# Patient Record
Sex: FEMALE | Race: WHITE
[De-identification: names, ages, dates, MRNs, and addresses within clinical notes are randomized per-mention and may not be internally consistent; named-entity substitution may affect disease eponyms.]

---

## 2012-07-06 VITALS — SYSTOLIC BLOOD PRESSURE: 138 MMHG | DIASTOLIC BLOOD PRESSURE: 74 MMHG

## 2018-09-24 ENCOUNTER — HOSPITAL ENCOUNTER (OUTPATIENT)
Dept: HOSPITAL 97 - 2ND | Age: 78
Setting detail: OBSERVATION
LOS: 3 days | Discharge: HOME | End: 2018-09-27
Attending: INTERNAL MEDICINE | Admitting: INTERNAL MEDICINE
Payer: COMMERCIAL

## 2018-09-24 VITALS — BODY MASS INDEX: 34.9 KG/M2

## 2018-09-24 DIAGNOSIS — B96.20: ICD-10-CM

## 2018-09-24 DIAGNOSIS — Z23: ICD-10-CM

## 2018-09-24 DIAGNOSIS — Z88.0: ICD-10-CM

## 2018-09-24 DIAGNOSIS — R46.0: ICD-10-CM

## 2018-09-24 DIAGNOSIS — Z88.2: ICD-10-CM

## 2018-09-24 DIAGNOSIS — E11.9: ICD-10-CM

## 2018-09-24 DIAGNOSIS — M19.90: ICD-10-CM

## 2018-09-24 DIAGNOSIS — I10: ICD-10-CM

## 2018-09-24 DIAGNOSIS — N39.0: Primary | ICD-10-CM

## 2018-09-24 LAB
ALBUMIN SERPL BCP-MCNC: 3 G/DL (ref 3.4–5)
ALP SERPL-CCNC: 65 U/L (ref 45–117)
ALT SERPL W P-5'-P-CCNC: 15 U/L (ref 12–78)
AST SERPL W P-5'-P-CCNC: 20 U/L (ref 15–37)
BUN BLD-MCNC: 19 MG/DL (ref 7–18)
GLUCOSE SERPLBLD-MCNC: 89 MG/DL (ref 74–106)
HCT VFR BLD CALC: 41.1 % (ref 36–45)
INR BLD: 0.99
LYMPHOCYTES # SPEC AUTO: 2.6 K/UL (ref 0.7–4.9)
MAGNESIUM SERPL-MCNC: 2 MG/DL (ref 1.8–2.4)
MCH RBC QN AUTO: 33.6 PG (ref 27–35)
MCV RBC: 100.6 FL (ref 80–100)
PMV BLD: 8.8 FL (ref 7.6–11.3)
POTASSIUM SERPL-SCNC: 3.7 MMOL/L (ref 3.5–5.1)
RBC # BLD: 4.09 M/UL (ref 3.86–4.86)
TSH SERPL DL<=0.05 MIU/L-ACNC: 2.84 UIU/ML (ref 0.36–3.74)
UA COMPLETE W REFLEX CULTURE PNL UR: (no result)
UA DIPSTICK W REFLEX MICRO PNL UR: (no result)

## 2018-09-24 PROCEDURE — 36415 COLL VENOUS BLD VENIPUNCTURE: CPT

## 2018-09-24 PROCEDURE — 74177 CT ABD & PELVIS W/CONTRAST: CPT

## 2018-09-24 PROCEDURE — 93005 ELECTROCARDIOGRAM TRACING: CPT

## 2018-09-24 PROCEDURE — 80076 HEPATIC FUNCTION PANEL: CPT

## 2018-09-24 PROCEDURE — 87186 SC STD MICRODIL/AGAR DIL: CPT

## 2018-09-24 PROCEDURE — 87086 URINE CULTURE/COLONY COUNT: CPT

## 2018-09-24 PROCEDURE — 81015 MICROSCOPIC EXAM OF URINE: CPT

## 2018-09-24 PROCEDURE — 82607 VITAMIN B-12: CPT

## 2018-09-24 PROCEDURE — 82652 VIT D 1 25-DIHYDROXY: CPT

## 2018-09-24 PROCEDURE — 87088 URINE BACTERIA CULTURE: CPT

## 2018-09-24 PROCEDURE — 71046 X-RAY EXAM CHEST 2 VIEWS: CPT

## 2018-09-24 PROCEDURE — 85730 THROMBOPLASTIN TIME PARTIAL: CPT

## 2018-09-24 PROCEDURE — 87077 CULTURE AEROBIC IDENTIFY: CPT

## 2018-09-24 PROCEDURE — 85025 COMPLETE CBC W/AUTO DIFF WBC: CPT

## 2018-09-24 PROCEDURE — 84100 ASSAY OF PHOSPHORUS: CPT

## 2018-09-24 PROCEDURE — 83735 ASSAY OF MAGNESIUM: CPT

## 2018-09-24 PROCEDURE — 94640 AIRWAY INHALATION TREATMENT: CPT

## 2018-09-24 PROCEDURE — 87040 BLOOD CULTURE FOR BACTERIA: CPT

## 2018-09-24 PROCEDURE — 85610 PROTHROMBIN TIME: CPT

## 2018-09-24 PROCEDURE — 71045 X-RAY EXAM CHEST 1 VIEW: CPT

## 2018-09-24 PROCEDURE — 84443 ASSAY THYROID STIM HORMONE: CPT

## 2018-09-24 PROCEDURE — 81003 URINALYSIS AUTO W/O SCOPE: CPT

## 2018-09-24 PROCEDURE — 80048 BASIC METABOLIC PNL TOTAL CA: CPT

## 2018-09-24 RX ADMIN — TOPIRAMATE SCH MG: 100 TABLET, FILM COATED ORAL at 22:17

## 2018-09-24 RX ADMIN — SODIUM CHLORIDE SCH MLS: 0.45 INJECTION, SOLUTION INTRAVENOUS at 21:58

## 2018-09-24 RX ADMIN — QUETIAPINE FUMARATE SCH MG: 100 TABLET, FILM COATED ORAL at 22:17

## 2018-09-24 NOTE — RAD REPORT
EXAM DESCRIPTION:  Vanna Garcia (2 Views)9/24/2018 9:42 pm

 

CLINICAL HISTORY:  Cough

 

COMPARISON:  April 2018

 

FINDINGS:   The lungs appear clear of acute infiltrate. The heart is borderline enlarged

 

IMPRESSION:   No acute abnormalities displayed

## 2018-09-24 NOTE — RAD REPORT
EXAM DESCRIPTION:  CT - Abdomen   Pelvis W Contrast - 9/24/2018 9:29 pm

 

CLINICAL HISTORY:  Abdominal pain/urinary tract infection/dysuria

 

COMPARISON:  none.

 

TECHNIQUE:  Computed axial tomography of the abdomen pelvis was obtained. 100 cc Isovue-300 was admin
istered intravenously. Oral contrast was given

 

All CT scans are performed using dose optimization technique as appropriate and may include automated
 exposure control or mA/KV adjustment according to patient size.

 

FINDINGS:  An 11 millimeter left lower lobe nodule is unchanged T

 

He liver, pancreas, and adrenals appear unremarkable. Small renal cysts are present.

 

A 25 millimeter splenic cyst contains a small peripheral calcification

 

There is no evidence of diverticulitis.

 

The bladder wall is thickened with stranding in the adjacent fat

 

Postsurgical changes involve the lumbar spine.

 

A tiny umbilical hernia is seen

 

IMPRESSION:  Thickened bladder wall with stranding in the adjacent fat indicating a cystitis

 

11 millimeter left lower lobe nodule unchanged from April 2018. However it has enlarged from May 2011
 exam in which it measured 6 millimeters. As was previously mentioned a nuclear medicine PET-CT scan 
is recommended

## 2018-09-25 LAB
BUN BLD-MCNC: 16 MG/DL (ref 7–18)
GLUCOSE SERPLBLD-MCNC: 96 MG/DL (ref 74–106)
HCT VFR BLD CALC: 37.2 % (ref 36–45)
LYMPHOCYTES # SPEC AUTO: 2.3 K/UL (ref 0.7–4.9)
MAGNESIUM SERPL-MCNC: 1.9 MG/DL (ref 1.8–2.4)
MCH RBC QN AUTO: 33.8 PG (ref 27–35)
MCV RBC: 100.2 FL (ref 80–100)
PMV BLD: 8.8 FL (ref 7.6–11.3)
POTASSIUM SERPL-SCNC: 3.3 MMOL/L (ref 3.5–5.1)
RBC # BLD: 3.71 M/UL (ref 3.86–4.86)
UA COMPLETE W REFLEX CULTURE PNL UR: (no result)
UA DIPSTICK W REFLEX MICRO PNL UR: (no result)

## 2018-09-25 RX ADMIN — TOPIRAMATE SCH MG: 100 TABLET, FILM COATED ORAL at 10:18

## 2018-09-25 RX ADMIN — MEMANTINE HYDROCHLORIDE SCH MG: 10 TABLET ORAL at 21:18

## 2018-09-25 RX ADMIN — TRAMADOL HYDROCHLORIDE SCH MG: 50 TABLET, COATED ORAL at 12:21

## 2018-09-25 RX ADMIN — MEMANTINE HYDROCHLORIDE SCH MG: 10 TABLET ORAL at 10:19

## 2018-09-25 RX ADMIN — DULOXETINE HYDROCHLORIDE SCH MG: 30 CAPSULE, DELAYED RELEASE ORAL at 10:16

## 2018-09-25 RX ADMIN — METOPROLOL SUCCINATE SCH MG: 50 TABLET, EXTENDED RELEASE ORAL at 10:22

## 2018-09-25 RX ADMIN — ALBUTEROL SULFATE SCH MG: 2.5 SOLUTION RESPIRATORY (INHALATION) at 20:17

## 2018-09-25 RX ADMIN — TRAMADOL HYDROCHLORIDE SCH MG: 50 TABLET, COATED ORAL at 21:17

## 2018-09-25 RX ADMIN — GABAPENTIN SCH MG: 300 CAPSULE ORAL at 10:17

## 2018-09-25 RX ADMIN — ALBUTEROL SULFATE SCH MG: 2.5 SOLUTION RESPIRATORY (INHALATION) at 14:58

## 2018-09-25 RX ADMIN — CEFTRIAXONE SCH MLS: 1 INJECTION, SOLUTION INTRAVENOUS at 21:18

## 2018-09-25 RX ADMIN — QUETIAPINE FUMARATE SCH MG: 100 TABLET, FILM COATED ORAL at 21:18

## 2018-09-25 RX ADMIN — TOPIRAMATE SCH MG: 100 TABLET, FILM COATED ORAL at 21:49

## 2018-09-25 RX ADMIN — DULOXETINE HYDROCHLORIDE SCH MG: 30 CAPSULE, DELAYED RELEASE ORAL at 21:17

## 2018-09-25 RX ADMIN — ENOXAPARIN SODIUM SCH MG: 40 INJECTION SUBCUTANEOUS at 10:22

## 2018-09-25 RX ADMIN — GABAPENTIN SCH MG: 300 CAPSULE ORAL at 21:17

## 2018-09-25 RX ADMIN — POTASSIUM CHLORIDE SCH MEQ: 10 TABLET, FILM COATED, EXTENDED RELEASE ORAL at 10:16

## 2018-09-25 RX ADMIN — POTASSIUM CHLORIDE SCH MEQ: 10 TABLET, FILM COATED, EXTENDED RELEASE ORAL at 21:16

## 2018-09-25 RX ADMIN — INDAPAMIDE SCH MG: 2.5 TABLET, FILM COATED ORAL at 12:22

## 2018-09-25 RX ADMIN — TOPIRAMATE SCH: 100 TABLET, FILM COATED ORAL at 21:00

## 2018-09-25 RX ADMIN — ALBUTEROL SULFATE SCH MG: 2.5 SOLUTION RESPIRATORY (INHALATION) at 02:00

## 2018-09-25 RX ADMIN — METOPROLOL SUCCINATE SCH MG: 50 TABLET, EXTENDED RELEASE ORAL at 18:26

## 2018-09-25 RX ADMIN — ALBUTEROL SULFATE SCH MG: 2.5 SOLUTION RESPIRATORY (INHALATION) at 07:45

## 2018-09-25 RX ADMIN — GABAPENTIN SCH MG: 300 CAPSULE ORAL at 14:37

## 2018-09-25 RX ADMIN — ATORVASTATIN CALCIUM SCH MG: 20 TABLET, FILM COATED ORAL at 21:16

## 2018-09-25 RX ADMIN — CEFTRIAXONE SCH MLS: 1 INJECTION, SOLUTION INTRAVENOUS at 10:17

## 2018-09-25 NOTE — P.CNS
Date of Consult: 18





Gyn Consult requested.


Pt. is a 77 year old , s/p hysterectomy for abnormal pap smears in . 

She is hospitilized with an uncomplicated UTI, with no hx of fever or flank 

pain.  By history this is her 5th UTI since May.  She has no hx of bladder 

support surgery. There are no documents to review on chart other than CT, which 

is suggestive only of inflammation around bladder.  I don't suspect gyn origen 

for her infections and would suggest outpt. evaluation by a urologist.  Perhaps 

a 6 month course of suppressive therapy with trimethoprim would be appropriate 

to consider.

## 2018-09-25 NOTE — PN
Subjective:  Ms. Peres has had about 5 to 6 UTI's with treatment given for Cipro and Ceftin off and
 on.  Cultures have been sensitive to this antibiotics.  She fails to improve, continues to have lowe
r abdominal pain and severely putrid smell of the urine, so I decided to admit her for IV antibiotics
 and have consulted from GYN and Urology.  Dr. Concepcion saw the patient this morning, he did not fin
d anything obvious on his side.  I ordered a CT scan of abdomen pelvis with contrast, which showed no
 major findings.  She does have a nodule, which is in the lung, which is about 11 mm slightly bigger 
than before about 5 years ago.  On investigations, as above plus potassium 2.2, BUN, creatinine constance
l.  UA is positive for more than 50 WBCs and leukocyte esterase is pending.



Assessment And Plan:  

1.Recurrent urinary tract infection.  Consult Dr. Rooney.  Possible cystoscopy.  Prognosis remains gu
arded.  She had extremely poor hygiene at home and that is one reason why she continues to have infec
tions.  We do not find any gynecological reasons on the CT scan of abdomen and pelvis did not show ob
struction of urinary flow.  Now we need a cystoscopy to figure out anything inside the bladder, which
 could be causing these bladder infections like a bladder cancer.

2.Diabetes, high blood pressure, degenerative joint disease.  Continues to be stable at this point.





MISTY/CATRACHITA

DD:  09/25/2018 12:52:30Voice ID:  [id number]

DT:  09/25/2018 17:47:30Report ID:  [id number]

## 2018-09-25 NOTE — CON
History Of Present Illness:  This is a pleasant 77-year-old lady, a patient of Dr. Barger, who has bee
n having recurrent urinary tract infections.  She said she has had 5 infections since May of this yea
r, which is almost one every month or so.  Last time on 08/16/2018, she had E coli that was pansensit
salome and in January 2017, she had a Klebsiella pneumoniae infection that was pretty much pansensitive 
with intermediate sensitivity to cephalosporin, resistant to nitrofurantoin and the computer shows an
other E coli infection in December 2016 that was pansensitive.  So, according to the computer, she ha
s an occasional UTI, but she said she has had 5 since May.  She has no other urinary symptoms except 
burning normally.  She does not have any frequency, hesitancy, nocturia, decrease in force of stream 
or postvoid dribbling.  She only drinks 1 to 2 small bottles of water a day, so she is definitely deh
ydrated.  She needs to increase her fluids.  Continue taking probiotics, take cranberry juice and D-M
annose 1500 mg a day for prevention.  She said her mom had these problems too when she got older, but
 last year and so she did pretty well.



Past Medical History:  History of benign hypertension, obstructive sleep apnea, Meniere disease, esop
hageal stenosis.



Past Surgical History:  Tonsillectomy, breast biopsy, hysterectomy, appendectomy, left mastectomy, to
mely right knee replacement.



Allergies:  CLINDAMYCIN CAUSES RASH, PENICILLIN CAUSES RASH, SULFA CAUSES RASH, PROPOXYPHENE FROM FRAN
VOCET, AND SOME UNKNOWN ALLERGIES, STRAWBERRY SOME RASH.



Active Medications:  Albuterol, Lipitor, ceftriaxone now, Benadryl, Cymbalta, Lovenox, Neurontin, Chaz
ol, Imodium, Namenda, Zofran GlycoLax, potassium, Seroquel, Topamax, Ultram.



Social History:  No tobacco.  No smoking.



Family History:  Noncontributory.



Review of Systems:

10-point review of systems is essentially negative.



Physical Examination:

GENERAL:  She is lying in bed, resting comfortable.  Normal-appearing patient. 

VITAL SIGNS: Temperature 97.2, pulse 57, respirations 16, blood pressure 99/54, sats 93%. 

HEENT:  Atraumatic, normocephalic. 

CHEST:  Clear. 

HEART:  S1, S2.  

ABDOMEN:  Soft, nontender.  

EXTREMITIES:  Normal range of motion.



Laboratory Data:  White count 7.0, H and H is 12.5 and 37, platelet 199.  Coagulation studies normal 
with a PTT 22.5.  Chemistry shows slightly low potassium 3.3 with sodium 139, chloride 103, carbon di
oxide 24, BUN 16, creatinine 1.0, GFR 54, glucose 96, calcium 8.2.  Urine culture is pending.  The UA
 shows 10 to 20 rbc's, greater than 50 wbc's and bacteria 20 to 50.



Assessment:  Recurrent urinary tract infection.



Plan:  Await for culture and sensitivity based on antibiotics and that she needs to hydrate a lot mor
e.  She should be drinking about half her body weight in ounces per day, taking probiotics every day,
 cranberry pills and D-Mannose 1500 mg per day.  Her CT scan was reviewed and there are no significan
t finding for any cause for UTI.  No stones.  No hydronephrosis and some inflammation around the blad
stefano consistent with a UTI.  She may also need possible prophylactic once a day antibiotics in the nex
t 6 months.





LIBERTAD/CATRACHITA

DD:  09/25/2018 09:18:14Voice ID:  [id number]

DT:  09/25/2018 14:01:19Report ID:  [id number]

## 2018-09-25 NOTE — EKG
Test Date:    2018-09-24               Test Time:    17:53:04

Technician:   RICKY                                     

                                                     

MEASUREMENT RESULTS:                                       

Intervals:                                           

Rate:         56                                     

VA:           168                                    

QRSD:         86                                     

QT:           446                                    

QTc:          430                                    

Axis:                                                

P:            0                                      

VA:           168                                    

QRS:          43                                     

T:            41                                     

                                                     

INTERPRETIVE STATEMENTS:                                       

                                                     

Sinus bradycardia

Otherwise normal ECG

Compared to ECG 04/19/2012 19:53:12

Sinus rhythm no longer present

ST (T wave) deviation no longer present



Electronically Signed On 09-25-18 09:24:10 CDT by Darrian Packer

## 2018-09-25 NOTE — RAD REPORT
EXAM DESCRIPTION:  RAD - Chest Single View - 9/25/2018 6:00 pm

 

CLINICAL HISTORY:  PICC line placement

 

COMPARISON:  September 24

 

FINDINGS:  Portable chest was obtained following placement of a right upper extremity PICC line. The 
catheter tip is in the mid SVC.

## 2018-09-26 LAB
BUN BLD-MCNC: 15 MG/DL (ref 7–18)
GLUCOSE SERPLBLD-MCNC: 103 MG/DL (ref 74–106)
HCT VFR BLD CALC: 37 % (ref 36–45)
LYMPHOCYTES # SPEC AUTO: 2.5 K/UL (ref 0.7–4.9)
MAGNESIUM SERPL-MCNC: 2 MG/DL (ref 1.8–2.4)
MCH RBC QN AUTO: 33.8 PG (ref 27–35)
MCV RBC: 99.3 FL (ref 80–100)
PMV BLD: 9.1 FL (ref 7.6–11.3)
POTASSIUM SERPL-SCNC: 3.1 MMOL/L (ref 3.5–5.1)
RBC # BLD: 3.73 M/UL (ref 3.86–4.86)

## 2018-09-26 RX ADMIN — TRAMADOL HYDROCHLORIDE SCH: 50 TABLET, COATED ORAL at 21:00

## 2018-09-26 RX ADMIN — INDAPAMIDE SCH MG: 2.5 TABLET, FILM COATED ORAL at 09:23

## 2018-09-26 RX ADMIN — GABAPENTIN SCH MG: 300 CAPSULE ORAL at 14:25

## 2018-09-26 RX ADMIN — POTASSIUM CHLORIDE SCH MEQ: 10 TABLET, FILM COATED, EXTENDED RELEASE ORAL at 07:42

## 2018-09-26 RX ADMIN — ALBUTEROL SULFATE SCH MG: 2.5 SOLUTION RESPIRATORY (INHALATION) at 08:25

## 2018-09-26 RX ADMIN — POTASSIUM CHLORIDE SCH MEQ: 10 TABLET, FILM COATED, EXTENDED RELEASE ORAL at 21:17

## 2018-09-26 RX ADMIN — GABAPENTIN SCH MG: 300 CAPSULE ORAL at 21:17

## 2018-09-26 RX ADMIN — ALBUTEROL SULFATE SCH MG: 2.5 SOLUTION RESPIRATORY (INHALATION) at 13:55

## 2018-09-26 RX ADMIN — ATORVASTATIN CALCIUM SCH MG: 20 TABLET, FILM COATED ORAL at 21:18

## 2018-09-26 RX ADMIN — METOPROLOL SUCCINATE SCH MG: 50 TABLET, EXTENDED RELEASE ORAL at 05:43

## 2018-09-26 RX ADMIN — SODIUM CHLORIDE SCH MLS: 0.45 INJECTION, SOLUTION INTRAVENOUS at 05:43

## 2018-09-26 RX ADMIN — ALBUTEROL SULFATE SCH MG: 2.5 SOLUTION RESPIRATORY (INHALATION) at 19:56

## 2018-09-26 RX ADMIN — DULOXETINE HYDROCHLORIDE SCH MG: 30 CAPSULE, DELAYED RELEASE ORAL at 09:22

## 2018-09-26 RX ADMIN — ENOXAPARIN SODIUM SCH MG: 40 INJECTION SUBCUTANEOUS at 09:22

## 2018-09-26 RX ADMIN — MEMANTINE HYDROCHLORIDE SCH MG: 10 TABLET ORAL at 09:26

## 2018-09-26 RX ADMIN — METOPROLOL SUCCINATE SCH MG: 50 TABLET, EXTENDED RELEASE ORAL at 17:48

## 2018-09-26 RX ADMIN — TRAMADOL HYDROCHLORIDE SCH: 50 TABLET, COATED ORAL at 09:00

## 2018-09-26 RX ADMIN — DULOXETINE HYDROCHLORIDE SCH MG: 30 CAPSULE, DELAYED RELEASE ORAL at 21:17

## 2018-09-26 RX ADMIN — TOPIRAMATE SCH MG: 100 TABLET, FILM COATED ORAL at 21:19

## 2018-09-26 RX ADMIN — MEMANTINE HYDROCHLORIDE SCH MG: 10 TABLET ORAL at 21:18

## 2018-09-26 RX ADMIN — CEFTRIAXONE SCH MLS: 1 INJECTION, SOLUTION INTRAVENOUS at 09:26

## 2018-09-26 RX ADMIN — CEFTRIAXONE SCH MLS: 1 INJECTION, SOLUTION INTRAVENOUS at 21:16

## 2018-09-26 RX ADMIN — QUETIAPINE FUMARATE SCH MG: 100 TABLET, FILM COATED ORAL at 21:18

## 2018-09-26 RX ADMIN — ALBUTEROL SULFATE SCH MG: 2.5 SOLUTION RESPIRATORY (INHALATION) at 01:29

## 2018-09-26 RX ADMIN — GABAPENTIN SCH MG: 300 CAPSULE ORAL at 09:26

## 2018-09-26 RX ADMIN — TOPIRAMATE SCH MG: 100 TABLET, FILM COATED ORAL at 09:26

## 2018-09-26 NOTE — PN
Subjective:  The patient is stable.  Multiple family members present in the room of 5.



Objective:  Her urine culture came back E. coli, pansensitive. 

Vital Sign:  She is afebrile, stable. 



Her white count is still normal at 6.6, H and H 12.6 and 37, platelet 218.  .



Assessment And Plan:  Plan is to send the patient home.  She is to bathe every day.  Apparently, she 
was not doing that at home.  She is to drink lots of water.  We are going to give her Macrobid 1 p.o.
 b.i.d. with food #14, then she is to go on Macrodantin 50 mg 1 p.o. daily with food for 90 days with
 1 refill, and she can follow up with me in 1 week.  She is okay for discharge today.





VALARIE

DD:  09/26/2018 13:34:37Voice ID:  [id number]

DT:  09/26/2018 18:14:57Report ID:  [id number]

## 2018-09-27 VITALS — OXYGEN SATURATION: 90 %

## 2018-09-27 VITALS — DIASTOLIC BLOOD PRESSURE: 58 MMHG | SYSTOLIC BLOOD PRESSURE: 128 MMHG

## 2018-09-27 VITALS — TEMPERATURE: 98.8 F

## 2018-09-27 LAB
BUN BLD-MCNC: 13 MG/DL (ref 7–18)
GLUCOSE SERPLBLD-MCNC: 98 MG/DL (ref 74–106)
HCT VFR BLD CALC: 37 % (ref 36–45)
LYMPHOCYTES # SPEC AUTO: 2.6 K/UL (ref 0.7–4.9)
MAGNESIUM SERPL-MCNC: 1.8 MG/DL (ref 1.8–2.4)
MCH RBC QN AUTO: 33.9 PG (ref 27–35)
MCV RBC: 100.6 FL (ref 80–100)
PMV BLD: 8.5 FL (ref 7.6–11.3)
POTASSIUM SERPL-SCNC: 3.2 MMOL/L (ref 3.5–5.1)
RBC # BLD: 3.68 M/UL (ref 3.86–4.86)

## 2018-09-27 RX ADMIN — GABAPENTIN SCH MG: 300 CAPSULE ORAL at 08:36

## 2018-09-27 RX ADMIN — POTASSIUM CHLORIDE SCH: 10 TABLET, FILM COATED, EXTENDED RELEASE ORAL at 08:22

## 2018-09-27 RX ADMIN — DULOXETINE HYDROCHLORIDE SCH MG: 30 CAPSULE, DELAYED RELEASE ORAL at 08:37

## 2018-09-27 RX ADMIN — INDAPAMIDE SCH: 2.5 TABLET, FILM COATED ORAL at 09:00

## 2018-09-27 RX ADMIN — METOPROLOL SUCCINATE SCH MG: 50 TABLET, EXTENDED RELEASE ORAL at 08:37

## 2018-09-27 RX ADMIN — ENOXAPARIN SODIUM SCH MG: 40 INJECTION SUBCUTANEOUS at 08:40

## 2018-09-27 RX ADMIN — MEMANTINE HYDROCHLORIDE SCH MG: 10 TABLET ORAL at 08:35

## 2018-09-27 RX ADMIN — TRAMADOL HYDROCHLORIDE SCH MG: 50 TABLET, COATED ORAL at 11:16

## 2018-09-27 RX ADMIN — ALBUTEROL SULFATE SCH: 2.5 SOLUTION RESPIRATORY (INHALATION) at 14:00

## 2018-09-27 RX ADMIN — TOPIRAMATE SCH MG: 100 TABLET, FILM COATED ORAL at 08:40

## 2018-09-27 RX ADMIN — CEFTRIAXONE SCH MLS: 1 INJECTION, SOLUTION INTRAVENOUS at 08:35

## 2018-09-27 RX ADMIN — ALBUTEROL SULFATE SCH: 2.5 SOLUTION RESPIRATORY (INHALATION) at 08:00

## 2018-09-27 RX ADMIN — ALBUTEROL SULFATE SCH MG: 2.5 SOLUTION RESPIRATORY (INHALATION) at 03:53

## 2018-09-27 NOTE — PN
__________



Subjective:  The patient has improved about 50%.  Denies any chest pain, nausea
, vomiting, diarrhea, or coughing.



Physical Examination:

Vital Signs:  Blood pressure 118/59.  Pulse is 55. 

CHEST:  Clear. 

Heart:  Regular. 

Abdomen:  No guarding, rebound, or rigidity. 



On full investigation, CT abdomen, chest, and pelvis did not show any 
abnormalities.  Dr. Rooney performed postvoid residual.  She has normal 
residual.  Her urine culture is growing gram-negative bacilli, E coli, and 
seems to be sensitive to all the antibiotics.



Assessment And Planning:  Recurrent UTI.  In the past, the patient has had 
multiple antibiotics given for about the last 4 months for the same bacteria.  
Sensitivity has been just as good as before, even though she recurs an 
infection and the patient's family, which is a caretaker, was quite upset about 
it, but actually her hygiene is extremely poor.  She does not get bath possibly 
on a daily basis, not maybe once a week.  Her clothes have severe smell all the 
time.  Her house is full of junk according to home health nurses and the care 
given by the people around is probably not good enough to clean her area 
appropriately, so she does not get infections, which is what I stressed today 
with the family, which is daughter that she has to be clean every single day, 
shower every day, and clean from front to back, use Bidet to spray water and 
clean the area after every time she urinates or has bowel movement , and keep 
the area very dry in the merlin-area to prevent infection.





MISTY/CATRACHITA

DD:  09/26/2018 17:48:58   Voice ID:  [id number]

DT:  09/26/2018 22:40:33   Report ID:  [id number]

LARRY

## 2018-09-27 NOTE — PN
Subjective:  The patient is doing well, ready for discharge.



Objective:  Vital Signs:  Afebrile.  Vital signs are stable.



Laboratory Data:  Labs are stable.  White count 6.6, H and H 12.5 and 37.0, platelet count 204.



Assessment:  Escherichia coli.  Urinary tract infection.



Plan:  To shower daily, take her medications, hydrate daily well.  Follow up in a month.  Has been on
 prophylaxis for 6 months.





VALARIE

DD:  09/27/2018 12:23:48Voice ID:  [id number]

DT:  09/27/2018 17:24:36Report ID:  [id number]

## 2018-09-28 LAB
1,25(OH)2D SERPL-MCNC: 27 PG/ML (ref 18–72)
1,25(OH)2D2 SERPL-MCNC: <8 PG/ML

## 2018-09-28 NOTE — DS
Date of Discharge:  09/27/2018



Final Diagnosis:  Recurrent severe urinary tract infection.



Secondary Diagnoses:  Diabetes, poor hygiene, high blood pressure, osteoarthritis.



Hospital Course:  The patient is a 77-year-old with past medical history of above medical problems, h
as had 5 infections in the last 5-6 months of UTI, but on further details we find out that she is wip
ing her urine area back to front and the same for the stool area, and the hygiene is poor.  I have ad
vised family to get Bidet to clean to have hooked up on the commode and clean her merlin area properly 
on daily basis, get shower every day, and get a very good hygiene which will improve and prevent the 
urinary tract infections.  She does not have any physical reasons why she will have UTI besides that.
  CT scan of abdomen and pelvis with contrast, negative.  No postvoid residual in the urinary bladder
.  Patient is discharged in stable condition on Ceftin 250 b.i.d.  She grew E coli which is sensitive
 to Ceftin 250 b.i.d. for about 10 days, and further details given about the UTI.





MISTY/CATRACHITA

DD:  09/27/2018 12:46:28Voice ID:  [id number]

DT:  09/28/2018 06:27:51Report ID:  [id number]

## 2019-10-19 ENCOUNTER — HOSPITAL ENCOUNTER (EMERGENCY)
Dept: HOSPITAL 97 - ER | Age: 79
Discharge: HOME | End: 2019-10-19
Payer: COMMERCIAL

## 2019-10-19 VITALS — SYSTOLIC BLOOD PRESSURE: 126 MMHG | DIASTOLIC BLOOD PRESSURE: 76 MMHG

## 2019-10-19 VITALS — TEMPERATURE: 97.8 F

## 2019-10-19 VITALS — OXYGEN SATURATION: 100 %

## 2019-10-19 DIAGNOSIS — F32.9: ICD-10-CM

## 2019-10-19 DIAGNOSIS — Z88.0: ICD-10-CM

## 2019-10-19 DIAGNOSIS — Z88.2: ICD-10-CM

## 2019-10-19 DIAGNOSIS — Z85.9: ICD-10-CM

## 2019-10-19 DIAGNOSIS — N39.0: Primary | ICD-10-CM

## 2019-10-19 DIAGNOSIS — Z88.5: ICD-10-CM

## 2019-10-19 DIAGNOSIS — Z88.3: ICD-10-CM

## 2019-10-19 LAB — UA COMPLETE W REFLEX CULTURE PNL UR: (no result)

## 2019-10-19 PROCEDURE — 99283 EMERGENCY DEPT VISIT LOW MDM: CPT

## 2019-10-19 PROCEDURE — 96372 THER/PROPH/DIAG INJ SC/IM: CPT

## 2019-10-19 PROCEDURE — 87086 URINE CULTURE/COLONY COUNT: CPT

## 2019-10-19 PROCEDURE — 81003 URINALYSIS AUTO W/O SCOPE: CPT

## 2019-10-19 PROCEDURE — 81015 MICROSCOPIC EXAM OF URINE: CPT

## 2019-10-19 PROCEDURE — 87088 URINE BACTERIA CULTURE: CPT

## 2019-10-19 NOTE — ER
Nurse's Notes                                                                                     

 [facility]                                                                 

Name: Leona Peres                                                                              

Age: 79 yrs                                                                                       

Sex: Female                                                                                       

: 1940                                                                                   

MRN: [id number]                                                                                   

Arrival Date: 10/19/2019                                                                          

Time: 08:47                                                                                       

Account#: [id number]                                                                            

Bed 8                                                                                             

Private MD: Kendall Barger V                                                                      

Diagnosis: Urinary tract infection, site not specified                                            

                                                                                                  

Presentation:                                                                                     

10/19                                                                                             

09:01 Presenting complaint: Patient states: Pain with urination and urinary frequency that    ss  

      began at 0500 this AM. Patient was treated for UTI 1 month ago, and is on maintenance       

      dose of Macrodantin for recurrent UTI. Transition of care: patient was not received         

      from another setting of care. Onset of symptoms was 2019. Risk Assessment:      

      Do you want to hurt yourself or someone else? Patient reports no desire to harm self or     

      others. Initial Sepsis Screen: Does the patient have a suspected source of infection?       

      Yes: Dysuria/Frequency/Urgency/UTI. Care prior to arrival: None.                            

09:01 Method Of Arrival: Ambulatory                                                           ss  

09:01 Acuity: ANUSHKA 4                                                                           ss  

09:02 Initial Sepsis Screen: Does the patient meet any 2 criteria? No. Patient's initial      hb  

      sepsis screen is negative.                                                                  

                                                                                                  

Historical:                                                                                       

- Allergies:                                                                                      

09:02 clindamycin HCl (bulk);                                                                 hb  

09:02 Darvocet-N 100;                                                                         hb  

09:02 PENICILLINS;                                                                            hb  

09:02 Sulfa (Sulfonamide Antibiotics);                                                        hb  

- Home Meds:                                                                                      

09:02 duloxetine 60 mg Oral cpDR 2 caps once daily [Active]; Macrodantin 50 mg Oral cap 1 cap hb  

      once daily [Active]; metoprolol succinate 50 mg Oral Tb24 1 tab once daily [Active];        

      potassium chloride 10 mEq Oral cpER 1 cap 2 times per day [Active]; Seroquel 100 mg         

      Oral tab daily [Active]; simvastatin 40 mg Oral tab 1 tab once daily [Active];              

      spironolactone 20mg Oral once daily [Active]; tramadol 50 mg Oral tab 2 times daily         

      [Active];                                                                                   

- PMHx:                                                                                           

09:02 Cancer; Depression; Fibromyalgia; GERD; insomnia; lymphedema; skipped heart beats;      hb  

- PSHx:                                                                                           

09:02 cervical fusion; knee replacements; Appendectomy; lumbar fusion; Hysterectomy;          hb  

      Mastectomy, Left;                                                                           

                                                                                                  

- Immunization history:: Adult Immunizations up to date, Adult Immunizations up to date.          

- Social history:: Smoking status: Patient/guardian denies using tobacco, Smoking                 

  status: Patient/guardian denies using tobacco.                                                  

- Ebola Screening: : No symptoms or risks identified at this time Patient denies                  

  exposure to infectious person Patient denies travel to an Ebola-affected area in the            

  21 days before illness onset.                                                                   

                                                                                                  

                                                                                                  

Screenin:01 Abuse screen: Denies threats or abuse. Denies injuries from another. Nutritional        hb  

      screening: No deficits noted. Tuberculosis screening: No symptoms or risk factors           

      identified. Fall Risk None identified.                                                      

                                                                                                  

Assessment:                                                                                       

09:00 General: Appears in no apparent distress. Behavior is calm, cooperative. Pain: Pain     hb  

      currently is 7 out of 10 on a pain scale. Neuro: Level of Consciousness is awake,           

      alert, obeys commands, Oriented to person, place, time, situation. Cardiovascular:          

      Capillary refill < 3 seconds Patient's skin is warm and dry. Respiratory: Airway is         

      patent Respiratory effort is even, unlabored, Respiratory pattern is regular,               

      symmetrical. GI: Abdomen is non-distended, Reports lower abdominal pain. : Reports        

      burning with urination, pain in suprapubic area with urination. EENT: No signs and/or       

      symptoms were reported regarding the EENT system. Derm: Skin is pink, warm \T\ dry.         

      Musculoskeletal: No signs and/or symptoms reported regarding the musculoskeletal system.    

09:52 Reassessment: Patient appears in no apparent distress at this time. No changes from     hb  

      previously documented assessment. Patient and/or family updated on plan of care and         

      expected duration. Pain level reassessed. Patient is alert, oriented x 3, equal             

      unlabored respirations, skin warm/dry/pink.                                                 

10:31 Reassessment: Patient appears in no apparent distress at this time. No changes from     hb  

      previously documented assessment. Patient and/or family updated on plan of care and         

      expected duration. Pain level reassessed. Patient is alert, oriented x 3, equal             

      unlabored respirations, skin warm/dry/pink.                                                 

11:30 Reassessment: Patient appears in no apparent distress at this time. Patient and/or      hb  

      family updated on plan of care and expected duration. Pain level reassessed. Patient is     

      alert, oriented x 3, equal unlabored respirations, skin warm/dry/pink.                      

                                                                                                  

Vital Signs:                                                                                      

09:01  / 77; Pulse 71; Resp 18; Temp 97.8(TE); Pulse Ox 95% ; Weight 96.62 kg; Height 5   

      ft. 3 in. (160.02 cm); Pain 8/10;                                                           

10:15  / 74; Pulse 70; Resp 14; Pulse Ox 100% on R/A;                                   hb  

11:30  / 76; Pulse 68; Resp 16; Pulse Ox 100% on R/A;                                   hb  

09:01 Body Mass Index 37.73 (96.62 kg, 160.02 cm)                                               

                                                                                                  

ED Course:                                                                                        

08:47 Patient arrived in ED.                                                                  ag5 

08:47 Kendall Barger MD is Private Physician.                                                 ag5 

08:54 Arsen Brice MD is Attending Physician.                                                

09:01 Marcelle Glalagher RN is Primary Nurse.                                                   hb  

09:01 Arm band placed on.                                                                     hb  

09:02 Triage completed.                                                                       ss  

09:11 Patient has correct armband on for positive identification. Bed in low position. Call   hb  

      light in reach. Side rails up X 1.                                                          

11:27 Kendall Barger MD is Referral Physician.                                                gs  

11:59 No provider procedures requiring assistance completed. Patient did not have IV access   hb  

      during this emergency room visit.                                                           

                                                                                                  

Administered Medications:                                                                         

11:41 Drug: Rocephin (cefTRIAXone) 1 grams Route: IM; Site: right ventrogluteal;              hb  

11:59 Follow up: Response: No adverse reaction                                                hb  

                                                                                                  

                                                                                                  

Outcome:                                                                                          

11:27 Discharge ordered by MD.                                                                gs  

11:59 Discharged to home ambulatory.                                                          hb  

11:59 Condition: stable                                                                           

11:59 Discharge instructions given to patient, Instructed on discharge instructions, follow       

      up and referral plans. medication usage, Demonstrated understanding of instructions,        

      follow-up care, medications, Prescriptions given X 1.                                       

12:00 Patient left the ED.                                                                    hb  

                                                                                                  

Signatures:                                                                                       

Kimberly River RN                      RN                                                      

Marcelle Gallagher RN RN                                                      

Arsen Brice MD MD                                                      

Ashtyn Mas                                ag5                                                  

                                                                                                  

Corrections: (The following items were deleted from the chart)                                    

09:03 09:01 Initial Sepsis Screen: Does the patient meet any 2 criteria? hb                   hb  

                                                                                                  

**************************************************************************************************

## 2019-10-19 NOTE — EDPHYS
Physician Documentation                                                                           

 [facility]                                                                 

Name: Leona Peres                                                                              

Age: 79 yrs                                                                                       

Sex: Female                                                                                       

: 1940                                                                                   

MRN: [id number]                                                                                   

Arrival Date: 10/19/2019                                                                          

Time: 08:47                                                                                       

Account#: [id number]                                                                            

Bed 8                                                                                             

Private MD: Kendall Barger V                                                                      

ED Physician Arsen Brice                                                                       

HPI:                                                                                              

10/19                                                                                             

11:25 This 79 yrs old  Female presents to ER via Ambulatory with complaints of Pain  gs  

      With Urination.                                                                             

11:25 Onset: The symptoms/episode began/occurred this morning. Associated signs and symptoms: gs  

      Pertinent negatives: fever, vomiting. Severity of symptoms: At their worst the symptoms     

      were moderate, in the emergency department the symptoms are unchanged. The patient has      

      experienced similar episodes in the past, a few times. The patient has not recently         

      seen a physician.                                                                           

                                                                                                  

Historical:                                                                                       

- Allergies:                                                                                      

09:02 clindamycin HCl (bulk);                                                                 hb  

09:02 Darvocet-N 100;                                                                         hb  

09:02 PENICILLINS;                                                                            hb  

09:02 Sulfa (Sulfonamide Antibiotics);                                                        hb  

- Home Meds:                                                                                      

09:02 duloxetine 60 mg Oral cpDR 2 caps once daily [Active]; Macrodantin 50 mg Oral cap 1 cap hb  

      once daily [Active]; metoprolol succinate 50 mg Oral Tb24 1 tab once daily [Active];        

      potassium chloride 10 mEq Oral cpER 1 cap 2 times per day [Active]; Seroquel 100 mg         

      Oral tab daily [Active]; simvastatin 40 mg Oral tab 1 tab once daily [Active];              

      spironolactone 20mg Oral once daily [Active]; tramadol 50 mg Oral tab 2 times daily         

      [Active];                                                                                   

- PMHx:                                                                                           

09:02 Cancer; Depression; Fibromyalgia; GERD; insomnia; lymphedema; skipped heart beats;      hb  

- PSHx:                                                                                           

09:02 cervical fusion; knee replacements; Appendectomy; lumbar fusion; Hysterectomy;          hb  

      Mastectomy, Left;                                                                           

                                                                                                  

- Immunization history:: Adult Immunizations up to date, Adult Immunizations up to date.          

- Social history:: Smoking status: Patient/guardian denies using tobacco, Smoking                 

  status: Patient/guardian denies using tobacco.                                                  

- Ebola Screening: : No symptoms or risks identified at this time Patient denies                  

  exposure to infectious person Patient denies travel to an Ebola-affected area in the            

  21 days before illness onset.                                                                   

                                                                                                  

                                                                                                  

ROS:                                                                                              

11:25 Abdomen/GI: Positive for diarrhea, chronic.                                             gs  

11:25 All other systems are negative.                                                             

                                                                                                  

Exam:                                                                                             

11:25 Head/Face:  Normocephalic, atraumatic. Eyes:  Pupils equal round and reactive to light, gs  

      extra-ocular motions intact.  Lids and lashes normal.  Conjunctiva and sclera are           

      non-icteric and not injected.  Cornea within normal limits.  Periorbital areas with no      

      swelling, redness, or edema. ENT:  Nares patent. No nasal discharge, no septal              

      abnormalities noted.  Tympanic membranes are normal and external auditory canals are        

      clear.  Oropharynx with no redness, swelling, or masses, exudates, or evidence of           

      obstruction, uvula midline.  Mucous membranes moist. Neck:  Trachea midline, no             

      thyromegaly or masses palpated, and no cervical lymphadenopathy.  Supple, full range of     

      motion without nuchal rigidity, or vertebral point tenderness.  No Meningismus.             

      Chest/axilla:  Normal chest wall appearance and motion.  Nontender with no deformity.       

      No lesions are appreciated. Cardiovascular:  Regular rate and rhythm with a normal S1       

      and S2.  No gallops, murmurs, or rubs.  Normal PMI, no JVD.  No pulse deficits.             

      Respiratory:  Lungs have equal breath sounds bilaterally, clear to auscultation and         

      percussion.  No rales, rhonchi or wheezes noted.  No increased work of breathing, no        

      retractions or nasal flaring. Abdomen/GI:  Soft, non-tender, with normal bowel sounds.      

      No distension or tympany.  No guarding or rebound.  No evidence of tenderness               

      throughout. Back:  No spinal tenderness.  No costovertebral tenderness.  Full range of      

      motion. Skin:  Warm, dry with normal turgor.  Normal color with no rashes, no lesions,      

      and no evidence of cellulitis. MS/ Extremity:  Pulses equal, no cyanosis.                   

      Neurovascular intact.  Full, normal range of motion. Neuro:  Awake and alert, GCS 15,       

      oriented to person, place, time, and situation.  Cranial nerves II-XII grossly intact.      

      Motor strength 5/5 in all extremities.  Sensory grossly intact.  Cerebellar exam            

      normal.  Normal gait.                                                                       

11:25 Constitutional: The patient appears alert, awake.                                           

                                                                                                  

Vital Signs:                                                                                      

09:01  / 77; Pulse 71; Resp 18; Temp 97.8(TE); Pulse Ox 95% ; Weight 96.62 kg; Height 5 ss  

      ft. 3 in. (160.02 cm); Pain 8/10;                                                           

10:15  / 74; Pulse 70; Resp 14; Pulse Ox 100% on R/A;                                   hb  

11:30  / 76; Pulse 68; Resp 16; Pulse Ox 100% on R/A;                                   hb  

09:01 Body Mass Index 37.73 (96.62 kg, 160.02 cm)                                               

                                                                                                  

MDM:                                                                                              

09:59 Patient medically screened.                                                             gs  

11:25 Differential diagnosis: urinary tract infection. Data reviewed: vital signs, nurses     gs  

      notes, lab test result(s). Counseling: I had a detailed discussion with the patient         

      and/or guardian regarding: lab results, the need for outpatient follow up. Response to      

      treatment: the patient's symptoms have markedly improved after treatment.                   

                                                                                                  

10/19                                                                                             

09:03 Order name: Urine Microscopic Only; Complete Time: 11:01                                gs  

10/19                                                                                             

09:55 Order name: Urine Dipstick--Ancillary (enter results); Complete Time: 11:01             eb  

10/19                                                                                             

09:03 Order name: Urine Dipstick-Ancillary (obtain specimen); Complete Time: 09:21              

10/19                                                                                             

10:33 Order name: Urine Culture                                                               EDMS

                                                                                                  

Administered Medications:                                                                         

11:41 Drug: Rocephin (cefTRIAXone) 1 grams Route: IM; Site: right ventrogluteal;              hb  

11:59 Follow up: Response: No adverse reaction                                                hb  

                                                                                                  

                                                                                                  

Disposition:                                                                                      

10/19/19 11:27 Discharged to Home. Impression: Urinary tract infection, site not specified.       

- Condition is Stable.                                                                            

- Discharge Instructions: Dysuria, Urinary Tract Infection, Adult.                                

- Prescriptions for Keflex 500 mg Oral Capsule - take 1 capsule by ORAL route every 12            

  hours for 10 days; 20 capsule.                                                                  

- Medication Reconciliation Form, Thank You Letter, Antibiotic Education, Prescription            

  Opioid Use form.                                                                                

- Follow up: Kendall Barger MD; When: 2 - 3 days; Reason: Re-evaluation by your                   

  physician.                                                                                      

                                                                                                  

                                                                                                  

                                                                                                  

Signatures:                                                                                       

[facility]                                                 

Kimberly River RN                      RN                                                      

Marcelle Gallagher RN                     RN                                                      

Arsen Brice MD MD                                                      

                                                                                                  

Corrections: (The following items were deleted from the chart)                                    

12:00 11:27 10/19/2019 11:27 Discharged to Home. Impression: Urinary tract infection, site    hb  

      not specified. Condition is Stable. Forms are Medication Reconciliation Form, Thank You     

      Letter, Antibiotic Education, Prescription Opioid Use. Follow up: Kendall Barger; When: 2     

      - 3 days; Reason: Re-evaluation by your physician. gs                                       

                                                                                                  

**************************************************************************************************

## 2020-01-24 ENCOUNTER — HOSPITAL ENCOUNTER (EMERGENCY)
Dept: HOSPITAL 97 - ER | Age: 80
Discharge: HOME | End: 2020-01-24
Payer: COMMERCIAL

## 2020-01-24 VITALS — OXYGEN SATURATION: 97 % | SYSTOLIC BLOOD PRESSURE: 117 MMHG | TEMPERATURE: 97.2 F | DIASTOLIC BLOOD PRESSURE: 85 MMHG
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
## 2020-01-24 DIAGNOSIS — G47.00: ICD-10-CM

## 2020-01-24 DIAGNOSIS — Z88.3: ICD-10-CM

## 2020-01-24 DIAGNOSIS — K21.9: ICD-10-CM
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
## 2020-01-24 DIAGNOSIS — S02.2XXA: Primary | ICD-10-CM

## 2020-01-24 DIAGNOSIS — Z88.0: ICD-10-CM
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
## 2020-01-24 DIAGNOSIS — M79.7: ICD-10-CM

## 2020-01-24 DIAGNOSIS — Z88.2: ICD-10-CM

## 2020-01-24 PROCEDURE — 76377 3D RENDER W/INTRP POSTPROCES: CPT

## 2020-01-24 PROCEDURE — 99284 EMERGENCY DEPT VISIT MOD MDM: CPT

## 2020-01-24 PROCEDURE — 70450 CT HEAD/BRAIN W/O DYE: CPT

## 2020-01-24 PROCEDURE — 70486 CT MAXILLOFACIAL W/O DYE: CPT

## 2020-01-24 NOTE — RAD REPORT
EXAM DESCRIPTION:  CT - Head Brain Wo Cont - 1/24/2020 1:39 pm

 

CLINICAL HISTORY:  HEADACHE

Trauma, head injury

 

COMPARISON:  Facial Bones W/ Mpr dated 1/24/2020; HEAD BRAIN W O CONTRAST dated 8/7/2013

 

TECHNIQUE:  All CT scans are performed using dose optimization technique as appropriate and may inclu
de automated exposure control or mA/KV adjustment according to patient size.

 

FINDINGS:  No intracranial hemorrhage, hydrocephalus or extra-axial fluid collection.No areas of brai
n edema or evidence of midline shift.

 

The paranasal sinuses and mastoids are clear. The calvarium is intact.

 

IMPRESSION:  No acute intracranial abnormality.

## 2020-01-24 NOTE — RAD REPORT
EXAM DESCRIPTION:  CT - CTFB

 

CLINICAL HISTORY:  FACIAL PAIN

Trauma, facial pain and injury

 

COMPARISON:  No comparisons

 

TECHNIQUE:  Axial 2 mm thick images of the face were obtained with sagittal and coronal reconstructio
n images.

 

All CT scans are performed using dose optimization technique as appropriate and may include automated
 exposure control or mA/KV adjustment according to patient size.

 

FINDINGS:  Mild nasal bone fracture is present.The mandible is intact.

 

The globes and orbital contents are grossly unremarkable.The paranasal sinuses and mastoids are clear
.

 

 

IMPRESSION:  Nasal bone fracture.

## 2020-01-24 NOTE — EDPHYS
Physician Documentation                                                                           

 [facility]                                                                 

Name: Leona Peres                                                                              

Age: 79 yrs                                                                                       

Sex: Female                                                                                       

: 1940                                                                                   

MRN: [id number]                                                                                   

Arrival Date: 2020                                                                          

Time: 12:56                                                                                       

Account#: [id number]                                                                            

Bed 20                                                                                            

Private MD: Kendall Barger V                                                                      

ED Physician Ruslan Bob                                                                       

HPI:                                                                                              

                                                                                             

13:26 This 79 yrs old  Female presents to ER via Wheelchair with complaints of Fall  kb  

      Injury.                                                                                     

13:26 Details of fall: The patient fell from an upright position, while walking. Onset: The   kb  

      symptoms/episode began/occurred just prior to arrival. Associated injuries: The patient     

      sustained injury to the head, abrasion, pain. Severity of symptoms: At their worst the      

      symptoms were moderate, in the emergency department the symptoms are unchanged. The         

      patient has not experienced similar symptoms in the past. The patient has not recently      

      seen a physician. "I tripped over a curb and face planted." Reports facial pain,            

      headache and bilateral knee pain. Full ROM of all extremities. Ambulates with steady        

      gait..                                                                                      

                                                                                                  

Historical:                                                                                       

- Allergies:                                                                                      

13:04 clindamycin HCl (bulk);                                                                 aa5 

13:04 Darvocet-N 100;                                                                         aa5 

13:04 PENICILLINS;                                                                            aa5 

13:04 Sulfa (Sulfonamide Antibiotics);                                                        aa5 

- Home Meds:                                                                                      

13:04 duloxetine 60 mg Oral cpDR 2 caps once daily [Active]; Macrodantin 50 mg Oral cap 1 cap aa5 

      once daily [Active]; metoprolol succinate 50 mg Oral Tb24 1 tab once daily [Active];        

      potassium chloride 10 mEq Oral cpER 1 cap 2 times per day [Active]; Seroquel 100 mg         

      Oral tab daily [Active]; simvastatin 40 mg Oral tab 1 tab once daily [Active];              

      spironolactone 20mg Oral once daily [Active]; tramadol 50 mg Oral tab 2 times daily         

      [Active];                                                                                   

- PMHx:                                                                                           

13:04 Cancer; Depression; Fibromyalgia; GERD; insomnia; lymphedema; skipped heart beats;      aa5 

- PSHx:                                                                                           

13:04 cervical fusion; knee replacements; Appendectomy; lumbar fusion; Hysterectomy;          aa5 

      Mastectomy, Left;                                                                           

                                                                                                  

- Immunization history:: Adult Immunizations unknown.                                             

- Social history:: Smoking status: Patient denies any tobacco usage or history of.                

- Ebola Screening: : No symptoms or risks identified at this time.                                

                                                                                                  

                                                                                                  

ROS:                                                                                              

13:26 Constitutional: Negative for fever, chills, and weight loss, Eyes: Negative for injury, kb  

      pain, redness, and discharge, ENT: Negative for injury, pain, and discharge, Neck:          

      Negative for injury, pain, and swelling, Cardiovascular: Negative for chest pain,           

      palpitations, and edema, Respiratory: Negative for shortness of breath, cough,              

      wheezing, and pleuritic chest pain, Abdomen/GI: Negative for abdominal pain, nausea,        

      vomiting, diarrhea, and constipation, Back: Negative for injury and pain, : Negative      

      for injury, bleeding, discharge, and swelling, MS/Extremity: Negative for injury and        

      deformity.                                                                                  

13:26 Skin: Positive for abrasion(s), ecchymosis, of the face.                                    

13:26 Neuro: Positive for headache.                                                               

                                                                                                  

Exam:                                                                                             

13:25 Constitutional:  This is a well developed, well nourished patient who is awake, alert,  kb  

      and in no acute distress. Eyes:  Pupils equal round and reactive to light, extra-ocular     

      motions intact.  Lids and lashes normal.  Conjunctiva and sclera are non-icteric and        

      not injected.  Cornea within normal limits.  Periorbital areas with no swelling,            

      redness, or edema. ENT:  Nares patent. No nasal discharge, no septal abnormalities          

      noted.  Tympanic membranes are normal and external auditory canals are clear.               

      Oropharynx with no redness, swelling, or masses, exudates, or evidence of obstruction,      

      uvula midline.  Mucous membranes moist. Neck:  Trachea midline, no thyromegaly or           

      masses palpated, and no cervical lymphadenopathy.  Supple, full range of motion without     

      nuchal rigidity, or vertebral point tenderness.  No Meningismus. Chest/axilla:  Normal      

      chest wall appearance and motion.  Nontender with no deformity.  No lesions are             

      appreciated. Cardiovascular:  Regular rate and rhythm with a normal S1 and S2.  No          

      gallops, murmurs, or rubs.  Normal PMI, no JVD.  No pulse deficits. Respiratory:  Lungs     

      have equal breath sounds bilaterally, clear to auscultation and percussion.  No rales,      

      rhonchi or wheezes noted.  No increased work of breathing, no retractions or nasal          

      flaring. Abdomen/GI:  Soft, non-tender, with normal bowel sounds.  No distension or         

      tympany.  No guarding or rebound.  No evidence of tenderness throughout. Skin:  Warm,       

      dry with normal turgor.  Normal color with no rashes, no lesions, and no evidence of        

      cellulitis. MS/ Extremity:  Pulses equal, no cyanosis.  Neurovascular intact.  Full,        

      normal range of motion. Neuro:  Awake and alert, GCS 15, oriented to person, place,         

      time, and situation.  Cranial nerves II-XII grossly intact.  Motor strength 5/5 in all      

      extremities.  Sensory grossly intact.  Cerebellar exam normal.  Normal gait.                

13:25 Head/face: Noted is no obvious of injury or deformity except abrasion(s), that are          

      mild, of the  nose, ecchymosis, that is mild.                                               

                                                                                                  

Vital Signs:                                                                                      

13:05  / 85; Pulse 58; Resp 16 S; Temp 97.2(TE); Pulse Ox 97% on R/A; Weight 95.25 kg   aa5 

      (R); Height 5 ft. 3 in. (160.02 cm) (R);                                                    

13:05 Body Mass Index 37.20 (95.25 kg, 160.02 cm)                                             aa5 

                                                                                                  

MDM:                                                                                              

13:09 Patient medically screened.                                                             kb  

13:25 Data reviewed: vital signs, nurses notes. Data interpreted: Pulse oximetry: on room air kb  

      is 97 %. Interpretation: normal.                                                            

13:59 Counseling: I had a detailed discussion with the patient and/or guardian regarding: the kb  

      historical points, exam findings, and any diagnostic results supporting the                 

      discharge/admit diagnosis, radiology results, the need for outpatient follow up, a          

      family practitioner, to return to the emergency department if symptoms worsen or            

      persist or if there are any questions or concerns that arise at home.                       

                                                                                                  

                                                                                             

13:17 Order name: CT Head Brain wo Cont; Complete Time: 13:53                                 kb  

                                                                                             

13:17 Order name: CT Facial Bones W/O Con; Complete Time: 13:57                               kb  

                                                                                                  

Administered Medications:                                                                         

13:27 Drug: traMADol 50 mg Route: PO;                                                         tw2 

14:07 Follow up: Response: No adverse reaction; Pain is decreased                             tw2 

14:08 Drug: Cipro 500 mg Route: PO;                                                           tw2 

14:22 Follow up: Response: No adverse reaction                                                tw2 

                                                                                                  

                                                                                                  

Disposition:                                                                                      

14:50 Co-signature as Attending Physician, Ruslan Bob MD I agree with the assessment and   kdr 

      plan of care.                                                                               

                                                                                                  

Disposition:                                                                                      

20 14:06 Discharged to Home. Impression: Fracture of nasal bones.                           

- Condition is Stable.                                                                            

- Discharge Instructions: Nasal Fracture, Easy-to-Read.                                           

- Prescriptions for Cipro 500 mg Oral Tablet - take 1 tablet by ORAL route every 12               

  hours for 7 days; 14 tablet. Tramadol 50 mg Oral Tablet - take 1 tablet by ORAL route           

  every 8 hours as needed; 12 tablet.                                                             

- Medication Reconciliation Form, Thank You Letter, Antibiotic Education, Prescription            

  Opioid Use form.                                                                                

- Follow up: Emergency Department; When: As needed; Reason: Worsening of condition.               

  Follow up: Kendall Barger; When: 2 - 3 days; Reason: Recheck today's complaints,                  

  Continuance of care, Re-evaluation by your physician.                                           

                                                                                                  

                                                                                                  

                                                                                                  

Signatures:                                                                                       

Dispatcher [employer]                           EDMS                                                 

Megan Lan, FNP-C                 FNP-Ckb                                                   

Ruslan Bob MD MD kdr Calderon, Audri, RN                     RN   aa5                                                  

Jennifer Hurst RN                          RN   tw2                                                  

                                                                                                  

Corrections: (The following items were deleted from the chart)                                    

14:37 14:06 2020 14:06 Discharged to Home. Impression: Fracture of nasal bones.         tw2 

      Condition is Stable. Discharge Instructions: Nasal Fracture, Easy-to-Read.                  

      Prescriptions for Cipro 500 mg Oral Tablet - take 1 tablet by ORAL route every 12 hours     

      for 7 days; 14 tablet, Tramadol 50 mg Oral Tablet - take 1 tablet by ORAL route every 8     

      hours as needed; 12 tablet. and Forms are Medication Reconciliation Form, Thank You         

      Letter, Antibiotic Education, Prescription Opioid Use. Follow up: Emergency Department;     

      When: As needed; Reason: Worsening of condition. Follow up: Kendall Barger; When: 2 - 3       

      days; Reason: Recheck today's complaints, Continuance of care, Re-evaluation by your        

      physician. kb                                                                               

                                                                                                  

**************************************************************************************************

## 2020-01-24 NOTE — ER
Nurse's Notes                                                                                     

 [facility]                                                                 

Name: Leona Peres                                                                              

Age: 79 yrs                                                                                       

Sex: Female                                                                                       

: 1940                                                                                   

MRN: [id number]                                                                                   

Arrival Date: 2020                                                                          

Time: 12:56                                                                                       

Account#: [id number]                                                                            

Bed 20                                                                                            

Private MD: Kendall Barger V                                                                      

Diagnosis: Fracture of nasal bones                                                                

                                                                                                  

Presentation:                                                                                     

                                                                                             

13:02 Presenting complaint: Patient states: "I tripped on a curve and fell onto face". Denies aa5 

      LOC. Pt c/o yao knees and face pain. Transition of care: patient was not received from      

      another setting of care. Onset of symptoms was 2020. Risk Assessment: Do        

      you want to hurt yourself or someone else? Patient reports no desire to harm self or        

      others. Initial Sepsis Screen: Does the patient meet any 2 criteria? No. Patient's          

      initial sepsis screen is negative. Does the patient have a suspected source of              

      infection? No. Patient's initial sepsis screen is negative. Care prior to arrival: None.    

13:02 Acuity: ANUSHKA 4                                                                           aa5 

13:02 Method Of Arrival: Wheelchair                                                           aa5 

                                                                                                  

Historical:                                                                                       

- Allergies:                                                                                      

13:04 clindamycin HCl (bulk);                                                                 aa5 

13:04 Darvocet-N 100;                                                                         aa5 

13:04 PENICILLINS;                                                                            aa5 

13:04 Sulfa (Sulfonamide Antibiotics);                                                        aa5 

- Home Meds:                                                                                      

13:04 duloxetine 60 mg Oral cpDR 2 caps once daily [Active]; Macrodantin 50 mg Oral cap 1 cap aa5 

      once daily [Active]; metoprolol succinate 50 mg Oral Tb24 1 tab once daily [Active];        

      potassium chloride 10 mEq Oral cpER 1 cap 2 times per day [Active]; Seroquel 100 mg         

      Oral tab daily [Active]; simvastatin 40 mg Oral tab 1 tab once daily [Active];              

      spironolactone 20mg Oral once daily [Active]; tramadol 50 mg Oral tab 2 times daily         

      [Active];                                                                                   

- PMHx:                                                                                           

13:04 Cancer; Depression; Fibromyalgia; GERD; insomnia; lymphedema; skipped heart beats;      aa5 

- PSHx:                                                                                           

13:04 cervical fusion; knee replacements; Appendectomy; lumbar fusion; Hysterectomy;          aa5 

      Mastectomy, Left;                                                                           

                                                                                                  

- Immunization history:: Adult Immunizations unknown.                                             

- Social history:: Smoking status: Patient denies any tobacco usage or history of.                

- Ebola Screening: : No symptoms or risks identified at this time.                                

                                                                                                  

                                                                                                  

Screenin:36 Abuse screen: Denies threats or abuse. Nutritional screening: No deficits noted.        tw2 

      Tuberculosis screening: No symptoms or risk factors identified. Fall Risk Secondary         

      diagnosis (15 points) impaired mobility.                                                    

                                                                                                  

Assessment:                                                                                       

13:10 General: Appears in no apparent distress. slender, well groomed, Behavior is calm,      tw2 

      cooperative, appropriate for age. Pain: Complains of pain in face and nose. Neuro:          

      Level of Consciousness is awake, alert, obeys commands, Oriented to person, place,          

      time, situation. Cardiovascular: Patient's skin is warm and dry. Respiratory: Airway is     

      patent Respiratory effort is even, unlabored, Respiratory pattern is regular,               

      symmetrical. GI: No signs and/or symptoms were reported involving the gastrointestinal      

      system. : No signs and/or symptoms were reported regarding the genitourinary system.      

      EENT: Reports pain in nose. Derm: No signs and/or symptoms reported regarding the           

      dermatologic system. Musculoskeletal: Range of motion: intact in all extremities.           

14:33 Reassessment: Patient appears in no apparent distress at this time. No changes from     tw2 

      previously documented assessment. Patient and/or family updated on plan of care and         

      expected duration. Pain level reassessed. Patient is alert, oriented x 3, equal             

      unlabored respirations, skin warm/dry/pink.                                                 

                                                                                                  

Vital Signs:                                                                                      

13:05  / 85; Pulse 58; Resp 16 S; Temp 97.2(TE); Pulse Ox 97% on R/A; Weight 95.25 kg   aa5 

      (R); Height 5 ft. 3 in. (160.02 cm) (R);                                                    

13:05 Body Mass Index 37.20 (95.25 kg, 160.02 cm)                                             aa5 

                                                                                                  

ED Course:                                                                                        

12:56 Patient arrived in ED.                                                                  as  

12:57 Kendall Barger MD is Private Physician.                                                 as  

13:03 Triage completed.                                                                       aa5 

13:03 Arm band placed on.                                                                     aa5 

13:06 Bed in low position. Call light in reach. Side rails up X2. Cardiac monitor on. Pulse   tw2 

      ox on. NIBP on. Warm blanket given.                                                         

13:08 [facility]                                                        kb  

13:08 Ruslan Bob MD is Attending Physician.                                              kb  

13:15 Jennifer Hurst, RN is Primary Nurse.                                                        tw2 

13:40 CT Head Brain wo Cont In Process Unspecified.                                           EDMS

13:41 CT Facial Bones W/O Con In Process Unspecified.                                         EDMS

14:06 Kendall Barger MD is Referral Physician.                                                kb  

14:36 No provider procedures requiring assistance completed. Patient did not have IV access   tw2 

      during this emergency room visit.                                                           

                                                                                                  

Administered Medications:                                                                         

13:27 Drug: traMADol 50 mg Route: PO;                                                         tw2 

14:07 Follow up: Response: No adverse reaction; Pain is decreased                             tw2 

14:08 Drug: Cipro 500 mg Route: PO;                                                           tw2 

14:22 Follow up: Response: No adverse reaction                                                tw2 

                                                                                                  

                                                                                                  

Outcome:                                                                                          

14:06 Discharge ordered by MD.                                                                kb  

14:36 Discharged to home via wheelchair, with family.                                         tw2 

14:36 Condition: stable                                                                           

14:36 Discharge instructions given to patient, family, Instructed on discharge instructions,      

      follow up and referral plans. no drinking with medication, no driving heavy equipment,      

      medication usage, Demonstrated understanding of instructions, follow-up care,               

      medications, Prescriptions given X 2.                                                       

14:37 Patient left the ED.                                                                    tw2 

                                                                                                  

Signatures:                                                                                       

Dispatcher [employer]                           EDMS                                                 

Megan Lan, NELSONP-C                 FNP-Blank Brown Audri, RN                     RN   aa5                                                  

Jennifer Hurst, RN                          RN   tw2                                                  

                                                                                                  

**************************************************************************************************

## 2020-02-06 ENCOUNTER — HOSPITAL ENCOUNTER (OUTPATIENT)
Dept: HOSPITAL 97 - OR | Age: 80
Discharge: HOME | End: 2020-02-06
Attending: OTOLARYNGOLOGY
Payer: COMMERCIAL

## 2020-02-06 VITALS — OXYGEN SATURATION: 94 % | DIASTOLIC BLOOD PRESSURE: 53 MMHG | SYSTOLIC BLOOD PRESSURE: 110 MMHG

## 2020-02-06 VITALS — TEMPERATURE: 97.7 F

## 2020-02-06 DIAGNOSIS — Z80.9: ICD-10-CM

## 2020-02-06 DIAGNOSIS — Z88.3: ICD-10-CM

## 2020-02-06 DIAGNOSIS — S02.2XXA: Primary | ICD-10-CM

## 2020-02-06 DIAGNOSIS — J34.89: ICD-10-CM

## 2020-02-06 DIAGNOSIS — Z88.0: ICD-10-CM

## 2020-02-06 DIAGNOSIS — Z88.2: ICD-10-CM

## 2020-02-06 DIAGNOSIS — Z87.891: ICD-10-CM

## 2020-02-06 DIAGNOSIS — K21.9: ICD-10-CM

## 2020-02-06 DIAGNOSIS — Z83.3: ICD-10-CM

## 2020-02-06 DIAGNOSIS — Z82.49: ICD-10-CM

## 2020-02-06 PROCEDURE — 84132 ASSAY OF SERUM POTASSIUM: CPT

## 2020-02-06 PROCEDURE — 36415 COLL VENOUS BLD VENIPUNCTURE: CPT

## 2020-02-06 PROCEDURE — 0NSBXZZ REPOSITION NASAL BONE, EXTERNAL APPROACH: ICD-10-PCS

## 2020-02-06 PROCEDURE — 21320 CLSD TX NSL FX W/MNPJ&STABLJ: CPT

## 2020-02-06 RX ADMIN — MORPHINE SULFATE ONE MG: 4 INJECTION, SOLUTION INTRAMUSCULAR; INTRAVENOUS at 08:45

## 2020-02-06 RX ADMIN — MORPHINE SULFATE ONE MG: 4 INJECTION, SOLUTION INTRAMUSCULAR; INTRAVENOUS at 08:35

## 2020-03-11 ENCOUNTER — HOSPITAL ENCOUNTER (EMERGENCY)
Dept: HOSPITAL 97 - ER | Age: 80
Discharge: HOME | End: 2020-03-11
Payer: COMMERCIAL

## 2020-03-11 VITALS — OXYGEN SATURATION: 98 % | SYSTOLIC BLOOD PRESSURE: 111 MMHG | DIASTOLIC BLOOD PRESSURE: 56 MMHG | TEMPERATURE: 97.7 F

## 2020-03-11 DIAGNOSIS — Z88.5: ICD-10-CM

## 2020-03-11 DIAGNOSIS — F32.9: ICD-10-CM

## 2020-03-11 DIAGNOSIS — Z88.0: ICD-10-CM

## 2020-03-11 DIAGNOSIS — Z88.2: ICD-10-CM

## 2020-03-11 DIAGNOSIS — Z88.3: ICD-10-CM

## 2020-03-11 DIAGNOSIS — Z90.12: ICD-10-CM

## 2020-03-11 DIAGNOSIS — M25.511: Primary | ICD-10-CM

## 2020-03-11 PROCEDURE — 71250 CT THORAX DX C-: CPT

## 2020-03-11 PROCEDURE — 70450 CT HEAD/BRAIN W/O DYE: CPT

## 2020-03-11 PROCEDURE — 72125 CT NECK SPINE W/O DYE: CPT

## 2020-03-11 PROCEDURE — 96372 THER/PROPH/DIAG INJ SC/IM: CPT

## 2020-03-11 PROCEDURE — 99283 EMERGENCY DEPT VISIT LOW MDM: CPT

## 2020-03-11 NOTE — ER
Nurse's Notes                                                                                     

 [facility]                                                                 

Name: Leona Peres                                                                              

Age: 79 yrs                                                                                       

Sex: Female                                                                                       

: 1940                                                                                   

MRN: [id number]                                                                                   

Arrival Date: 2020                                                                          

Time: 14:24                                                                                       

Account#: [id number]                                                                            

Bed 18                                                                                            

Private MD:                                                                                       

Diagnosis: Low back pain;Pain in right shoulder                                                   

                                                                                                  

Presentation:                                                                                     

                                                                                             

14:37 Chief complaint: Patient states: pain in right shoulder and neck. Pt fell last Friday   dm5 

      morning. Pt was seen by Dr. Barger on Friday and got xrays. Pain has gotten on left          

      side. Coronavirus screen: The patient has NOT traveled to a country currently being         

      monitored by the [facility] within the last 14 days. The patient has NOT had contact with any      

      known and/or suspected case of coronavirus. Proceed with normal triage procedures.          

      Ebola Screen: Patient negative for fever greater than or equal to 101.5 degrees             

      Fahrenheit, and additional compatible Ebola Virus Disease symptoms Patient denies           

      exposure to infectious person. Patient denies travel to an Ebola-affected area in the       

      21 days before illness onset. No symptoms or risks identified at this time. Initial         

      Sepsis Screen: Does the patient meet any 2 criteria? No. Patient's initial sepsis           

      screen is negative. Does the patient have a suspected source of infection? No.              

      Patient's initial sepsis screen is negative. Risk Assessment: Do you want to hurt           

      yourself or someone else? Patient reports no desire to harm self or others.                 

14:37 Method Of Arrival: Wheelchair                                                           dm5 

14:37 Acuity: ANUSHKA 3                                                                           dm5 

                                                                                                  

Triage Assessment:                                                                                

16:57 General: Appears in no apparent distress. Behavior is calm, cooperative, appropriate    vc  

      for age. Pain: Complains of pain in lumbar spine and right lateral posterior chest and      

      right clavicle. Musculoskeletal: Circulation, motion, and sensation intact. Range of        

      motion: intact in all extremities.                                                          

                                                                                                  

Historical:                                                                                       

- Allergies:                                                                                      

17:04 clindamycin HCl (bulk);                                                                 vc  

17:04 Darvocet-N 100;                                                                         vc  

17:04 PENICILLINS;                                                                            vc  

17:04 Sulfa (Sulfonamide Antibiotics);                                                        vc  

- Home Meds:                                                                                      

17:04 duloxetine 60 mg Oral cpDR 2 caps once daily [Active]; Macrodantin 50 mg Oral cap 1 cap vc  

      once daily [Active]; metoprolol succinate 50 mg Oral Tb24 1 tab once daily [Active];        

- PMHx:                                                                                           

17:04 Cancer; Depression; Fibromyalgia; insomnia; GERD; lymphedema; skipped heart beats;      vc  

- PSHx:                                                                                           

17:04 Appendectomy; knee replacements; Hysterectomy; Mastectomy, Left; lumbar fusion;         vc  

      cervical fusion;                                                                            

                                                                                                  

- Immunization history:: Adult Immunizations up to date.                                          

- Social history:: Smoking status: Patient denies any tobacco usage or history of.                

                                                                                                  

                                                                                                  

Screenin:57 Abuse screen: Denies threats or abuse. Nutritional screening: No deficits noted.        vc  

      Tuberculosis screening: No symptoms or risk factors identified. Fall Risk None              

      identified.                                                                                 

                                                                                                  

Vital Signs:                                                                                      

14:40  / 72; Pulse 66; Resp 20; Temp 98.7; Pulse Ox 94% on R/A; Weight 93.89 kg (R);    dm5 

      Height 5 ft. 3 in. (160.02 cm); Pain 7/10;                                                  

17:12  / 56; Pulse 58; Resp 20; Temp 97.7; Pulse Ox 98% ;                               lt1 

14:40 Body Mass Index 36.67 (93.89 kg, 160.02 cm)                                             dm5 

                                                                                                  

ED Course:                                                                                        

14:24 Patient arrived in ED.                                                                  ag5 

14:39 Triage completed.                                                                       dm5 

15:00 Kelsey Gama, RN is Primary Nurse.                                                  vc  

15:06 Ruslan Bob MD is Attending Physician.                                              kdr 

16:04 CT Traumagram (Head C Spine CAP wo con) In Process Unspecified.                         EDMS

16:59 Arm band placed on.                                                                     vc  

17:00 No provider procedures requiring assistance completed. IV discontinued, intact,         vc  

      bleeding controlled, No redness/swelling at site. Pressure dressing applied.                

                                                                                                  

Administered Medications:                                                                         

15:58 Drug: Norco 10 mg-325 mg 1 tabs Route: PO;                                              vc  

16:30 Follow up: Response: No adverse reaction; Pain is unchanged, physician notified         vc  

17:27 Drug: morphine 4 mg Route: IM; Site: right deltoid;                                     vc  

17:28 Follow up: Response: No adverse reaction; Medication administered at discharge.         vc  

                                                                                                  

                                                                                                  

Outcome:                                                                                          

16:42 Discharge ordered by MD.                                                                kdr 

17:26 Discharged to home via wheelchair, with family.                                         vc  

17:26 Condition: good                                                                             

17:26 Discharge instructions given to patient, family, Instructed on discharge instructions,      

      follow up and referral plans. medication usage, Demonstrated understanding of               

      instructions, follow-up care, medications, Prescriptions given X 2.                         

17:30 Patient left the ED.                                                                    vc  

                                                                                                  

Signatures:                                                                                       

Dispatcher [employer]                           Andie Nation RN                    RN   [id number]                                                  

Ruslan Bob MD MD kdr Gaskin, Ajare                                5                                                  

Monique Oshea                                   1                                                  

Kelsey Gama RN                    RN                                                      

                                                                                                  

**************************************************************************************************

## 2020-03-11 NOTE — EDPHYS
Physician Documentation                                                                           

 [facility]                                                                 

Name: Leona Peres                                                                              

Age: 79 yrs                                                                                       

Sex: Female                                                                                       

: 1940                                                                                   

MRN: [id number]                                                                                   

Arrival Date: 2020                                                                          

Time: 14:24                                                                                       

Account#: [id number]                                                                            

Bed 18                                                                                            

Private MD:                                                                                       

ED Physician Ruslan Bob                                                                       

HPI:                                                                                              

                                                                                             

15:29 This 79 yrs old  Female presents to ER via Wheelchair with complaints of Back  kdr 

      Pain, Neck Pain, <24hrs Old.                                                                

15:29 This 79 yrs old  Female presents to ER via Wheelchair with complaints of Back  kdr 

      Pain, Neck Pain, <24hrs Old.                                                                

15:31 The patient fell backwards into her shower on Friday and is now c/o head pain, right    kdr 

      shoulder low back and left hip pain.. Onset: The symptoms/episode began/occurred            

      suddenly, Last Friday. Severity of symptoms: At their worst the symptoms were moderate      

      in the emergency department the symptoms are unchanged. The patient has not experienced     

      similar symptoms in the past. The patient has not recently seen a physician. .              

                                                                                                  

Historical:                                                                                       

- Allergies:                                                                                      

17:04 clindamycin HCl (bulk);                                                                 vc  

17:04 Darvocet-N 100;                                                                         vc  

17:04 PENICILLINS;                                                                            vc  

17:04 Sulfa (Sulfonamide Antibiotics);                                                        vc  

- Home Meds:                                                                                      

17:04 duloxetine 60 mg Oral cpDR 2 caps once daily [Active]; Macrodantin 50 mg Oral cap 1 cap vc  

      once daily [Active]; metoprolol succinate 50 mg Oral Tb24 1 tab once daily [Active];        

- PMHx:                                                                                           

17:04 Cancer; Depression; Fibromyalgia; insomnia; GERD; lymphedema; skipped heart beats;      vc  

- PSHx:                                                                                           

17:04 Appendectomy; knee replacements; Hysterectomy; Mastectomy, Left; lumbar fusion;         vc  

      cervical fusion;                                                                            

                                                                                                  

- Immunization history:: Adult Immunizations up to date.                                          

- Social history:: Smoking status: Patient denies any tobacco usage or history of.                

                                                                                                  

                                                                                                  

ROS:                                                                                              

15:31 Constitutional: Negative for fever, chills, and weight loss, Eyes: Negative for injury, kdr 

      pain, redness, and discharge, ENT: Negative for injury, pain, and discharge, Neck:          

      Negative for injury, pain, and swelling, Cardiovascular: Negative for chest pain,           

      palpitations, and edema, Respiratory: Negative for shortness of breath, cough,              

      wheezing, and pleuritic chest pain, Abdomen/GI: Negative for abdominal pain, nausea,        

      vomiting, diarrhea, and constipation, : Negative for injury, bleeding, discharge, and     

      swelling, MS/Extremity: Negative for injury and deformity, Skin: Negative for injury,       

      rash, and discoloration, Psych: Negative for depression, anxiety, suicide ideation,         

      homicidal ideation, and hallucinations, Allergy/Immunology: Negative for hives, rash,       

      and allergies, Endocrine: Negative for neck swelling, polydipsia, polyuria, polyphagia,     

      and marked weight changes, Hematologic/Lymphatic: Negative for swollen nodes, abnormal      

      bleeding, and unusual bruising.                                                             

15:31 Back: Positive for pain at rest, pain with movement, Negative for injury or acute           

      deformity.                                                                                  

15:31 MS/extremity: Positive for decreased range of motion, of the right clavicle and right       

      lateral posterior chest.                                                                    

                                                                                                  

Exam:                                                                                             

15:31 Constitutional:  This is a well developed, well nourished patient who is awake, alert,  kdr 

      and in no acute distress. Head/Face:  Normocephalic, atraumatic. Eyes:  Pupils equal        

      round and reactive to light, extra-ocular motions intact.  Lids and lashes normal.          

      Conjunctiva and sclera are non-icteric and not injected.  Cornea within normal limits.      

      Periorbital areas with no swelling, redness, or edema. Neck:  Trachea midline, no           

      thyromegaly or masses palpated, and no cervical lymphadenopathy.  Supple, full range of     

      motion without nuchal rigidity, or vertebral point tenderness.  No Meningismus.             

      Chest/axilla:  Normal chest wall appearance and motion.  Nontender with no deformity.       

      No lesions are appreciated. Cardiovascular:  Regular rate and rhythm with a normal S1       

      and S2.  No gallops, murmurs, or rubs.  Normal PMI, no JVD.  No pulse deficits.             

      Respiratory:  Lungs have equal breath sounds bilaterally, clear to auscultation and         

      percussion.  No rales, rhonchi or wheezes noted.  No increased work of breathing, no        

      retractions or nasal flaring. Abdomen/GI:  Soft, non-tender, with normal bowel sounds.      

      No distension or tympany.  No guarding or rebound.  No evidence of tenderness               

      throughout. Skin:  Warm, dry with normal turgor.  Normal color with no rashes, no           

      lesions, and no evidence of cellulitis. MS/ Extremity:  Pulses equal, no cyanosis.          

      Neurovascular intact.  Full, normal range of motion. Psych:  Awake, alert, with             

      orientation to person, place and time.  Behavior, mood, and affect are within normal        

      limits.                                                                                     

15:31 Back: pain, that is mild, ROM is decreased, with rotation to the right, chronic. normal     

      spinal alignment noted, CVA tenderness, that is mild, is noted on the left, vertebral       

      tenderness, is appreciated at L5 and lumbar spine.                                          

                                                                                                  

Vital Signs:                                                                                      

14:40  / 72; Pulse 66; Resp 20; Temp 98.7; Pulse Ox 94% on R/A; Weight 93.89 kg (R);    dm5 

      Height 5 ft. 3 in. (160.02 cm); Pain 7/10;                                                  

17:12  / 56; Pulse 58; Resp 20; Temp 97.7; Pulse Ox 98% ;                               lt1 

14:40 Body Mass Index 36.67 (93.89 kg, 160.02 cm)                                             dm5 

                                                                                                  

MDM:                                                                                              

15:31 Data reviewed: vital signs, nurses notes. Counseling: I had a detailed discussion with  kdr 

      the patient and/or guardian regarding: the historical points, exam findings, and any        

      diagnostic results supporting the discharge/admit diagnosis, lab results, radiology         

      results.                                                                                    

16:42 Patient medically screened.                                                             kdr 

16:46 ED course: The patient was stable in the ED and o/w stable.  score 150.              kdr 

                                                                                                  

                                                                                             

15:29 Order name: CT Traumagram (Head C Spine CAP wo con); Complete Time: 16:40               kdr 

                                                                                                  

Administered Medications:                                                                         

15:58 Drug: Norco 10 mg-325 mg 1 tabs Route: PO;                                              vc  

16:30 Follow up: Response: No adverse reaction; Pain is unchanged, physician notified         vc  

17:27 Drug: morphine 4 mg Route: IM; Site: right deltoid;                                     vc  

17:28 Follow up: Response: No adverse reaction; Medication administered at discharge.         vc  

                                                                                                  

                                                                                                  

Disposition:                                                                                      

20 16:42 Discharged to Home. Impression: Low back pain, Pain in right shoulder.             

- Condition is Stable.                                                                            

- Discharge Instructions: Musculoskeletal Pain, Shoulder Pain, Easy-to-Read, Back Pain,           

  Adult, Easy-to-Read.                                                                            

- Prescriptions for Robaxin 500 mg Oral Tablet - take 1 tablet by ORAL route every 6              

  hours As needed; 20 tablet. Tylenol- Codeine #3 300-30 mg Oral Tablet - take 2                  

  tablets by ORAL route every 4-6 hours As needed; 12 tablet.                                     

- Medication Reconciliation Form, Thank You Letter, Prescription Opioid Use form.                 

- Follow up: Private Physician; When: 2 - 3 days; Reason: If symptoms return, Further             

  diagnostic work-up, Recheck today's complaints, Continuance of care, Re-evaluation by           

  your physician.                                                                                 

- Problem is new.                                                                                 

- Symptoms have improved.                                                                         

                                                                                                  

                                                                                                  

                                                                                                  

Signatures:                                                                                       

Dispatcher [employer]                           EDMS                                                 

Ruslan Bob MD MD   [facility]                                                  

Kelsey Gama RN                    RN   vc                                                   

                                                                                                  

Corrections: (The following items were deleted from the chart)                                    

17:30 16:42 2020 16:42 Discharged to Home. Impression: Low back pain; Pain in right     vc  

      shoulder. Condition is Stable. Forms are Medication Reconciliation Form, Thank You          

      Letter, Antibiotic Education, Prescription Opioid Use. Follow up: Private Physician;        

      When: 2 - 3 days; Reason: If symptoms return, Further diagnostic work-up, Recheck           

      today's complaints, Continuance of care, Re-evaluation by your physician. Problem is        

      new. Symptoms have improved. kdr                                                            

                                                                                                  

**************************************************************************************************

## 2020-03-11 NOTE — RAD REPORT
EXAM DESCRIPTION:

CT - Head C Spine Cap Wo Con - 3/11/2020 3:53 pm

 

TECHNIQUE:  Computed axial tomography of the head and cervical spine was obtained. Coronal and sagitt
al reconstruction was performed

 

Computed axial tomography of the chest, abdomen and pelvis was obtained. Contrast was not requested.

 

All CT scans are performed using dose optimization technique as appropriate and may include automated
 exposure control or mA/KV adjustment according to patient size.

 

CLINICAL HISTORY:

Head and neck injury with chest and abdominal pain status post fall

 

COMPARISON:  CT abdomen chest 2018 and 2019

 

FINDINGS:

An intracranial bleed is not seen.

 

The ventricles are normal in caliber. An extra-axial fluid collection is not noted. .

 

Fluid within the sinuses/mastoids is not seen.

 

A cervical fracture is not seen. Anterior fusion involves C4 through C7. Mild posterior subluxation C
4 on C5 is without significant change

 

The evaluation of mediastinum, t[place], vessels, solid organs and bowel are limited secondary to the lac
k of contrast administration.

 

A mediastinal hematoma is not noted. A pleural effusion is not seen. A lung contusion is not present.
 A 12 millimeter left lower lobe nodule unchanged from 2018. A right humeral prosthesis is in place. 
Artifact from the prosthesis obscures adjacent detail somewhat.

 

The liver,spleen, pancreas, adrenals,kidneys and bladder do not demonstrate a traumatic injury. 25 mi
llimeter splenic cyst.

 

IMPRESSION:  .

1. No acute intracranial abnormality is seen.

 

2. A cervical fracture is not visualized. If the patient continues have symptoms to suggest intracran
ial/spinal cord pathology MRI be recommended

 

3. No traumatic abnormality involving the chest/abdomen/pelvis.

 

4. 12 millimeter left lower lobe nodule unchanged from 2018. Follow up CT chest in 1 year is recommen
ded to reassess stability

## 2020-10-16 ENCOUNTER — HOSPITAL ENCOUNTER (OUTPATIENT)
Dept: HOSPITAL 97 - ER | Age: 80
Setting detail: OBSERVATION
LOS: 2 days | Discharge: HOME | End: 2020-10-18
Attending: INTERNAL MEDICINE | Admitting: INTERNAL MEDICINE
Payer: COMMERCIAL

## 2020-10-16 VITALS — BODY MASS INDEX: 37.7 KG/M2

## 2020-10-16 DIAGNOSIS — G30.9: ICD-10-CM

## 2020-10-16 DIAGNOSIS — E86.0: ICD-10-CM

## 2020-10-16 DIAGNOSIS — M79.7: ICD-10-CM

## 2020-10-16 DIAGNOSIS — M79.2: ICD-10-CM

## 2020-10-16 DIAGNOSIS — G47.00: ICD-10-CM

## 2020-10-16 DIAGNOSIS — K21.9: ICD-10-CM

## 2020-10-16 DIAGNOSIS — F02.80: ICD-10-CM

## 2020-10-16 DIAGNOSIS — F32.9: ICD-10-CM

## 2020-10-16 DIAGNOSIS — R53.1: ICD-10-CM

## 2020-10-16 DIAGNOSIS — I89.0: ICD-10-CM

## 2020-10-16 DIAGNOSIS — Z20.828: ICD-10-CM

## 2020-10-16 DIAGNOSIS — E66.01: ICD-10-CM

## 2020-10-16 DIAGNOSIS — Z85.3: ICD-10-CM

## 2020-10-16 DIAGNOSIS — J98.8: Primary | ICD-10-CM

## 2020-10-16 DIAGNOSIS — Z87.891: ICD-10-CM

## 2020-10-16 DIAGNOSIS — Z86.711: ICD-10-CM

## 2020-10-16 DIAGNOSIS — Z96.653: ICD-10-CM

## 2020-10-16 LAB
BUN BLD-MCNC: 16 MG/DL (ref 7–18)
GLUCOSE SERPLBLD-MCNC: 106 MG/DL (ref 74–106)
HCT VFR BLD CALC: 48.9 % (ref 36–45)
INR BLD: 1.02
LYMPHOCYTES # SPEC AUTO: 1.3 K/UL (ref 0.7–4.9)
MAGNESIUM SERPL-MCNC: 2.1 MG/DL (ref 1.8–2.4)
MORPHOLOGY BLD-IMP: (no result)
NT-PROBNP SERPL-MCNC: 784 PG/ML (ref ?–450)
PMV BLD: 9.4 FL (ref 7.6–11.3)
POTASSIUM SERPL-SCNC: 3.8 MMOL/L (ref 3.5–5.1)
RBC # BLD: 4.91 M/UL (ref 3.86–4.86)
TROPONIN (EMERG DEPT USE ONLY): < 0.02 NG/ML (ref 0–0.04)

## 2020-10-16 PROCEDURE — 96372 THER/PROPH/DIAG INJ SC/IM: CPT

## 2020-10-16 PROCEDURE — 93970 EXTREMITY STUDY: CPT

## 2020-10-16 PROCEDURE — 83735 ASSAY OF MAGNESIUM: CPT

## 2020-10-16 PROCEDURE — 93005 ELECTROCARDIOGRAM TRACING: CPT

## 2020-10-16 PROCEDURE — 83880 ASSAY OF NATRIURETIC PEPTIDE: CPT

## 2020-10-16 PROCEDURE — 71045 X-RAY EXAM CHEST 1 VIEW: CPT

## 2020-10-16 PROCEDURE — 36415 COLL VENOUS BLD VENIPUNCTURE: CPT

## 2020-10-16 PROCEDURE — 85025 COMPLETE CBC W/AUTO DIFF WBC: CPT

## 2020-10-16 PROCEDURE — 85610 PROTHROMBIN TIME: CPT

## 2020-10-16 PROCEDURE — 80048 BASIC METABOLIC PNL TOTAL CA: CPT

## 2020-10-16 PROCEDURE — 84484 ASSAY OF TROPONIN QUANT: CPT

## 2020-10-16 PROCEDURE — 87804 INFLUENZA ASSAY W/OPTIC: CPT

## 2020-10-16 PROCEDURE — 87040 BLOOD CULTURE FOR BACTERIA: CPT

## 2020-10-16 PROCEDURE — 72125 CT NECK SPINE W/O DYE: CPT

## 2020-10-16 PROCEDURE — 97161 PT EVAL LOW COMPLEX 20 MIN: CPT

## 2020-10-16 PROCEDURE — 71275 CT ANGIOGRAPHY CHEST: CPT

## 2020-10-16 PROCEDURE — 83605 ASSAY OF LACTIC ACID: CPT

## 2020-10-16 PROCEDURE — 85049 AUTOMATED PLATELET COUNT: CPT

## 2020-10-16 PROCEDURE — 85379 FIBRIN DEGRADATION QUANT: CPT

## 2020-10-16 PROCEDURE — 96374 THER/PROPH/DIAG INJ IV PUSH: CPT

## 2020-10-16 PROCEDURE — 99285 EMERGENCY DEPT VISIT HI MDM: CPT

## 2020-10-16 PROCEDURE — 96375 TX/PRO/DX INJ NEW DRUG ADDON: CPT

## 2020-10-16 PROCEDURE — 90471 IMMUNIZATION ADMIN: CPT

## 2020-10-16 PROCEDURE — 70450 CT HEAD/BRAIN W/O DYE: CPT

## 2020-10-16 RX ADMIN — ACETAMINOPHEN PRN MG: 500 TABLET, FILM COATED ORAL at 23:19

## 2020-10-16 RX ADMIN — Medication SCH ML: at 23:07

## 2020-10-16 NOTE — RAD REPORT
EXAM DESCRIPTION:  US - Extrem Venous W Compress Addi - 10/16/2020 9:04 pm

 

CLINICAL HISTORY:  SOB;Pain

Bilateral leg edema and swelling.

 

COMPARISON:  Extremity Venous Uni Ltd dated 3/3/2018

 

TECHNIQUE:  Real-time sonographic interrogation of the left and right lower extremity deep venous sys
tems was performed.

 

FINDINGS:  Normal compressibility, flow augmentation, phasic flow and spontaneous flow is identified 
in both the left and right lower extremity deep venous systems.

 

IMPRESSION:  No sonographic evidence of left or right lower extremity deep venous thrombosis.

## 2020-10-16 NOTE — EDPHYS
Physician Documentation                                                                           

 [facility]                                                                 

Name: Leona Peres                                                                              

Age: 80 yrs                                                                                       

Sex: Female                                                                                       

: 1940                                                                                   

MRN: [id number]                                                                                   

Arrival Date: 10/16/2020                                                                          

Time: 14:18                                                                                       

Account#: [id number]                                                                            

Bed 5                                                                                             

Private MD: Kendall Rivero V                                                                      

ED Physician Og Cotter                                                                      

HPI:                                                                                              

10/16                                                                                             

16:21 This 80 yrs old  Female presents to ER via Wheelchair with complaints of Fall  kdr 

      Injury, General Weakness.                                                                   

16:21 Details of fall: The patient fell from seated position, Was trying to get up from       kdr 

      toilet and fell hitting her head on the fixture. She denies LOC and now c/o pain to the     

      right parietal area. Minimal contusion and no hematoma noted. Onset: The                    

      symptoms/episode began/occurred acutely, suddenly, just prior to arrival. Associated        

      injuries: The patient sustained injury to the head. Severity of symptoms: At their          

      worst the symptoms were mild, in the emergency department the symptoms are unchanged.       

      The patient has not experienced similar symptoms in the past. The patient has not           

      recently seen a physician.                                                                  

                                                                                                  

Historical:                                                                                       

- Allergies:                                                                                      

14:43 Darvocet-N 100;                                                                         ss  

14:43 PENICILLINS;                                                                            ss  

14:43 Sulfa (Sulfonamide Antibiotics);                                                        ss  

22:21 clindamycin HCl (bulk);                                                                 ll2 

- PMHx:                                                                                           

14:43 Cancer; GERD; insomnia; Fibromyalgia; Depression; lymphedema; skipped heart beats;      ss  

- PSHx:                                                                                           

14:43 Appendectomy; knee replacements; lumbar fusion; Hysterectomy; Mastectomy, Left;         ss  

      cervical fusion;                                                                            

                                                                                                  

- Immunization history:: Adult Immunizations up to date.                                          

- Social history:: Smoking status: Patient denies any tobacco usage or history of.                

                                                                                                  

                                                                                                  

ROS:                                                                                              

16:21 Constitutional: Negative for fever, chills, and weight loss, Eyes: Negative for injury, kdr 

      pain, redness, and discharge, Neck: Negative for injury, pain, and swelling,                

      Cardiovascular: Negative for chest pain, palpitations, and edema, Respiratory: Negative     

      for shortness of breath, cough, wheezing, and pleuritic chest pain, Abdomen/GI:             

      Negative for abdominal pain, nausea, vomiting, diarrhea, and constipation, Back:            

      Negative for injury and pain, : Negative for injury, bleeding, discharge, and             

      swelling, MS/Extremity: Negative for injury and deformity, Skin: Negative for injury,       

      rash, and discoloration, Neuro: Negative for headache, weakness, numbness, tingling,        

      and seizure activity. Psych: Negative for depression, anxiety, suicide ideation,            

      homicidal ideation, and hallucinations, Allergy/Immunology: Negative for hives, rash,       

      and allergies, Endocrine: Negative for neck swelling, polydipsia, polyuria, polyphagia,     

      and marked weight changes, Hematologic/Lymphatic: Negative for swollen nodes, abnormal      

      bleeding, and unusual bruising.                                                             

                                                                                                  

Exam:                                                                                             

16:21 Constitutional:  This is a well developed, well nourished patient who is awake, alert,  kdr 

      and in no acute distress. Head/Face:  Normocephalic, atraumatic. Eyes:  Pupils equal        

      round and reactive to light, extra-ocular motions intact.  Lids and lashes normal.          

      Conjunctiva and sclera are non-icteric and not injected.  Cornea within normal limits.      

      Periorbital areas with no swelling, redness, or edema. Neck:  Trachea midline, no           

      thyromegaly or masses palpated, and no cervical lymphadenopathy.  Supple, full range of     

      motion without nuchal rigidity, or vertebral point tenderness.  No Meningismus.             

      Chest/axilla:  Normal chest wall appearance and motion.  Nontender with no deformity.       

      No lesions are appreciated. Cardiovascular:  Regular rate and rhythm with a normal S1       

      and S2.  No gallops, murmurs, or rubs.  Normal PMI, no JVD.  No pulse deficits.             

      Respiratory:  Lungs have equal breath sounds bilaterally, clear to auscultation and         

      percussion.  No rales, rhonchi or wheezes noted.  No increased work of breathing, no        

      retractions or nasal flaring. Abdomen/GI:  Soft, non-tender, with normal bowel sounds.      

      No distension or tympany.  No guarding or rebound.  No evidence of tenderness               

      throughout. Back:  No spinal tenderness.  No costovertebral tenderness.  Full range of      

      motion.                                                                                     

16:21 Abdomen/GI: Inspection: obese                                                               

19:25 ECG was reviewed by the Attending Physician.                                            kdr 

                                                                                                  

Vital Signs:                                                                                      

14:41  / 76; Pulse 83; Resp 16; Temp 97.9(TE); Pulse Ox 95% on R/A; Weight 94.35 kg;    ss  

      Height 5 ft. 3 in. (160.02 cm);                                                             

14:45  / 82; Pulse 79; Resp 17;                                                         bp  

15:22  / 63; Pulse 74; Resp 17; Pulse Ox 96% on R/A;                                    tw2 

15:59  / 79; Pulse 79; Resp 17; Pulse Ox 95% ;                                          bp  

16:10  / 71; Pulse 80; Resp 16; Pulse Ox 88% on R/A;                                    tw2 

17:10  / 64; Pulse 77; Resp 18; Pulse Ox 97% on 2 lpm NC;                               tw2 

17:58  / 64; Pulse 76; Resp 14; Pulse Ox 98% ;                                          bp  

19:34  / 78; Pulse 81; Resp 18; Pulse Ox 98% ;                                          ll2 

21:00  / 78; Pulse 84; Resp 18; Pulse Ox 98% on 2 lpm NC;                               ll2 

22:22  / 79; Pulse 83; Resp 14; Pulse Ox 98% on 2 lpm NC;                               ll2 

14:41 Body Mass Index 36.85 (94.35 kg, 160.02 cm)                                             ss  

16:10 pt placed on 2L NC at this time, 95% will continue to monitor, provider notified.       tw2 

                                                                                                  

[place] Coma Score:                                                                               

15:00 Eye Response: spontaneous(4). Verbal Response: oriented(5). Motor Response: obeys       bp  

      commands(6). Total: 15.                                                                     

                                                                                                  

Trauma Score (Adult):                                                                             

15:00 Eye Response: spontaneous(1); Verbal Response: oriented(1); Motor Response: obeys       bp  

      commands(2); Systolic BP: > 89 mm Hg(4); Respiratory Rate: 10 to 29 per min(4); [place]     

      Score: 15; Trauma Score: 12                                                                 

                                                                                                  

MDM:                                                                                              

16:20 Patient medically screened.                                                             kdr 

16:25 Data reviewed: vital signs, nurses notes, old medical records, radiologic studies.      kdr 

      Counseling: I had a detailed discussion with the patient and/or guardian regarding: the     

      historical points, exam findings, and any diagnostic results supporting the                 

      discharge/admit diagnosis, lab results, radiology results.                                  

16:25 ED course: The patient was noted, while awake and talking, to have a sat of 88% on RA.  kdr 

      The patient has been having a non-productive cough for.                                     

19:34 Patient medically screened.                                                             ragini 

19:39 Differential diagnosis: closed head injury, contusion. Data interpreted: Cardiac        ragini 

      monitor: rate is 81 beats/min, rhythm is regular, Pulse oximetry: on room air is 98 %.      

      Test interpretation: by ED physician or midlevel provider: ECG, plain radiologic            

      studies. ED course: dr rivero dw the case , admit , full.                                    

                                                                                                  

10/16                                                                                             

16:29 Order name: Basic Metabolic Panel; Complete Time: 17:53                                 kdr 

10/16                                                                                             

16:29 Order name: CBC with Diff; Complete Time: 17:53                                         kdr 

10/16                                                                                             

16:29 Order name: Magnesium; Complete Time: 17:53                                             kdr 

10/16                                                                                             

16:29 Order name: NT PRO-BNP; Complete Time: 17:53                                            kdr 

10/16                                                                                             

16:29 Order name: PT-INR; Complete Time: 17:53                                                kdr 

10/16                                                                                             

16:29 Order name: Troponin (emerg Dept Use Only); Complete Time: 17:53                        kdr 

10/16                                                                                             

17:05 Order name: Manual Differential; Complete Time: 17:53                                   EDMS

10/16                                                                                             

18:09 Order name: DD; Complete Time: 19:35                                                    kdr 

10/16                                                                                             

18:19 Order name: Basic Metabolic Panel                                                       EDMS

10/16                                                                                             

18:19 Order name: Basic Metabolic Panel                                                       EDMS

10/16                                                                                             

18:19 Order name: CBC with Automated Diff                                                     EDMS

10/16                                                                                             

18:19 Order name: CBC with Automated Diff                                                     EDMS

10/16                                                                                             

18:19 Order name: Troponin I                                                                  EDMS

10/16                                                                                             

15:09 Order name: CT Head C Spine; Complete Time: 16:19                                       em1 

10/16                                                                                             

16:29 Order name: CXR XRAY; Complete Time: 17:53                                              kdr 

10/16                                                                                             

18:19 Order name: Troponin I                                                                  EDMS

10/16                                                                                             

18:19 Order name: Troponin I                                                                  EDMS

10/16                                                                                             

18:20 Order name: CT Chest For PE Angio; Complete Time: 19:50                                 kdr 

10/16                                                                                             

18:49 Order name: US Extremity Venous W Compression Addi; Complete Time: 21:37                 kdr 

10/16                                                                                             

19:39 Order name: Blood Culture Adult (2)                                                     ragini 

10/16                                                                                             

19:39 Order name: Lactate                                                                     ragini 

10/16                                                                                             

20:20 Order name: SARS-COV-2 RT PCR; Complete Time: 21:37                                     EDMS

10/16                                                                                             

16:29 Order name: EKG; Complete Time: 16:30                                                   kdr 

10/16                                                                                             

16:29 Order name: Cardiac monitoring; Complete Time: 16:56                                    kdr 

10/16                                                                                             

16:29 Order name: EKG - Nurse/Tech; Complete Time: 16:56                                      kdr 

10/16                                                                                             

16:29 Order name: IV Saline Lock; Complete Time: 16:57                                        kdr 

10/16                                                                                             

16:29 Order name: Labs collected and sent; Complete Time: 16:56                               kdr 

10/16                                                                                             

16:29 Order name: O2 Per Protocol; Complete Time: 16:33                                       kdr 

10/16                                                                                             

16:29 Order name: O2 Sat Monitoring; Complete Time: 16:33                                     kdr 

10/16                                                                                             

18:19 Order name: Regular                                                                     EDMS

10/16                                                                                             

18:19 Order name: EKG Electrocardiogram                                                       EDMS

10/16                                                                                             

18:19 Order name: EKG Electrocardiogram                                                       EDMS

10/16                                                                                             

18:19 Order name: EKG Electrocardiogram                                                       EDMS

10/16                                                                                             

18:19 Order name: EKG Electrocardiogram                                                       EDMS

                                                                                                  

EC:25 Rate is 72 beats/min. Rhythm is regular, Sinus arrythmia with No ectopy. QRS Axis is    kdr 

      Normal. CT interval is normal. QRS interval is normal. QT interval is normal. Clinical      

      impression: NSR w/ Non-specific ST/T Changes.                                               

                                                                                                  

Administered Medications:                                                                         

19:43 Not Given (Duplicate Order): Albuterol HFA Inhaler 4 puffs Inhalation once              ragini 

20:03 Drug: Decadron - Dexamethasone 6 mg Route: IVP; Site: right hand;                       ll2 

21:23 Follow up: Response: No adverse reaction                                                ll2 

20:04 Drug: Mucomyst - Acetylcysteine 600 mg Route: PO;                                       ll2 

21:22 Follow up: Response: No adverse reaction                                                ll2 

20:04 Drug: Pepcid 20 mg Route: IVP; Site: right hand;                                        ll2 

20:53 Follow up: Response: No adverse reaction                                                ll2 

21:22 Follow up: Response: No adverse reaction                                                ll2 

21:22 Drug: Zithromax 500 mg Route: IVPB; Infused Over: 1 hrs; Site: right hand;              ll2 

21:22 Drug: Rocephin 1 grams Route: IV; Rate: per protocol; Site: right hand;                 ll2 

21:23 Follow up: Response: No adverse reaction; IV Status: Completed infusion                 ll2 

21:22 Drug: Lovenox 40 mg Route: Sub-Q; Site: left lower abdomen;                             ll2 

21:23 Follow up: Response: No adverse reaction                                                ll2 

21:30 Drug: Xopenex 2.5 mg Route: Inhalation;                                                 ll2 

21:30 Drug: AtroVENT Aerosol 0.5 mg Route: Inhalation;                                        ll2 

                                                                                                  

                                                                                                  

Disposition:                                                                                      

10/16/20 18:05 Hospitalization ordered by Kendall Rivero for Observation. Preliminary diagnosis    

  are Superficial injury of head, Concussion, Weakness, Dyspnea, Hypoxemia,                       

  Solitary pulmonary nodule.                                                                      

- Bed requested for Telemetry/MedSurg (observation).                                              

- Status is Observation.                                                                      ll2 

- Condition is Fair.                                                                              

- Problem is new.                                                                                 

- Symptoms have improved.                                                                         

                                                                                                  

                                                                                                  

                                                                                                  

Signatures:                                                                                       

Dispatcher [employer]                           EDMS                                                 

Og Cotter MD MD cha Rittger, Kevin, MD MD kdr Smirch, Shelby, RN RN                                                      

Michelle Braswell RN                       RN   Aviva Stanton RN                    RN   ll2                                                  

                                                                                                  

Corrections: (The following items were deleted from the chart)                                    

15:13 14:55 Head Brain Wo Cont+CT.RAD.BRZ ordered. EDMS                                       EDMS

15:13 15:08 Head C Spine MPR Wo Con+CT.RAD.BRZ ordered. [facility]                                  EDMS

16:25 16:20 10/16/2020 16:20 Discharged to Home. Impression: Other slipping, tripping and     kdr 

      stumbling and falls; Superficial injury of head. Condition is Stable. Forms are             

      Medication Reconciliation Form, Thank You Letter, Antibiotic Education, Prescription        

      Opioid Use. Follow up: Kendall Rivero; When: 2 - 3 days; Reason: If symptoms return,          

      Further diagnostic work-up, Recheck today's complaints, Continuance of care,                

      Re-evaluation by your physician. Problem is new. Symptoms have improved. kdr                

19:45 18:05 Hospitalization Ordered by Kendall Rivero MD for Observation. Preliminary diagnosis ragini 

      is Superficial injury of head; Concussion. Bed requested for Telemetry/MedSurg              

      (observation). Status is Observation. Condition is Fair. Problem is new. Symptoms have      

      improved. kdr                                                                               

20:10 19:45 10/16/2020 18:05 Hospitalization Ordered by Kendall Rivero MD for Observation.      ragini 

      Preliminary diagnosis is Superficial injury of head; Concussion; Weakness; Dyspnea;         

      Hypoxemia. Bed requested for Telemetry/MedSurg (observation). Status is Observation.        

      Condition is Fair. Problem is new. Symptoms have improved. [facility]                              

20:20 16:25 CORONAVIRUS+MR.LAB.BRZ ordered. [facility]                                              EDMS

21:39 20:10 10/16/2020 18:05 Hospitalization Ordered by Kendall Rivero MD for Observation.      cg  

      Preliminary diagnosis is Superficial injury of head; Concussion; Weakness; Dyspnea;         

      Hypoxemia; Solitary pulmonary nodule. Bed requested for Telemetry/MedSurg                   

      (observation). Status is Observation. Condition is Fair. Problem is new. Symptoms have      

      improved. ragini                                                                               

22:21 14:43 Allergies: clindamycin HCl (bulk); ss                                             ll2 

22:27 21:39 10/16/2020 18:05 Hospitalization Ordered by Kendall Rivero MD for Observation.      ll2 

      Preliminary diagnosis is Superficial injury of head; Concussion; Weakness; Dyspnea;         

      Hypoxemia; Solitary pulmonary nodule. Bed requested for Telemetry/MedSurg                   

      (observation). Status is Observation. Condition is Fair. Problem is new. Symptoms have      

      improved. cg                                                                                

                                                                                                  

**************************************************************************************************

## 2020-10-16 NOTE — RAD REPORT
EXAM DESCRIPTION:  CT - Chest For Pe Angio - 10/16/2020 6:58 pm

 

CLINICAL HISTORY:  Chest pain.

Dyspnea;SOB

 

COMPARISON:  Chest Abd Pelvis Wo Con dated 4/11/2018

 

TECHNIQUE:  CT angiogram of the pulmonary arteries was performed with MIP.

 

All CT scans are performed using dose optimization technique as appropriate and may include automated
 exposure control or mA/KV adjustment according to patient size.

 

FINDINGS:  No evidence of pulmonary thromboembolism.

 

No acute aortic finding demonstrated.

 

Rounded pulmonary nodule is again seen in the medial left lung base. There has been slight interval g
rowth in the size of the nodule since 04/11/2018. Currently, the nodule measures approximately 12 x 1
2 mm, previously 10 x 10 mm.

 

No pulmonary infiltrate is seen.

 

No significant pericardial or pleural fluid. Small hiatal hernia is seen with mildly patulous esophag
us.

 

No concerning bony finding.

 

IMPRESSION:  No evidence of pulmonary thromboembolism.

 

No acute lumbar abnormality suspected. Fractional growth in the medial left lower lobe pulmonary nodu
le since comparative study. Consider follow-up CT chest in 6 -12 months for surveillance purposes.

## 2020-10-16 NOTE — RAD REPORT
EXAM DESCRIPTION:  CT - CTHCSPWOC - 10/16/2020 3:53 pm

 

CLINICAL HISTORY:  Trauma, head and neck injury.

Fall

 

COMPARISON:  CT HEAD CSPINE MPR WO CONTRAST dated 7/6/2012; SOFT TISSUE NECK W O CONTRAST dated 4/17/
2011

 

TECHNIQUE:  Axial 5 mm thick images of the head were obtained.

 

Axial 2 mm thick images of the cervical spine were obtained with sagittal and coronal reconstruction 
images generated and reviewed.

 

All CT scans are performed using dose optimization technique as appropriate and may include automated
 exposure control or mA/KV adjustment according to patient size.

 

FINDINGS:  CT HEAD WITHOUT CONTRAST:

 

No acute hemorrhage, hydrocephalus or extra-axial collection is identified.No areas of brain edema or
 midline shift.

 

The paranasal sinuses and mastoids are clear.The calvarium is intact.

 

CT CERVICAL SPINE WITHOUT CONTRAST:

 

No fracture or subluxation.Anterior cervical hardware spanning C4 -7 is present with solid bony fusio
n.No prevertebral soft tissues swelling is identified.

 

IMPRESSION:  No acute intracranial or cervical spine findings.

## 2020-10-16 NOTE — RAD REPORT
EXAM DESCRIPTION:  RAD - Chest Single View - 10/16/2020 4:58 pm

 

CLINICAL HISTORY:  COUGH

Chest pain.

 

COMPARISON:  Chest Single View dated 8/28/2019; Chest Single View dated 9/25/2018; Chest Pa And Lat (
2 Views) dated 9/24/2018; Chest Pa And Lat (2 Views) dated 11/9/2016

 

FINDINGS:  Portable technique limits examination quality.

 

The lungs are grossly clear. The heart is upper limit normal in size. No displaced fractures.Right hu
meral prosthesis. Small cervical hardware plate.

 

IMPRESSION:  No acute intrathoracic process suspected.

## 2020-10-16 NOTE — XMS REPORT
Continuity of Care Document

                           Created on:2020



Patient:NEGRO GONZALES

Sex:Female

:1940

External Reference #:[id number]





Demographics







                          Address                   [address] [place] [address]

 

                          Home Phone                [phone]

 

                          Mobile Phone              [phone]

 

                          Email Address             [email or web address]

 

                          Preferred Language        English

 

                          Marital Status            Unknown

 

                          Episcopal Affiliation     Unknown

 

                          Race                      Unknown

 

                          Additional Race(s)        Unavailable



                                                    White

 

                          Ethnic Group              Unknown









Author







                          Organization              [facility]

t

 

                          Address                   [address]



                                                    [place] [address]

 

                          Phone                     [phone]









Support







                Name            Relationship    Address         Phone

 

                Homero          Child           Unavailable     [phone]

 

                Homero          Other           Unavailable     [phone]









Care Team Providers







                    Name                Role                Phone

 

                    Denita RIZVI            Primary Care Physician [phone]

 

                    Abbe RIZVI           Attending Clinician [phone]









Payers







           Payer Name Policy Type Policy     Effective Date Expiration Date Sour

ce



                                 Number                           

 

           [employer] MEDICARE[employer]ETNA            [id number]   2014            [place]

on



           MEDICARE HMO/PPO                       00:00:00              [facility]

t



           [id number]                                             



           -PresentHMO                                             







Problems

This patient has no known problems.



Allergies, Adverse Reactions, Alerts







       Allergy Allergy Status Severity Reaction(s) Onset  Inactive Treating Comm

ents 

Source



       Name   Type                        Date   Date   Clinician        

 

       Clindamy Propensi Active        Unknown 2020-0                      [place]

on



       jayna    ty to                Reaction 6-30                        [facility]



       Phosphat adverse                      00:00:                      st



       e      reaction                      00                          



              s to                                                    



              drug                                                    

 

       Propoxyp Propensi Active        Unknown 2020-0                      [place]

on



       hene   ty to                Reaction 6-30                        [facility]



       N-Acetam adverse                      00:00:                      st



       inophen reaction                      00                          



              s to                                                    



              drug                                                    

 

       Penicill Propensi Active        Unknown 2020-0                      [place]

on



       ins    ty to                Reaction 6-30                        [facility]



              adverse                      00:00:                      st



              reaction                      00                          



              s to                                                    



              drug                                                    

 

       Sulfa  Propensi Active        Unknown 2020-0                      [place]



       (Sulfona ty to                Reaction 6-30                        [facility]

i



       mide   adverse                      00:00:                      st



       Antibiot reaction                      00                          



       ics)   s to                                                    



              drug                                                    

 

       Acetamin Propensi Active        Unknown 2020-0                      [facility]

on



       ophen  ty to                Reaction 6-30                        [facility]



              adverse                      00:00:                      st



              reaction                      00                          



              s to                                                    



              drug                                                    







Family History







           Family Member Diagnosis  Comments   Start Date Stop Date  Source

 

           Natural father Diabetes                                    [facility]

[facility]

 

           Natural father Heart disease                                  [facility]

 

           Natural father Hypertension                                  [place] 

[facility]

 

           Natural mother Asthma                                      [facility]

[facility]odist







Social History







           Social Habit Start Date Stop Date  Quantity   Comments   Source

 

           History of tobacco                       Current smoker            [place]

[place] [facility]



           use                                                    

 

           [facility]



           Alcohol Std Drinks                                             

 

           [facility]

[facility]



           Alcohol Binge                                             

 

           Sex Assigned At                                             [facility]

ethodist



           Birth                                                  

 

           Cigarettes smoked 2020                       [place]

 [facility]



           current (pack per 00:00:00   00:00:00                         



           day) - Reported                                             

 

           Cigarette  2020                       [facility]

ist



           pack-years 00:00:00   00:00:00                         

 

           Tobacco use and 2020 Never used            [facility]

ethodist



           exposure   00:00:00   00:00:00                         

 

           Alcohol intake 2020 Lifetime              [facility]

thodist



                      00:00:00   00:00:00   non-drinker            



                                            (finding)             

 

           History SDOH 2020 1                     [place] [facility]

odist



           Alcohol Frequency 00:00:00   00:00:00                         









                Smoking Status  Start Date      Stop Date       Source

 

                Former smoker   2020 00:00:00 2020 00:00:00 [place] 

[facility]







Medications

This patient has no known medications.



Vital Signs







             Vital Name   Observation Time Observation Value Comments     Source

 

             Body height  2020 11:34:00 160 cm                    [place] 

[facility]

 

             Body weight  2020 11:34:00 95.255 kg                 [place] 

[facility]

 

             BMI          2020 11:34:00 37.20 kg/m2               [place] 

[facility]







Procedures







                Procedure       Date / Time Performed Performing Clinician [facility]

e

 

                CT ABD/PELVIC EXTERNAL 2020 10:37:00 Ellis Mcadams [employer] [facility]



                STUDY                                           







Plan of Care







             Planned Activity Planned Date Details      Comments     Source

 

             Future Scheduled 2020   INFLUENZA VACCINE              [place]

n [employer]



             Test         00:00:00     [code = INFLUENZA              



                                       VACCINE]                  

 

             Future Scheduled 2006-10-07   65+ PNEUMOCOCCAL              [place]

 [facility] [facility]



             Test         00:00:00     VACCINE (1 of 1 -              



                                       PPSV23) [code = 65+              



                                       PNEUMOCOCCAL VACCINE              



                                       (1 of 1 - PPSV23)]              

 

             Future Scheduled 1991-10-07   SHINGLES VACCINES (#1)              [facility]

[place] [facility]



             Test         00:00:00     [code = SHINGLES              



                                       VACCINES (#1)]              







Encounters







        Start   End     Encounter Admission Attending Care    Care    Encounter 

Source



        Date/Time Date/Time Type    Type    Clinicians Facility Department ID   

   

 

        2020 Outpatient         [facility]     [id number]

[address] [place]



        00:00:00 00:00:00                 ELLIS                    [id number]     [facility]

i



                                                                        st

 

        2020 Outpatient         [facility]     [id number]

[address] [place]



        00:00:00 00:00:00                 ELLIS                    [address]     [facility]

i



                                                                        st







Results







           Test Description Test Time  Test Comments Results    Result     [facility]

e



                                                       Comments   

 

           CT Abd/Pelvic 2020            This exam was not            [facility]

[place]



           External Study 12:24:47              acquired at a            [facility]



                                            [facility]             



                                            facility and has            



                                            not been              



                                            interpreted by a            



                                            [employer] [facility]             



                                            Provider.  The            



                                            exam was imported            



                                            into our imaging            



                                            system.

## 2020-10-16 NOTE — ER
Nurse's Notes                                                                                     

 [facility]                                                                 

Name: Leona Peres                                                                              

Age: 80 yrs                                                                                       

Sex: Female                                                                                       

: 1940                                                                                   

MRN: [id number]                                                                                   

Arrival Date: 10/16/2020                                                                          

Time: 14:18                                                                                       

Account#: [id number]                                                                            

Bed 5                                                                                             

Private MD: Kendall Barger V                                                                      

Diagnosis: Superficial injury of head;Concussion;Weakness;Dyspnea;Hypoxemia;Solitary pulmonary    

  nodule                                                                                          

                                                                                                  

Presentation:                                                                                     

10/16                                                                                             

14:41 Chief complaint: Patient states: "She fell and hit her head on the floor a couple hours ss  

      ago. She has been a little disoriented since then. She has been falling more                

      frequently. She is on antibiotics and cough medication and is scheduled to have a COVID     

      swab on Monday.". Coronavirus screen: Client denies travel out of the [place] in the last      

      14 days. Ebola Screen: Patient denies exposure to infectious person. Patient denies         

      travel to an Ebola-affected area in the 21 days before illness onset. Initial Sepsis        

      Screen: Does the patient meet any 2 criteria? No. Patient's initial sepsis screen is        

      negative. Does the patient have a suspected source of infection? No. Patient's initial      

      sepsis screen is negative. Risk Assessment: Do you want to hurt yourself or someone         

      else? Patient reports no desire to harm self or others. Onset of symptoms was 2020.                                                                                   

14:41 Method Of Arrival: Wheelchair                                                           ss  

14:41 Acuity: ANUSHKA 2                                                                           ss  

                                                                                                  

Triage Assessment:                                                                                

14:45 General: Appears in no apparent distress. comfortable, obese, Behavior is cooperative,  bp  

      appropriate for age, anxious. General: SEE TRAUMA TAB. Pain: Complains of pain in head.     

                                                                                                  

Historical:                                                                                       

- Allergies:                                                                                      

14:43 Darvocet-N 100;                                                                         ss  

14:43 PENICILLINS;                                                                            ss  

14:43 Sulfa (Sulfonamide Antibiotics);                                                        ss  

22:21 clindamycin HCl (bulk);                                                                 ll2 

- PMHx:                                                                                           

14:43 Cancer; GERD; insomnia; Fibromyalgia; Depression; lymphedema; skipped heart beats;      ss  

- PSHx:                                                                                           

14:43 Appendectomy; knee replacements; lumbar fusion; Hysterectomy; Mastectomy, Left;         ss  

      cervical fusion;                                                                            

                                                                                                  

- Immunization history:: Adult Immunizations up to date.                                          

- Social history:: Smoking status: Patient denies any tobacco usage or history of.                

                                                                                                  

                                                                                                  

Screening:                                                                                        

15:22 Abuse screen: Denies threats or abuse. Nutritional screening: No deficits noted.        tw2 

      Tuberculosis screening: No symptoms or risk factors identified. Fall Risk Secondary         

      diagnosis (15 points) impaired mobility.                                                    

                                                                                                  

Primary Survey:                                                                                   

14:45 NO uncontrolled hemorrhage observed. A: The patient is alert. Airway: patent.           bp  

      Breathing/Chest: Respiratory pattern: regular, Respiratory effort: spontaneous,             

      unlabored. Circulation: Cardiac rhythm: sinus rhythm. Disability Alert.                     

      Exposure/Environment: There is no evidence of uncontrolled external bleeding. No            

      obvious injuries are noted at this time.                                                    

15:57 Reassessment Airway Airway Patent Breathing/Chest Respiratory pattern Regular           bp  

      Respiratory effort Spontaneous Unlabored Circulation Heart rhythm Sinus rhythm              

      Disability Alert.                                                                           

                                                                                                  

Assessment:                                                                                       

14:45 General: SEE TRAUMA TAB.                                                                bp  

15:56 Reassessment: PT RETURNED FROM CT.                                                      bp  

16:22 Reassessment: Patient appears in no apparent distress at this time. No changes from     tw2 

      previously documented assessment. Patient and/or family updated on plan of care and         

      expected duration. Pain level reassessed.                                                   

17:59 Reassessment: Patient appears in no apparent distress at this time. Patient and/or      bp  

      family updated on plan of care and expected duration. Pain level reassessed. ALL            

      CURRENT ORDERS COMPLETED, DISPO PENDING.                                                    

18:32 Reassessment: Patient appears in no apparent distress at this time. ADMIT INITIATED. PT bp  

      TO CT WITH RAD TECH.                                                                        

20:05 Reassessment: Patient and/or family updated on plan of care and expected duration. Pain ll2 

      level reassessed. Patient is alert, oriented x 3, equal unlabored respirations, skin        

      warm/dry/pink. lab at bedside drawing cultures, neither IV would draw blood.                

21:50 Reassessment: Patient and/or family updated on plan of care and expected duration. Pain ll2 

      level reassessed. Patient is alert, oriented x 3, equal unlabored respirations, skin        

      warm/dry/pink. attempted to call report, was told the nurse needed to call me back.         

22:00 Reassessment: report given to ZHAO Smallwood.                                            ll2 

                                                                                                  

Vital Signs:                                                                                      

14:41  / 76; Pulse 83; Resp 16; Temp 97.9(TE); Pulse Ox 95% on R/A; Weight 94.35 kg;    ss  

      Height 5 ft. 3 in. (160.02 cm);                                                             

14:45  / 82; Pulse 79; Resp 17;                                                         bp  

15:22  / 63; Pulse 74; Resp 17; Pulse Ox 96% on R/A;                                    tw2 

15:59  / 79; Pulse 79; Resp 17; Pulse Ox 95% ;                                          bp  

16:10  / 71; Pulse 80; Resp 16; Pulse Ox 88% on R/A;                                    tw2 

17:10  / 64; Pulse 77; Resp 18; Pulse Ox 97% on 2 lpm NC;                               tw2 

17:58  / 64; Pulse 76; Resp 14; Pulse Ox 98% ;                                          bp  

19:34  / 78; Pulse 81; Resp 18; Pulse Ox 98% ;                                          ll2 

21:00  / 78; Pulse 84; Resp 18; Pulse Ox 98% on 2 lpm NC;                               ll2 

22:22  / 79; Pulse 83; Resp 14; Pulse Ox 98% on 2 lpm NC;                               ll2 

14:41 Body Mass Index 36.85 (94.35 kg, 160.02 cm)                                             ss  

16:10 pt placed on 2L NC at this time, 95% will continue to monitor, provider notified.       tw2 

                                                                                                  

[place] Coma Score:                                                                               

15:00 Eye Response: spontaneous(4). Verbal Response: oriented(5). Motor Response: obeys       bp  

      commands(6). Total: 15.                                                                     

                                                                                                  

Trauma Score (Adult):                                                                             

15:00 Eye Response: spontaneous(1); Verbal Response: oriented(1); Motor Response: obeys       bp  

      commands(2); Systolic BP: > 89 mm Hg(4); Respiratory Rate: 10 to 29 per min(4); [place]     

      Score: 15; Trauma Score: 12                                                                 

                                                                                                  

ED Course:                                                                                        

14:18 Patient arrived in ED.                                                                  ag5 

14:19 Kendall Barger MD is Private Physician.                                                 ag5 

14:43 Triage completed.                                                                       ss  

14:43 Arm band placed on right wrist.                                                         ss  

14:45 Patient has correct armband on for positive identification. Bed in low position. Call   bp  

      light in reach. Side rails up X2.                                                           

14:55 Matthew Palmer, RN is Primary Nurse.                                                    bp  

14:59 Ruslan Bob MD is Attending Physician.                                              kdr 

15:23 Patient maintains SpO2 saturation greater than 95% on room air. Thermoregulation: warm  tw2 

      blanket given to patient.                                                                   

15:53 CT Head C Spine In Process Unspecified.                                                 EDMS

16:19 Kendall Barger MD is Referral Physician.                                                kdr 

16:56 Inserted saline lock: 22 gauge in right hand, using aseptic technique. Blood collected. tw2 

      Missed attempt(s): 22 gauge in right hand. Bleeding controlled, band aid applied,           

      catheter tip intact.                                                                        

16:59 CXR XRAY In Process Unspecified.                                                        EDMS

18:04 Kendall Barger MD is Hospitalizing Provider.                                            kdr 

18:47 Radiology exam delayed due to IV insertion attempt and/or patient not having            vm2 

      appropriate IV at this time.                                                                

18:59 CT Chest For PE Angio In Process Unspecified.                                           EDMS

19:00 Report given to ZHAO Vences and ZHAO Carbajal.                                               tw2 

19:34 Attending Physician role handed off by Ruslan Bob MD                               ragini 

19:34 Og Cotter MD is Attending Physician.                                             ragini 

19:40 US Extremity Venous W Compression Addi Sent.                                             mg2 

21:04 US Extremity Venous W Compression Addi In Process Unspecified.                           EDMS

22:20 No provider procedures requiring assistance completed. Patient admitted, IV remains in  ll2 

      place.                                                                                      

                                                                                                  

Administered Medications:                                                                         

19:43 Not Given (Duplicate Order): Albuterol HFA Inhaler 4 puffs Inhalation once              ragini 

20:03 Drug: Decadron - Dexamethasone 6 mg Route: IVP; Site: right hand;                       ll2 

21:23 Follow up: Response: No adverse reaction                                                ll2 

20:04 Drug: Mucomyst - Acetylcysteine 600 mg Route: PO;                                       ll2 

21:22 Follow up: Response: No adverse reaction                                                ll2 

20:04 Drug: Pepcid 20 mg Route: IVP; Site: right hand;                                        ll2 

20:53 Follow up: Response: No adverse reaction                                                ll2 

21:22 Follow up: Response: No adverse reaction                                                ll2 

21:22 Drug: Zithromax 500 mg Route: IVPB; Infused Over: 1 hrs; Site: right hand;              ll2 

21:22 Drug: Rocephin 1 grams Route: IV; Rate: per protocol; Site: right hand;                 ll2 

21:23 Follow up: Response: No adverse reaction; IV Status: Completed infusion                 ll2 

21:22 Drug: Lovenox 40 mg Route: Sub-Q; Site: left lower abdomen;                             ll2 

21:23 Follow up: Response: No adverse reaction                                                ll2 

21:30 Drug: Xopenex 2.5 mg Route: Inhalation;                                                 ll2 

21:30 Drug: AtroVENT Aerosol 0.5 mg Route: Inhalation;                                        ll2 

                                                                                                  

                                                                                                  

Intake:                                                                                           

15:00 PO: 0ml; Total: 0ml.                                                                    bp  

                                                                                                  

Output:                                                                                           

15:00 Urine: 0ml; Total: 0ml.                                                                 bp  

                                                                                                  

Outcome:                                                                                          

16:20 Discharge ordered by MD.                                                                kdr 

18:05 Decision to Hospitalize by Provider.                                                    kdr 

22:27 Patient left the ED.                                                                    ll2 

                                                                                                  

Signatures:                                                                                       

Dispatcher [employer]                           EDMS                                                 

Og Cotter MD MD cha Rittger, Kevin, MD MD   [facility]                                                  

Kimberly River RN                      RN                                                      

Jennifer Hurst RN                          RN   2                                                  

Serenity Richards                            [facility]                                                  

Matthew Palmer RN                      RN                                                      

Anjum Calhoun RN                    RN   [facility]                                                  

Ashtyn Mas                                [facility]                                                  

Aviva New RN                    RN   ll2                                                  

                                                                                                  

Corrections: (The following items were deleted from the chart)                                    

20:20 16:34 CORONAVIRUS+MR.LAB.BRZ drawn and sent. tw2                                        EDMS

22:21 14:43 Allergies: clindamycin HCl (bulk); [facility] 

                                                                                                  

************************************************************************************************** Problem: Falls - Risk of:  Goal: Will remain free from falls  Description: Will remain free from falls  Outcome: Met This Shift  Goal: Absence of physical injury  Description: Absence of physical injury  Outcome: Met This Shift     Problem: Pain:  Goal: Pain level will decrease  Description: Pain level will decrease  Outcome: Ongoing  Goal: Control of acute pain  Description: Control of acute pain  Outcome: Ongoing  Goal: Control of chronic pain  Description: Control of chronic pain  Outcome: Ongoing

## 2020-10-16 NOTE — XMS REPORT
Clinical Summary

                           Created on:2020



Patient:Leona Peres

Sex:Female

:1940

External Reference #:[id number]





Demographics







                          [place] [address]

 

                          Home Phone                [phone]

 

                          Mobile Phone              [phone]

 

                          Email Address             [email or web address]

 

                          Preferred Language        English

 

                          Marital Status            

 

                          Judaism Affiliation     Unknown

 

                          Race                      White

 

                          Ethnic Group              Not  or 









Author







                          Organization              [facility]

 

                          Address                   [address] [place] [address]









Support







                Name            Relationship    Address         Phone

 

                Lydia Valentin    Child           Unavailable     [phone]

 

                Maida Valentin Unavailable     Unavailable     [phone]









Care Team Providers







                    Name                Role                Phone

 

                    Kendall Barger MD    Primary Care Provider [phone]









Allergies







             Active Allergy Reactions    Severity     Noted Date   Comments

 

             Clindamycin Phosphate Unknown Reaction              2020   

 

             Propoxyphene N-Acetaminophen Unknown Reaction              20

20   

 

             Penicillins  Unknown Reaction              2020   

 

             Sulfa (Sulfonamide Antibiotics) Unknown Reaction                 

 

             Acetaminophen Unknown Reaction              2020   







Medications

Not on file



Active Problems

Not on file



Encounters







             Date         Type         Specialty    Care Team    Description

 

             2020   Hospital Encounter Radiology    Ellis Mcadams MD 

 

             2020   Office Visit Neurosurgery Ellis Mcadams MD Compression

 fracture of



                                                                 T11 vertebra, i

nitial



                                                                 encounter (HCC)

 (Primary



                                                                 Dx)

 

             2020   Travel                                 

 

             2020   Travel                                 



after 10/16/2019



Medical History







                    Medical History     Date                Comments

 

                    Hypertension                            

 

                    Cancer (HCC)                            

 

                    Headache                                







Family History







                Medical History Relation        Name            Comments

 

                Diabetes        Father                          

 

                Heart disease   Father                          

 

                Hypertension    Father                          

 

                Asthma          Mother                          









                Relation        Name            Status          Comments

 

                Father                                          

 

                Mother                                          







Social History







             Tobacco Use  Types        Packs/Day    Years Used   Date

 

             Former Smoker              0.5          15           Quit: 









                Smokeless Tobacco: Never Used                                 









                Alcohol Use     Drinks/Week     oz/Week         Comments

 

                Never                                           









                    Alcohol Habits      Answer              Date Recorded

 

                    How often do you have a drink containing alcohol? Never     

          2020

 

                    How many drinks containing alcohol do you have on a typical 

Not asked           



                    day when you are drinking?                     

 

                    How often do you have six or more drinks on one occasion? No

t asked           









                          Sex Assigned at Birth     Date Recorded

 

                          Not on file               







Last Filed Vital Signs







                Vital Sign      Reading         Time Taken      Comments

 

                Blood Pressure  -               -               

 

                Pulse           -               -               

 

                Temperature     -               -               

 

                Respiratory Rate -               -               

 

                Oxygen Saturation -               -               

 

                Inhaled Oxygen Concentration -               -               

 

                Weight          95.3 kg (210 lb) 2020 11:34 AM CDT 

 

                Height          160 cm (5' 3")  2020 11:34 AM CDT 

 

                Body Mass Index 37.2            2020 11:34 AM CDT 







Plan of Treatment







                Health Maintenance Due Date        Last Done       Comments

 

                SHINGLES VACCINES (#1) 10/07/1991                      

 

                65+ PNEUMOCOCCAL VACCINE (1 of 1 - PPSV23) 10/07/2006           

           

 

                INFLUENZA VACCINE 2020                      







Procedures







             Procedure Name Priority     Date/Time    Associated Diagnosis Comme

nts

 

             CT ABD/PELVIC Routine      2020 10:37 AM              Results

 for this



             EXTERNAL STUDY              CDT                       procedure are

 in



                                                                 the results



                                                                 section.



after 10/16/2019



Results

CT Abd/Pelvic External Study (2020 10:37 AM CDT)





                                        Specimen

 

                                        









                          Narrative                 Performed At

 

                                        This exam was not acquired at a [facility]

t facility and has not been 



                                        [facility]



                                        interpreted by a [facility] Provider. T

he exam was imported into our 



                                        



                          imaging system.           









                Performing Organization Address         City/State/ZIP Code Phon

e Number

 

                [facility]      [address] [place] [address] 



after 10/16/2019



Insurance







          Payer     Benefit Plan / Group Subscriber ID Effective Dates Phone    

 Address   Type

 

          AETNA MEDICARE AETNA MEDICARE [id number]  2014-Present              

       HMO



                    HMO/PPO [facility]                                         









           Guarantor Name Account Type Relation to Date of Birth Phone      Bill

ing



                                 Patient                          Address

 

           Leona Peres Personal/Family Self       1940 [phone]

 [facility]







                                                       (Home)     [place] [address]







Advance Directives

For more information, please contact: [phone]





                Type            Date Recorded   Patient Representative Explanati

on

 

                Advance Directives, Living Will and                             

    



                Medical Power of

## 2020-10-17 LAB
BLD SMEAR INTERP: (no result)
BUN BLD-MCNC: 16 MG/DL (ref 7–18)
GLUCOSE SERPLBLD-MCNC: 188 MG/DL (ref 74–106)
HCT VFR BLD CALC: 43.5 % (ref 36–45)
LYMPHOCYTES # SPEC AUTO: 1 K/UL (ref 0.7–4.9)
MORPHOLOGY BLD-IMP: (no result)
PMV BLD: 10.2 FL (ref 7.6–11.3)
PMV BLD: 9.7 FL (ref 7.6–11.3)
POTASSIUM SERPL-SCNC: 3.8 MMOL/L (ref 3.5–5.1)
RBC # BLD: 4.42 M/UL (ref 3.86–4.86)

## 2020-10-17 RX ADMIN — ACETAMINOPHEN PRN MG: 500 TABLET, FILM COATED ORAL at 13:51

## 2020-10-17 RX ADMIN — Medication SCH ML: at 09:03

## 2020-10-17 RX ADMIN — Medication SCH ML: at 20:54

## 2020-10-17 RX ADMIN — CEFTRIAXONE SCH MLS: 1 INJECTION, SOLUTION INTRAVENOUS at 20:54

## 2020-10-17 NOTE — P.HP
Certification for Inpatient


Patient admitted to: Observation


With expected LOS: <2 Midnights


Practitioner: I am a practitioner with admitting privileges, knowledge of 

patient current condition, hospital course, and medical plan of care.


Services: Services provided to patient in accordance with Admission requirements

found in Title 42 Section 412.3 of the Code of Federal Regulations





Patient History


Date of Service: 10/17/20


Reason for admission: CONGESTION AND WEAKNESS


History of Present Illness: 





SHE COMES TO ER FOR CONGESTION, WEAKNESS AND AS SHE IS OLD, CHRONICALLY ILL WITH

WBC OF 14K, ER DOCTORS DECIDED TO ADMIT HER.  CT CHEST IS NEG FOR PE OR 

PNEUMONIA.  SHE IS WEAK THIS AM WHEN I SEE HER AND SEEMS TO HAVE SINUS 

CONGESTION. 


Allergies





clindamycin HCl [From Cleocin] Allergy (Severe, Verified 10/16/20 22:44)


   Hives/Rash


clindamycin palmitate HCl [From Cleocin] Allergy (Severe, Verified 10/16/20 

22:44)


   Hives/Rash


clindamycin phosphate [From Cleocin] Allergy (Severe, Verified 10/16/20 22:44)


   Hives/Rash


Penicillins Allergy (Severe, Verified 10/16/20 22:44)


   Hives/Rash


Sulfa (Sulfonamide Antibiotics) [Sulfa(Sulfonamide Antibiotics)] Allergy 

(Severe, Verified 10/16/20 22:44)


   Hives/Rash


acetaminophen [From Darvocet-N 100] Allergy (Verified 02/05/20 12:34)


   Unknown


propoxyphene [From Darvocet-N 100] Allergy (Verified 02/05/20 12:34)


   Unknown


strawberries Allergy (Severe, Uncoded 02/05/20 12:34)


   Hives/Rash





Home Medications: 








Indapamide [Lozol*] 2.5 mg PO DAILY 04/17/12 


Simvastatin 40 mg PO BEDTIME 04/17/12 


Duloxetine [Cymbalta *] 60 mg PO BID 09/24/18 


Memantine HCl 10 mg PO BID 09/24/18 


Potassium Oral Tab [Klor-Con 10 mEq Tab*] 20 meq PO BID 09/24/18 


Quetiapine [Seroquel*] 300 mg PO BEDTIME 09/24/18 


Tramadol HCl [Ultram] 50 mg PO BEDTIME 09/24/18 


Metoprolol Succinate [Toprol Xl*] 25 mg PO BEDTIME 08/29/19 


Nitrofurantoin Monohyd/M-Cryst [Macrobid 100 mg Capsule] 50 mg PO DAILY 08/29/19




Topiramate [Topamax*] 200 mg PO BEDTIME 08/29/19 


Donepezil HCl 5 mg PO DAILY 02/05/20 


Gabapentin 300 mg PO DAILY 02/05/20 


Cyclosporine [Restasis] 1 gtt EACH EYE BID 10/17/20 


Gabapentin 1 tab PO BEDTIME 10/17/20 


Pantoprazole Sodium [Protonix] 1 tab PO DAILY 10/17/20 


Thyroid,Pork [Np Thyroid] 1 tab PO DAILY 10/17/20 


Topiramate [Topamax] 1 tab PO DAILY 10/17/20 


Zolpidem Tartrate [Ambien] 1 tab PO BEDTIME 10/17/20 








- Past Medical/Surgical History


Has patient received pneumonia vaccine in the past: Yes


Diabetic: No


-: Lymphedema


-: heart arrhythmia w/ PVCs


-: L breast cancer


-: PE


-: Meniere's Disease


-: Former smoker ([id number])


-: Reccurent UTIs


-: Fibromyalgia


-: GERD


-: insomnia


-: Depression


-: s/p Left Mastectomy


-: SANTOSH TKA


-: Cervical fusion


-: Lumbar fusion


-: Appey


-: Hysterectomy





- Family History


  ** Father


-: Diabetes


Notes: heart attack.  alcoholic





- Social History


Smoking Status: Former smoker


Alcohol use: No


CD- Drugs: No


Caffeine use: No


Place of Residence: Home





Review of Systems


10-point ROS is otherwise unremarkable


General: Weakness, Malaise





Physical Examination





- Vital Signs


Temperature: 96.9 F


Blood Pressure: 117/67


Pulse: 60


Respirations: 18


Pulse Ox (%): 96





- Physical Exam


General: Mild distress, Obese


HEENT: Atraumatic, PERRLA, Mucous membr. moist/pink, EOMI, Sclerae nonicteric


Neck: Supple, 2+ carotid pulse no bruit, No LAD, Without JVD or thyroid 

abnormality


Respiratory: Clear to auscultation bilaterally, Normal air movement


Cardiovascular: Regular rate/rhythm, Normal S1 S2


Gastrointestinal: Normal bowel sounds, No tenderness


Musculoskeletal: No tenderness


Integumentary: No rashes


Neurological: Normal gait, Normal speech, Normal strength at 5/5 x4 extr, Normal

 tone, Normal affect


Lymphatics: No axilla or inguinal lymphadenopathy





- Studies


Laboratory Data (last 24 hrs)





10/16/20 21:00: Troponin I Cancelled


10/16/20 16:54: PT 12.0, INR 1.02


10/16/20 16:54: WBC 14.1 H, Hgb 16.4 H, Hct 48.9 H, Plt Count 240


10/16/20 16:54: Sodium 138, Potassium 3.8, BUN 16, Creatinine 1.35 H, Glucose 

106, Magnesium 2.1








Assessment and Plan





- Problems (Diagnosis)


(1) Respiratory infection


Current Visit: Yes   Status: Acute   


Plan: 


ACUTE SINUSITIS. 


SHE IS IMMUNOSUPPRESSED ELDERLY, DIABETIC WHO TENDS TO HAVE RECURRENT UTI, AND 

RECURRENT COMPLAINTS OF MANY ISSUES GOING TO MANY DOCTORS.  


IV ROCEPHINE


BC 2 PENDING. 








(2) General weakness


Current Visit: Yes   Status: Acute   





(3) Morbid obesity


Current Visit: Yes   Status: Chronic   





(4) Alzheimer disease


Current Visit: Yes   Status: Chronic   


Qualifiers: 


   Alzheimer's disease onset: late-onset 





(5) Neuralgic pain


Current Visit: Yes   Status: Chronic   


Plan: 


SHE IS ON BOTH TOPAMAX AND LYRICA PER HER NEUROLOGIST. 


I ENQUIRED AND HE WANTS TO CONTINUE BOTH.








(6) Dehydration


Current Visit: Yes   Status: Acute   


Plan: 


IMPROVED ON LAB.








- Advance Directives


Does patient have a Living Will: No


Does patient have a Durable POA for Healthcare: Yes

## 2020-10-18 VITALS — DIASTOLIC BLOOD PRESSURE: 54 MMHG | SYSTOLIC BLOOD PRESSURE: 99 MMHG | TEMPERATURE: 97.3 F

## 2020-10-18 VITALS — OXYGEN SATURATION: 96 %

## 2020-10-18 RX ADMIN — Medication SCH ML: at 08:51

## 2020-10-18 RX ADMIN — CEFTRIAXONE SCH MLS: 1 INJECTION, SOLUTION INTRAVENOUS at 08:51

## 2020-10-18 NOTE — P.DS
Admission Date: 10/16/20


Discharge Date: 10/18/20


Disposition: ROUTINE DISCHARGE


Discharge Condition: FAIR


Reason for Admission: CONGESTION AND WEAKNESS





- Problems


(1) Respiratory infection


Current Visit: Yes   Status: Acute   





(2) General weakness


Current Visit: Yes   Status: Acute   





(3) Morbid obesity


Current Visit: Yes   Status: Chronic   





(4) Alzheimer disease


Current Visit: Yes   Status: Chronic   


Qualifiers: 


   Alzheimer's disease onset: late-onset 





(5) Neuralgic pain


Current Visit: Yes   Status: Chronic   





(6) Dehydration


Current Visit: Yes   Status: Acute   


Brief History of Present Illness: 





SHE COMES TO ER FOR CONGESTION, WEAKNESS AND AS SHE IS OLD, CHRONICALLY ILL WITH

WBC OF 14K, ER DOCTORS DECIDED TO ADMIT HER.  CT CHEST IS NEG FOR PE OR 

PNEUMONIA.  SHE IS WEAK THIS AM WHEN I SEE HER AND SEEMS TO HAVE SINUS 

CONGESTION. 


Hospital Course: 





NEGRO CAME WITH CONGESTION AND WEAKNESS.  ER DOCTORS DECIDED TO ADMIT HER.  SHE

HAS NO PNEUMONIA, PE AND HAS DEHYDRATION. SHE HAS IMPROVED ON ABX.  I WILL 

DISCHARGE HER ON LEVAQUIN.  SHE IS CONGESTED MAINLY IN SINUSES AND HAS PND. SHE 

HAS MANY MEDICAL ISSUES AND IS QUITE DECONDITIONED FROM OBESITY AND CHRONIC 

ISSUES. 


Vital Signs/Physical Exam: 














Temp Pulse Resp BP Pulse Ox


 


 97.5 F   54   18   117/58 L  96 


 


 10/18/20 08:00  10/18/20 08:00  10/18/20 08:00  10/18/20 08:00  10/18/20 08:00








Laboratory Data at Discharge: 














WBC  12.9 K/uL (4.3-10.9)  H  10/17/20  06:51    


 


Hgb  14.4 g/dL (12.0-15.0)   10/17/20  06:51    


 


Hct  43.5 % (36.0-45.0)   10/17/20  06:51    


 


Plt Count  193 K/uL (152-406)   10/17/20  15:07    


 


PT  12.0 SECONDS (9.5-12.5)   10/16/20  16:54    


 


INR  1.02   10/16/20  16:54    


 


Sodium  138 mmol/L (136-145)   10/17/20  06:51    


 


Potassium  3.8 mmol/L (3.5-5.1)   10/17/20  06:51    


 


BUN  16 mg/dL (7-18)   10/17/20  06:51    


 


Creatinine  1.18 mg/dL (0.55-1.3)   10/17/20  06:51    


 


Glucose  188 mg/dL ()  H  10/17/20  06:51    


 


Magnesium  2.1 mg/dL (1.8-2.4)   10/16/20  16:54    


 


Troponin I  < 0.02 ng/mL (0.0-0.045)   10/17/20  06:58    








Home Medications: 








Indapamide [Lozol*] 2.5 mg PO DAILY 04/17/12 


Simvastatin 40 mg PO BEDTIME 04/17/12 


Duloxetine [Cymbalta *] 60 mg PO BID 09/24/18 


Memantine HCl 10 mg PO BID 09/24/18 


Potassium Oral Tab [Klor-Con 10 mEq Tab*] 20 meq PO BID 09/24/18 


Quetiapine [Seroquel*] 300 mg PO BEDTIME 09/24/18 


Tramadol HCl [Ultram] 50 mg PO BEDTIME 09/24/18 


Metoprolol Succinate [Toprol Xl*] 25 mg PO BEDTIME 08/29/19 


Topiramate [Topamax*] 200 mg PO BEDTIME 08/29/19 


Donepezil HCl 5 mg PO DAILY 02/05/20 


Gabapentin 300 mg PO DAILY 02/05/20 


Cyclosporine [Restasis] 1 gtt EACH EYE BID 10/17/20 


Gabapentin 1 tab PO BEDTIME 10/17/20 


Pantoprazole Sodium [Protonix] 1 tab PO DAILY 10/17/20 


Thyroid,Pork [Np Thyroid] 1 tab PO DAILY 10/17/20 


Topiramate [Topamax] 1 tab PO DAILY 10/17/20 


Zolpidem Tartrate [Ambien] 1 tab PO BEDTIME 10/17/20 


levoFLOXacin [Levaquin*] 500 mg PO DAILY #7 tab 10/18/20 





New Medications: 


levoFLOXacin [Levaquin*] 500 mg PO DAILY #7 tab


Patient Discharge Instructions: MAKE SURE TO MAKE APT WITH DR. BRIDGES FOR SMALL

 NODULE IN YOUR LUNG.  YOU CAN TAKE COPY OF THE REPORT TO HIM.  I GAVE YOU COPY 

TODAY.


Followup: 


José Luis Segal MD [ACTIVE - CAN ADMIT] -  (Call to make an appointment. )

## 2020-10-25 ENCOUNTER — HOSPITAL ENCOUNTER (EMERGENCY)
Dept: HOSPITAL 97 - ER | Age: 80
Discharge: HOME | End: 2020-10-25
Payer: COMMERCIAL

## 2020-10-25 VITALS — OXYGEN SATURATION: 99 % | SYSTOLIC BLOOD PRESSURE: 117 MMHG | DIASTOLIC BLOOD PRESSURE: 71 MMHG

## 2020-10-25 VITALS — TEMPERATURE: 99.5 F

## 2020-10-25 DIAGNOSIS — Z88.0: ICD-10-CM

## 2020-10-25 DIAGNOSIS — Z88.2: ICD-10-CM

## 2020-10-25 DIAGNOSIS — Z88.3: ICD-10-CM

## 2020-10-25 DIAGNOSIS — Z88.5: ICD-10-CM

## 2020-10-25 DIAGNOSIS — R05: Primary | ICD-10-CM

## 2020-10-25 LAB
BUN BLD-MCNC: 10 MG/DL (ref 7–18)
GLUCOSE SERPLBLD-MCNC: 111 MG/DL (ref 74–106)
HCT VFR BLD CALC: 42.8 % (ref 36–45)
LYMPHOCYTES # SPEC AUTO: 2.3 K/UL (ref 0.7–4.9)
PMV BLD: 9 FL (ref 7.6–11.3)
POTASSIUM SERPL-SCNC: 3 MMOL/L (ref 3.5–5.1)
RBC # BLD: 4.36 M/UL (ref 3.86–4.86)

## 2020-10-25 PROCEDURE — 71045 X-RAY EXAM CHEST 1 VIEW: CPT

## 2020-10-25 PROCEDURE — 99284 EMERGENCY DEPT VISIT MOD MDM: CPT

## 2020-10-25 PROCEDURE — 84145 PROCALCITONIN (PCT): CPT

## 2020-10-25 PROCEDURE — 85025 COMPLETE CBC W/AUTO DIFF WBC: CPT

## 2020-10-25 PROCEDURE — 36415 COLL VENOUS BLD VENIPUNCTURE: CPT

## 2020-10-25 PROCEDURE — 87804 INFLUENZA ASSAY W/OPTIC: CPT

## 2020-10-25 PROCEDURE — 80048 BASIC METABOLIC PNL TOTAL CA: CPT

## 2020-10-25 NOTE — ER
Nurse's Notes                                                                                     

 [facility]                                                                 

Name: Leona Peres                                                                              

Age: 80 yrs                                                                                       

Sex: Female                                                                                       

: 1940                                                                                   

MRN: [id number]                                                                                   

Arrival Date: 10/25/2020                                                                          

Time: 11:59                                                                                       

Account#: [id number]                                                                            

Bed 2                                                                                             

Private MD: Kendall Barger V                                                                      

Diagnosis: Cough                                                                                  

                                                                                                  

Presentation:                                                                                     

10/25                                                                                             

12:10 Coronavirus screen: Client denies travel out of the [place] in the last 14 days. The       sv  

      client reports previous COVID testing was negative. Date of collection: 2020. Ebola Screen: No symptoms or risks identified at this time. Risk Assessment: Do       

      you want to hurt yourself or someone else? Patient reports no desire to harm self or        

      others.                                                                                     

12:10 Method Of Arrival: Ambulatory                                                             

12:15 Chief complaint: Patient states: Dry cough for 1 week. On Levaquin for 1 week. Was      ll1 

      admitted last weekend for sinus infection and hypoxia. Covid negative last week.            

      Coronavirus screen: cough unrelated to allergies, difficulty breathing, fatigue, fever,     

      Client presents with at least one sign or symptom that may indicate coronavirus-19.         

      Standard/surgical mask placed on the client. Initial Sepsis Screen: Does the patient        

      meet any 2 criteria? No. Patient's initial sepsis screen is negative. Does the patient      

      have a suspected source of infection? Yes: Productive cough/pneumonia. Onset of             

      symptoms was 2020.                                                              

12:15 Acuity: ANUSHKA 3                                                                           ll1 

                                                                                                  

Triage Assessment:                                                                                

12:16 General: Appears in no apparent distress. uncomfortable, well developed, Behavior is    sv  

      calm, cooperative, appropriate for age. Pain: Denies pain. Neuro: Level of                  

      Consciousness is awake, alert, obeys commands, Oriented to person, place, time,             

      situation, Gait is steady, with her walker. Respiratory: Reports cough that is              

      non-productive, persistent. Respiratory: Denies shortness of breath. Derm: Skin is          

      normal.                                                                                     

                                                                                                  

Historical:                                                                                       

- Allergies:                                                                                      

12:10 clindamycin HCl (bulk);                                                                 sv  

12:10 Darvocet-N 100;                                                                         sv  

12:10 PENICILLINS;                                                                            sv  

12:10 Sulfa (Sulfonamide Antibiotics);                                                        sv  

- PMHx:                                                                                           

12:10 insomnia; Depression; GERD; Cancer; Fibromyalgia; lymphedema; skipped heart beats;      sv  

- PSHx:                                                                                           

12:10 Appendectomy; Hysterectomy; Mastectomy, Left; knee replacements; lumbar fusion;         sv  

      cervical fusion;                                                                            

                                                                                                  

- Immunization history:: Flu vaccine is up to date.                                               

- Social history:: Smoking status: Patient/guardian denies using tobacco, but has a               

  distant history of tobacco abuse, Smoking status: Patient/guardian denies using                 

  tobacco, the patient reports quitting approximately 45 years ago.                               

- Family history:: not pertinent.                                                                 

- Hospitalizations: : The patient was recently seen at [facility].                                                                                         

                                                                                                  

                                                                                                  

Screenin:08 Abuse screen: Denies threats or abuse. Denies injuries from another. Nutritional        sv  

      screening: No deficits noted. Tuberculosis screening: No symptoms or risk factors           

      identified.                                                                                 

12:10 Fall Risk None identified.                                                              sv  

                                                                                                  

Assessment:                                                                                       

13:24 Reassessment: Patient appears in no apparent distress at this time. No changes from     sv  

      previously documented assessment. Patient and/or family updated on plan of care and         

      expected duration. Pain level reassessed. Patient is alert, oriented x 3, equal             

      unlabored respirations, skin warm/dry/pink.                                                 

14:00 Reassessment: Patient appears in no apparent distress at this time. Patient and/or      sv  

      family updated on plan of care and expected duration. Pain level reassessed. Patient is     

      alert, oriented x 3, equal unlabored respirations, skin warm/dry/pink. Patient states       

      feeling better. Patient states symptoms have improved.                                      

                                                                                                  

Vital Signs:                                                                                      

12:15  / 83; Pulse 80; Resp 20; Temp 99.5(O); Pulse Ox 94% ; Pain 7/10;                 ll1 

13:19  / 71; Pulse 61; Resp 16; Pulse Ox 99% ;                                          sv  

                                                                                                  

ED Course:                                                                                        

11:59 Patient arrived in ED.                                                                  mr  

11:59 Kendall Barger MD is Private Physician.                                                 mr  

12:06 Jey Cassidy MD is Attending Physician.                                                rn  

12:06 Brittani Connolly RN is Primary Nurse.                                                  sv  

12:06 Arm band placed on.                                                                     sv  

12:06 Patient has correct armband on for positive identification. Bed in low position. Call   sv  

      light in reach. Door closed. Head of bed elevated.                                          

12:15 ED physician to see patient.                                                            sv  

12:17 Triage completed.                                                                       ll1 

12:35 Flu and/or RSV swab sent to lab. Inserted saline lock: 24 gauge in right wrist, using   sv  

      aseptic technique. ,using aseptic technique. diffusics Blood collected. Flushed right       

      with 2 ml normal saline.                                                                    

12:43 X-ray(s) taken.                                                                         sv  

12:48 XRAY Chest (1 view) In Process Unspecified.                                             EDMS

13:27 Awaiting disposition.                                                                   sv  

13:47 José Luis Segal MD is Referral Physician.                                             rn  

14:11 No provider procedures requiring assistance completed. IV discontinued, intact,         sv  

      bleeding controlled, No redness/swelling at site. Pressure dressing applied.                

                                                                                                  

Administered Medications:                                                                         

12:30 Drug: Tussionex Pennkinetic ER 5 ml Route: PO;                                          sv  

13:24 Follow up: Response: No adverse reaction                                                sv  

13:24 Drug: Tessalon Perle 100 mg Route: PO;                                                  sv  

14:11 Follow up: Response: No adverse reaction                                                sv  

                                                                                                  

                                                                                                  

Outcome:                                                                                          

13:48 Discharge ordered by MD.                                                                rn  

14:11 Patient left the ED.                                                                    sv  

14:11 Discharged to home via wheelchair, with family.                                         sv  

14:11 Condition: stable                                                                           

14:11 Condition: improved                                                                         

14:11 Discharge instructions given to patient, family, Instructed on discharge instructions,      

      follow up and referral plans. medication usage, Demonstrated understanding of               

      instructions, follow-up care, medications, Prescriptions given X 1.                         

                                                                                                  

Signatures:                                                                                       

Dispatcher [employer]                           EDMS                                                 

Brittani Connolly RN                    Ana Rendon Roman, MD MD rn Lewis, Lynsay, RN RN   ll1                                                  

                                                                                                  

**************************************************************************************************

## 2020-10-25 NOTE — RAD REPORT
EXAM DESCRIPTION:  RAD - Chest Single View - 10/25/2020 12:48 pm

 

CLINICAL HISTORY:  COUGH

 

COMPARISON:  Portable chest October 16

 

TECHNIQUE:  AP portable chest image was obtained 10/25/2020 12:48 pm .

 

FINDINGS:  Lung volumes are low. No new mass or consolidation. Interstitial pattern is fractionally i
ncreased from the comparison study. Heart and vasculature are normal. No measurable pleural effusion 
and no pneumothorax. No acute bony abnormality seen. No acute aortic findings suspected.

 

IMPRESSION:  No new mass or consolidation.

 

Slight increase in the interstitial pattern could be mild infiltrate or edema.

## 2020-10-25 NOTE — EDPHYS
Physician Documentation                                                                           

 [facility]                                                                 

Name: Leona Peres                                                                              

Age: 80 yrs                                                                                       

Sex: Female                                                                                       

: 1940                                                                                   

MRN: [id number]                                                                                   

Arrival Date: 10/25/2020                                                                          

Time: 11:59                                                                                       

Account#: [id number]                                                                            

Bed 2                                                                                             

Private MD: Kendall Barger V                                                                      

ED Physician Jey Cassidy                                                                         

HPI:                                                                                              

10/25                                                                                             

12:26 This 80 yrs old  Female presents to ER via Ambulatory with complaints of Cough.rn  

12:26 The patient or guardian reports cough. Onset: The symptoms/episode began/occurred 1     rn  

      week(s) ago. Severity of symptoms: At their worst the symptoms were moderate, in the        

      emergency department the symptoms are unchanged. Modifying factors: The symptoms are        

      alleviated by nothing, the symptoms are aggravated by nothing. The patient has              

      experienced similar episodes in the past. The patient has been recently been admitted       

      at [facility]. Reports cough, worsening over last week, was          

      admitted after fall, sent home on levaquin, still taking, no fever, non-productive          

      cough..                                                                                     

                                                                                                  

Historical:                                                                                       

- Allergies:                                                                                      

12:10 clindamycin HCl (bulk);                                                                 sv  

12:10 Darvocet-N 100;                                                                         sv  

12:10 PENICILLINS;                                                                            sv  

12:10 Sulfa (Sulfonamide Antibiotics);                                                        sv  

- PMHx:                                                                                           

12:10 insomnia; Depression; GERD; Cancer; Fibromyalgia; lymphedema; skipped heart beats;      sv  

- PSHx:                                                                                           

12:10 Appendectomy; Hysterectomy; Mastectomy, Left; knee replacements; lumbar fusion;         sv  

      cervical fusion;                                                                            

                                                                                                  

- Immunization history:: Flu vaccine is up to date.                                               

- Social history:: Smoking status: Patient/guardian denies using tobacco, but has a               

  distant history of tobacco abuse, Smoking status: Patient/guardian denies using                 

  tobacco, the patient reports quitting approximately 45 years ago.                               

- Family history:: not pertinent.                                                                 

- Hospitalizations: : The patient was recently seen at [facility].                                                                                         

                                                                                                  

                                                                                                  

ROS:                                                                                              

12:26 Constitutional: Negative for fever, chills, and weight loss, Eyes: Negative for injury, rn  

      pain, redness, and discharge, Cardiovascular: Negative for chest pain, palpitations,        

      and edema, Respiratory: Negative for wheezing, and pleuritic chest pain, Abdomen/GI:        

      Negative for abdominal pain, nausea, vomiting, diarrhea, and constipation,                  

      MS/Extremity: Negative for injury and deformity, Skin: Negative for injury, rash, and       

      discoloration, Neuro: Negative for headache, numbness, tingling, and seizure.               

                                                                                                  

Exam:                                                                                             

12:26 Constitutional:  Overweight female, ambulatory to room without assistance Head/Face:    rn  

      Normocephalic, atraumatic. Cardiovascular:  Regular rate and rhythm.  No pulse              

      deficits. Respiratory:  Mild tachypnea, no retractions, diminished at bases, + somewhat     

      frequent non-productive cough Abdomen/GI:  soft, non-tender Skin:  Warm, dry MS/            

      Extremity:  Pulses equal, no cyanosis.  Neurovascular intact.  Full, normal range of        

      motion.  Equal circumference. Neuro:  Awake and alert, GCS 15, oriented to person,          

      place, time, and situation.                                                                 

                                                                                                  

Vital Signs:                                                                                      

12:15  / 83; Pulse 80; Resp 20; Temp 99.5(O); Pulse Ox 94% ; Pain 7/10;                 ll1 

13:19  / 71; Pulse 61; Resp 16; Pulse Ox 99% ;                                          sv  

                                                                                                  

MDM:                                                                                              

12:06 Patient medically screened.                                                             rn  

13:45 Differential Diagnosis: Bronchitis Influenza Upper Respiratory Infection Allergic       rn  

      Rhinitis Viral Syndrome Pneumonia. Data reviewed: vital signs, nurses notes, lab test       

      result(s), radiologic studies, plain films, and as a result, I will discharge patient.      

      Counseling: I had a detailed discussion with the patient and/or guardian regarding: the     

      historical points, exam findings, and any diagnostic results supporting the                 

      discharge/admit diagnosis, lab results, radiology results, the need for outpatient          

      follow up, to return to the emergency department if symptoms worsen or persist or if        

      there are any questions or concerns that arise at home. Response to treatment: the          

      patient's symptoms have mildly improved after treatment, and as a result, I will            

      discharge patient. Special discussion: I discussed with the patient/guardian in detail      

      that at this point there is no indication for admission to the hospital. It is              

      understood, however, that if the symptoms persist or worsen the patient needs to return     

      immediately for re-evaluation. Based on the history and exam findings, there is no          

      indication for further emergent testing or inpatient evaluation. I discussed with the       

      patient/guardian the need to see the pulmonologist for further evaluation of the            

      symptoms. ED course: Oxygen ok, cough improved, no definitive pneumonia on CXR, stable      

      vitals, has f/u appt with Dr. Segal tomorrow. Will dc home with continuation of          

      levaquin, add cough medication, and recommend humidifier and return precautions. .          

                                                                                                  

10/25                                                                                             

12:20 Order name: CBC with Diff; Complete Time: 13:10                                         rn  

10/25                                                                                             

12:20 Order name: Basic Metabolic Panel; Complete Time: 13:10                                 rn  

10/25                                                                                             

12:20 Order name: XRAY Chest (1 view); Complete Time: 13:10                                   rn  

10/25                                                                                             

12:20 Order name: Procalcitonin; Complete Time: 13:33                                         rn  

10/25                                                                                             

12:20 Order name: Flu; Complete Time: 13:24                                                   rn  

10/25                                                                                             

12:20 Order name: IV Start; Complete Time: 12:44                                              rn  

                                                                                                  

Administered Medications:                                                                         

12:30 Drug: Tussionex Pennkinetic ER 5 ml Route: PO;                                          sv  

13:24 Follow up: Response: No adverse reaction                                                sv  

13:24 Drug: Tessalon Perle 100 mg Route: PO;                                                  sv  

14:11 Follow up: Response: No adverse reaction                                                sv  

                                                                                                  

                                                                                                  

Disposition:                                                                                      

10/25/20 13:48 Discharged to Home. Impression: Cough.                                             

- Condition is Stable.                                                                            

- Discharge Instructions: Cough, Adult.                                                           

- Prescriptions for Tessalon Perles 100 mg Oral Capsule - take 1 capsule by ORAL route            

  every 8 hours As needed; 15 capsule.                                                            

- Medication Reconciliation Form, Thank You Letter, Antibiotic Education, Prescription            

  Opioid Use form.                                                                                

- Follow up: José Luis Segal MD; When: Tomorrow; Reason: Recheck today's complaints,            

  Re-evaluation by your physician.                                                                

- Problem is an ongoing problem.                                                                  

- Symptoms have improved.                                                                         

                                                                                                  

                                                                                                  

                                                                                                  

Signatures:                                                                                       

Dispatcher [employer]                           Brittani Rosen RN                    RN   Jey Henry MD MD rn Lewis, Lynsay, RN                       RN   ll1                                                  

                                                                                                  

Corrections: (The following items were deleted from the chart)                                    

14:11 13:48 10/25/2020 13:48 Discharged to Home. Impression: Cough. Condition is Stable.      sv  

      Forms are Medication Reconciliation Form, Thank You Letter, Antibiotic Education,           

      Prescription Opioid Use. Follow up: José Luis Segal; When: Tomorrow; Reason: Recheck        

      today's complaints, Re-evaluation by your physician. Problem is an ongoing problem.         

      Symptoms have improved. rn                                                                  

                                                                                                  

**************************************************************************************************

## 2020-10-25 NOTE — XMS REPORT
Continuity of Care Document

                           Created on:2020



Patient:NEGRO GONZALES

Sex:Female

:1940

External Reference #:[id number]





Demographics







                          Address                   [address] [place] [address]

 

                          Home Phone                [phone]

 

                          Mobile Phone              [phone]

 

                          Email Address             [email or web address]

 

                          Preferred Language        English

 

                          Marital Status            Unknown

 

                          Presybeterian Affiliation     Unknown

 

                          Race                      Unknown

 

                          Additional Race(s)        Unavailable



                                                    White

 

                          Ethnic Group              Unknown









Author







                          Organization              [facility]

t

 

                          Address                   [address]



                                                    [place] [address]

 

                          Phone                     [phone]









Support







                Name            Relationship    Address         Phone

 

                Homero          Child           Unavailable     [phone]

 

                Homero          Other           Unavailable     [phone]









Care Team Providers







                    Name                Role                Phone

 

                    Denita RIZVI            Primary Care Physician [phone]

 

                    Abbe RIZVI           Attending Clinician [phone]









Payers







           Payer Name Policy Type Policy     Effective Date Expiration Date Sour

ce



                                 Number                           

 

           AETNA MEDICAREAETNA            [id number]   2014            [facility]

on



           MEDICARE HMO/PPO                       00:00:00              [facility]

t



           [id number]                                             



           -PresentHMO                                             







Problems

This patient has no known problems.



Allergies, Adverse Reactions, Alerts







       Allergy Allergy Status Severity Reaction(s) Onset  Inactive Treating Comm

ents 

Source



       Name   Type                        Date   Date   Clinician        

 

       Clindamy Propensi Active        Unknown 2020-0                      [facility]

on



       jayna    ty to                Reaction 6-30                        [facility]



       Phosphat adverse                      00:00:                      st



       e      reaction                      00                          



              s to                                                    



              drug                                                    

 

       Propoxyp Propensi Active        Unknown 2020-0                      [facility]

on



       hene   ty to                Reaction 6-30                        [facility]



       N-Acetam adverse                      00:00:                      st



       inophen reaction                      00                          



              s to                                                    



              drug                                                    

 

       Penicill Propensi Active        Unknown 2020-0                      [facility]

on



       ins    ty to                Reaction 6-30                        [facility]



              adverse                      00:00:                      st



              reaction                      00                          



              s to                                                    



              drug                                                    

 

       Sulfa  Propensi Active        Unknown 2020-0                      [facility]



       (Sulfona ty to                Reaction 6-30                        [facility]

i



       mide   adverse                      00:00:                      st



       Antibiot reaction                      00                          



       ics)   s to                                                    



              drug                                                    

 

       Acetamin Propensi Active        Unknown 2020-0                      [facility]

on



       ophen  ty to                Reaction 6-30                        [facility]



              adverse                      00:00:                      st



              reaction                      00                          



              s to                                                    



              drug                                                    







Family History







           Family Member Diagnosis  Comments   Start Date Stop Date  Source

 

           Natural father Diabetes                                    [facility]

[facility]odist

 

           Natural father Heart disease                                  [facility]

 

           Natural father Hypertension                                  [facility] 

[facility]

 

           Natural mother Asthma                                      [facility]

thodist







Social History







           Social Habit Start Date Stop Date  Quantity   Comments   Source

 

           History of tobacco                       Current smoker            [facility]

[place] [facility]



           use                                                    

 

           [facility]

[facility]



           Alcohol Std Drinks                                             

 

           [facility]

od[facility]



           Alcohol Binge                                             

 

           Sex Assigned At                                             [facility]

[facility]



           Birth                                                  

 

           Cigarettes smoked 2020                       [facility]

 [facility]



           current (pack per 00:00:00   00:00:00                         



           day) - Reported                                             

 

           Cigarette  2020                       [facility]

ist



           pack-years 00:00:00   00:00:00                         

 

           Tobacco use and 2020 Never used            [facility]

ethodist



           exposure   00:00:00   00:00:00                         

 

           Alcohol intake 2020 Lifetime              [facility]

thodist



                      00:00:00   00:00:00   non-drinker            



                                            (finding)             

 

           History SDOH 2020 1                     [facility]

odist



           Alcohol Frequency 00:00:00   00:00:00                         









                Smoking Status  Start Date      Stop Date       Source

 

                Former smoker   2020 00:00:00 2020 00:00:00 [facility] 

[facility]







Medications

This patient has no known medications.



Vital Signs







             Vital Name   Observation Time Observation Value Comments     Source

 

             Body height  2020 11:34:00 160 cm                    [facility] 

[facility]

 

             Body weight  2020 11:34:00 95.255 kg                 [facility] 

[facility]

 

             BMI          2020 11:34:00 37.20 kg/m2               [facility] 

[facility]







Procedures







                Procedure       Date / Time Performed Performing Clinician [facility]

e

 

                CT ABD/PELVIC EXTERNAL 2020 10:37:00 Ellis Mcadams [facility]



                STUDY                                           







Plan of Care







             Planned Activity Planned Date Details      Comments     Source

 

             Future Scheduled 2020   INFLUENZA VACCINE              [facility]

n [facility]



             Test         00:00:00     [code = INFLUENZA              



                                       VACCINE]                  

 

             Future Scheduled 2006-10-07   65+ PNEUMOCOCCAL              [facility]

 [facility]



             Test         00:00:00     VACCINE (1 of 1 -              



                                       PPSV23) [code = 65+              



                                       PNEUMOCOCCAL VACCINE              



                                       (1 of 1 - PPSV23)]              

 

             Future Scheduled 1991-10-07   SHINGLES VACCINES (#1)              [facility]

[facility]



             Test         00:00:00     [code = SHINGLES              



                                       VACCINES (#1)]              







Encounters







        Start   End     Encounter Admission Attending Care    Care    Encounter 

Source



        Date/Time Date/Time Type    Type    Clinicians Facility Department ID   

   

 

        2020 Outpatient         [facility]     [id number]

[address] [facility]



        00:00:00 00:00:00                 ELLIS                    [address]     [facility]

i



                                                                        st

 

        2020 Outpatient         [facility]     [id number]

[address] [facility]



        00:00:00 00:00:00                 ELLIS                    [address]     [facility]

i



                                                                        st







Results







           Test Description Test Time  Test Comments Results    Result     [facility]

e



                                                       Comments   

 

           CT Abd/Pelvic 2020            This exam was not            [facility]

[place]



           External Study 12:24:47              acquired at a            [facility]



                                            [facility]             



                                            facility and has            



                                            not been              



                                            interpreted by a            



                                            [facility]             



                                            Provider.  The            



                                            exam was imported            



                                            into our imaging            



                                            system.

## 2020-10-25 NOTE — XMS REPORT
Clinical Summary

                           Created on:2020



Patient:Leona Peres

Sex:Female

:1940

External Reference #:[id number]





Demographics







                          [place] [address]

 

                          Home Phone                [phone]

 

                          Mobile Phone              [phone]

 

                          Email Address             [email or web address]

 

                          Preferred Language        English

 

                          Marital Status            

 

                          Mormonism Affiliation     Unknown

 

                          Race                      White

 

                          Ethnic Group              Not  or 









Author







                          Organization              [facility]

 

                          Address                   [address] [place] [address]









Support







                Name            Relationship    Address         Phone

 

                Lydia Valentin    Child           Unavailable     [phone]

 

                Maida Valentin Unavailable     Unavailable     [phone]









Care Team Providers







                    Name                Role                Phone

 

                    Kendall Barger MD    Primary Care Provider [phone]









Allergies







             Active Allergy Reactions    Severity     Noted Date   Comments

 

             Clindamycin Phosphate Unknown Reaction              2020   

 

             Propoxyphene N-Acetaminophen Unknown Reaction              20

20   

 

             Penicillins  Unknown Reaction              2020   

 

             Sulfa (Sulfonamide Antibiotics) Unknown Reaction                 

 

             Acetaminophen Unknown Reaction              2020   







Medications

Not on file



Active Problems

Not on file



Encounters







             Date         Type         Specialty    Care Team    Description

 

             2020   Hospital Encounter Radiology    Ellis Mcadams MD 

 

             2020   Office Visit Neurosurgery Ellis Mcadams MD Compression

 fracture of



                                                                 T11 vertebra, i

nitial



                                                                 encounter (HCC)

 (Primary



                                                                 Dx)

 

             2020   Travel                                 

 

             2020   Travel                                 



after 10/25/2019



Medical History







                    Medical History     Date                Comments

 

                    Hypertension                            

 

                    Cancer (HCC)                            

 

                    Headache                                







Family History







                Medical History Relation        Name            Comments

 

                Diabetes        Father                          

 

                Heart disease   Father                          

 

                Hypertension    Father                          

 

                Asthma          Mother                          









                Relation        Name            Status          Comments

 

                Father                                          

 

                Mother                                          







Social History







             Tobacco Use  Types        Packs/Day    Years Used   Date

 

             Former Smoker              0.5          15           Quit: 









                Smokeless Tobacco: Never Used                                 









                Alcohol Use     Drinks/Week     oz/Week         Comments

 

                Never                                           









                    Alcohol Habits      Answer              Date Recorded

 

                    How often do you have a drink containing alcohol? Never     

          2020

 

                    How many drinks containing alcohol do you have on a typical 

Not asked           



                    day when you are drinking?                     

 

                    How often do you have six or more drinks on one occasion? No

t asked           









                          Sex Assigned at Birth     Date Recorded

 

                          Not on file               







Last Filed Vital Signs







                Vital Sign      Reading         Time Taken      Comments

 

                Blood Pressure  -               -               

 

                Pulse           -               -               

 

                Temperature     -               -               

 

                Respiratory Rate -               -               

 

                Oxygen Saturation -               -               

 

                Inhaled Oxygen Concentration -               -               

 

                Weight          95.3 kg (210 lb) 2020 11:34 AM CDT 

 

                Height          160 cm (5' 3")  2020 11:34 AM CDT 

 

                Body Mass Index 37.2            2020 11:34 AM CDT 







Plan of Treatment







                Health Maintenance Due Date        Last Done       Comments

 

                SHINGLES VACCINES (#1) 10/07/1991                      

 

                65+ PNEUMOCOCCAL VACCINE (1 of 1 - PPSV23) 10/07/2006           

           

 

                INFLUENZA VACCINE 2020                      







Procedures







             Procedure Name Priority     Date/Time    Associated Diagnosis Comme

nts

 

             CT ABD/PELVIC Routine      2020 10:37 AM              Results

 for this



             EXTERNAL STUDY              CDT                       procedure are

 in



                                                                 the results



                                                                 section.



after 10/25/2019



Results

CT Abd/Pelvic External Study (2020 10:37 AM CDT)





                                        Specimen

 

                                        









                          Narrative                 Performed At

 

                                        This exam was not acquired at a [facility]

t facility and has not been 



                                        [facility]



                                        interpreted by a [facility] Provider. T

he exam was imported into our 



                                        



                          imaging system.           









                Performing Organization Address         City/State/ZIP Code Phon

e Number

 

                [facility]      [address] [place] [address] 



after 10/25/2019



Insurance







          Payer     Benefit Plan / Group Subscriber ID Effective Dates Phone    

 Address   Type

 

          AETNA MEDICARE AETNA MEDICARE [id number]  2014-Present              

       HMO



                    HMO/PPO [facility]                                         









           Guarantor Name Account Type Relation to Date of Birth Phone      Bill

ing



                                 Patient                          Address

 

           Leona Peres Personal/Family Self       1940 [phone] [address]

 [facility]







                                                       (Home)     [place] [address]







Advance Directives

For more information, please contact: [phone]





                Type            Date Recorded   Patient Representative Explanati

on

 

                Advance Directives, Living Will and                             

    



                Medical Power of

## 2020-11-05 ENCOUNTER — HOSPITAL ENCOUNTER (EMERGENCY)
Dept: HOSPITAL 97 - ER | Age: 80
Discharge: HOME | End: 2020-11-05
Payer: COMMERCIAL

## 2020-11-05 VITALS — OXYGEN SATURATION: 95 % | SYSTOLIC BLOOD PRESSURE: 131 MMHG | DIASTOLIC BLOOD PRESSURE: 72 MMHG

## 2020-11-05 VITALS — TEMPERATURE: 97.9 F

## 2020-11-05 DIAGNOSIS — Z88.2: ICD-10-CM
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
## 2020-11-05 DIAGNOSIS — K21.9: ICD-10-CM

## 2020-11-05 DIAGNOSIS — Z90.12: ICD-10-CM

## 2020-11-05 DIAGNOSIS — Z88.4: ICD-10-CM
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
## 2020-11-05 DIAGNOSIS — Z88.5: ICD-10-CM

## 2020-11-05 DIAGNOSIS — F32.9: ICD-10-CM

## 2020-11-05 DIAGNOSIS — S01.01XA: Primary | ICD-10-CM

## 2020-11-05 DIAGNOSIS — Z88.0: ICD-10-CM
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
## 2020-11-05 PROCEDURE — 70450 CT HEAD/BRAIN W/O DYE: CPT

## 2020-11-05 PROCEDURE — 96372 THER/PROPH/DIAG INJ SC/IM: CPT

## 2020-11-05 PROCEDURE — 99284 EMERGENCY DEPT VISIT MOD MDM: CPT

## 2020-11-05 PROCEDURE — 72125 CT NECK SPINE W/O DYE: CPT

## 2020-11-05 NOTE — XMS REPORT
Clinical Summary

                           Created on:2020



Patient:Leona Peres

Sex:Female

:1940

External Reference #:[id number]





Demographics







                          Address                   [place] [address]

 

                          Home Phone                [phone]

 

                          Mobile Phone              [phone]

 

                          Email Address             [email or web address]

 

                          Preferred Language        English

 

                          Marital Status            

 

                          Sabianist Affiliation     Unknown

 

                          Race                      White

 

                          Ethnic Group              Not  or 









Author







                          Organization              [facility]

 

                          Address                   [address] [place] [address]









Support







                Name            Relationship    Address         Phone

 

                Lydia Valentin    Child           Unavailable     [phone]

 

                Maida Valentin Unavailable     Unavailable     [phone]









Care Team Providers







                    Name                Role                Phone

 

                    Kendall Barger MD    Primary Care Provider [phone]









Allergies







             Active Allergy Reactions    Severity     Noted Date   Comments

 

             Clindamycin Phosphate Unknown Reaction              2020   

 

             Propoxyphene N-Acetaminophen Unknown Reaction              20

20   

 

             Penicillins  Unknown Reaction              2020   

 

             Sulfa (Sulfonamide Antibiotics) Unknown Reaction                 

 

             Acetaminophen Unknown Reaction              2020   







Medications

Not on file



Active Problems

Not on file



Encounters







             Date         Type         Specialty    Care Team    Description

 

             2020   Hospital Encounter Radiology    Ellis Mcadams MD 

 

             2020   Office Visit Neurosurgery Ellis Mcadams MD Compression

 fracture of



                                                                 T11 vertebra, i

nitial



                                                                 encounter (HCC)

 (Primary



                                                                 Dx)

 

             2020   Travel                                 

 

             2020   Travel                                 



after 2019



Medical History







                    Medical History     Date                Comments

 

                    Hypertension                            

 

                    Cancer (HCC)                            

 

                    Headache                                







Family History







                Medical History Relation        Name            Comments

 

                Diabetes        Father                          

 

                Heart disease   Father                          

 

                Hypertension    Father                          

 

                Asthma          Mother                          









                Relation        Name            Status          Comments

 

                Father                                          

 

                Mother                                          







Social History







             Tobacco Use  Types        Packs/Day    Years Used   Date

 

             Former Smoker              0.5          15           Quit: 









                Smokeless Tobacco: Never Used                                 









                Alcohol Use     Drinks/Week     oz/Week         Comments

 

                Never                                           









                    Alcohol Habits      Answer              Date Recorded

 

                    How often do you have a drink containing alcohol? Never     

          2020

 

                    How many drinks containing alcohol do you have on a typical 

Not asked           



                    day when you are drinking?                     

 

                    How often do you have six or more drinks on one occasion? No

t asked           









                          Sex Assigned at Birth     Date Recorded

 

                          Not on file               







Last Filed Vital Signs







                Vital Sign      Reading         Time Taken      Comments

 

                Blood Pressure  -               -               

 

                Pulse           -               -               

 

                Temperature     -               -               

 

                Respiratory Rate -               -               

 

                Oxygen Saturation -               -               

 

                Inhaled Oxygen Concentration -               -               

 

                Weight          95.3 kg (210 lb) 2020 11:34 AM CDT 

 

                Height          160 cm (5' 3")  2020 11:34 AM CDT 

 

                Body Mass Index 37.2            2020 11:34 AM CDT 







Plan of Treatment







                Health Maintenance Due Date        Last Done       Comments

 

                SHINGLES VACCINES (#1) 10/07/1991                      

 

                65+ PNEUMOCOCCAL VACCINE (1 of 1 - PPSV23) 10/07/2006           

           

 

                INFLUENZA VACCINE 2020                      







Procedures







             Procedure Name Priority     Date/Time    Associated Diagnosis Comme

nts

 

             CT ABD/PELVIC Routine      2020 10:37 AM              Results

 for this



             EXTERNAL STUDY              CDT                       procedure are

 in



                                                                 the results



                                                                 section.



after 2019



Results

CT Abd/Pelvic External Study (2020 10:37 AM CDT)





                                        Specimen

 

                                        









                          Narrative                 Performed At

 

                                        This exam was not acquired at a [facility]

t facility and has not been 



                                        [facility]



                                        interpreted by a [facility] Provider. T

he exam was imported into our 



                                        



                          imaging system.           









                Performing Organization Address         City/State/ZIP Code Phon

e Number

 

                [facility]      [address] [place] [address] 



after 2019



Insurance







          Payer     Benefit Plan / Group Subscriber ID Effective Dates Phone    

 Address   Type

 

          AETNA MEDICARE AETNA MEDICARE [id number]  2014-Present              

       HMO



                    HMO/PPO [facility]                                         









           Guarantor Name Account Type Relation to Date of Birth Phone      Bill

ing



                                 Patient                          Address

 

           Leona Peres Personal/Family Self       1940 [phone]

 [facility]







                                                       (Home)     [place] [address]







Advance Directives

For more information, please contact: [phone]





                Type            Date Recorded   Patient Representative Explanati

on

 

                Advance Directives, Living Will and                             

    



                Medical Power of

## 2020-11-05 NOTE — XMS REPORT
Continuity of Care Document

                           Created on:2020



Patient:NEGRO GONZALES

Sex:Female

:1940

External Reference #:[id number]





Demographics







                          Address                   [address] [place] [address]

 

                          Home Phone                [phone]

 

                          Mobile Phone              [phone]

 

                          Email Address             [email or web address]

 

                          Preferred Language        English

 

                          Marital Status            Unknown

 

                          Holiness Affiliation     Unknown

 

                          Race                      Unknown

 

                          Additional Race(s)        Unavailable



                                                    White

 

                          Ethnic Group              Unknown









Author







                          Organization              [facility]

t

 

                          Address                   [address]



                                                    [place] [address]

 

                          Phone                     [phone]









Support







                Name            Relationship    Address         Phone

 

                Homero          Child           Unavailable     [phone]

 

                Homero          Other           Unavailable     [phone]









Care Team Providers







                    Name                Role                Phone

 

                    Denita RIZVI            Primary Care Physician [phone]

 

                    Abbe RIZVI           Attending Clinician [phone]









Payers







           Payer Name Policy Type Policy     Effective Date Expiration Date Sour

ce



                                 Number                           

 

           AETNA MEDICAREAETNA            [id number]   2014            [facility]

on



           MEDICARE HMO/PPO                       00:00:00              [facility]

t



           [id number]                                             



           -PresentHMO                                             







Problems

This patient has no known problems.



Allergies, Adverse Reactions, Alerts







       Allergy Allergy Status Severity Reaction(s) Onset  Inactive Treating Comm

ents 

Source



       Name   Type                        Date   Date   Clinician        

 

       Clindamy Propensi Active        Unknown 2020-0                      [facility]

on



       jayna    ty to                Reaction 6-30                        [facility]



       Phosphat adverse                      00:00:                      st



       e      reaction                      00                          



              s to                                                    



              drug                                                    

 

       Propoxyp Propensi Active        Unknown 2020-0                      [facility]

on



       hene   ty to                Reaction 6-30                        [facility]



       N-Acetam adverse                      00:00:                      st



       inophen reaction                      00                          



              s to                                                    



              drug                                                    

 

       Penicill Propensi Active        Unknown 2020-0                      [facility]

on



       ins    ty to                Reaction 6-30                        [facility]



              adverse                      00:00:                      st



              reaction                      00                          



              s to                                                    



              drug                                                    

 

       Sulfa  Propensi Active        Unknown 2020-0                      [facility]



       (Sulfona ty to                Reaction 6-30                        [facility]

i



       mide   adverse                      00:00:                      st



       Antibiot reaction                      00                          



       ics)   s to                                                    



              drug                                                    

 

       Acetamin Propensi Active        Unknown 2020-0                      [facility]

on



       ophen  ty to                Reaction 6-30                        [facility]



              adverse                      00:00:                      st



              reaction                      00                          



              s to                                                    



              drug                                                    







Family History







           Family Member Diagnosis  Comments   Start Date Stop Date  Source

 

           Natural father Diabetes                                    [facility]

[facility]

 

           Natural father Heart disease                                  [facility]

 

           Natural father Hypertension                                  [facility] 

[facility]

 

           Natural mother Asthma                                      [facility]

[facility]







Social History







           Social Habit Start Date Stop Date  Quantity   Comments   Source

 

           History of tobacco                       Current smoker            [facility]

[place] [facility]



           use                                                    

 

           [facility]



           Alcohol Std Drinks                                             

 

           [facility]

[facility]



           Alcohol Binge                                             

 

           Sex Assigned At                                             [facility]

[facility]



           Birth                                                  

 

           Cigarettes smoked 2020                       [facility]

 [facility]



           current (pack per 00:00:00   00:00:00                         



           day) - Reported                                             

 

           Cigarette  2020                       [facility]

ist



           pack-years 00:00:00   00:00:00                         

 

           Tobacco use and 2020 Never used            [facility]

[facility]



           exposure   00:00:00   00:00:00                         

 

           Alcohol intake 2020 Lifetime              [facility]

[facility]



                      00:00:00   00:00:00   non-drinker            



                                            (finding)             

 

           History SDOH 2020 1                     [facility]

[facility]



           Alcohol Frequency 00:00:00   00:00:00                         









                Smoking Status  Start Date      Stop Date       Source

 

                Former smoker   2020 00:00:00 2020 00:00:00 [facility] 

[facility]







Medications

This patient has no known medications.



Vital Signs







             Vital Name   Observation Time Observation Value Comments     Source

 

             Body height  2020 11:34:00 160 cm                    [facility] 

[facility]

 

             Body weight  2020 11:34:00 95.255 kg                 [facility] 

[facility]

 

             BMI          2020 11:34:00 37.20 kg/m2               [facility] 

[facility]







Procedures







                Procedure       Date / Time Performed Performing Clinician [facility]

e

 

                CT ABD/PELVIC EXTERNAL 2020 10:37:00 Ellis Mcadams [facility]



                STUDY                                           







Plan of Care







             Planned Activity Planned Date Details      Comments     Source

 

             Future Scheduled 2020   INFLUENZA VACCINE              [place]

n [facility]



             Test         00:00:00     [code = INFLUENZA              



                                       VACCINE]                  

 

             Future Scheduled 2006-10-07   65+ PNEUMOCOCCAL              [facility]

 [facility]



             Test         00:00:00     VACCINE (1 of 1 -              



                                       PPSV23) [code = 65+              



                                       PNEUMOCOCCAL VACCINE              



                                       (1 of 1 - PPSV23)]              

 

             Future Scheduled 1991-10-07   SHINGLES VACCINES (#1)              [facility]

[facility]



             Test         00:00:00     [code = SHINGLES              



                                       VACCINES (#1)]              







Encounters







        Start   End     Encounter Admission Attending Care    Care    Encounter 

Source



        Date/Time Date/Time Type    Type    Clinicians Facility Department ID   

   

 

        2020 Outpatient         [facility]     [id number]

[address] [place]



        00:00:00 00:00:00                 [facility]                    [address]     [facility]

i



                                                                        st

 

        2020 Outpatient         [facility]     [id number]

[address] [place]



        00:00:00 00:00:00                 [facility]LL[facility]                    [address]     [facility]

i



                                                                        st







Results







           Test Description Test Time  Test Comments Results    Result     [facility]

e



                                                       Comments   

 

           CT Abd/Pelvic 2020            This exam was not            [facility]

[place]



           External Study 12:24:47              acquired at a            [facility]



                                            [facility]             



                                            facility and has            



                                            not been              



                                            interpreted by a            



                                            [facility]             



                                            Provider.  The            



                                            exam was imported            



                                            into our imaging            



                                            system.

## 2020-11-05 NOTE — ER
Nurse's Notes                                                                                     

 [facility]                                                                 

Name: Leona Peres                                                                              

Age: 80 yrs                                                                                       

Sex: Female                                                                                       

: 1940                                                                                   

MRN: [id number]                                                                                   

Arrival Date: 2020                                                                          

Time: 17:37                                                                                       

Account#: [id number]                                                                            

Bed 19                                                                                            

Private MD:                                                                                       

Diagnosis: Fall on same level, unspecified;Unspecified injury of head;Laceration without foreign  

  body of scalp                                                                                   

                                                                                                  

Presentation:                                                                                     

                                                                                             

17:30 Chief complaint: EMS states: Pt. was walking to the bed with her walker when she over   rb3 

      stepped and lost her footing causing her to fall. LOC is unknown. Has laceration to the     

      left side of the head, bleeding is controlled. Denies taking blood thinners. , T      

      98.8 Allergy to Penicillin, Sulfa, and Cleocin. Coronavirus screen: At this time, the       

      client does not indicate any symptoms associated with coronavirus-19. Ebola Screen:         

      Patient denies travel to an Ebola-affected area in the 21 days before illness onset.        

      Complicating Factors: There are no complicating factors for this patient. Initial           

      Sepsis Screen: Does the patient meet any 2 criteria? No. Patient's initial sepsis           

      screen is negative. Does the patient have a suspected source of infection? Yes: Skin        

      breakdown/wound. Risk Assessment: Do you want to hurt yourself or someone else? Patient     

      reports no desire to harm self or others. Onset of symptoms was 2020 at        

      16:30.                                                                                      

17:30 Method Of Arrival: EMS: [facility]                                                rb3 

17:30 Acuity: ANUSHKA 3                                                                           rb3 

                                                                                                  

Triage Assessment:                                                                                

17:30 General: Appears in no apparent distress. comfortable, Behavior is calm, cooperative.   rb3 

      Pain: Complains of pain in left side of her head Pain currently is 8 out of 10 on a         

      pain scale. Pain began 1 hour ago. Neuro: Level of Consciousness is awake, obeys            

      commands, confused, Oriented to person, place, situation. Cardiovascular: Patient's         

      skin is warm and dry. Respiratory: Airway is patent Respiratory effort is even,             

      unlabored, Respiratory pattern is regular, symmetrical. GI: No signs and/or symptoms        

      were reported involving the gastrointestinal system. : No signs and/or symptoms were      

      reported regarding the genitourinary system. Injury Description: Laceration sustained       

      to left side of head is not bleeding, was sustained 1-2 hours ago. is bleeding no           

      active bleeding noted.                                                                      

                                                                                                  

Historical:                                                                                       

- Allergies:                                                                                      

17:30 clindamycin HCl (bulk);                                                                 rb3 

17:30 Darvocet-N 100;                                                                         rb3 

17:30 PENICILLINS;                                                                            rb3 

17:30 Sulfa (Sulfonamide Antibiotics);                                                        rb3 

- Home Meds:                                                                                      

17:30 duloxetine 60 mg Oral cpDR 2 caps once daily [Active]; Macrodantin 50 mg Oral cap 1 cap rb3 

      once daily [Active]; metoprolol succinate 50 mg Oral Tb24 1 tab once daily [Active];        

      potassium chloride 10 mEq Oral cpER 1 cap 2 times per day [Active]; Seroquel 100 mg         

      Oral tab daily [Active]; simvastatin 40 mg Oral tab 1 tab once daily [Active];              

      spironolactone 20mg Oral once daily [Active]; tramadol 50 mg Oral tab 2 times daily         

      [Active];                                                                                   

- PMHx:                                                                                           

17:30 Cancer; Depression; Fibromyalgia; insomnia; GERD; skipped heart beats; lymphedema;      rb3 

- PSHx:                                                                                           

17:30 Appendectomy; Hysterectomy; Mastectomy, Left; knee replacements; lumbar fusion;         rb3 

      cervical fusion;                                                                            

                                                                                                  

- Immunization history:: Adult Immunizations up to date, Last tetanus immunization: up            

  to date.                                                                                        

- Social history:: Smoking status: Patient/guardian denies using.                                 

                                                                                                  

                                                                                                  

Screenin:30 Abuse screen: Denies threats or abuse. Nutritional screening: No deficits noted.        rb3 

      Tuberculosis screening: No symptoms or risk factors identified. Fall Risk Fall in past      

      12 months (25 points). Secondary diagnosis (15 points) impaired mobility, No IV (0          

      pts). Ambulatory Aid- Crutches/Cane/Walker (15 pts). Gait- Impaired (20 pts.). Mental       

      Status- Overestimates/Forgets Limitations (15 pts.). Total Martinez Fall Scale indicates       

      High Risk Score (45 or more points). Fall prevention measures have been instituted.         

      Side Rails Up X 2 Placed Close to Nursing Station 1:1 Attendant Assigned Frequent           

      Obs/Assessments Occuring Family Present and informed to notify staff if the need to         

      leave the bedside As available patient and family educated on Fall Prevention Program       

      and Strategies.                                                                             

                                                                                                  

Assessment:                                                                                       

17:30 Musculoskeletal: Range of motion: intact in all extremities.                            rb3 

18:30 Reassessment: Patient appears in no apparent distress at this time. No changes from     rb3 

      previously documented assessment.                                                           

                                                                                                  

Vital Signs:                                                                                      

17:30  / 76; Pulse 83; Resp 17; Temp 97.9; Pulse Ox 96% on R/A; Weight 93.44 kg; Height rb3 

      5 ft. 3 in. (160.02 cm); Pain 8/10;                                                         

17:30 Body Mass Index 36.49 (93.44 kg, 160.02 cm)                                             rb3 

                                                                                                  

ED Course:                                                                                        

17:30 Arm band placed on right wrist.                                                         rb3 

17:30 Patient has correct armband on for positive identification. Bed in low position. Call   rb3 

      light in reach. Side rails up X 1. Cardiac monitor on. Pulse ox on. NIBP on. Warm           

      blanket given.                                                                              

17:37 Patient arrived in ED.                                                                  rb3 

17:42 Triage completed.                                                                       rb3 

17:46 Dahiana Pugh FNP-C is PHCP.                                                          pramod 

18:05 CT Head C Spine In Process Unspecified.                                                 EDMS

                                                                                                  

Administered Medications:                                                                         

18:39 Drug: fentaNYL (PF) 25 mcg Route: IM; Site: right deltoid;                              rb3 

                                                                                                  

                                                                                                  

Outcome:                                                                                          

18:51 Discharge ordered by MD. [email or web address] 

                                                                                                  

Signatures:                                                                                       

Dispatcher [employer]                           EDMS                                                 

Dahiana Pugh FNP-C FNP-Jena Dinh, RN                     RN   rb3                                                  

                                                                                                  

**************************************************************************************************

## 2020-11-05 NOTE — RAD REPORT
EXAM DESCRIPTION:  CT - [id number] - 11/5/2020 6:05 pm

 

CLINICAL HISTORY:  Trauma, head and neck injury.

SMASH INJURY

 

COMPARISON:  Head C Spine Mpr Wo Con dated 10/16/2020; CT HEAD CSPINE MPR WO CONTRAST dated 7/6/2012;
 SOFT TISSUE NECK W O CONTRAST dated 4/17/2011

 

TECHNIQUE:  Axial 5 mm thick images of the head were obtained.

 

Axial 2 mm thick images of the cervical spine were obtained with sagittal and coronal reconstruction 
images generated and reviewed.

 

All CT scans are performed using dose optimization technique as appropriate and may include automated
 exposure control or mA/KV adjustment according to patient size.

 

FINDINGS:  CT HEAD WITHOUT CONTRAST:

 

No acute hemorrhage, hydrocephalus or extra-axial collection is identified.No areas of brain edema or
 midline shift.

 

The paranasal sinuses and mastoids are clear.The calvarium is intact.

 

CT CERVICAL SPINE WITHOUT CONTRAST:

 

No fracture or subluxation.Hardware is present spanning C4-7.No prevertebral soft tissues swelling is
 identified.

 

IMPRESSION:  No acute intracranial or cervical spine findings.

## 2020-11-28 ENCOUNTER — HOSPITAL ENCOUNTER (EMERGENCY)
Dept: HOSPITAL 97 - ER | Age: 80
LOS: 1 days | Discharge: HOME | End: 2020-11-29
Payer: COMMERCIAL

## 2020-11-28 DIAGNOSIS — Z88.2: ICD-10-CM

## 2020-11-28 DIAGNOSIS — Z88.3: ICD-10-CM

## 2020-11-28 DIAGNOSIS — N39.0: Primary | ICD-10-CM

## 2020-11-28 DIAGNOSIS — Z88.5: ICD-10-CM

## 2020-11-28 DIAGNOSIS — Z88.0: ICD-10-CM

## 2020-11-28 LAB
ALBUMIN SERPL BCP-MCNC: 3.4 G/DL (ref 3.4–5)
ALP SERPL-CCNC: 55 U/L (ref 45–117)
ALT SERPL W P-5'-P-CCNC: 27 U/L (ref 12–78)
AST SERPL W P-5'-P-CCNC: 24 U/L (ref 15–37)
BUN BLD-MCNC: 18 MG/DL (ref 7–18)
GLUCOSE SERPLBLD-MCNC: 134 MG/DL (ref 74–106)
HCT VFR BLD CALC: 44.6 % (ref 36–45)
LIPASE SERPL-CCNC: 73 U/L (ref 73–393)
LYMPHOCYTES # SPEC AUTO: 1.2 K/UL (ref 0.7–4.9)
PMV BLD: 9 FL (ref 7.6–11.3)
POTASSIUM SERPL-SCNC: 3.2 MMOL/L (ref 3.5–5.1)
RBC # BLD: 4.44 M/UL (ref 3.86–4.86)

## 2020-11-28 PROCEDURE — 99285 EMERGENCY DEPT VISIT HI MDM: CPT

## 2020-11-28 PROCEDURE — 83690 ASSAY OF LIPASE: CPT

## 2020-11-28 PROCEDURE — 80048 BASIC METABOLIC PNL TOTAL CA: CPT

## 2020-11-28 PROCEDURE — 83735 ASSAY OF MAGNESIUM: CPT

## 2020-11-28 PROCEDURE — 93005 ELECTROCARDIOGRAM TRACING: CPT

## 2020-11-28 PROCEDURE — 36415 COLL VENOUS BLD VENIPUNCTURE: CPT

## 2020-11-28 PROCEDURE — 74177 CT ABD & PELVIS W/CONTRAST: CPT

## 2020-11-28 PROCEDURE — 96360 HYDRATION IV INFUSION INIT: CPT

## 2020-11-28 PROCEDURE — 81003 URINALYSIS AUTO W/O SCOPE: CPT

## 2020-11-28 PROCEDURE — 80076 HEPATIC FUNCTION PANEL: CPT

## 2020-11-28 PROCEDURE — 85025 COMPLETE CBC W/AUTO DIFF WBC: CPT

## 2020-11-28 NOTE — XMS REPORT
Clinical Summary

                          Created on:2020



Patient:Leona Peres

Sex:Female

:1940

External Reference #:[id number]





Demographics







                          [place] [address]

 

                          Home Phone                [phone]

 

                          Mobile Phone              [phone]

 

                          Email Address             [email or web address]

 

                          Preferred Language        English

 

                          Marital Status            

 

                          Holiness Affiliation     Unknown

 

                          Race                      White

 

                          Ethnic Group              Not  or 









Author







                          Organization              [facility]

 

                          Address                   [address] [place] [address]









Support







                Name            Relationship    Address         Phone

 

                Lydia Valentin    Child           Unavailable     [phone]

 

                Maida Valentin Unavailable     Unavailable     [phone]









Care Team Providers







                    Name                Role                Phone

 

                    Kendall Barger MD    Primary Care Provider [phone]









Allergies







             Active Allergy Reactions    Severity     Noted Date   Comments

 

             Clindamycin Phosphate Unknown Reaction              2020   

 

             Propoxyphene N-Acetaminophen Unknown Reaction              20

20   

 

             Penicillins  Unknown Reaction              2020   

 

             Sulfa (Sulfonamide Antibiotics) Unknown Reaction                 

 

             Acetaminophen Unknown Reaction              2020   







Medications

Not on file



Active Problems

Not on file



Encounters







             Date         Type         Specialty    Care Team    Description

 

             2020   Hospital Encounter Radiology    Ellis Mcadams MD 

 

             2020   Office Visit Neurosurgery Ellis Mcadams MD Compression

 fracture of



                                                                 T11 vertebra, i

nitial



                                                                 encounter (HCC)

 (Primary



                                                                 Dx)

 

             2020   Travel                                 

 

             2020   Travel                                 



after 2019



Medical History







                    Medical History     Date                Comments

 

                    Hypertension                            

 

                    Cancer (HCC)                            

 

                    Headache                                







Family History







                Medical History Relation        Name            Comments

 

                Diabetes        Father                          

 

                Heart disease   Father                          

 

                Hypertension    Father                          

 

                Asthma          Mother                          









                Relation        Name            Status          Comments

 

                Father                                          

 

                Mother                                          







Social History







             Tobacco Use  Types        Packs/Day    Years Used   Date

 

             Former Smoker              0.5          15           Quit: 









                Smokeless Tobacco: Never Used                                 









                Alcohol Use     Drinks/Week     oz/Week         Comments

 

                Never                                           









                    Alcohol Habits      Answer              Date Recorded

 

                    How often do you have a drink containing alcohol? Never     

          2020

 

                    How many drinks containing alcohol do you have on a typical 

Not asked           



                    day when you are drinking?                     

 

                    How often do you have six or more drinks on one occasion? No

t asked           









                          Sex Assigned at Birth     Date Recorded

 

                          Not on file               







Last Filed Vital Signs







                Vital Sign      Reading         Time Taken      Comments

 

                Blood Pressure  -               -               

 

                Pulse           -               -               

 

                Temperature     -               -               

 

                Respiratory Rate -               -               

 

                Oxygen Saturation -               -               

 

                Inhaled Oxygen Concentration -               -               

 

                Weight          95.3 kg (210 lb) 2020 11:34 AM CDT 

 

                Height          160 cm (5' 3")  2020 11:34 AM CDT 

 

                Body Mass Index 37.2            2020 11:34 AM CDT 







Plan of Treatment







                Health Maintenance Due Date        Last Done       Comments

 

                SHINGLES VACCINES (#1) 10/07/1991                      

 

                65+ PNEUMOCOCCAL VACCINE (1 of 1 - PPSV23) 10/07/2006           

           

 

                INFLUENZA VACCINE 2020                      







Procedures







             Procedure Name Priority     Date/Time    Associated Diagnosis Comme

nts

 

             CT ABD/PELVIC Routine      2020 10:37 AM              Results

 for this



             EXTERNAL STUDY              CDT                       procedure are

 in



                                                                 the results



                                                                 section.



after 2019



Results

CT Abd/Pelvic External Study (2020 10:37 AM CDT)





                                        Specimen

 

                                        









                          Narrative                 Performed At

 

                                        This exam was not acquired at a [facility]

t facility and has not been 



                                        [facility]



                                        interpreted by a [facility] Provider. T

he exam was imported into our 



                                        



                          imaging system.           









                Performing Organization Address         City/State/ZIP Code Phon

e Number

 

                [facility]      [address] [place] [address] 



after 2019



Insurance







          Payer     Benefit Plan / Group Subscriber ID Effective Dates Phone    

 Address   Type

 

          AETNA MEDICARE AETNA MEDICARE [id number]  2014-Present              

       HMO



                    HMO/PPO [facility]                                         









           Guarantor Name Account Type Relation to Date of Birth Phone      Bill

ing



                                 Patient                          Address

 

           Leona Peres Personal/Family Self       1940 [phone]

 [facility]







                                                       (Home)     [place] [address]







Advance Directives

For more information, please contact: [phone]





                Type            Date Recorded   Patient Representative Explanati

on

 

                Advance Directives, Living Will and                             

    



                Medical Power of

## 2020-11-28 NOTE — XMS REPORT
Continuity of Care Document

                          Created on:2020



Patient:NEGRO GONZALES

Sex:Female

:1940

External Reference #:[id number]





Demographics







                          Address                   [address] [place] [address]

 

                          Home Phone                [phone] [place]

 

                          Mobile Phone              [phone]

 

                          Email Address             [email or web address]

 

                          Preferred Language        English

 

                          Marital Status            Unknown

 

                          Adventism Affiliation     Unknown

 

                          Race                      Unknown

 

                          Additional Race(s)        Unavailable



                                                    White

 

                          Ethnic Group              Unknown









Author







                          Organization              [facility]

t

 

                          Address                   [address]



                                                    [place] [address]

 

                          Phone                     [phone]









Support







                Name            Relationship    Address         Phone

 

                Homero          Child           Unavailable     [phone]

 

                Homero          Other           Unavailable     [phone]









Care Team Providers







                    Name                Role                Phone

 

                    Denita RIZVI            Primary Care Physician [phone]

 

                    Abbe RIZVI           Attending Clinician [phone]









Payers







           Payer Name Policy Type Policy     Effective Date Expiration Date Sour

ce



                                 Number                           

 

           [employer]ET[employer] MEDICAREAETNA            [id number]   2014            [place]

on



           MEDICARE HMO/PPO                       00:00:00              [facility]

t



           [id number]                                             



           -PresentHMO                                             







Problems

This patient has no known problems.



Allergies, Adverse Reactions, Alerts







       Allergy Allergy Status Severity Reaction(s) Onset  Inactive Treating Comm

ents 

Source



       Name   Type                        Date   Date   Clinician        

 

       Clindamy Propensi Active        Unknown 2020-0                      [facility]

on



       jayna    ty to                Reaction 6-30                        [facility]



       Phosphat adverse                      00:00:                      st



       e      reaction                      00                          



              s to                                                    



              drug                                                    

 

       Propoxyp Propensi Active        Unknown 2020-0                      [facility]

on



       hene   ty to                Reaction 6-30                        [facility]



       N-Acetam adverse                      00:00:                      st



       inophen reaction                      00                          



              s to                                                    



              drug                                                    

 

       Penicill Propensi Active        Unknown 2020-0                      [facility]

on



       ins    ty to                Reaction 6-30                        [facility]



              adverse                      00:00:                      st



              reaction                      00                          



              s to                                                    



              drug                                                    

 

       Sulfa  Propensi Active        Unknown 2020-0                      [facility]



       (Sulfona ty to                Reaction 6-30                        [facility]

i



       mide   adverse                      00:00:                      st



       Antibiot reaction                      00                          



       ics)   s to                                                    



              drug                                                    

 

       Acetamin Propensi Active        Unknown 2020-0                      [facility]

on



       ophen  ty to                Reaction 6-30                        [facility]



              adverse                      00:00:                      st



              reaction                      00                          



              s to                                                    



              drug                                                    







Family History







           Family Member Diagnosis  Comments   Start Date Stop Date  Source

 

           Natural father Diabetes                                    [facility]

[facility]

 

           Natural father Heart disease                                  [facility]

 [facility]

 

           Natural father Hypertension                                  [facility] 

[facility]

 

           Natural mother Asthma                                      [facility]

[facility]odist







Social History







           Social Habit Start Date Stop Date  Quantity   Comments   Source

 

           History of tobacco                       Current smoker            [facility]

[place] [facility]



           use                                                    

 

           [facility]



           Alcohol Std Drinks                                             

 

           [facility]

[facility]



           Alcohol Binge                                             

 

           Sex Assigned At                                             [facility]

[facility]



           Birth                                                  

 

           Cigarettes smoked 2020                       [facility]

 [facility]



           current (pack per 00:00:00   00:00:00                         



           day) - Reported                                             

 

           Cigarette  2020                       [facility]

ist



           pack-years 00:00:00   00:00:00                         

 

           Tobacco use and 2020 Never used            [facility]

ethodist



           exposure   00:00:00   00:00:00                         

 

           Alcohol intake 2020 Lifetime              [facility]

thodist



                      00:00:00   00:00:00   non-drinker            



                                            (finding)             

 

           History SDOH 2020 1                     [facility]

odist



           Alcohol Frequency 00:00:00   00:00:00                         









                Smoking Status  Start Date      Stop Date       Source

 

                Former smoker   2020 00:00:00 2020 00:00:00 [facility] 

[facility]







Medications

This patient has no known medications.



Vital Signs







             Vital Name   Observation Time Observation Value Comments     Source

 

             Body height  2020 11:34:00 160 cm                    [facility] 

[facility]

 

             Body weight  2020 11:34:00 95.255 kg                 [facility] 

[facility]

 

             BMI          2020 11:34:00 37.20 kg/m2               [facility] 

[facility]







Procedures







                Procedure       Date / Time Performed Performing Clinician [facility]

e

 

                CT ABD/PELVIC EXTERNAL 2020 10:37:00 [facility]



                STUDY                                           







Plan of Care







             Planned Activity Planned Date Details      Comments     Source

 

             Future Scheduled 2020   INFLUENZA VACCINE              [facility]

[facility]



             Test         00:00:00     [code = INFLUENZA              



                                       VACCINE]                  

 

             Future Scheduled 2006-10-07   65+ PNEUMOCOCCAL              [facility]

 [facility]



             Test         00:00:00     VACCINE (1 of 1 -              



                                       PPSV23) [code = 65+              



                                       PNEUMOCOCCAL VACCINE              



                                       (1 of 1 - PPSV23)]              

 

             Future Scheduled 1991-10-07   SHINGLES VACCINES (#1)              [facility]

[facility]



             Test         00:00:00     [code = SHINGLES              



                                       VACCINES (#1)]              







Encounters







        Start   End     Encounter Admission Attending Care    Care    Encounter 

Source



        Date/Time Date/Time Type    Type    Clinicians Facility Department ID   

   

 

        2020 Outpatient         ABBE [facility]     [id number]

[address] [place]



        00:00:00 00:00:00                 ELLIS                    [id number]     [facility]

i



                                                                        st

 

        2020 Outpatient         ABBE [facility]     [id number]

[address] [place]



        00:00:00 00:00:00                 ELLIS                    [address]     [facility]

i



                                                                        st







Results







           Test Description Test Time  Test Comments Results    Result     [facility]

e



                                                       Comments   

 

           CT Abd/Pelvic 2020            This exam was not            [facility]

[place]



           External Study 12:24:47              acquired at a            [facility]



                                            [facility]             



                                            facility and has            



                                            not been              



                                            interpreted by a            



                                            [facility]             



                                            Provider.  The            



                                            exam was imported            



                                            into our imaging            



                                            system.

## 2020-11-29 VITALS — DIASTOLIC BLOOD PRESSURE: 83 MMHG | SYSTOLIC BLOOD PRESSURE: 127 MMHG | OXYGEN SATURATION: 99 %

## 2020-11-29 VITALS — TEMPERATURE: 98.4 F

## 2020-11-29 NOTE — EDPHYS
Physician Documentation                                                                           

 [facility]                                                                 

Name: Leona Peres                                                                              

Age: 80 yrs                                                                                       

Sex: Female                                                                                       

: 1940                                                                                   

MRN: [id number]                                                                                   

Arrival Date: 2020                                                                          

Time: 19:52                                                                                       

Account#: [id number]                                                                            

Bed 4                                                                                             

Private MD:                                                                                       

ED Physician Gerardo Richardson                                                                      

HPI:                                                                                              

                                                                                             

20:42 This 80 yrs old  Female presents to ER via Ambulatory with complaints of       mh7 

      Diarrhea.                                                                                   

20:42 The patient presents to the emergency department with diarrhea, that is intermittent, 8 mh7 

      times since the onset of symptoms. Onset: The symptoms/episode began/occurred today, at     

      15:00. Possible causes: unknown. The symptoms are aggravated by nothing. The symptoms       

      are alleviated by nothing. Associated signs and symptoms: Pertinent negatives:              

      abdominal pain, anorexia, belching, constipation, dysuria, fever, flatulence, GI            

      bleeding, hematuria, nausea, vaginal discharge, vomiting. Severity of symptoms: At          

      their worst the symptoms were moderate today, in the emergency department the symptoms      

      have improved moderately.                                                                   

                                                                                                  

Historical:                                                                                       

- Allergies:                                                                                      

20:09 clindamycin HCl (bulk);                                                                 sg  

20:09 Darvocet-N 100;                                                                         sg  

20:09 PENICILLINS;                                                                            sg  

20:09 Sulfa (Sulfonamide Antibiotics);                                                        sg  

- PMHx:                                                                                           

20:09 Cancer; Depression; Fibromyalgia; GERD; insomnia; lymphedema; skipped heart beats;      sg  

- PSHx:                                                                                           

20:09 Appendectomy; Hysterectomy; Mastectomy, Left; knee replacements; lumbar fusion;         sg  

      cervical fusion;                                                                            

                                                                                                  

- Immunization history:: Adult Immunizations up to date.                                          

- Social history:: Smoking status: Patient denies any tobacco usage or history of.                

                                                                                                  

                                                                                                  

ROS:                                                                                              

20:42 Constitutional: Negative for fever, chills, and weight loss, Eyes: Negative for injury, mh7 

      pain, redness, and discharge, ENT: Negative for injury, pain, and discharge, Neck:          

      Negative for injury, pain, and swelling, Cardiovascular: Negative for chest pain,           

      palpitations, and edema, Respiratory: Negative for shortness of breath, cough,              

      wheezing, and pleuritic chest pain, Back: Negative for injury and pain, : Negative        

      for injury, bleeding, discharge, and swelling, MS/Extremity: Negative for injury and        

      deformity, Skin: Negative for injury, rash, and discoloration, Neuro: Negative for          

      headache, weakness, numbness, tingling, and seizure, Psych: Negative for depression,        

      anxiety, suicide ideation, homicidal ideation, and hallucinations, Allergy/Immunology:      

      Negative for hives, rash, and allergies, Endocrine: Negative for neck swelling,             

      polydipsia, polyuria, polyphagia, and marked weight changes, Hematologic/Lymphatic:         

      Negative for swollen nodes, abnormal bleeding, and unusual bruising.                        

                                                                                                  

Exam:                                                                                             

20:42 Constitutional:  This is a well developed, well nourished patient who is awake, alert,  mh7 

      and in no acute distress. Head/Face:  Normocephalic, atraumatic. Eyes:  Pupils equal        

      round and reactive to light, extra-ocular motions intact.  Lids and lashes normal.          

      Conjunctiva and sclera are non-icteric and not injected.  Cornea within normal limits.      

      Periorbital areas with no swelling, redness, or edema. Neck:  Trachea midline, no           

      thyromegaly or masses palpated, and no cervical lymphadenopathy.  Supple, full range of     

      motion without nuchal rigidity, or vertebral point tenderness.  No Meningismus.             

      Chest/axilla:  Normal chest wall appearance and motion.  Nontender with no deformity.       

      No lesions are appreciated. Cardiovascular:  Regular rate and rhythm with a normal S1       

      and S2.  No gallops, murmurs, or rubs.  Normal PMI, no JVD.  No pulse deficits.             

      Respiratory:  Lungs have equal breath sounds bilaterally, clear to auscultation and         

      percussion.  No rales, rhonchi or wheezes noted.  No increased work of breathing, no        

      retractions or nasal flaring. Abdomen/GI:  Soft, non-tender, with normal bowel sounds.      

      No distension or tympany.  No guarding or rebound.  No evidence of tenderness               

      throughout. Back:  No spinal tenderness.  No costovertebral tenderness.  Full range of      

      motion. Skin:  Warm, dry with normal turgor.  Normal color with no rashes, no lesions,      

      and no evidence of cellulitis. MS/ Extremity:  Pulses equal, no cyanosis.                   

      Neurovascular intact.  Full, normal range of motion. Neuro:  Awake and alert, GCS 15,       

      oriented to person, place, time, and situation.  Cranial nerves II-XII grossly intact.      

      Motor strength 5/5 in all extremities.  Sensory grossly intact.  Cerebellar exam            

      normal.  Normal gait. Psych:  Awake, alert, with orientation to person, place and time.     

       Behavior, mood, and affect are within normal limits.                                       

                                                                                                  

Vital Signs:                                                                                      

20:00  / 63; Pulse 61; Resp 16; Temp 98.4; Pulse Ox 99% ;                               rr5 

21:00  / 70; Pulse 59; Resp 17; Pulse Ox 98% ;                                          rr5 

22:00  / 62; Pulse 60; Resp 16; Pulse Ox 98% ;                                          rr5 

22:40  / 83; Pulse 62; Resp 16; Pulse Ox 99% ;                                          rr5 

                                                                                             

00:00  / 89; Pulse 64; Resp 17; Pulse Ox 98% ;                                          rr5 

01:00  / 62; Pulse 69; Resp 17; Pulse Ox 99% ;                                          rr5 

                                                                                                  

MDM:                                                                                              

00:54 Differential diagnosis: pancreatitis, diverticulitis, viral gastroenteritis,            mh7 

      gastroenteritis, Diarrhea, UTI. Data reviewed: vital signs, nurses notes, old medical       

      records, lab test result(s), CBC, electrolytes, urinalysis, EKG, radiologic studies, CT     

      scan. Data interpreted: Pulse oximetry: on room air is 99 %. Interpretation: normal.        

      Counseling: I had a detailed discussion with the patient and/or guardian regarding: the     

      historical points, exam findings, and any diagnostic results supporting the                 

      discharge/admit diagnosis, lab results, radiology results, the need for outpatient          

      follow up, to return to the emergency department if symptoms worsen or persist or if        

      there are any questions or concerns that arise at home. Response to treatment: the          

      patient's symptoms have resolved after treatment, the patient's blood pressure is in an     

      acceptable range, mental status has returned to baseline, the patient no longer shows       

      bradycardia, the patient is not short of breath, the patient is not tachycardic, the        

      patient's pain is gone, the patient's temperature has normalized.                           

00:56 Patient medically screened.                                                             [facility] 

                                                                                                  

                                                                                             

20:39 Order name: Basic Metabolic Panel; Complete Time: 21:36                                 [facility] 

                                                                                             

20:39 Order name: CBC with Diff; Complete Time: 21:36                                         [facility] 

                                                                                             

20:39 Order name: Hepatic Function; Complete Time: 21:36                                      [facility] 

                                                                                             

20:39 Order name: Lipase; Complete Time: 21:36                                                [facility] 

                                                                                             

21:36 Order name: Magnesium; Complete Time: 22:06                                             [facility] 

                                                                                             

22:52 Order name: Urine Dipstick--Ancillary (enter results); Complete Time: 23:42             tt3 

                                                                                             

20:39 Order name: IV Saline Lock; Complete Time: 20:57                                        mh7 

                                                                                             

20:39 Order name: Labs collected and sent; Complete Time: 20:58                               mh7 

                                                                                             

20:39 Order name: Urine Dipstick-Ancillary (obtain specimen); Complete Time: 22:53            mh7 

                                                                                             

22:47 Order name: Abdomen                                                                     [facility]

                                                                                             

21:39 Order name: EKG - Nurse/Tech; Complete Time: 22:22                                      mh7 

                                                                                             

00:32 Order name: PO challenge; Complete Time: 00:36                                          mh7 

                                                                                                  

Administered Medications:                                                                         

                                                                                             

21:01 Drug: NS 0.9% 1000 ml Route: IV; Rate: 1000 ml; Site: right hand;                       rr5 

22:00 Follow up: Response: No adverse reaction; IV Status: Completed infusion; IV Intake:     rr5 

      1000ml                                                                                      

22:22 Drug: Potassium Effervescent Tablet 25 mEq Route: PO;                                   rr5 

23:20 Follow up: Response: No adverse reaction                                                rr5 

                                                                                             

00:36 Drug: Macrobid 100 mg Route: PO;                                                        rr5 

01:15 Follow up: Response: No adverse reaction                                                rr5 

                                                                                                  

                                                                                                  

Disposition:                                                                                      

20 00:56 Discharged to Home. Impression: Diarrhea, unspecified, Urinary tract infection,    

  site not specified.                                                                             

- Condition is Stable.                                                                            

- Discharge Instructions: Diarrhea, Adult, Urinary Tract Infection, Adult.                        

- Prescriptions for Flagyl 500 mg Oral Tablet - take 1 tablet by ORAL route every 12              

  hours for 7 days; 14 tablet. Macrobid 100 mg Oral Capsule - take 1 capsule by ORAL              

  route every 12 hours for 7 days; 14 capsule.                                                    

- Medication Reconciliation Form, Thank You Letter, Antibiotic Education, Prescription            

  Opioid Use form.                                                                                

- Follow up: Private Physician; When: 1 - 2 days; Reason: Worsening of condition,                 

  Recheck today's complaints, Continuance of care, Re-evaluation by your physician.               

- Problem is new.                                                                                 

- Symptoms have improved.                                                                         

                                                                                                  

                                                                                                  

                                                                                                  

Signatures:                                                                                       

Dispatcher [employer]                           EDJimmy Ashford, RN                         RN   Dahiana Clifton, FNP-C                   FNP-Csnw                                                  

Sammy Mullen RN                      RN   rr5                                                  

Gerardo Richardson MD MD   7                                                  

                                                                                                  

Corrections: (The following items were deleted from the chart)                                    

                                                                                             

22:47 21:40 Abdomen W/ Con+CT.RAD.BRZ ordered. [facility]

                                                                                             

01:15 00:56 2020 00:56 Discharged to Home. Impression: Diarrhea, unspecified; Urinary   rr5 

      tract infection, site not specified. Condition is Stable. Forms are Medication              

      Reconciliation Form, Thank You Letter, Antibiotic Education, Prescription Opioid Use.       

      Follow up: Private Physician; When: 1 - 2 days; Reason: Worsening of condition, Recheck     

      today's complaints, Continuance of care, Re-evaluation by your physician. Problem is        

      new. Symptoms have improved. mh7                                                            

                                                                                                  

**************************************************************************************************

## 2020-11-29 NOTE — ER
Nurse's Notes                                                                                     

 [facility]                                                                 

Name: Leona Peres                                                                              

Age: 80 yrs                                                                                       

Sex: Female                                                                                       

: 1940                                                                                   

MRN: [id number]                                                                                   

Arrival Date: 2020                                                                          

Time: 19:52                                                                                       

Account#: [id number]                                                                            

Bed 4                                                                                             

Private MD:                                                                                       

Diagnosis: Diarrhea, unspecified;Urinary tract infection, site not specified                      

                                                                                                  

Presentation:                                                                                     

                                                                                             

19:57 Acuity: ANUSHKA 3                                                                           sg  

19:57 Chief complaint: Patient states: pt reports having diarrhea that began about 1500       sg  

      today, reports having taken Immodium PTA, states no BM in the last hour, pt complaining     

      of feeling weak and having not been able to stand and use her walker. Coronavirus           

      screen: Client denies travel out of the [place] in the last 14 days. At this time, the         

      client does not indicate any symptoms associated with coronavirus-19. Ebola Screen:         

      Patient negative for fever greater than or equal to 101.5 degrees Fahrenheit, and           

      additional compatible Ebola Virus Disease symptoms Patient denies exposure to               

      infectious person. Patient denies travel to an Ebola-affected area in the 21 days           

      before illness onset. No symptoms or risks identified at this time. Initial Sepsis          

      Screen: Does the patient meet any 2 criteria? No. Patient's initial sepsis screen is        

      negative. Does the patient have a suspected source of infection? No. Patient's initial      

      sepsis screen is negative. Risk Assessment: Do you want to hurt yourself or someone         

      else? Patient reports no desire to harm self or others. Onset of symptoms was 2020. Care prior to arrival: None. Activity prior to arrival: None. Mechanism of        

      Injury: No Mechanism of Injury. Transition of care: patient was not received from           

      another setting of care.                                                                    

19:57 Method Of Arrival: Ambulatory                                                           sg  

                                                                                                  

Historical:                                                                                       

- Allergies:                                                                                      

20:09 clindamycin HCl (bulk);                                                                 sg  

20:09 Darvocet-N 100;                                                                         sg  

20:09 PENICILLINS;                                                                            sg  

20:09 Sulfa (Sulfonamide Antibiotics);                                                        sg  

- PMHx:                                                                                           

20:09 Cancer; Depression; Fibromyalgia; GERD; insomnia; lymphedema; skipped heart beats;      sg  

- PSHx:                                                                                           

20:09 Appendectomy; Hysterectomy; Mastectomy, Left; knee replacements; lumbar fusion;         sg  

      cervical fusion;                                                                            

                                                                                                  

- Immunization history:: Adult Immunizations up to date.                                          

- Social history:: Smoking status: Patient denies any tobacco usage or history of.                

                                                                                                  

                                                                                                  

Screenin:02 Abuse screen: Denies threats or abuse. Denies injuries from another. Nutritional        rr5 

      screening: No deficits noted. Tuberculosis screening: No symptoms or risk factors           

      identified. Fall Risk IV access (20 points). Gait- Weak (10 pts.). Mental Status-           

      Oriented to own ability (0 pts). Total Martinez Fall Scale indicates Low Risk Score (25-44     

      pts). Fall prevention measures have been instituted. Side Rails Up X 2 Placed close to      

      Nursing Station Frequent Obs/Assesments occuring Family Present and informed to notify      

      staff if they need to leave bedside As available Patient and Family Educated on Fall        

      Prevention Program and strategies.                                                          

                                                                                                  

Assessment:                                                                                       

20:00 General: Appears in no apparent distress. comfortable, Behavior is calm, cooperative,   rr5 

      appropriate for age, Reports feeling ill for fatigue for. Pain: Denies pain. Neuro:         

      Level of Consciousness is awake, alert, obeys commands, Oriented to person, place,          

      time. Cardiovascular: Capillary refill < 3 seconds Patient's skin is warm and dry.          

      Respiratory: Airway is patent Respiratory effort is even, unlabored, Respiratory            

      pattern is regular, symmetrical. GI: Abdomen is round non-distended, Reports diarrhea.      

      : No signs and/or symptoms were reported regarding the genitourinary system. EENT: No     

      signs and/or symptoms were reported regarding the EENT system. Derm: Skin is intact, is     

      healthy with good turgor, Skin temperature is warm. Musculoskeletal: Capillary refill <     

      3 seconds.                                                                                  

21:00 Reassessment: Patient appears in no apparent distress at this time. Patient is alert,   rr5 

      oriented x 3, equal unlabored respirations, skin warm/dry/pink. awaiting for results.       

22:00 Reassessment: Patient appears in no apparent distress at this time. Patient is alert,   rr5 

      oriented x 3, equal unlabored respirations, skin warm/dry/pink. no complaints made.         

23:00 Reassessment: Patient appears in no apparent distress at this time. Patient is alert,   rr5 

      oriented x 3, equal unlabored respirations, skin warm/dry/pink. on CT scan.                 

                                                                                             

00:00 Reassessment: Patient appears in no apparent distress at this time. Patient is alert,   rr5 

      oriented x 3, equal unlabored respirations, skin warm/dry/pink. awaiting for CT result.     

01:15 Reassessment: Patient appears in no apparent distress at this time. Patient is alert,   rr5 

      oriented x 3, equal unlabored respirations, skin warm/dry/pink. PO challenge done no        

      nausea or vomiting noted, discharge instruction given and explained without complaints      

      made Patient states feeling better. Patient states symptoms have improved.                  

                                                                                                  

Vital Signs:                                                                                      

                                                                                             

20:00  / 63; Pulse 61; Resp 16; Temp 98.4; Pulse Ox 99% ;                               rr5 

21:00  / 70; Pulse 59; Resp 17; Pulse Ox 98% ;                                          rr5 

22:00  / 62; Pulse 60; Resp 16; Pulse Ox 98% ;                                          rr5 

22:40  / 83; Pulse 62; Resp 16; Pulse Ox 99% ;                                          rr5 

                                                                                             

00:00  / 89; Pulse 64; Resp 17; Pulse Ox 98% ;                                          rr5 

01:00  / 62; Pulse 69; Resp 17; Pulse Ox 99% ;                                          rr5 

                                                                                                  

ED Course:                                                                                        

                                                                                             

19:52 Patient arrived in ED.                                                                  bp1 

19:57 Triage completed.                                                                       sg  

20:04 Sammy Mullen, RN is Primary Nurse.                                                    rr5 

20:08 Arm band placed on.                                                                     sg  

20:11 Geradro Richardson MD is Attending Physician.                                             mh7 

20:13 Sammy Mullen, RN is Primary Nurse.                                                    rr5 

20:20 Patient has correct armband on for positive identification. Placed in gown. Bed in low  rr5 

      position. Call light in reach. Side rails up X2. Cardiac monitor on. Pulse ox on. NIBP      

      on.                                                                                         

21:00 Inserted saline lock: 20 gauge in right hand, using aseptic technique. Blood collected. rr5 

22:20 Inserted saline lock: 22 gauge in right ,using aseptic technique. shoulder.             rr5 

22:22 EKG done, by ED staff, reviewed by Gerardo Richardson MD.                                   rr5 

23:08 Abdomen In Process Unspecified.                                                         EDMS

                                                                                             

01:10 No provider procedures requiring assistance completed. IV discontinued, intact,         rr5 

      bleeding controlled, No redness/swelling at site. Pressure dressing applied.                

                                                                                                  

Administered Medications:                                                                         

                                                                                             

21:01 Drug: NS 0.9% 1000 ml Route: IV; Rate: 1000 ml; Site: right hand;                       rr5 

22:00 Follow up: Response: No adverse reaction; IV Status: Completed infusion; IV Intake:     rr5 

      1000ml                                                                                      

22:22 Drug: Potassium Effervescent Tablet 25 mEq Route: PO;                                   rr5 

23:20 Follow up: Response: No adverse reaction                                                rr5 

                                                                                             

00:36 Drug: Macrobid 100 mg Route: PO;                                                        rr5 

01:15 Follow up: Response: No adverse reaction                                                rr5 

                                                                                                  

                                                                                                  

Intake:                                                                                           

                                                                                             

22:00 IV: 1000ml; Total: 1000ml.                                                              rr5 

                                                                                                  

Outcome:                                                                                          

                                                                                             

00:56 Discharge ordered by MD.                                                                mh7 

01:10 Discharged to home via wheelchair, with family.                                         rr5 

01:10 Condition: stable                                                                           

01:10 Discharge instructions given to patient, family, Instructed on discharge instructions,      

      follow up and referral plans. medication usage, Demonstrated understanding of               

      instructions, follow-up care, medications, Prescriptions given X 2.                         

01:15 Patient left the ED.                                                                    rr5 

                                                                                                  

Signatures:                                                                                       

Dispatcher [employer]                           EDMS                                                 

Jimmy Sims RN RN                                                      

Sammy Mullen RN RN   rr5                                                  

Tesha Turcios Maurice, MD MD   7                                                  

                                                                                                  

**************************************************************************************************

## 2020-11-30 NOTE — RAD REPORT
EXAM DESCRIPTION:  CT - Abdomen   Pelvis W Contrast - 11/29/2020 4:02 am

 

CLINICAL HISTORY:  Diarrhea

 

COMPARISON:  8/28/2019

 

TECHNIQUE:  CT of the abdomen and pelvis performed following IV administration of   iodinated contras
t.

 

FINDINGS:  Lung Bases: Stable solid 120 cm pulmonary nodule in the left lower lobe.

Bones: Generalized endplate spondylosis and disc height narrowing at this time. Mild superior endplat
e deformity of T11 is stable.

Abdomen:

Liver: The liver has normal size and density. No intrahepatic biliary dilatation.

Gallbladder: No calcified gallstones.

Spleen, Pancreas, and Adrenal Glands:   Stable cystic lesion in the spleen. Replacement of pancreas. 
Adrenal glands are unremarkable.

Kidneys:   No hydronephrosis or obstructing calculus.   Small right hepatic cyst. Punctate nonobstruc
ting right nephrolithiasis.

Vasculature: Aortoiliac atherosclerosis. IVC is unremarkable.   The portal vein is patent. The proxim
al visceral and renal arteries are patent.

Stomach:   Small hiatal hernia.

Other:   No free intraperitoneal air.   No free fluid or lymphadenopathy.

Pelvis:

Bladder:   Urinary bladder is unremarkable.

Bowel:   No dilated loops of large or small bowel.

Appendix:   Not identified.

Pelvis: Prior hysterectomy.

 

IMPRESSION:  1. No acute inflammatory or obstructive process identified.

2. Punctate nonobstructing right nephrolithiasis.

This exam was performed according to our departmental dose-optimization program, which includes autom
ated exposure control, adjustment of the mA and/or kV according to patient size and/or use of iterati
ve reconstruction technique.

 

Electronically signed by:   Jhonny Schmidt   11/28/2020 11:28 PM CST Workstation: [id number]

 

 

 

Due to temporary technical issues with the PACS/Fluency reporting system, reports are being signed by
 the in house radiologist without review as a courtesy to ensure prompt reporting. The interpreting r
adiologist is fully responsible for the content of the report.

## 2021-01-26 ENCOUNTER — HOSPITAL ENCOUNTER (EMERGENCY)
Dept: HOSPITAL 97 - ER | Age: 81
Discharge: HOME | End: 2021-01-26
Payer: COMMERCIAL

## 2021-01-26 VITALS — SYSTOLIC BLOOD PRESSURE: 150 MMHG | DIASTOLIC BLOOD PRESSURE: 75 MMHG

## 2021-01-26 VITALS — OXYGEN SATURATION: 98 % | TEMPERATURE: 97.4 F
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
## 2021-01-26 DIAGNOSIS — S80.01XA: Primary | ICD-10-CM

## 2021-01-26 DIAGNOSIS — Z88.3: ICD-10-CM
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
## 2021-01-26 DIAGNOSIS — Z88.5: ICD-10-CM

## 2021-01-26 DIAGNOSIS — Z88.0: ICD-10-CM

## 2021-01-26 DIAGNOSIS — Z88.2: ICD-10-CM

## 2021-01-26 DIAGNOSIS — Z85.9: ICD-10-CM

## 2021-01-26 PROCEDURE — 72125 CT NECK SPINE W/O DYE: CPT

## 2021-01-26 PROCEDURE — 70450 CT HEAD/BRAIN W/O DYE: CPT

## 2021-01-26 PROCEDURE — 99284 EMERGENCY DEPT VISIT MOD MDM: CPT

## 2021-01-26 NOTE — XMS REPORT
Clinical Summary

                           Created on:2021



Patient:Leona Peres

Sex:Female

:1940

External Reference #:[id number]





Demographics







                          [place] [address]

 

                          Home Phone                [phone]

 

                          Mobile Phone              [phone]

 

                          Email Address             [email or web address]

 

                          Preferred Language        English

 

                          Marital Status            

 

                          Shinto Affiliation     Unknown

 

                          Race                      White

 

                          Ethnic Group              Not  or 









Author







                          Organization              [facility]

 

                          Address                   [address] [place] [address]









Support







                Name            Relationship    Address         Phone

 

                Lydia Valentin    Child           Unavailable     [phone]

 

                Maida Valentin Unavailable     Unavailable     [phone]









Care Team Providers







                    Name                Role                Phone

 

                    Kendall Barger MD    Primary Care Provider [phone]









Allergies







             Active Allergy Reactions    Severity     Noted Date   Comments

 

             Clindamycin Phosphate Unknown Reaction              2020   

 

             Propoxyphene N-Acetaminophen Unknown Reaction              20

20   

 

             Penicillins  Unknown Reaction              2020   

 

             Sulfa (Sulfonamide Antibiotics) Unknown Reaction                 

 

             Acetaminophen Unknown Reaction              2020   







Medications

Not on file



Active Problems

Not on file



Encounters







             Date         Type         Specialty    Care Team    Description

 

             2020   Hospital Encounter Radiology    Ellis Mcadams MD 

 

             2020   Office Visit Neurosurgery Ellis Mcadams MD Compression

 fracture of



                                                                 T11 vertebra, i

nitial



                                                                 encounter (HCC)

 (Primary



                                                                 Dx)

 

             2020   Travel                                 

 

             2020   Travel                                 



after 2020



Medical History







                    Medical History     Date                Comments

 

                    Hypertension                            

 

                    Cancer (HCC)                            

 

                    Headache                                







Family History







                Medical History Relation        Name            Comments

 

                Diabetes        Father                          

 

                Heart disease   Father                          

 

                Hypertension    Father                          

 

                Asthma          Mother                          









                Relation        Name            Status          Comments

 

                Father                                          

 

                Mother                                          







Social History







             Tobacco Use  Types        Packs/Day    Years Used   Date

 

             Former Smoker              0.5          15           Quit: 









                Smokeless Tobacco: Never Used                                 









                Alcohol Use     Drinks/Week     oz/Week         Comments

 

                Never                                           









                    Alcohol Habits      Answer              Date Recorded

 

                    How often do you have a drink containing alcohol? Never     

          2020

 

                    How many drinks containing alcohol do you have on a typical 

Not asked           



                    day when you are drinking?                     

 

                    How often do you have six or more drinks on one occasion? No

t asked           









                          Sex Assigned at Birth     Date Recorded

 

                          Not on file               







Last Filed Vital Signs







                Vital Sign      Reading         Time Taken      Comments

 

                Blood Pressure  -               -               

 

                Pulse           -               -               

 

                Temperature     -               -               

 

                Respiratory Rate -               -               

 

                Oxygen Saturation -               -               

 

                Inhaled Oxygen Concentration -               -               

 

                Weight          95.3 kg (210 lb) 2020 11:34 AM CDT 

 

                Height          160 cm (5' 3")  2020 11:34 AM CDT 

 

                Body Mass Index 37.2            2020 11:34 AM CDT 







Plan of Treatment







                Health Maintenance Due Date        Last Done       Comments

 

                COVID-19 VACCINE (1 of 2) 10/07/1957                      

 

                SHINGLES VACCINES (#1) 10/07/1991                      

 

                65+ PNEUMOCOCCAL VACCINE (1 of 1 - PPSV23) 10/07/2006           

           

 

                INFLUENZA VACCINE 2020                      







Procedures







             Procedure Name Priority     Date/Time    Associated Diagnosis Comme

nts

 

             CT ABD/PELVIC Routine      2020 10:37 AM              Results

 for this



             EXTERNAL STUDY              CDT                       procedure are

 in



                                                                 the results



                                                                 section.



after 2020



Results

CT Abd/Pelvic External Study (2020 10:37 AM CDT)





                                        Specimen

 

                                        









                          Narrative                 Performed At

 

                                        This exam was not acquired at a [facility]

t facility and has not been 



                                         [facility]



                                        interpreted by a [facility] Provider. T

he exam was imported into our 



                                        



                          imaging system.           









                Performing Organization Address         City/State/ZIP Code Phon

e Number

 

                [facility]      [address] [place] [address] 



after 2020



Insurance







          Payer     Benefit Plan / Group Subscriber ID Effective Dates Phone    

 Address   Type

 

          AETNA MEDICARE AETNA MEDICARE [id number]  2014-Present              

       HMO



                    HMO/PPO [facility]                                         









           Guarantor Name Account Type Relation to Date of Birth Phone      Bill

ing



                                 Patient                          Address

 

           Leona Peres Personal/Family Self       1940 [phone]

 [facility]







                                                       (Home)     [place] [address]







Advance Directives

For more information, please contact: [phone]





                Type            Date Recorded   Patient Representative Explanati

on

 

                Advance Directives, Living Will and                             

    



                Medical Power of

## 2021-01-26 NOTE — ER
Nurse's Notes                                                                                     

 [facility]                                                                 

Name: Leona Peres                                                                              

Age: 80 yrs                                                                                       

Sex: Female                                                                                       

: 1940                                                                                   

MRN: [id number]                                                                                   

Arrival Date: 2021                                                                          

Time: 15:22                                                                                       

Account#: [id number]                                                                            

Bed 2                                                                                             

Private MD:                                                                                       

Diagnosis: Contusion of right knee-from fall;Fall on same level from slipping, tripping and       

  stumbling                                                                                       

                                                                                                  

Presentation:                                                                                     

                                                                                             

15:58 Chief complaint: Patient states: "I was letting my dog out of the kennel and I lost my  ss  

      balance and fell." Pt c/o R knee pain and hematoma to R temple area. Denies LOC.            

      Coronavirus screen: Client denies travel out of the [place] in the last 14 days. Ebola         

      Screen: Patient denies exposure to infectious person. Patient denies travel to an           

      Ebola-affected area in the 21 days before illness onset. Initial Sepsis Screen: Does        

      the patient meet any 2 criteria? No. Patient's initial sepsis screen is negative. Does      

      the patient have a suspected source of infection? No. Patient's initial sepsis screen       

      is negative. Risk Assessment: Do you want to hurt yourself or someone else? Patient         

      reports no desire to harm self or others. Note Injury occurred 1 hour ago. Onset of         

      symptoms was 2021.                                                              

15:58 Method Of Arrival: Wheelchair                                                           ss  

15:58 Acuity: ANUSHKA 3                                                                           ss  

                                                                                                  

Historical:                                                                                       

- Allergies:                                                                                      

16:03 clindamycin HCl (bulk);                                                                 ss  

16:03 Darvocet-N 100;                                                                         ss  

16:03 PENICILLINS;                                                                            ss  

16:03 Sulfa (Sulfonamide Antibiotics);                                                        ss  

- PMHx:                                                                                           

16:03 Cancer; Fibromyalgia; Depression; GERD; insomnia; lymphedema; skipped heart beats;      ss  

- PSHx:                                                                                           

16:03 Appendectomy; Hysterectomy; Mastectomy, Left; knee replacements; lumbar fusion;         ss  

      cervical fusion;                                                                            

                                                                                                  

- Immunization history:: Adult Immunizations up to date.                                          

- Social history:: Smoking status: Patient denies any tobacco usage or history of.                

                                                                                                  

                                                                                                  

Screenin:54 Abuse screen: Denies threats or abuse. Nutritional screening: No deficits noted.        tw2 

      Tuberculosis screening: No symptoms or risk factors identified. Fall Risk Secondary         

      diagnosis (15 points) impaired mobility.                                                    

                                                                                                  

Assessment:                                                                                       

17:22 General: Appears in no apparent distress. uncomfortable, obese, well groomed, Behavior  tw2 

      is calm, cooperative, appropriate for age. Pain: Complains of pain in right leg. Neuro:     

      Level of Consciousness is awake, alert, obeys commands, Oriented to person, place,          

      situation. Cardiovascular: Patient's skin is warm and dry. Respiratory: Airway is           

      patent Respiratory effort is even, unlabored, Respiratory pattern is regular,               

      symmetrical. GI: No signs and/or symptoms were reported involving the gastrointestinal      

      system. Musculoskeletal: Circulation, motion, and sensation intact. Range of motion:        

      intact in all extremities.                                                                  

17:58 Reassessment: Patient appears in no apparent distress at this time. No changes from     tw2 

      previously documented assessment. Patient and/or family updated on plan of care and         

      expected duration. Pain level reassessed. Patient is alert, oriented x 3, equal             

      unlabored respirations, skin warm/dry/pink.                                                 

18:09 Reassessment: Patient appears in no apparent distress at this time. No changes from     tw2 

      previously documented assessment. Patient and/or family updated on plan of care and         

      expected duration. Pain level reassessed. Patient is alert, oriented x 3, equal             

      unlabored respirations, skin warm/dry/pink.                                                 

                                                                                                  

Vital Signs:                                                                                      

15:58  / 74; Pulse 80; Resp 16; Temp 97.4(TE); Pulse Ox 98% on R/A; Weight 92.08 kg;    ss  

      Height 5 ft. 3 in. (160.02 cm); Pain 8/10;                                                  

17:54  / 75; Pulse 65; Resp 17; Pulse Ox 98% on R/A;                                    tw2 

15:58 Body Mass Index 35.96 (92.08 kg, 160.02 cm)                                               

                                                                                                  

ED Course:                                                                                        

15:22 Patient arrived in ED.                                                                  ag5 

16:02 Triage completed.                                                                       ss  

16:03 Arm band placed on right wrist.                                                         ss  

16:33 XRAY Knee RIGHT 3 view In Process Unspecified.                                          EDMS

16:39 CT Head C Spine In Process Unspecified.                                                 EDMS

17:22 Og Cottre MD is Attending Physician.                                             [facility] 

17:22 Bed in low position. Call light in reach. Side rails up X2. Pulse ox on. NIBP on.       tw2 

17:23 Og Abreu PA is PHCP.                                                                cp  

17:23 Brittani Connolly, RN is Primary Nurse.                                                  sv  

17:58 No provider procedures requiring assistance completed. Patient did not have IV access   tw2 

      during this emergency room visit.                                                           

                                                                                                  

Administered Medications:                                                                         

17:39 Drug: Ibuprofen 800 mg Route: PO;                                                       tw2 

18:09 Follow up: Response: No adverse reaction                                                tw2 

17:39 Drug: traMADol 50 mg {Note: rass 0.} Route: PO;                                         tw2 

18:09 Follow up: Response: No adverse reaction                                                tw2 

                                                                                                  

                                                                                                  

Outcome:                                                                                          

17:39 Discharge ordered by MD.                                                                sebastien  

18:08 Discharged to home via wheelchair.                                                      tw2 

18:08 Condition: stable                                                                           

18:08 Discharge instructions given to patient, Instructed on discharge instructions, follow       

      up and referral plans. no drinking with medication, no driving heavy equipment,             

      medication usage, safety practices, CMS checks after ace wrapping Demonstrated              

      understanding of instructions, follow-up care, medications, CMS checks after ace            

      wrapping Prescriptions given X 2.                                                           

18:09 Patient left the ED.                                                                    tw2 

                                                                                                  

Signatures:                                                                                       

Dispatcher [employer]                           Brittani Rosen, Og Armstrong RN, MD MD cha Smirch, Shelby, RN RN ss Page, Corey, PA                         PA   Jennifer Moreno RN                          RN   tw2                                                  

Ashtyn Mas                                ag5                                                  

                                                                                                  

**************************************************************************************************

## 2021-01-26 NOTE — XMS REPORT
Continuity of Care Document

                           Created on:2021



Patient:NEGRO GONZALES

Sex:Female

:1940

External Reference #:[id number]





Demographics







                          Address                   [address] [place] [address]

 

                          Home Phone                [phone] [employer]

 

                          Mobile Phone              [phone]

 

                          Email Address             [email or web address]

 

                          Preferred Language        English

 

                          Marital Status            Unknown

 

                          Jewish Affiliation     Unknown

 

                          Race                      Unknown

 

                          Additional Race(s)        Unavailable



                                                    White

 

                          Ethnic Group              Unknown









Author







                          Organization              [facility]

t

 

                          Address                   [address]



                                                    [place] [address]

 

                          Phone                     [phone]









Support







                Name            Relationship    Address         Phone

 

                Homero          Child           Unavailable     [phone]

 

                Homero          Other           Unavailable     [phone]









Care Team Providers







                    Name                Role                Phone

 

                    Denita RIZVI            Primary Care Physician [phone]

 

                    Abbe RIZVI           Attending Clinician [phone]









Payers







           Payer Name Policy Type Policy     Effective Date Expiration Date Sour

ce



                                 Number                           

 

           AETNA MEDICAREAETNA            [id number]   2014            [place]

on



           MEDICARE HMO/PPO                       00:00:00              [facility]

t



           [id number]                                             



           -PresentHMO                                             







Problems

This patient has no known problems.



Allergies, Adverse Reactions, Alerts







       Allergy Allergy Status Severity Reaction(s) Onset  Inactive Treating Comm

ents 

Source



       Name   Type                        Date   Date   Clinician        

 

       Clindamy Propensi Active        Unknown 2020-0                      [facility]

on



       jayna    ty to                Reaction 6-30                        [facility]



       Phosphat adverse                      00:00:                      st



       e      reaction                      00                          



              s to                                                    



              drug                                                    

 

       Propoxyp Propensi Active        Unknown 2020-0                      [facility]

on



       hene   ty to                Reaction 6-30                        [facility]



       N-Acetam adverse                      00:00:                      st



       inophen reaction                      00                          



              s to                                                    



              drug                                                    

 

       Penicill Propensi Active        Unknown 2020-0                      [facility]

on



       ins    ty to                Reaction 6-30                        [facility]



              adverse                      00:00:                      st



              reaction                      00                          



              s to                                                    



              drug                                                    

 

       Sulfa  Propensi Active        Unknown 2020-0                      [facility]



       (Sulfona ty to                Reaction 6-30                        [facility]

i



       mide   adverse                      00:00:                      st



       Antibiot reaction                      00                          



       ics)   s to                                                    



              drug                                                    

 

       Acetamin Propensi Active        Unknown 2020-0                      [facility]

on



       ophen  ty to                Reaction 6-30                        [facility]



              adverse                      00:00:                      st



              reaction                      00                          



              s to                                                    



              drug                                                    







Family History







           Family Member Diagnosis  Comments   Start Date Stop Date  Source

 

           Natural father Diabetes                                    [facility]

[facility]

 

           Natural father Heart disease                                  [place]

 [facility]

 

           Natural father Hypertension                                  [place] 

[facility]

 

           Natural mother Asthma                                      [facility]

[facility]od[facility]







Social History







           Social Habit Start Date Stop Date  Quantity   Comments   Source

 

           History of tobacco                       Current smoker            [facility]

[place] [facility]



           use                                                    

 

           [facility]

[facility]



           Alcohol Std Drinks                                             

 

           [facility]

[facility]



           Alcohol Binge                                             

 

           Sex Assigned At                                             [facility]

ethodist



           Birth                                                  

 

           Cigarettes smoked 2020                       [place]

 [facility]



           current (pack per 00:00:00   00:00:00                         



           day) - Reported                                             

 

           Cigarette  2020                       [place] [facility]

ist



           pack-years 00:00:00   00:00:00                         

 

           Tobacco use and 2020 Never used            [facility]

ethodist



           exposure   00:00:00   00:00:00                         

 

           Alcohol intake 2020 Lifetime              [facility]

thodist



                      00:00:00   00:00:00   non-drinker            



                                            (finding)             

 

           History SDOH 2020 1                     [place] [facility]

odist



           Alcohol Frequency 00:00:00   00:00:00                         









                Smoking Status  Start Date      Stop Date       Source

 

                Former smoker   2020 00:00:00 2020 00:00:00 [place] 

[facility]







Medications

This patient has no known medications.



Vital Signs







             Vital Name   Observation Time Observation Value Comments     Source

 

             Body height  2020 11:34:00 160 cm                    [place] 

[facility]

 

             Body weight  2020 11:34:00 95.255 kg                 [place] 

[facility]

 

             BMI          2020 11:34:00 37.20 kg/m2               [place] 

[facility]







Procedures







                Procedure       Date / Time Performed Performing Clinician [facility]

e

 

                CT ABD/PELVIC EXTERNAL 2020 10:37:00 Ellis Mcadams [facility]



                STUDY                                           







Plan of Care







             Planned Activity Planned Date Details      Comments     Source

 

             Future Scheduled 2020   INFLUENZA VACCINE              [facility]

[facility] [facility]



             Test         00:00:00     [code = INFLUENZA              



                                       VACCINE]                  

 

             Future Scheduled 2006-10-07   65+ PNEUMOCOCCAL              [place]

 [facility]



             Test         00:00:00     VACCINE (1 of 1 -              



                                       PPSV23) [code = 65+              



                                       PNEUMOCOCCAL VACCINE              



                                       (1 of 1 - PPSV23)]              

 

             Future Scheduled 1991-10-07   SHINGLES VACCINES (#1)              [facility]

[facility]



             Test         00:00:00     [code = SHINGLES              



                                       VACCINES (#1)]              

 

             Future Scheduled 1957-10-07   COVID-19 VACCINE (1 of              [place]

[place]



             Test         00:00:00     2) [code = COVID-19              



                                       VACCINE (1 of 2)]              







Encounters







        Start   End     Encounter Admission Attending Care    Care    Encounter 

Source



        Date/Time Date/Time Type    Type    Clinicians Facility Department ID   

   

 

        2020 Outpatient         [facility]     [id number]

[address] [place]



        00:00:00 00:00:00                 ELLIS                    [id number]     [facility]

i



                                                                        st

 

        2020 Outpatient         [facility]     [id number]

[address] [place]



        00:00:00 00:00:00                 ELLIS                    [id number]     [facility]

i



                                                                        st







Results







           Test Description Test Time  Test Comments Results    Result     [facility]

e



                                                       Comments   

 

           CT Abd/Pelvic 2020            This exam was not            [facility]

[place]



           External Study 12:24:47              acquired at a            [facility]             



                                            facility and has            



                                            not been              



                                            interpreted by a            



                                            [facility]             



                                            Provider.  The            



                                            exam was imported            



                                            into our imaging            



                                            system.

## 2021-01-26 NOTE — EDPHYS
Physician Documentation                                                                           

 [facility]                                                                 

Name: Leona Peres                                                                              

Age: 80 yrs                                                                                       

Sex: Female                                                                                       

: 1940                                                                                   

MRN: [id number]                                                                                   

Arrival Date: 2021                                                                          

Time: 15:22                                                                                       

Account#: [id number]                                                                            

Bed 2                                                                                             

Private MD:                                                                                       

ED Physician Og Cotter                                                                      

HPI:                                                                                              

                                                                                             

17:30 This 80 yrs old  Female presents to ER via Wheelchair with complaints of Fall  cp  

      Injury.                                                                                     

17:30 Details of fall: The patient fell from an upright position, while walking.              cp  

17:30 Onset: The symptoms/episode began/occurred today. Associated injuries: The patient      cp  

      sustained right knee, ecchymosis, painful injury. Severity of symptoms: in the              

      emergency department the symptoms are unchanged, despite home interventions. Patient        

      reports she did hit her head but no LOC.                                                    

                                                                                                  

Historical:                                                                                       

- Allergies:                                                                                      

16:03 clindamycin HCl (bulk);                                                                 ss  

16:03 Darvocet-N 100;                                                                         ss  

16:03 PENICILLINS;                                                                            ss  

16:03 Sulfa (Sulfonamide Antibiotics);                                                        ss  

- PMHx:                                                                                           

16:03 Cancer; Fibromyalgia; Depression; GERD; insomnia; lymphedema; skipped heart beats;      ss  

- PSHx:                                                                                           

16:03 Appendectomy; Hysterectomy; Mastectomy, Left; knee replacements; lumbar fusion;         ss  

      cervical fusion;                                                                            

                                                                                                  

- Immunization history:: Adult Immunizations up to date.                                          

- Social history:: Smoking status: Patient denies any tobacco usage or history of.                

                                                                                                  

                                                                                                  

ROS:                                                                                              

17:32 Constitutional: Negative for body aches, chills, fever, poor PO intake.                 cp  

17:32 Neck: Negative for pain with movement, pain at rest.                                        

17:32 Cardiovascular: Negative for chest pain.                                                    

17:32 Respiratory: Negative for cough, shortness of breath, wheezing.                             

17:32 Back: Negative for pain at rest, pain with movement.                                        

17:32 MS/extremity: Positive for contusion, ecchymosis, pain, swelling, tenderness, of the        

      right knee, Negative for decreased range of motion, deformity.                              

17:32 Neuro: Negative for altered mental status, headache, loss of consciousness, syncope,        

      weakness.                                                                                   

17:32 All other systems are negative.                                                             

                                                                                                  

Exam:                                                                                             

17:35 Head/Face:  Normocephalic, atraumatic.                                                  cp  

17:35 Constitutional: The patient appears in no acute distress, alert, awake, non-toxic, well     

      developed, well nourished, obese.                                                           

17:35 Eyes: Periorbital structures: appear normal, Conjunctiva: normal, no exudate, no            

      injection, Sclera: no appreciated abnormality, Lids and lashes: appear normal,              

      bilaterally.                                                                                

17:35 ENT: External ear(s): are unremarkable, Nose: is normal, Posterior pharynx: Airway: no      

      evidence of obstruction, patent.                                                            

17:35 Neck: C-spine: vertebral tenderness, is not appreciated, crepitus, is not appreciated,      

      ROM/movement: is normal, is supple, without pain, no range of motions limitations.          

17:35 Chest/axilla: Inspection: normal.                                                           

17:35 Cardiovascular: Rate: normal, Rhythm: regular.                                              

17:35 Respiratory: the patient does not display signs of respiratory distress,  Respirations:     

      normal, no use of accessory muscles, no retractions, labored breathing, is not present.     

17:35 Abdomen/GI: Inspection: abdomen appears normal, Palpation: abdomen is soft and              

      non-tender, in all quadrants.                                                               

17:35 Back: pain, is absent, ROM is normal.                                                       

17:35 Musculoskeletal/extremity: Extremities: grossly normal except: noted in the anterior        

      aspect right knee: ecchymosis, pain, swelling, tenderness, There is no evidence of          

      decreased ROM, deformity, Perfusion: the extremity is normally perfused throughout,         

      Sensation intact.                                                                           

17:35 Neuro: Orientation: to person, place \T\ time. Mentation: is normal, Motor: moves all       

      fours, strength is normal.                                                                  

                                                                                                  

Vital Signs:                                                                                      

15:58  / 74; Pulse 80; Resp 16; Temp 97.4(TE); Pulse Ox 98% on R/A; Weight 92.08 kg;    ss  

      Height 5 ft. 3 in. (160.02 cm); Pain 8/10;                                                  

17:54  / 75; Pulse 65; Resp 17; Pulse Ox 98% on R/A;                                    tw2 

15:58 Body Mass Index 35.96 (92.08 kg, 160.02 cm)                                             ss  

                                                                                                  

MDM:                                                                                              

17:22 Patient medically screened.                                                             ragini 

17:30 Differential diagnosis: closed head injury, contusion, fracture, multiple trauma.       cp  

17:39 Data reviewed: vital signs, nurses notes, radiologic studies, plain films.              cp  

17:39 Test interpretation: by ED physician or midlevel provider: plain radiologic studies.    cp  

      Counseling: I had a detailed discussion with the patient and/or guardian regarding: the     

      historical points, exam findings, and any diagnostic results supporting the                 

      discharge/admit diagnosis, radiology results, to return to the emergency department if      

      symptoms worsen or persist or if there are any questions or concerns that arise at          

      home. Response to treatment: the patient's symptoms have mildly improved after              

      treatment, and as a result, I will discharge patient. ED course: VSS. Radiology studies     

      negative for fracture. Will discharge to home for continued monitoring.                     

                                                                                                  

                                                                                             

16:04 Order name: CT Head C Spine                                                               

                                                                                             

16:04 Order name: XRAY Knee RIGHT 3 view                                                        

                                                                                             

17:31 Order name: Ace wrap-joint; Complete Time: 17:33                                        cp  

                                                                                                  

Administered Medications:                                                                         

17:39 Drug: Ibuprofen 800 mg Route: PO;                                                       tw2 

18:09 Follow up: Response: No adverse reaction                                                tw 

17:39 Drug: traMADol 50 mg {Note: rass 0.} Route: PO;                                         tw2 

18:09 Follow up: Response: No adverse reaction                                                tw2 

                                                                                                  

                                                                                                  

Disposition:                                                                                      

18:00 Chart complete.                                                                         cp  

                                                                                             

11:09 Co-signature as Attending Physician, Og Cotter MD I agree with the assessment and  ragini 

      plan of care.                                                                               

                                                                                                  

Disposition:                                                                                      

21 17:39 Discharged to Home. Impression: Contusion of right knee - from fall, Fall on       

  same level from slipping, tripping and stumbling.                                               

- Condition is Stable.                                                                            

- Discharge Instructions: Contusion, Knee Pain.                                                   

- Prescriptions for Mobic 7.5 mg Oral Tablet - take 1 tablet by ORAL route once daily             

  take with food; 20 tablet. Tramadol 50 mg Oral Tablet - take 1 tablet by ORAL route             

  every 8 hours as needed; 12 tablet.                                                             

- Medication Reconciliation Form, Thank You Letter, Antibiotic Education, Prescription            

  Opioid Use form.                                                                                

- Follow up: Private Physician; When: 2 - 3 days; Reason: Recheck today's complaints.             

- Problem is new.                                                                                 

- Symptoms have improved.                                                                         

                                                                                                  

                                                                                                  

                                                                                                  

Signatures:                                                                                       

Dispatcher [employer]                           Og Castellon MD MD cha Smirch, Shelby RN                      RN   Og Lester PA PA cp Wise, Tara RN                          RN   tw2                                                  

                                                                                                  

Corrections: (The following items were deleted from the chart)                                    

                                                                                             

18:09 17:39 2021 17:39 Discharged to Home. Impression: Contusion of right knee - from   tw2 

      fall; Fall on same level from slipping, tripping and stumbling. Condition is Stable.        

      Forms are Medication Reconciliation Form, Thank You Letter, Antibiotic Education,           

      Prescription Opioid Use. Follow up: Private Physician; When: 2 - 3 days; Reason:            

      Recheck today's complaints. Problem is new. Symptoms have improved. cp                      

                                                                                                  

**************************************************************************************************

## 2021-01-26 NOTE — RAD REPORT
EXAM DESCRIPTION:  CT - Head C Spine Mpr Wo Con - 1/26/2021 4:39 pm

 

CLINICAL HISTORY:  Head and neck injury status post fall. Head and neck pain

 

COMPARISON:  October 2020

 

TECHNIQUE:  Computed axial tomography of the head and cervical spine was obtained.

 

Sagittal and coronal reconstruction was performed.

 

All CT scans are performed using dose optimization technique as appropriate and may include automated
 exposure control or mA/KV adjustment according to patient size.

 

FINDINGS:  A right frontal scalp hematoma

 

An intracranial bleed is not seen. The ventricles are normal in caliber. An extra-axial fluid collect
ion is not noted.Fluid within the visualized sinuses and mastoids is not seen

 

A cervical fracture is not visualized. No dislocation is noted. Postsurgical changes involve the cerv
ical spine. Mild posterior subluxation C4 on C5 without significant change .Spondylosis

 

IMPRESSION:  No acute intracranial abnormality is seen.

 

A cervical fracture is not visualized.  If the patient continues to have symptoms to suggest intracra
nial /spinal cord pathology then MRI would be recommended

## 2021-01-26 NOTE — RAD REPORT
EXAM DESCRIPTION:  RAD - Knee Right 3 View - 1/26/2021 4:33 pm

 

CLINICAL HISTORY:  Right knee pain status post injury

 

FINDINGS:  No fracture or dislocation is seen.

 

Right knee prosthesis is in good position

## 2021-03-23 ENCOUNTER — HOSPITAL ENCOUNTER (OUTPATIENT)
Dept: HOSPITAL 97 - ER | Age: 81
Setting detail: OBSERVATION
LOS: 1 days | Discharge: HOME | End: 2021-03-24
Attending: HOSPITALIST | Admitting: HOSPITALIST
Payer: COMMERCIAL

## 2021-03-23 VITALS — BODY MASS INDEX: 36.6 KG/M2

## 2021-03-23 DIAGNOSIS — G47.00: ICD-10-CM

## 2021-03-23 DIAGNOSIS — R53.1: ICD-10-CM

## 2021-03-23 DIAGNOSIS — M79.7: ICD-10-CM

## 2021-03-23 DIAGNOSIS — Z88.0: ICD-10-CM

## 2021-03-23 DIAGNOSIS — Z85.3: ICD-10-CM

## 2021-03-23 DIAGNOSIS — F32.9: ICD-10-CM

## 2021-03-23 DIAGNOSIS — I49.3: ICD-10-CM

## 2021-03-23 DIAGNOSIS — R41.82: Primary | ICD-10-CM

## 2021-03-23 DIAGNOSIS — Z83.3: ICD-10-CM

## 2021-03-23 DIAGNOSIS — Z90.710: ICD-10-CM

## 2021-03-23 DIAGNOSIS — Z87.891: ICD-10-CM

## 2021-03-23 DIAGNOSIS — Z88.6: ICD-10-CM

## 2021-03-23 DIAGNOSIS — Z82.49: ICD-10-CM

## 2021-03-23 DIAGNOSIS — Z90.12: ICD-10-CM

## 2021-03-23 DIAGNOSIS — Z88.3: ICD-10-CM

## 2021-03-23 DIAGNOSIS — Z88.2: ICD-10-CM

## 2021-03-23 DIAGNOSIS — I89.0: ICD-10-CM

## 2021-03-23 DIAGNOSIS — E78.5: ICD-10-CM

## 2021-03-23 DIAGNOSIS — Z87.440: ICD-10-CM

## 2021-03-23 DIAGNOSIS — K21.9: ICD-10-CM

## 2021-03-23 DIAGNOSIS — Z96.653: ICD-10-CM

## 2021-03-23 DIAGNOSIS — Z20.822: ICD-10-CM

## 2021-03-23 DIAGNOSIS — H81.09: ICD-10-CM

## 2021-03-23 PROCEDURE — 87040 BLOOD CULTURE FOR BACTERIA: CPT

## 2021-03-23 PROCEDURE — 96361 HYDRATE IV INFUSION ADD-ON: CPT

## 2021-03-23 PROCEDURE — 83605 ASSAY OF LACTIC ACID: CPT

## 2021-03-23 PROCEDURE — 93005 ELECTROCARDIOGRAM TRACING: CPT

## 2021-03-23 PROCEDURE — 85610 PROTHROMBIN TIME: CPT

## 2021-03-23 PROCEDURE — 83690 ASSAY OF LIPASE: CPT

## 2021-03-23 PROCEDURE — 83880 ASSAY OF NATRIURETIC PEPTIDE: CPT

## 2021-03-23 PROCEDURE — 71045 X-RAY EXAM CHEST 1 VIEW: CPT

## 2021-03-23 PROCEDURE — 36415 COLL VENOUS BLD VENIPUNCTURE: CPT

## 2021-03-23 PROCEDURE — 96375 TX/PRO/DX INJ NEW DRUG ADDON: CPT

## 2021-03-23 PROCEDURE — 80076 HEPATIC FUNCTION PANEL: CPT

## 2021-03-23 PROCEDURE — 81003 URINALYSIS AUTO W/O SCOPE: CPT

## 2021-03-23 PROCEDURE — 70450 CT HEAD/BRAIN W/O DYE: CPT

## 2021-03-23 PROCEDURE — 85025 COMPLETE CBC W/AUTO DIFF WBC: CPT

## 2021-03-23 PROCEDURE — 80048 BASIC METABOLIC PNL TOTAL CA: CPT

## 2021-03-23 PROCEDURE — 84484 ASSAY OF TROPONIN QUANT: CPT

## 2021-03-23 PROCEDURE — 99285 EMERGENCY DEPT VISIT HI MDM: CPT

## 2021-03-23 PROCEDURE — 96374 THER/PROPH/DIAG INJ IV PUSH: CPT

## 2021-03-23 PROCEDURE — 83735 ASSAY OF MAGNESIUM: CPT

## 2021-03-23 NOTE — XMS REPORT
Continuity of Care Document

                            Created on:2021



Patient:NEGRO GONZALES

Sex:Female

:1940

External Reference #:[id number]





Demographics







                          Address                   [address] [place] [address]

 

                          Home Phone                [phone] [employer]

 

                          Email Address             [email or web address]

 

                          Preferred Language        Unknown

 

                          Marital Status            Unknown

 

                          Gnosticism Affiliation     Unknown

 

                          Race                      Unknown

 

                          Additional Race(s)        Unavailable

 

                          Ethnic Group              Unknown









Author







                          Organization              [facility]

t

 

                          Address                   [address] [place] [address] [address]. [address] [place] [id number]

 

                          Phone                     [phone]









Care Team Providers







                    Name                Role                Phone

 

                    BAEZ              Attending Clinician Unavailable









Problems

This patient has no known problems.



Allergies, Adverse Reactions, Alerts

This patient has no known allergies or adverse reactions.



Medications

This patient has no known medications.



Procedures

This patient has no known procedures.



Encounters







        Start   End     Encounter Admission Attending Care    Care    Encounter 

Source



        Date/Time Date/Time Type    Type    Clinicians Facility Department ID   

   

 

        2021 Outpatient         [facility]     [id number]

[address]



        00:00:00 00:00:00                 JOSE                    [id number]     [facility]

i



                                                                        st

 

        2020 Outpatient         [facility]     [id number]

[address]



        00:00:00 00:00:00                 JOSE                    [id number]     [facility]

i



                                                                        st

 

        2020 Outpatient         [facility]     [id number]

[address]



        00:00:00 00:00:00                 JOSE                    [id number]     [facility]

i



                                                                        st







Results

This patient has no known results.

## 2021-03-24 VITALS — TEMPERATURE: 97.1 F | SYSTOLIC BLOOD PRESSURE: 98 MMHG | DIASTOLIC BLOOD PRESSURE: 54 MMHG

## 2021-03-24 VITALS — OXYGEN SATURATION: 97 %

## 2021-03-24 LAB
ALBUMIN SERPL BCP-MCNC: 3 G/DL (ref 3.4–5)
ALP SERPL-CCNC: 77 U/L (ref 45–117)
ALT SERPL W P-5'-P-CCNC: 21 U/L (ref 12–78)
AST SERPL W P-5'-P-CCNC: 23 U/L (ref 15–37)
BUN BLD-MCNC: 18 MG/DL (ref 7–18)
GLUCOSE SERPLBLD-MCNC: 106 MG/DL (ref 74–106)
HCT VFR BLD CALC: 45.2 % (ref 36–45)
INR BLD: 0.9
LIPASE SERPL-CCNC: 115 U/L (ref 73–393)
LYMPHOCYTES # SPEC AUTO: 2.8 K/UL (ref 0.7–4.9)
MAGNESIUM SERPL-MCNC: 2.1 MG/DL (ref 1.8–2.4)
NT-PROBNP SERPL-MCNC: 144 PG/ML (ref ?–450)
PMV BLD: 8.4 FL (ref 7.6–11.3)
POTASSIUM SERPL-SCNC: 3.5 MMOL/L (ref 3.5–5.1)
RBC # BLD: 4.64 M/UL (ref 3.86–4.86)
TROPONIN (EMERG DEPT USE ONLY): < 0.02 NG/ML (ref 0–0.04)
UA DIPSTICK W REFLEX MICRO PNL UR: (no result)

## 2021-03-24 NOTE — EKG
Test Date:    2021-03-23               Test Time:    23:24:52

Technician:   PATRICIA                                    

                                                     

MEASUREMENT RESULTS:                                       

Intervals:                                           

Rate:         58                                     

OH:           166                                    

QRSD:         84                                     

QT:           440                                    

QTc:          431                                    

Axis:                                                

P:            77                                     

OH:           166                                    

QRS:          78                                     

T:            73                                     

                                                     

INTERPRETIVE STATEMENTS:                                       

                                                     

Sinus bradycardia

ST & T wave abnormality, consider anterior ischemia

Abnormal ECG

Compared to ECG 11/28/2020 22:09:48

Possible ischemia now present

ST (T wave) deviation still present



Electronically Signed On 03-24-21 07:02:17 CDT by Darrian Packer

## 2021-03-24 NOTE — RAD REPORT
EXAM DESCRIPTION:  CT - Ct Stroke Brain Wo Cont - 3/24/2021 7:12 am

 

******** ADDENDUM #1 ********

THIS REPORT CONTAINS FINDINGS THAT MAY BE CRITICAL TO PATIENT CARE:  The findings were verbally discu
ssed via telephone conference with Og Cotter MD on 3/23/2021 at 11:46 PM CDT.

Electronically signed by:   Milton Stanley MD   3/23/2021 11:46 PM CDT Workstation: [id number]

*** End of Addendum ***

 

EXAM DESCRIPTION:  Ct Stroke Brain Wo Cont

 

CLINICAL HISTORY:  80 years   Female   Mental status change; Declining state

 

TECHNIQUE:  Axial noncontrast CT head with coronal and sagittal reformats. All CT scans at this facil
ity use dose modulation, iterative reconstruction, and/or weight based dosing when appropriate to red
uce radiation dose to as low as reasonably achievable.

 

COMPARISON:  1/26/2021.

 

FINDINGS:  Brain: Parenchymal volume loss. Chronic small vessel disease. No obvious large acute lupillo
torial infarction. No intracranial hemorrhage, midline shift, mass or mass effect.

Ventricles: No hydrocephalus.

Orbits: Unremarkable.

Sinuses: Visualized portions are clear.

Mastoid: Clear.

Osseous: Unremarkable.

Soft tissues: Unremarkable.

 

IMPRESSION:  No acute CT findings.

 

Electronically signed by:   Milton Stanley MD   3/23/2021 11:43 PM CDT Workstation: [id number]

 

 

Due to temporary technical issues with the PACS/Fluency reporting system, reports are being signed by
 the in house radiologist without review as a courtesy to ensure prompt reporting. The interpreting r
adiologist is fully responsible for the content of the report.

## 2021-03-24 NOTE — P.HP
Certification for Inpatient


Patient admitted to: Observation


With expected LOS: <2 Midnights


Patient will require the following post-hospital care: None


Practitioner: I am a practitioner with admitting privileges, knowledge of 

patient current condition, hospital course, and medical plan of care.


Services: Services provided to patient in accordance with Admission requirements

found in Title 42 Section 412.3 of the Code of Federal Regulations





<Jaun Peres - Last Filed: 03/24/21 04:04>





Patient History


Date of Service: 03/24/21


Primary Care Provider: Dr. Mcwilliams


Reason for admission: AMS, weakness


History of Present Illness: 


Ms. Peres is a 81 yo female with HLD, Meniere's, lymphedema, GERD, fibromy

algia, depression, h/o breast cancer here today for AMS and weakness. Tonight at

her assisted living facility after receiving her regular medications, she 

started to feel 'alf awake, alf dreaming.' She said she felt foggy, 

couldn't move her hands, and her speech was slurred so she called for help. She 

reports weakness, but has no other complaints besides a few days of dry cough. 

She received narcan x3 at her facility with reported improvement. She now feels 

back to baseline, AOx4. She denies fever, night sweats, chills, chest pain, 

wheezing. Initial stroke scale of 1, now score of 0. CT head with no acute 

findings. 





- Past Medical/Surgical History


Diabetic: No


-: Lymphedema


-: heart arrhythmia w/ PVCs


-: L breast cancer


-: PE


-: Meniere's Disease


-: Former smoker ([id number])


-: Reccurent UTIs


-: Fibromyalgia


-: GERD


-: insomnia


-: Depression


-: s/p Left Mastectomy


-: SANTOSH TKA


-: Cervical fusion


-: Lumbar fusion


-: Appey


-: Hysterectomy





- Family History


  ** Father


-: Diabetes


Notes: heart attack.  alcoholic





- Social History


Smoking Status: Former smoker


Alcohol use: No


CD- Drugs: No


Caffeine use: No


Place of Residence: Nursing Home





<Jaun Peres - Last Filed: 03/24/21 04:04>


Date of Service: 03/24/21





<Guillaume Garibay - Last Filed: 03/30/21 04:41>


Allergies





clindamycin HCl [From Cleocin] Allergy (Severe, Verified 03/24/21 05:19)


   Hives/Rash


clindamycin palmitate HCl [From Cleocin] Allergy (Severe, Verified 03/24/21 

05:19)


   Hives/Rash


clindamycin phosphate [From Cleocin] Allergy (Severe, Verified 03/24/21 05:19)


   Hives/Rash


Penicillins Allergy (Severe, Verified 03/24/21 05:19)


   Hives/Rash


Sulfa (Sulfonamide Antibiotics) [Sulfa(Sulfonamide Antibiotics)] Allergy 

(Severe, Verified 03/24/21 05:19)


   Hives/Rash


acetaminophen [From Darvocet-N 100] Allergy (Verified 03/24/21 05:19)


   Unknown


propoxyphene [From Darvocet-N 100] Allergy (Verified 03/24/21 05:19)


   Unknown


strawberries Allergy (Severe, Uncoded 03/24/21 05:19)


   Hives/Rash





Home Medications: 








Indapamide [Lozol*] 2.5 mg PO DAILY 04/17/12 


Simvastatin 40 mg PO BEDTIME 04/17/12 


Duloxetine [Cymbalta *] 60 mg PO BID 09/24/18 


Memantine HCl 10 mg PO BID 09/24/18 


Potassium Oral Tab [Klor-Con 10 mEq Tab*] 20 meq PO BID 09/24/18 


Quetiapine [Seroquel*] 150 mg PO BEDTIME 09/24/18 


Metoprolol Succinate [Toprol Xl*] 25 mg PO BEDTIME 08/29/19 


Donepezil HCl 10 mg PO BEDTIME 02/05/20 


Gabapentin 300 mg PO TID 02/05/20 


Pantoprazole Sodium [Protonix] 1 tab PO DAILY 10/17/20 


Thyroid,Pork [Np Thyroid] 1 tab PO DAILY 10/17/20 


Topiramate [Topamax] 200 mg PO BID 10/17/20 


Zolpidem Tartrate [Ambien] 1 tab PO BEDTIME 10/17/20 


ALPRAZolam [Xanax*] 1 tab PO BEDTIME 03/24/21 


Cranberry Fruit Extract [Ellura] 1 cap PO DAILY 03/24/21 


Hydrocodone 7.5/APAP 325 [Norco 7.5/325 mg*] 1 tab PO TID PRN 03/24/21 


Magnesium Oxide 1 tab PO DAILY 03/24/21 


Tizanidine HCl [Zanaflex] 1 tab PO BEDTIME 03/24/21 


buPROPion HCl [Bupropion Xl] 1 tab PO DAILY 03/24/21 








Review of Systems


General: Weakness, As per HPI


Eyes: Unremarkable


ENT: Unremarkable


Respiratory: Cough, As per HPI


Cardiovascular: Unremarkable


Gastrointestinal: Unremarkable


Genitourinary: Unremarkable


Musculoskeletal: Unremarkable


Integumentary: Unremarkable


Neurological: Weakness, Change in Speech, Confusion, As per HPI


Lymphatics: Unremarkable





<Jaun Peres - Last Filed: 03/24/21 04:04>





Physical Examination





- Vital Signs


Temperature: 97.5 F


Blood Pressure: 108/66


Pulse: 60


Respirations: 18


Pulse Ox (%): 95





- Physical Exam


General: Alert, In no apparent distress, Oriented x3, Cooperative


HEENT: Atraumatic, Normocephalic, PERRLA, Mucous membr. moist/pink, EOMI, 

Sclerae nonicteric


Neck: Supple, 2+ carotid pulse no bruit, JVD not distended, No Thyromegaly, No 

LAD


Respiratory: Clear to auscultation bilaterally, Normal air movement


Cardiovascular: No edema, Normal pulses, Regular rate/rhythm, Normal S1 S2, No 

gallops, No rubs, No murmurs


Capillary refill: <2 Seconds


Gastrointestinal: Normal bowel sounds, Soft and benign, Non-distended, No 

ascites, No tenderness, No masses, No rebound, No guarding


Musculoskeletal: No clubbing, No swelling, No contractures, No erythema, No 

tenderness, No warmth


Integumentary: No rashes, No breakdown, No significant lesion, No 

tenderness/swelling, No erythema, No warmth, No cyanosis


Neurological: Normal speech, Normal strength at 5/5 x4 extr, Normal tone, 

Sensation intact, Cranial nerves 3-12 intact, Normal affect


Lymphatics: No axilla or inguinal lymphadenopathy





- Studies


Laboratory Data (last 24 hrs)





03/24/21 01:59: WBC 8.20, Hgb 14.7, Hct 45.2 H, Plt Count 219


03/24/21 01:59: Sodium 141, Potassium 3.5, BUN 18, Creatinine 0.97, Glucose 106,

Magnesium 2.1, Total Bilirubin 0.2, AST 23, ALT 21, Alkaline Phosphatase 77, 

Lipase 115


03/23/21 23:50: PT 10.3, INR 0.90








<Jaun Peres - Last Filed: 03/24/21 04:04>





- Studies


Microbiology Data (last 24 hrs): 








03/24/21 01:48   Blood  - Blood   Aerobic Blood Culture - Final


                            No growth in 5 days.


03/24/21 01:48   Blood  - Blood   Anaerobic Blood Culture - Final


03/24/21 01:41   Blood  - Blood   Aerobic Blood Culture - Final


                            No growth in 5 days.


03/24/21 01:41   Blood  - Blood   Anaerobic Blood Culture - Final








<Guillaume Garibay - Last Filed: 03/30/21 04:41>





Assessment and Plan





- Problems (Diagnosis)


(1) Altered mental status


Status: Acute   


Plan: 


Patient has returned to baseline. CT head with no acute findings. given ASA and 

folic acid. Improvement after narcan x 3, likely due to polypharmacy.  patient 

should see PCP to further evaluate outpatient medications. 


Qualifiers: 


   Altered mental status type: unspecified   Qualified Code(s): R41.82 - Altered

mental status, unspecified   





(2) GERD (gastroesophageal reflux disease)


Status: Acute   


Plan: 


stable, continue with home medications


Qualifiers: 


   Esophagitis presence: esophagitis presence not specified   Qualified Code(s):

K21.9 - Gastro-esophageal reflux disease without esophagitis   





(3) Depression


Status: Acute   


Plan: 


stable, continue with home medications


Qualifiers: 


   Depression Type: unspecified   Qualified Code(s): F32.9 - Major depressive 

disorder, single episode, unspecified   





(4) Fibromyalgia


Status: Chronic   


Plan: 


stable, continue with home medications








(5) Hyperlipidemia


Status: Chronic   


Plan: 


stable, continue with home medications


Qualifiers: 


   Hyperlipidemia type: unspecified   Qualified Code(s): E78.5 - Hyperlipidemia,

unspecified   





(6) General weakness


Status: Chronic   


Plan: 


improved from initial admission. speech has improved. patient at baseline. 

continue to monitor. 





Discharge Plan: Nursing Home


Plan to discharge in: 24 Hours





- Advance Directives


Does patient have a Living Will: No


Does patient have a Durable POA for Healthcare: Yes





- Code Status/Comfort Care


Code Status Assessed: Yes (full code)


Critical Care: No


Time Spent Managing Pts Care (In Minutes): 70





<Jaun Peres - Last Filed: 03/24/21 04:04>


Date of Service: 03/24/21





Patient will be admitted and cultures are pending. Patient is clinically doing 

much better. Patient is with daughter and wanting to go home. 





<Guillaume Garibay - Last Filed: 03/30/21 04:41>

## 2021-03-24 NOTE — EDPHYS
Physician Documentation                                                                           

 [facility]                                                                 

Name: Leona Peres                                                                              

Age: 80 yrs                                                                                       

Sex: Female                                                                                       

: 1940                                                                                   

MRN: [id number]                                                                                   

Arrival Date: 2021                                                                          

Time: 23:08                                                                                       

Account#: [id number]                                                                            

Bed 24                                                                                            

Private MD:                                                                                       

ED Physician Og Cotter                                                                      

HPI:                                                                                              

                                                                                             

23:22 This 80 yrs old  Female presents to ER via Un[facility]igned with complaints of       ragini 

      Altered Mental Status.                                                                      

23:22 The patient presents with confusion, decreased mental status. Onset: The                ragini 

      symptoms/episode began/occurred just prior to arrival, 1.25 hour(s) ago. Possible           

      causes: MEDS, POLYPHARMACY. Associated signs and symptoms: The patient has no apparent      

      associated signs or symptoms. Current symptoms: In the emergency department the             

      patient's symptoms have improved, mildly. Patient's baseline: Neuro: alert and fully        

      oriented. The patient has experienced similar episodes in the past, a few times.            

                                                                                                  

Historical:                                                                                       

- Allergies:                                                                                      

23:09 clindamycin HCl (bulk);                                                                 jb4 

23:09 PENICILLINS;                                                                            jb4 

23:09 Darvocet-N 100;                                                                         jb4 

23:09 Sulfa (Sulfonamide Antibiotics);                                                        jb4 

23:09 Strawberries;                                                                           jb4 

- PMHx:                                                                                           

23:09 Cancer; Depression; Fibromyalgia; GERD; insomnia; lymphedema; skipped heart beats;      jb4 

- PSHx:                                                                                           

23:09 Appendectomy; Mastectomy, Left; Hysterectomy; lumbar fusion; knee replacements;         jb4 

      cervical fusion;                                                                            

                                                                                                  

- Immunization history:: Adult Immunizations up to date.                                          

- Social history:: Smoking status: Patient denies any tobacco usage or history of.                

- Family history:: not pertinent.                                                                 

                                                                                                  

                                                                                                  

ROS:                                                                                              

23:24 Constitutional: Negative for fever, chills, and weight loss, Eyes: Negative for injury, ragini 

      pain, redness, and discharge, ENT: Negative for injury, pain, and discharge, Neck:          

      Negative for injury, pain, and swelling, Cardiovascular: Negative for chest pain,           

      palpitations, and edema, Abdomen/GI: Negative for abdominal pain, nausea, vomiting,         

      diarrhea, and constipation, Back: Negative for injury and pain, : Negative for            

      injury, bleeding, discharge, and swelling, MS/Extremity: Negative for injury and            

      deformity, Psych: Negative for depression, anxiety, suicide ideation, homicidal             

      ideation, and hallucinations, Allergy/Immunology: Negative for hives, rash, and             

      allergies, Endocrine: Negative for neck swelling, polydipsia, polyuria, polyphagia, and     

      marked weight changes, Hematologic/Lymphatic: Negative for swollen nodes, abnormal          

      bleeding, and unusual bruising.                                                             

23:24 Respiratory: Positive for cough, shortness of breath, at rest.                              

23:24 Neuro: Positive for altered mental status, weakness.                                        

23:24 Psych: Negative for anxiety, insomnia.                                                      

                                                                                                  

Exam:                                                                                             

23:24 Constitutional:  This is a well developed, well nourished patient who is awake, alert,  ragini 

      and in no acute distress. Head/Face:  Normocephalic, atraumatic. Eyes:  Pupils equal        

      round and reactive to light, extra-ocular motions intact.  Lids and lashes normal.          

      Conjunctiva and sclera are non-icteric and not injected.  Cornea within normal limits.      

      Periorbital areas with no swelling, redness, or edema. ENT:  Nares patent. No nasal         

      discharge, no septal abnormalities noted.  Tympanic membranes are normal and external       

      auditory canals are clear.  Oropharynx with no redness, swelling, or masses, exudates,      

      or evidence of obstruction, uvula midline.  Mucous membranes moist. Neck:  Trachea          

      midline, no thyromegaly or masses palpated, and no cervical lymphadenopathy.  Supple,       

      full range of motion without nuchal rigidity, or vertebral point tenderness.  No            

      Meningismus. Chest/axilla:  Normal chest wall appearance and motion.  Nontender with no     

      deformity.  No lesions are appreciated. Cardiovascular:  Regular rate and rhythm with a     

      normal S1 and S2.  No gallops, murmurs, or rubs.  Normal PMI, no JVD.  No pulse             

      deficits. Respiratory:  Lungs have equal breath sounds bilaterally, clear to                

      auscultation and percussion.  No rales, rhonchi or wheezes noted.  No increased work of     

      breathing, no retractions or nasal flaring. Abdomen/GI:  Soft, non-tender, with normal      

      bowel sounds.  No distension or tympany.  No guarding or rebound.  No evidence of           

      tenderness throughout. Back:  No spinal tenderness.  No costovertebral tenderness.          

      Full range of motion. Female :  Normal external genitalia. Skin:  Warm, dry with          

      normal turgor.  Normal color with no rashes, no lesions, and no evidence of cellulitis.     

      MS/ Extremity:  Pulses equal, no cyanosis.  Neurovascular intact.  Full, normal range       

      of motion. Psych:  Awake, alert, with orientation to person, place and time.  Behavior,     

      mood, and affect are within normal limits.                                                  

23:24 Neuro: Orientation: appropriate for stated age, no acute changes, Mentation:                

      appropriate for stated age, no acute changes, per EMS, per family, Memory: appropriate      

      for stated age, Cranial nerves: is grossly normal based on the patient's age, no acute      

      changes, Cerebellar function: Motor: Gait: not tested. Deep tendon reflexes are 2+          

      (normal) in the  bilateral brachioradialis, bicep, tricep and patellar and Achilles         

      tendons, seizure activity, is not displayed by the patient.                                 

23:30 ECG was reviewed by the Attending Physician.                                            [facility] 

                                                                                                  

Vital Signs:                                                                                      

23:09  / 66; Pulse 60; Resp 18; Temp 97.5(TE); Pulse Ox 95% on R/A; Pain 0/10;          4 

                                                                                             

00:00  / 55; Pulse 58; Resp 16; Pulse Ox 95% on R/A;                                    4 

01:00 BP 93 / 50; Pulse 57; Resp 18; Pulse Ox 94% on R/A;                                     4 

02:00  / 84; Pulse 56; Resp 19; Pulse Ox 98% on R/A;                                    4 

03:00  / 80; Pulse 55; Resp 17; Pulse Ox 94% on R/A;                                    4 

04:00  / 59; Pulse 96; Resp 16; Pulse Ox 58% on R/A;                                    jb4 

                                                                                                  

NIH Stroke Scale Scores:                                                                          

                                                                                             

23:15 NIHSS Score: 1                                                                          [facility] 

23:24 NIHSS Score: 0                                                                          ragini 

                                                                                                  

MDM:                                                                                              

23:16 Patient medically screened.                                                             ragini 

23:29 Differential Diagnosis altered mental status. Differential Diagnosis: CVA, electrolyte  ragini 

      abnormality, hypoglycemia, intracranial bleed, pneumonia, sepsis, TIA, UTI. Data            

      reviewed: vital signs, nurses notes, lab test result(s), EKG, radiologic studies, CT        

      scan, plain films. Data interpreted: Cardiac monitor: rate is 85 beats/min, rhythm is       

      regular, Pulse oximetry: on room air is 100 %. Test interpretation: by ED physician or      

      midlevel provider: ECG, plain radiologic studies. Counseling: I had a detailed              

      discussion with the patient and/or guardian regarding: the historical points, exam          

      findings, and any diagnostic results supporting the discharge/admit diagnosis, lab          

      results, radiology results, the need for further work-up and treatment in the hospital.     

                                                                                             

01:21 ED course: PT IS NIH 0, NON FOCAL , IMPROVED , AT BASELINE, NOT A TPA CANDIDATE.        [facility] 

                                                                                                  

                                                                                             

23:20 Order name: Basic Metabolic Panel                                                       [facility] 

                                                                                             

23:20 Order name: CBC with Diff                                                               [facility] 

                                                                                             

23:20 Order name: LFT's                                                                       [facility] 

                                                                                             

23:20 Order name: Magnesium                                                                   [facility] 

                                                                                             

23:20 Order name: NT PRO-BNP                                                                  [facility] 

                                                                                             

23:20 Order name: PT-INR; Complete Time: 01:21                                                [facility] 

                                                                                             

23:20 Order name: Troponin (emerg Dept Use Only); Complete Time: 03:01                        [facility] 

                                                                                             

23:20 Order name: Blood Culture Adult (2)                                                     [facility] 

                                                                                             

23:20 Order name: Lactate; Complete Time: 03:01                                               [facility] 

                                                                                             

23:20 Order name: Lipase; Complete Time: 03:01                                                [facility] 

                                                                                             

23:21 Order name: Basic Metabolic Panel; Complete Time: 03:01                                 [facility]

                                                                                             

23:21 Order name: CBC with Automated Diff; Complete Time: 03:01                               [facility]

                                                                                             

23:21 Order name: Liver (Hepatic) Function; Complete Time: 03:01                              [facility]

                                                                                             

23:21 Order name: Magnesium; Complete Time: 03:01                                             [facility]

                                                                                             

23:20 Order name: XRAY Chest (1 view)                                                         [facility] 

                                                                                             

23:20 Order name: EKG; Complete Time: 23:22                                                   [facility] 

                                                                                             

23:20 Order name: Cardiac monitoring; Complete Time: 23:59                                    [facility] 

                                                                                             

23:20 Order name: EKG - Nurse/Tech; Complete Time: 23:59                                      [facility] 

                                                                                             

23:20 Order name: IV Saline Lock; Complete Time: 23:59                                        [facility] 

                                                                                             

23:20 Order name: CT Stroke Brain w/o Contrast                                                [facility] 

                                                                                             

23:21 Order name: NT PRO-BNP; Complete Time: 03:01                                            [facility]

                                                                                             

23:24 Order name: COVID-19 : Document "Date of Symptom Onset" if Symptomatic.                 [facility] 

                                                                                             

00:52 Order name: Urine Dipstick--Ancillary (enter results); Complete Time: 03:01             [facility] 

                                                                                             

03:42 Order name: SARS-COV-2 RT PCR                                                           [facility]

                                                                                             

03:59 Order name: CONS Physician Consult                                                      [facility]

                                                                                             

23:20 Order name: Labs collected and sent; Complete Time: 23:59                               [facility] 

                                                                                             

23:20 Order name: O2 Per Protocol; Complete Time: 23:59                                       [facility] 

                                                                                             

23:20 Order name: O2 Sat Monitoring; Complete Time: 23:59                                     [facility] 

                                                                                             

23:20 Order name: Arvind; Complete Time: 02:50                                                 [facility] 

                                                                                                  

EC/23                                                                                             

23:30 Rate is 58 beats/min. Rhythm is regular. QRS Axis is Normal. UT interval is normal. QRS ragini 

      interval is normal. QT interval is normal. No Q waves. T waves are Inverted in leads        

      V1, V2, V3, V4. No ST changes noted. Clinical impression: NSR w/ Non-specific ST/T          

      Changes. Interpreted by me. Reviewed by me.                                                 

                                                                                                  

Administered Medications:                                                                         

23:50 Drug: NS 0.9% 1000 ml Route: IV; Rate: 1 bolus; Site: right hand;                       [facility] 

                                                                                             

02:00 Follow up: Response: No adverse reaction; IV Status: Completed infusion; IV Intake:     jb4 

      1000ml                                                                                      

                                                                                             

23:50 Drug: foLIC Acid 1 mg Route: IVPB; Site: right hand;                                    [facility] 

23:51 Follow up: Response: No adverse reaction; IV Status: Completed infusion                 [facility] 

                                                                                             

01:35 Drug: Rocephin - (cefTRIAXone) 1 grams {Note: Administered IV push per pharmacy         [facility] 

      protocol.} Route: IVPB; Infused Over: 30 mins; Site: right hand;                            

01:40 Follow up: Response: No adverse reaction; IV Status: Completed infusion; IV Intake: [id number] 

01:35 Drug: Aspirin Chewable Tablet 324 mg Route: PO;                                         [facility] 

02:00 Follow up: Response: No adverse reaction                                                [facility] 

                                                                                                  

                                                                                                  

Disposition:                                                                                      

21 00:53 Hospitalization ordered by Guillaume Garibay for Observation. Preliminary             

  diagnosis are Altered mental status, unspecified, Weakness.                                     

- Bed requested for Telemetry/MedSurg (observation).                                              

- Status is Observation.                                                                      jb4 

- Condition is Fair.                                                                              

- Problem is new.                                                                                 

- Symptoms have improved.                                                                         

                                                                                                  

                                                                                                  

                                                                                                  

                                                                                                  

NIH Stroke Scale - NIH Stroke Score                                                               

Date: 2021                                                                                  

Time: 23:15                                                                                       

Total Score = 1                                                                                   

  1a. Level of Consciousness (LOC) - 0(Alert)                                                     

  1b. Level of Consciousness (LOC) (Year \T\ Age) - 0(Both)                                       

  1c. LOC Commands (Open \T\ Closes Eyes/) - 0(Both)                                          

   2. Best Gaze (Lateral Gaze Paresis) - 0(Normal)                                                

   3. Visual Field Loss - 0(No visual loss)                                                       

   4. Facial Palsy - 0(Normal)                                                                    

  5a. Left Arm: Motor (10-second hold) - 0(No drift)                                              

  5b. Right Arm: Motor (10-second hold) - 0(No drift)                                             

  6a. Left Leg: Motor (5-second hold - always test supine) - 0(No drift)                          

  6b. Right Leg: Motor (5-second hold - always test supine) - 0(No drift)                         

   7. Limb Ataxia (finger/nose \T\ heel/shin - test with eyes open) - 0(Absent)                   

   8. Sensory Loss (pinprick arms/legs/face) - 0(Normal)                                          

   9. Best Language: Aphasia (description/naming/reading) - 0(No aphasia)                         

  10. Dysarthria (speech clarity - read or repeat words) - 1(Mild to Moderate)                    

  11. Extinction and Inattention (visual/tactile/auditory/spatial/personal) - 0(No                

      abnormality)                                                                                

Initials: jb4                                                                                     

                                                                                                  

                                                                                                  

NIH Stroke Scale - NIH Stroke Score                                                               

Date: 2021                                                                                  

Time: 23:24                                                                                       

Total Score = 0                                                                                   

  1a. Level of Consciousness (LOC) - 0(Alert)                                                     

  1b. Level of Consciousness (LOC) (Year \T\ Age) - 0(Both)                                       

  1c. LOC Commands (Open \T\ Closes Eyes/) - 0(Both)                                          

   2. Best Gaze (Lateral Gaze Paresis) - 0(Normal)                                                

   3. Visual Field Loss - 0(No visual loss)                                                       

   4. Facial Palsy - 0(Normal)                                                                    

  5a. Left Arm: Motor (10-second hold) - 0(No drift)                                              

  5b. Right Arm: Motor (10-second hold) - 0(No drift)                                             

  6a. Left Leg: Motor (5-second hold - always test supine) - 0(No drift)                          

  6b. Right Leg: Motor (5-second hold - always test supine) - 0(No drift)                         

   7. Limb Ataxia (finger/nose \T\ heel/shin - test with eyes open) - 0(Absent)                   

   8. Sensory Loss (pinprick arms/legs/face) - 0(Normal)                                          

   9. Best Language: Aphasia (description/naming/reading) - 0(No aphasia)                         

  10. Dysarthria (speech clarity - read or repeat words) - 0(Normal)                              

  11. Extinction and Inattention (visual/tactile/auditory/spatial/personal) - 0(No                

      abnormality)                                                                                

Initials: [facility]                                                                                     

                                                                                                  

Signatures:                                                                                       

Dispatcher [employer]                           EDMS                                                 

Og Cotter MD MD cha Garcia, Cindy, RN                       RN                                                      

Adam Catalan RN                       RN   jb4                                                  

                                                                                                  

Corrections: (The following items were deleted from the chart)                                    

02:19  23:25 CORONAVIRUS ordered. [facility]        

                                                                                             

04:14 00:53 Hospitalization Ordered by Guillaume Garibay MD for Observation. Preliminary  cg          

      diagnosis is Altered mental status, unspecified; Weakness. Bed requested for                

      Telemetry/MedSurg (observation). Status is Observation. Condition is Fair.                  

      Problem is new. Symptoms have improved. [facility]                                                 

05:08 04:14 2021 00:53 Hospitalization Ordered by Guillaume Garibay MD for          jb4         

      Observation. Preliminary diagnosis is Altered mental status, unspecified;                   

      Weakness. Bed requested for Telemetry/MedSurg (observation). Status is                      

      Observation. Condition is Fair. Problem is new. Symptoms have improved.                   

                                                                                                  

**************************************************************************************************

## 2021-03-24 NOTE — RAD REPORT
EXAM DESCRIPTION:  Vanna Single View3/23/2021 11:46 pm

 

CLINICAL HISTORY:  Chest pain

 

COMPARISON:  2020

 

FINDINGS:  Borderline mild interstitial pulmonary edema.

 

Heart is mildly enlarged

## 2021-03-30 NOTE — P.DS
Discharge Date: 03/24/21


Primary Care Provider: Dr. Mcwilliams


Disposition: ROUTINE DISCHARGE


Discharge Condition: GOOD


Reason for Admission: AMS, weakness


Brief History of Present Illness: 





Ms. Peres is a 79 yo female with HLD, Meniere's, lymphedema, GERD, 

fibromyalgia, depression, h/o breast cancer here today for AMS and weakness. 

Tonight at her assisted living facility after receiving her regular medications,

she started to feel 'alf awake, alf dreaming.' She said she felt foggy, 

couldn't move her hands, and her speech was slurred so she called for help. She 

reports weakness, but has no other complaints besides a few days of dry cough. 

She received narcan x3 at her facility with reported improvement. She now feels 

back to baseline, AOx4. She denies fever, night sweats, chills, chest pain, 

wheezing. Initial stroke scale of 1, now score of 0. CT head with no acute 

findings. 


Hospital Course: 





Patient had done well during hospital stay. At this time patient is clinically 

stable for discharge. Patient will call me in a few days to get results of 

culture results. 


Vital Signs/Physical Exam: 














Temp Pulse Resp BP Pulse Ox


 


 97.1 F   65   18   98/54 L  93 


 


 03/24/21 12:00  03/24/21 12:00  03/24/21 12:00  03/24/21 12:00  03/24/21 12:00








General: Alert, In no apparent distress, Oriented x3


Laboratory Data at Discharge: 














WBC  8.20 K/uL (4.3-10.9)   03/24/21  01:59    


 


Hgb  14.7 g/dL (12.0-15.0)   03/24/21  01:59    


 


Hct  45.2 % (36.0-45.0)  H  03/24/21  01:59    


 


Plt Count  219 K/uL (152-406)   03/24/21  01:59    


 


PT  10.3 SECONDS (9.5-12.5)   03/23/21  23:50    


 


INR  0.90   03/23/21  23:50    


 


Sodium  141 mmol/L (136-145)   03/24/21  01:59    


 


Potassium  3.5 mmol/L (3.5-5.1)   03/24/21  01:59    


 


BUN  18 mg/dL (7-18)   03/24/21  01:59    


 


Creatinine  0.97 mg/dL (0.55-1.3)   03/24/21  01:59    


 


Glucose  106 mg/dL ()   03/24/21  01:59    


 


Magnesium  2.1 mg/dL (1.8-2.4)   03/24/21  01:59    


 


Total Bilirubin  0.2 mg/dL (0.2-1.0)   03/24/21  01:59    


 


AST  23 U/L (15-37)   03/24/21  01:59    


 


ALT  21 U/L (12-78)   03/24/21  01:59    


 


Alkaline Phosphatase  77 U/L ()   03/24/21  01:59    


 


Lipase  115 U/L ()   03/24/21  01:59    








Home Medications: 








Indapamide [Lozol*] 2.5 mg PO DAILY 04/17/12 


Simvastatin 40 mg PO BEDTIME 04/17/12 


Duloxetine [Cymbalta *] 60 mg PO BID 09/24/18 


Memantine HCl 10 mg PO BID 09/24/18 


Potassium Oral Tab [Klor-Con 10 mEq Tab*] 20 meq PO BID 09/24/18 


Quetiapine [Seroquel*] 150 mg PO BEDTIME 09/24/18 


Metoprolol Succinate [Toprol Xl*] 25 mg PO BEDTIME 08/29/19 


Donepezil HCl 10 mg PO BEDTIME 02/05/20 


Gabapentin 300 mg PO TID 02/05/20 


Pantoprazole Sodium [Protonix] 1 tab PO DAILY 10/17/20 


Thyroid,Pork [Np Thyroid] 1 tab PO DAILY 10/17/20 


Topiramate [Topamax] 200 mg PO BID 10/17/20 


Zolpidem Tartrate [Ambien] 1 tab PO BEDTIME 10/17/20 


ALPRAZolam [Xanax*] 1 tab PO BEDTIME 03/24/21 


Cranberry Fruit Extract [Ellura] 1 cap PO DAILY 03/24/21 


Hydrocodone 7.5/APAP 325 [Norco 7.5/325 mg*] 1 tab PO TID PRN 03/24/21 


Magnesium Oxide 1 tab PO DAILY 03/24/21 


Tizanidine HCl [Zanaflex] 1 tab PO BEDTIME 03/24/21 


buPROPion HCl [Bupropion Xl] 1 tab PO DAILY 03/24/21 





Physician Discharge Instructions: 


OK TO DC IV AND DC HOME


FOLLOW-UP WITH PRIMARY CARE PROVIDER IN 1-2 WEEKS


FOLLOW-UP WITH Neurology IN 1-2 WEEKS


RETURN TO THE ER IF symptoms worsen


CALL or TEXT DR. BOUDREAUX [phone] IF ANY QUESTIONS REGARDING HOSPITAL STAY.


PLEASE CALL THE FLOOR [phone] IF ANY MEDICATION OR NURSING QUESTIONS.


Arrange for outpatient sleep study


Diet: AHA


Activity: Fall precautions


Followup: 


Javier Mcwilliams MD [Primary Care Provider] - 


Steven Cannon MD [ACTIVE - CAN ADMIT] - 


Time spent managing pt's care (in minutes): 35

## 2021-05-12 ENCOUNTER — HOSPITAL ENCOUNTER (EMERGENCY)
Dept: HOSPITAL 97 - ER | Age: 81
Discharge: HOME | End: 2021-05-12
Payer: COMMERCIAL

## 2021-05-12 VITALS — TEMPERATURE: 97.8 F

## 2021-05-12 VITALS — OXYGEN SATURATION: 95 % | SYSTOLIC BLOOD PRESSURE: 115 MMHG | DIASTOLIC BLOOD PRESSURE: 55 MMHG

## 2021-05-12 DIAGNOSIS — Z88.0: ICD-10-CM

## 2021-05-12 DIAGNOSIS — S70.01XA: Primary | ICD-10-CM

## 2021-05-12 DIAGNOSIS — Z88.5: ICD-10-CM

## 2021-05-12 DIAGNOSIS — S00.90XA: ICD-10-CM

## 2021-05-12 DIAGNOSIS — Z88.2: ICD-10-CM
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
## 2021-05-12 DIAGNOSIS — F32.9: ICD-10-CM

## 2021-05-12 DIAGNOSIS — Z88.1: ICD-10-CM

## 2021-05-12 DIAGNOSIS — R53.1: ICD-10-CM

## 2021-05-12 DIAGNOSIS — Z90.12: ICD-10-CM

## 2021-05-12 DIAGNOSIS — Z91.018: ICD-10-CM

## 2021-05-12 LAB
ALBUMIN SERPL BCP-MCNC: 2.8 G/DL (ref 3.4–5)
ALP SERPL-CCNC: 89 U/L (ref 45–117)
ALT SERPL W P-5'-P-CCNC: 18 U/L (ref 12–78)
AST SERPL W P-5'-P-CCNC: 18 U/L (ref 15–37)
BUN BLD-MCNC: 21 MG/DL (ref 7–18)
GLUCOSE SERPLBLD-MCNC: 130 MG/DL (ref 74–106)
HCT VFR BLD CALC: 44.8 % (ref 36–45)
INR BLD: 0.99
LIPASE SERPL-CCNC: 91 U/L (ref 73–393)
LYMPHOCYTES # SPEC AUTO: 1.4 K/UL (ref 0.7–4.9)
MAGNESIUM SERPL-MCNC: 2 MG/DL (ref 1.8–2.4)
NT-PROBNP SERPL-MCNC: 247 PG/ML (ref ?–450)
PMV BLD: 8.6 FL (ref 7.6–11.3)
POTASSIUM SERPL-SCNC: 3.5 MMOL/L (ref 3.5–5.1)
RBC # BLD: 4.63 M/UL (ref 3.86–4.86)
TROPONIN (EMERG DEPT USE ONLY): < 0.02 NG/ML (ref 0–0.04)

## 2021-05-12 PROCEDURE — 83735 ASSAY OF MAGNESIUM: CPT

## 2021-05-12 PROCEDURE — 71045 X-RAY EXAM CHEST 1 VIEW: CPT

## 2021-05-12 PROCEDURE — 36415 COLL VENOUS BLD VENIPUNCTURE: CPT

## 2021-05-12 PROCEDURE — 05HQ33Z INSERTION OF INFUSION DEVICE INTO LEFT EXTERNAL JUGULAR VEIN, PERCUTANEOUS APPROACH: ICD-10-PCS

## 2021-05-12 PROCEDURE — 80076 HEPATIC FUNCTION PANEL: CPT

## 2021-05-12 PROCEDURE — 73502 X-RAY EXAM HIP UNI 2-3 VIEWS: CPT

## 2021-05-12 PROCEDURE — 70450 CT HEAD/BRAIN W/O DYE: CPT

## 2021-05-12 PROCEDURE — 85610 PROTHROMBIN TIME: CPT

## 2021-05-12 PROCEDURE — 80048 BASIC METABOLIC PNL TOTAL CA: CPT

## 2021-05-12 PROCEDURE — 72125 CT NECK SPINE W/O DYE: CPT

## 2021-05-12 PROCEDURE — 96360 HYDRATION IV INFUSION INIT: CPT

## 2021-05-12 PROCEDURE — 99284 EMERGENCY DEPT VISIT MOD MDM: CPT

## 2021-05-12 PROCEDURE — 83880 ASSAY OF NATRIURETIC PEPTIDE: CPT

## 2021-05-12 PROCEDURE — 84484 ASSAY OF TROPONIN QUANT: CPT

## 2021-05-12 PROCEDURE — 36569 INSJ PICC 5 YR+ W/O IMAGING: CPT

## 2021-05-12 PROCEDURE — 83690 ASSAY OF LIPASE: CPT

## 2021-05-12 PROCEDURE — 71250 CT THORAX DX C-: CPT

## 2021-05-12 PROCEDURE — 96361 HYDRATE IV INFUSION ADD-ON: CPT

## 2021-05-12 PROCEDURE — 85025 COMPLETE CBC W/AUTO DIFF WBC: CPT

## 2021-05-12 PROCEDURE — 72170 X-RAY EXAM OF PELVIS: CPT

## 2021-05-12 PROCEDURE — 73552 X-RAY EXAM OF FEMUR 2/>: CPT

## 2021-05-12 NOTE — XMS REPORT
Continuity of Care Document

                             Created on:May 12, 2021



Patient:NEGRO GONZALES

Sex:Female

:1940

External Reference #:[id number]





Demographics







                          Address                   [address] [place] [address]

 

                          Home Phone                [phone] [employer]

 

                          Mobile Phone              [phone]

 

                          Email Address             [email or web address]

 

                          Preferred Language        English

 

                          Marital Status            Unknown

 

                          Anabaptist Affiliation     Unknown

 

                          Race                      Unknown

 

                          Additional Race(s)        Unavailable



                                                    White

 

                          Ethnic Group              Unknown









Author







                          Organization              [facility]

t

 

                          Address                   [address]



                                                    [place] [address]

 

                          Phone                     [phone]









Support







                Name            Relationship    Address         Phone

 

                Homero          Child           Unavailable     [phone]

 

                Homero          Other           Unavailable     [phone]









Care Team Providers







                    Name                Role                Phone

 

                    Denita RIZVI            Primary Care Physician [phone]

 

                    Abbe RIZVI           Attending Clinician [phone]









Payers







           Payer Name Policy Type Policy     Effective Date Expiration Date Sour

ce



                                 Number                           

 

           AETNA MEDICAREAETNA            [id number]   2014            [place]

on



           MEDICARE HMO/PPO                       00:00:00              [facility]

t



           [id number]                                             



           -PresentHMO                                             







Problems

This patient has no known problems.



Allergies, Adverse Reactions, Alerts







       Allergy Allergy Status Severity Reaction(s) Onset  Inactive Treating Comm

ents 

Source



       Name   Type                        Date   Date   Clinician        

 

       Propoxyp Propensi Active        Unknown 2020-0                      [facility]

on



       hene   ty to                Reaction 6-30                        [facility]



       N-Acetam adverse                      00:00:                      st



       inophen reaction                      00                          



              s to                                                    



              drug                                                    

 

       Penicill Propensi Active        Unknown 2020-0                      [facility]

on



       ins    ty to                Reaction 6-30                        [facility]



              adverse                      00:00:                      st



              reaction                      00                          



              s to                                                    



              drug                                                    

 

       Sulfa  Propensi Active        Unknown 2020-0                      [facility]



       (Sulfona ty to                Reaction 6-30                        [facility]

i



       mide   adverse                      00:00:                      st



       Antibiot reaction                      00                          



       ics)   s to                                                    



              drug                                                    

 

       Acetamin Propensi Active        Unknown 2020-0                      [facility]

on



       ophen  ty to                Reaction 6-30                        [facility]



              adverse                      00:00:                      st



              reaction                      00                          



              s to                                                    



              drug                                                    

 

       Clindamy Propensi Active        Unknown 2020-0                      [facility]

on



       jayna    ty to                Reaction 6-30                        [facility]



       Phosphat adverse                      00:00:                      st



       e      reaction                      00                          



              s to                                                    



              drug                                                    







Family History







           Family Member Diagnosis  Comments   Start Date Stop Date  Source

 

           Natural father Diabetes                                    [facility]

[facility]

 

           Natural father Heart disease                                  [facility]

 

           Natural father Hypertension                                  [facility] 

[facility]

 

           Natural mother Asthma                                      [facility]

[facility]







Social History







           Social Habit Start Date Stop Date  Quantity   Comments   Source

 

           History of tobacco                       Current smoker            [facility]



           use                                                    

 

           History SDOH                                             [facility]

od[facility]



           Alcohol Std Drinks                                             

 

           History SDOH                                             [facility]

odist



           Alcohol Binge                                             

 

           Cigarettes smoked 2020                       [facility]

 [facility]



           current (pack per 00:00:00   00:00:00                         



           day) - Reported                                             

 

           Cigarette  2020                       [facility]

ist



           pack-years 00:00:00   00:00:00                         

 

           Tobacco use and 2020 Never used            [facility]

[facility]



           exposure   00:00:00   00:00:00                         

 

           Alcohol intake 2020 Lifetime              [facility]

[facility]



                      00:00:00   00:00:00   non-drinker            



                                            (finding)             

 

           History SDOH 2020 1                     [facility]

odist



           Alcohol Frequency 00:00:00   00:00:00                         

 

           Sex Assigned At [birth year]                       [facility]



           Birth      00:00:00   00:00:00                         









                Smoking Status  Start Date      Stop Date       Source

 

                Former smoker   2020 00:00:00 2020 00:00:00 [facility]







Medications

This patient has no known medications.



Vital Signs







             Vital Name   Observation Time Observation Value Comments     Source

 

             Body height  2020 11:34:00 160 cm                    [facility] 

[facility]

 

             Body weight  2020 11:34:00 95.255 kg                 [facility] 

[facility]

 

             BMI          2020 11:34:00 37.20 kg/m2               [facility] 

[facility]







Procedures







                Procedure       Date / Time Performed Performing Clinician Sour

e

 

                FL EXTERNAL STUDY EXAM 2020 11:20:00 Ellis Baezist

 

                CT ABD/PELVIC EXTERNAL 2020 10:37:00 Ellis Baez



                STUDY                                           







Plan of Care







             Planned Activity Planned Date Details      Comments     Source

 

             Future Scheduled 2021   INFLUENZA VACCINE              [place]

[facility]



             Test         00:00:00     [code = INFLUENZA              



                                       VACCINE]                  

 

             Future Scheduled 2006-10-07   65+ PNEUMOCOCCAL              [place]

 [facility]



             Test         00:00:00     VACCINE (1 of 1 -              



                                       PPSV23) [code = 65+              



                                       PNEUMOCOCCAL VACCINE              



                                       (1 of 1 - PPSV23)]              

 

             Future Scheduled 1991-10-07   SHINGLES VACCINES (#1)              [facility]

[facility]



             Test         00:00:00     [code = SHINGLES              



                                       VACCINES (#1)]              

 

             Future Scheduled 1959-10-07   Hepatitis C screening              [facility]

[facility]



             Test         00:00:00     (procedure) [code =              



                                       167963178]                







Encounters







        Start   End     Encounter Admission Attending Care    Care    Encounter 

Source



        Date/Time Date/Time Type    Type    Clinicians Facility Department ID   

   

 

        2021-05-10 2021-05-10 Outpatient         BAEZ, [facility]     [id number]

[address]



        00:00:00 00:00:00                 ELLIS                    [id number]     [facility]

i



                                                                        st

 

        2021 Outpatient         BAEZ, [facility]     [id number]

[address] [place]



        00:00:00 00:00:00                 ELLIS                    [address]     [facility]

i



                                                                        st

 

        2020 Outpatient         BAEZ, [facility]     [id number]

[address] [place]



        00:00:00 00:00:00                 ELLIS                    [address]     [facility]

i



                                                                        st

 

        2020 Outpatient         BAEZ, [facility]     [id number]

[address]



        00:00:00 00:00:00                 ELLIS                    [address]     [facility]

i



                                                                        st







Results







           Test Description Test Time  Test Comments Results    Result     [facility]

e



                                                       Comments   

 

           FL External Study 2021-05-10            This exam was not            

[facility]



           Exam       08:55:15              acquired at a            [facility]



                                            [facility]             



                                            facility and has            



                                            not been              



                                            interpreted by a            



                                            [facility]             



                                            Provider.  The            



                                            exam was imported            



                                            into our imaging            



                                            system.               

 

           CT Abd/Pelvic 2020            This exam was not            [facility]



           External Study 12:24:47              [facility] at a            [facility]



                                            [facility]             



                                            facility and has            



                                            not been              



                                            interpreted by a            



                                            [facility]             



                                            Provider.  The            



                                            exam was imported            



                                            into our imaging            



                                            system.

## 2021-05-12 NOTE — RAD REPORT
EXAM DESCRIPTION:  RAD - Femur Right - 5/12/2021 11:37 am

 

CLINICAL HISTORY:  PAIN

 

COMPARISON:  No comparisons

 

FINDINGS:  Right total knee arthroplasty is present. No hardware loosening seen. No acute fracture ev
ident.

## 2021-05-12 NOTE — ER
Nurse's Notes                                                                                     

 [facility] [place]                                                                 

Name: Leona Peres                                                                              

Age: 80 yrs                                                                                       

Sex: Female                                                                                       

: 1940                                                                                   

MRN: [id number]                                                                                   

Arrival Date: 2021                                                                          

Time: 09:20                                                                                       

Account#: [id number]                                                                            

Bed 16                                                                                            

Private MD:                                                                                       

Diagnosis: Fall due to bumping against object;Superficial injury of head;Contusion of right       

  hip;Weakness                                                                                    

                                                                                                  

Presentation:                                                                                     

                                                                                             

09:21 Chief complaint: EMS states: Slipped in bathroom yesterday, c/o left hip pain, left     hb  

      chest wall pain, and pain in right groin when ambulating. Coronavirus screen: At this       

      time, the client does not indicate any symptoms associated with coronavirus-19. Ebola       

      Screen: No symptoms or risks identified at this time. Initial Sepsis Screen: Does the       

      patient meet any 2 criteria? No. Patient's initial sepsis screen is negative. Does the      

      patient have a suspected source of infection? No. Patient's initial sepsis screen is        

      negative. Risk Assessment: Do you want to hurt yourself or someone else? Patient            

      reports no desire to harm self or others. Onset of symptoms was May 11, 2021.               

09:21 Method Of Arrival: EMS: [facility]                                                  

09:21 Acuity: ANUSHKA 3                                                                           hb  

                                                                                                  

Triage Assessment:                                                                                

09:31 General: Appears in no apparent distress. comfortable, Behavior is calm, cooperative,   tr6 

      appropriate for age. Pain: Complains of pain in c/o pain in left shoulder and lower         

      back s/p mechanical fall per pt. EENT: s/p fall. abrasion to upper nose and knot on         

      forehead. Neuro: No deficits noted. Cardiovascular: No deficits noted. Respiratory: No      

      deficits noted. GI: No deficits noted. : No deficits noted. Derm: No deficits noted.      

      Musculoskeletal: Reports weakness in b/l lower extremeties. pt normally walks with a        

      walker. Injury Description: Abrasion sustained to nose.                                     

                                                                                                  

Historical:                                                                                       

- Allergies:                                                                                      

09:23 clindamycin HCl (bulk);                                                                 hb  

09:23 Darvocet-N 100;                                                                         hb  

09:23 PENICILLINS;                                                                            hb  

09:23 Strawberries;                                                                           hb  

09:23 Sulfa (Sulfonamide Antibiotics);                                                        hb  

- Home Meds:                                                                                      

09:23 duloxetine 60 mg Oral cpDR 2 caps once daily [Active]; Macrodantin 50 mg Oral cap 1 cap hb  

      once daily [Active]; metoprolol succinate 50 mg Oral Tb24 1 tab once daily [Active];        

      potassium chloride 10 mEq Oral cpER 1 cap 2 times per day [Active]; Seroquel 100 mg         

      Oral tab daily [Active]; simvastatin 40 mg Oral tab 1 tab once daily [Active];              

      spironolactone 20mg Oral once daily [Active]; tramadol 50 mg Oral tab 2 times daily         

      [Active];                                                                                   

- PMHx:                                                                                           

09:23 Cancer; Depression; Fibromyalgia; GERD; insomnia; lymphedema; skipped heart beats;      hb  

- PSHx:                                                                                           

09:23 Appendectomy; Mastectomy, Left; Hysterectomy; lumbar fusion; knee replacements;         hb  

      cervical fusion;                                                                            

                                                                                                  

- Immunization history:: Adult Immunizations up to date.                                          

- Social history:: Smoking status: Patient denies any tobacco usage or history of.                

- Family history:: not pertinent.                                                                 

                                                                                                  

                                                                                                  

Screenin:30 Abuse screen: Denies threats or abuse. Denies injuries from another. Abuse screen:      tr6 

      Denies threats or abuse. Denies injuries from another. Nutritional screening: No            

      deficits noted. Tuberculosis screening: No symptoms or risk factors identified. Fall        

      Risk Fall in past 12 months (25 points).                                                    

                                                                                                  

Assessment:                                                                                       

10:02 General: Appears in no apparent distress. comfortable, Behavior is calm, cooperative.   vg1 

      Pain: Complains of pain in right hip, right groin, Left giovanny of ribs Pain currently is 7     

      out of 10 on a pain scale. Pain began 2-3 days ago. Neuro: Level of Consciousness is        

      awake, alert, obeys commands, Oriented to person, place, time, situation.                   

      Cardiovascular: Patient's skin is warm and dry. Respiratory: Airway is patent               

      Respiratory effort is even, unlabored. GI: No signs and/or symptoms were reported           

      involving the gastrointestinal system. : No signs and/or symptoms were reported           

      regarding the genitourinary system. EENT: No signs and/or symptoms were reported            

      regarding the EENT system. Derm: Skin is intact, Skin is pink, warm \T\ dry.                

      Musculoskeletal: Circulation, motion, and sensation intact.                                 

12:15 Reassessment: Patient appears in no apparent distress at this time. No changes from     vg1 

      previously documented assessment. Patient and/or family updated on plan of care and         

      expected duration. Pain level reassessed. Patient is alert, oriented x 3, equal             

      unlabored respirations, skin warm/dry/pink.                                                 

14:49 Reassessment: Patient appears in no apparent distress at this time. Patient and/or      vg1 

      family updated on plan of care and expected duration. Pain level reassessed. Patient is     

      alert, oriented x 3, equal unlabored respirations, skin warm/dry/pink.                      

                                                                                                  

Vital Signs:                                                                                      

09:21  / 65; Pulse 67; Resp 16; Temp 97.8; Pulse Ox 100% ; Pain 5/10;                   hb  

10:03  / 50; Pulse 56; Resp 16; Pulse Ox 97% on R/A;                                    vg1 

12:16  / 57; Pulse 52; Resp 18; Pulse Ox 94% on R/A;                                    vg1 

14:40  / 55; Pulse 56; Resp 18; Pulse Ox 95% on R/A;                                    vg1 

                                                                                                  

ED Course:                                                                                        

09:20 Patient arrived in ED.                                                                  hb  

09:22 Og Cotter MD is Attending Physician.                                             ragini 

09:22 Triage completed.                                                                       hb  

09:23 Arm band placed on.                                                                     hb  

09:30 Sera Santos RN is Primary Nurse.                                                 tr6 

09:38 Patient has correct armband on for positive identification. Fall risk band placed. Bed  tr6 

      in low position. Side rails up X2.                                                          

09:38 No provider procedures requiring assistance completed.                                  tr6 

09:43 EKG done, by ED staff, reviewed by Og Cotter MD.                                   em1 

10:15 Primary Nurse role handed off by Sera Santos, RN                                  vg1 

10:15 Serenity Braswell, RN is Primary Nurse.                                                  vg1 

10:45 Missed attempt(s): 22 gauge in right forearm.                                           vg1 

10:52 Initial lab(s) drawn, by ED staff, sent to lab. Missed attempt(s): 22 gauge in right    vg1 

      forearm.                                                                                    

11:19 Head C Spine Cap Wo Con In Process Unspecified.                                         EDMS

11:33 XRAY Chest (1 view) In Process Unspecified.                                             EDMS

11:33 Pelvis XRAY In Process Unspecified.                                                     EDMS

11:33 Hip Right 2 View XRAY In Process Unspecified.                                           EDMS

11:33 Femur Right XRAY In Process Unspecified.                                                EDMS

11:53 Patient moved back from CT.                                                             vg1 

13:10 Fredis Horton MD is Referral Physician.                                                ragini 

                                                                                                  

Administered Medications:                                                                         

12:15 Drug: NS 0.9% 500 ml Route: IV; Rate: bolus; Site: left jugular;                        vg1 

12:45 Follow up: IV Status: Completed infusion; IV Intake: 500ml                              vg1 

12:59 Drug: NS 0.9% 1000 ml Route: IV; Rate: 125 ml/hr; Site: left jugular;                   vg1 

14:51 Follow up: IV Status: Completed infusion; IV Intake: 700ml                              vg1 

                                                                                                  

                                                                                                  

Intake:                                                                                           

12:45 IV: 500ml; Total: 500ml.                                                                vg1 

14:51 IV: 700ml; Total: 1200ml.                                                               vg1 

                                                                                                  

Outcome:                                                                                          

13:10 Discharge ordered by MD. eid 

14:51 Patient left the ED.                                                                    vg1 

                                                                                                  

Signatures:                                                                                       

Dispatcher [employer]                           EDMS                                                 

gO Cotter MD MD cha Martinez, Eric                               em1                                                  

Marcelle Gallagher RN RN hb Garcia, Victoria, RN                    RN   vg1                                                  

Sera Santos RN                   RN   tr6                                                  

                                                                                                  

**************************************************************************************************

## 2021-05-12 NOTE — RAD REPORT
EXAM DESCRIPTION:  RAD - Chest Single View - 5/12/2021 11:36 am

 

CLINICAL HISTORY:  BLUNT CHEST TRAUMA

Chest pain.

 

COMPARISON:  Chest Single View dated 3/23/2021; Chest Single View dated 10/25/2020; Chest Single View
 dated 10/16/2020; Chest Single View dated 8/28/2019

 

FINDINGS:  Portable technique limits examination quality.

 

The lungs are grossly clear. The heart is normal in size. No displaced fractures.Cervical spine hardw
are plate is noted. Right humeral prosthesis.

 

IMPRESSION:  No acute intrathoracic process suspected.

## 2021-05-12 NOTE — RAD REPORT
EXAM DESCRIPTION:  RAD - Pelvis - 5/12/2021 11:38 am

 

CLINICAL HISTORY:  BLUNT TRAUMA

 

COMPARISON:  No comparisons

 

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 No fracture, dislocation or AVN pattern o
bserved.

## 2021-05-12 NOTE — EDPHYS
Physician Documentation                                                                           

 [facility] [place]                                                                 

Name: Leona Peres                                                                              

Age: 80 yrs                                                                                       

Sex: Female                                                                                       

: 1940                                                                                   

MRN: [id number]                                                                                   

Arrival Date: 2021                                                                          

Time: 09:20                                                                                       

Account#: [id number]                                                                            

Bed 16                                                                                            

Private MD:                                                                                       

CASSANDRA Physician Og Cotter                                                                      

HPI:                                                                                              

                                                                                             

10:18 This 80 yrs old  Female presents to ER via EMS with complaints of Hip Pain.    ragini 

10:18 The patient or guardian reports decreased range of motion, pain. sustained from a fall, ragini 

      the patient slipped, from a standing position. The complaints affect the right hip.         

      Onset: The symptoms/episode began/occurred 2 day(s) ago. Modifying factors: The             

      symptoms are alleviated by nothing, the symptoms are aggravated by any movement.            

      Associated signs and symptoms: Loss of consciousness: the patient experienced no loss       

      of consciousness. Severity of symptoms: At their worst the symptoms were moderate. The      

      patient has not experienced similar symptoms in the past.                                   

                                                                                                  

Historical:                                                                                       

- Allergies:                                                                                      

09:23 clindamycin HCl (bulk);                                                                 hb  

09:23 Darvocet-N 100;                                                                         hb  

09:23 PENICILLINS;                                                                            hb  

09:23 Strawberries;                                                                           hb  

09:23 Sulfa (Sulfonamide Antibiotics);                                                        hb  

- Home Meds:                                                                                      

09:23 duloxetine 60 mg Oral cpDR 2 caps once daily [Active]; Macrodantin 50 mg Oral cap 1 cap hb  

      once daily [Active]; metoprolol succinate 50 mg Oral Tb24 1 tab once daily [Active];        

      potassium chloride 10 mEq Oral cpER 1 cap 2 times per day [Active]; Seroquel 100 mg         

      Oral tab daily [Active]; simvastatin 40 mg Oral tab 1 tab once daily [Active];              

      spironolactone 20mg Oral once daily [Active]; tramadol 50 mg Oral tab 2 times daily         

      [Active];                                                                                   

- PMHx:                                                                                           

09:23 Cancer; Depression; Fibromyalgia; GERD; insomnia; lymphedema; skipped heart beats;      hb  

- PSHx:                                                                                           

09:23 Appendectomy; Mastectomy, Left; Hysterectomy; lumbar fusion; knee replacements;         hb  

      cervical fusion;                                                                            

                                                                                                  

- Immunization history:: Adult Immunizations up to date.                                          

- Social history:: Smoking status: Patient denies any tobacco usage or history of.                

- Family history:: not pertinent.                                                                 

                                                                                                  

                                                                                                  

ROS:                                                                                              

10:18 Constitutional: Negative for fever, chills, and weight loss, Eyes: Negative for injury, ragini 

      pain, redness, and discharge, ENT: Negative for injury, pain, and discharge, Neck:          

      Negative for injury, pain, and swelling, Cardiovascular: Negative for chest pain,           

      palpitations, and edema, Respiratory: Negative for shortness of breath, cough,              

      wheezing, and pleuritic chest pain, Back: Negative for injury and pain, : Negative        

      for injury, bleeding, discharge, and swelling, MS/Extremity: Negative for injury and        

      deformity, Skin: Negative for injury, rash, and discoloration, Neuro: Negative for          

      headache, weakness, numbness, tingling, and seizure.                                        

10:18 Abdomen/GI: Negative for abdominal pain, nausea, vomiting, diarrhea, and constipation.      

10:18 Abdomen/GI:                                                                                 

                                                                                                  

Exam:                                                                                             

10:18 Constitutional:  This is a well developed, well nourished patient who is awake, alert,  ragini 

      and in no acute distress. Head/Face:  Normocephalic, atraumatic. Eyes:  Pupils equal        

      round and reactive to light, extra-ocular motions intact.  Lids and lashes normal.          

      Conjunctiva and sclera are non-icteric and not injected.  Cornea within normal limits.      

      Periorbital areas with no swelling, redness, or edema. ENT:  Nares patent. No nasal         

      discharge, no septal abnormalities noted.  Tympanic membranes are normal and external       

      auditory canals are clear.  Oropharynx with no redness, swelling, or masses, exudates,      

      or evidence of obstruction, uvula midline.  Mucous membranes moist. Neck:  Trachea          

      midline, no thyromegaly or masses palpated, and no cervical lymphadenopathy.  Supple,       

      full range of motion without nuchal rigidity, or vertebral point tenderness.  No            

      Meningismus. Chest/axilla:  Normal chest wall appearance and motion.  Nontender with no     

      deformity.  No lesions are appreciated. Cardiovascular:  Regular rate and rhythm with a     

      normal S1 and S2.  No gallops, murmurs, or rubs.  Normal PMI, no JVD.  No pulse             

      deficits. Respiratory:  Lungs have equal breath sounds bilaterally, clear to                

      auscultation and percussion.  No rales, rhonchi or wheezes noted.  No increased work of     

      breathing, no retractions or nasal flaring. Abdomen/GI:  Soft, non-tender, with normal      

      bowel sounds.  No distension or tympany.  No guarding or rebound.  No evidence of           

      tenderness throughout. Back:  No spinal tenderness.  No costovertebral tenderness.          

      Full range of motion. Female :  Normal external genitalia. Skin:  Warm, dry with          

      normal turgor.  Normal color with no rashes, no lesions, and no evidence of cellulitis.     

      Neuro:  Awake and alert, GCS 15, oriented to person, place, time, and situation.            

      Cranial nerves II-XII grossly intact.  Motor strength 5/5 in all extremities.  Sensory      

      grossly intact.  Cerebellar exam normal.  Normal gait. Psych:  Awake, alert, with           

      orientation to person, place and time.  Behavior, mood, and affect are within normal        

      limits.                                                                                     

10:18 Musculoskeletal/extremity: ROM: limited active range of motion, limited passive range       

      of motion, in the right leg, Circulation is intact in all extremities. Sensation            

      intact. Compartment Syndrome exam of affected extremity: is normal. DVT Exam: no            

      swelling, negative Homans' sign noted on exam, no appreciated bluish discoloration, no      

      erythema, no increased warmth, pain, tenderness.                                            

10:23 ECG was reviewed by the Attending Physician.                                            [facility] 

                                                                                                  

Vital Signs:                                                                                      

09:21  / 65; Pulse 67; Resp 16; Temp 97.8; Pulse Ox 100% ; Pain 5/10;                   hb  

10:03  / 50; Pulse 56; Resp 16; Pulse Ox 97% on R/A;                                    vg1 

12:16  / 57; Pulse 52; Resp 18; Pulse Ox 94% on R/A;                                    vg1 

14:40  / 55; Pulse 56; Resp 18; Pulse Ox 95% on R/A;                                    vg1 

                                                                                                  

Procedures:                                                                                       

12:15 Peripheral line: by aseptic technique a peripheral line was placed in the left external ragini 

      jugular vein.                                                                               

                                                                                                  

MDM:                                                                                              

09:22 Patient medically screened.                                                             [facility] 

10:21 Differential diagnosis: hip fracture, intertrochanteric fracture. Data reviewed: vital  [facility] 

      signs, nurses notes, lab test result(s), EKG, radiologic studies, CT scan, plain films.     

      Data interpreted: Cardiac monitor: not applicable for this patient encounter. Pulse         

      oximetry: on room air is 97 %. Test interpretation: by ED physician or midlevel             

      provider: ECG, plain radiologic studies. Counseling: I had a detailed discussion with       

      the patient and/or guardian regarding: the historical points, exam findings, and any        

      diagnostic results supporting the discharge/admit diagnosis, lab results, radiology         

      results.                                                                                    

                                                                                                  

                                                                                             

10:17 Order name: Basic Metabolic Panel; Complete Time: 13:01                                 [facility] 

                                                                                             

10:17 Order name: CBC with Diff; Complete Time: 12:14                                         [facility] 

                                                                                             

10:17 Order name: LFT's; Complete Time: 13:                                                 [facility] 

                                                                                             

10:17 Order name: Magnesium; Complete Time: 13:                                             [facility] 

                                                                                             

10:17 Order name: NT PRO-BNP; Complete Time: 13:                                            [facility] 

                                                                                             

10:17 Order name: PT-INR; Complete Time: 13:                                                [facility] 

                                                                                             

10:17 Order name: Troponin (emerg Dept Use Only); Complete Time: 13:                        [facility] 

                                                                                             

10:17 Order name: XRAY Chest (1 view); Complete Time: 12:14                                   [facility] 

                                                                                             

10:17 Order name: Lipase; Complete Time: 13:                                                [facility] 

                                                                                             

10:17 Order name: Pelvis XRAY; Complete Time: 13:                                           [facility] 

                                                                                             

10:17 Order name: Hip Right 2 View XRAY; Complete Time: 13:                                 [facility] 

                                                                                             

10:17 Order name: Femur Right XRAY; Complete Time: 13:                                      [facility] 

                                                                                             

11:16 Order name: Head C Spine Cap Wo Con; Complete Time: 12:14                               [facility]

                                                                                             

09:34 Order name: EKG - Nurse/Tech; Complete Time: 09:39                                      tr6 

                                                                                             

10:17 Order name: EKG; Complete Time: 10:18                                                   [facility] 

                                                                                             

10:17 Order name: Cardiac monitoring; Complete Time: 10:33                                    [facility] 

                                                                                             

10:17 Order name: IV Saline Lock; Complete Time: 12:17                                        [facility] 

                                                                                             

10:17 Order name: Labs collected and sent; Complete Time: 10:52                               [facility] 

                                                                                             

10:17 Order name: O2 Per Protocol; Complete Time: 10:34                                       [facility] 

                                                                                             

10:17 Order name: O2 Sat Monitoring; Complete Time: 10:34                                     [facility] 

                                                                                             

11:07 Order name: Labs - recollect needed: recolllect all labs; Complete Time: 12:17          bd  

                                                                                                  

ECG:                                                                                              

10:23 Rate is 56 beats/min. Rhythm is regular. QRS Axis is Normal. VT interval is normal. QRS ragini 

      interval is normal. QT interval is normal. No Q waves. T waves are Normal. No ST            

      changes noted. Clinical impression: Sinus bradycardia and No evidence of ischemia.          

      Interpreted by me. Reviewed by me.                                                          

                                                                                                  

Administered Medications:                                                                         

12:15 Drug: NS 0.9% 500 ml Route: IV; Rate: bolus; Site: left jugular;                        vg1 

12:45 Follow up: IV Status: Completed infusion; IV Intake: 500ml                              vg1 

12:59 Drug: NS 0.9% 1000 ml Route: IV; Rate: 125 ml/hr; Site: left jugular;                   vg1 

14:51 Follow up: IV Status: Completed infusion; IV Intake: 700ml                              vg1 

                                                                                                  

                                                                                                  

Disposition:                                                                                      

21 13:10 Discharged to Home. Impression: Fall due to bumping against object, Superficial    

  injury of head, Contusion of right hip, Weakness.                                               

- Condition is Stable.                                                                            

- Discharge Instructions: Head Injury, Adult, Near-Syncope, Weakness, Weakness,                   

  Easy-to-Read, Head Injury, Adult, Easy-to-Read.                                                 

- Prescriptions for Tylenol- Codeine #3 300-30 mg Oral Tablet - take 1 tablet by ORAL             

  route every 4-6 hours As needed; 20 tablet.                                                     

- Medication Reconciliation Form, Thank You Letter, Antibiotic Education, Prescription            

  Opioid Use form.                                                                                

- Follow up: Private Physician; When: 2 - 3 days; Reason: Recheck today's complaints,             

  Continuance of care, Re-evaluation by your physician. Follow up: Dr. Fredis Horton;              

  When: 2 - 3 days; Reason: Recheck today's complaints, Continuance of care,                      

  Re-evaluation by your physician.                                                                

- Problem is new.                                                                                 

- Symptoms have improved.                                                                         

                                                                                                  

                                                                                                  

                                                                                                  

Signatures:                                                                                       

Dispatcher [employer]                           EDMS                                                 

Chery Coreas Corey, MD MD cha Baxter, Heather, RN                     Serenity Conde RN RN   vg1                                                  

Sera Santos RN                   RN   tr6                                                  

                                                                                                  

Corrections: (The following items were deleted from the chart)                                    

11:16 10:18 Head C Spine CAP W Con+CT.RAD.BRZ ordered. EDMS                                   EDMS

14:51 13:10 2021 13:10 Discharged to Home. Impression: Fall due to bumping against      vg1 

      object; Superficial injury of head; Contusion of right hip; Weakness. Condition is          

      Stable. Discharge Instructions: Head Injury, Adult, Near-Syncope, Weakness, Weakness,       

      Easy-to-Read, Head Injury, Adult, Easy-to-Read. Prescriptions for Tylenol-Codeine #3        

      300-30 mg Oral Tablet - take 1 tablet by ORAL route every 4-6 hours As needed; 20           

      tablet. and Forms are Medication Reconciliation Form, Thank You Letter, Antibiotic          

      Education, Prescription Opioid Use. Follow up: Private Physician; When: 2 - 3 days;         

      Reason: Recheck today's complaints, Continuance of care, Re-evaluation by your              

      physician. Follow up: Dr. Fredis Horton; When: 2 - 3 days; Reason: Recheck today's           

      complaints, Continuance of care, Re-evaluation by your physician. Problem is new.           

      Symptoms have improved. ragini                                                                 

                                                                                                  

**************************************************************************************************

## 2021-05-12 NOTE — RAD REPORT
EXAM DESCRIPTION:  RAD - Hip Right 2 View - 5/12/2021 11:38 am

 

CLINICAL HISTORY:  PAIN

 

COMPARISON:  No comparisons

 

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 No fracture, dislocation 
or AVN pattern observed.

## 2021-07-02 ENCOUNTER — HOSPITAL ENCOUNTER (OUTPATIENT)
Dept: HOSPITAL 97 - ER | Age: 81
Setting detail: OBSERVATION
LOS: 1 days | Discharge: HOME | End: 2021-07-03
Attending: HOSPITALIST | Admitting: HOSPITALIST
Payer: COMMERCIAL

## 2021-07-02 VITALS — BODY MASS INDEX: 37.2 KG/M2

## 2021-07-02 DIAGNOSIS — J44.9: ICD-10-CM

## 2021-07-02 DIAGNOSIS — I95.1: Primary | ICD-10-CM

## 2021-07-02 DIAGNOSIS — Z87.891: ICD-10-CM

## 2021-07-02 DIAGNOSIS — E66.01: ICD-10-CM

## 2021-07-02 DIAGNOSIS — F41.9: ICD-10-CM

## 2021-07-02 DIAGNOSIS — F03.90: ICD-10-CM

## 2021-07-02 DIAGNOSIS — G62.9: ICD-10-CM

## 2021-07-02 DIAGNOSIS — Z85.3: ICD-10-CM

## 2021-07-02 DIAGNOSIS — K21.9: ICD-10-CM

## 2021-07-02 DIAGNOSIS — M79.7: ICD-10-CM

## 2021-07-02 DIAGNOSIS — G89.4: ICD-10-CM

## 2021-07-02 DIAGNOSIS — I49.3: ICD-10-CM

## 2021-07-02 DIAGNOSIS — E03.9: ICD-10-CM

## 2021-07-02 DIAGNOSIS — E87.6: ICD-10-CM

## 2021-07-02 DIAGNOSIS — I10: ICD-10-CM

## 2021-07-02 DIAGNOSIS — G47.00: ICD-10-CM

## 2021-07-02 DIAGNOSIS — F32.9: ICD-10-CM

## 2021-07-02 DIAGNOSIS — Z86.711: ICD-10-CM

## 2021-07-02 DIAGNOSIS — Z96.653: ICD-10-CM

## 2021-07-02 DIAGNOSIS — H81.09: ICD-10-CM

## 2021-07-02 LAB
ALBUMIN SERPL BCP-MCNC: 3 G/DL (ref 3.4–5)
ALP SERPL-CCNC: 93 U/L (ref 45–117)
ALT SERPL W P-5'-P-CCNC: 32 U/L (ref 12–78)
AST SERPL W P-5'-P-CCNC: 20 U/L (ref 15–37)
BUN BLD-MCNC: 22 MG/DL (ref 7–18)
GLUCOSE SERPLBLD-MCNC: 91 MG/DL (ref 74–106)
HCT VFR BLD CALC: 45.7 % (ref 36–45)
INR BLD: 0.97
LYMPHOCYTES # SPEC AUTO: 2.1 K/UL (ref 0.7–4.9)
MAGNESIUM SERPL-MCNC: 2.3 MG/DL (ref 1.8–2.4)
NT-PROBNP SERPL-MCNC: 270 PG/ML (ref ?–450)
PMV BLD: 8 FL (ref 7.6–11.3)
POTASSIUM SERPL-SCNC: 3.2 MMOL/L (ref 3.5–5.1)
RBC # BLD: 4.78 M/UL (ref 3.86–4.86)
TROPONIN (EMERG DEPT USE ONLY): < 0.02 NG/ML (ref 0–0.04)

## 2021-07-02 PROCEDURE — 87088 URINE BACTERIA CULTURE: CPT

## 2021-07-02 PROCEDURE — 36415 COLL VENOUS BLD VENIPUNCTURE: CPT

## 2021-07-02 PROCEDURE — 97161 PT EVAL LOW COMPLEX 20 MIN: CPT

## 2021-07-02 PROCEDURE — 94760 N-INVAS EAR/PLS OXIMETRY 1: CPT

## 2021-07-02 PROCEDURE — 93005 ELECTROCARDIOGRAM TRACING: CPT

## 2021-07-02 PROCEDURE — 83735 ASSAY OF MAGNESIUM: CPT

## 2021-07-02 PROCEDURE — 71045 X-RAY EXAM CHEST 1 VIEW: CPT

## 2021-07-02 PROCEDURE — 83880 ASSAY OF NATRIURETIC PEPTIDE: CPT

## 2021-07-02 PROCEDURE — 99285 EMERGENCY DEPT VISIT HI MDM: CPT

## 2021-07-02 PROCEDURE — 70450 CT HEAD/BRAIN W/O DYE: CPT

## 2021-07-02 PROCEDURE — 85610 PROTHROMBIN TIME: CPT

## 2021-07-02 PROCEDURE — 81015 MICROSCOPIC EXAM OF URINE: CPT

## 2021-07-02 PROCEDURE — 80076 HEPATIC FUNCTION PANEL: CPT

## 2021-07-02 PROCEDURE — 72125 CT NECK SPINE W/O DYE: CPT

## 2021-07-02 PROCEDURE — 84484 ASSAY OF TROPONIN QUANT: CPT

## 2021-07-02 PROCEDURE — 87086 URINE CULTURE/COLONY COUNT: CPT

## 2021-07-02 PROCEDURE — 81003 URINALYSIS AUTO W/O SCOPE: CPT

## 2021-07-02 PROCEDURE — 96360 HYDRATION IV INFUSION INIT: CPT

## 2021-07-02 PROCEDURE — 80048 BASIC METABOLIC PNL TOTAL CA: CPT

## 2021-07-02 PROCEDURE — 85025 COMPLETE CBC W/AUTO DIFF WBC: CPT

## 2021-07-02 RX ADMIN — Medication SCH ML: at 20:43

## 2021-07-02 NOTE — RAD REPORT
EXAM DESCRIPTION:  Vanna Single View7/2/2021 3:33 pm

 

CLINICAL HISTORY:  Chest pain

 

COMPARISON:  May 2021

 

FINDINGS:   The lungs appear clear of acute infiltrate. The heart is mildly enlarged

 

IMPRESSION:   No acute abnormalities displayed

## 2021-07-02 NOTE — ER
Nurse's Notes                                                                                     

 [facility]                                                                 

Name: Leona Peres                                                                              

Age: 80 yrs                                                                                       

Sex: Female                                                                                       

: 1940                                                                                   

MRN: [id number]                                                                                   

Arrival Date: 2021                                                                          

Time: 14:53                                                                                       

Account#: [id number]                                                                            

Bed 15                                                                                            

Private MD:                                                                                       

Diagnosis: Syncope;Orthostatic hypotension;Abnormal electrocardiogram [ECG] [EKG]                 

                                                                                                  

Presentation:                                                                                     

                                                                                             

14:57 Chief complaint: Patient states: I felt dizzy, then I fell and my head bounced once on  ca1 

      the floor. I am not in pain but my head is aching. EMS states: Syncopal episode             

      presumably fell from standing and hit head on floor. Pt on Plavix. . EKG shows       

      ST depression on V3. Coronavirus screen: Client denies travel out of the [place] in the        

      last 14 days. At this time, the client does not indicate any symptoms associated with       

      coronavirus-19. Ebola Screen: Patient negative for fever greater than or equal to 101.5     

      degrees Fahrenheit, and additional compatible Ebola Virus Disease symptoms Patient          

      denies exposure to infectious person. Patient denies travel to an Ebola-affected area       

      in the 21 days before illness onset. No symptoms or risks identified at this time.          

      Initial Sepsis Screen: Does the patient meet any 2 criteria? No. Patient's initial          

      sepsis screen is negative. Does the patient have a suspected source of infection? No.       

      Patient's initial sepsis screen is negative. Risk Assessment: Do you want to hurt           

      yourself or someone else? Patient reports no desire to harm self or others. Onset of        

      symptoms was 2021.                                                                 

14:57 Method Of Arrival: EMS: [facility]                                                ca1 

14:57 Acuity: ANUSHKA 2                                                                           ca1 

                                                                                                  

Historical:                                                                                       

- Allergies:                                                                                      

15:00 clindamycin HCl (bulk);                                                                 ca1 

15:00 Darvocet-N 100;                                                                         ca1 

15:00 PENICILLINS;                                                                            ca1 

15:00 Strawberries;                                                                           ca1 

15:00 Sulfa (Sulfonamide Antibiotics);                                                        ca1 

- Home Meds:                                                                                      

15:00 alprazolam 0.25 mg Oral tab 1 tab twice a day for anxiety [Active]; benzonatate 200 mg  ca1 

      oral cap 1 cap 3 times per day [Active]; bupropion HCl 150 mg Oral Tb24 1 tab once          

      daily [Active]; bupropion HCl 300 mg Oral Tb24 1 tab once daily [Active]; clopidogrel       

      75 mg oral tab 1 tab once daily [Active]; donepezil 10 mg oral tab 1 tab once daily         

      [Active]; duloxetine 60 mg Oral cpDR 2 caps once daily [Active]; doxepin 50 mg Oral cap     

      1 cap once daily [Active]; Ellura 200 mg oral cap daily [Active]; gabapentin 300 mg         

      oral cap 1 cap 3 times per day [Active]; hydrocodone-acetaminophen 7.5-325 mg Oral tab      

      1 tab every 8 hours [Active]; indapamide 2.5 mg oral tab 1 tab once daily [Active];         

      levothyroxine 75 mcg tab 1 tab once daily [Active]; magnesium oxide 500 mg Oral tab 500     

      mg after meals and before bedtime [Active]; memantine 10 mg oral tab 1 tab 2 times per      

      day [Active]; metoprolol succinate 25 mg oral Tb24 1 tab once daily [Active];               

      pantoprazole 40 mg oral grps 1 packet once daily [Active]; potassium chloride 20 mEq        

      oral TbER 1 tab 2 times per day [Active]; simvastatin 40 mg Oral tab 1 tab once daily       

      [Active]; quetiapine 200 mg oral Tb24 1 tab once daily [Active]; tizanidine 4 mg oral       

      tab 1 tab daily [Active]; topiramate 200 mg oral cp24 1 cap twice a day [Active];           

      Trelegy Ellipta 100-62.5-25 mcg inhalation dsdv 1 puff once daily [Active];                 

- PMHx:                                                                                           

15:00 Cancer; Depression; Fibromyalgia; GERD; insomnia; lymphedema; skipped heart beats;      ca1 

                                                                                                  

- Immunization history:: Adult Immunizations up to date, Client reports receiving the             

  2nd dose of the Covid vaccine, Client reports receiving the 1st dose of the Covid               

  vaccine, Pneumococcal vaccine is up to date, Flu vaccine is up to date.                         

- Social history:: Smoking status: Patient/guardian denies using tobacco, the patient             

  reports quitting approximately 53 years ago.                                                    

                                                                                                  

                                                                                                  

Screening:                                                                                        

15:00 Abuse screen: Denies threats or abuse. Denies injuries from another. Nutritional        ca1 

      screening: No deficits noted. Tuberculosis screening: No symptoms or risk factors           

      identified. Fall Risk Fall in past 12 months (25 points). IV access (20 points).            

      Ambulatory Aid- Crutches/Cane/Walker (15 pts). Total Martinez Fall Scale indicates High        

      Risk Score (45 or more points). Fall prevention measures have been instituted. Side         

      Rails Up X 2 Frequent Obs/Assessments Occuring As available patient and family educated     

      on Fall Prevention Program and Strategies.                                                  

                                                                                                  

Assessment:                                                                                       

15:00 General: Appears in no apparent distress. comfortable, Behavior is calm, cooperative,   ca1 

      appropriate for age. Pain: Complains of pain in scalp Pain currently is 2 out of 10 on      

      a pain scale. Quality of pain is described as aching. Neuro: Level of Consciousness is      

      awake, alert, obeys commands, Oriented to person, place, time, situation. Neuro:            

      Reports a syncopal episode. Cardiovascular: Heart tones S1 S2 present Capillary refill      

      < 3 seconds Patient's skin is warm and dry. Rhythm is sinus rhythm. Respiratory: Airway     

      is patent Respiratory effort is even, unlabored, Respiratory pattern is regular,            

      symmetrical, Breath sounds are clear bilaterally. GI: Abdomen is round non-distended,       

      Bowel sounds present X 4 quads. Abd is soft and non tender X 4 quads. : No signs          

      and/or symptoms were reported regarding the genitourinary system. EENT: No signs and/or     

      symptoms were reported regarding the EENT system. Derm: Skin is intact, is healthy with     

      good turgor, Skin is pink, warm \T\ dry. Musculoskeletal: Circulation, motion, and          

      sensation intact. Capillary refill < 3 seconds.                                             

16:00 Reassessment: Patient appears in no apparent distress at this time. Patient and/or      ca1 

      family updated on plan of care and expected duration. Pain level reassessed. Patient is     

      alert, oriented x 3, equal unlabored respirations, skin warm/dry/pink.                      

17:00 Reassessment: Patient appears in no apparent distress at this time. Patient and/or      ca1 

      family updated on plan of care and expected duration. Pain level reassessed. Patient is     

      alert, oriented x 3, equal unlabored respirations, skin warm/dry/pink.                      

17:50 Reassessment: Patient appears in no apparent distress at this time. Patient and/or      ca1 

      family updated on plan of care and expected duration. Pain level reassessed. Patient is     

      alert, oriented x 3, equal unlabored respirations, skin warm/dry/pink.                      

18:42 Reassessment: Patient appears in no apparent distress at this time. Patient and/or      ca1 

      family updated on plan of care and expected duration. Pain level reassessed. Patient is     

      alert, oriented x 3, equal unlabored respirations, skin warm/dry/pink.                      

19:50 Reassessment: Patient appears in no apparent distress at this time. Patient and/or      ca1 

      family updated on plan of care and expected duration. Pain level reassessed. Patient is     

      alert, oriented x 3, equal unlabored respirations, skin warm/dry/pink.                      

20:25 Reassessment: Patient appears in no apparent distress at this time. Patient is alert,   ca1 

      oriented x 3, equal unlabored respirations, skin warm/dry/pink.                             

                                                                                                  

Vital Signs:                                                                                      

14:57  / 94; Pulse 65; Resp 18 S; Temp 98.5(O); Pulse Ox 100% on R/A; Weight 95.25 kg   ca1 

      (R); Height 5 ft. 3 in. (160.02 cm) (R); Pain 0/10;                                         

15:00  / 77; Pulse 60; Resp 18 S; Pulse Ox 97% on R/A;                                  ca1 

17:16  / 81 Supine; Pulse 67; Resp 18 S; Pulse Ox 100% on R/A;                          ca1 

17:18  / 69 Sitting; Pulse 61; Resp 15 S; Pulse Ox 100% on R/A;                         ca1 

17:20 BP 91 / 65 Standing; Pulse 64; Resp 18 S; Pulse Ox 96% on R/A;                          ca1 

18:30 BP 99 / 84; Pulse 59; Resp 18 S; Pulse Ox 96% on R/A;                                   ca1 

19:50  / 58; Pulse 62; Resp 17 S; Pulse Ox 95% on R/A;                                  ca1 

14:57 Body Mass Index 37.20 (95.25 kg, 160.02 cm)                                             ca1 

                                                                                                  

ED Course:                                                                                        

14:53 Patient arrived in ED.                                                                  ca1 

15:00 Triage completed.                                                                       ca1 

15:00 Arm band placed on right wrist.                                                         ca1 

15:00 Patient has correct armband on for positive identification. Bed in low position. Call   ca1 

      light in reach. Side rails up X2. Cardiac monitor on. Pulse ox on. NIBP on. Warm            

      blanket given.                                                                              

15:01 Aroldo Salazar PA is PHCP.                                                               [id number] 

15:01 Ruslan Bob MD is Attending Physician.                                              [email or web address] 

15:05 EKG done, by ED staff, reviewed by Aroldo MARES.                                      [id number] 

15:10 Acob, Olga, RN is Primary Nurse.                                                      ca1 

15:16 CT Head C Spine In Process Unspecified.                                                 EDMS

15:33 XRAY Chest (1 view) In Process Unspecified.                                             EDMS

15:50 Missed attempt(s): 22 gauge in right upper arm. Bleeding controlled, band aid applied,  ca1 

      catheter tip intact.                                                                        

16:08 Accessed peripheral vein via ultrasound, utilizing dynamic ultrasound technique using   jd3 

      18G Nexia IV Catheter ,sterile technique, per hospital protocol. Clean \T\ dry. Dressing    

      intact. Good blood return. Flushes easily.                                                  

17:30 Tyrell Treviño is Hospitalizing Provider.                                               [email or web address] 

20:03 No provider procedures requiring assistance completed.                                  ca1 

20:03 Patient admitted, IV remains in place.                                                  ca1 

                                                                                                  

Administered Medications:                                                                         

16:58 Drug: Potassium Chloride 40 mEq Route: PO;                                              ca1 

18:42 Follow up: Response: No adverse reaction                                                ca1 

17:29 Drug: NS 0.9% 500 ml Route: IV; Rate: bolus; Site: right antecubital;                   ca1 

18:15 Follow up: IV Status: Completed infusion; IV Intake: 500ml                              ca1 

18:16 Drug: NS 0.9% 1000 ml Route: IV; Rate: 75 ml/hr; Site: right antecubital;               ca1 

18:42 Follow up: Response: No adverse reaction; IV Status: Infusion continued upon admission  ca1 

                                                                                                  

                                                                                                  

Intake:                                                                                           

18:15 IV: 500ml; Total: 500ml.                                                                ca1 

                                                                                                  

Outcome:                                                                                          

17:31 Decision to Hospitalize by Provider.                                                    [id number] 

20:03 Admitted to Med/surg accompanied by tech, via stretcher, room 210, with chart, Report   ca1 

      called to  ZHAO Lewis                                                                        

20:03 Condition: stable                                                                           

20:03 Instructed on the need for admit, Demonstrated understanding of instructions.               

20:25 Patient left the ED.                                                                    ca1 

                                                                                                  

Signatures:                                                                                       

Dispatcher [employer]                           EDMS                                                 

Aroldo Salazar PA PA   [id number]                                                  

Kaylee Quijano                              [facility]                                                  

Shane Skelton RN                    RN   Olga Saravia RN                        RN   ca1                                                  

                                                                                                  

**************************************************************************************************

## 2021-07-02 NOTE — XMS REPORT
Continuity of Care Document

                             Created on:2021



Patient:NEGRO GONZALES

Sex:Female

:1940

External Reference #:[id number]





Demographics







                          Address                   [address] [place] [address]

 

                          Home Phone                [phone]

 

                          Mobile Phone              [phone]

 

                          Email Address             [email or web address]

 

                          Preferred Language        English

 

                          Marital Status            Unknown

 

                          Catholic Affiliation     Unknown

 

                          Race                      Unknown

 

                          Additional Race(s)        Unavailable



                                                    White

 

                          Ethnic Group              Unknown









Author







                          Organization              [facility]

t

 

                          Address                   [address]



                                                    [place] [address]

 

                          Phone                     [phone]









Support







                Name            Relationship    Address         Phone

 

                Homero          Child           Unavailable     [phone]

 

                Homero          Other           Unavailable     [phone]









Care Team Providers







                    Name                Role                Phone

 

                    Denita RIZVI            Primary Care Physician [phone]

 

                    Abbe RIZVI           Attending Clinician [phone]









Payers







           Payer Name Policy Type Policy     Effective Date Expiration Date Sour

ce



                                 Number                           

 

           [employer]ET[employer] MEDICAREAETNA            [id number]   2014            [place]

on



           MEDICARE HMO/PPO                       00:00:00              [facility]

t



           [id number]                                             



           -PresentHMO                                             







Problems

This patient has no known problems.



Allergies, Adverse Reactions, Alerts







       Allergy Allergy Status Severity Reaction(s) Onset  Inactive Treating Comm

ents 

Source



       Name   Type                        Date   Date   Clinician        

 

       Propoxyp Propensi Active        Unknown 2020-0                      [facility]

on



       hene   ty to                Reaction 6-30                        [facility]



       N-Acetam adverse                      00:00:                      st



       inophen reaction                      00                          



              s to                                                    



              drug                                                    

 

       Penicill Propensi Active        Unknown 2020-0                      [facility]

on



       ins    ty to                Reaction 6-30                        [facility]



              adverse                      00:00:                      st



              reaction                      00                          



              s to                                                    



              drug                                                    

 

       Sulfa  Propensi Active        Unknown 2020-0                      [facility]



       (Sulfona ty to                Reaction 6-30                        [facility]

i



       mide   adverse                      00:00:                      st



       Antibiot reaction                      00                          



       ics)   s to                                                    



              drug                                                    

 

       Acetamin Propensi Active        Unknown 2020-0                      [facility]

on



       ophen  ty to                Reaction 6-30                        [facility]



              adverse                      00:00:                      st



              reaction                      00                          



              s to                                                    



              drug                                                    

 

       Clindamy Propensi Active        Unknown 2020-0                      [facility]

on



       jayna    ty to                Reaction 6-30                        [facility]



       Phosphat adverse                      00:00:                      st



       e      reaction                      00                          



              s to                                                    



              drug                                                    







Family History







           Family Member Diagnosis  Comments   Start Date Stop Date  Source

 

           Natural father Diabetes                                    [facility]

[facility]

 

           Natural father Heart disease                                  [facility]

 [facility]

 

           Natural father Hypertension                                  [facility] 

[facility]

 

           Natural mother Asthma                                      [facility]

thodist







Social History







           Social Habit Start Date Stop Date  Quantity   Comments   Source

 

           History of tobacco                       Current smoker            [facility]

[facility]



           use                                                    

 

           [facility]

[facility]



           Alcohol Std Drinks                                             

 

           [facility]

[facility]



           Alcohol Binge                                             

 

           Cigarettes smoked 2020                       [facility]

 [facility]



           current (pack per 00:00:00   00:00:00                         



           day) - Reported                                             

 

           Cigarette  2020                       [facility]

ist



           pack-years 00:00:00   00:00:00                         

 

           Tobacco use and 2020 Never used            [facility]

[facility]



           exposure   00:00:00   00:00:00                         

 

           Alcohol intake 2020 Lifetime              [facility]

thod[facility]



                      00:00:00   00:00:00   non-drinker            



                                            (finding)             

 

           History SDOH 2020 1                     [facility]

odist



           Alcohol Frequency 00:00:00   00:00:00                         

 

           Sex Assigned At [birth year]                       [facility]

[facility]



           Birth      00:00:00   00:00:00                         









                Smoking Status  Start Date      Stop Date       Source

 

                Former smoker   2020 00:00:00 2020 00:00:00 [facility] 

[facility]







Medications

This patient has no known medications.



Procedures







                Procedure       Date / Time Performed Performing Clinician [facility]

e

 

                FL EXTERNAL STUDY EXAM 2020 11:20:00 Ellis Baez

on [facility]







Plan of Care







             Planned Activity Planned Date Details      Comments     Source

 

             Future Scheduled 2021   INFLUENZA VACCINE              Pat padilla [facility]



             Test         00:00:00     [code = INFLUENZA              



                                       VACCINE]                  

 

             Future Scheduled 2006-10-07   65+ PNEUMOCOCCAL              [place]

 [facility]



             Test         00:00:00     VACCINE (1 of 1 -              



                                       PPSV23) [code = 65+              



                                       PNEUMOCOCCAL VACCINE              



                                       (1 of 1 - PPSV23)]              

 

             Future Scheduled 1991-10-07   SHINGLES VACCINES (#1)              H

terri [facility]



             Test         00:00:00     [code = SHINGLES              



                                       VACCINES (#1)]              

 

             Future Scheduled 1959-10-07   Hepatitis C screening              [facility]

isaura [facility]



             Test         00:00:00     (procedure) [code =              



                                       099317122]                







Encounters







        Start   End     Encounter Admission Attending Care    Care    Encounter 

Source



        Date/Time Date/Time Type    Type    Clinicians Facility Department ID   

   

 

        2021-05-10 2021-05-10 Outpatient         [facility], [facility]     [id number]

[address]



        00:00:00 00:00:00                 ELLIS                    [address]     [facility]

i



                                                                        st

 

        2021 Outpatient         [facility], [facility]     [id number]

[address]



        00:00:00 00:00:00                 ELLIS                    088     [facility]

i



                                                                        st

 

        2020 Outpatient         [facility], [facility]     [id number]

[address]



        00:00:00 00:00:00                 ELLIS                    [address]     [facility]

i



                                                                        st

 

        2020 Outpatient         [facility], [facility]     [id number]

[address]



        00:00:00 00:00:00                 ELLIS                    [address]     [facility]

i



                                                                        st







Results







           Test Description Test Time  Test Comments Results    Result     [facility]

e



                                                       Comments   

 

           FL External Study 2021-05-10            This exam was not            

[facility]



           Exam       08:55:15              acquired at a            [facility]



                                            [facility]             



                                            facility and has            



                                            not been              



                                            interpreted by a            



                                            [facility]             



                                            Provider.  The            



                                            exam was imported            



                                            into our imaging            



                                            system.

## 2021-07-02 NOTE — EDPHYS
Physician Documentation                                                                           

 [facility]                                                                 

Name: Leona Peres                                                                              

Age: 80 yrs                                                                                       

Sex: Female                                                                                       

: 1940                                                                                   

MRN: [id number]                                                                                   

Arrival Date: 2021                                                                          

Time: 14:53                                                                                       

Account#: [id number]                                                                            

Bed 15                                                                                            

Private MD:                                                                                       

ED Physician Ruslan Bob                                                                       

HPI:                                                                                              

                                                                                             

16:42 This 80 yrs old  Female presents to ER via EMS with complaints of Syncope,     jr8 

      Fall Injury.                                                                                

16:42 The patient has experienced syncope, collapsed, lost consciousness. Onset: The          jr8 

      symptoms/episode began/occurred acutely, today. Duration: This was a single episode.        

      Context: occurred at home, occurred while the patient was walking, Just prior to the        

      episode the patient experienced dizziness. Associated injury: Head/face: pain.              

      Associated signs and symptoms: The patient has no apparent associated signs or              

      symptoms. Current symptoms: Currently, the patient is not experiencing any symptoms,        

      the patient feels back to baseline, no decreased level of consciousness, no confusion,      

      no dysphasia, no headache, no paralysis, no visual changes. The patient has not             

      experienced similar symptoms in the past. The patient has not recently seen a physician.    

                                                                                                  

Historical:                                                                                       

- Allergies:                                                                                      

15:00 clindamycin HCl (bulk);                                                                 ca1 

15:00 Darvocet-N 100;                                                                         ca1 

15:00 PENICILLINS;                                                                            ca1 

15:00 Strawberries;                                                                           ca1 

15:00 Sulfa (Sulfonamide Antibiotics);                                                        ca1 

- Home Meds:                                                                                      

15:00 alprazolam 0.25 mg Oral tab 1 tab twice a day for anxiety [Active]; benzonatate 200 mg  ca1 

      oral cap 1 cap 3 times per day [Active]; bupropion HCl 150 mg Oral Tb24 1 tab once          

      daily [Active]; bupropion HCl 300 mg Oral Tb24 1 tab once daily [Active]; clopidogrel       

      75 mg oral tab 1 tab once daily [Active]; donepezil 10 mg oral tab 1 tab once daily         

      [Active]; duloxetine 60 mg Oral cpDR 2 caps once daily [Active]; doxepin 50 mg Oral cap     

      1 cap once daily [Active]; Ellura 200 mg oral cap daily [Active]; gabapentin 300 mg         

      oral cap 1 cap 3 times per day [Active]; hydrocodone-acetaminophen 7.5-325 mg Oral tab      

      1 tab every 8 hours [Active]; indapamide 2.5 mg oral tab 1 tab once daily [Active];         

      levothyroxine 75 mcg tab 1 tab once daily [Active]; magnesium oxide 500 mg Oral tab 500     

      mg after meals and before bedtime [Active]; memantine 10 mg oral tab 1 tab 2 times per      

      day [Active]; metoprolol succinate 25 mg oral Tb24 1 tab once daily [Active];               

      pantoprazole 40 mg oral grps 1 packet once daily [Active]; potassium chloride 20 mEq        

      oral TbER 1 tab 2 times per day [Active]; simvastatin 40 mg Oral tab 1 tab once daily       

      [Active]; quetiapine 200 mg oral Tb24 1 tab once daily [Active]; tizanidine 4 mg oral       

      tab 1 tab daily [Active]; topiramate 200 mg oral cp24 1 cap twice a day [Active];           

      Trelegy Ellipta 100-62.5-25 mcg inhalation dsdv 1 puff once daily [Active];                 

- PMHx:                                                                                           

15:00 Cancer; Depression; Fibromyalgia; GERD; insomnia; lymphedema; skipped heart beats;      ca1 

                                                                                                  

- Immunization history:: Adult Immunizations up to date, Client reports receiving the             

  2nd dose of the Covid vaccine, Client reports receiving the 1st dose of the Covid               

  vaccine, Pneumococcal vaccine is up to date, Flu vaccine is up to date.                         

- Social history:: Smoking status: Patient/guardian denies using tobacco, the patient             

  reports quitting approximately 53 years ago.                                                    

                                                                                                  

                                                                                                  

ROS:                                                                                              

16:42 Eyes: Negative for injury, pain, redness, and discharge, ENT: Negative for injury,      jr8 

      pain, and discharge, Neck: Negative for injury, pain, and swelling, Cardiovascular:         

      Negative for chest pain, palpitations, and edema, Respiratory: Negative for shortness       

      of breath, cough, wheezing, and pleuritic chest pain, Abdomen/GI: Negative for              

      abdominal pain, nausea, vomiting, diarrhea, and constipation, Back: Negative for injury     

      and pain, MS/Extremity: Negative for injury and deformity, Skin: Negative for injury,       

      rash, and discoloration.                                                                    

16:42 Neuro: Positive for dizziness, syncope.                                                     

                                                                                                  

Exam:                                                                                             

16:42 Constitutional:  This is a well developed, well nourished patient who is awake, alert,  jr8 

      and in no acute distress. Head/Face:  Normocephalic, atraumatic. Eyes:  Pupils equal        

      round and reactive to light, extra-ocular motions intact.  Lids and lashes normal.          

      Conjunctiva and sclera are non-icteric and not injected.  Cornea within normal limits.      

      Periorbital areas with no swelling, redness, or edema. ENT:  Nares patent. No nasal         

      discharge, no septal abnormalities noted.  Tympanic membranes are normal and external       

      auditory canals are clear.  Oropharynx with no redness, swelling, or masses, exudates,      

      or evidence of obstruction, uvula midline.  Mucous membranes moist. Neck:  Trachea          

      midline, no thyromegaly or masses palpated, and no cervical lymphadenopathy.  Supple,       

      full range of motion without nuchal rigidity, or vertebral point tenderness.  No            

      Meningismus. Chest/axilla:  Normal chest wall appearance and motion.  Nontender with no     

      deformity.  No lesions are appreciated. Cardiovascular:  Regular rate and rhythm with a     

      normal S1 and S2.  No gallops, murmurs, or rubs.  Normal PMI, no JVD.  No pulse             

      deficits. Respiratory:  Lungs have equal breath sounds bilaterally, clear to                

      auscultation and percussion.  No rales, rhonchi or wheezes noted.  No increased work of     

      breathing, no retractions or nasal flaring. Abdomen/GI:  Soft, non-tender, with normal      

      bowel sounds.  No distension or tympany.  No guarding or rebound.  No evidence of           

      tenderness throughout. Back:  No spinal tenderness.  No costovertebral tenderness.          

      Full range of motion. Skin:  Warm, dry with normal turgor.  Normal color with no            

      rashes, no lesions, and no evidence of cellulitis. MS/ Extremity:  Pulses equal, no         

      cyanosis.  Neurovascular intact.  Full, normal range of motion. Neuro:  Awake and           

      alert, GCS 15, oriented to person, place, time, and situation.  Cranial nerves II-XII       

      grossly intact.  Motor strength 5/5 in all extremities.  Sensory grossly intact.            

      Cerebellar exam normal.                                                                     

17:28 ECG was reviewed by the Attending Physician.                                            jr8 

                                                                                                  

Vital Signs:                                                                                      

14:57  / 94; Pulse 65; Resp 18 S; Temp 98.5(O); Pulse Ox 100% on R/A; Weight 95.25 kg   ca1 

      (R); Height 5 ft. 3 in. (160.02 cm) (R); Pain 0/10;                                         

15:00  / 77; Pulse 60; Resp 18 S; Pulse Ox 97% on R/A;                                  ca1 

17:16  / 81 Supine; Pulse 67; Resp 18 S; Pulse Ox 100% on R/A;                          ca1 

17:18  / 69 Sitting; Pulse 61; Resp 15 S; Pulse Ox 100% on R/A;                         ca1 

17:20 BP 91 / 65 Standing; Pulse 64; Resp 18 S; Pulse Ox 96% on R/A;                          ca1 

18:30 BP 99 / 84; Pulse 59; Resp 18 S; Pulse Ox 96% on R/A;                                   ca1 

19:50  / 58; Pulse 62; Resp 17 S; Pulse Ox 95% on R/A;                                  ca1 

14:57 Body Mass Index 37.20 (95.25 kg, 160.02 cm)                                             ca1 

                                                                                                  

MDM:                                                                                              

15:02 Patient medically screened.                                                             [facility] 

17:30 Data reviewed: vital signs, nurses notes, lab test result(s), EKG, radiologic studies,  [facility] 

      CT scan, plain films. Data interpreted: Pulse oximetry: on room air is 100 %.               

      Interpretation: normal. Counseling: I had a detailed discussion with the patient and/or     

      guardian regarding: the historical points, exam findings, and any diagnostic results        

      supporting the discharge/admit diagnosis, lab results, radiology results, the need for      

      further work-up and treatment in the hospital.                                              

                                                                                                  

                                                                                             

15:02 Order name: Basic Metabolic Panel; Complete Time: 16:41                                 [facility] 

                                                                                             

15:02 Order name: CBC with Diff; Complete Time: 16:30                                         [facility] 

                                                                                             

15:02 Order name: LFT's; Complete Time: 16:41                                                 [facility] 

                                                                                             

15:02 Order name: Magnesium; Complete Time: 16:41                                             [facility] 

                                                                                             

15:02 Order name: NT PRO-BNP; Complete Time: 16:41                                            [facility] 

                                                                                             

15:02 Order name: PT-INR; Complete Time: 16:30                                                [facility] 

                                                                                             

15:02 Order name: Troponin (emerg Dept Use Only); Complete Time: 16:41                        [facility] 

                                                                                             

15:02 Order name: XRAY Chest (1 view); Complete Time: 16:14                                   [facility] 

                                                                                             

15:02 Order name: EKG; Complete Time: 15:02                                                   [facility] 

                                                                                             

15:06 Order name: CT Head C Spine; Complete Time: 16:14                                       [facility] 

                                                                                             

15:02 Order name: Cardiac monitoring; Complete Time: 15:11                                                                                                                                 

15:02 Order name: EKG - Nurse/Tech; Complete Time: 15:11                                                                                                                                   

15:02 Order name: IV Saline Lock; Complete Time: 16:20                                                                                                                                     

15:02 Order name: Labs collected and sent; Complete Time: 16:20                                                                                                                            

15:02 Order name: O2 Per Protocol; Complete Time: 16:20                                                                                                                                    

15:02 Order name: O2 Sat Monitoring; Complete Time: 16:20                                                                                                                                  

16:42 Order name: Orthostatics; Complete Time: 17:25                                                                                                                                       

19:25 Order name: CONS Physician Consult                                                      EDMS

                                                                                                  

EC:28 Rate is 62 beats/min. Rhythm is regular, Sinus Rhythm. QRS Axis is Normal. TX interval  jr8 

      is normal at 156 msec. QRS interval is normal at 80 msec. QT interval is normal at 384      

      msec. Q waves are Present in lead aVF. T waves are Inverted in leads V1, V2, V3, V4. ST     

      Segment is depressed in leads V2, V3, V4. Clinical impression: NSR w/ Non-specific ST/T     

      Changes. Interpreted by me. Reviewed by me.                                                 

                                                                                                  

Administered Medications:                                                                         

16:58 Drug: Potassium Chloride 40 mEq Route: PO;                                              ca1 

18:42 Follow up: Response: No adverse reaction                                                ca1 

17:29 Drug: NS 0.9% 500 ml Route: IV; Rate: bolus; Site: right antecubital;                   ca1 

18:15 Follow up: IV Status: Completed infusion; IV Intake: 500ml                              ca1 

18:16 Drug: NS 0.9% 1000 ml Route: IV; Rate: 75 ml/hr; Site: right antecubital;               ca1 

18:42 Follow up: Response: No adverse reaction; IV Status: Infusion continued upon admission  ca1 

                                                                                                  

                                                                                                  

Disposition Summary:                                                                              

21 17:31                                                                                    

Hospitalization Ordered                                                                           

      Hospitalization Status: Observation                                                     jr8 

      Provider: Tyrell Treviño 

      Location: Telemetry/[facility] (observation)                                               [facility] 

      Condition: Stable                                                                       [facility] 

      Problem: new                                                                            jr8 

      Symptoms: have improved                                                                 jr8 

      Bed/Room Type: Standard                                                                 [facility] 

      Room Assignment: 210(21 19:53)                                                    mw  

      Diagnosis                                                                                   

        - Syncope                                                                             jr8 

        - Orthostatic hypotension                                                             jr8 

        - Abnormal electrocardiogram [ECG] [EKG]                                              [facility] 

      Forms:                                                                                      

        - Medication Reconciliation Form                                                      jr8 

        - SBAR form                                                                           8 

Addendum:                                                                                         

2021                                                                                        

     13:30 Co-signature as Attending Physician, Ruslan Bob MD I agree with the assessment and   k
dr

           plan of care.                                                                          

                                                                                                  

Signatures:                                                                                       

Dispatcher [employer]                           EDMS                                                 

Betsy Palma RN RN                                                      

Ruslan Bob MD MD   [facility]                                                  

Aroldo Salazar PA PA   jr8                                                  

Olga Goode RN RN   ca1                                                  

                                                                                                  

Corrections: (The following items were deleted from the chart)                                    

                                                                                             

15:10 15:02 Head Brain Wo Cont+CT.RAD.BRZ ordered. EDMS                                       EDMS

19:53 17:31 jrGuero                                                                                 

                                                                                                  

**************************************************************************************************

## 2021-07-02 NOTE — P.HP
Certification for Inpatient


Patient admitted to: Observation


With expected LOS: <2 Midnights


Patient will require the following post-hospital care: None


Practitioner: I am a practitioner with admitting privileges, knowledge of 

patient current condition, hospital course, and medical plan of care.


Services: Services provided to patient in accordance with Admission requirements

found in Title 42 Section 412.3 of the Code of Federal Regulations





Patient History


Date of Service: 07/02/21


Reason for admission: syncope, fall 


History of Present Illness: 


Ms. Peres is a 81 yo F with GERD, fibromyalgia, lymphedema, depression here 

today for syncope and collapse. She said she stood up from chair and walked 

across the room. When she was almost across the room she felt dizzy and had 

tonnel vision. She felt the blood rushing to her brain, and then the next thing 

she remembers is waking up on her back. She said she lost consciousness for a 

few seconds. Denies chest pain, SOB ,palpitations. Orthostatic VS positive in 

the ED, troponin negative, abnormal EKG. K 3.2. Imaging wnl. 


Allergies





clindamycin HCl [From Cleocin] Allergy (Severe, Verified 07/02/21 20:52)


   Hives/Rash


clindamycin palmitate HCl [From Cleocin] Allergy (Severe, Verified 07/02/21 

20:52)


   Hives/Rash


clindamycin phosphate [From Cleocin] Allergy (Severe, Verified 07/02/21 20:52)


   Hives/Rash


Penicillins Allergy (Severe, Verified 07/02/21 20:52)


   Hives/Rash


Sulfa (Sulfonamide Antibiotics) [Sulfa(Sulfonamide Antibiotics)] Allergy 

(Severe, Verified 07/02/21 20:52)


   Hives/Rash


acetaminophen [From Darvocet-N 100] Allergy (Verified 07/02/21 20:52)


   Unknown


propoxyphene [From Darvocet-N 100] Allergy (Verified 07/02/21 20:52)


   Unknown


strawberries Allergy (Severe, Uncoded 03/24/21 05:19)


   Hives/Rash





Home Medications: 








Simvastatin 40 mg PO BEDTIME 04/17/12 


Memantine HCl 10 mg PO BID 09/24/18 


Metoprolol Succinate [Toprol Xl*] 25 mg PO DAILY 08/29/19 


Gabapentin 300 mg PO TID 02/05/20 


Pantoprazole Sodium [Protonix] 1 tab PO DAILY 10/17/20 


Topiramate [Topamax] 200 mg PO BID 10/17/20 


ALPRAZolam [Xanax*] 0.25 mg PO Q12HP PRN 03/24/21 


Hydrocodone 7.5/APAP 325 [Norco 7.5/325 mg*] 1 tab PO Q8HP PRN 03/24/21 


Magnesium Oxide 1 tab PO DAILY 03/24/21 


Tizanidine HCl [Zanaflex] 4 mg PO DAILYPRN PRN 03/24/21 


Benzonatate 200 mg PO Q8HP PRN 07/02/21 


Bupropion *Xl* [Wellbutrin XL] 150 mg PO DAILY 07/02/21 


Bupropion *Xl* [Wellbutrin XL] 300 mg PO DAILY 07/02/21 


Clopidogrel Bisulfate [Plavix] 75 mg PO DAILY 07/02/21 


Cranberry Fruit Extract [Ellura] 200 mg PO DAILY 07/02/21 


Donepezil HCl 10 mg PO BEDTIME 07/02/21 


Doxepin HCl 50 mg PO BEDTIME 07/02/21 


Duloxetine HCl 60 mg PO BID 07/02/21 


Fluticasone/Umeclidin/Vilanter [Trelegy Ellipta 100-62.5-25] 1 each IH DAILY 

07/02/21 


Indapamide [Lozol] 2.5 mg PO DAILY 07/02/21 


Levothyroxine [Synthroid] 75 mcg PO [id number] 07/02/21 


Mupirocin Oint [Bactroban 2% Ointment] 1 appl TP BID 07/02/21 


Potassium Chloride 20 meq PO BID 07/02/21 


Quetiapine Fumarate [Seroquel] 200 mg PO BEDTIME 07/02/21 








- Past Medical/Surgical History


Diabetic: No


-: Lymphedema


-: heart arrhythmia w/ PVCs


-: L breast cancer


-: PE


-: Meniere's Disease


-: Former smoker


-: Recurrent UTIs


-: Fibromyalgia


-: GERD


-: Insomnia


-: Depression


-: Neuropathy


-: s/p Left Mastectomy


-: SANTOSH TKA


-: Cervical fusion


-: Lumbar fusion


-: Appendectomy


-: Hysterectomy





- Family History


  ** Father


-: Diabetes


Notes: heart attack.  alcoholic





- Social History


Smoking Status: Never smoker


Alcohol use: No


CD- Drugs: No


Caffeine use: No


Place of Residence: Nursing Home





Review of Systems


Eyes: Vision Change


Neurological: As per HPI





Physical Examination





- Vital Signs


Temperature: 97.6 F


Blood Pressure: 99/64


Pulse: 78


Respirations: 18


Pulse Ox (%): 96





- Physical Exam


General: Alert, In no apparent distress


HEENT: Atraumatic, PERRLA, Mucous membr. moist/pink, EOMI, Sclerae nonicteric


Neck: Supple, 2+ carotid pulse no bruit, No LAD, Without JVD or thyroid 

abnormality


Respiratory: Clear to auscultation bilaterally, Normal air movement


Cardiovascular: Regular rate/rhythm, Normal S1 S2


Gastrointestinal: Normal bowel sounds, No tenderness


Musculoskeletal: No tenderness


Integumentary: No rashes


Neurological: Normal speech, Normal strength at 5/5 x4 extr, Normal tone, Normal

 affect


Lymphatics: No axilla or inguinal lymphadenopathy





- Studies


Laboratory Data (last 24 hrs)





07/02/21 16:07: PT 11.1, INR 0.97


07/02/21 16:07: WBC 8.60, Hgb 15.5 H, Hct 45.7 H, Plt Count 257


07/02/21 16:07: Sodium 140, Potassium 3.2 L, BUN 22 H, Creatinine 1.30, Glucose 

91, Magnesium 2.3, Total Bilirubin 0.4, AST 20, ALT 32, Alkaline Phosphatase 93








Assessment and Plan





- Problems (Diagnosis)


(1) Syncope


Current Visit: Yes   Status: Acute   


Qualifiers: 


   Syncope type: unspecified   Qualified Code(s): R55 - Syncope and collapse   





(2) Fall


Current Visit: Yes   Status: Acute   


Qualifiers: 


   Encounter type: initial encounter   Qualified Code(s): W19.XXXA - Unspecified

 fall, initial encounter   





(3) Depression


Current Visit: No   Status: Acute   


Qualifiers: 


   Depression Type: unspecified   Qualified Code(s): F32.9 - Major depressive 

disorder, single episode, unspecified   





(4) Hypokalemia


Current Visit: No   Status: Acute   





(5) Fibromyalgia


Current Visit: No   Status: Chronic   





(6) Orthostatic hypotension


Current Visit: Yes   Status: Acute   





- Plan


cardiology consulted


on telemetry


continue IVF


EKG in the AM, ECHO pending, repeat orthostatic VS in the AM


potassium replacement protocol


DVT ppx


reconcile and continue home medications 


Discharge Plan: Nursing Home


Plan to discharge in: 24 Hours





- Advance Directives


Does patient have a Living Will: No


Does patient have a Durable POA for Healthcare: Yes





- Code Status/Comfort Care


Code Status Assessed: Yes (full code )


Critical Care: No


Time Spent Managing Pts Care (In Minutes): 70

## 2021-07-02 NOTE — RAD REPORT
EXAM DESCRIPTION:  CT - Head C Spine Mpr Wo Con - 7/2/2021 3:16 pm

 

CLINICAL HISTORY:  Head and neck injury status post fall. Head and neck pain

 

COMPARISON:  January 2021

 

TECHNIQUE:  Computed axial tomography of the head and cervical spine was obtained.

 

Sagittal and coronal reconstruction was performed.

 

All CT scans are performed using dose optimization technique as appropriate and may include automated
 exposure control or mA/KV adjustment according to patient size.

 

FINDINGS:  An intracranial bleed is not seen. The ventricles are normal in caliber. An extra-axial fl
uid collection is not noted.Fluid within the visualized sinuses and mastoids is not seen

 

A cervical fracture is not visualized. No dislocation is noted. Postsurgical changes involve the cerv
ical spine. Mild posterior subluxation C4 on C5 without significant change.

 

IMPRESSION:  No acute intracranial abnormality is seen.

 

A cervical fracture is not visualized.  If the patient continues to have symptoms to suggest intracra
nial /spinal cord pathology then MRI would be recommended

## 2021-07-03 VITALS — SYSTOLIC BLOOD PRESSURE: 126 MMHG | TEMPERATURE: 97.5 F | DIASTOLIC BLOOD PRESSURE: 58 MMHG

## 2021-07-03 VITALS — OXYGEN SATURATION: 92 %

## 2021-07-03 LAB — UA DIPSTICK W REFLEX MICRO PNL UR: (no result)

## 2021-07-03 RX ADMIN — SODIUM CHLORIDE SCH MLS: 0.9 INJECTION, SOLUTION INTRAVENOUS at 14:27

## 2021-07-03 RX ADMIN — GABAPENTIN SCH MG: 300 CAPSULE ORAL at 09:15

## 2021-07-03 RX ADMIN — GABAPENTIN SCH MG: 300 CAPSULE ORAL at 01:49

## 2021-07-03 RX ADMIN — CEFTRIAXONE SCH MLS: 1 INJECTION, SOLUTION INTRAVENOUS at 02:03

## 2021-07-03 RX ADMIN — MORPHINE SULFATE PRN MG: 2 INJECTION, SOLUTION INTRAMUSCULAR; INTRAVENOUS at 01:50

## 2021-07-03 RX ADMIN — Medication SCH ML: at 09:00

## 2021-07-03 RX ADMIN — GABAPENTIN SCH MG: 300 CAPSULE ORAL at 14:27

## 2021-07-03 RX ADMIN — SODIUM CHLORIDE SCH MLS: 0.9 INJECTION, SOLUTION INTRAVENOUS at 10:00

## 2021-07-03 RX ADMIN — CEFTRIAXONE SCH MLS: 1 INJECTION, SOLUTION INTRAVENOUS at 09:16

## 2021-07-03 RX ADMIN — SODIUM CHLORIDE SCH: 0.9 INJECTION, SOLUTION INTRAVENOUS at 09:18

## 2021-07-03 RX ADMIN — MORPHINE SULFATE PRN MG: 2 INJECTION, SOLUTION INTRAMUSCULAR; INTRAVENOUS at 09:58

## 2021-07-03 NOTE — CON
Date of Consultation:  07/03/2021



Admitted to Dr. Treviño on 07/02/2021.



Reason For Consultation:  Syncope.



History Of Present Illness:  Ms. Peres is a 80-year-old white woman with an extensive list of medic
ations and extensive past medical history who came in with syncope.  She knew she was going to pass o
ut.  She got dizzy and lightheaded before she fainted.  Denied any chest pain, nausea, vomiting, diap
horesis, PND, orthopnea, or pedal edema.  Denied any fever or chills or cough.  Workup so far include
s a creatinine of 1.3, potassium 3.2.  Negative troponin.  CT of her head and spine were negative.  C
hest x-ray was negative, but she was hypotensive, and hypokalemic with a potassium of 3.2.



Allergies:  SULFA, PENICILLIN, AND CLINDAMYCIN.



Past Medical History:  Include depression, fibromyalgia, gastroesophageal reflux disease, palpitation
s with PVCs, lymphedema, insomnia, hypertension, COPD, anxiety, dementia, neuropathy, hypothyroidism,
 chronic pain syndrome managed with a pain pump, and Meniere disease.



Review of Systems:

Negative.



Social History:  Negative.



Family History:  Unremarkable.



Medications:  At home include Xanax, Wellbutrin, benazepril, Plavix, Neurontin, inhalers, metoprolol,
 Protonix, Zocor, potassium, Seroquel, Lozol, Synthroid, Topamax, magnesium, and memantine.



Physical Examination:

Vital Signs:  When I saw her, her vital signs were stable.  She was afebrile. 

HEENT:  Negative. 

Neck:  Supple without any bruit, lymphadenopathy, JVD, or thyromegaly. 

Chest:  Clear to auscultation and percussion. 

Cardiac:  Exam revealed a regular rhythm and rate.  No murmurs, gallops, or rubs. 

Abdomen:  Obese, but benign. 

Extremities:  Revealed no clubbing, cyanosis, or edema. 

Skin:  Dry and intact.  Pulses were present distally. 

Neurological:  She was nonfocal.



Impression And Plan:  

1.Orthostatic hypotension.

2.Hypokalemia.  I am comfortable with supplementing the potassium, hydrate her and I would send her 
home, have her increase her salt intake.  When she stops the metoprolol or decreases that, she has a 
lot of palpitations, and she will keep that dose.  When she stops the Lozol, she has attacks of Menie
re disease, so we will keep the Lozol as well at the same dose.  She takes a very extensive list of p
ain medications for multiple purposes, and that should be addressed by her primary care physician and
 hopefully decrease some of those.  From my standpoint, she can go home, and [facility].  No reason to keep her in the hospital at this point.  Her problems includin
g hypertension, COPD, depression, fibromyalgia, PVCs, insomnia, gastroesophageal reflux disease, wyatt
ntia, hypothyroidism, neuropathy are stable at this point.





NB/CATRACHITA

DD:  07/03/2021 10:42:24Voice ID:  [id number]

DT:  07/03/2021 11:35:21Report ID:  [id number]

## 2021-07-03 NOTE — P.DS
Admission Date: 07/02/21


Discharge Date: 07/03/21


Disposition: ROUTINE DISCHARGE


Discharge Condition: FAIR


Reason for Admission: syncope, fall 





- Problems


(1) Orthostatic hypotension


Current Visit: Yes   Status: Acute   





(2) Fall


Current Visit: Yes   Status: Acute   


Qualifiers: 


   Encounter type: initial encounter   Qualified Code(s): W19.XXXA - Unspecified

fall, initial encounter   





(3) Menieres disease


Current Visit: Yes   Status: Acute   





(4) PVCs (premature ventricular contractions)


Current Visit: Yes   Status: Acute   





(5) Syncope


Current Visit: Yes   Status: Acute   


Qualifiers: 


   Syncope type: unspecified   Qualified Code(s): R55 - Syncope and collapse   





(6) Depression


Current Visit: No   Status: Acute   


Qualifiers: 


   Depression Type: unspecified   Qualified Code(s): F32.9 - Major depressive 

disorder, single episode, unspecified   





(7) Fibromyalgia


Current Visit: No   Status: Chronic   





(8) Morbid obesity


Current Visit: No   Status: Chronic   


Brief History of Present Illness: 


79 yo woman with GERD, fibromyalgia, lymphedema, depression presented to the 

emergency department for syncope and collapse. She said she stood up from chair 

and walked across the room.  She done felt dizzy, had tunnel vision, blackout 

and fell. She lost consciousness for a few seconds. Denied chest pain, SOB, or 

palpitations. Orthostatic vitals were positive in the ED. Troponin negative.  

Patient placed under observation for monitoring.





Hospital Course: 


Patient started on IV normal saline for hydration.  He is on metoprolol and 

indapamide.  Metoprolol for PVCs and indapamide for Meniere's disease.  Her 

systolic blood pressure ranged from the 90s to 110. Patient seen by cardiology-

Dr. Packer who recommended to continue the metoprolol and indapamide and 

advised no salt restriction.  She has been told orthostatic precautions.  She 

may compression stockings sugar blood pressure remained low at home.





Vital Signs/Physical Exam: 














Temp Pulse Resp BP Pulse Ox


 


 97.2 F   57   17   105/52 L  96 


 


 07/03/21 08:00  07/03/21 08:00  07/03/21 10:28  07/03/21 08:00  07/03/21 10:28








General: Alert, In no apparent distress, Oriented x3


HEENT: Atraumatic, Normocephalic, PERRLA, Mucous membr. moist/pink


Neck: JVD not distended


Respiratory: Clear to auscultation bilaterally, Normal air movement


Cardiovascular: No edema, Regular rate/rhythm, Normal S1 S2


Capillary refill: <2 Seconds


Gastrointestinal: Normal bowel sounds, Soft and benign, Non-distended, No 

tenderness


Musculoskeletal: No swelling


Integumentary: No rashes


Neurological: Normal speech, Normal strength at 5/5 x4 extr, Cranial nerves 3-12

intact


Laboratory Data at Discharge: 














WBC  8.60 K/uL (4.3-10.9)   07/02/21  16:07    


 


Hgb  15.5 g/dL (12.0-15.0)  H  07/02/21  16:07    


 


Hct  45.7 % (36.0-45.0)  H  07/02/21  16:07    


 


Plt Count  257 K/uL (152-406)   07/02/21  16:07    


 


PT  11.1 SECONDS (9.5-12.5)   07/02/21  16:07    


 


INR  0.97   07/02/21  16:07    


 


Sodium  140 mmol/L (136-145)   07/02/21  16:07    


 


Potassium  3.2 mmol/L (3.5-5.1)  L  07/02/21  16:07    


 


BUN  22 mg/dL (7-18)  H  07/02/21  16:07    


 


Creatinine  1.30 mg/dL (0.55-1.3)   07/02/21  16:07    


 


Glucose  91 mg/dL ()   07/02/21  16:07    


 


Magnesium  2.3 mg/dL (1.8-2.4)   07/02/21  16:07    


 


Total Bilirubin  0.4 mg/dL (0.2-1.0)   07/02/21  16:07    


 


AST  20 U/L (15-37)   07/02/21  16:07    


 


ALT  32 U/L (12-78)   07/02/21  16:07    


 


Alkaline Phosphatase  93 U/L ()   07/02/21  16:07    








Home Medications: 








Simvastatin 40 mg PO BEDTIME 04/17/12 


Memantine HCl 10 mg PO BID 09/24/18 


Metoprolol Succinate [Toprol Xl*] 25 mg PO DAILY 08/29/19 


Gabapentin 300 mg PO TID 02/05/20 


Pantoprazole Sodium [Protonix] 1 tab PO DAILY 10/17/20 


Topiramate [Topamax] 200 mg PO BID 10/17/20 


ALPRAZolam [Xanax*] 0.25 mg PO Q12HP PRN 03/24/21 


Hydrocodone 7.5/APAP 325 [Norco 7.5/325 mg*] 1 tab PO Q8HP PRN 03/24/21 


Magnesium Oxide 1 tab PO DAILY 03/24/21 


Tizanidine HCl [Zanaflex] 4 mg PO DAILYPRN PRN 03/24/21 


Benzonatate 200 mg PO Q8HP PRN 07/02/21 


Bupropion *Xl* [Wellbutrin XL*] 300 mg PO DAILY 07/02/21 


Clopidogrel Bisulfate [Plavix*] 75 mg PO DAILY 07/02/21 


Cranberry Fruit Extract [Ellura] 200 mg PO DAILY 07/02/21 


Donepezil HCl 10 mg PO BEDTIME 07/02/21 


Doxepin HCl 50 mg PO BEDTIME 07/02/21 


Duloxetine HCl 60 mg PO BID 07/02/21 


Fluticasone/Umeclidin/Vilanter [Trelegy Ellipta 100-62.5-25] 1 each IH DAILY 

07/02/21 


Indapamide [Lozol] 2.5 mg PO DAILY 07/02/21 


Levothyroxine [Synthroid*] 75 mcg PO [id number] 07/02/21 


Mupirocin Oint [Bactroban 2% Ointment*] 1 appl TP BID 07/02/21 


Potassium Chloride 20 meq PO BID 07/02/21 


Quetiapine Fumarate [Seroquel] 200 mg PO BEDTIME 07/02/21 





Physician Discharge Instructions: 


Orthostatic precautions.


Diet: AHA


Activity: Fall precautions


Followup: 


Javier Mcwilliams MD [Primary Care Provider] - 


Time spent managing pt's care (in minutes): 33

## 2021-07-03 NOTE — EKG
Test Date:    2021-07-02               Test Time:    15:02:50

Technician:   MISAEL                                     

                                                     

MEASUREMENT RESULTS:                                       

Intervals:                                           

Rate:         62                                     

ID:           [id number]                                    

QRSD:         80                                     

QT:           384                                    

QTc:          389                                    

Axis:                                                

P:            65                                     

ID:           [id number]                                    

QRS:          45                                     

T:            67                                     

                                                     

INTERPRETIVE STATEMENTS:                                       

                                                     

Normal sinus rhythm

ST & T wave abnormality, consider anterior ischemia

Abnormal ECG

Compared to ECG 05/12/2021 09:38:43

Possible ischemia now present

Sinus bradycardia no longer present

ST (T wave) deviation still present



Electronically Signed On 07-03-21 10:25:39 CDT by Darrian Packer

## 2021-07-10 ENCOUNTER — HOSPITAL ENCOUNTER (EMERGENCY)
Dept: HOSPITAL 97 - ER | Age: 81
Discharge: HOME | End: 2021-07-10
Payer: COMMERCIAL

## 2021-07-10 VITALS — DIASTOLIC BLOOD PRESSURE: 78 MMHG | SYSTOLIC BLOOD PRESSURE: 131 MMHG | OXYGEN SATURATION: 96 %

## 2021-07-10 VITALS — TEMPERATURE: 99.4 F
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
## 2021-07-10 DIAGNOSIS — Z88.0: ICD-10-CM

## 2021-07-10 DIAGNOSIS — S52.501A: Primary | ICD-10-CM

## 2021-07-10 DIAGNOSIS — Z88.2: ICD-10-CM

## 2021-07-10 DIAGNOSIS — Z88.5: ICD-10-CM

## 2021-07-10 DIAGNOSIS — S52.611A: ICD-10-CM

## 2021-07-10 DIAGNOSIS — Z88.3: ICD-10-CM

## 2021-07-10 DIAGNOSIS — Z91.018: ICD-10-CM

## 2021-07-10 PROCEDURE — 2W3CX1Z IMMOBILIZATION OF RIGHT LOWER ARM USING SPLINT: ICD-10-PCS

## 2021-07-10 PROCEDURE — 99284 EMERGENCY DEPT VISIT MOD MDM: CPT

## 2021-07-10 NOTE — EDPHYS
Physician Documentation                                                                           

 [facility]                                                                 

Name: Leona Peres                                                                              

Age: 80 yrs                                                                                       

Sex: Female                                                                                       

: 1940                                                                                   

MRN: [id number]                                                                                   

Arrival Date: 07/10/2021                                                                          

Time: 03:55                                                                                       

Account#: [id number]                                                                            

Bed 20                                                                                            

Private MD:                                                                                       

ED Physician Gerardo Richardson                                                                      

HPI:                                                                                              

07/10                                                                                             

06:10 This 80 yrs old  Female presents to ER via EMS with complaints of Fall.        mh7 

06:10 Details of fall: The patient fell from an upright position, while walking. Onset: The   mh7 

      symptoms/episode began/occurred just prior to arrival, today. Associated injuries: The      

      patient sustained right forearm, painful injury. Severity of symptoms: At their worst       

      the symptoms were moderate, earlier today, in the emergency department the symptoms are     

      unchanged.                                                                                  

06:31 States that she fell due to losing her balance when trying to take off her diaper.      mh7 

      Denies any head injury or LOC. Denies any symptoms prior to fall..                          

                                                                                                  

Historical:                                                                                       

- Allergies:                                                                                      

03:57 Strawberries;                                                                           em  

03:57 PENICILLINS;                                                                            em  

03:57 Sulfa (Sulfonamide Antibiotics);                                                        em  

03:57 Darvocet-N 100;                                                                         em  

03:57 clindamycin HCl (bulk);                                                                 em  

- PMHx:                                                                                           

03:57 Cancer; Fibromyalgia; insomnia; lymphedema; GERD; skipped heart beats; Depression;      em  

                                                                                                  

- Immunization history:: Adult Immunizations up to date.                                          

- Social history:: Smoking status: Patient denies any tobacco usage or history of.                

                                                                                                  

                                                                                                  

ROS:                                                                                              

06:10 Constitutional: Negative for fever, chills, and weight loss, Eyes: Negative for injury, mh7 

      pain, redness, and discharge, ENT: Negative for injury, pain, and discharge, Neck:          

      Negative for injury, pain, and swelling, Cardiovascular: Negative for chest pain,           

      palpitations, and edema, Respiratory: Negative for shortness of breath, cough,              

      wheezing, and pleuritic chest pain, Abdomen/GI: Negative for abdominal pain, nausea,        

      vomiting, diarrhea, and constipation, Back: Negative for injury and pain, : Negative      

      for injury, bleeding, discharge, and swelling, Skin: Negative for injury, rash, and         

      discoloration, Neuro: Negative for headache, weakness, numbness, tingling, and seizure,     

      Psych: Negative for depression, anxiety, suicide ideation, homicidal ideation, and          

      hallucinations, Allergy/Immunology: Negative for hives, rash, and allergies, Endocrine:     

      Negative for neck swelling, polydipsia, polyuria, polyphagia, and marked weight             

      changes, Hematologic/Lymphatic: Negative for swollen nodes, abnormal bleeding, and          

      unusual bruising.                                                                           

                                                                                                  

Exam:                                                                                             

06:10 Constitutional:  This is a well developed, well nourished patient who is awake, alert,  mh7 

      and in no acute distress. Head/Face:  Normocephalic, atraumatic. Eyes:  Pupils equal        

      round and reactive to light, extra-ocular motions intact.  Lids and lashes normal.          

      Conjunctiva and sclera are non-icteric and not injected.  Cornea within normal limits.      

      Periorbital areas with no swelling, redness, or edema. ENT:  Nares patent. No nasal         

      discharge, no septal abnormalities noted.  Tympanic membranes are normal and external       

      auditory canals are clear.  Oropharynx with no redness, swelling, or masses, exudates,      

      or evidence of obstruction, uvula midline.  Mucous membranes moist. Neck:  Trachea          

      midline, no thyromegaly or masses palpated, and no cervical lymphadenopathy.  Supple,       

      full range of motion without nuchal rigidity, or vertebral point tenderness.  No            

      Meningismus. Chest/axilla:  Normal chest wall appearance and motion.  Nontender with no     

      deformity.  No lesions are appreciated. Cardiovascular:  Regular rate and rhythm with a     

      normal S1 and S2.  No gallops, murmurs, or rubs.  Normal PMI, no JVD.  No pulse             

      deficits. Respiratory:  Lungs have equal breath sounds bilaterally, clear to                

      auscultation and percussion.  No rales, rhonchi or wheezes noted.  No increased work of     

      breathing, no retractions or nasal flaring. Abdomen/GI:  Soft, non-tender, with normal      

      bowel sounds.  No distension or tympany.  No guarding or rebound.  No evidence of           

      tenderness throughout. Back:  No spinal tenderness.  No costovertebral tenderness.          

      Full range of motion. Skin:  Warm, dry with normal turgor.  Normal color with no            

      rashes, no lesions, and no evidence of cellulitis.                                          

06:10 Neuro:  Awake and alert, GCS 15, oriented to person, place, time, and situation.            

      Cranial nerves II-XII grossly intact.  Motor strength 5/5 in all extremities.  Sensory      

      grossly intact.  Cerebellar exam normal.  Normal gait. Psych:  Awake, alert, with           

      orientation to person, place and time.  Behavior, mood, and affect are within normal        

      limits.                                                                                     

06:10 Musculoskeletal/extremity: Extremities: noted in the right forearm: pain, tenderness,       

      ROM: limited active range of motion due to pain, in the right forearm, limited passive      

      range of motion due to pain, in the right forearm, Circulation is intact in all             

      extremities. Sensation intact. Compartment Syndrome exam of affected extremity: is          

      normal. no numbness, no tingling, no sensation deficit, no palor, no weak pulses,           

      Joints: the  right wrist displays painful range of motion, tenderness, Weight bearing:      

      able to fully bear weight, without difficulty, Tendon exam: specific tendon testing         

      normal through active and passive range of motion                                           

                                                                                                  

Vital Signs:                                                                                      

03:55  / 76; Pulse 64; Resp 16; Temp 99.4; Pulse Ox 94% on R/A;                         em  

07:00  / 78; Pulse 61; Resp 16; Pulse Ox 96% on R/A;                                    8 

                                                                                                  

Procedures:                                                                                       

07:00 Splinting: Splint applied to right forearm using Orthoglass splint, sling, applied by   [facility] 

      nurse. Examined by me, post splint application: neurovascular intact, 2+ distal pulses      

      palpable, brisk capillary refill noted, Patient tolerated well.                             

                                                                                                  

MDM:                                                                                              

06:55 Differential diagnosis: abrasion, contusion, fracture. Data reviewed: vital signs,      [facility] 

      nurses notes, EMS record, nursing home records, radiologic studies, plain films.            

      Counseling: I had a detailed discussion with the patient and/or guardian regarding: the     

      historical points, exam findings, and any diagnostic results supporting the                 

      discharge/admit diagnosis, radiology results, the need for outpatient follow up, a          

      orthopedic surgeon, to return to the emergency department if symptoms worsen or persist     

      or if there are any questions or concerns that arise at home. Response to treatment:        

      the patient's symptoms have markedly improved after treatment.                              

06:57 Patient medically screened.                                                             [facility] 

                                                                                                  

07/10                                                                                             

04:00 Order name: Forearm Right XRAY                                                            

07/10                                                                                             

06:30 Order name: Sugar Tong Forearm Splint; Complete Time: 06:40                             [facility] 

07/10                                                                                             

06:30 Order name: Sling; Complete Time: 06:40                                                 [facility] 

                                                                                                  

Administered Medications:                                                                         

04:32 Drug: Norco (HYDROcodone-acetaminophen) (7.5 mg-325 mg) 1 tabs Route: PO;               em  

06:40 Follow up: Response: No adverse reaction                                                [id number] 

                                                                                                  

                                                                                                  

Disposition Summary:                                                                              

07/10/21 06:57                                                                                    

Discharge Ordered                                                                                 

      Location: Home                                                                          [facility] 

      Problem: new                                                                            [facility] 

      Symptoms: have improved                                                                 [facility] 

      Condition: Stable                                                                       [facility] 

      Diagnosis                                                                                   

        - Fall-Mechanical                                                                     mh7 

        - Distal Radius Fracture, Ulnar Styloid Fracture, Right                               mh7 

      Followup:                                                                               [facility] 

        - With: Private Physician                                                                  

        - When: 1 - 2 days                                                                         

        - Reason: Worsening of condition, Recheck today's complaints, Continuance of care,         

      Re-evaluation by your physician                                                             

      Followup:                                                                               [facility] 

        - With: Carson Vincent MD                                                                

        - When: 2 - 3 days                                                                         

        - Reason: Worsening of condition, Recheck today's complaints                               

      Discharge Instructions:                                                                     

        - Discharge Summary Sheet                                                             [facility] 

        - Radial Fracture                                                                     [facility] 

        - Wrist Fracture Treated With Immobilization, Easy-to-Read                            [facility] 

        - Ulnar Fracture                                                                      [facility] 

        - Cast or Splint Care, Adult, Easy-to-Read                                            [facility] 

      Forms:                                                                                      

        - Medication Reconciliation Form                                                      [facility] 

        - Thank You Letter                                                                    [facility] 

        - Antibiotic Education                                                                [facility] 

        - Prescription Opioid Use                                                             [facility] 

Signatures:                                                                                       

Dispatcher [employer]                           Lukas Guillen RN RN em Holmes, Maurice, MD MD   [facility]                                                  

Tyrell Agudelo RN   [id number]                                                  

                                                                                                  

**************************************************************************************************

## 2021-07-10 NOTE — RAD REPORT
EXAM DESCRIPTION:  RAD - Forearm Right - 7/10/2021 6:13 am

 

CLINICAL HISTORY:  PAIN, fall

 

Images were initially sent to the  [employer] service. Due to technical issues, no report was
 issued.

 

COMPARISON:  No comparisons

 

FINDINGS:  Transverse fracture is present in the metaphyseal portion of the distal radius with mild i
mpaction. Additional fracture line appears to extend to the articular surface. There are small fractu
re fragments along the main transverse fracture line. No dislocation or significant angulation deform
ity seen. Ulna styloid is fractured.

 

Carpal bones maintain normal positioning to the articular surface

No foreign body or other soft tissue abnormality.

 

IMPRESSION:  Comminuted distal radius fracture with mild impaction. No significant distraction or ang
ulation deformity.

## 2021-07-10 NOTE — XMS REPORT
Continuity of Care Document

                            Created on:July 10, 2021



Patient:NEGRO GONZALES

Sex:Female

:1940

External Reference #:[id number]





Demographics







                          Address                   [address]



                                                    [place] [address]

 

                          Home Phone                [phone]

 

                          Mobile Phone              [phone]

 

                          Email Address             [email or web address]

 

                          Preferred Language        English

 

                          Marital Status            Unknown

 

                          Islam Affiliation     Unknown

 

                          Race                      Unknown

 

                          Additional Race(s)        Unavailable



                                                    White

 

                          Ethnic Group              Unknown









Author







                          Organization              [facility]

t

 

                          Address                   [address]



                                                    [place] [address]

 

                          Phone                     [phone]









Support







                Name            Relationship    Address         Phone

 

                Homero          Child           Unavailable     [phone]

 

                Homero          Other           Unavailable     [phone]









Care Team Providers







                    Name                Role                Phone

 

                    Denita RIZVI            Primary Care Physician [phone]

 

                    Abbe RIZVI           Attending Clinician [phone]









Payers







           Payer Name Policy Type Policy     Effective Date Expiration Date Sour

ce



                                 Number                           

 

           [employer] MEDICAREAETNA            [id number]   2014            [place]

on



           MEDICARE HMO/PPO                       00:00:00              [facility]

t



           [id number]                                             



           -PresentHMO                                             







Problems

This patient has no known problems.



Allergies, Adverse Reactions, Alerts







       Allergy Allergy Status Severity Reaction(s) Onset  Inactive Treating Comm

ents 

Source



       Name   Type                        Date   Date   Clinician        

 

       Propoxyp Propensi Active        Unknown 2020-0                      [facility]

on



       hene   ty to                Reaction 6-30                        [facility]



       N-Acetam adverse                      00:00:                      st



       inophen reaction                      00                          



              s to                                                    



              drug                                                    

 

       Penicill Propensi Active        Unknown 2020-0                      [facility]

on



       ins    ty to                Reaction 6-30                        [facility]



              adverse                      00:00:                      st



              reaction                      00                          



              s to                                                    



              drug                                                    

 

       Sulfa  Propensi Active        Unknown 2020-0                      [facility]



       (Sulfona ty to                Reaction 6-30                        [facility]

i



       mide   adverse                      00:00:                      st



       Antibiot reaction                      00                          



       ics)   s to                                                    



              drug                                                    

 

       Acetamin Propensi Active        Unknown 2020-0                      [facility]

on



       ophen  ty to                Reaction 6-30                        [facility]



              adverse                      00:00:                      st



              reaction                      00                          



              s to                                                    



              drug                                                    

 

       Clindamy Propensi Active        Unknown 2020-0                      [facility]

on



       jayna    ty to                Reaction 6-30                        [facility]



       Phosphat adverse                      00:00:                      st



       e      reaction                      00                          



              s to                                                    



              drug                                                    







Family History







           Family Member Diagnosis  Comments   Start Date Stop Date  Source

 

           Natural father Diabetes                                    [place] [facility]

[facility]

 

           Natural father Heart disease                                  [facility]

 [facility]

 

           Natural father Hypertension                                  [facility] 

[facility]

 

           Natural mother Asthma                                      [facility]

[facility]







Social History







           Social Habit Start Date Stop Date  Quantity   Comments   Source

 

           History of tobacco                       Current smoker            [facility]

[facility]



           use                                                    

 

           [facility]

[facility]



           Alcohol Std Drinks                                             

 

           [facility]

[facility]



           Alcohol Binge                                             

 

           Cigarettes smoked 2020                       [facility]

 [facility]



           current (pack per 00:00:00   00:00:00                         



           day) - Reported                                             

 

           Cigarette  2020                       Al [facility]

ist



           pack-years 00:00:00   00:00:00                         

 

           Tobacco use and 2020 Never used            [facility]

[facility]



           exposure   00:00:00   00:00:00                         

 

           Alcohol intake 2020 Lifetime              [facility]

[facility]



                      00:00:00   00:00:00   non-drinker            



                                            (finding)             

 

           History SDOH 2020 1                     [place] [facility]

odist



           Alcohol Frequency 00:00:00   00:00:00                         

 

           Sex Assigned At [birth year]                       [facility]

[facility]



           Birth      00:00:00   00:00:00                         









                Smoking Status  Start Date      Stop Date       Source

 

                Former smoker   2020 00:00:00 2020 00:00:00 [facility] 

[facility]







Medications

This patient has no known medications.



Procedures







                Procedure       Date / Time Performed Performing Clinician [facility]

e

 

                FL EXTERNAL STUDY EXAM 2020 11:20:00 Ellis Baez

on [facility]







Plan of Care







             Planned Activity Planned Date Details      Comments     Source

 

             Future Scheduled 2021   INFLUENZA VACCINE              [facility]



             Test         00:00:00     [code = INFLUENZA              



                                       VACCINE]                  

 

             Future Scheduled 2006-10-07   65+ PNEUMOCOCCAL              [place]

 [facility]



             Test         00:00:00     VACCINE (1 of 1 -              



                                       PPSV23) [code = 65+              



                                       PNEUMOCOCCAL VACCINE              



                                       (1 of 1 - PPSV23)]              

 

             Future Scheduled 1991-10-07   SHINGLES VACCINES (#1)              H

terri [facility]



             Test         00:00:00     [code = SHINGLES              



                                       VACCINES (#1)]              

 

             Future Scheduled 1959-10-07   Hepatitis C screening              

isaura [facility]



             Test         00:00:00     (procedure) [code =              



                                       706100166]                







Encounters







        Start   End     Encounter Admission Attending Care    Care    Encounter 

Source



        Date/Time Date/Time Type    Type    Clinicians Facility Department ID   

   

 

        2021-05-10 2021-05-10 Outpatient         BAEZ, [facility]     [id number]

[address] [place]



        00:00:00 00:00:00                 ELLIS                    [id number]     [facility]

i



                                                                        st

 

        2021 Outpatient         BAEZ, [facility]     [id number]

[address] [place]



        00:00:00 00:00:00                 ELLIS                    [address]     [facility]

i



                                                                        st

 

        2020 Outpatient         BAEZ, [facility]     [id number]

[address] [place]



        00:00:00 00:00:00                 ELLIS                    [address]     [facility]

i



                                                                        st

 

        2020 Outpatient         BAEZ, [facility]     [id number]

[address] [place]



        00:00:00 00:00:00                 ELLIS                    [address]     [facility]

i



                                                                        st







Results







           Test Description Test Time  Test Comments Results    Result     [facility]

e



                                                       Comments   

 

           FL External Study 2021-05-10            This exam was not            

[facility]



           Exam       08:55:15              acquired at a            [facility]



                                            [facility]             



                                            facility and has            



                                            not been              



                                            interpreted by a            



                                            [facility]             



                                            Provider.  The            



                                            exam was imported            



                                            into our imaging            



                                            system.

## 2021-07-10 NOTE — ER
Nurse's Notes                                                                                     

 [facility]                                                                 

Name: Leona Peres                                                                              

Age: 80 yrs                                                                                       

Sex: Female                                                                                       

: 1940                                                                                   

MRN: [id number]                                                                                   

Arrival Date: 07/10/2021                                                                          

Time: 03:55                                                                                       

Account#: [id number]                                                                            

Bed 20                                                                                            

Private MD:                                                                                       

Diagnosis: Fall-Mechanical;Distal Radius Fracture, Ulnar Styloid Fracture, Right                  

                                                                                                  

Presentation:                                                                                     

07/10                                                                                             

03:55 Chief complaint: EMS states: was using the restroom and lost balance, reports pain to   em  

      right forearm and abrasion to the right knee, denies LOC or hitting head. Coronavirus       

      screen: Client denies travel out of the [place] in the last 14 days. Ebola Screen: Patient     

      negative for fever greater than or equal to 101.5 degrees Fahrenheit, and additional        

      compatible Ebola Virus Disease symptoms Patient denies exposure to infectious person.       

      Patient denies travel to an Ebola-affected area in the 21 days before illness onset. No     

      symptoms or risks identified at this time. Initial Sepsis Screen: Does the patient meet     

      any 2 criteria? No. Patient's initial sepsis screen is negative. Does the patient have      

      a suspected source of infection? No. Patient's initial sepsis screen is negative. Risk      

      Assessment: Do you want to hurt yourself or someone else? Patient reports no desire to      

      harm self or others. Onset of symptoms was July 10, 2021.                                   

03:55 Method Of Arrival: EMS: [facility]                                                em  

03:55 Acuity: ANUSHKA 3                                                                           em  

                                                                                                  

Historical:                                                                                       

- Allergies:                                                                                      

03:57 Strawberries;                                                                           em  

03:57 PENICILLINS;                                                                            em  

03:57 Sulfa (Sulfonamide Antibiotics);                                                        em  

03:57 Darvocet-N 100;                                                                         em  

03:57 clindamycin HCl (bulk);                                                                 em  

- PMHx:                                                                                           

03:57 Cancer; Fibromyalgia; insomnia; lymphedema; GERD; skipped heart beats; Depression;      em  

                                                                                                  

- Immunization history:: Adult Immunizations up to date.                                          

- Social history:: Smoking status: Patient denies any tobacco usage or history of.                

                                                                                                  

                                                                                                  

Screenin:02 Abuse screen: Denies threats or abuse. Nutritional screening: No deficits noted.        rr5 

      Tuberculosis screening: No symptoms or risk factors identified. Fall Risk Fall in past      

      12 months (25 points).                                                                      

                                                                                                  

Assessment:                                                                                       

04:12 General: Appears in no apparent distress. uncomfortable, Behavior is calm, cooperative, jm8 

      appropriate for age. Pain: Complains of pain in right arm Pain currently is 10 out of       

      10 on a pain scale. Neuro: No deficits noted. Level of Consciousness is awake, alert,       

      obeys commands, Oriented to person, place, time. Cardiovascular: No deficits noted.         

      Respiratory: No deficits noted. Airway is patent Trachea midline Respiratory effort is      

      even, unlabored, Respiratory pattern is regular, symmetrical. GI: No deficits noted. No     

      signs and/or symptoms were reported involving the gastrointestinal system. : No           

      deficits noted. No signs and/or symptoms were reported regarding the genitourinary          

      system. EENT: No deficits noted. No signs and/or symptoms were reported regarding the       

      EENT system. Derm: No deficits noted. No signs and/or symptoms reported regarding the       

      dermatologic system. Musculoskeletal: Reports pain in right arm.                            

07:00 General: Appears in no apparent distress. Behavior is calm, cooperative. Neuro: Level   rb3 

      of Consciousness is awake, alert, obeys commands, Oriented to person, place, time.          

      Cardiovascular: Patient's skin is warm and dry. Respiratory: Airway is patent               

      Respiratory effort is even, unlabored, Respiratory pattern is regular, symmetrical. GI:     

      No signs and/or symptoms were reported involving the gastrointestinal system. : No        

      signs and/or symptoms were reported regarding the genitourinary system.                     

                                                                                                  

Vital Signs:                                                                                      

03:55  / 76; Pulse 64; Resp 16; Temp 99.4; Pulse Ox 94% on R/A;                         em  

07:00  / 78; Pulse 61; Resp 16; Pulse Ox 96% on R/A;                                    jm8 

                                                                                                  

ED Course:                                                                                        

03:55 Patient arrived in ED.                                                                  em  

03:57 Triage completed.                                                                       em  

03:57 Arm band placed on.                                                                     em  

04:02 Patient has correct armband on for positive identification. Bed in low position. Call   rr5 

      light in reach. Side rails up X2.                                                           

04:03 Gerardo Richardson MD is Attending Physician.                                             mh7 

06:13 Forearm Right XRAY In Process Unspecified.                                              EDMS

06:56 Carson Vincent MD is Referral Physician.                                              mh7 

07:21 No provider procedures requiring assistance completed. Patient did not have IV access   rb3 

      during this emergency room visit.                                                           

                                                                                                  

Administered Medications:                                                                         

04:32 Drug: Norco (HYDROcodone-acetaminophen) (7.5 mg-325 mg) 1 tabs Route: PO;               em  

06:40 Follow up: Response: No adverse reaction                                                jm8 

                                                                                                  

                                                                                                  

Outcome:                                                                                          

06:57 Discharge ordered by MD.                                                                nahum 

07:21 Discharged to home via wheelchair, with family.                                         rb3 

07:21 Condition: stable                                                                           

07:21 Discharge instructions given to family, Instructed on discharge instructions, follow up     

      and referral plans. Demonstrated understanding of instructions, follow-up care,             

      Prescriptions given X none                                                                  

07:22 Patient left the ED.                                                                    rb3 

                                                                                                  

Signatures:                                                                                       

Dispatcher [employer]                           EDLukas House RN RN em Roque, Raymond, RN                      RN   [email or web address]                                                  

Gerardo Richardson MD MD   7                                                  

Jena Mcadams RN                     RN   rb3                                                  

Tyrell Agudelo RN                     RN   jm8                                                  

                                                                                                  

**************************************************************************************************

## 2021-08-16 ENCOUNTER — HOSPITAL ENCOUNTER (OUTPATIENT)
Dept: HOSPITAL 97 - ER | Age: 81
Setting detail: OBSERVATION
Discharge: HOME | End: 2021-08-16
Attending: FAMILY MEDICINE | Admitting: FAMILY MEDICINE
Payer: COMMERCIAL

## 2021-08-16 VITALS — OXYGEN SATURATION: 97 % | SYSTOLIC BLOOD PRESSURE: 100 MMHG | DIASTOLIC BLOOD PRESSURE: 61 MMHG

## 2021-08-16 VITALS — TEMPERATURE: 97.9 F

## 2021-08-16 DIAGNOSIS — Z20.822: ICD-10-CM

## 2021-08-16 DIAGNOSIS — I48.91: Primary | ICD-10-CM

## 2021-08-16 DIAGNOSIS — I95.9: ICD-10-CM

## 2021-08-16 LAB
ALBUMIN SERPL BCP-MCNC: 2.8 G/DL (ref 3.4–5)
ALP SERPL-CCNC: 107 U/L (ref 45–117)
ALT SERPL W P-5'-P-CCNC: 18 U/L (ref 12–78)
AST SERPL W P-5'-P-CCNC: 17 U/L (ref 15–37)
BUN BLD-MCNC: 17 MG/DL (ref 7–18)
GLUCOSE SERPLBLD-MCNC: 102 MG/DL (ref 74–106)
HCT VFR BLD CALC: 42.4 % (ref 36–45)
INR BLD: 1.07
LYMPHOCYTES # SPEC AUTO: 1.1 K/UL (ref 0.7–4.9)
MAGNESIUM SERPL-MCNC: 1.9 MG/DL (ref 1.8–2.4)
NT-PROBNP SERPL-MCNC: 1134 PG/ML (ref ?–450)
PMV BLD: 7.7 FL (ref 7.6–11.3)
POTASSIUM SERPL-SCNC: 4.6 MMOL/L (ref 3.5–5.1)
RBC # BLD: 4.37 M/UL (ref 3.86–4.86)
TROPONIN (EMERG DEPT USE ONLY): < 0.02 NG/ML (ref 0–0.04)

## 2021-08-16 PROCEDURE — 36415 COLL VENOUS BLD VENIPUNCTURE: CPT

## 2021-08-16 PROCEDURE — 83735 ASSAY OF MAGNESIUM: CPT

## 2021-08-16 PROCEDURE — 96372 THER/PROPH/DIAG INJ SC/IM: CPT

## 2021-08-16 PROCEDURE — 84484 ASSAY OF TROPONIN QUANT: CPT

## 2021-08-16 PROCEDURE — 80076 HEPATIC FUNCTION PANEL: CPT

## 2021-08-16 PROCEDURE — 93005 ELECTROCARDIOGRAM TRACING: CPT

## 2021-08-16 PROCEDURE — 96361 HYDRATE IV INFUSION ADD-ON: CPT

## 2021-08-16 PROCEDURE — 80048 BASIC METABOLIC PNL TOTAL CA: CPT

## 2021-08-16 PROCEDURE — 83880 ASSAY OF NATRIURETIC PEPTIDE: CPT

## 2021-08-16 PROCEDURE — 99284 EMERGENCY DEPT VISIT MOD MDM: CPT

## 2021-08-16 PROCEDURE — 96374 THER/PROPH/DIAG INJ IV PUSH: CPT

## 2021-08-16 PROCEDURE — 85025 COMPLETE CBC W/AUTO DIFF WBC: CPT

## 2021-08-16 PROCEDURE — 85610 PROTHROMBIN TIME: CPT

## 2021-08-16 PROCEDURE — 71045 X-RAY EXAM CHEST 1 VIEW: CPT

## 2021-08-16 NOTE — ER
Nurse's Notes                                                                                     

 [facility]                                                                 

Name: Leona Peres                                                                              

Age: 80 yrs                                                                                       

Sex: Female                                                                                       

: 1940                                                                                   

MRN: [id number]                                                                                   

Arrival Date: 2021                                                                          

Time: 14:29                                                                                       

Account#: [id number]                                                                            

Bed 27                                                                                            

Private MD:                                                                                       

Diagnosis: Unspecified atrial fibrillation;Weakness;Hypotension, unspecified-resolved;Palpitations

                                                                                                  

Presentation:                                                                                     

                                                                                             

15:02 Chief complaint: EMS states: CALLED BY [facility] 2/2 GEN WEAKNESS. NOTED AFIB ON     bp  

      SCENE. Coronavirus screen: At this time, the client does not indicate any symptoms          

      associated with coronavirus-19. Ebola Screen: No symptoms or risks identified at this       

      time. Initial Sepsis Screen: Does the patient meet any 2 criteria? HR > 90 bpm. No.         

      Patient's initial sepsis screen is negative. Does the patient have a suspected source       

      of infection? No. Patient's initial sepsis screen is negative. Risk Assessment: Do you      

      want to hurt yourself or someone else? Patient reports no desire to harm self or            

      others. Onset of symptoms was 2021 at 14:30. Care prior to arrival: IV           

      initiated. 22 GA, in the right UPPER ARM.                                                   

15:02 Method Of Arrival: EMS: [facility]                                                bp  

15:02 Acuity: ANUSHKA 2                                                                           bp  

                                                                                                  

Triage Assessment:                                                                                

15:02 General: Appears distressed, obese, Behavior is cooperative, appropriate for age,       bp  

      anxious. Pain: Denies pain. EENT: No deficits noted. Neuro: Level of Consciousness is       

      awake, alert, obeys commands, Oriented to Appropriate for age Speech is normal, Facial      

      symmetry appears normal. Cardiovascular: Rhythm is atrial fibrillation with rapid           

      ventricular response. Respiratory: No deficits noted. GI: Abdomen is obese, Abd is soft     

      X 4 quads Abd is non tender X 4 quads. : No signs and/or symptoms were reported           

      regarding the genitourinary system. Derm: No deficits noted. Musculoskeletal: Reports       

      weakness in GENERALIZED.                                                                    

                                                                                                  

- Immunization history:: Adult Immunizations up to date.                                          

- Social history:: Smoking status: Patient denies any tobacco usage or history of.                

                                                                                                  

                                                                                                  

Screening:                                                                                        

15:07 Abuse screen: Denies threats or abuse. Denies injuries from another. Nutritional        bp  

      screening: No deficits noted. Tuberculosis screening: No symptoms or risk factors           

      identified. Fall Risk None identified.                                                      

                                                                                                  

Assessment:                                                                                       

15:07 General: SEE TRIAGE NOTE.                                                               bp  

15:58 Reassessment: No changes from previously documented assessment. Patient and/or family   bp  

      updated on plan of care and expected duration. Pain level reassessed. CARDIZEM HELD FOR     

      HYPOTENSION. PT TBA.                                                                        

17:00 Reassessment: No changes from previously documented assessment. Patient and/or family   bp  

      updated on plan of care and expected duration. Pain level reassessed. PT NOTED              

      HYPOTENSIVE ON MONITOR. MD NOTIFIED.                                                        

                                                                                                  

Vital Signs:                                                                                      

15:00 BP 95 / 68; Pulse 116; Resp 17; Pulse Ox 97% ; Weight 95.25 kg; Height 5 ft. 3 in.      bp  

      (160.02 cm);                                                                                

15:02  / 110; Pulse 128; Resp 20; Temp 97.9; Pulse Ox 96% ;                             bp  

15:58 BP 91 / 66; Pulse 118; Resp 19; Pulse Ox 97% ;                                          bp  

16:45 BP 78 / 45; Pulse 115; Resp 16; Pulse Ox 95% ;                                          bp  

17:19  / 61; Pulse 119; Resp 13; Pulse Ox 97% ;                                         bp  

15:00 Body Mass Index 37.20 (95.25 kg, 160.02 cm)                                             bp  

                                                                                                  

NIH Stroke Scale Scores:                                                                          

15:07 NIHSS Score: 2                                                                          bp  

                                                                                                  

ED Course:                                                                                        

14:29 Patient arrived in ED.                                                                  ds1 

15:02 Matthew Palmer, RN is Primary Nurse.                                                    bp  

15:04 Vijaya Ross is Attending Physician.                                                    sp3 

15:04 Triage completed.                                                                       bp  

15:07 Arm band placed on.                                                                     bp  

15:07 Patient has correct armband on for positive identification. Bed in low position. Call   bp  

      light in reach. Side rails up X2.                                                           

15:07 Maintain EMS IV. Dressing intact. Good blood return noted. Site clean \T\ dry. Gauge \T\    bp

      site: 22 GAUGE RUE.                                                                         

15:21 Inserted saline lock: 22 gauge in right wrist, using aseptic technique. Blood collected.bp  

15:57 XRAY Chest (1 view) In Process Unspecified.                                             EDMS

17:37 Kiran Salter MD is Hospitalizing Provider.                                          sp3 

17:57 Kemal Manning DO is Hospitalizing Provider.                                           la1 

18:08 initiated transfer to [facility].                                              bd  

19:17 Attending Physician role handed off by Vijaya Ross                                     ragini 

19:17 Og Cotter MD is Attending Physician.                                             ragini 

19:30 Primary Nurse role handed off by Matthew Palmer RN                                     2 

20:00 administrative approval given by Colby Saldaña/ patient has been accepted to [address] bed 1405/Dr. Christopher accepted the patient in transfer/ report to be called to      

      [phone].                                                                               

20:23 Alva Chawla, RN is Primary Nurse.                                                 ch4 

                                                                                                  

Administered Medications:                                                                         

16:45 Drug: NS 0.9% 500 ml Route: IV; Rate: bolus; Site: right wrist;                         bp  

19:06 Follow up: IV Status: Completed infusion; IV Intake: 500ml                              bp  

17:11 Drug: NS 0.9% 500 ml Route: IV; Rate: bolus; Site: right wrist;                         bp  

19:07 Follow up: IV Status: Completed infusion; IV Intake: 500ml                              bp  

17:39 Not Given (Hemodynamic Parameters): Cardizem (diltiazem) 10 mg IVP once; Over 2 minutes bp  

17:39 Not Given (Hemodynamic Parameters): Lovenox (enoxaparin) 1 mg/kg Sub-Q once             bp  

17:39 Drug: Lovenox (enoxaparin) 1 mg/kg Route: Sub-Q; Site: right lower abdomen;             bp  

17:39 Follow up: Response: No adverse reaction                                                bp  

18:30 Drug: Digoxin 0.5 mg Route: IVP; Site: right wrist;                                     bp  

19:07 Follow up: Response: No adverse reaction                                                bp  

                                                                                                  

                                                                                                  

Intake:                                                                                           

19:06 IV: 500ml; Total: 500ml.                                                                bp  

19:07 IV: 500ml; Total: 1000ml.                                                               bp  

                                                                                                  

Outcome:                                                                                          

17:39 Decision to Hospitalize by Provider.                                                    sp3 

19:21 ER care complete, transfer ordered by MD.                                               ragini 

20:51 Patient left the ED.                                                                    mw2 

                                                                                                  

                                                                                                  

NIH Stroke Scale - NIH Stroke Score                                                               

Date: 2021                                                                                  

Time: 15:07                                                                                       

Total Score = 2                                                                                   

  1a. Level of Consciousness (LOC) - 0(Alert)                                                     

  1b. Level of Consciousness (LOC) (Month \T\ Age) - 0(Both)                                      

  1c. LOC Commands (Open \T\ Closes Eyes/) - 0(Both)                                          

   2. Best Gaze (Lateral Gaze Paresis) - 0(Normal)                                                

   3. Visual Field Loss - 0(No visual loss)                                                       

   4. Facial Palsy - 0(Normal)                                                                    

  5a. Left Arm: Motor (10-second hold) - 0(No drift)                                              

  5b. Right Arm: Motor (10-second hold) - 0(No drift)                                             

  6a. Left Leg: Motor (5-second hold - always test supine) - 1(Drift)                             

  6b. Right Leg: Motor (5-second hold - always test supine) - 1(Drift)                            

   7. Limb Ataxia (finger/nose \T\ heel/shin - test with eyes open) - 0(Absent)                   

   8. Sensory Loss (pinprick arms/legs/face) - 0(Normal)                                          

   9. Best Language: Aphasia (description/naming/reading) - 0(No aphasia)                         

  10. Dysarthria (speech clarity - read or repeat words) - 0(Normal)                              

  11. Extinction and Inattention (visual/tactile/auditory/spatial/personal) - 0(No                

      abnormality)                                                                                

Initials: bp                                                                                      

                                                                                                  

Signatures:                                                                                       

Dispatcher [employer]                           EDMS                                                 

Chery Coreas Corey, MD MD cha Sanford, Demi                                ds1                                                  

Vipul Luna, FNP-C                      FNP-Cla1                                                  

Matthew Palmer, RN                      RN                                                      

Kirk Serrano                            mw2                                                  

Vijaya Ross                                 sp3                                                  

Alva Chawla, ZHAO                   RN   ch4                                                  

                                                                                                  

Corrections: (The following items were deleted from the chart)                                    

15:58 15:00 BP 91 / 66; Pulse 118bpm; Resp 19bpm; Pulse Ox 97%; bp                    bp          

17:19 15:00 BP 95 / 68; Pulse 116bpm; Resp 17bpm; Pulse Ox 97%; bp                    bp          

                                                                                                  

**************************************************************************************************

## 2021-08-16 NOTE — XMS REPORT
Continuity of Care Document

                           Created on:2021



Patient:NEGRO GONZALES

Sex:Female

:1940

External Reference #:[id number]





Demographics







                          Address                   [address]



                                                    [place] [address]

 

                          Home Phone                [phone]

 

                          Mobile Phone              [phone]

 

                          Email Address             [email or web address]

 

                          Preferred Language        English

 

                          Marital Status            Unknown

 

                          Latter-day Affiliation     Unknown

 

                          Race                      Unknown

 

                          Additional Race(s)        Unavailable



                                                    White

 

                          Ethnic Group              Unknown









Author







                          Organization              [facility]

t

 

                          Address                   [address] [place] [address] [address]



                                                    [place] [address]

 

                          Phone                     [phone]









Support







                Name            Relationship    Address         Phone

 

                Homero          Child           Unavailable     [phone]

 

                Homero          Other           Unavailable     [phone]









Care Team Providers







                    Name                Role                Phone

 

                    Denita RIZVI            Primary Care Physician [phone]

 

                    Abbe RIZVI           Attending Clinician [phone]









Payers







           Payer Name Policy Type Policy     Effective Date Expiration Date Sour

ce



                                 Number                           

 

           [employer] MEDICAREAETNA            [id number]   2014            [facility]

dist



           MEDICARE HMO/PPO                       00:00:00              [facility]



           [id number]                                             



           -PresentHMO                                             







Problems

This patient has no known problems.



Allergies, Adverse Reactions, Alerts







       Allergy Allergy Status Severity Reaction(s) Onset  Inactive Treating Comm

ents 

Source



       Name   Type                        Date   Date   Clinician        

 

       Propoxyp Propensi Active        Unknown 2020-0                      [facility]

di



       hene   ty to                Reaction 6-30                        st



       N-Acetam adverse                      00:00:                      [facility]osp[facility]



       inophen reaction                      00                          l



              s to                                                    



              drug                                                    

 

       Penicill Propensi Active        Unknown 2020-0                      [facility]

di



       ins    ty to                Reaction 6-30                        st



              adverse                      00:00:                      [facility]



              reaction                      00                          l



              s to                                                    



              drug                                                    

 

       Sulfa  Propensi Active        Unknown 2020-0                      [facility]



       (Sulfona ty to                Reaction 6-30                        st



       mide   adverse                      00:00:                      [facility]ospita



       Antibiot reaction                      00                          l



       ics)   s to                                                    



              drug                                                    

 

       Acetamin Propensi Active        Unknown 2020-0                      [facility]

di



       ophen  ty to                Reaction 6-30                        st



              adverse                      00:00:                      [facility]ospita



              reaction                      00                          l



              s to                                                    



              drug                                                    

 

       Clindamy Propensi Active        Unknown 2020-0                      [facility]

di



       jayna    ty to                Reaction 6-30                        st



       Phosphat adverse                      00:00:                      Hospita



       e      reaction                      00                          l



              s to                                                    



              drug                                                    







Family History







           Family Member Diagnosis  Comments   Start Date Stop Date  Source

 

           Natural father Diabetes                                    [facility]

 

           Natural father Heart disease                                  [facility]

 

           Natural father Hypertension                                  [facility]

t [facility]

 

           Natural mother Asthma                                      [facility]







Social History







           Social Habit Start Date Stop Date  Quantity   Comments   Source

 

           History of tobacco                       Current smoker            [facility]

[facility]



           use                                                    [facility]

 

           History SDOH                                             [facility]



           Alcohol Std Drinks                                             [facility]ospit

al

 

           History SDOH                                             [facility]



           Alcohol Binge                                             [facility]

 

           Cigarettes smoked 2020                       [facility]

st



           current (pack per 00:00:00   00:00:00                         Hospita

l



           day) - Reported                                             

 

           Cigarette  2020                       [facility]



           pack-years 00:00:00   00:00:00                         Hospital

 

           Tobacco use and 2020 Never used            [facility]



           exposure   00:00:00   00:00:00                         Hospital

 

           Alcohol intake 2020 Lifetime              [facility]



                      00:00:00   00:00:00   non-drinker            Hospital



                                            (finding)             

 

           History SDOH 2020 1                     [facility]



           Alcohol Frequency 00:00:00   00:00:00                         [facility]ospita

l

 

           Sex Assigned At [birth year]                       [facility]



           Birth      00:00:00   00:00:00                         Hospital









                Smoking Status  Start Date      Stop Date       Source

 

                Former smoker   2020 00:00:00 2020 00:00:00 [facility]







Medications

This patient has no known medications.



Procedures







                Procedure       Date / Time Performed Performing Clinician Sour

e

 

                [place] EXTERNAL STUDY EXAM 2020 16:20:00 McadamsEllis

[facility]







Plan of Care







             Planned Activity Planned Date Details      Comments     Source

 

             Future Scheduled Test              Hepatitis C screening           

   [facility]



                                       (procedure) [code =              



                                       014996108]                

 

             Future Scheduled Test              SHINGLES VACCINES (#1)          

    [facility]



                                       [code = SHINGLES              



                                       VACCINES (#1)]              

 

             Future Scheduled Test              65+ PNEUMOCOCCAL              [facility]



                                       VACCINE (1 of 1 -              



                                       PPSV23) [code = 65+              



                                       PNEUMOCOCCAL VACCINE (1              



                                       of 1 - PPSV23)]              

 

             Future Scheduled Test              INFLUENZA VACCINE [code         

     [facility]



                                       = INFLUENZA VACCINE]              







Encounters







        Start   End     Encounter Admission Attending Care    Care    Encounter 

Source



        Date/Time Date/Time Type    Type    Clinicians Facility Department ID   

   

 

        2021-05-10 2021-05-10 [facility] [place] 1.2.840.1 [id number]

[id number] [facility]



        08:36:31 23:59:00 Encounter         [facility]    [id number]         [address]     [address]



                                                3.430.2.7                 Hospit

a



                                                .[id number]                 l



                                                .8                      

 

        2021-05-10 2021-05-10 Outpatient         ABBE, [facility]     [id number]

[address]



        00:00:00 00:00:00                 ELLIS                    [address]     Method

i



                                                                        st

 

        2021 Office          Abbe, 1.2.840.1 [id number]

[id number] [facility]



        13:03:28 13:29:49 Visit           Ellis    [id number]         [address]     [address]



                                                3.430.2.7                 Hospit

a



                                                .[id number]                 l



                                                .8                      

 

        2021 Outpatient         ABBE [facility]     [id number]

[address]



        00:00:00 00:00:00                 ELLIS                    088     Method

i



                                                                        st

 

        2021 Travel                  1.2.840.1 [id number]

[id number] [facility]



        00:00:00 00:00:00                         [id number] 383     

st



                                                3.430.2.7 [id number]         Ho

spita



                                                .[id number] 084.8           l



                                                .8                      

 

        2020 [facility]     [id number]

[address]



        00:00:00 00:00:00                 ELLIS                    860     Method

i



                                                                        st

 

        2020 [facility]     [id number]

[address]



        00:00:00 00:00:00                 ELLIS                    414     Method

i



                                                                        st







Results







           Test Description Test Time  Test Comments Results    Result     [facility]

e



                                                       Comments   

 

           FL External Study 2021-05-10            This exam was not            

[facility]



           Exam       13:55:15              acquired at a            Hospital



                                            [facility]             



                                            facility and has            



                                            not been              



                                            interpreted by a            



                                            Latter-day             



                                            Provider.  The            



                                            exam was imported            



                                            into our imaging            



                                            system.

## 2021-08-16 NOTE — RAD REPORT
EXAM DESCRIPTION:  RAD - Chest Single View - 8/16/2021 3:56 pm

 

CLINICAL HISTORY:  Afib RVR new

 

COMPARISON:  Chest Single View dated 7/2/2021; Chest Single View dated 5/12/2021; Chest Single View d
ated 3/23/2021; Chest Single View dated 10/25/2020

 

FINDINGS:  No evidence of edema or pneumonia. The heart size is within normal limits.No acute osseous
 abnormality. No significant pleural effusions or pneumothorax. Right shoulder arthroplasty. ACDF in 
the cervical spine

 

IMPRESSION:  No acute cardiopulmonary disease.

## 2021-08-16 NOTE — EDPHYS
Physician Documentation                                                                           

 [facility]                                                                 

Name: Leona Peres                                                                              

Age: 80 yrs                                                                                       

Sex: Female                                                                                       

: 1940                                                                                   

MRN: [id number]                                                                                   

Arrival Date: 2021                                                                          

Time: 14:29                                                                                       

Account#: [id number]                                                                            

Bed 27                                                                                            

Private MD:                                                                                       

ED Physician Og Cotter                                                                      

HPI:                                                                                              

                                                                                             

16:15 This 80 yrs old  Female presents to ER via EMS with complaints of weakness \T\   sp3

      dizziness.                                                                                  

16:15 Female with a history of hypertension, hypothyroidism, and recent admission for TIA for sp3 

      which she was discharged on Plavix now presents to the ED from her care facility for        

      generalized malaise and weakness. EMS found patient to be in atrial fibrillation for        

      which she has not had before. Patient's last admission note states regular rhythm.          

      Patient does not have any focal neuro deficits, weakness, memory loss, speech               

      abnormality (which is what she had on her last TIA), patient has not had atrial             

      fibrillation in the past but states she has had PVCs for which she is currently on          

      metoprolol. EMS did not provide any katharine agents due to proximity to the ER and stable      

      patient status. They did document A. fib RVR with a rate of 130 and brief moments but       

      not sustained.. On ROS, patient denies headache, fever, neck pain, chest pain,              

      shortness of breath, abdominal pain, nausea, vomiting, diarrhea, syncope, seizure,          

      neuro symptoms, fever, URI symptoms, or any other abnormality at this time. Patient has     

      received Covid vaccine..                                                                    

                                                                                                  

- Immunization history:: Adult Immunizations up to date.                                          

- Social history:: Smoking status: Patient denies any tobacco usage or history of.                

                                                                                                  

                                                                                                  

ROS:                                                                                              

16:18 Eyes: Negative for injury, pain, redness, and discharge, ENT: Negative for injury,      sp3 

      pain, and discharge, Neck: Negative for injury, pain, and swelling, Cardiovascular:         

      Negative for chest pain, palpitations, and edema, Respiratory: Negative for shortness       

      of breath, cough, wheezing, and pleuritic chest pain, Abdomen/GI: Negative for              

      abdominal pain, nausea, vomiting, diarrhea, and constipation, Skin: Negative for            

      injury, rash, and discoloration, Neuro: Negative for headache, weakness, numbness,          

      tingling, and seizure.                                                                      

16:18 Constitutional: Positive for fatigue, Negative for body aches, chills, fever, poor PO       

      intake, weight loss.                                                                        

                                                                                                  

Exam:                                                                                             

16:19 Constitutional:  This is a well developed, well nourished patient who is awake, alert,  sp3 

      and in no acute distress. Head/Face:  Normocephalic, atraumatic. Eyes:  Pupils equal        

      round and reactive to light, extra-ocular motions intact.  Lids and lashes normal.          

      Conjunctiva and sclera are non-icteric and not injected.  Cornea within normal limits.      

      Periorbital areas with no swelling, redness, or edema. ENT:  Nares patent. No nasal         

      discharge, no septal abnormalities noted.  External auditory canals are clear.              

      Oropharynx with no redness, swelling, or masses, exudates, or evidence of obstruction,      

      uvula midline.  Mucous membranes moist. Neck:  Trachea midline, no thyromegaly or           

      masses palpated, and no cervical lymphadenopathy.  Supple, full range of motion without     

      nuchal rigidity, or vertebral point tenderness.  No Meningismus. Chest/axilla:  Normal      

      chest wall appearance and motion.  Nontender with no deformity.  No lesions are             

      appreciated.                                                                                

16:19 Abdomen/GI:  Soft, non-tender, with normal bowel sounds.  No distension or tympany.  No     

      guarding or rebound.  No evidence of tenderness throughout. Skin:  Warm, dry with           

      normal turgor.  Normal color with no rashes, no lesions, and no evidence of cellulitis.     

      Psych:  Awake, alert, with orientation to person, place and time.  Behavior, mood, and      

      affect are within normal limits.                                                            

16:19 Cardiovascular: Rate: tachycardic, Rhythm: irregularly irregular, Pulses:                   

16:19 ECG was reviewed by the Attending Physician.                                                

                                                                                                  

Vital Signs:                                                                                      

15:00 BP 95 / 68; Pulse 116; Resp 17; Pulse Ox 97% ; Weight 95.25 kg; Height 5 ft. 3 in.      bp  

      (160.02 cm);                                                                                

15:02  / 110; Pulse 128; Resp 20; Temp 97.9; Pulse Ox 96% ;                             bp  

15:58 BP 91 / 66; Pulse 118; Resp 19; Pulse Ox 97% ;                                          bp  

16:45 BP 78 / 45; Pulse 115; Resp 16; Pulse Ox 95% ;                                          bp  

17:19  / 61; Pulse 119; Resp 13; Pulse Ox 97% ;                                         bp  

15:00 Body Mass Index 37.20 (95.25 kg, 160.02 cm)                                             bp  

                                                                                                  

NIH Stroke Scale Scores:                                                                          

15:07 NIHSS Score: 2                                                                          bp  

                                                                                                  

MDM:                                                                                              

15:04 Patient medically screened.                                                             sp3 

16:21 Data reviewed: vital signs, lab test result(s), EKG, radiologic studies. ED course:     sp3 

      80-year-old female who presents with generalized weakness and new onset atrial              

      fibrillation. Patient's heart rate is in the 110s to 130s and varies in the emergency       

      department. Blood pressure remains in the  systolic range. At this time I believe     

      that an oral agent would actually improve her blood pressure before administering we        

      will give patient 500 mL of normal saline IV in an effort to normalize blood pressure       

      prior to receiving Cardizem. I believe patient's A. fib is likely less than 24 hours        

      but there is no way to confirm that so therefore we will anticoagulate with Lovenox 1       

      dose in the ED as well as attempt the Cardizem..                                            

17:32 ED course: Patient's heart rate is now between 101-110 still in atrial fibrillation.    sp3 

      Blood pressure is come up with IV fluids to 100 systolic. Will hold AV katharine blocking       

      agents. Lovenox first dose has been given subcutaneously 1 mg/kg. I discussed his case      

      with the hospitalist team who graciously accepted this patient under service. Patient       

      aware of admission and has no further questions..                                           

18:50 ED course: [facility] transfer center return call saying  sp3 

      that they have bed available now and they will be able to accommodate this patient at       

      their facility. I have spoken to the cardiology team and hospitalist team there who         

      have accepted this patient due to no beds being available here and continued boarding       

      in the emergency department. We thanked him for their assistance in this patient's          

      care..                                                                                      

19:36 Differential Diagnosis: cardiac arrhythmia, cerebrovascular accident, sepsis, vasovagal ragini 

      episode. Data interpreted: Cardiac monitor: Pulse oximetry: on room air is 97 %. Test       

      interpretation: by ED physician or midlevel provider: ECG, plain radiologic studies.        

      Counseling: I had a detailed discussion with the patient and/or guardian regarding: the     

      historical points, exam findings, and any diagnostic results supporting the                 

      discharge/admit diagnosis, lab results, radiology results, the need to transfer to          

      another facility, for higher level of care, [facility] does not           

      immediately have the required specialist. Medication response: digoxin , fluids,            

      improved.                                                                                   

                                                                                                  

                                                                                             

15:05 Order name: Basic Metabolic Panel; Complete Time: 16:24                                 sp3 

                                                                                             

15:05 Order name: CBC with Diff; Complete Time: 16:24                                         sp3 

                                                                                             

15:05 Order name: LFT's; Complete Time: 16:24                                                 sp3 

                                                                                             

15:05 Order name: Magnesium; Complete Time: 16:24                                             sp3 

                                                                                             

15:05 Order name: NT PRO-BNP; Complete Time: 16:24                                            sp3 

                                                                                             

15:05 Order name: PT-INR; Complete Time: 16:55                                                sp3 

                                                                                             

15:05 Order name: Troponin (emerg Dept Use Only); Complete Time: 16:24                        sp3 

                                                                                             

15:05 Order name: XRAY Chest (1 view); Complete Time: 16:55                                   sp3 

                                                                                             

19:03 Order name: SARS-COV-2 RT PCR; Complete Time: 19:15                                     EDMS

                                                                                             

15:05 Order name: EKG; Complete Time: 15:06                                                   sp3 

                                                                                             

15:05 Order name: Cardiac monitoring; Complete Time: 15:11                                    sp3 

                                                                                             

15:05 Order name: EKG - Nurse/Tech; Complete Time: 15:11                                      sp3 

                                                                                             

15:05 Order name: IV Saline Lock; Complete Time: 15:11                                        sp3 

                                                                                             

15:05 Order name: Labs collected and sent; Complete Time: 15:11                               sp3 

                                                                                             

15:05 Order name: O2 Per Protocol; Complete Time: 15:11                                       sp3 

                                                                                             

15:05 Order name: O2 Sat Monitoring; Complete Time: 15:11                                     sp3 

                                                                                                  

EC:19 Rhythm is irregularly irregular. QRS Axis is Normal. QRS interval is normal. QT         sp3 

      interval is normal.                                                                         

                                                                                                  

Administered Medications:                                                                         

16:45 Drug: NS 0.9% 500 ml Route: IV; Rate: bolus; Site: right wrist;                         bp  

19:06 Follow up: IV Status: Completed infusion; IV Intake: 500ml                              bp  

17:11 Drug: NS 0.9% 500 ml Route: IV; Rate: bolus; Site: right wrist;                         bp  

19:07 Follow up: IV Status: Completed infusion; IV Intake: 500ml                              bp  

17:39 Not Given (Hemodynamic Parameters): Cardizem (diltiazem) 10 mg IVP once; Over 2 minutes bp  

17:39 Not Given (Hemodynamic Parameters): Lovenox (enoxaparin) 1 mg/kg Sub-Q once             bp  

17:39 Drug: Lovenox (enoxaparin) 1 mg/kg Route: Sub-Q; Site: right lower abdomen;             bp  

17:39 Follow up: Response: No adverse reaction                                                bp  

18:30 Drug: Digoxin 0.5 mg Route: IVP; Site: right wrist;                                     bp  

19:07 Follow up: Response: No adverse reaction                                                bp  

                                                                                                  

                                                                                                  

Disposition Summary:                                                                              

21 19:21                                                                                    

Transfer Ordered                                                                                  

      Transfer Location: [facility]                                ragini 

      Reason: Higher level of care                                                            ragini 

      Condition: Stable(21 19:21)                                                       ragini 

      Problem: new(21 19:21)                                                            ragini 

      Symptoms: have improved(21 19:21)                                                 ragini 

      Accepting Physician: to dr donte mccracken(21 20:51)                                mw2 

      Diagnosis                                                                                   

        - Unspecified atrial fibrillation(21 19:21)                                     ragini 

        - Weakness                                                                            ragini 

        - Hypotension, unspecified - resolved                                                 ragini 

        - Palpitations                                                                        ragini 

      Forms:                                                                                      

        - Medication Reconciliation Form                                                      ragini 

        - SBAR form                                                                           ragini 

                                                                                                  

NIH Stroke Scale - NIH Stroke Score                                                               

Date: 2021                                                                                  

Time: 15:07                                                                                       

Total Score = 2                                                                                   

  1a. Level of Consciousness (LOC) - 0(Alert)                                                     

  1b. Level of Consciousness (LOC) (Month \T\ Age) - 0(Both)                                      

  1c. LOC Commands (Open \T\ Closes Eyes/) - 0(Both)                                          

   2. Best Gaze (Lateral Gaze Paresis) - 0(Normal)                                                

   3. Visual Field Loss - 0(No visual loss)                                                       

   4. Facial Palsy - 0(Normal)                                                                    

  5a. Left Arm: Motor (10-second hold) - 0(No drift)                                              

  5b. Right Arm: Motor (10-second hold) - 0(No drift)                                             

  6a. Left Leg: Motor (5-second hold - always test supine) - 1(Drift)                             

  6b. Right Leg: Motor (5-second hold - always test supine) - 1(Drift)                            

   7. Limb Ataxia (finger/nose \T\ heel/shin - test with eyes open) - 0(Absent)                   

   8. Sensory Loss (pinprick arms/legs/face) - 0(Normal)                                          

   9. Best Language: Aphasia (description/naming/reading) - 0(No aphasia)                         

  10. Dysarthria (speech clarity - read or repeat words) - 0(Normal)                              

  11. Extinction and Inattention (visual/tactile/auditory/spatial/personal) - 0(No                

      abnormality)                                                                                

Initials: bp                                                                                      

                                                                                                  

Signatures:                                                                                       

Dispatcher [employer]                           EDMS                                                 

Og Cotter MD MD   ragini                                                  

Vipul Luna, FNP-C                      FNP-Cla1                                                  

Matthew Palmer, RN                      RN   bp                                                   

Kirk Serrano                            mw2                                                  

Vijaya Ross                                 sp3                                                  

                                                                                                  

Corrections: (The following items were deleted from the chart)                                    

17:57 17:36 CORONAVIRUS+MR.LAB.BRZ ordered. EDMS                                      EDMS        

17:57 17:39 Omitogun, Kiran sp3                                                       la1         

19:17 17:39 Observation sp3                                                           ragini         

19:17 17:39 Telemetry/MedSurg (observation) sp3                                       ragini         

19:17 17:39 Stable sp3                                                                ragini         

19:17 17:39 new sp3                                                                   ragini         

19:17 17:39 have improved sp3                                                         ragini         

19:17 17:39 Standard sp3                                                              ragini         

19:17 17:39 sp3                                                                       ragini         

19:17 17:39 Unspecified atrial fibrillation sp3                                       ragini         

19:17 17:39 New onset Afib with RVR, hypotension sp3                                  ragini         

19:17 17:57 Kemal Manning la1                                                        ragini         

20:51 19:21 to dr donte mccracken ragini                                                   mw2         

                                                                                                  

**************************************************************************************************

## 2021-08-17 NOTE — EKG
Test Date:    2021-08-16               Test Time:    14:59:41

Technician:   STEVENSON                                    

                                                     

MEASUREMENT RESULTS:                                       

Intervals:                                           

Rate:         125                                    

CA:                                                  

QRSD:         84                                     

QT:           274                                    

QTc:          395                                    

Axis:                                                

P:                                                   

CA:                                                  

QRS:          93                                     

T:            229                                    

                                                     

INTERPRETIVE STATEMENTS:                                       

                                                     

Atrial fibrillation with rapid ventricular response

Rightward axis

ST & T wave abnormality, consider inferior ischemia or digitalis effect

ST & T wave abnormality, consider anterior ischemia or digitalis effect

Abnormal ECG

Compared to ECG 07/02/2021 15:02:50

Right-axis deviation now present

Sinus rhythm no longer present

ST (T wave) deviation still present

Possible ischemia still present



Electronically Signed On 08-17-21 16:53:45 CDT by Darrian Packer

## 2021-09-11 ENCOUNTER — HOSPITAL ENCOUNTER (EMERGENCY)
Dept: HOSPITAL 97 - ER | Age: 81
Discharge: HOME | End: 2021-09-11
Payer: COMMERCIAL
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
## 2021-09-11 DIAGNOSIS — Z91.018: ICD-10-CM

## 2021-09-11 DIAGNOSIS — Z88.2: ICD-10-CM

## 2021-09-11 DIAGNOSIS — Z88.0: ICD-10-CM

## 2021-09-11 DIAGNOSIS — S82.431A: Primary | ICD-10-CM

## 2021-09-11 DIAGNOSIS — Z88.5: ICD-10-CM

## 2021-09-11 DIAGNOSIS — K21.9: ICD-10-CM

## 2021-09-11 DIAGNOSIS — M79.7: ICD-10-CM

## 2021-09-11 DIAGNOSIS — Z88.3: ICD-10-CM

## 2021-09-11 DIAGNOSIS — F32.9: ICD-10-CM
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
## 2021-09-11 PROCEDURE — 29515 APPLICATION SHORT LEG SPLINT: CPT

## 2021-09-11 PROCEDURE — 2W3QX1Z IMMOBILIZATION OF RIGHT LOWER LEG USING SPLINT: ICD-10-PCS

## 2021-09-11 PROCEDURE — 96372 THER/PROPH/DIAG INJ SC/IM: CPT

## 2021-09-11 PROCEDURE — 99284 EMERGENCY DEPT VISIT MOD MDM: CPT

## 2021-09-11 PROCEDURE — 73610 X-RAY EXAM OF ANKLE: CPT

## 2021-09-11 NOTE — RAD REPORT
EXAM DESCRIPTION:  RAD - Ankle Right 3 View - 9/11/2021 12:08 pm

 

CLINICAL HISTORY:  PAIN

 

COMPARISON:  Ankle Left 3 View dated 10/8/2009

 

FINDINGS:  Distal fibular fracture which is obliquely oriented and extends to the tibiofibular syndes
mosis. Well corticated fragment adjacent to the medial malleolus favored to be a remote fracture.

 

IMPRESSION:  Acute distal fibular fracture. Well corticated fragment adjacent to the medial malleolus
 favored chronic avulsion fracture.

## 2021-09-11 NOTE — EDPHYS
Physician Documentation                                                                           

 [facility]                                                                 

Name: Leona Peres                                                                              

Age: 80 yrs                                                                                       

Sex: Female                                                                                       

: 1940                                                                                   

MRN: [id number]                                                                                   

Arrival Date: 2021                                                                          

Time: 11:53                                                                                       

Account#: [id number]                                                                            

Bed 13                                                                                            

Private MD:                                                                                       

ED Physician Jey Cassidy                                                                         

HPI:                                                                                              

                                                                                             

12:00 This 80 yrs old  Female presents to ER via [facility] with complaints of ankle rn  

      injury and pain.                                                                            

12:00 The patient presents with decreased range of motion, a deformity, an injury, pain. The  rn  

      complaints affect the right ankle. Onset: The symptoms/episode began/occurred just          

      prior to arrival. Associated signs and symptoms: Pertinent positives: swelling,             

      Pertinent negatives: fever, weakness. Modifying factors: The symptoms are alleviated by     

      elevation of extremity, Splinting of extremity the symptoms are aggravated by movement.     

      Severity of symptoms: At their worst the symptoms were moderate, in the emergency           

      department the symptoms are unchanged. The patient has not experienced similar symptoms     

      in the past. The patient has not recently seen a physician. Patient reports trying to       

      walk around the structure without using walker, tripped, misstep and fall. Reports          

      right ankle injury and pain. Unable to walk on it. Improved after splinting but still       

      feels broken. Does not take any anticoagulation..                                           

                                                                                                  

Historical:                                                                                       

- Allergies:                                                                                      

12:02 clindamycin HCl (bulk);                                                                 vg1 

12:02 Darvocet-N 100;                                                                         vg1 

12:02 PENICILLINS;                                                                            vg1 

12:02 Strawberries;                                                                           vg1 

12:02 Sulfa (Sulfonamide Antibiotics);                                                        vg1 

- Home Meds:                                                                                      

12:02 alprazolam 0.25 mg Oral tab 1 tab twice a day for Anxiety [Active]; benzonatate 200 mg  vg1 

      Oral cap 1 cap 3 times per day [Active]; bupropion HCl 150 mg Oral Tb24 1 tab once          

      daily [Active]; bupropion HCl 300 mg Oral Tb24 1 tab once daily [Active]; clopidogrel       

      75 mg Oral tab 1 tab once daily [Active]; donepezil 10 mg Oral tab 1 tab once daily         

      [Active]; doxepin 50 mg Oral cap 1 cap once daily [Active]; duloxetine 60 mg Oral cpDR      

      2 caps once daily [Active]; Ellura 200 mg Oral cap daily [Active]; gabapentin 300 mg        

      Oral cap 1 cap 3 times per day [Active]; indapamide 2.5 mg Oral tab 1 tab once daily        

      [Active]; levothyroxine 75 mcg tab 1 tab once daily [Active]; magnesium oxide 500 mg        

      Oral tab 500 mg after meals and before bedtime [Active]; metoprolol succinate 25 mg         

      Oral Tb24 1 tab once daily [Active]; pantoprazole 40 mg Oral grps 1 packet once daily       

      [Active]; Trelegy Ellipta 100-62.5-25 mcg inhalation dsdv 1 puff once daily [Active];       

      topiramate 200 mg Oral cp24 1 cap twice a day [Active]; tizanidine 4 mg Oral tab 1 tab      

      daily [Active]; simvastatin 40 mg Oral tab 1 tab once daily [Active]; quetiapine 200 mg     

      Oral Tb24 1 tab once daily [Active]; potassium chloride 20 mEq Oral TbER 1 tab 2 times      

      per day [Active]; memantine 10 mg Oral tab 1 tab 2 times per day [Active];                  

      hydrocodone-acetaminophen 7.5-325 mg Oral tab 1 tab every 8 hours [Active]; Eliquis 5       

      mg oral tab [Active];                                                                       

- PMHx:                                                                                           

12:02 Cancer; Depression; Fibromyalgia; GERD; insomnia; lymphedema; skipped heart beats;      vg1 

                                                                                                  

- Immunization history:: Adult Immunizations up to date, Client reports receiving the             

  2nd dose of the Covid vaccine.                                                                  

- Social history:: Smoking status: Patient denies any tobacco usage or history of.                

- Family history:: not pertinent.                                                                 

- Hospitalizations: : No recent hospitalization is reported.                                      

                                                                                                  

                                                                                                  

ROS:                                                                                              

12:00 Constitutional: Negative for fever, chills, and weight loss, Eyes: Negative for injury, rn  

      pain, redness, and discharge, Neck: Negative for injury, pain, and swelling,                

      Cardiovascular: Negative for chest pain, palpitations, and edema, Respiratory: Negative     

      for shortness of breath, cough, wheezing, and pleuritic chest pain, Abdomen/GI:             

      Negative for abdominal pain, nausea, vomiting, diarrhea, and constipation, Back:            

      Negative for injury and pain, MS/Extremity: Positive for injury and deformity to right      

      ankle Skin: No open wounds or lacerations. Neuro: Negative for headache, weakness,          

      numbness, tingling, and seizure.                                                            

12:00 All other systems are negative.                                                             

                                                                                                  

Exam:                                                                                             

12:00 Constitutional:  This is a well developed, well nourished patient who is awake, alert,  rn  

      appears in pain Head/Face:  Normocephalic, atraumatic. Neck:  No midline cervical           

      tenderness Chest/axilla:  Normal chest wall appearance and motion.  Nontender with no       

      deformity.  Cardiovascular:  Regular rate and rhythm.  No pulse deficits. Respiratory:      

      No increased work of breathing, no retractions or nasal flaring. Abdomen/GI:  Soft,         

      nontender Skin:  Warm, dry with normal turgor.  Normal color with no rashes, no             

      lesions, and no evidence of cellulitis. MS/ Extremity:  Pulses equal, no cyanosis.          

      Moderate swelling and deformity to right ankle.  No open wounds or bleeding.  Appears       

      to be laterally displaced.  No pain or tenderness of right hip/femur/knee/proximal          

      tib-fib. Neuro:  Awake and alert, GCS 15, oriented to person, place, time, and              

      situation.  Motor strength 5/5 in all extremities.  Sensory grossly intact.                 

                                                                                                  

Vital Signs:                                                                                      

11:50  / 78; Pulse 60; Resp 18; Temp 98.0(O); Pulse Ox 98% ; Weight 92.99 kg; Height 5  vg1 

      ft. 3 in. (160.02 cm); Pain 8/10;                                                           

13:30  / 80; Pulse 65; Resp 16; Pulse Ox 100% on R/A;                                   zb  

11:50 Body Mass Index 36.31 (92.99 kg, 160.02 cm)                                             vg1 

                                                                                                  

Procedures:                                                                                       

12:51 Splinting: Splint applied to right ankle using Orthoglass splint, applied by myself.    rn  

      Examined by me, post splint application: neurovascular intact, 2+ distal pulses             

      palpable, brisk capillary refill noted, Patient tolerated well, Minimal compression and     

      manipulation applied during splinting.                                                      

                                                                                                  

MDM:                                                                                              

11:53 Patient medically screened.                                                             rn  

12:51 Differential diagnosis: fracture, sprain. Data reviewed: vital signs, nurses notes,     rn  

      radiologic studies, plain films, and as a result, I will discharge patient. Test            

      interpretation: by ED physician or midlevel provider: plain radiologic studies, X-ray       

      right ankle shows distal fibular fracture with minimal displacement. No evidence of         

      ankle dislocation.. Counseling: I had a detailed discussion with the patient and/or         

      guardian regarding: the historical points, exam findings, and any diagnostic results        

      supporting the discharge/admit diagnosis, radiology results, the need for outpatient        

      follow up, to return to the emergency department if symptoms worsen or persist or if        

      there are any questions or concerns that arise at home. Response to treatment: the          

      patient's symptoms have markedly improved after treatment, and as a result, I will          

      discharge patient. Special discussion: I discussed with the patient/guardian in detail      

      that at this point there is no indication for admission to the hospital. It is              

      understood, however, that if the symptoms persist or worsen the patient needs to return     

      immediately for re-evaluation. Based on the history and exam findings, there is no          

      indication for further emergent testing or inpatient evaluation. I discussed with the       

      patient/guardian the need to see the orthopedic surgeon for further evaluation of the       

      symptoms. ED course: Patient states no pain after splinting and fentanyl. Has a scooter     

      at home and understands nonweightbearing and follow-up with orthopedics..                   

                                                                                                  

                                                                                             

11:57 Order name: XRAY Ankle RIGHT 3 view; Complete Time: 12:16                               rn  

                                                                                             

12:12 Order name: Splint - Ankle: Orthoglass: Stirrup; Complete Time: 12:26                   rn  

                                                                                             

12:12 Order name: Splint - Ankle: Posterior; Complete Time: 12:26                             rn  

                                                                                                  

Administered Medications:                                                                         

12:25 Drug: fentaNYL (PF) 25 mcg {Note: +2.} Route: IM; Site: right deltoid;                  zb  

13:00 Follow up: Response: No adverse reaction; Pain is decreased; RASS: Alert and Calm (0)   zb  

                                                                                                  

                                                                                                  

Disposition Summary:                                                                              

21 12:54                                                                                    

Discharge Ordered                                                                                 

      Location: Home                                                                          rn  

      Problem: new                                                                            rn  

      Symptoms: have improved                                                                 rn  

      Condition: Stable                                                                       rn  

      Diagnosis                                                                                   

        - Displaced oblique fracture of shaft of right fibula, initial encounter for closed   rn  

      fracture                                                                                    

      Followup:                                                                               rn  

        - With: Carson Vincent MD                                                                

        - When: 5 - 6 days                                                                         

        - Reason: Recheck today's complaints, Re-evaluation by your physician                      

      Discharge Instructions:                                                                     

        - Discharge Summary Sheet                                                             rn  

        - Cast or Splint Care, Adult                                                          rn  

        - Nondisplaced Fibular Ankle Fracture Treated With Immobilization                     rn  

      Forms:                                                                                      

        - Medication Reconciliation Form                                                      rn  

        - Thank You Letter                                                                    rn  

        - Antibiotic Education                                                                rn  

        - Prescription Opioid Use                                                             rn  

Signatures:                                                                                       

Dispatcher [employer]                           EDJey Duque MD MD rn Garcia, Victoria, RN                    RN   Trena Peck RN RN zb                                                   

                                                                                                  

**************************************************************************************************

## 2021-09-11 NOTE — ER
Nurse's Notes                                                                                     

 [facility]                                                                 

Name: Leona Peres                                                                              

Age: 80 yrs                                                                                       

Sex: Female                                                                                       

: 1940                                                                                   

MRN: [id number]                                                                                   

Arrival Date: 2021                                                                          

Time: 11:53                                                                                       

Account#: [id number]                                                                            

Bed 13                                                                                            

Private MD:                                                                                       

Diagnosis: Displaced oblique fracture of shaft of right fibula, initial encounter for closed      

  fracture                                                                                        

                                                                                                  

Presentation:                                                                                     

                                                                                             

11:50 Chief complaint: EMS states: Pt is from [facility]; Pt was walking without walker,    vg1 

      turned the corner in room and lost footing and fell. Denies head injury/LOC. Pt does        

      take Eliquis; Pt c/o Right ankle pain. Pt was given 60 mg of Toradol IM PTA in Right        

      deltoid.                                                                                    

11:50 Method Of Arrival: EMS: [facility]                                                vg1 

11:50 Coronavirus screen: Vaccine status: Patient reports receiving the 2nd dose of the covid vg1 

      vaccine. Ebola Screen: Patient negative for fever greater than or equal to 101.5            

      degrees Fahrenheit, and additional compatible Ebola Virus Disease symptoms. Initial         

      Sepsis Screen: Does the patient meet any 2 criteria? No. Patient's initial sepsis           

      screen is negative. Does the patient have a suspected source of infection? No.              

      Patient's initial sepsis screen is negative. Risk Assessment: Do you want to hurt           

      yourself or someone else? Patient reports no desire to harm self or others. Onset of        

      symptoms was 2021.                                                            

11:50 Acuity: ANUSHKA 3                                                                           vg1 

                                                                                                  

Triage Assessment:                                                                                

12:02 General: Appears in no apparent distress. uncomfortable, Behavior is calm, cooperative. vg1 

      Pain: Complains of pain in Right ankle Pain currently is 8 out of 10 on a pain scale.       

                                                                                                  

Historical:                                                                                       

- Allergies:                                                                                      

12:02 clindamycin HCl (bulk);                                                                 vg1 

12:02 Darvocet-N 100;                                                                         vg1 

12:02 PENICILLINS;                                                                            vg1 

12:02 Strawberries;                                                                           vg1 

12:02 Sulfa (Sulfonamide Antibiotics);                                                        vg1 

- Home Meds:                                                                                      

12:02 alprazolam 0.25 mg Oral tab 1 tab twice a day for Anxiety [Active]; benzonatate 200 mg  vg1 

      Oral cap 1 cap 3 times per day [Active]; bupropion HCl 150 mg Oral Tb24 1 tab once          

      daily [Active]; bupropion HCl 300 mg Oral Tb24 1 tab once daily [Active]; clopidogrel       

      75 mg Oral tab 1 tab once daily [Active]; donepezil 10 mg Oral tab 1 tab once daily         

      [Active]; doxepin 50 mg Oral cap 1 cap once daily [Active]; duloxetine 60 mg Oral cpDR      

      2 caps once daily [Active]; Ellura 200 mg Oral cap daily [Active]; gabapentin 300 mg        

      Oral cap 1 cap 3 times per day [Active]; indapamide 2.5 mg Oral tab 1 tab once daily        

      [Active]; levothyroxine 75 mcg tab 1 tab once daily [Active]; magnesium oxide 500 mg        

      Oral tab 500 mg after meals and before bedtime [Active]; metoprolol succinate 25 mg         

      Oral Tb24 1 tab once daily [Active]; pantoprazole 40 mg Oral grps 1 packet once daily       

      [Active]; Trelegy Ellipta 100-62.5-25 mcg inhalation dsdv 1 puff once daily [Active];       

      topiramate 200 mg Oral cp24 1 cap twice a day [Active]; tizanidine 4 mg Oral tab 1 tab      

      daily [Active]; simvastatin 40 mg Oral tab 1 tab once daily [Active]; quetiapine 200 mg     

      Oral Tb24 1 tab once daily [Active]; potassium chloride 20 mEq Oral TbER 1 tab 2 times      

      per day [Active]; memantine 10 mg Oral tab 1 tab 2 times per day [Active];                  

      hydrocodone-acetaminophen 7.5-325 mg Oral tab 1 tab every 8 hours [Active]; Eliquis 5       

      mg oral tab [Active];                                                                       

- PMHx:                                                                                           

12:02 Cancer; Depression; Fibromyalgia; GERD; insomnia; lymphedema; skipped heart beats;      vg1 

                                                                                                  

- Immunization history:: Adult Immunizations up to date, Client reports receiving the             

  2nd dose of the Covid vaccine.                                                                  

- Social history:: Smoking status: Patient denies any tobacco usage or history of.                

- Family history:: not pertinent.                                                                 

- Hospitalizations: : No recent hospitalization is reported.                                      

                                                                                                  

                                                                                                  

Screenin:27 Abuse screen: Denies threats or abuse. Denies injuries from another. Nutritional        zb  

      screening: No deficits noted. Tuberculosis screening: No symptoms or risk factors           

      identified. Fall Risk Fall in past 12 months (25 points). No secondary diagnosis (0         

      pts). No IV (0 pts). Ambulatory Aid- None/Bed Rest/Nurse Assist (0 pts). Gait- Impaired     

      (20 pts.). Mental Status- Oriented to own ability (0 pts). Total Martinez Fall Scale           

      indicates High Risk Score (45 or more points). Fall prevention measures have been           

      instituted. Side Rails Up X 2 Placed Close to Nursing Station Frequent Obs/Assessments      

      Occuring Family Present and informed to notify staff if the need to leave the bedside       

      As available patient and family educated on Fall Prevention Program and Strategies.         

                                                                                                  

Assessment:                                                                                       

12:04 General: Appears in no apparent distress. Pain: Complains of pain in right medial       zb  

      malleolus, right ankle and anterior aspect of right ankle Pain currently is 8 out of 10     

      on a pain scale. Quality of pain is described as aching, sharp. Neuro: Level of             

      Consciousness is awake, alert, obeys commands, Oriented to person, place, time,             

      situation. Cardiovascular: Patient's skin is warm and dry. Respiratory: Airway is           

      patent Respiratory effort is even, unlabored. Musculoskeletal: Range of motion: limited     

      in right ankle.                                                                             

13:00 Reassessment: Patient appears in no apparent distress at this time. Patient and/or      zb  

      family updated on plan of care and expected duration. Pain level reassessed. Patient is     

      alert, oriented x 3, equal unlabored respirations, skin warm/dry/pink. splint               

      completed. ED discussed care with patient.                                                  

                                                                                                  

Vital Signs:                                                                                      

11:50  / 78; Pulse 60; Resp 18; Temp 98.0(O); Pulse Ox 98% ; Weight 92.99 kg; Height 5  vg1 

      ft. 3 in. (160.02 cm); Pain 8/10;                                                           

13:30  / 80; Pulse 65; Resp 16; Pulse Ox 100% on R/A;                                   zb  

11:50 Body Mass Index 36.31 (92.99 kg, 160.02 cm)                                             vg1 

                                                                                                  

ED Course:                                                                                        

11:53 Patient arrived in ED.                                                                  rn  

11:53 Jey Cassidy MD is Attending Physician.                                                rn  

12:00 Trena Moulton RN is Primary Nurse.                                                   zb  

12:02 Triage completed.                                                                       vg1 

12:02 Arm band placed on.                                                                     vg1 

12:08 XRAY Ankle RIGHT 3 view In Process Unspecified.                                         EDMS

12:26 Orthoglass splint: Posterior short lleg splint applied on stirrup splint applied on     mh5 

      right leg.                                                                                  

12:30 Patient has correct armband on for positive identification. Bed in low position. Call   zb  

      light in reach. Side rails up X 1. Pulse ox on. NIBP on. Door closed. Noise minimized.      

12:53 Carson Vincent MD is Referral Physician.                                              rn  

13:33 No provider procedures requiring assistance completed. Patient did not have IV access   zb  

      during this emergency room visit.                                                           

                                                                                                  

Administered Medications:                                                                         

12:25 Drug: fentaNYL (PF) 25 mcg {Note: +2.} Route: IM; Site: right deltoid;                  zb  

13:00 Follow up: Response: No adverse reaction; Pain is decreased; RASS: Alert and Calm (0)   zb  

                                                                                                  

                                                                                                  

Outcome:                                                                                          

12:54 Discharge ordered by MD.                                                                rn  

13:00 Discharged to home via wheelchair, with friend.                                         zb  

13:00 Condition: stable                                                                           

13:00 Discharge instructions given to patient, family, Instructed on discharge instructions,      

      follow up and referral plans. Demonstrated understanding of instructions, follow-up         

      care.                                                                                       

13:33 Patient left the ED.                                                                    zb  

                                                                                                  

Signatures:                                                                                       

Dispatcher [employer]                           EDMS                                                 

Jey Cassidy MD MD rn Martinez, Maria                              [facility]                                                  

Serenity Braswell RN                    RN   1                                                  

Trena Moulton RN                     RN   zb                                                   

                                                                                                  

**************************************************************************************************

## 2021-12-30 ENCOUNTER — HOSPITAL ENCOUNTER (INPATIENT)
Dept: HOSPITAL 97 - ER | Age: 81
LOS: 2 days | Discharge: NURSING, REHAB OR LONG-TERM CARE | DRG: 689 | End: 2022-01-01
Attending: HOSPITALIST | Admitting: HOSPITALIST
Payer: COMMERCIAL

## 2021-12-30 VITALS — BODY MASS INDEX: 36.3 KG/M2

## 2021-12-30 DIAGNOSIS — N39.0: Primary | ICD-10-CM

## 2021-12-30 DIAGNOSIS — Z90.710: ICD-10-CM

## 2021-12-30 DIAGNOSIS — D72.829: ICD-10-CM

## 2021-12-30 DIAGNOSIS — Z91.018: ICD-10-CM

## 2021-12-30 DIAGNOSIS — Z79.890: ICD-10-CM

## 2021-12-30 DIAGNOSIS — Z79.01: ICD-10-CM

## 2021-12-30 DIAGNOSIS — Z20.822: ICD-10-CM

## 2021-12-30 DIAGNOSIS — Z87.891: ICD-10-CM

## 2021-12-30 DIAGNOSIS — Z79.02: ICD-10-CM

## 2021-12-30 DIAGNOSIS — I95.9: ICD-10-CM

## 2021-12-30 DIAGNOSIS — G93.41: ICD-10-CM

## 2021-12-30 DIAGNOSIS — Z79.899: ICD-10-CM

## 2021-12-30 DIAGNOSIS — J44.9: ICD-10-CM

## 2021-12-30 DIAGNOSIS — I50.9: ICD-10-CM

## 2021-12-30 DIAGNOSIS — E66.01: ICD-10-CM

## 2021-12-30 DIAGNOSIS — I48.91: ICD-10-CM

## 2021-12-30 DIAGNOSIS — Z88.0: ICD-10-CM

## 2021-12-30 DIAGNOSIS — K21.9: ICD-10-CM

## 2021-12-30 DIAGNOSIS — Z88.6: ICD-10-CM

## 2021-12-30 DIAGNOSIS — Z88.1: ICD-10-CM

## 2021-12-30 DIAGNOSIS — T43.95XA: ICD-10-CM

## 2021-12-30 DIAGNOSIS — R00.1: ICD-10-CM

## 2021-12-30 LAB
ALBUMIN SERPL BCP-MCNC: 2.3 G/DL (ref 3.4–5)
ALP SERPL-CCNC: 102 U/L (ref 45–117)
ALT SERPL W P-5'-P-CCNC: 12 U/L (ref 12–78)
AST SERPL W P-5'-P-CCNC: 13 U/L (ref 15–37)
BLD SMEAR INTERP: (no result)
BUN BLD-MCNC: 19 MG/DL (ref 7–18)
GLUCOSE SERPLBLD-MCNC: 209 MG/DL (ref 74–106)
HCT VFR BLD CALC: 38.5 % (ref 36–45)
INR BLD: 1.49
LYMPHOCYTES # SPEC AUTO: 0.7 K/UL (ref 0.7–4.9)
MAGNESIUM SERPL-MCNC: 1.8 MG/DL (ref 1.8–2.4)
MORPHOLOGY BLD-IMP: (no result)
NT-PROBNP SERPL-MCNC: 936 PG/ML (ref ?–450)
PMV BLD: 9 FL (ref 7.6–11.3)
POTASSIUM SERPL-SCNC: 4.6 MMOL/L (ref 3.5–5.1)
RBC # BLD: 4.01 M/UL (ref 3.86–4.86)
TROPONIN (EMERG DEPT USE ONLY): < 0.02 NG/ML (ref 0–0.04)

## 2021-12-30 PROCEDURE — 96374 THER/PROPH/DIAG INJ IV PUSH: CPT

## 2021-12-30 PROCEDURE — 87086 URINE CULTURE/COLONY COUNT: CPT

## 2021-12-30 PROCEDURE — 84443 ASSAY THYROID STIM HORMONE: CPT

## 2021-12-30 PROCEDURE — 83735 ASSAY OF MAGNESIUM: CPT

## 2021-12-30 PROCEDURE — 36415 COLL VENOUS BLD VENIPUNCTURE: CPT

## 2021-12-30 PROCEDURE — 87040 BLOOD CULTURE FOR BACTERIA: CPT

## 2021-12-30 PROCEDURE — 84100 ASSAY OF PHOSPHORUS: CPT

## 2021-12-30 PROCEDURE — 80048 BASIC METABOLIC PNL TOTAL CA: CPT

## 2021-12-30 PROCEDURE — 86901 BLOOD TYPING SEROLOGIC RH(D): CPT

## 2021-12-30 PROCEDURE — 94640 AIRWAY INHALATION TREATMENT: CPT

## 2021-12-30 PROCEDURE — 97530 THERAPEUTIC ACTIVITIES: CPT

## 2021-12-30 PROCEDURE — 93005 ELECTROCARDIOGRAM TRACING: CPT

## 2021-12-30 PROCEDURE — 83605 ASSAY OF LACTIC ACID: CPT

## 2021-12-30 PROCEDURE — 97110 THERAPEUTIC EXERCISES: CPT

## 2021-12-30 PROCEDURE — 84484 ASSAY OF TROPONIN QUANT: CPT

## 2021-12-30 PROCEDURE — 80061 LIPID PANEL: CPT

## 2021-12-30 PROCEDURE — 51702 INSERT TEMP BLADDER CATH: CPT

## 2021-12-30 PROCEDURE — 85610 PROTHROMBIN TIME: CPT

## 2021-12-30 PROCEDURE — 80076 HEPATIC FUNCTION PANEL: CPT

## 2021-12-30 PROCEDURE — 87088 URINE BACTERIA CULTURE: CPT

## 2021-12-30 PROCEDURE — 99285 EMERGENCY DEPT VISIT HI MDM: CPT

## 2021-12-30 PROCEDURE — 97161 PT EVAL LOW COMPLEX 20 MIN: CPT

## 2021-12-30 PROCEDURE — 96375 TX/PRO/DX INJ NEW DRUG ADDON: CPT

## 2021-12-30 PROCEDURE — 83880 ASSAY OF NATRIURETIC PEPTIDE: CPT

## 2021-12-30 PROCEDURE — 71045 X-RAY EXAM CHEST 1 VIEW: CPT

## 2021-12-30 PROCEDURE — 86900 BLOOD TYPING SEROLOGIC ABO: CPT

## 2021-12-30 PROCEDURE — 85025 COMPLETE CBC W/AUTO DIFF WBC: CPT

## 2021-12-30 PROCEDURE — 94760 N-INVAS EAR/PLS OXIMETRY 1: CPT

## 2021-12-30 PROCEDURE — 86850 RBC ANTIBODY SCREEN: CPT

## 2021-12-30 PROCEDURE — 80053 COMPREHEN METABOLIC PANEL: CPT

## 2021-12-30 PROCEDURE — 81001 URINALYSIS AUTO W/SCOPE: CPT

## 2021-12-30 RX ADMIN — IPRATROPIUM BROMIDE SCH: 0.5 SOLUTION RESPIRATORY (INHALATION) at 20:51

## 2021-12-30 RX ADMIN — ALBUTEROL SULFATE SCH: 2.5 SOLUTION RESPIRATORY (INHALATION) at 20:51

## 2021-12-30 RX ADMIN — Medication SCH ML: at 21:00

## 2021-12-30 RX ADMIN — FLECAINIDE ACETATE SCH MG: 100 TABLET ORAL at 23:26

## 2021-12-30 RX ADMIN — APIXABAN SCH MG: 5 TABLET, FILM COATED ORAL at 21:00

## 2021-12-30 NOTE — P.HP
Certification for Inpatient


Patient admitted to: Observation


With expected LOS: <2 Midnights


Practitioner: I am a practitioner with admitting privileges, knowledge of 

patient current condition, hospital course, and medical plan of care.


Services: Services provided to patient in accordance with Admission requirements

found in Title 42 Section 412.3 of the Code of Federal Regulations





Patient History


Date of Service: 12/30/21


Reason for admission: Fever and hypotension


History of Present Illness: 


81-year-old woman with a history of chronic atrial fibrillation, COPD, history 

of recurrent UTI was transferred from the nursing home to the emergency 

department due to hypotension.  Patient also reported to have had a fever and 

confused.  Patient not able to provide any history due to confusion.  Work-up in

the emergency department revealed leukocytosis with WBC count of 12,000.  BMP 

unremarkable.  EKG revealed sinus bradycardia.  Chest x-ray shows no acute 

disease, UA not done yet.  Patient systolic blood pressure ranging from 90s to 

100.  Blood cultures obtained in the ED. Patient noted to have nonproductive 

cough.  She is hospitalized for further evaluation and management.





Allergies





clindamycin HCl [From Cleocin] Allergy (Severe, Verified 07/02/21 20:52)


   Hives/Rash


clindamycin palmitate HCl [From Cleocin] Allergy (Severe, Verified 07/02/21 

20:52)


   Hives/Rash


clindamycin phosphate [From Cleocin] Allergy (Severe, Verified 07/02/21 20:52)


   Hives/Rash


Penicillins Allergy (Severe, Verified 07/02/21 20:52)


   Hives/Rash


Sulfa (Sulfonamide Antibiotics) [Sulfa(Sulfonamide Antibiotics)] Allergy 

(Severe, Verified 07/02/21 20:52)


   Hives/Rash


acetaminophen [From Darvocet-N 100] Allergy (Verified 07/02/21 20:52)


   Unknown


propoxyphene [From Darvocet-N 100] Allergy (Verified 07/02/21 20:52)


   Unknown


strawberries Allergy (Severe, Uncoded 03/24/21 05:19)


   Hives/Rash





Home Medications: 








Simvastatin 40 mg PO BEDTIME 04/17/12 


Memantine HCl 10 mg PO BID 09/24/18 


Metoprolol Succinate [Toprol Xl*] 25 mg PO DAILY 08/29/19 


Gabapentin 300 mg PO TID 02/05/20 


Pantoprazole Sodium [Protonix] 1 tab PO DAILY 10/17/20 


Topiramate [Topamax] 200 mg PO BID 10/17/20 


ALPRAZolam [Xanax*] 0.25 mg PO Q12HP PRN 03/24/21 


Hydrocodone 7.5/APAP 325 [Norco 7.5/325 mg*] 1 tab PO Q8HP PRN 03/24/21 


Magnesium Oxide 1 tab PO DAILY 03/24/21 


Tizanidine HCl [Zanaflex] 4 mg PO DAILYPRN PRN 03/24/21 


Benzonatate 200 mg PO Q8HP PRN 07/02/21 


Bupropion *Xl* [Wellbutrin XL*] 300 mg PO DAILY 07/02/21 


Clopidogrel Bisulfate [Plavix*] 75 mg PO DAILY 07/02/21 


Cranberry Fruit Extract [Ellura] 200 mg PO DAILY 07/02/21 


Donepezil HCl 10 mg PO BEDTIME 07/02/21 


Doxepin HCl 50 mg PO BEDTIME 07/02/21 


Duloxetine HCl 60 mg PO BID 07/02/21 


Fluticasone/Umeclidin/Vilanter [Trelegy Ellipta 100-62.5-25] 1 each IH DAILY 

07/02/21 


Indapamide [Lozol] 2.5 mg PO DAILY 07/02/21 


Levothyroxine [Synthroid*] 75 mcg PO [id number] 07/02/21 


Mupirocin Oint [Bactroban 2% Ointment*] 1 appl TP BID 07/02/21 


Potassium Chloride 20 meq PO BID 07/02/21 


Quetiapine Fumarate [Seroquel] 200 mg PO BEDTIME 07/02/21 








- Past Medical/Surgical History


Diabetic: No


-: Lymphedema


-: heart arrhythmia w/ PVCs


-: L breast cancer


-: PE


-: Meniere's Disease


-: Former smoker


-: Recurrent UTIs


-: Fibromyalgia


-: GERD


-: Insomnia


-: Depression


-: Neuropathy


-: s/p Left Mastectomy


-: SANTOSH TKA


-: Cervical fusion


-: Lumbar fusion


-: Appendectomy


-: Hysterectomy





- Family History


  ** Father


-: Diabetes


Notes: heart attack.  alcoholic





- Social History


Alcohol use: No


CD- Drugs: No


Caffeine use: No





Review of Systems


is unable to be obtained (Due to confusion)





Physical Examination





- Physical Exam


General: In no apparent distress, Confused, Obese


HEENT: PERRLA, Mucous membr. moist/pink, EOMI, Sclerae nonicteric


Neck: Supple, JVD not distended


Respiratory: Clear to auscultation bilaterally, Normal air movement


Cardiovascular: No edema, Regular rate/rhythm, Normal S1 S2, No murmurs


Capillary refill: <2 Seconds


Gastrointestinal: Normal bowel sounds, Soft and benign, Non-distended, No 

tenderness


Musculoskeletal: No swelling, No tenderness


Integumentary: No rashes, No erythema, No cyanosis


Neurological: Normal speech, Normal strength at 5/5 x4 extr


Lymphatics: No axilla or inguinal lymphadenopathy





- Studies


Laboratory Data (last 24 hrs)





12/30/21 13:25: PT 17.2 H, INR 1.49


12/30/21 13:25: WBC 12.40 H, Hgb 12.4, Hct 38.5, Plt Count 220


12/30/21 13:25: Sodium 136, Potassium 4.6, BUN 19 H, Creatinine 1.17, Glucose 

209 H, Magnesium 1.8, Total Bilirubin 0.5, AST 13 L, ALT 12, Alkaline 

Phosphatase 102








Assessment and Plan





- Problems (Diagnosis)


(1) Acute metabolic encephalopathy


Current Visit: Yes   Status: Acute   





(2) History of recurrent UTI (urinary tract infection)


Current Visit: Yes   Status: Acute   





(3) Hypotension


Current Visit: Yes   Status: Acute   





(4) COPD (chronic obstructive pulmonary disease)


Current Visit: Yes   Status: Acute   





(5) Morbid obesity


Current Visit: No   Status: Chronic   





- Plan


I suspect patient symptoms are secondary to UTI.  UA is pending.


Place patient under observation


Blood cultures obtained in the ED.


Patient is not septic, lactate within normal limits.


Start IV Levaquin as we will wait for urinalysis, urine culture and blood 

cultures.


Blood pressure already improved.  Continue to monitor.  Hydration with IV normal

 saline as needed.


Hypotension could be medication induced versus dehydration.


Continue home bronchodilators for COPD.


Diet as tolerated.


Patient is on Lasix for CHF and metoprolol for A. fib.


We will hold metoprolol and Lasix.


Continue Eliquis.








- Advance Directives


Does patient have a Living Will: No


Does patient have a Durable POA for Healthcare: Yes

## 2021-12-30 NOTE — EDPHYS
Physician Documentation                                                                           

 [facility]                                                                 

Name: Leona Peres                                                                              

Age: 81 yrs                                                                                       

Sex: Female                                                                                       

: 1940                                                                                   

MRN: [id number]                                                                                   

Arrival Date: 2021                                                                          

Time: 11:51                                                                                       

Account#: [id number]                                                                            

Bed 4                                                                                             

Private MD:                                                                                       

ED Physician Og Cotter                                                                      

HPI:                                                                                              

                                                                                             

13:48 This 81 yrs old  Female presents to ER via EMS with complaints of Fever.       ragini 

13:48 The patient reports fever, not measured (subjective), that was measured at 100 degrees  ragini 

      Fahrenheit. Onset: The symptoms/episode began/occurred 3 day(s) ago. Modifying factors:     

      there are no obvious modifying factors. Associated signs and symptoms: Pertinent            

      positives: chills, cough, headache, sinus congestion, sinus drainage. Severity of           

      symptoms: At their worst the symptoms were moderate in the emergency department the         

      symptoms are unchanged. The patient has not experienced similar symptoms in the past.       

                                                                                                  

Historical:                                                                                       

- Allergies:                                                                                      

12:55 clindamycin HCl (bulk);                                                                 bp  

12:55 Darvocet-N 100;                                                                         bp  

12:55 PENICILLINS;                                                                            bp  

12:55 Strawberries;                                                                           bp  

12:55 Sulfa (Sulfonamide Antibiotics);                                                        bp  

- PMHx:                                                                                           

12:55 skipped heart beats; lymphedema; insomnia; GERD; Fibromyalgia; Depression; Cancer;      bp  

                                                                                                  

- Immunization history:: Adult Immunizations up to date.                                          

- Social history:: Smoking status: Patient denies any tobacco usage or history of.                

                                                                                                  

                                                                                                  

ROS:                                                                                              

13:51 Constitutional: Negative for fever, chills, and weight loss, Eyes: Negative for injury, ragini 

      pain, redness, and discharge, ENT: Negative for injury, pain, and discharge, Neck:          

      Negative for injury, pain, and swelling, Cardiovascular: Negative for chest pain,           

      palpitations, and edema, Abdomen/GI: Negative for abdominal pain, nausea, vomiting,         

      diarrhea, and constipation, Back: Negative for injury and pain, : Negative for            

      injury, bleeding, discharge, and swelling, MS/Extremity: Negative for injury and            

      deformity, Neuro: Negative for headache, weakness, numbness, tingling, and seizure,         

      Psych: Negative for depression, anxiety, suicide ideation, homicidal ideation, and          

      hallucinations, Allergy/Immunology: Negative for hives, rash, and allergies, Endocrine:     

      Negative for neck swelling, polydipsia, polyuria, polyphagia, and marked weight             

      changes, Hematologic/Lymphatic: Negative for swollen nodes, abnormal bleeding, and          

      unusual bruising.                                                                           

13:51 Respiratory: Positive for cough, shortness of breath, wheezing, expiratory.                 

13:51 Skin: Positive for pallor.                                                                  

                                                                                                  

Exam:                                                                                             

13:51 Constitutional:  This is a well developed, well nourished patient who is awake, alert,  ragini 

      and in no acute distress. Head/Face:  Normocephalic, atraumatic. Eyes:  Pupils equal        

      round and reactive to light, extra-ocular motions intact.  Lids and lashes normal.          

      Conjunctiva and sclera are non-icteric and not injected.  Cornea within normal limits.      

      Periorbital areas with no swelling, redness, or edema. ENT:  Nares patent. No nasal         

      discharge, no septal abnormalities noted.  Tympanic membranes are normal and external       

      auditory canals are clear.  Oropharynx with no redness, swelling, or masses, exudates,      

      or evidence of obstruction, uvula midline.  Mucous membranes moist. Neck:  Trachea          

      midline, no thyromegaly or masses palpated, and no cervical lymphadenopathy.  Supple,       

      full range of motion without nuchal rigidity, or vertebral point tenderness.  No            

      Meningismus. Chest/axilla:  Normal chest wall appearance and motion.  Nontender with no     

      deformity.  No lesions are appreciated. Respiratory:  Lungs have equal breath sounds        

      bilaterally, clear to auscultation and percussion.  No rales, rhonchi or wheezes noted.     

       No increased work of breathing, no retractions or nasal flaring. Back:  No spinal          

      tenderness.  No costovertebral tenderness.  Full range of motion. Female :  Normal        

      external genitalia. Skin:  Warm, dry with normal turgor.  Normal color with no rashes,      

      no lesions, and no evidence of cellulitis.                                                  

13:51 Cardiovascular: Rate: normal, Rhythm: regular, Pulses: Pulses are 4+ in bilateral           

      radial, brachial, femoral, popliteal, posterior tibial and and dorsalis pedis               

      arteries.. Heart sounds: normal, Edema: is not appreciated, JVD: is not appreciated.        

13:51 ECG was reviewed by the Attending Physician.                                                

                                                                                                  

Vital Signs:                                                                                      

12:00 BP 80 / 64; Pulse 61; Resp 23; Pulse Ox 100% ;                                          bp  

13:00 BP 93 / 55; Pulse 55; Resp 17; Pulse Ox 100% ;                                          bp  

14:00 BP 96 / 57; Pulse 54; Resp 17; Pulse Ox 100% ;                                          bp  

15:00  / 72; Pulse 57; Resp 16; Pulse Ox 100% ;                                         bp  

16:00 BP 89 / 78; Pulse 69; Resp 16; Pulse Ox 100% ;                                          bp  

                                                                                             

12:14 Weight 93 kg; Height 5 ft. 3 in. (160.02 cm);                                           jl7 

12:14 Body Mass Index 36.32 (93.00 kg, 160.02 cm)                                             7 

                                                                                                  

MDM:                                                                                              

                                                                                             

11:57 Patient medically screened.                                                             [facility] 

13:53 Antibiotic administration: Rocephin and Zithromax given. Differential diagnosis:        ragini 

      bronchitis, flu, URI, viral Infection, bacterial infection, URI, bronchitis, pneumonia      

      UTI. Differential Diagnosis: Bronchitis Influenza Upper Respiratory Infection Sinusitis     

      Asthma Exacerbation Viral Syndrome Pneumonia. Data reviewed: vital signs, nurses notes,     

      lab test result(s), EKG, radiologic studies, plain films. Data interpreted: Cardiac         

      monitor: rate is 55 beats/min, rhythm is regular, Pulse oximetry: on room air. Test         

      interpretation: by ED physician or midlevel provider: ECG, plain radiologic studies.        

      Counseling: I had a detailed discussion with the patient and/or guardian regarding: the     

      historical points, exam findings, and any diagnostic results supporting the                 

      discharge/admit diagnosis, lab results, radiology results, the need for further work-up     

      and treatment in the hospital.                                                              

                                                                                                  

                                                                                             

12:54 Order name: Basic Metabolic Panel                                                       [facility] 

                                                                                             

12:54 Order name: CBC with Diff                                                               [facility] 

                                                                                             

12:54 Order name: LFT's; Complete Time: 14:09                                                 [facility] 

                                                                                             

12:54 Order name: Magnesium; Complete Time: 14:09                                             [facility] 

                                                                                             

12:54 Order name: NT PRO-BNP; Complete Time: 14:09                                            [facility] 

                                                                                             

12:54 Order name: PT-INR; Complete Time: 13:47                                                [facility] 

                                                                                             

12:54 Order name: Troponin (emerg Dept Use Only); Complete Time: 14:09                        [facility] 

                                                                                             

12:54 Order name: Type And Screen                                                             [facility] 

                                                                                             

12:54 Order name: Lactate; Complete Time: 14:09                                               [facility] 

                                                                                             

12:54 Order name: Blood Culture Adult (2)                                                     [facility] 

                                                                                             

12:54 Order name: SARS-COV-2 RT PCR (Document "Date of Onset" if Symptomatic)                 [facility] 

                                                                                             

12:54 Order name: Urine Culture                                                               [facility] 

                                                                                             

12:54 Order name: Basic Metabolic Panel; Complete Time: 14:09                                 [facility]

                                                                                             

12:54 Order name: CBC with Automated Diff; Complete Time: 14:09                               [facility]

                                                                                             

12:54 Order name: XRAY Chest (1 view); Complete Time: 15:40                                   [facility] 

                                                                                             

14:05 Order name: CBC Smear Scan; Complete Time: 14:09                                        [facility]

                                                                                             

15:15 Order name: Urinalysis W/Microscopic                                                    [facility]

                                                                                             

22:24 Order name: Troponin I                                                                  [facility]

                                                                                             

04:44 Order name: CBC with Automated Diff                                                     [facility]

                                                                                             

05:00 Order name: Troponin I                                                                  [facility]

                                                                                             

05:14 Order name: Comprehensive Metabolic Panel                                               [facility]

                                                                                             

05:14 Order name: Phosphorus                                                                  [facility]

                                                                                             

05:14 Order name: Lipid Profile                                                               [facility]

                                                                                             

05:14 Order name: Magnesium                                                                   [facility]

                                                                                             

05:14 Order name: Thyroid Stimulating Hormone                                                 [facility]

                                                                                             

12:54 Order name: EKG; Complete Time: 12:55                                                   [facility] 

                                                                                             

12:54 Order name: Cardiac monitoring; Complete Time: 13:18                                    [facility] 

                                                                                             

12:54 Order name: IV Saline Lock; Complete Time: 13:18                                        [facility] 

                                                                                             

12:54 Order name: Labs collected and sent; Complete Time: 13:18                               [facility] 

                                                                                             

12:54 Order name: O2 Per Protocol; Complete Time: 13:18                                       [facility] 

                                                                                             

12:54 Order name: O2 Sat Monitoring; Complete Time: 13:18                                     [facility] 

                                                                                             

12:54 Order name: IV Saline Lock - Large Bore; Complete Time: 13:46                           [facility] 

                                                                                                  

EC:51 Rate is 55 beats/min. Rhythm is regular. QRS Axis is Normal. DC interval is prolonged   ragini 

      at 216 msec. QRS interval is normal. QT interval is normal. No Q waves. T waves are         

      Normal. No ST changes noted. Clinical impression: Sinus bradycardia and No evidence of      

      ischemia. Interpreted by me. Reviewed by me.                                                

                                                                                                  

Administered Medications:                                                                         

13:15 Drug: Pepcid (famotidine) 20 mg Route: IVP; Site: left jugular;                         bp  

13:15 Drug: Rocephin (cefTRIAXone) 1 grams Route: IV; Rate: per protocol; Site: left jugular; bp  

13:15 Drug: NS 0.9% 1000 ml Route: IV; Rate: 125 ml/hr; Site: left jugular;                   bp  

15:00 Drug: Zithromax (azithromycin) 500 mg Route: IVPB; Infused Over: 1 hrs; Site: left      bp  

      jugular;                                                                                    

15:00 Drug: NS 0.9% 1000 ml Route: IV; Rate: 1 bolus; Site: left jugular;                     bp  

15:45 Drug: SOLU-Medrol (methylPrednisoLONE) 125 mg Route: IVP; Site: left jugular;           bp  

15:45 Drug: Xopenex (levalbuterol) 2.5 mg Route: Inhalation;                                  bp  

15:45 Drug: AtroVENT (ipratropium) Aerosol 0.5 mg Route: Inhalation;                          bp  

                                                                                                  

                                                                                                  

Disposition Summary:                                                                              

21 13:58                                                                                    

Hospitalization Ordered                                                                           

      Hospitalization Status: Inpatient Admission                                             ragini 

      Provider: Tyrell Treviño cha 

      Condition: Fair                                                                         ragini 

      Problem: new                                                                            ragini 

      Symptoms: have improved                                                                 ragini 

      Bed/Room Type: Standard                                                                 ragini 

      Location: Telemetry/MedSurg (Inpatient)(21 13:14)                                 dw  

      Room Assignment: [facility](21 13:14)                                                    dw  

      Diagnosis                                                                                   

        - Dyspnea                                                                             ragini 

        - Hypotension, unspecified                                                            ragini 

        - Acute upper respiratory infection, unspecified                                      ragini 

        - Obesity, unspecified                                                                ragini 

        - Fever, unspecified                                                                  ragini 

        - COPD/ Chronic obstructive pulmonary disease, unspecified                            ragini 

      Forms:                                                                                      

        - Medication Reconciliation Form                                                      ragini 

        - SBAR form                                                                           ragini 

Signatures:                                                                                       

Dispatcher [employer]                           EDMS                                                 

Betsy Palma RN                        RN   Mima Yu RN                        RN   Og Kramer MD MD cha Leal, Jahala RN                        Matthew Monte RN                      RN   bp                                                   

                                                                                                  

Corrections: (The following items were deleted from the chart)                                    

17:27 13:58 ragini                                                                               dw  

19:25 13:58 Telemetry/MedSurg (Inpatient) ragini                                                 mw  

19:25 17:27 201 dw                                                                            mw  

21:43 19:25 Telemetry/MedSurg (Inpatient) mw                                                  mw  

21:43 19:25 mw                                                                                mw  

                                                                                             

13:14  21:43 [facility] ER HOLD mw                                                             dw  

                                                                                             

13:14  21:43 ERHOLD- mw                                                                  dw  

                                                                                                  

**************************************************************************************************

## 2021-12-30 NOTE — RAD REPORT
[facility]

 

CLINICAL HISTORY:  cough

 

COMPARISON:  August 2021

 

FINDINGS:   Upper lobe vessels are prominent indicative of pulmonary venous hypertension.

 

The heart is mildly to moderately enlarged.

 

Lungs appear clear of acute infiltrate.

## 2021-12-30 NOTE — ER
Nurse's Notes                                                                                     

 [facility]                                                                 

Name: Leona Peres                                                                              

Age: 81 yrs                                                                                       

Sex: Female                                                                                       

: 1940                                                                                   

MRN: [id number]                                                                                   

Arrival Date: 2021                                                                          

Time: 11:51                                                                                       

Account#: [id number]                                                                            

Bed 4                                                                                             

Private MD:                                                                                       

Diagnosis: Dyspnea;Hypotension, unspecified;Acute upper respiratory infection,                    

  unspecified;Obesity, unspecified;Fever, unspecified;COPD/ Chronic obstructive                   

  pulmonary disease, unspecified                                                                  

                                                                                                  

Presentation:                                                                                     

                                                                                             

11:55 Chief complaint: EMS states: FEVER AT [facility]. Coronavirus screen: At this time,   bp  

      the client does not indicate any symptoms associated with coronavirus-19. Ebola Screen:     

      No symptoms or risks identified at this time. Initial Sepsis Screen: Does the patient       

      meet any 2 criteria? No. Patient's initial sepsis screen is negative. Does the patient      

      have a suspected source of infection? No. Patient's initial sepsis screen is negative.      

      Risk Assessment: Do you want to hurt yourself or someone else? Patient reports no           

      desire to harm self or others. Onset of symptoms was 2021.                     

11:55 Method Of Arrival: EMS                                                                  bp  

11:55 Acuity: ANUSHKA 3                                                                           bp  

                                                                                                  

Triage Assessment:                                                                                

12:55 General: Appears in no apparent distress. uncomfortable, obese, Behavior is calm,       bp  

      cooperative, anxious. Pain: Denies pain. EENT: No deficits noted. Neuro: Level of           

      Consciousness is awake, alert, obeys commands, Oriented to Appropriate for age.             

      Cardiovascular: No deficits noted. Respiratory: No deficits noted. GI: No signs and/or      

      symptoms were reported involving the gastrointestinal system. Abdomen is non-distended,     

      obese. : No signs and/or symptoms were reported regarding the genitourinary system.       

      Derm: No deficits noted. Musculoskeletal: No deficits noted.                                

                                                                                                  

Historical:                                                                                       

- Allergies:                                                                                      

12:55 clindamycin HCl (bulk);                                                                 bp  

12:55 Darvocet-N 100;                                                                         bp  

12:55 PENICILLINS;                                                                            bp  

12:55 Strawberries;                                                                           bp  

12:55 Sulfa (Sulfonamide Antibiotics);                                                        bp  

- PMHx:                                                                                           

12:55 skipped heart beats; lymphedema; insomnia; GERD; Fibromyalgia; Depression; Cancer;      bp  

                                                                                                  

- Immunization history:: Adult Immunizations up to date.                                          

- Social history:: Smoking status: Patient denies any tobacco usage or history of.                

                                                                                                  

                                                                                                  

Screenin:58 Abuse screen: Denies threats or abuse. Denies injuries from another. Nutritional        bp  

      screening: No deficits noted. Tuberculosis screening: No symptoms or risk factors           

      identified. Fall Risk None identified.                                                      

                                                                                                  

Assessment:                                                                                       

12:58 General: SEE TRIAGE NOTE.                                                               bp  

15:00 Reassessment: No changes from previously documented assessment. Patient and/or family   bp  

      updated on plan of care and expected duration. Pain level reassessed. ADMIT INITIATED.      

                                                                                                  

Vital Signs:                                                                                      

12:00 BP 80 / 64; Pulse 61; Resp 23; Pulse Ox 100% ;                                          bp  

13:00 BP 93 / 55; Pulse 55; Resp 17; Pulse Ox 100% ;                                          bp  

14:00 BP 96 / 57; Pulse 54; Resp 17; Pulse Ox 100% ;                                          bp  

15:00  / 72; Pulse 57; Resp 16; Pulse Ox 100% ;                                         bp  

16:00 BP 89 / 78; Pulse 69; Resp 16; Pulse Ox 100% ;                                          bp  

12/31                                                                                             

12:14 Weight 93 kg; Height 5 ft. 3 in. (160.02 cm);                                           jl7 

12:14 Body Mass Index 36.32 (93.00 kg, 160.02 cm)                                             7 

                                                                                                  

ED Course:                                                                                        

                                                                                             

11:51 Patient arrived in ED.                                                                  as  

11:57 Og Cotetr MD is Attending Physician.                                             ragini 

12:02 Matthew Palmer, RN is Primary Nurse.                                                    bp  

12:54 Triage completed.                                                                       bp  

12:55 Arm band placed on.                                                                     bp  

12:55 Inserted saline lock: 18 gauge in right EJ, using aseptic technique. Blood collected.   bp  

12:58 Patient has correct armband on for positive identification. Bed in low position. Call   bp  

      light in reach. Side rails up X2.                                                           

13:54 Tyrell Treviño is Hospitalizing Provider.                                               ragini 

14:48 XRAY Chest (1 view) In Process Unspecified.                                             EDMS

18:47 Samuels cath inserted, using sterile technique, 16 Fr., by me, balloon inflated, to       bp  

      gravity drainage, returned clear yellow urine. Patient tolerated well.                      

                                                                                                  

Administered Medications:                                                                         

13:15 Drug: Pepcid (famotidine) 20 mg Route: IVP; Site: left jugular;                         bp  

13:15 Drug: Rocephin (cefTRIAXone) 1 grams Route: IV; Rate: per protocol; Site: left jugular; bp  

13:15 Drug: NS 0.9% 1000 ml Route: IV; Rate: 125 ml/hr; Site: left jugular;                   bp  

15:00 Drug: Zithromax (azithromycin) 500 mg Route: IVPB; Infused Over: 1 hrs; Site: left      bp  

      jugular;                                                                                    

15:00 Drug: NS 0.9% 1000 ml Route: IV; Rate: 1 bolus; Site: left jugular;                     bp  

15:45 Drug: SOLU-Medrol (methylPrednisoLONE) 125 mg Route: IVP; Site: left jugular;           bp  

15:45 Drug: Xopenex (levalbuterol) 2.5 mg Route: Inhalation;                                  bp  

15:45 Drug: AtroVENT (ipratropium) Aerosol 0.5 mg Route: Inhalation;                          bp  

                                                                                                  

                                                                                                  

Outcome:                                                                                          

13:58 Decision to Hospitalize by Provider.                                                    ragini 

                                                                                             

15:21 Patient left the ED.                                                                    jl7 

                                                                                                  

Signatures:                                                                                       

Dispatcher [employer]                           Og Castellon MD MD cha Martinez, Amelia as Leal, Jahala, RN                        RN   jl7                                                  

Matthew Palmer RN                      RN   bp                                                   

                                                                                                  

**************************************************************************************************

## 2021-12-30 NOTE — XMS REPORT
Continuity of Care Document

                          Created on:2021



Patient:NEGRO GONZALES

Sex:Female

:1940

External Reference #:[id number]





Demographics







                          Address                   [address] 



                                                    [place] [address]

 

                          Home Phone                [phone]

 

                          Mobile Phone              [phone]

 

                          Email Address             [email or web address]

 

                          Preferred Language        English

 

                          Marital Status            Unknown

 

                          Cheondoism Affiliation     Unknown

 

                          Race                      Unknown

 

                          Additional Race(s)        White



                                                    Unavailable

 

                          Ethnic Group              Unknown









Author







                          Organization              [facility]

t

 

                          Address                   [address]



                                                    [place] [address]

 

                          Phone                     [phone]









Support







                Name            Relationship    Address         Phone

 

                Homero          Child           Unavailable     [phone]

 

                Homero          Other           Unavailable     [phone]

 

                Prabhu         Daughter        Unavailable     [phone]

 

                Ja         Spouse          [address]   [phone]



                                                [place] [address] 

 

                Ja         Child           Unavailable     [phone]









Care Team Providers







                    Name                Role                Phone

 

                    Denita RIZVI            Primary Care Physician [phone]

 

                    TUCKER BASSETT   Attending Clinician Unavailable

 

                    CARROLL EVANS        Attending Clinician Unavailable

 

                    CARROLL Evans MD     Attending Clinician [phone]

 

                    Doctor Unassigned,  Name Attending Clinician Unavailable

 

                    PETE Urrutia       Attending Clinician [phone]

 

                    Tucker Bassett MD Attending Clinician [phone]

 

                    Leni Ramos MD  Attending Clinician [phone]

 

                    GRANT Christopher MD       Attending Clinician [phone]

 

                    Marcelino Kay MD   Attending Clinician [phone]

 

                    Abbe RIZVI           Attending Clinician [phone]

 

                    TUCKER BASSETT   Admitting Clinician Unavailable

 

                    LENI RAMOS     Admitting Clinician Unavailable









Payers







           Payer Name Policy Type Policy Number Effective Date Expiration Date PETE NGUYEN MANAGED            [id number]   2021            



           MEDICARE PPO-ERIC                       00:00:00              







Problems







       Condition Condition Condition Status Onset  Resolution Last   Treating Co

mments 

Source



       Name   Details Category        Date   Date   Treatment Clinician        



                                                 Date                 

 

       Atrial Atrial Disease Active                              [facility]



       fibrillati fibrillati               8-16                               [facility]

[facility] -



       on with on with               00:00:                             Medical



       RVR    RVR                  00                                 Center

 

       Status Status Disease Active 2016                             [facility]



       post total post total               2-20                               it

y of



       knee   knee                 00:00:                             [place]



       replacemen replacemen               00                                 Me

dical



       t, left t, left                                                  Branch

 

       Morbid Morbid Disease Active 2016                             [facility]



       obesity obesity               2-20                               ity of



       with body with body               00:00:                             [place]

[place]



       mass index mass index               00                                 Me

dical



       of 50 or of 50 or                                                  Branch



       higher higher                                                  

 

       Morbid Morbid Disease Active 2016                             [facility]



       obesity obesity               2-20                               ity of



       with body with body               00:00:                             [place]

[place]



       mass index mass index               00                                 Me

dical



       of     of                                                      Branch



       40.0-49.9 40.0-49.9                                                  

 

       CORNELIA    CORNELIA    Disease Active                                    [facility]



       (obstructi (obstructi                                                  [facility]

kes -



       ve sleep ve sleep                                                  [facility]

l



       apnea) apnea)                                                  Center







Allergies, Adverse Reactions, Alerts







       Allergy Allergy Status Severity Reaction(s) Onset  Inactive Treating Comm

ents 

Source



       Name   Type                        Date   Date   Clinician        

 

       STRAWBER Allergy Active Low    Rash   -0                      [facility]



       RY                                 8-17                        [facility]



                                          00[facility]00[facility]                      [facility]



                                          00                          Center

 

       Strawber Propensi Active        Rash   -0                      [facility]



       ry     ty to                       817                        [facility]



              adverse                      00:00:                      Medical



              reaction                      00                          Center



              s                                                       

 

       CLINDAMY Allergy Active High   Rash   -0                      [facility]



       JAYNA HCL                             8-16                        [facility]



                                          00:00:                      [facility]



                                          00                          Center

 

       PENICILL Allergy Active High   Rash   -0                      [facility]



       IN                                 8-16                        [facility]



                                          00:00:                      [facility]



                                          00                          Center

 

       SULFA  Allergy Active High   Rash   -0                      [facility]



       (SULFONA                             816                        [facility]



       MIDE                               00:00:                      Medical



       ANTIBIOT                             00                          Center



       [facility])                                                           

 

       Clindamy Propensi Active        Rash   -0                      [facility]



       jayna Hcl ty to                       8-16                        [facility]



              adverse                      00:00:                      Medical



              reaction                      00                          Center



              s                                                       

 

       Penicill Propensi Active        Rash   -0                      [facility]



       in     ty to                       8-16                        [facility]



              adverse                      00:00:                      Medical



              reaction                      00                          Center



              s                                                       

 

       Sulfa  Propensi Active        Rash   -0                      [facility]



       (Sulfona ty to                       8-16                        [facility]



       mide   adverse                      00:00:                      Medical



       Antibiot reaction                      00                          Center



       [facility])   s                                                       

 

       ACETAMIN DRUG   Active        Unknown-Cmnt 0                      Un

bob



       OPHEN  INGREDI                      6-30                        ity of



                                          00:00:                      [place]



                                          00                          Medical



                                                                      Branch

 

       PROPOXYP DRUG   Active        Unknown-Cmnt -0                      Un

bob



       HENE                               630                        ity of



       N-ACETAM                             00:00:                      [place]



       INOPHEN                             00                          Medical



                                                                      Branch

 

       Acetamin Propensi Active        Unknown - 2020-0                      Uni

vers



       ophen  ty to                See comments 630                        ity 

of



              adverse                      00:00:                      [place]



              reaction                      00                          Medical



              s                                                       Branch

 

       Propoxyp Propensi Active        Unknown - 2020-0                      Uni

vers



       hene   ty to                See comments                         ity 

of



       N-Acetam adverse                      00:00:                      [place]



       inophen reaction                      00                          Medical



              s                                                       Branch

 

       Clindamy Propensi Active        Unknown 2020-0                      [facility]

di



       jayna    ty to                Reaction 6                        st



       Phosphat adverse                      00:00:                      Hospita



       e      reaction                      00                          l



              s to                                                    



              drug                                                    

 

       Propoxyp Propensi Active        Unknown 2020-0                      [facility]

di



       hene   ty to                Reaction                         st



       N-Acetam adverse                      00:00:                      [facility]ospita



       inophen reaction                      00                          l



              s to                                                    



              drug                                                    

 

       Penicill Propensi Active        Unknown 2020-0                      [facility]

di



       ins    ty to                Reaction                         st



              adverse                      00:00:                      Hospita



              reaction                      00                          l



              s to                                                    



              drug                                                    

 

       Sulfa  Propensi Active        Unknown 2020-0                      [facility]



       (Sulfona ty to                Reaction                         st



       mide   adverse                      00:00:                      Hospita



       Antibiot reaction                      00                          l



       ics)   s to                                                    



              drug                                                    

 

       Acetamin Propensi Active        Unknown 2020-0                      [facility]

di



       ophen  ty to                Reaction 30                        st



              adverse                      00:00:                      Hospita



              reaction                      00                          l



              s to                                                    



              drug                                                    

 

       CLINDAMY DRUG   Active        Rash   -                      [facility]



       JAYNA HCL INGREDI                      2-19                        ity of



                                          00:00:                      [place]



                                          00                          Medical



                                                                      Branch

 

       PENICILL DRUG   Active        Anaphylaxis 2016                      Uni

vers



       IN     INGREDI                      2-19                        ity of



                                          00:00:                      [place]



                                          00                          Medical



                                                                      Branch

 

       SULFA  Drug   Active        Rash   2016                      [facility]



       (SULFONA Class                       2-19                        ity of



       MIDE                               00:00:                      [place]



       ANTIBIOT                             00                          Medical



       ICS)                                                           Branch

 

       Clindamy Propensi Active        Rash   2016                      Univer

s



       jayna Hcl ty to                       2-19                        ity of



              adverse                      00:00:                      [place]



              reaction                      00                          Medical



              s                                                       Branch

 

       Penicill Propensi Active        Anaphylaxis 2016                      U

nivers



       in     ty to                       2-19                        ity of



              adverse                      00:00:                      [place]



              reaction                      00                          Medical



              s                                                       Branch

 

       Sulfa  Propensi Active        Rash   2016                      [facility]



       (Sulfona ty to                       2-19                        ity of



       mide   adverse                      00:00:                      [place]



       Antibiot reaction                      00                          Medica

l



       ics)   s                                                       Branch







Family History







           Family Member Diagnosis  Comments   Start Date Stop Date  Source

 

           Natural father Heart disease                                  [facility]

 

           Natural father Hypertension                                  [facility]

 

           Natural father Diabetes                                    [facility]

 

           Natural mother Asthma                                      [facility]







Social History







           Social Habit Start Date Stop Date  Quantity   Comments   Source

 

           History SDOH                                             [facility]

 -



           Alcohol Std Drinks                                             [facility]

l Center

 

           History SDOH                                             [facility]

 -



           Alcohol Binge                                             [facility]

ter

 

           History SDOH                                             [facility]

 -



           Alcohol Comment                                             [facility]

enter

 

           Exposure to                       Not sure              [facility]

 [place]



           SARS-CoV-2 (event)                                             [facility]

l Branch

 

           History of tobacco                       Smoker                [facility]



           use                                                    

 

           Alcohol intake 2021 0 /d                  [facility] [place]



                      00:00:00   00:00:00                         [facility]

 

           Tobacco use and 2021 Never used            [facility]

[facility] -



           exposure   00:00:00   00:00:00                         [facility]

 

           History SDOH 2021 1                     [facility]

 -



           Alcohol Frequency 00:00:00   00:00:00                         [facility]

 

           Cigarettes smoked 2020                       [facility]



           current (pack per 00:00:00   00:00:00                         



           day) - Reported                                             

 

           Cigarette  2020                       [facility]

ital



           pack-years 00:00:00   00:00:00                         

 

           Sex Assigned At [birth year]                       [facility]



           B[facility]      00:00:00   00:00:00                         [facility]









                Smoking Status  Start Date      Stop Date       Source

 

                Never smoker                                    [facility]

 

                Former smoker   2020 00:00:00 2020 00:00:00 [facility]







Medications







       Ordered Filled Start  Stop   Current Ordering Indication Dosage Frequency

 Signature

                    Comments            Components          Source



     Medication Medication Date Date Medication? Clinician                (SIG) 

          



     Name Name                                                   

 

     buPROPion            Yes                                     [facility]



      mg      8-26                                              ity of



     24 hr      00:00:                                              [place]



     tablet      00                                                Medical



                                                                 Branch

 

     buPROPion            Yes                                     [facility]



      mg      8-26                                              ity of



     24 hr      00:00:                                              [place]



     tablet      00                                                Medical



                                                                 Branch

 

     buPROPion            Yes                                     [facility]



      mg      8-26                                              ity of



     24 hr      00:00:                                              [place]



     tablet      00                                                Medical



                                                                 Branch

 

     buPROPion            Yes                                     [facility]



      mg      8-26                                              ity of



     24 hr      00:00:                                              [place]



     tablet                                                      Medical



                                                                 Branch

 

     buPROPion            Yes                                     [facility]



      mg      8-26                                              ity of



     24 hr      00:00:                                              [place]



     tablet      00                                                Medical



                                                                 Branch

 

     buPROPion            Yes                                     [facility]



      mg      8-26                                              ity of



     24 hr      00:00:                                              [place]



     tablet      00                                                Medical



                                                                 Branch

 

     buPROPion            Yes                                     [facility]



      mg      8-26                                              ity of



     24 hr      00:00:                                              [place]



     tablet      00                                                Medical



                                                                 Branch

 

     buPROPion            Yes                                     [facility]



      mg      8-26                                              ity of



     24 hr      00:00:                                              [place]



     tablet      00                                                Medical



                                                                 Branch

 

     buPROPion            Yes                                     [facility]



      mg      8-26                                              ity of



     24 hr      00:00:                                              [place]



     tablet      00                                                Medical



                                                                 Branch

 

     furosemide      0 2022- No             20mg QD   Take 1           [facility]



     (LASIX) 20      8- 08-                          tablet (20           [facility]

kes -



     MG tablet      00:00: 23:59                          mg total)           Me

dical



               00   :00                           by mouth           Center



                                                  daily.           

 

     zolpidem            Yes            10mg      Take 10 mg           [facility]



     (AMBIEN) 10      8-22                               by mouth           [facility]

s -



     mg tablet      12:43:                               every           Medical



               24                                 night as           Center



                                                  needed for           



                                                  Insomnia.           

 

     donepeziL            Yes            10mg QD   Take 10 mg           [facility]

[facility]



     (ARICEPT)      8-22                               by mouth           [facility] 

-



     10 MG      12:43:                               nightly.           Medical



     tablet      24                                                Center

 

     DULoxetine            Yes            60mg Q.5D Take 60 mg           [facility]

[facility]



     (CYMBALTA)      8-22                               by mouth 2           [facility]

es -



     60 MG      12:43:                               (two)           Medical



     capsule      24                                 times           Center



                                                  daily.           

 

     gabapentin            Yes            300mg [id number] Take 300       

    [facility]



     (NEURONTIN)      8-22                          [id number] mg by           L

ukes -



     300 MG      12:43:                          3D   mouth 3           Medical



     capsule      24                                 (three)           Center



                                                  times           



                                                  daily.           

 

     indapamide            Yes            2.5mg QD   Take 2.5           [facility]

[facility]



     (LOZOL) 2.5      8-22                               mg by           [facility] -



     MG tablet      12:43:                               mouth           Medical



               24                                 every           Center



                                                  morning.           

 

     memantine            Yes            10mg Q.5D Take 10 mg           [facility]



     (NAMENDA)      8-22                               by mouth 2           [facility]

s -



     10 MG      12:43:                               (two)           Medical



     tablet      24                                 times           Center



                                                  daily.           

 

     metoprolol      2021-0      Yes            25mg QD   Take 25 mg           [facility]



     succinate      8-22                               by mouth           [facility] 

-



     (TOPROL-XL)      12:43:                               nightly.           Me

dical



     25 MG 24 hr      24                                                Center



     tablet                                                        

 

     HYDROcodone            Yes            1{tbl}      Take 1           [facility]



     -acetaminop      8-22                               tablet by           [facility]

es -



     hen (NORCO      12:43:                               mouth 3           Medi

bella



     7.5-325)      24                                 (three)           Center



     7.5-325 mg                                         times           



     per tablet                                         daily as           



                                                  needed for           



                                                  Pain.           

 

     potassium            Yes            20meq Q.5D Take 20           [facility]



     chloride      8-22                               mEq by           [facility]



     (KLOR-CON)      12:43:                               mouth 2           Medi

bella



     20 mEq      24                                 (two)           Center



     packet                                         times           



                                                  daily.           

 

     QUEtiapine            Yes            200mg QD   Take 200           [facility]



     (SEROquel)      8-22                               mg by           [facility]



     200 MG      12:43:                               mouth           Medical



     tablet      24                                 nightly.           Center

 

     simvastatin            Yes            40mg QD   Take 40 mg           

[facility]



     (ZOCOR) 40      8-22                               by mouth           [facility]

 -



     MG tablet      12:43:                               nightly.           Medi

bella



               24                                                Center

 

     topiramate      0      Yes            200mg Q.5D Take 200           [facility]



     (TOPAMAX)      8-22                               mg by           [facility]



     200 MG      12:43:                               mouth 2           Medical



     tablet      24                                 (two)           Center



                                                  times           



                                                  daily.           

 

     buPROPion            Yes            150mg QD   Take 150           [facility]



     (WELLBUTRIN      8-22                               mg by           [facility]



     XL) 150 MG      12:43:                               mouth           [facility]

l



     24 hr      24                                 daily.           Center



     tablet                                                        

 

     ALPRAZolam            Yes            .25mg      Take 0.25           [facility]

[facility]



     (XANAX)      8-22                               mg by           [facility]



     0.25 MG      12:43:                               mouth           Medical



     tablet      24                                 every           Center



                                                  night as           



                                                  needed for           



                                                  Anxiety.           

 

     tiZANidine            Yes            4mg  QD   Take 4 mg           [facility]



     (ZANAFLEX)      8-22                               by mouth           [facility]

 -



     4 MG tablet      12:43:                               daily.           Medi

bella



               24                                                Center

 

     fluticasone      0      Yes                 QD   Inhale 1           [facility]



     -umeclidin-      8-22                               Inhalation           [facility]

kes -



     vilanter      12:43:                               by mouth           Medic

al



     (Trelegy      24                                 via            Center



     Ellipta)                                         inhaler           



     100-62.5-25                                         daily .           



     mcg DsDv                                                        

 

     clopidogreL      2021- No             75mg QD   Take 75 mg          

 [facility]



     (PLAVIX) 75      8-22 08-22                          by mouth           [facility]

es -



     mg tablet      10:48: 00:00                          daily.           Medic

al



               43   :00                                          Center

 

     ELIQUIS 5            Yes            5mg       Take 5 mg           [facility]

[facility]



     mg tablet      8-22                               by mouth 2           [facility] 

of



               00:00:                               (two)           [place]



               00                                 times           Medical



                                                  daily.           [facility]

 

     benzonatate            Yes                      TAKE 1           [facility]

ers



     100 mg      8-22                               CAPSULE           ity of



     capsule      00:00:                               (100 MG           [place]



               00                                 TOTAL) BY           Medical



                                                  MOUTH 3           Branch



                                                  (THREE)           



                                                  TIMES           



                                                  DAILY AS           



                                                  NEEDED FOR           



                                                  COUGH FOR           



                                                  UP TO 7           



                                                  DAYS.           

 

     ELIQUIS 5            Yes            5mg       Take 5 mg           [facility]

vers



     mg tablet      8-22                               by mouth 2           ity 

of



               00:00:                               (two)           [place]



               00                                 times           Medical



                                                  daily.           Branch

 

     benzonatate            Yes                      TAKE 1           [facility]

ers



     100 mg      8-22                               CAPSULE           ity of



     capsule      00:00:                               (100 MG           [place]



               00                                 TOTAL) BY           Medical



                                                  MOUTH 3           Branch



                                                  (THREE)           



                                                  TIMES           



                                                  DAILY AS           



                                                  NEEDED FOR           



                                                  COUGH FOR           



                                                  UP TO 7           



                                                  DAYS.           

 

     ELIQUIS 5            Yes            5mg       Take 5 mg           [facility]

vers



     mg tablet      8-22                               by mouth 2           ity 

of



               00:00:                               (two)           [place]



               00                                 times           Medical



                                                  daily.           Branch

 

     benzonatate            Yes                      TAKE 1           [facility]

ers



     100 mg      8-22                               CAPSULE           ity of



     capsule      00:00:                               (100 MG           [place]



               00                                 TOTAL) BY           Medical



                                                  MOUTH 3           Branch



                                                  (THREE)           



                                                  TIMES           



                                                  DAILY AS           



                                                  NEEDED FOR           



                                                  COUGH FOR           



                                                  UP TO 7           



                                                  DAYS.           

 

     ELIQUIS 5            Yes            5mg       Take 5 mg           [facility]

vers



     mg tablet      8-22                               by mouth 2           ity 

of



               00:00:                               (two)           [place]



               00                                 times           Medical



                                                  daily.           Branch

 

     benzonatate            Yes                      TAKE 1           Un[facility]

ers



     100 mg      8-22                               CAPSULE           ity of



     capsule      00:00:                               (100 MG           [place]



               00                                 TOTAL) BY           Medical



                                                  MOUTH 3           Branch



                                                  (THREE)           



                                                  TIMES           



                                                  DAILY AS           



                                                  NEEDED FOR           



                                                  COUGH FOR           



                                                  UP TO 7           



                                                  DAYS.           

 

     ELIQUIS 5      0      Yes            5mg       Take 5 mg           Un[facility]

vers



     mg tablet      8-22                               by mouth 2           ity 

of



               00:00:                               (two)           [place]



               00                                 times           Medical



                                                  daily.           [facility]anch

 

     benzonatate            Yes                      TAKE 1           Un[facility]

ers



     100 mg      8-22                               CAPSULE           ity of



     capsule      00:00:                               (100 MG           [place]



               00                                 TOTAL) BY           Medical



                                                  MOUTH 3           Branch



                                                  (THREE)           



                                                  TIMES           



                                                  DAILY AS           



                                                  NEEDED FOR           



                                                  COUGH FOR           



                                                  UP TO 7           



                                                  DAYS.           

 

     ELIQUIS 5      0      Yes            5mg       Take 5 mg           Uni

vers



     mg tablet      8-22                               by mouth 2           ity 

of



               00:00:                               (two)           [place]



               00                                 times           Medical



                                                  daily.           Branch

 

     benzonatate            Yes                      TAKE 1           Un[facility]

ers



     100 mg      8-22                               CAPSULE           ity of



     capsule      00:00:                               (100 MG           [place]



               00                                 TOTAL) BY           Medical



                                                  MOUTH 3           Branch



                                                  (THREE)           



                                                  TIMES           



                                                  DAILY AS           



                                                  NEEDED FOR           



                                                  COUGH FOR           



                                                  UP TO 7           



                                                  DAYS.           

 

     ELIQUIS 5      0      Yes            5mg       Take 5 mg           Uni

vers



     mg tablet      8-22                               by mouth 2           ity 

of



               00:00:                               (two)           [place]



               00                                 times           Medical



                                                  daily.           Branch

 

     benzonatate            Yes                      TAKE 1           Un[facility]

ers



     100 mg      8-22                               CAPSULE           ity of



     capsule      00:00:                               (100 MG           [place]



               00                                 TOTAL) BY           Medical



                                                  MOUTH 3           Branch



                                                  (THREE)           



                                                  TIMES           



                                                  DAILY AS           



                                                  NEEDED FOR           



                                                  COUGH FOR           



                                                  UP TO 7           



                                                  DAYS.           

 

     ELIQUIS 5            Yes            5mg       Take 5 mg           Uni

vers



     mg tablet      8-22                               by mouth 2           ity 

of



               00:00:                               (two)           [place]



               00                                 times           Medical



                                                  daily.           Branch

 

     benzonatate            Yes                      TAKE 1           Un[facility]

ers



     100 mg      8-22                               CAPSULE           ity of



     capsule      00:00:                               (100 MG           [place]



               00                                 TOTAL) BY           Medical



                                                  MOUTH 3           Branch



                                                  (THREE)           



                                                  TIMES           



                                                  DAILY AS           



                                                  NEEDED FOR           



                                                  COUGH FOR           



                                                  UP TO 7           



                                                  DAYS.           

 

     ELIQUIS 5            Yes            5mg       Take 5 mg           Uni

vers



     mg tablet      8-22                               by mouth 2           ity 

of



               00:00:                               (two)           [place]



               00                                 times           Medical



                                                  daily.           Branch

 

     benzonatate            Yes                      TAKE 1           Univ

ers



     100 mg      8-22                               CAPSULE           ity of



     capsule      00:00:                               (100 MG           [place]



               00                                 TOTAL) BY           Medical



                                                  MOUTH 3           Branch



                                                  (THREE)           



                                                  TIMES           



                                                  DAILY AS           



                                                  NEEDED FOR           



                                                  COUGH FOR           



                                                  UP TO 7           



                                                  DAYS.           

 

     apixaban      2022- No             5mg  Q.5D Take 1           [facility]



     (ELIQUIS) 5                                tablet (5           [facility]

kes -



     mg Tab      00:00: 23:59                          mg total)           Medic

al



     tablet      00   :00                           by mouth 2           Center



                                                  (two)           



                                                  times           



                                                  daily.           

 

     cyanocobala      2022- No             1000ug QD   Take 1           [facility]



     min,                                tablet           [facility]



     vitamin      00:00: 23:59                          (1,000 mcg           Med

ical



     B-12, 1000      00   :00                           total) by           Cent

er



     MCG tablet                                         mouth           



                                                  daily.           

 

     flecainide      2022- No             50mg      Take 1           [facility]



     (TAMBOCOR)                                tablet (50           [facility]

kes -



     50 MG      00:00: 23:59                          mg total)           [facility]

l



     tablet      00   :00                           by mouth           Center



                                                  every 12           



                                                  (twelve)           



                                                  hours.           

 

     benzonatate      2021- No             100mg      Take 1           [facility]



     (TESSALON)                                capsule           [facility]

 -



     100 MG      00:00: 23:59                          (100 mg           Medical



     capsule      00   :00                           total) by           Center



                                                  mouth 3           



                                                  (three)           



                                                  times           



                                                  daily as           



                                                  needed for           



                                                  Cough for           



                                                  up to 7           



                                                  days.           

 

     ALPRAZolam            Yes                                     [facility]



     0.25 mg      8-02                                              ity of



     tablet      00:00:                                              [place]



                                                               [facility]

 

     ALPRAZolam            Yes                                     [facility]



     0.25 mg      8-02                                              ity of



     tablet      00:00:                                              [place]



                                                               [facility]

 

     ALPRAZolam            Yes                                     [facility]



     0.25 mg      8-02                                              ity of



     tablet      00:00:                                              [place]



               00                                                [facility]

 

     ALPRAZolam            Yes                                     [facility]



     0.25 mg      8-02                                              ity of



     tablet      00:00:                                              [place]



                                                               [facility]

 

     ALPRAZolam            Yes                                     [facility]



     0.25 mg      8-02                                              ity of



     tablet      00:00:                                              [place]



               00                                                [facility]

 

     ALPRAZolam            Yes                                     [facility]



     0.25 mg      8-02                                              ity of



     tablet      00:00:                                              [place]



                                                               [facility]

 

     ALPRAZolam            Yes                                     [facility]



     0.25 mg      8-02                                              ity of



     tablet      00:00:                                              [place]



                                                               [facility]

 

     ALPRAZolam            Yes                                     [facility]



     0.25 mg      8-02                                              ity of



     tablet      00:00:                                              [place]



                                                               [facility]

 

     ALPRAZolam            Yes                                     [facility]



     0.25 mg      8-02                                              ity of



     tablet      00:00:                                              [place]



                                                               [facility]

 

     calcium      2016      Yes            2{tbl}      Take 2           Un[facility]

s



     carb and      2-28                               tablets by           ity o

f



     citrate-vit      11:30:                               mouth           [place]



     D3        04                                 daily.           Medical



     (CITRACAL +                                                        [facility]anch



     D SLOW                                                        



     RELEASE)                                                        



     600 mg                                                        



     calcium-                                                        



     500 unit                                                        



     TbSR                                                        

 

     DULoxetine      2016      Yes            60mg      Take 60 mg           U

nivers



     (CYMBALTA)      2-28                               by mouth           ity o

f



     60 mg      11:30:                               daily.           [place]



     capsule      04                                                Medical



                                                                 Branch

 

     indapamide      2016      Yes            2.5mg      Take 2.5           Un

bob



     (LOZOL) 2.5      2-28                               mg by           ity of



     mg tablet      11:30:                               mouth           [place]



               04                                 daily.           Medical



                                                                 Branch

 

     Vit C-Vit      2016      Yes            1{capsu      Take 1           Uni

vers



     E-Lutein-Mi      2-28                     le}       capsule by           it

y of



     n-OM-3      11:30:                               mouth           [place]



     (OCUVITE)      04                                 daily.           Medical



     150-30-5-15                                                        Br[facility]



     0                                                           



     mg-unit-mg-                                                        



     mg Cap                                                        

 

     multivitami      2016      Yes            1{tbl}      Take 1           Un

bob



     n         2-28                               tablet by           ity of



     (ONE-A-DAY      11:30:                               mouth           [place]



     ESSENTIAL)      04                                 daily.           Medical



     tablet                                                        Branch

 

     FERROUS      2016      Yes            1{tbl}      Take 1           Univer

s



     FUMARATE/VI      2-28                               tablet by           ity

 of



     T BCOMP,C      11:30:                               mouth           [place]



     (SUPER B      04                                 daily.           Medical



     COMPLEX                                                        Branch



     ORAL)                                                        

 

     ascorbic      2016      Yes            500mg      Take 500           Univ

ers



     acid,      2-28                               mg by           ity of



     vitamin C,      11:30:                               mouth           [place]



     (VITAMIN C)      04                                 daily.           Medica

l



     500 mg                                                        [facility]anch



     tablet                                                        

 

     Cholecalcif      2016      Yes            1{tbl}      Take 1           Un

bob



     edith,      2-28                               tablet by           ity of



     Vitamin D3,      11:30:                               mouth           [place]



     (VITAMIN      04                                 daily.           Medical



     D3) 5,000                                                        [facility]



     unit tablet                                                        

 

     QUEtiapine      2016      Yes            50mg      Take 50 mg           U

nivers



     (SEROQUEL)      2-28                               by mouth           ity o

f



     50 mg      11:30:                               daily.           [place]



     tablet      04                                                Medical



                                                                 Branch

 

     simvastatin      2016      Yes            40mg      Take 40 mg           

[facility]



     (ZOCOR) 40      2-28                               by mouth           ity o

f



     mg tablet      11:30:                               at             [place]



               04                                 bedtime.           Medical



                                                                 Branch

 

     topiramate      2016      Yes            200mg      Take 200           Un

bob



     (TOPAMAX)      2-28                               mg by           ity of



     100 mg      11:30:                               mouth           [place]



     tablet      04                                 every           Medical



                                                  evening.           Branch

 

     KCL       2016      Yes            10meq      Take 10           [facility]



     (KLOR-CON      2-28                               mEq by           ity of



     M10) 10 mEq      11:30:                               mouth 2           [facility]

as



     tablet      04                                 (two)           Medical



                                                  times           [facility]



                                                  daily.           

 

     pregabalin      2016      Yes            225mg      Take 225           Un

bob



     (LYRICA)      2-28                               mg by           ity of



     225 mg      11:30:                               mouth 2           [place]



     capsule      04                                 (two)           Medical



                                                  times           Branch



                                                  daily.           

 

     memantine      2016      Yes            5mg       Take 5 mg           Uni

vers



     (NAMENDA) 5      2-28                               by mouth 2           it

y of



     mg tablet      11:30:                               (two)           [place]



               04                                 times           Medical



                                                  daily.           Branch

 

     metoprolol      2016      Yes            50mg      Take 50 mg           U

nivers



     tartrate      2-28                               by mouth 2           ity o

f



     (LOPRESSOR)      11:30:                               (two)           [place]



     50 mg      04                                 times           Medical



     tablet                                         daily.           Branch

 

     esomeprazol      2016      Yes            20mg      Take 20 mg           

[facility]



     e (NEXIUM)      2-28                               by mouth 2           ity

 of



     20 mg      11:30:                               (two)           [place]



     capsule      04                                 times           Medical



                                                  daily.           Branch

 

     LACTOBAC/BI      2016      Yes            1{tbl}      Take 1           Un

bob



     FIDOBAC/TYLER      2-28                               tablet by           ity

 of



     B AK CON      11:30:                               mouth 2           [place]



     (ULTRA      04                                 (two)           Medical



     PATRICK PLUS                                         times           Branch



     ORAL)                                         daily.           



                                                  Indication           



                                                  s:             



                                                  Ultimate           



                                                  [facility]           

 

     calcium      2016      Yes            2{tbl}      Take 2           Univer

s



     carb and      2-28                               tablets by           ity o

f



     citrate-vit      11:30:                               mouth           [place]



     D3        04                                 daily.           Medical



     (CITRACAL +                                                        Br[facility]



     D SLOW                                                        



     RELEASE)                                                        



     600 mg                                                        



     calcium-                                                        



     500 unit                                                        



     TbSR                                                        

 

     DULoxetine      2016      Yes            60mg      Take 60 mg           U

nivers



     (CYMBALTA)      2-28                               by mouth           ity o

f



     60 mg      11:30:                               daily.           [place]



     capsule      04                                                Medical



                                                                 Branch

 

     indapamide      2016      Yes            2.5mg      Take 2.5           Un

bob



     (LOZOL) 2.5      2-28                               mg by           ity of



     mg tablet      11:30:                               mouth           [place]



               04                                 daily.           Medical



                                                                 Branch

 

     Vit C-Vit      2016      Yes            1{capsu      Take 1           Uni

vers



     E-Lutein-Mi      2-28                     le}       capsule by           it

y of



     n-OM-3      11:30:                               mouth           [place]



     (OCUVITE)      04                                 daily.           Medical



     150-30-5-15                                                        Branch



     0                                                           



     mg-unit-mg-                                                        



     mg Cap                                                        

 

     multivitami      2016      Yes            1{tbl}      Take 1           Un

bob



     n         2-28                               tablet by           ity of



     (ONE-A-DAY      11:30:                               mouth           [place]



     ESSENTIAL)      04                                 daily.           Medical



     tablet                                                        Branch

 

     FERROUS      2016      Yes            1{tbl}      Take 1           Univer

s



     FUMARATE/VI      2-28                               tablet by           ity

 of



     T BCOMP,C      11:30:                               mouth           [place]



     (SUPER B      04                                 daily.           Medical



     COMPLEX                                                        Branch



     ORAL)                                                        

 

     ascorbic      2016      Yes            500mg      Take 500           Univ

ers



     acid,      2-28                               mg by           ity of



     vitamin C,      11:30:                               mouth           [place]



     (VITAMIN C)      04                                 daily.           Medica

l



     500 mg                                                        [facility]



     tablet                                                        

 

     Cholecalcif      2016      Yes            1{tbl}      Take 1           Un

bob



     edith,      2-28                               tablet by           ity of



     Vitamin D3,      11:30:                               mouth           [place]



     (VITAMIN      04                                 daily.           Medical



     D3) 5,000                                                        [facility]



     unit tablet                                                        

 

     QUEtiapine      2016      Yes            50mg      Take 50 mg           U

nivers



     (SEROQUEL)      2-28                               by mouth           ity o

f



     50 mg      11:30:                               daily.           [place]



     tablet      04                                                Medical



                                                                 Branch

 

     simvastatin      2016      Yes            40mg      Take 40 mg           

[facility]



     (ZOCOR) 40      2-28                               by mouth           ity o

f



     mg tablet      11:30:                               at             [place]



               04                                 bedtime.           Medical



                                                                 Branch

 

     topiramate      2016      Yes            200mg      Take 200           Un

bob



     (TOPAMAX)      2-28                               mg by           ity of



     100 mg      11:30:                               mouth           [place]



     tablet      04                                 every           Medical



                                                  evening.           Branch

 

     KCL       2016      Yes            10meq      Take 10           [facility]



     (KLOR-CON      2-28                               mEq by           ity of



     M10) 10 mEq      11:30:                               mouth 2           [facility]

as



     tablet      04                                 (two)           Medical



                                                  times           [place]



                                                  daily.           

 

     pregabalin      2016      Yes            225mg      Take 225           Un

bob



     (LYRICA)      2-28                               mg by           ity of



     225 mg      11:30:                               mouth 2           [place]



     capsule      04                                 (two)           Medical



                                                  times           Branch



                                                  daily.           

 

     memantine      2016      Yes            5mg       Take 5 mg           Uni

vers



     (NAMENDA) 5      2-28                               by mouth 2           it

y of



     mg tablet      11:30:                               (two)           [place]



               04                                 times           Medical



                                                  daily.           Branch

 

     metoprolol      2016      Yes            50mg      Take 50 mg           U

nivers



     tartrate      2-28                               by mouth 2           ity o

f



     (LOPRESSOR)      11:30:                               (two)           [place]



     50 mg      04                                 times           Medical



     tablet                                         daily.           Branch

 

     esomeprazol      2016      Yes            20mg      Take 20 mg           

[facility]



     e (NEXIUM)      2-28                               by mouth 2           ity

 of



     20 mg      11:30:                               (two)           [place]



     capsule      04                                 times           Medical



                                                  daily.           Branch

 

     LACTOBAC/BI      2016      Yes            1{tbl}      Take 1           Un

bob



     FIDOBAC/TYLER      2-28                               tablet by           ity

 of



     B AK CON      11:30:                               mouth 2           [place]



     (ULTRA      04                                 (two)           Medical



     PATRICK PLUS                                         times           Branch



     ORAL)                                         daily.           



                                                  Indication           



                                                  s:             



                                                  Ultimate           



                                                  [facility]           

 

     calcium      2016      Yes            2{tbl}      Take 2           Univer

s



     carb and      2-28                               tablets by           ity o

f



     citrate-vit      11:30:                               mouth           [place]



     D3        04                                 daily.           Medical



     (CITRACAL +                                                        [facility]



     D SLOW                                                        



     RELEASE)                                                        



     600 mg                                                        



     calcium-                                                        



     500 unit                                                        



     TbSR                                                        

 

     DULoxetine      2016      Yes            60mg      Take 60 mg           U

nivers



     (CYMBALTA)      2-28                               by mouth           ity o

f



     60 mg      11:30:                               daily.           [place]



     capsule      04                                                Medical



                                                                 Branch

 

     indapamide      2016      Yes            2.5mg      Take 2.5           Un

bob



     (LOZOL) 2.5      2-28                               mg by           ity of



     mg tablet      11:30:                               mouth           [place]



               04                                 daily.           Medical



                                                                 Branch

 

     Vit C-Vit      2016      Yes            1{capsu      Take 1           Uni

vers



     E-Lutein-Mi      2-28                     le}       capsule by           it

y of



     n-OM-3      11:30:                               mouth           [place]



     (OCUVITE)      04                                 daily.           Medical



     150-30-5-15                                                        Br[facility]



     0                                                           



     mg-unit-mg-                                                        



     mg Cap                                                        

 

     multivitami      2016      Yes            1{tbl}      Take 1           Un

bob



     n         2-28                               tablet by           ity of



     (ONE-A-DAY      11:30:                               mouth           [place]



     ESSENTIAL)      04                                 daily.           Medical



     tablet                                                        Branch

 

     FERROUS      2016      Yes            1{tbl}      Take 1           Univer

s



     FUMARATE/VI      2-28                               tablet by           ity

 of



     T BCOMP,C      11:30:                               mouth           [place]



     (SUPER B      04                                 daily.           Medical



     COMPLEX                                                        [facility]anch



     ORAL)                                                        

 

     ascorbic      2016      Yes            500mg      Take 500           Univ

ers



     acid,      2-28                               mg by           ity of



     vitamin C,      11:30:                               mouth           [place]



     (VITAMIN C)      04                                 daily.           Medica

l



     500 mg                                                        [facility]



     tablet                                                        

 

     Cholecalcif      2016      Yes            1{tbl}      Take 1           Un

bob



     edith,      2-28                               tablet by           ity of



     Vitamin D3,      11:30:                               mouth           [place]



     (VITAMIN      04                                 daily.           Medical



     D3) 5,000                                                        [facility]



     unit tablet                                                        

 

     QUEtiapine      2016      Yes            50mg      Take 50 mg           U

nivers



     (SEROQUEL)      2-28                               by mouth           ity o

f



     50 mg      11:30:                               daily.           [place]



     tablet      04                                                Medical



                                                                 Branch

 

     simvastatin      2016      Yes            40mg      Take 40 mg           

[facility]



     (ZOCOR) 40      2-28                               by mouth           ity o

f



     mg tablet      11:30:                               at             [place]



               04                                 bedtime.           Medical



                                                                 Branch

 

     topiramate      2016      Yes            200mg      Take 200           Un

bob



     (TOPAMAX)      2-28                               mg by           ity of



     100 mg      11:30:                               mouth           [place]



     tablet      04                                 every           Medical



                                                  evening.           Branch

 

     KCL       2016      Yes            10meq      Take 10           [facility]



     (KLOR-CON      2-28                               mEq by           ity of



     M10) 10 mEq      11:30:                               mouth 2           [facility]

as



     tablet      04                                 (two)           Medical



                                                  times           [facility]



                                                  daily.           

 

     pregabalin      2016      Yes            225mg      Take 225           Un

bob



     (LYRICA)      2-28                               mg by           ity of



     225 mg      11:30:                               mouth 2           [place]



     capsule      04                                 (two)           Medical



                                                  times           Branch



                                                  daily.           

 

     memantine      2016      Yes            5mg       Take 5 mg           Uni

vers



     (NAMENDA) 5      2-28                               by mouth 2           it

y of



     mg tablet      11:30:                               (two)           [place]



               04                                 times           Medical



                                                  daily.           Branch

 

     metoprolol      2016      Yes            50mg      Take 50 mg           U

nivers



     tartrate      2-28                               by mouth 2           ity o

f



     (LOPRESSOR)      11:30:                               (two)           [place]



     50 mg      04                                 times           Medical



     tablet                                         daily.           Branch

 

     esomeprazol      2016      Yes            20mg      Take 20 mg           

[facility]



     e (NEXIUM)      2-28                               by mouth 2           ity

 of



     20 mg      11:30:                               (two)           [place]



     capsule      04                                 times           Medical



                                                  daily.           Branch

 

     LACTOBAC/BI      2016      Yes            1{tbl}      Take 1           Un

bob



     FIDOBAC/TYLER      2-28                               tablet by           ity

 of



     B AK CON      11:30:                               mouth 2           [place]



     (ULTRA      04                                 (two)           Medical



     PATRICK PLUS                                         times           Branch



     ORAL)                                         daily.           



                                                  Indication           



                                                  s:             



                                                  Ultimate           



                                                  [facility]           

 

     calcium      2016      Yes            2{tbl}      Take 2           Univer

s



     carb and      2-28                               tablets by           ity o

f



     citrate-vit      11:30:                               mouth           [place]



     D3        04                                 daily.           Medical



     (CITRACAL +                                                        [facility]



     D SLOW                                                        



     RELEASE)                                                        



     600 mg                                                        



     calcium-                                                        



     500 unit                                                        



     TbSR                                                        

 

     DULoxetine      2016      Yes            60mg      Take 60 mg           U

nivers



     (CYMBALTA)      2-28                               by mouth           ity o

f



     60 mg      11:30:                               daily.           [place]



     capsule      04                                                Medical



                                                                 Branch

 

     indapamide      2016      Yes            2.5mg      Take 2.5           Un

bob



     (LOZOL) 2.5      2-28                               mg by           ity of



     mg tablet      11:30:                               mouth           [place]



               04                                 daily.           Medical



                                                                 Branch

 

     Vit C-Vit      2016      Yes            1{capsu      Take 1           Uni

vers



     E-Lutein-Mi      2-28                     le}       capsule by           it

y of



     n-OM-3      11:30:                               mouth           [place]



     (OCUVITE)      04                                 daily.           Medical



     150-30-5-15                                                        Branch



     0                                                           



     mg-unit-mg-                                                        



     mg Cap                                                        

 

     multivitami      2016      Yes            1{tbl}      Take 1           Un

bob



     n         2-28                               tablet by           ity of



     (ONE-A-DAY      11:30:                               mouth           [place]



     ESSENTIAL)      04                                 daily.           Medical



     tablet                                                        Branch

 

     FERROUS      2016      Yes            1{tbl}      Take 1           Univer

s



     FUMARATE/VI      2-28                               tablet by           ity

 of



     T BCOMP,C      11:30:                               mouth           [place]



     (SUPER B      04                                 daily.           Medical



     COMPLEX                                                        Branch



     ORAL)                                                        

 

     ascorbic      2016      Yes            500mg      Take 500           Univ

ers



     acid,      2-28                               mg by           ity of



     vitamin C,      11:30:                               mouth           [place]



     (VITAMIN C)      04                                 daily.           Medica

l



     500 mg                                                        Branch



     tablet                                                        

 

     Cholecalcif      2016      Yes            1{tbl}      Take 1           Un

bob



     edith,      2-28                               tablet by           ity of



     Vitamin D3,      11:30:                               mouth           [place]



     (VITAMIN      04                                 daily.           Medical



     D3) 5,000                                                        Branch



     unit tablet                                                        

 

     QUEtiapine      2016      Yes            50mg      Take 50 mg           U

nivers



     (SEROQUEL)      2-28                               by mouth           ity o

f



     50 mg      11:30:                               daily.           [place]



     tablet      04                                                Medical



                                                                 Branch

 

     simvastatin      2016      Yes            40mg      Take 40 mg           

[facility]



     (ZOCOR) 40      2-28                               by mouth           ity o

f



     mg tablet      11:30:                               at             [place]



               04                                 bedtime.           Medical



                                                                 Branch

 

     topiramate      2016      Yes            200mg      Take 200           Un

bob



     (TOPAMAX)      2-28                               mg by           ity of



     100 mg      11:30:                               mouth           [place]



     tablet      04                                 every           Medical



                                                  evening.           Branch

 

     KCL       2016      Yes            10meq      Take 10           [facility]



     (KLOR-CON      2-28                               mEq by           ity of



     M10) 10 mEq      11:30:                               mouth 2           [facility]

as



     tablet      04                                 (two)           Medical



                                                  times           Branch



                                                  daily.           

 

     pregabalin      2016      Yes            225mg      Take 225           Un

bob



     (LYRICA)      2-28                               mg by           ity of



     225 mg      11:30:                               mouth 2           [place]



     capsule      04                                 (two)           Medical



                                                  times           Branch



                                                  daily.           

 

     memantine      2016      Yes            5mg       Take 5 mg           Uni

vers



     (NAMENDA) 5      2-28                               by mouth 2           it

y of



     mg tablet      11:30:                               (two)           [place]



               04                                 times           Medical



                                                  daily.           Branch

 

     metoprolol      2016      Yes            50mg      Take 50 mg           U

nivers



     tartrate      2-28                               by mouth 2           ity o

f



     (LOPRESSOR)      11:30:                               (two)           [place]



     50 mg      04                                 times           Medical



     tablet                                         daily.           Branch

 

     esomeprazol      2016      Yes            20mg      Take 20 mg           

[facility]



     e (NEXIUM)      2-28                               by mouth 2           ity

 of



     20 mg      11:30:                               (two)           [place]



     capsule      04                                 times           Medical



                                                  daily.           Branch

 

     LACTOBAC/BI      2016      Yes            1{tbl}      Take 1           Un

bob



     FIDOBAC/TYLER      2-28                               tablet by           ity

 of



     B AK CON      11:30:                               mouth 2           [place]



     (ULTRA      04                                 (two)           Medical



     PATRICK PLUS                                         times           Branch



     ORAL)                                         daily.           



                                                  Indication           



                                                  s:             



                                                  Ultimate           



                                                  [facility]           

 

     calcium      2016      Yes            2{tbl}      Take 2           Univer

s



     carb and      2-28                               tablets by           ity o

f



     citrate-vit      11:30:                               mouth           [place]



     D3        04                                 daily.           Medical



     (CITRACAL +                                                        Branch



     D SLOW                                                        



     RELEASE)                                                        



     600 mg                                                        



     calcium-                                                        



     500 unit                                                        



     TbSR                                                        

 

     DULoxetine      2016      Yes            60mg      Take 60 mg           U

nivers



     (CYMBALTA)      2-28                               by mouth           ity o

f



     60 mg      11:30:                               daily.           [place]



     capsule      04                                                Medical



                                                                 Branch

 

     indapamide      2016      Yes            2.5mg      Take 2.5           Un

bob



     (LOZOL) 2.5      2-28                               mg by           ity of



     mg tablet      11:30:                               mouth           [place]



               04                                 daily.           Medical



                                                                 Branch

 

     Vit C-Vit      2016      Yes            1{capsu      Take 1           Uni

vers



     E-Lutein-Mi      2-28                     le}       capsule by           it

y of



     n-OM-3      11:30:                               mouth           [place]



     (OCUVITE)      04                                 daily.           Medical



     150-30-5-15                                                        Branch



     0                                                           



     mg-unit-mg-                                                        



     mg Cap                                                        

 

     multivitami      2016      Yes            1{tbl}      Take 1           Un

bob



     n         2-28                               tablet by           ity of



     (ONE-A-DAY      11:30:                               mouth           [place]



     ESSENTIAL)      04                                 daily.           Medical



     tablet                                                        Branch

 

     FERROUS      2016      Yes            1{tbl}      Take 1           Univer

s



     FUMARATE/VI      2-28                               tablet by           ity

 of



     T BCOMP,C      11:30:                               mouth           [place]



     (SUPER B      04                                 daily.           Medical



     COMPLEX                                                        Branch



     ORAL)                                                        

 

     ascorbic      2016      Yes            500mg      Take 500           Univ

ers



     acid,      2-28                               mg by           ity of



     vitamin C,      11:30:                               mouth           [place]



     (VITAMIN C)      04                                 daily.           Medica

l



     500 mg                                                        Branch



     tablet                                                        

 

     Cholecalcif      2016      Yes            1{tbl}      Take 1           Un

bob



     edith,      2-28                               tablet by           ity of



     Vitamin D3,      11:30:                               mouth           [place]



     (VITAMIN      04                                 daily.           Medical



     D3) 5,000                                                        Branch



     unit tablet                                                        

 

     QUEtiapine      2016      Yes            50mg      Take 50 mg           U

nivers



     (SEROQUEL)      2-28                               by mouth           ity o

f



     50 mg      11:30:                               daily.           [place]



     tablet      04                                                Medical



                                                                 Branch

 

     simvastatin      2016      Yes            40mg      Take 40 mg           

[facility]



     (ZOCOR) 40      2-28                               by mouth           ity o

f



     mg tablet      11:30:                               at             [place]



               04                                 bedtime.           Medical



                                                                 Branch

 

     topiramate      2016      Yes            200mg      Take 200           Un

bob



     (TOPAMAX)      2-28                               mg by           ity of



     100 mg      11:30:                               mouth           [place]



     tablet      04                                 every           Medical



                                                  evening.           Branch

 

     KCL       2016      Yes            10meq      Take 10           [facility]



     (KLOR-CON      2-28                               mEq by           ity of



     M10) 10 mEq      11:30:                               mouth 2           [facility]

as



     tablet      04                                 (two)           Medical



                                                  times           Branch



                                                  daily.           

 

     pregabalin      2016      Yes            225mg      Take 225           Un

bob



     (LYRICA)      2-28                               mg by           ity of



     225 mg      11:30:                               mouth 2           [place]



     capsule      04                                 (two)           Medical



                                                  times           Branch



                                                  daily.           

 

     memantine      2016      Yes            5mg       Take 5 mg           Uni

vers



     (NAMENDA) 5      2-28                               by mouth 2           it

y of



     mg tablet      11:30:                               (two)           [place]



               04                                 times           Medical



                                                  daily.           Branch

 

     metoprolol      2016      Yes            50mg      Take 50 mg           U

nivers



     tartrate      2-28                               by mouth 2           ity o

f



     (LOPRESSOR)      11:30:                               (two)           [place]



     50 mg      04                                 times           Medical



     tablet                                         daily.           Branch

 

     esomeprazol      2016      Yes            20mg      Take 20 mg           

[facility]



     e (NEXIUM)      2-28                               by mouth 2           ity

 of



     20 mg      11:30:                               (two)           [place]



     capsule      04                                 times           Medical



                                                  daily.           Branch

 

     LACTOBAC/BI      2016      Yes            1{tbl}      Take 1           Un

bob



     FIDOBAC/TYLER      2-28                               tablet by           ity

 of



     B AK CON      11:30:                               mouth 2           [place]



     (ULTRA      04                                 (two)           Medical



     PATRICK PLUS                                         times           Branch



     ORAL)                                         daily.           



                                                  Indication           



                                                  s:             



                                                  Ultimate           



                                                  [facility]           

 

     calcium      2016      Yes            2{tbl}      Take 2           Univer

s



     carb and      2-28                               tablets by           ity o

f



     citrate-vit      11:30:                               mouth           [place]



     D3        04                                 daily.           Medical



     (CITRACAL +                                                        Branch



     D SLOW                                                        



     RELEASE)                                                        



     600 mg                                                        



     calcium-                                                        



     500 unit                                                        



     TbSR                                                        

 

     DULoxetine      2016      Yes            60mg      Take 60 mg           U

nivers



     (CYMBALTA)      2-28                               by mouth           ity o

f



     60 mg      11:30:                               daily.           [place]



     capsule      04                                                Medical



                                                                 Branch

 

     indapamide      2016      Yes            2.5mg      Take 2.5           Un

bob



     (LOZOL) 2.5      2-28                               mg by           ity of



     mg tablet      11:30:                               mouth           [place]



               04                                 daily.           Medical



                                                                 Branch

 

     Vit C-Vit      2016      Yes            1{capsu      Take 1           Uni

vers



     E-Lutein-Mi      2-28                     le}       capsule by           it

y of



     n-OM-3      11:30:                               mouth           [place]



     (OCUVITE)      04                                 daily.           Medical



     150-30-5-15                                                        Branch



     0                                                           



     mg-unit-mg-                                                        



     mg Cap                                                        

 

     multivitami      2016      Yes            1{tbl}      Take 1           Un

bob



     n         2-28                               tablet by           ity of



     (ONE-A-DAY      11:30:                               mouth           [place]



     ESSENTIAL)      04                                 daily.           Medical



     tablet                                                        Branch

 

     FERROUS      2016      Yes            1{tbl}      Take 1           Univer

s



     FUMARATE/VI      2-28                               tablet by           ity

 of



     T BCOMP,C      11:30:                               mouth           [place]



     (SUPER B      04                                 daily.           Medical



     COMPLEX                                                        Branch



     ORAL)                                                        

 

     ascorbic      2016      Yes            500mg      Take 500           Univ

ers



     acid,      2-28                               mg by           ity of



     vitamin C,      11:30:                               mouth           [place]



     (VITAMIN C)      04                                 daily.           Medica

l



     500 mg                                                        Branch



     tablet                                                        

 

     Cholecalcif      2016      Yes            1{tbl}      Take 1           Un

bob



     edith,      2-28                               tablet by           ity of



     Vitamin D3,      11:30:                               mouth           [place]



     (VITAMIN      04                                 daily.           Medical



     D3) 5,000                                                        Branch



     unit tablet                                                        

 

     QUEtiapine      2016      Yes            50mg      Take 50 mg           U

nivers



     (SEROQUEL)      2-28                               by mouth           ity o

f



     50 mg      11:30:                               daily.           [place]



     tablet      04                                                Medical



                                                                 Branch

 

     simvastatin      2016      Yes            40mg      Take 40 mg           

[facility]



     (ZOCOR) 40      2-28                               by mouth           ity o

f



     mg tablet      11:30:                               at             [place]



               04                                 bedtime.           Medical



                                                                 Branch

 

     topiramate      2016      Yes            200mg      Take 200           Un

bob



     (TOPAMAX)      2-28                               mg by           ity of



     100 mg      11:30:                               mouth           [place]



     tablet      04                                 every           Medical



                                                  evening.           Branch

 

     KCL       2016      Yes            10meq      Take 10           [facility]



     (KLOR-CON      2-28                               mEq by           ity of



     M10) 10 mEq      11:30:                               mouth 2           [facility]

as



     tablet      04                                 (two)           Medical



                                                  times           Branch



                                                  daily.           

 

     pregabalin      2016      Yes            225mg      Take 225           Un

bob



     (LYRICA)      2-28                               mg by           ity of



     225 mg      11:30:                               mouth 2           [place]



     capsule      04                                 (two)           Medical



                                                  times           Branch



                                                  daily.           

 

     memantine      2016      Yes            5mg       Take 5 mg           Uni

vers



     (NAMENDA) 5      2-28                               by mouth 2           it

y of



     mg tablet      11:30:                               (two)           [place]



               04                                 times           Medical



                                                  daily.           Branch

 

     metoprolol      2016      Yes            50mg      Take 50 mg           U

nivers



     tartrate      2-28                               by mouth 2           ity o

f



     (LOPRESSOR)      11:30:                               (two)           [place]



     50 mg      04                                 times           Medical



     tablet                                         daily.           Branch

 

     esomeprazol      2016      Yes            20mg      Take 20 mg           

[facility]



     e (NEXIUM)      2-28                               by mouth 2           ity

 of



     20 mg      11:30:                               (two)           [place]



     capsule      04                                 times           Medical



                                                  daily.           Branch

 

     LACTOBAC/BI      2016      Yes            1{tbl}      Take 1           Un

bob



     FIDOBAC/TYLER      2-28                               tablet by           ity

 of



     B AK CON      11:30:                               mouth 2           [place]



     (ULTRA      04                                 (two)           Medical



     PATRICK PLUS                                         times           Branch



     ORAL)                                         daily.           



                                                  Indication           



                                                  s:             



                                                  Ultimate           



                                                  [facility]           

 

     calcium      2016      Yes            2{tbl}      Take 2           Univer

s



     carb and      2-28                               tablets by           ity o

f



     citrate-vit      11:30:                               mouth           [place]



     D3        04                                 daily.           Medical



     (CITRACAL +                                                        Branch



     D SLOW                                                        



     RELEASE)                                                        



     600 mg                                                        



     calcium-                                                        



     500 unit                                                        



     TbSR                                                        

 

     DULoxetine      2016      Yes            60mg      Take 60 mg           U

nivers



     (CYMBALTA)      2-28                               by mouth           ity o

f



     60 mg      11:30:                               daily.           [place]



     capsule      04                                                Medical



                                                                 Branch

 

     indapamide      2016      Yes            2.5mg      Take 2.5           Un

bob



     (LOZOL) 2.5      2-28                               mg by           ity of



     mg tablet      11:30:                               mouth           [place]



               04                                 daily.           Medical



                                                                 Branch

 

     Vit C-Vit      2016      Yes            1{capsu      Take 1           Uni

vers



     E-Lutein-Mi      2-28                     le}       capsule by           it

y of



     n-OM-3      11:30:                               mouth           [place]



     (OCUVITE)      04                                 daily.           Medical



     150-30-5-15                                                        Branch



     0                                                           



     mg-unit-mg-                                                        



     mg Cap                                                        

 

     multivitami      2016      Yes            1{tbl}      Take 1           Un

bob



     n         2-28                               tablet by           ity of



     (ONE-A-DAY      11:30:                               mouth           [place]



     ESSENTIAL)      04                                 daily.           Medical



     tablet                                                        Branch

 

     FERROUS      2016      Yes            1{tbl}      Take 1           Univer

s



     FUMARATE/VI      2-28                               tablet by           ity

 of



     T BCOMP,C      11:30:                               mouth           [place]



     (SUPER B      04                                 daily.           Medical



     COMPLEX                                                        Branch



     ORAL)                                                        

 

     ascorbic      2016      Yes            500mg      Take 500           Univ

ers



     acid,      2-28                               mg by           ity of



     vitamin C,      11:30:                               mouth           [place]



     (VITAMIN C)      04                                 daily.           Medica

l



     500 mg                                                        Branch



     tablet                                                        

 

     Cholecalcif      2016      Yes            1{tbl}      Take 1           Un

bob



     edith,      2-28                               tablet by           ity of



     Vitamin D3,      11:30:                               mouth           [place]



     (VITAMIN      04                                 daily.           Medical



     D3) 5,000                                                        Branch



     unit tablet                                                        

 

     QUEtiapine      2016      Yes            50mg      Take 50 mg           U

nivers



     (SEROQUEL)      2-28                               by mouth           ity o

f



     50 mg      11:30:                               daily.           [place]



     tablet      04                                                Medical



                                                                 Branch

 

     simvastatin      2016      Yes            40mg      Take 40 mg           

[facility]



     (ZOCOR) 40      2-28                               by mouth           ity o

f



     mg tablet      11:30:                               at             [place]



               04                                 bedtime.           Medical



                                                                 Branch

 

     topiramate      2016      Yes            200mg      Take 200           Un

bob



     (TOPAMAX)      2-28                               mg by           ity of



     100 mg      11:30:                               mouth           [place]



     tablet      04                                 every           Medical



                                                  evening.           Branch

 

     KCL       2016      Yes            10meq      Take 10           [facility]



     (KLOR-CON      2-28                               mEq by           ity of



     M10) 10 mEq      11:30:                               mouth 2           [facility]

as



     tablet      04                                 (two)           Medical



                                                  times           [facility]anch



                                                  daily.           

 

     pregabalin      2016      Yes            225mg      Take 225           Un

bob



     (LYRICA)      2-28                               mg by           ity of



     225 mg      11:30:                               mouth 2           [place]



     capsule      04                                 (two)           Medical



                                                  times           Branch



                                                  daily.           

 

     memantine      2016      Yes            5mg       Take 5 mg           Uni

vers



     (NAMENDA) 5      2-28                               by mouth 2           it

y of



     mg tablet      11:30:                               (two)           [place]



               04                                 times           Medical



                                                  daily.           Branch

 

     metoprolol      2016      Yes            50mg      Take 50 mg           U

nivers



     tartrate      2-28                               by mouth 2           ity o

f



     (LOPRESSOR)      11:30:                               (two)           [place]



     50 mg      04                                 times           Medical



     tablet                                         daily.           Branch

 

     esomeprazol      2016      Yes            20mg      Take 20 mg           

[facility]



     e (NEXIUM)      2-28                               by mouth 2           ity

 of



     20 mg      11:30:                               (two)           [place]



     capsule      04                                 times           Medical



                                                  daily.           Branch

 

     LACTOBAC/BI      2016      Yes            1{tbl}      Take 1           Un

bob



     FIDOBAC/TYLER      2-28                               tablet by           ity

 of



     B AK CON      11:30:                               mouth 2           [place]



     (ULTRA      04                                 (two)           Medical



     PATRICK PLUS                                         times           Branch



     ORAL)                                         daily.           



                                                  Indication           



                                                  s:             



                                                  Ultimate           



                                                  [facility]           

 

     calcium      2016      Yes            2{tbl}      Take 2           Univer

s



     carb and      2-28                               tablets by           ity o

f



     citrate-vit      11:30:                               mouth           [place]



     D3        04                                 daily.           Medical



     (CITRACAL +                                                        Branch



     D SLOW                                                        



     RELEASE)                                                        



     600 mg                                                        



     calcium-                                                        



     500 unit                                                        



     TbSR                                                        

 

     DULoxetine      2016      Yes            60mg      Take 60 mg           U

nivers



     (CYMBALTA)      2-28                               by mouth           ity o

f



     60 mg      11:30:                               daily.           [place]



     capsule      04                                                Medical



                                                                 Branch

 

     indapamide      2016      Yes            2.5mg      Take 2.5           Un

bob



     (LOZOL) 2.5      2-28                               mg by           ity of



     mg tablet      11:30:                               mouth           [place]



               04                                 daily.           Medical



                                                                 Branch

 

     Vit C-Vit      2016      Yes            1{capsu      Take 1           Uni

vers



     E-Lutein-Mi      2-28                     le}       capsule by           it

y of



     n-OM-3      11:30:                               mouth           [place]



     (OCUVITE)      04                                 daily.           Medical



     150-30-5-15                                                        Branch



     0                                                           



     mg-unit-mg-                                                        



     mg Cap                                                        

 

     multivitami      2016      Yes            1{tbl}      Take 1           Un

bob



     n         2-28                               tablet by           ity of



     (ONE-A-DAY      11:30:                               mouth           [place]



     ESSENTIAL)      04                                 daily.           Medical



     tablet                                                        Branch

 

     FERROUS      2016      Yes            1{tbl}      Take 1           Univer

s



     FUMARATE/VI      2-28                               tablet by           ity

 of



     T BCOMP,C      11:30:                               mouth           [place]



     (SUPER B      04                                 daily.           Medical



     COMPLEX                                                        Branch



     ORAL)                                                        

 

     ascorbic      2016      Yes            500mg      Take 500           Univ

ers



     acid,      2-28                               mg by           ity of



     vitamin C,      11:30:                               mouth           [place]



     (VITAMIN C)      04                                 daily.           Medica

l



     500 mg                                                        Br[facility]



     tablet                                                        

 

     Cholecalcif      2016      Yes            1{tbl}      Take 1           Un

bob



     edith,      2-28                               tablet by           ity of



     Vitamin D3,      11:30:                               mouth           [place]



     (VITAMIN      04                                 daily.           Medical



     D3) 5,000                                                        [facility]



     unit tablet                                                        

 

     QUEtiapine      2016      Yes            50mg      Take 50 mg           U

nivers



     (SEROQUEL)      2-28                               by mouth           ity o

f



     50 mg      11:30:                               daily.           [place]



     tablet      04                                                Medical



                                                                 Branch

 

     simvastatin      2016      Yes            40mg      Take 40 mg           

[facility]



     (ZOCOR) 40      2-28                               by mouth           ity o

f



     mg tablet      11:30:                               at             [place]



               04                                 bedtime.           Medical



                                                                 Branch

 

     topiramate      2016      Yes            200mg      Take 200           Un

bob



     (TOPAMAX)      2-28                               mg by           ity of



     100 mg      11:30:                               mouth           [place]



     tablet      04                                 every           Medical



                                                  evening.           Branch

 

     KCL       2016      Yes            10meq      Take 10           [facility]



     (KLOR-CON      2-28                               mEq by           ity of



     M10) 10 mEq      11:30:                               mouth 2           [facility]

as



     tablet      04                                 (two)           Medical



                                                  times           [facility]anch



                                                  daily.           

 

     pregabalin      2016      Yes            225mg      Take 225           Un

bob



     (LYRICA)      2-28                               mg by           ity of



     225 mg      11:30:                               mouth 2           [place]



     capsule      04                                 (two)           Medical



                                                  times           Branch



                                                  daily.           

 

     memantine      2016      Yes            5mg       Take 5 mg           Uni

vers



     (NAMENDA) 5      2-28                               by mouth 2           it

y of



     mg tablet      11:30:                               (two)           [place]



               04                                 times           Medical



                                                  daily.           Branch

 

     metoprolol      2016      Yes            50mg      Take 50 mg           U

nivers



     tartrate      2-28                               by mouth 2           ity o

f



     (LOPRESSOR)      11:30:                               (two)           [place]



     50 mg      04                                 times           Medical



     tablet                                         daily.           Branch

 

     esomeprazol      2016      Yes            20mg      Take 20 mg           

[facility]



     e (NEXIUM)      2-28                               by mouth 2           ity

 of



     20 mg      11:30:                               (two)           [place]



     capsule      04                                 times           Medical



                                                  daily.           Branch

 

     LACTOBAC/BI      2016      Yes            1{tbl}      Take 1           Un

bob



     FIDOBAC/TYLER      2-28                               tablet by           ity

 of



     B AK CON      11:30:                               mouth 2           [place]



     (ULTRA      04                                 (two)           Medical



     PATRICK PLUS                                         times           Branch



     ORAL)                                         daily.           



                                                  Indication           



                                                  s:             



                                                  Ultimate           



                                                  [facility]           

 

     calcium      2016      Yes            2{tbl}      Take 2           Univer

s



     carb and      2-28                               tablets by           ity o

f



     citrate-vit      11:30:                               mouth           [place]



     D3        04                                 daily.           Medical



     (CITRACAL +                                                        Branch



     D SLOW                                                        



     RELEASE)                                                        



     600 mg                                                        



     calcium-                                                        



     500 unit                                                        



     TbSR                                                        

 

     DULoxetine      2016      Yes            60mg      Take 60 mg           U

nivers



     (CYMBALTA)      2-28                               by mouth           ity o

f



     60 mg      11:30:                               daily.           [place]



     capsule      04                                                Medical



                                                                 Branch

 

     indapamide      2016      Yes            2.5mg      Take 2.5           Un

bob



     (LOZOL) 2.5      2-28                               mg by           ity of



     mg tablet      11:30:                               mouth           [place]



               04                                 daily.           Medical



                                                                 Branch

 

     Vit C-Vit      2016      Yes            1{capsu      Take 1           Uni

vers



     E-Lutein-Mi      2-28                     le}       capsule by           it

y of



     n-OM-3      11:30:                               mouth           [place]



     (OCUVITE)      04                                 daily.           Medical



     150-30-5-15                                                        Branch



     0                                                           



     mg-unit-mg-                                                        



     mg Cap                                                        

 

     multivitami      2016      Yes            1{tbl}      Take 1           Un

bob



     n         2-28                               tablet by           ity of



     (ONE-A-DAY      11:30:                               mouth           [place]



     ESSENTIAL)      04                                 daily.           Medical



     tablet                                                        Branch

 

     FERROUS      2016      Yes            1{tbl}      Take 1           Univer

s



     FUMARATE/VI      2-28                               tablet by           ity

 of



     T BCOMP,C      11:30:                               mouth           [place]



     (SUPER B      04                                 daily.           Medical



     COMPLEX                                                        Branch



     ORAL)                                                        

 

     ascorbic      2016      Yes            500mg      Take 500           Univ

ers



     acid,      2-28                               mg by           ity of



     vitamin C,      11:30:                               mouth           [place]



     (VITAMIN C)      04                                 daily.           Medica

l



     500 mg                                                        [facility]



     tablet                                                        

 

     Cholecalcif      2016      Yes            1{tbl}      Take 1           Un

bob



     edith,      2-28                               tablet by           ity of



     Vitamin D3,      11:30:                               mouth           [place]



     (VITAMIN      04                                 daily.           Medical



     D3) 5,000                                                        [facility]anch



     unit tablet                                                        

 

     QUEtiapine      2016      Yes            50mg      Take 50 mg           U

nivers



     (SEROQUEL)      2-28                               by mouth           ity o

f



     50 mg      11:30:                               daily.           [place]



     tablet      04                                                Medical



                                                                 Branch

 

     simvastatin      2016      Yes            40mg      Take 40 mg           

[facility]



     (ZOCOR) 40      2-28                               by mouth           ity o

f



     mg tablet      11:30:                               at             [place]



               04                                 bedtime.           Medical



                                                                 Branch

 

     topiramate      2016      Yes            200mg      Take 200           Un

bob



     (TOPAMAX)      2-28                               mg by           ity of



     100 mg      11:30:                               mouth           [place]



     tablet      04                                 every           Medical



                                                  evening.           Branch

 

     KCL       2016      Yes            10meq      Take 10           [facility]



     (KLOR-CON      2-28                               mEq by           ity of



     M10) 10 mEq      11:30:                               mouth 2           [facility]

as



     tablet      04                                 (two)           Medical



                                                  times           [place]



                                                  daily.           

 

     pregabalin      2016      Yes            225mg      Take 225           Un

bob



     (LYRICA)      2-28                               mg by           ity of



     225 mg      11:30:                               mouth 2           [place]



     capsule      04                                 (two)           Medical



                                                  times           [place]



                                                  daily.           

 

     memantine      2016      Yes            5mg       Take 5 mg           Uni

vers



     (NAMENDA) 5      2-28                               by mouth 2           it

y of



     mg tablet      11:30:                               (two)           [place]



               04                                 times           Medical



                                                  daily.           Branch

 

     metoprolol      2016      Yes            50mg      Take 50 mg           U

nivers



     tartrate      2-28                               by mouth 2           ity o

f



     (LOPRESSOR)      11:30:                               (two)           [place]



     50 mg      04                                 times           Medical



     tablet                                         daily.           Branch

 

     esomeprazol      2016      Yes            20mg      Take 20 mg           

[facility]



     e (NEXIUM)      2-28                               by mouth 2           ity

 of



     20 mg      11:30:                               (two)           [place]



     capsule      04                                 times           Medical



                                                  daily.           Branch

 

     LACTOBAC/BI      2016      Yes            1{tbl}      Take 1           Un

bob



     FIDOBAC/TYLER      2-28                               tablet by           ity

 of



     B AK CON      11:30:                               mouth 2           [place]



     (ULTRA      04                                 (two)           Medical



     PATRICK PLUS                                         times           Branch



     ORAL)                                         daily.           



                                                  Indication           



                                                  s:             



                                                  Ultimate           



                                                  Patrick           







Immunizations







           Ordered    Filled Immunization Date       Status     Comments   [facility]

e



           Immunization Name Name                                        

 

           SARS-COV-2 COVID-19            2021 Completed             Unive

rsity of



           PFIZER VACCINE            00:00:00                         [facility]

 

           SARS-COV-2 COVID-19            2021 Completed             Unive

rsity of



           PFIZER VACCINE            00:00:00                         [facility]

 

           SARS-COV-2 COVID-19            2021 Completed             Unive

rsity of



           PFIZER VACCINE            00:00:00                         [facility]

 

           SARS-COV-2 COVID-19            2021 Completed             Unive

rsity of



           PFIZER VACCINE            00:00:00                         [facility]

 

           SARS-COV-2 COVID-19            2021 Completed             Unive

rsity of



           PFIZER VACCINE            00:00:00                         [facility]

 

           SARS-COV-2 COVID-19            2021 Completed             Unive

rsity of



           PFIZER VACCINE            00:00:00                         [facility]

 

           SARS-COV-2 COVID-19            2021 Completed             Unive

rsity of



           PFIZER VACCINE            00:00:00                         [facility]

[facility]



                                                                  [facility]

 

           SARS-COV-2 COVID-19            2021 Completed             Unive

rsity of



           PFIZER VACCINE            00:00:00                         [facility]

 

           SARS-COV-2 COVID-19            2021 Completed             Unive

rsity of



           PFIZER VACCINE            00:00:00                         [facility]

[facility]



                                                                  Branch

 

           SARS-COV-2 COVID-19            2021 Completed             Unive

rsity of



           PFIZER VACCINE            00:00:00                         [facility]

[facility]



                                                                  Branch

 

           SARS-COV-2 COVID-19            2021 Completed             Unive

rsity of



           PFIZER VACCINE            00:00:00                         [facility]

[facility]



                                                                  Branch

 

           SARS-COV-2 COVID-19            2021 Completed             Unive

rsity of



           PFIZER VACCINE            00:00:00                         [facility]

 

           SARS-COV-2 COVID-19            2021 Completed             Unive

rsity of



           PFIZER VACCINE            00:00:00                         [facility]

 

           SARS-COV-2 COVID-19            2021 Completed             Unive

rsity of



           PFIZER VACCINE            00:00:00                         [facility]

 

           SARS-COV-2 COVID-19            2021 Completed             Unive

rsity of



           PFIZER VACCINE            00:00:00                         [facility]

 

           SARS-COV-2 COVID-19            2021 Completed             Unive

rsity of



           PFIZER VACCINE            00:00:00                         [facility]

 

           SARS-COV-2 COVID-19            2021 Completed             Unive

rsity of



           PFIZER VACCINE            00:00:00                         [facility]

 

           SARS-COV-2 COVID-19            2021 Completed             Unive

rsity of



           PFIZER VACCINE            00:00:00                         [facility]

[facility]



                                                                  Branch







Vital Signs







             Vital Name   Observation Time Observation Value Comments     Source

 

             WEIGHT       2021 06:47:00 97.478 kg                 

 

             WEIGHT       2021 23:00:00 98.839 kg                 

 

             HEIGHT       2021 22:25:00 160 cm                    

 

             WEIGHT       2021 22:25:00 100.562 kg                

 

             Body height  2021-11-15 19:34:00 160 cm                    [facility]

 

             Body weight  2021-11-15 19:34:00 108.863 kg                [facility]

 

             BMI          2021-11-15 19:34:00 42.51 kg/m2               [facility]

 

             WEIGHT       2021 06:47:00 97.478 kg                 

 

             WEIGHT       2021 23:00:00 98.839 kg                 

 

             HEIGHT       2021 22:25:00 160 cm                    

 

             WEIGHT       2021 22:25:00 100.562 kg                

 

             Heart rate   2021 08:01:00 56 /min                   [facility]

 

             Respiratory rate 2021 08:01:00 16 /min                   [facility]

 

             Oxygen saturation in 2021 08:01:00 93 /min                   

[facility] -



             Arterial blood by                                        Medical Ce

nter



             Pulse oximetry                                        

 

             Systolic blood 2021 07:00:00 126 mm[Hg]                [facility]

 

             Diastolic blood 2021 07:00:00 60 mm[Hg]                 [facility] S

[facility]

 

             Body temperature 2021 07:00:00 36.61 Brittany                 [facility]

 

             Body weight  2021 06:47:00 97.478 kg                 [facility]

 

             BMI          2021 06:47:00 38.07 kg/m2               [facility]

 

             Body height  2021 22:25:00 160 cm                    [facility]







Procedures







                Procedure       Date / Time     Performing Clinician Source



                                Performed                       

 

                XR ANKLE 3+ VW RIGHT 2021 22:15:00 Oleksandr Evans

[facility]

 

                HOME HEALTH - OTHER 2021 06:01:00 Doctor GiselassPetty griffith

[facility]



                                                [place]         [facility] HEALTH - OTHER 2021 06:01:00 Doctor GiselassPetty griffith

[facility]



                                                [place]         [facility]

 

                HIGH SENSITIVITY TROPONIN 2021 16:40:00 Tara Kay CH

I [facility]

 

                ECG 12-LEAD     2021 15:59:02 Unknown, Hl7 Doctor [facility]

 

                ECG 12-LEAD     2021 15:59:02 Unknown, Hl7 Doctor [facility]

 

                XR CHEST 1 VIEW PORTABLE / 2021 07:54:00 REA Abarca [facility]

 -



                [facility]

 

                CBC W/PLT COUNT & AUTO 2021 04:39:00 Mullins Syeda, [facility]

[facility]



                DIFFERENTIAL                    [facility]

 

                BASIC METABOLIC PANEL (7) 2021 04:39:00 Mullins Syeda, CH

I [facility]

 

                CBC W/PLT COUNT & AUTO 2021 04:39:00 Mullins Syeda, [facility]

[facility]



                DIFFERENTIAL                    [facility]

 

                MAGNESIUM       2021 04:39:00 Mullins Syeda, [facility]

 

                B-TYPE NATRIURETIC FACTOR 2021 03:34:00 Ivanna Abarca [facility]



                (BNP)                           [facility]

 

                XR CHEST 1 VIEW PORTABLE / 2021 13:03:00 Tara Kay

[facility]

 

                BASIC METABOLIC PANEL (7) 2021 11:41:00 Tara Kay CH

I [facility]

 

                MAGNESIUM       2021 11:41:00 Tara Kay [facility]

 

                B-TYPE NATRIURETIC FACTOR 2021 11:41:00 Tara Kay CH

I [facility]



                (BNP)                           [facility]

 

                ECG 12-LEAD     2021 10:53:46 Cornelius Garzon   [facility]

 

                TRANSESOPHAGEAL ECHO 2021 09:39:19 Alfredo Young [facility]

 

                CBC W/PLT COUNT & AUTO 2021 05:17:00 Chao Ramos [facility]

[facility]



                DIFFERENTIAL                    [facility]

 

                BASIC METABOLIC PANEL (7) 2021 05:17:00 Chao Ramos CH

I [facility]

 

                HEPATIC FUNCTION PANEL 2021 05:17:00 Rachel, [facility]

 

                CBC W/PLT COUNT & AUTO 2021 05:17:00 Chao Ramos   [facility]



                DIFFERENTIAL                    [facility]

 

                CARDIOVERSION   2021 00:31:19 Alfredo Young [facility]

 

                HIGH SENSITIVITY TROPONIN 2021 18:35:00 Rachel, [facility]

 

                2D ECHO W/ DOPPLER 2021 14:07:13 [facility]



                (CW/PW/COLOR)                   [facility]

 

                CT CHEST FOR PULMONARY 2021 05:36:00 Rachel [facility]



                EMBOLUS                         [facility]

 

                XR CHEST 1 VIEW PORTABLE / 2021 04:08:00 Chao Ramos   [facility] -



                BEDSIDE                         [facility]

 

                SARS-COV2/RT-PCR ([facility] & 2021 03:48:00 Rachel, [facility] -



                REF LABS)                       [facility]

 

                CBC W/PLT COUNT & AUTO 2021 03:41:00 Rachel [facility]

 

                BASIC METABOLIC PANEL (7) 2021 03:41:00 Rachel [facility]

 

                HEPATIC FUNCTION PANEL 2021 03:41:00 Rachel, [facility]

 

                CBC W/PLT COUNT & AUTO 2021 03:41:00 Rachel, [facility]



                DIFFERENTIAL                    [facility]

 

                HIGH SENSITIVITY TROPONIN 2021 03:41:00 Rachel [facility]

 

                B-TYPE NATRIURETIC FACTOR 2021 03:41:00 Rachel [facility]



                (BNP)                           [facility]

 

                CBC W/PLT COUNT & AUTO 2021 23:21:00 Rachel, Chao   [facility]

 

                BASIC METABOLIC PANEL (7) 2021 23:21:00 Rachel [facility]

 

                HEPATIC FUNCTION PANEL 2021 23:21:00 Rachel [facility]

 

                MAGNESIUM       2021 23:21:00 Rachel [facility]

 

                PHOSPHORUS      2021 23:21:00 Rachel, [facility]

 

                PROTHROMBIN TIME/INR 2021 23:21:00 Rachel, [facility]

 

                CBC W/PLT COUNT & AUTO 2021 23:21:00 Rachel, [facility]

 

                TSH/FREE T4 IF INDICATED 2021 23:21:00 Rachel, [facility]

 

                HIGH SENSITIVITY TROPONIN 2021 23:21:00 Rachel, [facility]

 

                D-DIMER         2021 23:21:00 [facility]

 

                VITAMIN B12 AND FOLATE 2021 23:21:00 [facility]

 

                ECG 12-LEAD     2021 23:12:15 [facility]

 

                ECG 12-LEAD     2021 23:07:40 Unknown, Hl7 Doctor [facility]

 

                REPORT OF PROCEDURE - 2021 00:00:00 Provider, Luc [facility]



                ENDOSCOPY SCAN                  Scanning        [facility]







Plan of Care







             Planned Activity Planned Date Details      Comments     Source

 

             Future Scheduled 2021   INFLUENZA VACCINE (#1)              C

[facility]



             Test         00:00:00     [code = INFLUENZA              Medical Ce

nter



                                       VACCINE (#1)]              

 

             Future Scheduled 2021   DEPRESSION SCREENING              [facility]



             Test         00:00:00     (12+) [code =              Medical Center



                                       DEPRESSION SCREENING              



                                       (12+)]                    

 

             Future Scheduled 2021   FALLS RISK SCREENING              [facility]



             Test         00:00:00     [code = FALLS RISK              Medical C

enter



                                       SCREENING]                

 

             Future Scheduled 2021   Medicare IPPE (WELCOME              C

[facility]



             Test         00:00:00     TO MEDICARE) [code =              Medical

 Center



                                       Medicare IPPE (WELCOME              



                                       TO MEDICARE)]              

 

             Future Scheduled 2006-10-07   PNEUMOCOCCAL 65+ YRS              [facility]



             Test         00:00:00     (1 of 1 -                 Medical Center



                                       [id number])              



                                       [code = PNEUMOCOCCAL              



                                       65+ YRS (1 of 1 -              



                                       [id number])]              

 

             Future Scheduled 1991-10-07   SHINGLES VACCINES (1              [facility]



             Test         00:00:00     of 2) [code = SHINGLES              Medic

al Center



                                       VACCINES (1 of 2)]              

 

             Future Scheduled 1960-10-07   DTAP/TDAP/TD VACCINES              [facility]

[facility]



             Test         00:00:00     (1 - Tdap) [code =              Medical C

enter



                                       DTAP/TDAP/TD VACCINES              



                                       (1 - Tdap)]               

 

             Future Scheduled 1959-10-07   HEPATITIS C SCREENING              [facility]

[facility]



             Test         00:00:00     [code = HEPATITIS C              Medical 

Center



                                       SCREENING]                

 

             Future Scheduled              Hepatitis C screening              [facility]



             Test                      (procedure) [code =              



                                       698447099]                

 

             Future Scheduled              SHINGLES VACCINES (#1)              [facility]



             Test                      [code = SHINGLES              



                                       VACCINES (#1)]              

 

             Future Scheduled              65+ PNEUMOCOCCAL              [facility]

st Hospital



             Test                      VACCINE (1 of 1 -              



                                       PPSV23) [code = 65+              



                                       PNEUMOCOCCAL VACCINE              



                                       (1 of 1 - PPSV23)]              

 

             Future Scheduled              INFLUENZA VACCINE              [facility]

[facility] Hospital



             Test                      [code = INFLUENZA              



                                       VACCINE]                  







Encounters







        Start   End     Encounter Admission Attending Care    Care    Encounter 

Source



        Date/Time Date/Time Type    Type    Clinicians Facility Department ID   

   

 

        2021         Inpatient ER      DANYELLE, [facility]    Cardiology [id number]

[address] [facility]



        22:07:00                         SELIN                         

 

        2021 Outpatient R       NATHAN [facility]    [id number]

[id number] [facility]



        15:45:00 23:59:00                 OLEKSANDR mayer [facility]

 

        2021 Hospital         Nathan [facility]    [id number]

[id number] Univers



        15:45:00 23:59:00 Encounter         Oleksandr [facility]  [id number]         

ity of



                                                [place] [id number]         Amari

as



                                                MARYBETH?BLEA [phone]         Me

wendy SANON    [address]             [facility]                 



                                                OFFICE                  



                                                [facility]                 

 

        [facility]       [facility]    [id number]

5P-20 Univers



        15:45:00 15:45:00                 OLEKSANDR                   [id number]  ity of



                                                                        [facility]

 

        2021 Telephone         [facility]    [id number]

[id number] Univers



        00:00:00 00:00:00                 Oleksandr [facility]  [id number]         it

y of



                                                [place] [id number]         Amari

as



                                                MARYBETH?BLEA [phone]         Me

wendy SANON    [address]             [facility]                 



                                                OFFICE                  



                                                [facility]                 

 

        2021 Orders          Doctor  ABHIJEET    [id number]

[id number] [facility]



        00:00:00 00:00:00 Only            Unassigned, CHARIS   350.1.13.10       

  ity of



                                        [place] [facility] [id number]         Amair

as



                                                        [phone]         [address]

 

        2021 Telephone         [facility]    [id number]

[id number] [facility]



        00:00:00 00:00:00                 Oleksandr [facility]  [id number]         it

y of



                                                [place] [id number]         Amari

as



                                                MARYBETH?BLEA [phone]         Me

wendy SANON    [address]             [facility]                 



                                                OFFICE                  



                                                [facility]                 

 

        2021 Telephone         [facility]    [id number]

[id number] Univers



        00:00:00 00:00:00                 Oleksandr [facility]  [id number]         it

y of



                                                [place] [id number]         Amari

as



                                                MARYBETH?BLEA [phone]         Me

wendy SANON    [address]             [facility]                 



                                                OFFICE                  



                                                [facility]                 

 

        2021 Orders          Doctor  ABHIJEET    [id number]

[id number] [facility]



        00:00:00 00:00:00 Only            Unassigned, CHARIS   350.1.13.10       

  ity of



                                        [place] [facility] [id number]         Amari

as



                                                        [phone]         [address]

 

        2021 Telephone         [facility]    [id number]

[id number] Univers



        00:00:00 00:00:00                 Aftab FREEMAN [facility]  350.1.13.10         it

y of



                                                [place] [id number]         Amari

as



                                                MARYBETH?BLEA [phone]         Me

wendy



                                                [address]



                                                MEDICAL                 



                                                OFFICE                  



                                                BUILDING                 

 

        2021-11-15 2021-11-15 Office          [facility]    [id number]

[id number] Univers



        13:34:19 14:01:40 Visit           Oleksandr [facility]  350.1.13.10         it

y of



                                                [facility] [id number]         Amari

as



                                                MARYBETH?BLEA [phone]         Me

wendy



                                                [address]                 



                                                OFFICE                  



                                                [facility]                 

 

        2021-11-15 2021-11-15 Outpatient R       [facility]    [id number]

[id number] [facility]



        13:30:00 14:01:40                 [facility]

 

        2021 [facility] Selin Feldman [facility] 

  [id number] 

[id number]                              [facility]



        22:07:00 12:43:00 Encounter         Chao Ramos Yashash D                       

  [facility]

 

        2021 Orders                  [facility]   [id number]

[id number] [facility]



        00:00:00 00:00:00 Only                                            [facility]

 

        2021 Outpatient                 [facility]     [id number]

[id number]



        00:00:00 23:59:00                                                 [place]



                                                                        [facility]

 

        2021 Travel                  [facility]   [id number]

 [facility]



        00:00:00 00:00:00                                                 [facility]

 

        2021-05-10 2021-05-10 [facility] 1.2.840.1 [id number]

[id number] [facility]



        08:36:31 23:59:00 Encounter         [facility]    [id number]         [address]



                                                3.430.2.7                 Hospit

a



                                                .[id number]                 l



                                                .8                      

 

        2021 Office          Abbe, 1.2.840.1 [id number]

[id number] [facility]i



        13:03:28 13:29:49 Visit           [facility]    [id number]         [address]     st



                                                3.430.2.7                 Hospit

a



                                                .[id number]                 l



                                                .8                      

 

        2021 Travel                  1.2.840.1 [id number] [id number]

[id number] [facility]



        00:00:00 00:00:00                         50132.1.1 350.1.13.43 383     

st



                                                3.430.2.7 0.2.7.3.698         Ho

spita



                                                .[id number] 084.8           l



                                                .8                      







Results







           Test Description Test Time  Test Comments Results    Result Comments 

Source









                    High Sensitivity Troponin I ([facility]/[place] Only) 2021 1

7:20:00 









                      Test Item  Value      Reference Range Interpretation Comme

nts









             Troponin I HS    (test 6 pg/ml      See_Comment                [Aut

omated message] The



             code = 37722-1)                                        system which

 generated



                                                                 this result tra

nsmitted



                                                                 reference range

: <=17.



                                                                 The reference r

nurys was



                                                                 not used to int

erpret



                                                                 this result as



                                                                 normal/abnormal

.

 

             MAGGI (test code = MAGGI)  ID - DBThe                          

 



                           STAT High                           



                          Sensitivity Troponin-I                           



                          results should be used                           



                          in conjunction with                           



                          other diagnostic                           



                          information such as                           



                          ECG, clinical                           



                          observations and                           



                          information, and                           



                          patient symptoms to                           



                          aid in the diagnosis                           



                          of MI.                                 

 

             Lab Interpretation (test Normal                                 



             code = 33495-7)                                        



[id number] [facility]20[facility]00





             Test Item    Value        Reference Range Interpretation Comments

 

             HIGH SENSITIVITY 6 pg/ml      See_Comment                [Automated

 message]



             TROPONIN I (test code =                                        The 

system which



             3340231)                                            generated this 

result



                                                                 transmitted ref

erence



                                                                 range: <=17. Th

e



                                                                 reference range

 was not



                                                                 used to interpr

et this



                                                                 result as



                                                                 normal/abnormal

.



 ID - DBThe  STAT High Sensitivity Troponin-I results should be
used in conjunctionwith other diagnostic information such as ECG, clinical 
observations and information, and patient symptoms to aid in the diagnosis of 
MI.RAD, CHEST, 1 VIEW, NON [id number] 10:23:00Reason for exam:-&gt;pulm 
edemaShould this be performed at the bedside?-&gt;Yes
************************************************************[facility] CENTERName: NEGRO GONZALES        : 1940        Sex: 
F************************************************************FINAL REPORT 
PATIENT ID:   [id number]  HISTORY: Pulmonary edema COMPARISON: 2021  FIND
INGS: Mild interstitial pulmonary edema. No pleural effusions or pneumothorax. 
The heart shadow is borderline enlarged. The thoracic aorta is mildly tortuous. 
There are postoperative changes in the cervical spine. A right shoulder 
arthroplasty is partially imaged. Signed: Nikolas Rickettseport Verified 
Date/Time:  2021 10:23:36 Reading Location: [address] Transitional 
Reading Room       Electronically signed by: NIKOLAS RICKETTS MD on 2021 
10:23 AMBasic Metabolic [id number] 05:46:00





             Test Item    Value        Reference Range Interpretation Comments

 

             Sodium (test code = 140 meq/L    136-145                   



             2951-2)                                             

 

             Potassium (test code = 3.4 meq/L    3.5-5.1      L            



             2823-3)                                             

 

             Chloride (test code = 108 meq/L           H            



             5-0)                                             

 

             CO2 (test code = 25 meq/L     22-29                     



             -9)                                             

 

             BUN (test code = 11 mg/dL     -                      



             3094-0)                                             

 

             Creatinine (test code 1.12 mg/dL   0.57-1.25                 



             = 2160-0)                                           

 

             Glucose (test code = 113 mg/dL           H            



             2345-7)                                             

 

             Calcium (test code = 8.9 mg/dL    8.4-10.2                  



             34108-5)                                            

 

             EGFR (test code = 47           mL/min/1.73 sq m              ESTIMA

ARIADNE GFR IS



             33914-3)                                            NOT AS ACCURATE



                                                                 AS CREATININE



                                                                 CLEARANCE IN



                                                                 PREDICTING



                                                                 GLOMERULAR



                                                                 FILTRATION RATE

.



                                                                 ESTIMATED GFR I

S



                                                                 NOT APPLICABLE



                                                                 FOR DIALYSIS



                                                                 PATIENTS.

 

             MAGGI (test code = MAGGI)  ID -                           



                          NICOLAS MEDELLIN                                 

 

             Lab Interpretation Abnormal                               



             (test code = 14096-0)                                        



[facility]





             Test Item    Value        Reference Range Interpretation Comments

 

             Magnesium (test code = 1.7 mg/dL    1.6-2.6                   



             99622-2)                                            

 

             MAGGI (test code = MAGGI)  ID - NICOLAS MEDELLIN                                      

 

             Lab Interpretation (test Normal                                 



             code = 29264-5)                                        



[facility] [id number] 05:46:00





             Test Item    Value        Reference Range Interpretation Comments

 

             SODIUM (BEAKER) 140 meq/L    136-145                   



             (test code = 381)                                        

 

             POTASSIUM (BEAKER) 3.4 meq/L    3.5-5.1      L            



             (test code = 379)                                        

 

             CHLORIDE (BEAKER) 108 meq/L           H            



             (test code = 382)                                        

 

             CO2 (BEAKER) (test 25 meq/L     22-29                     



             code = 355)                                         

 

             BLOOD UREA NITROGEN 11 mg/dL     7-21                      



             (BEAKER) (test code                                        



             = 354)                                              

 

             CREATININE (BEAKER) 1.12 mg/dL   0.57-1.25                 



             (test code = 358)                                        

 

             GLUCOSE RANDOM 113 mg/dL           H            



             (BEAKER) (test code                                        



             = 652)                                              

 

             CALCIUM (BEAKER) 8.9 mg/dL    8.4-10.2                  



             (test code = 697)                                        

 

             EGFR (BEAKER) (test 47 mL/min/1.73                           ESTIMA

ARIADNE GFR IS



             code = 1092) sq m                                   NOT AS ACCURATE

 AS



                                                                 CREATININE



                                                                 CLEARANCE IN



                                                                 PREDICTING



                                                                 GLOMERULAR



                                                                 FILTRATION RATE

.



                                                                 ESTIMATED GFR I

S



                                                                 NOT APPLICABLE 

FOR



                                                                 DIALYSIS PATIEN

TS.



 ID - NICOLAS [id number] 05:46:00





             Test Item    Value        Reference Range Interpretation Comments

 

             MAGNESIUM (BEAKER) (test code = 1.7 mg/dL    1.6-2.6               

    



             627)                                                



 ID - NICOLAS MCBC with platelet count + automated [id number] 05:04:00





             Test Item    Value        Reference Range Interpretation Comments

 

             WBC (test code = 6690-2) 5.7          See_Comment                [A

utomated message]



                                                                 The system [employer]



                                                                 generated this 

result



                                                                 transmitted ref

erence



                                                                 range: 3.5 - 10

.5



                                                                 K/L. The refe

rence



                                                                 range was not u

sed to



                                                                 interpret this 

result



                                                                 as normal/abnor

mal.

 

             RBC (test code = 789-8) 3.85         See_Comment  L             [Au

tomated message]



                                                                 The system [employer]



                                                                 generated this 

result



                                                                 transmitted ref

erence



                                                                 range: 3.93 - 5

.22



                                                                 M/L. The refe

rence



                                                                 range was not u

sed to



                                                                 interpret this 

result



                                                                 as normal/abnor

mal.

 

             MCHC (test code = 786-4) 30.9         See_Comment  L             [A

utomated message]



                                                                 The system [employer]



                                                                 generated this 

result



                                                                 transmitted ref

erence



                                                                 range: 32.2 - 3

5.5



                                                                 GM/DL. The refe

rence



                                                                 range was not u

sed to



                                                                 interpret this 

result



                                                                 as normal/abnor

mal.

 

             Hematocrit (test code = 39.1 %       34.1-44.9                 



             4544-3)                                             

 

             MCV (test code = 787-2) 101.6 fL     79.4-94.8    H            

 

             MCH (test code = 785-6) 31.4 pg      25.6-32.2                 

 

             RDW (test code = 788-0) 13.8 %       11.7-14.4                 

 

             Platelets (test code = 204          See_Comment                [Aut

omated message]



             777-3)                                              The system [employer]



                                                                 generated this 

result



                                                                 transmitted ref

erence



                                                                 range: 150 - 45

0 K/CU



                                                                 MM. The referen

ce



                                                                 range was not u

sed to



                                                                 interpret this 

result



                                                                 as normal/abnor

mal.

 

             MPV (test code = 9.5 fL       9.4-12.3                  



             68073-9)                                            

 

             nRBC (test code = 413) 0            See_Comment                [Aut

omated message]



                                                                 The system [employer]



                                                                 generated this 

result



                                                                 transmitted ref

erence



                                                                 range: 0 - 0 /1

00



                                                                 WBC. The refere

nce



                                                                 range was not u

sed to



                                                                 interpret this 

result



                                                                 as normal/abnor

mal.

 

             % Neutros (test code = 52 %                                   



             429)                                                

 

             % Lymphs (test code = 32 %                                   



             430)                                                

 

             % Monos (test code = 11 %                                   



             431)                                                

 

             % Eos (test code = 432) 5 %                                    

 

             % Baso (test code = 437) 1 %                                    

 

             # Neutros (test code = 2.96         See_Comment                [Aut

omated message]



             670)                                                The system [employer]



                                                                 generated this 

result



                                                                 transmitted ref

erence



                                                                 range: 1.56 - 6

.13



                                                                 K/L. The refe

rence



                                                                 range was not u

sed to



                                                                 interpret this 

result



                                                                 as normal/abnor

mal.

 

             # Lymphs (test code = 1.83         See_Comment                [Auto

mated message]



             414)                                                The system [employer]



                                                                 generated this 

result



                                                                 transmitted ref

erence



                                                                 range: 1.18 - 3

.74



                                                                 K/L. The refe

rence



                                                                 range was not u

sed to



                                                                 interpret this 

result



                                                                 as normal/abnor

mal.

 

             # Monos (test code = 0.60         See_Comment  H             [Autom

ated message]



             415)                                                The system [employer]



                                                                 generated this 

result



                                                                 transmitted ref

erence



                                                                 range: 0.24 - 0

.36



                                                                 K/L. The refe

rence



                                                                 range was not u

sed to



                                                                 interpret this 

result



                                                                 as normal/abnor

mal.

 

             # Eos (test code = 416) 0.26         See_Comment                [Au

tomated message]



                                                                 The system [employer]



                                                                 generated this 

result



                                                                 transmitted ref

erence



                                                                 range: 0.04 - 0

.36



                                                                 K/L. The refe

rence



                                                                 range was not u

sed to



                                                                 interpret this 

result



                                                                 as normal/abnor

mal.

 

             # Baso (test code = 417) 0.05         See_Comment                [A

utomated message]



                                                                 The system [employer]



                                                                 generated this 

result



                                                                 transmitted ref

erence



                                                                 range: 0.01 - 0

.08



                                                                 K/L. The refe

rence



                                                                 range was not u

sed to



                                                                 interpret this 

result



                                                                 as normal/abnor

mal.

 

             Immature     1 %          0-1                       



             Granulocytes-Relative                                        



             (test code = 2801)                                        

 

             Lab Interpretation (test Abnormal                               



             code = 86275-4)                                        



[facility] W/PLT COUNT &amp; AUTO [id number] 
05:04:00





             Test Item    Value        Reference Range Interpretation Comments

 

             WHITE BLOOD CELL COUNT (BEAKER) 5.7 K/ L     3.5-10.5              

    



             (test code = 775)                                        

 

             RED BLOOD CELL COUNT (BEAKER) 3.85 M/ L    3.93-5.22    L          

  



             (test code = 761)                                        

 

             HEMOGLOBIN (BEAKER) (test code = 12.1 GM/DL   11.2-15.7            

     



             410)                                                

 

             HEMATOCRIT (BEAKER) (test code = 39.1 %       34.1-44.9            

     



             411)                                                

 

             MEAN CORPUSCULAR VOLUME (BEAKER) 101.6 fL     79.4-94.8    H       

     



             (test code = 753)                                        

 

             MEAN CORPUSCULAR HEMOGLOBIN 31.4 pg      25.6-32.2                 



             (BEAKER) (test code = 751)                                        

 

             MEAN CORPUSCULAR HEMOGLOBIN CONC 30.9 GM/DL   32.2-35.5    L       

     



             (BEAKER) (test code = 752)                                        

 

             RED CELL DISTRIBUTION WIDTH 13.8 %       11.7-14.4                 



             (BEAKER) (test code = 412)                                        

 

             PLATELET COUNT (BEAKER) (test 204 K/CU MM  150-450                 

  



             code = 756)                                         

 

             MEAN PLATELET VOLUME (BEAKER) 9.5 fL       9.4-12.3                

  



             (test code = 754)                                        

 

             NUCLEATED RED BLOOD CELLS 0 /100 WBC   0-0                       



             (BEAKER) (test code = 413)                                        

 

             NEUTROPHILS RELATIVE PERCENT 52 %                                  

 



             (BEAKER) (test code = 429)                                        

 

             LYMPHOCYTES RELATIVE PERCENT 32 %                                  

 



             (BEAKER) (test code = 430)                                        

 

             MONOCYTES RELATIVE PERCENT 11 %                                   



             (BEAKER) (test code = 431)                                        

 

             EOSINOPHILS RELATIVE PERCENT 5 %                                   

 



             (BEAKER) (test code = 432)                                        

 

             BASOPHILS RELATIVE PERCENT 1 %                                    



             (BEAKER) (test code = 437)                                        

 

             NEUTROPHILS ABSOLUTE COUNT 2.96 K/ L    1.56-6.13                 



             (BEAKER) (test code = 670)                                        

 

             LYMPHOCYTES ABSOLUTE COUNT 1.83 K/ L    1.18-3.74                 



             (BEAKER) (test code = 414)                                        

 

             MONOCYTES ABSOLUTE COUNT (BEAKER) 0.60 K/ L    0.24-0.36    H      

      



             (test code = 415)                                        

 

             EOSINOPHILS ABSOLUTE COUNT 0.26 K/ L    0.04-0.36                 



             (BEAKER) (test code = 416)                                        

 

             BASOPHILS ABSOLUTE COUNT (BEAKER) 0.05 K/ L    0.01-0.08           

      



             (test code = 417)                                        

 

             IMMATURE GRANULOCYTES-RELATIVE 1 %          0-1                    

   



             PERCENT (BEAKER) (test code =                                      

  



             2801)                                               



B-type Natriuretic Factor (BNP)2021 04:52:00





             Test Item    Value        Reference Range Interpretation Comments

 

             BNP (test code = 44618-8) 97 pg/mL     0-100                     

 

             MAGGI (test code = MAGGI)  ID - CLAYTONAYA                          

 



                          L                                      

 

             Lab Interpretation (test Normal                                 



             code = 36295-6)                                        



[facility] 04:52:00





             Test Item    Value        Reference Range Interpretation Comments

 

             B-TYPE NATRIURETIC PEPTIDE (BEAKER) 97 pg/mL     0-100             

        



             (test code = 700)                                        



 COLIN CARLSON LRAD, CHEST, 1 VIEW, NON [id number] 14:01:00Reason for 
exam:-&gt;dyspneaShould this be performed at the bedside?-&gt;Yes
************************************************************[facility]        : 1940        Sex: 
F************************************************************FINAL REPORT 
PATIENT ID:   [id number] CLINICAL HISTORY: dyspnea  TECHNIQUE: 1 view of the c
hest. COMPARISON: 2021 IMPRESSION: Pulmonary vascular congestion is again 
seen with slightly increased prominence of the interstitial lung markings 
bilaterally. There is no focal lobar consolidation or significant pleural fluid.
The cardiomediastinal silhouette is magnified by technique. Cervicalfusion 
hardware right shoulder hardware is again seen. Signed: Juan Francisco MDReport 
Verified Date/Time:  2021 14:01:32 Reading Location: [facility] 
Radiology Reading Room      Electronicallysigned by: JUAN FRANCISCO M.D. on 
2021 02:01 PMBASI METABOLIC [id number] 12:22:00





             Test Item    Value        Reference Range Interpretation Comments

 

             SODIUM (BEAKER) 134 meq/L    136-145      L            



             (test code = 381)                                        

 

             POTASSIUM (BEAKER) 3.7 meq/L    3.5-5.1                   



             (test code = 379)                                        

 

             CHLORIDE (BEAKER) 103 meq/L                        



             (test code = 382)                                        

 

             CO2 (BEAKER) (test 25 meq/L     22-29                     



             code = 355)                                         

 

             BLOOD UREA NITROGEN 15 mg/dL     7-21                      



             (BEAKER) (test code                                        



             = 354)                                              

 

             CREATININE (BEAKER) 1.24 mg/dL   0.57-1.25                 



             (test code = 358)                                        

 

             GLUCOSE RANDOM 168 mg/dL           H            



             (BEAKER) (test code                                        



             = 652)                                              

 

             CALCIUM (BEAKER) 8.3 mg/dL    8.4-10.2     L            



             (test code = 697)                                        

 

             EGFR (BEAKER) (test 42 mL/min/1.73                           ESTIMA

ARIADNE GFR IS



             code = 1092) sq m                                   NOT AS ACCURATE

 AS



                                                                 CREATININE



                                                                 CLEARANCE IN



                                                                 PREDICTING



                                                                 GLOMERULAR



                                                                 FILTRATION RATE

.



                                                                 ESTIMATED GFR I

S



                                                                 NOT APPLICABLE 

FOR



                                                                 DIALYSIS PATIEN

TS.



 ID - [id number] 12:22:00





             Test Item    Value        Reference Range Interpretation Comments

 

             MAGNESIUM (BEAKER) (test code = 1.7 mg/dL    1.6-2.6               

    



             627)                                                



 ID - KANSONB-TYPE NATRIURETIC FACTOR (BNP)2021 12:13:00





             Test Item    Value        Reference Range Interpretation Comments

 

             B-TYPE NATRIURETIC PEPTIDE (BEAKER) 88 pg/mL     0-100             

        



             (test code = 700)                                        



 ID - EMERSONTransesophageal [id number] 21:25:57Ejection 
FractionSLEH ECHO HEARTLAB MKCKESSON [facility] HospitalHepatic 
function [id number] 06:35:00





             Test Item    Value        Reference Range Interpretation Comments

 

             Protein, Total (test 5.4          See_Comment  L             [Autom

ated



             code = 2885-2)                                        message] The



                                                                 system which



                                                                 generated this



                                                                 result transmit

ariadne



                                                                 reference range

:



                                                                 6.0 - 8.3 gm/dL

.



                                                                 The reference



                                                                 range was not u

sed



                                                                 to interpret th

is



                                                                 result as



                                                                 normal/abnormal

.

 

             Albumin (test code = 2.7 g/dL     3.5-5.0      L            



             71679-0)                                            

 

             Total Bilirubin (test 0.1 mg/dL    0.2-1.2      L            



             code = 1975-2)                                        

 

             Bilirubin, Direct 0.1 mg/dL    0.1-0.5                   



             (test code = 1968-7)                                        

 

             Alkaline Phosphatase 80 U/L                           



             (test code = 6768-6)                                        

 

             AST (test code = 15 U/L       5-34                      



             1920-8)                                             

 

             ALT (test code = 8 U/L        6-55                      



             1302-6)                                             

 

             MAGGI (test code = MAGGI)  ID -                           



                          ROBYN L                                

 

             Lab Interpretation Abnormal                               



             (test code = 74428-0)                                        



[facility] HospitalBAS[facility]OLIC [id number] 06:35:00





             Test Item    Value        Reference Range Interpretation Comments

 

             SODIUM (BEAKER) 138 meq/L    136-145                   



             (test code = 381)                                        

 

             POTASSIUM (BEAKER) 3.6 meq/L    3.5-5.1                   



             (test code = 379)                                        

 

             CHLORIDE (BEAKER) 106 meq/L                        



             (test code = 382)                                        

 

             CO2 (BEAKER) (test 22 meq/L     22-29                     



             code = 355)                                         

 

             BLOOD UREA NITROGEN 15 mg/dL     7-21                      



             (BEAKER) (test code                                        



             = 354)                                              

 

             CREATININE (BEAKER) 1.12 mg/dL   0.57-1.25                 



             (test code = 358)                                        

 

             GLUCOSE RANDOM 69 mg/dL            L            



             (BEAKER) (test code                                        



             = 652)                                              

 

             CALCIUM (BEAKER) 8.4 mg/dL    8.4-10.2                  



             (test code = 697)                                        

 

             EGFR (BEAKER) (test 47 mL/min/1.73                           ESTIMA

ARIADNE GFR IS



             code = 1092) sq m                                   NOT AS ACCURATE

 AS



                                                                 CREATININE



                                                                 CLEARANCE IN



                                                                 PREDICTING



                                                                 GLOMERULAR



                                                                 FILTRATION RATE

.



                                                                 ESTIMATED GFR I

S



                                                                 NOT APPLICABLE 

FOR



                                                                 DIALYSIS PATIEN

TS.



 ID - PIAYA LHEPATIC FUNCTION [id number] 06:35:00





             Test Item    Value        Reference Range Interpretation Comments

 

             TOTAL PROTEIN (BEAKER) (test code = 5.4 gm/dL    6.0-8.3      L    

        



             770)                                                

 

             ALBUMIN (BEAKER) (test code = 1145) 2.7 g/dL     3.5-5.0      L    

        

 

             BILIRUBIN TOTAL (BEAKER) (test code 0.1 mg/dL    0.2-1.2      L    

        



             = 377)                                              

 

             BILIRUBIN DIRECT (BEAKER) (test 0.1 mg/dL    0.1-0.5               

    



             code = 706)                                         

 

             ALKALINE PHOSPHATASE (BEAKER) (test 80 U/L                   

        



             code = 346)                                         

 

             AST (SGOT) (BEAKER) (test code = 15 U/L       5-34                 

     



             353)                                                

 

             ALT (SGPT) (BEAKER) (test code = 8 U/L        6-55                 

     



             347)                                                



 ID - PIAYA LCBC W/PLT COUNT &amp; AUTO [id number] 05:44:00





             Test Item    Value        Reference Range Interpretation Comments

 

             WHITE BLOOD CELL COUNT (BEAKER) 5.8 K/ L     3.5-10.5              

    



             (test code = 775)                                        

 

             RED BLOOD CELL COUNT (BEAKER) 3.95 M/ L    3.93-5.22               

  



             (test code = 761)                                        

 

             HEMOGLOBIN (BEAKER) (test code = 12.7 GM/DL   11.2-15.7            

     



             410)                                                

 

             HEMATOCRIT (BEAKER) (test code = 40.1 %       34.1-44.9            

     



             411)                                                

 

             MEAN CORPUSCULAR VOLUME (BEAKER) 101.5 fL     79.4-94.8    H       

     



             (test code = 753)                                        

 

             MEAN CORPUSCULAR HEMOGLOBIN 32.2 pg      25.6-32.2                 



             (BEAKER) (test code = 751)                                        

 

             MEAN CORPUSCULAR HEMOGLOBIN CONC 31.7 GM/DL   32.2-35.5    L       

     



             (BEAKER) (test code = 752)                                        

 

             RED CELL DISTRIBUTION WIDTH 13.4 %       11.7-14.4                 



             (BEAKER) (test code = 412)                                        

 

             PLATELET COUNT (BEAKER) (test 213 K/CU MM  150-450                 

  



             code = 756)                                         

 

             MEAN PLATELET VOLUME (BEAKER) 9.8 fL       9.4-12.3                

  



             (test code = 754)                                        

 

             NUCLEATED RED BLOOD CELLS 0 /100 WBC   0-0                       



             (BEAKER) (test code = 413)                                        

 

             NEUTROPHILS RELATIVE PERCENT 41 %                                  

 



             (BEAKER) (test code = 429)                                        

 

             LYMPHOCYTES RELATIVE PERCENT 41 %                                  

 



             (BEAKER) (test code = 430)                                        

 

             MONOCYTES RELATIVE PERCENT 10 %                                   



             (BEAKER) (test code = 431)                                        

 

             EOSINOPHILS RELATIVE PERCENT 6 %                                   

 



             (BEAKER) (test code = 432)                                        

 

             BASOPHILS RELATIVE PERCENT 1 %                                    



             (BEAKER) (test code = 437)                                        

 

             NEUTROPHILS ABSOLUTE COUNT 2.34 K/ L    1.56-6.13                 



             (BEAKER) (test code = 670)                                        

 

             LYMPHOCYTES ABSOLUTE COUNT 2.38 K/ L    1.18-3.74                 



             (BEAKER) (test code = 414)                                        

 

             MONOCYTES ABSOLUTE COUNT (BEAKER) 0.57 K/ L    0.24-0.36    H      

      



             (test code = 415)                                        

 

             EOSINOPHILS ABSOLUTE COUNT 0.34 K/ L    0.04-0.36                 



             (BEAKER) (test code = 416)                                        

 

             BASOPHILS ABSOLUTE COUNT (BEAKER) 0.07 K/ L    0.01-0.08           

      



             (test code = 417)                                        

 

             IMMATURE GRANULOCYTES-RELATIVE 1 %          0-1                    

   



             PERCENT (BEAKER) (test code =                                      

  



             2801)                                               



SARS-CoV2/RT-PCR (Asymptomatic ONLY)2021 23:22:00





             Test Item    Value        Reference Range Interpretation Comments

 

             SARS-COV2/RT-PCR (test Negative     Negative                  



             code = 71408-2)                                        

 

             MAGGI (test code = MAGGI) Negative result for                          

 



                          this test determines                           



                          that SARS-CoV-2 RNA was                           



                          not present in the                           



                          specimen above the                           



                          Limit of Detection                           



                          (LOD).  However,                           



                          Negative results do not                           



                          preclude SARS-CoV-2                           



                          infection and should                           



                          not be used as the sole                           



                          basis for treatment or                           



                          patient management                           



                          decisions.  Negative                           



                          results must be                           



                          combined with clinical                           



                          observations, patient                           



                          history, and                           



                          epidemiological                           



                          information.  A false                           



                          negative result may                           



                          occur if a specimen is                           



                          improperly collected,                           



                          transported, or                           



                          handled.  A false                           



                          negative result should                           



                          be considered if                           



                          patient's recent                           



                          exposures or clinical                           



                          presentation indicate                           



                          that COVID-19                           



                          (SARS-CoV-2) is likely                           



                          and diagnostic tests                           



                          for other causes of                           



                          illness are negative.                           



                          Re-testing should be                           



                          considered in cases of                           



                          suspected false                           



                          negatives. The limit of                           



                          detection for this                           



                          assay is 100 copies/mL.                           



                          This SARS-CoV-2 test is                           



                          a real-time RT_PCR test                           



                          intended for the                           



                          qualitative detection                           



                          of nucleic acid from                           



                          SARS-CoV-2 in a                           



                          nasopharyngeal swab                           



                          specimen collected from                           



                          individuals suspected                           



                          of COVID-19 by their                           



                          healthcare provider.                           



                          This test has not been                           



                          Food and Drug                           



                          Administration (FDA)                           



                          cleared or approved.                           



                          This is a modified                           



                          version of an approved                           



                          Emergency Use                           



                          Authorization (EUA) and                           



                          is in the process of                           



                          review by the FDA.                           



                          Once authorized by the                           



                          FDA, the issued EUA                           



                          will be effective until                           



                          the declaration that                           



                          circumstances exist                           



                          justifying the                           



                          authorization of the                           



                          emergency use of in                           



                          vitro diagnostic tests                           



                          for detection and/or                           



                          diagnosis of COVID-19                           



                          is terminated under                           



                          Section 564(b)(2) of                           



                          the Act or the EUA is                           



                          revoked under Section                           



                          564(g) of the Act.                           



                          Testing was performed                           



                          using the Abbott                           



                          SARS-CoV-2 assay. Fact                           



                          Sheet for Healthcare                           



                          Providers:https://www.natty davey/sal/RT                           



                          SARS-CoV-2 HCP Fact                           



                          Sheet 51-095621.pdf                           



                          Fact Sheet for                           



                          Healthcare                             



                          Patients:https://www.mo                           



                          lecular.abbott/sal/RT                           



                          SARS-CoV-2 Patient Fact                           



                          Sheet EN                               



                          51-796854O7.pdf                           

 

             Lab Interpretation Normal                                 



             (test code = 50573-4)                                        



[facility]20[facility] 
23:22:00





             Test Item    Value        Reference Range Interpretation Comments

 

             SARS-COV2/RT-PCR (test code = Negative     Negative                

  



             8235277)                                            



Negative result for this test determines that SARS-CoV-2 RNA was not present in 
the specimen above the Limit of Detection (LOD).  However, Negative results do 
not preclude SARS-CoV-2 infection and should not be used as the sole basis for 
treatment or patient management decisions.  Negative results must be combined 
with clinical observations, patient history, and epidemiological information.  A
false negative result may occur if a specimen is improperly collected, 
transported, or handled.  A false negative result should be considered if 
patient's recent exposures or clinical presentation indicate that COVID-19 
(SARS-CoV-2) is likely and diagnostic tests for other causes of illness are 
negative.  Re-testing should be considered in cases of suspected false 
negatives.The limit of detection for this assay is 100 copies/mL.This SARS-CoV-2
test is a real-time RT_PCR test intended for the qualitative detection of 
nucleic acid from SARS-CoV-2 in a nasopharyngeal swab specimen collected from 
individuals suspected of COVID-19 by their healthcare provider.This test has not
been Food and Drug Administration (FDA) cleared or approved.  This is a modified
version of an approved Emergency Use Authorization (EUA) and is in the process 
of review by the FDA.  Once authorized by the FDA, the issued EUA will be e
ffective until the declaration that circumstances exist justifying the 
authorization of the emergency use of in vitro diagnostic tests for detection 
and/or diagnosis of COVID-19 is terminated under Section 564(b)(2) of the Act or
the EUA is revoked under Section 564(g) of the Act.Testing was performedusing 
the Abbott SARS-CoV-2 assay.Fact Sheet for Healthcare 
Providers:https://www.molecular.abbott/sal/RT SARS-CoV-2 HCP Fact Sheet 51-
292612.pdfFact Sheet for Healthcare Patients:https://www.molecular.abbott/sal/RT
SARS-CoV-2 Patient Fact Sheet EN 51-971691Y0.pdfHIGH SENSITIVITY TROPONIN I
2021 19:21:00





             Test Item    Value        Reference Range Interpretation Comments

 

             HIGH SENSITIVITY 8 pg/ml      See_Comment                [Automated

 message]



             TROPONIN I (test code =                                        The 

system which



             7477702)                                            generated this 

result



                                                                 transmitted ref

erence



                                                                 range: <=17. Th

e



                                                                 reference range

 was not



                                                                 used to interpr

et this



                                                                 result as



                                                                 normal/abnormal

.



 ID - dwayne Gomez  STAT High Sensitivity Troponin-I results 
should be used in conjunction with other diagnostic information such as ECG, 
clinical observations and information, and patient symptoms to aid in the 
diagnosis of MI.2D Echo W/Doppler(CW/PW/Color)2021 17:56:11Ejection 
FractionSLE ECHO HEARTLAB MKCKESSON CP[facility]CH[facility]
WITH IV CONTRAST- PE TEST [id number] 06:05:00Unlisted Reason for Exam - 
Click Yes and Enter Reason Below-&gt;No
************************************************************[facility]        : 1940        Sex: 
F************************************************************FINAL REPORT 
PATIENT ID:   [id number] EXAM/TECHNIQUE: CT angiography of the chest pulmonary 
embolus protocol INDICATION: Concern for pulmonary embolism. COMPARISON: None. 
FINDINGS:  Lower Neck: Unremarkable. Parenchyma/Pleura: Bilateral patchy 
groundglass opacities are present in the lungs. 13 mm left lower lobe pulmonary 
nodule. Small bilateral pleural effusions. Septal thickening and peribronchial 
thickening is present. Airways: Unremarkable. Cardiac: Cardiomegaly. 
Mediastinum/lymph nodes: No lymphadenopathy. Vessels: No filling defects in the 
main, lobar, or segmental pulmonary arteries. Osseous: No acute osseous process.
No suspicious osseous lesion. Upper abdomen: Unremarkable. Impression: 1. No 
acute pulmonary embolism.2. Bilateral groundglass opacities, bilateral pleural 
effusions, and interstitial thickening may represent pulmonary edema and/or 
infection.3. Left lower lobe pulmonary nodule measuring 13 mm. Consider follow-
up CT in three months, PET/CT, or tissue sampling. Signed: Will Matthews 
[facility] Verified Date/Time:  2021 06:05:30       Electronically signed 
by:WILL MATTHEWS MD on 2021 06:05 AMHEPATIC FUNCTION [id number] 
06:00:00





             Test Item    Value        Reference Range Interpretation Comments

 

             TOTAL PROTEIN (BEAKER) (test code = 5.2 gm/dL    6.0-8.3      L    

        



             770)                                                

 

             ALBUMIN (BEAKER) (test code = 1145) 2.7 g/dL     3.5-5.0      L    

        

 

             BILIRUBIN TOTAL (BEAKER) (test code 0.6 mg/dL    0.2-1.2           

        



             = 377)                                              

 

             BILIRUBIN DIRECT (BEAKER) (test 0.3 mg/dL    0.1-0.5               

    



             code = 706)                                         

 

             ALKALINE PHOSPHATASE (BEAKER) (test 92 U/L                   

        



             code = 346)                                         

 

             AST (SGOT) (BEAKER) (test code = 14 U/L       5-34                 

     



             353)                                                

 

             ALT (SGPT) (BEAKER) (test code = 7 U/L        6-55                 

     



             347)                                                



 ID - NICOLAS MOperator ID - PIAYA LBASIC METABOLIC [id number] 
05:13:00





             Test Item    Value        Reference Range Interpretation Comments

 

             SODIUM (BEAKER) 139 meq/L    136-145                   



             (test code = 381)                                        

 

             POTASSIUM (BEAKER) 4.0 meq/L    3.5-5.1                   



             (test code = 379)                                        

 

             CHLORIDE (BEAKER) 110 meq/L           H            



             (test code = 382)                                        

 

             CO2 (BEAKER) (test 24 meq/L     22-29                     



             code = 355)                                         

 

             BLOOD UREA NITROGEN 14 mg/dL     7-21                      



             (BEAKER) (test code                                        



             = 354)                                              

 

             CREATININE (BEAKER) 1.05 mg/dL   0.57-1.25                 



             (test code = 358)                                        

 

             GLUCOSE RANDOM 81 mg/dL                         



             (BEAKER) (test code                                        



             = 652)                                              

 

             CALCIUM (BEAKER) 8.4 mg/dL    8.4-10.2                  



             (test code = 697)                                        

 

             EGFR (BEAKER) (test 51 mL/min/1.73                           ESTIMA

ARIADNE GFR IS



             code = 1092) sq m                                   NOT AS ACCURATE

 AS



                                                                 CREATININE



                                                                 CLEARANCE IN



                                                                 PREDICTING



                                                                 GLOMERULAR



                                                                 FILTRATION RATE

.



                                                                 ESTIMATED GFR I

S



                                                                 NOT APPLICABLE 

FOR



                                                                 DIALYSIS PATIEN

TS.



 ID - NICOLAS MVitamin B12 and [id number] 04:44:00





             Test Item    Value        Reference Range Interpretation Comments

 

             Vitamin B12 (test 272 pg/mL    213-816                   



             code = 2132-9)                                        

 

             Folate (test code = 7.70 ng/mL   See_Comment                [Automa

ariadne



             2284-8)                                             message] The



                                                                 system which



                                                                 generated this



                                                                 result transmit

ariadne



                                                                 reference range

:



                                                                 >=7.00. The



                                                                 reference range



                                                                 was not used to



                                                                 interpret this



                                                                 result as



                                                                 normal/abnormal

.

 

             MAGGI (test code = MAGGI)  ID -                           



                          NICOLAS MEDELLIN                                 

 

             Lab Interpretation Normal                                 



             (test code = 46672-8)                                        



[id number] 04:44:00





             Test Item    Value        Reference Range Interpretation Comments

 

             VITAMIN B12 (BEAKER) 272 pg/mL    213-816                   



             (test code = 774)                                        

 

             FOLATE (BEAKER) 7.70 ng/mL   See_Comment                [Automated 

message]



             (test code = 362)                                        The system

 which



                                                                 generated this 

result



                                                                 transmitted ref

erence



                                                                 range: >=7.00. 

The



                                                                 reference range

 was not



                                                                 used to interpr

et this



                                                                 result as



                                                                 normal/abnormal

.



 ID - NICOLAS MRAD, CHEST, 1 VIEW, NON [id number] 04:41:00Reason for 
exam:-&gt;afibShould this be performed at the bedside?-&gt;Yes
************************************************************[facility]        : 1940        Sex: 
F************************************************************FINAL REPORT 
PATIENT ID:   [id number] EXAM/TECHNIQUE: Single view frontal radiograph of the 
chest. INDICATION: Atrial fibrillation. COMPARISON: None. FINDINGS:  
Devices/Objects: None. Lungs: No focal consolidation. No pleural effusion. No 
pneumothorax. Heart/Mediastinum: No cardiomegaly. Mild interstitial thickening. 
Osseous: No acute osseous process. No suspicious osseous lesion. Upper abdomen: 
Unremarkable. Impression: Mild interstitial thickening may represent 
interstitial pulmonary edema. Signed: Will Matthews MDReport Verified 
Date/Time:  2021 04:41:03       Electronically signed by: WILL MATTHEWS MD on 2021 04:41 AMHIGH SENSITIVITY TROPONIN -[id number] 04:36:00





             Test Item    Value        Reference Range Interpretation Comments

 

             HIGH SENSITIVITY 8 pg/ml      See_Comment                [Automated

 message]



             TROPONIN I (test code =                                        The 

system which



             3514440)                                            generated this 

result



                                                                 transmitted ref

erence



                                                                 range: <=17. Th

e



                                                                 reference range

 was not



                                                                 used to interpr

et this



                                                                 result as



                                                                 normal/abnormal

.



 [facility]  STAT High Sensitivity Troponin-I results 
should be used in conjunction with other diagnostic information such as ECG, 
clinical observations and information, and patient symptoms to aid in the 
diagnosis of MI.B-TYPE NATRIURETIC FACTOR (BNP)2021 04:27:00





             Test Item    Value        Reference Range Interpretation Comments

 

             B-TYPE NATRIURETIC PEPTIDE (BEAKER) 144 pg/mL    0-100        H    

        



             (test code = 700)                                        



 ID - NICOLAS MCBC W/PLT COUNT &amp; AUTO [id number] 04:01:00





             Test Item    Value        Reference Range Interpretation Comments

 

             WHITE BLOOD CELL COUNT (BEAKER) 6.7 K/ L     3.5-10.5              

    



             (test code = 775)                                        

 

             RED BLOOD CELL COUNT (BEAKER) 3.83 M/ L    3.93-5.22    L          

  



             (test code = 761)                                        

 

             HEMOGLOBIN (BEAKER) (test code = 12.3 GM/DL   11.2-15.7            

     



             410)                                                

 

             HEMATOCRIT (BEAKER) (test code = 38.7 %       34.1-44.9            

     



             411)                                                

 

             MEAN CORPUSCULAR VOLUME (BEAKER) 101.0 fL     79.4-94.8    H       

     



             (test code = 753)                                        

 

             MEAN CORPUSCULAR HEMOGLOBIN 32.1 pg      25.6-32.2                 



             (BEAKER) (test code = 751)                                        

 

             MEAN CORPUSCULAR HEMOGLOBIN CONC 31.8 GM/DL   32.2-35.5    L       

     



             (BEAKER) (test code = 752)                                        

 

             RED CELL DISTRIBUTION WIDTH 13.5 %       11.7-14.4                 



             (BEAKER) (test code = 412)                                        

 

             PLATELET COUNT (BEAKER) (test 205 K/CU MM  150-450                 

  



             code = 756)                                         

 

             MEAN PLATELET VOLUME (BEAKER) 9.7 fL       9.4-12.3                

  



             (test code = 754)                                        

 

             NUCLEATED RED BLOOD CELLS 0 /100 WBC   0-0                       



             (BEAKER) (test code = 413)                                        

 

             NEUTROPHILS RELATIVE PERCENT 47 %                                  

 



             (BEAKER) (test code = 429)                                        

 

             LYMPHOCYTES RELATIVE PERCENT 38 %                                  

 



             (BEAKER) (test code = 430)                                        

 

             MONOCYTES RELATIVE PERCENT 10 %                                   



             (BEAKER) (test code = 431)                                        

 

             EOSINOPHILS RELATIVE PERCENT 4 %                                   

 



             (BEAKER) (test code = 432)                                        

 

             BASOPHILS RELATIVE PERCENT 1 %                                    



             (BEAKER) (test code = 437)                                        

 

             NEUTROPHILS ABSOLUTE COUNT 3.14 K/ L    1.56-6.13                 



             (BEAKER) (test code = 670)                                        

 

             LYMPHOCYTES ABSOLUTE COUNT 2.55 K/ L    1.18-3.74                 



             (BEAKER) (test code = 414)                                        

 

             MONOCYTES ABSOLUTE COUNT (BEAKER) 0.66 K/ L    0.24-0.36    H      

      



             (test code = 415)                                        

 

             EOSINOPHILS ABSOLUTE COUNT 0.23 K/ L    0.04-0.36                 



             (BEAKER) (test code = 416)                                        

 

             BASOPHILS ABSOLUTE COUNT (BEAKER) 0.05 K/ L    0.01-0.08           

      



             (test code = 417)                                        

 

             IMMATURE GRANULOCYTES-RELATIVE 1 %          0-1                    

   



             PERCENT (BEAKER) (test code =                                      

  



             2801)                                               



TSH/Free T4 If [id number] 00:19:00





             Test Item    Value        Reference Range Interpretation Comments

 

             TSH (test code = 1.723        See_Comment                [Automated



             22500-5)                                            message] The



                                                                 system which



                                                                 generated this



                                                                 result transmit

ariadne



                                                                 reference range

:



                                                                 0.350 - 4.940



                                                                 uIU/mL. The



                                                                 reference range



                                                                 was not used to



                                                                 interpret this



                                                                 result as



                                                                 normal/abnormal

.

 

             MAGGI (test code = MAGGI)  ID -                           



                          ROBYN L                                

 

             Lab Interpretation Normal                                 



             (test code = 05831-5)                                        



[facility] [id number] 00:19:00





             Test Item    Value        Reference Range Interpretation Comments

 

             THYROID STIMULATING HORMONE 1.723 uIU/mL 0.350-4.940               



             (BEAKER) (test code = 772)                                        



 ID - ROBYN ARZATEIGH SENSITIVITY TROPONIN -[id number] 00:02:00





             Test Item    Value        Reference Range Interpretation Comments

 

             HIGH SENSITIVITY 7 pg/ml      See_Comment                [Automated

 message]



             TROPONIN I (test code =                                        The 

system which



             1493767)                                            generated this 

result



                                                                 transmitted ref

erence



                                                                 range: <=17. Th

e



                                                                 reference range

 was not



                                                                 used to interpr

et this



                                                                 result as



                                                                 normal/abnormal

.



 ID - JOYhe  STAT High Sensitivity Troponin-I results should be
used in conjunctionwith other diagnostic information such as ECG, clinical 
observations and information, and patient symptoms to aid in the diagnosis of 
MI.[id number] 23:58:00





             Test Item    Value        Reference Range Interpretation Comments

 

             Phosphorus (test code = 3.3 mg/dL    2.3-4.7                   



             2777-1)                                             

 

             MAGGI (test code = MAGGI)  ID - DB                           

 

             Lab Interpretation (test Normal                                 



             code = 09998-3)                                        



[facility] METABOLIC [id number] 23:58:00





             Test Item    Value        Reference Range Interpretation Comments

 

             SODIUM (BEAKER) 138 meq/L    136-145                   



             (test code = 381)                                        

 

             POTASSIUM (BEAKER) 4.0 meq/L    3.5-5.1                   



             (test code = 379)                                        

 

             CHLORIDE (BEAKER) 109 meq/L           H            



             (test code = 382)                                        

 

             CO2 (BEAKER) (test 20 meq/L     22-29        L            



             code = 355)                                         

 

             BLOOD UREA NITROGEN 14 mg/dL     7-21                      



             (BEAKER) (test code                                        



             = 354)                                              

 

             CREATININE (BEAKER) 1.09 mg/dL   0.57-1.25                 



             (test code = 358)                                        

 

             GLUCOSE RANDOM 83 mg/dL                         



             (BEAKER) (test code                                        



             = 652)                                              

 

             CALCIUM (BEAKER) 8.1 mg/dL    8.4-10.2     L            



             (test code = 697)                                        

 

             EGFR (BEAKER) (test 48 mL/min/1.73                           ESTIMA

ARIADNE GFR IS



             code = 1092) sq m                                   NOT AS ACCURATE

 AS



                                                                 CREATININE



                                                                 CLEARANCE IN



                                                                 PREDICTING



                                                                 GLOMERULAR



                                                                 FILTRATION RATE

.



                                                                 ESTIMATED GFR I

S



                                                                 NOT APPLICABLE 

FOR



                                                                 DIALYSIS PATIEN

TS.



 ID - [id number] 23:58:00





             Test Item    Value        Reference Range Interpretation Comments

 

             MAGNESIUM (BEAKER) (test code = 1.7 mg/dL    1.6-2.6               

    



             627)                                                



 ID - [id number] 23:58:00





             Test Item    Value        Reference Range Interpretation Comments

 

             PHOSPHORUS (BEAKER) (test code = 3.3 mg/dL    2.3-4.7              

     



             604)                                                



 ID - DBHEPATIC FUNCTION [id number] 23:58:00





             Test Item    Value        Reference Range Interpretation Comments

 

             TOTAL PROTEIN (BEAKER) (test code = 5.4 gm/dL    6.0-8.3      L    

        



             770)                                                

 

             ALBUMIN (BEAKER) (test code = 1145) 2.8 g/dL     3.5-5.0      L    

        

 

             BILIRUBIN TOTAL (BEAKER) (test code 0.7 mg/dL    0.2-1.2           

        



             = 377)                                              

 

             BILIRUBIN DIRECT (BEAKER) (test 0.3 mg/dL    0.1-0.5               

    



             code = 706)                                         

 

             ALKALINE PHOSPHATASE (BEAKER) (test 92 U/L                   

        



             code = 346)                                         

 

             AST (SGOT) (BEAKER) (test code = 14 U/L       5-34                 

     



             353)                                                

 

             ALT (SGPT) (BEAKER) (test code = 9 U/L        6-55                 

     



             347)                                                



 ID - [id number] 23:42:00





             Test Item    Value        Reference Range Interpretation Comments

 

             D-Dimer, Quant (test 1.26         See_Comment  H             [Autom

ated



             code = 44098-0)                                        message] The



                                                                 system which



                                                                 generated this



                                                                 result



                                                                 transmitted



                                                                 reference range

:



                                                                 <0.50 MG/L FEU.



                                                                 The reference



                                                                 range was not



                                                                 used to interpr

et



                                                                 this result as



                                                                 normal/abnormal

.

 

             MAGGI (test code = MAGGI) Intended Use: The                           



                          D-Dimer Assay can                           



                          be used to aid in                           



                          the diagnosis of                           



                          Deep Vein                              



                          Thrombosis (DVT)                           



                          and Pulmonary                           



                          Embolism Disease                           



                          (PED).In patients                           



                          with low pre-test                           



                          probability,                           



                          various studies                           



                          concerning STA                           



                          Liatest D-dimer                           



                          test have                              



                          reported that                           



                          with a cutoff                           



                          value of 0.50                           



                          MG/L FEU, the                           



                          Negative                               



                          Predictive Value                           



                          (NPV) regarding                           



                          the exclusion of                           



                          thrombosis is                           



                          within %                           



                          range.                                 

 

             Lab Interpretation Abnormal                               



             (test code = 50235-2)                                        



[id number]





             Test Item    Value        Reference Range Interpretation Comments

 

             D-DIMER QUANTITATIVE (BEAKER) 1.26 MG/L FEU <0.50        H         

   



             (test code = 671)                                        



Intended Use: The D-Dimer Assay can be used to aid in the diagnosis of Deep Vein
Thrombosis (DVT) and Pulmonary Embolism Disease (PED).In patients with low pre-
test probability, various studies concerning STA Liatest D-dimer test have 
reported that with a cutoff value of 0.50 MG/L FEU, the Negative Predictive 
Value (NPV) regarding the exclusion of thrombosis is within % range.
Prothrombin time/[id number] 23:39:00





             Test Item    Value        Reference    Interpretation Comments



                                       Range                     

 

             Protime (test code = 15.4         See_Comment  H             [Autom

ated



             5902-2)                                             message] The



                                                                 system which



                                                                 generated this



                                                                 result



                                                                 transmitted



                                                                 reference range

:



                                                                 11.9 - 14.2



                                                                 seconds. The



                                                                 reference range



                                                                 was not used to



                                                                 interpret this



                                                                 result as



                                                                 normal/abnormal

.

 

             INR (test code = 1.24         See_Comment                [Automated



             6301-6)                                             message] The



                                                                 system which



                                                                 generated this



                                                                 result



                                                                 transmitted



                                                                 reference range

:



                                                                 <=5.90. The



                                                                 reference range



                                                                 was not used to



                                                                 interpret this



                                                                 result as



                                                                 normal/abnormal

.

 

             MAGGI (test code = RECOMMENDED                            



             MAGGI)         COUMADIN/WARFARIN                           



                          INR THERAPY                            



                          RANGESSTANDARD DOSE:                           



                          2.0 - 3.0                              



                          Includes:                              



                          PROPHYLAXIS for                           



                          venous thrombosis,                           



                          systemic                               



                          embolization;                           



                          TREATMENT for venous                           



                          thrombosis and/or                           



                          pulmonary                              



                          embolus.HIGH RISK:                           



                          Target INR is                           



                          2.5-3.5 for patients                           



                          with mechanical                           



                          heart valves.                           

 

             Lab Interpretation Abnormal                               



             (test code =                                        



             81355-9)                                            



[id number]39[facility]00





             Test Item    Value        Reference Range Interpretation Comments

 

             PROTIME (BEAKER) 15.4 seconds 11.9-14.2    H            



             (test code = 759)                                        

 

             INR (BEAKER) (test 1.24         See_Comment                [Automat

ed message]



             code = 370)                                         The system whic

h



                                                                 generated this 

result



                                                                 transmitted ref

erence



                                                                 range: <=5.90. 

The



                                                                 reference range

 was



                                                                 not used to int

erpret



                                                                 this result as



                                                                 normal/abnormal

.



RECOMMENDED COUMADIN/WARFARIN INR THERAPY RANGESSTANDARD DOSE: 2.0 - 3.0   
Includes: PROPHYLAXIS forvenous thrombosis, systemic embolization; TREATMENT for
venous thrombosis and/or pulmonary embolus.HIGH RISK: Target INR is 2.5-3.5 for 
patients with mechanical heart valves.CBC W/PLT COUNT &amp; AUTO DIFFERENTIAL
2021 23:32:00





             Test Item    Value        Reference Range Interpretation Comments

 

             WHITE BLOOD CELL COUNT (BEAKER) 7.9 K/ L     3.5-10.5              

    



             (test code = 775)                                        

 

             RED BLOOD CELL COUNT (BEAKER) 3.96 M/ L    3.93-5.22               

  



             (test code = 761)                                        

 

             HEMOGLOBIN (BEAKER) (test code = 12.7 GM/DL   11.2-15.7            

     



             410)                                                

 

             HEMATOCRIT (BEAKER) (test code = 40.3 %       34.1-44.9            

     



             411)                                                

 

             MEAN CORPUSCULAR VOLUME (BEAKER) 101.8 fL     79.4-94.8    H       

     



             (test code = 753)                                        

 

             MEAN CORPUSCULAR HEMOGLOBIN 32.1 pg      25.6-32.2                 



             (BEAKER) (test code = 751)                                        

 

             MEAN CORPUSCULAR HEMOGLOBIN CONC 31.5 GM/DL   32.2-35.5    L       

     



             (BEAKER) (test code = 752)                                        

 

             RED CELL DISTRIBUTION WIDTH 13.3 %       11.7-14.4                 



             (BEAKER) (test code = 412)                                        

 

             PLATELET COUNT (BEAKER) (test 210 K/CU MM  150-450                 

  



             code = 756)                                         

 

             MEAN PLATELET VOLUME (BEAKER) 9.3 fL       9.4-12.3     L          

  



             (test code = 754)                                        

 

             NUCLEATED RED BLOOD CELLS 0 /100 WBC   0-0                       



             (BEAKER) (test code = 413)                                        

 

             NEUTROPHILS RELATIVE PERCENT 58 %                                  

 



             (BEAKER) (test code = 429)                                        

 

             LYMPHOCYTES RELATIVE PERCENT 31 %                                  

 



             (BEAKER) (test code = 430)                                        

 

             MONOCYTES RELATIVE PERCENT 8 %                                    



             (BEAKER) (test code = 431)                                        

 

             EOSINOPHILS RELATIVE PERCENT 2 %                                   

 



             (BEAKER) (test code = 432)                                        

 

             BASOPHILS RELATIVE PERCENT 1 %                                    



             (BEAKER) (test code = 437)                                        

 

             NEUTROPHILS ABSOLUTE COUNT 4.59 K/ L    1.56-6.13                 



             (BEAKER) (test code = 670)                                        

 

             LYMPHOCYTES ABSOLUTE COUNT 2.42 K/ L    1.18-3.74                 



             (BEAKER) (test code = 414)                                        

 

             MONOCYTES ABSOLUTE COUNT (BEAKER) 0.63 K/ L    0.24-0.36    H      

      



             (test code = 415)                                        

 

             EOSINOPHILS ABSOLUTE COUNT 0.17 K/ L    0.04-0.36                 



             (BEAKER) (test code = 416)                                        

 

             BASOPHILS ABSOLUTE COUNT (BEAKER) 0.06 K/ L    0.01-0.08           

      



             (test code = 417)                                        

 

             IMMATURE GRANULOCYTES-RELATIVE 1 %          0-1                    

   



             PERCENT (BEAKER) (test code =                                      

  



             2060)

## 2021-12-31 LAB
ALBUMIN SERPL BCP-MCNC: 2.2 G/DL (ref 3.4–5)
ALP SERPL-CCNC: 94 U/L (ref 45–117)
ALT SERPL W P-5'-P-CCNC: 12 U/L (ref 12–78)
AST SERPL W P-5'-P-CCNC: 10 U/L (ref 15–37)
BUN BLD-MCNC: 19 MG/DL (ref 7–18)
GLUCOSE SERPLBLD-MCNC: 177 MG/DL (ref 74–106)
HCT VFR BLD CALC: 38.7 % (ref 36–45)
HDLC SERPL-MCNC: 49 MG/DL (ref 40–60)
LDLC SERPL CALC-MCNC: 83 MG/DL (ref ?–130)
LYMPHOCYTES # SPEC AUTO: 0.9 K/UL (ref 0.7–4.9)
MAGNESIUM SERPL-MCNC: 1.8 MG/DL (ref 1.8–2.4)
PMV BLD: 8.8 FL (ref 7.6–11.3)
POTASSIUM SERPL-SCNC: 4.5 MMOL/L (ref 3.5–5.1)
RBC # BLD: 4.04 M/UL (ref 3.86–4.86)
TSH SERPL DL<=0.05 MIU/L-ACNC: 0.28 UIU/ML (ref 0.36–3.74)

## 2021-12-31 RX ADMIN — LEVOTHYROXINE SODIUM SCH: 75 TABLET ORAL at 06:30

## 2021-12-31 RX ADMIN — IPRATROPIUM BROMIDE SCH MG: 0.5 SOLUTION RESPIRATORY (INHALATION) at 01:34

## 2021-12-31 RX ADMIN — FLECAINIDE ACETATE SCH MG: 100 TABLET ORAL at 21:19

## 2021-12-31 RX ADMIN — ACETAMINOPHEN PRN MG: 500 TABLET, FILM COATED ORAL at 02:33

## 2021-12-31 RX ADMIN — ALBUTEROL SULFATE SCH MG: 2.5 SOLUTION RESPIRATORY (INHALATION) at 20:00

## 2021-12-31 RX ADMIN — Medication SCH ML: at 21:19

## 2021-12-31 RX ADMIN — APIXABAN SCH MG: 5 TABLET, FILM COATED ORAL at 09:00

## 2021-12-31 RX ADMIN — ALBUTEROL SULFATE SCH MG: 2.5 SOLUTION RESPIRATORY (INHALATION) at 01:34

## 2021-12-31 RX ADMIN — ALBUTEROL SULFATE SCH MG: 2.5 SOLUTION RESPIRATORY (INHALATION) at 07:49

## 2021-12-31 RX ADMIN — ALBUTEROL SULFATE SCH MG: 2.5 SOLUTION RESPIRATORY (INHALATION) at 14:53

## 2021-12-31 RX ADMIN — APIXABAN SCH MG: 5 TABLET, FILM COATED ORAL at 21:19

## 2021-12-31 RX ADMIN — IPRATROPIUM BROMIDE SCH MG: 0.5 SOLUTION RESPIRATORY (INHALATION) at 20:00

## 2021-12-31 RX ADMIN — MONTELUKAST SODIUM SCH: 10 TABLET, FILM COATED ORAL at 09:00

## 2021-12-31 RX ADMIN — FLECAINIDE ACETATE SCH MG: 100 TABLET ORAL at 09:00

## 2021-12-31 RX ADMIN — IPRATROPIUM BROMIDE SCH MG: 0.5 SOLUTION RESPIRATORY (INHALATION) at 07:49

## 2021-12-31 RX ADMIN — Medication SCH ML: at 09:00

## 2021-12-31 RX ADMIN — IPRATROPIUM BROMIDE SCH MG: 0.5 SOLUTION RESPIRATORY (INHALATION) at 14:53

## 2021-12-31 NOTE — P.PN
Subjective


Date of Service: 12/31/21


Chief Complaint: Fever and hypotension


Patient doing better today and participating in physical therapy.


Blood pressure has been stable.


She is alert and oriented.


She has no new complaint except nonproductive cough.


COVID-19 test is negative.








Physical Examination





- Vital Signs


Temperature: 98.4 F


Blood Pressure: 120/57


Pulse: 58


Respirations: 14


Pulse Ox (%): 98





- Physical Exam


General: Alert, In no apparent distress, Obese


HEENT: Mucous membr. moist/pink


Neck: JVD not distended


Respiratory: Clear to auscultation bilaterally, Normal air movement


Cardiovascular: Regular rate/rhythm, Normal S1 S2, Edema (Bilateral lower 

extremities)


Gastrointestinal: Normal bowel sounds, Soft and benign, Non-distended, No 

tenderness


Musculoskeletal: No swelling


Neurological: Normal strength at 5/5 x4 extr





Assessment And Plan





- Current Problems (Diagnosis)


(1) Acute metabolic encephalopathy


Current Visit: Yes   Status: Acute   





(2) History of recurrent UTI (urinary tract infection)


Current Visit: Yes   Status: Acute   





(3) Hypotension


Current Visit: Yes   Status: Acute   





(4) COPD (chronic obstructive pulmonary disease)


Current Visit: Yes   Status: Acute   





(5) Morbid obesity


Current Visit: No   Status: Chronic   





- Plan


AMS resolved.


UA not done.  Urine culture is pending.


Blood cultures: No growth to date.


Patient is not septic, lactate within normal limits.


Continue IV Levaquin.


Blood pressure already improved.  Continue to monitor.  Discontinue IV fluid 

given history of CHF.


Hypotension could be medication induced versus dehydration.


Continue home bronchodilators for COPD.


Diet as tolerated.


Lasix and metoprolol on hold for now


Continue Eliquis.


Continue PT.

## 2022-01-01 VITALS — OXYGEN SATURATION: 98 %

## 2022-01-01 VITALS — DIASTOLIC BLOOD PRESSURE: 49 MMHG | TEMPERATURE: 97.8 F | SYSTOLIC BLOOD PRESSURE: 115 MMHG

## 2022-01-01 LAB
BUN BLD-MCNC: 18 MG/DL (ref 7–18)
GLUCOSE SERPLBLD-MCNC: 99 MG/DL (ref 74–106)
HCT VFR BLD CALC: 35.3 % (ref 36–45)
LYMPHOCYTES # SPEC AUTO: 1 K/UL (ref 0.7–4.9)
PMV BLD: 8.3 FL (ref 7.6–11.3)
POTASSIUM SERPL-SCNC: 3.8 MMOL/L (ref 3.5–5.1)
RBC # BLD: 3.64 M/UL (ref 3.86–4.86)

## 2022-01-01 RX ADMIN — IPRATROPIUM BROMIDE SCH: 0.5 SOLUTION RESPIRATORY (INHALATION) at 02:00

## 2022-01-01 RX ADMIN — IPRATROPIUM BROMIDE SCH MG: 0.5 SOLUTION RESPIRATORY (INHALATION) at 09:40

## 2022-01-01 RX ADMIN — ACETAMINOPHEN PRN MG: 500 TABLET, FILM COATED ORAL at 05:40

## 2022-01-01 RX ADMIN — FLECAINIDE ACETATE SCH MG: 100 TABLET ORAL at 09:42

## 2022-01-01 RX ADMIN — MONTELUKAST SODIUM SCH MG: 10 TABLET, FILM COATED ORAL at 09:42

## 2022-01-01 RX ADMIN — ALBUTEROL SULFATE SCH: 2.5 SOLUTION RESPIRATORY (INHALATION) at 02:00

## 2022-01-01 RX ADMIN — ACETAMINOPHEN PRN MG: 500 TABLET, FILM COATED ORAL at 09:44

## 2022-01-01 RX ADMIN — APIXABAN SCH MG: 5 TABLET, FILM COATED ORAL at 09:42

## 2022-01-01 RX ADMIN — ALBUTEROL SULFATE SCH MG: 2.5 SOLUTION RESPIRATORY (INHALATION) at 09:40

## 2022-01-01 RX ADMIN — Medication SCH ML: at 09:43

## 2022-01-01 RX ADMIN — LEVOTHYROXINE SODIUM SCH MG: 75 TABLET ORAL at 05:34

## 2022-01-01 NOTE — P.DS
Admission Date: 12/30/21


Discharge Date: 01/01/22


Disposition: TRANSFER TO NURSING HOME


Discharge Condition: FAIR


Reason for Admission: Fever and hypotension





- Problems


(1) Acute metabolic encephalopathy


Current Visit: Yes   Status: Acute   





(2) History of recurrent UTI (urinary tract infection)


Current Visit: Yes   Status: Acute   





(3) Hypotension


Current Visit: Yes   Status: Acute   





(4) COPD (chronic obstructive pulmonary disease)


Current Visit: Yes   Status: Acute   





(5) Morbid obesity


Current Visit: No   Status: Chronic   


Brief History of Present Illness: 


81-year-old woman with a history of chronic atrial fibrillation, COPD, history 

of recurrent UTI was transferred from the nursing home to the emergency 

department due to hypotension.  Patient also reported to have had a fever and 

confused.  Patient not able to provide any history due to confusion.  Work-up in

the emergency department revealed leukocytosis with WBC count of 12,000.  BMP 

unremarkable.  EKG revealed sinus bradycardia.  Chest x-ray shows no acute 

disease, UA not done yet.  Patient systolic blood pressure ranging from 90s to 

100.  Blood cultures obtained in the ED. Patient noted to have nonproductive 

cough.  She hospitalized for further evaluation and management.





Hospital Course: 


Admitted to the medical floor and briefly hydrated with IV normal saline for 

hypotension.  Chest x-ray unremarkable, no pneumonia.  Patient also started on 

IV antibiotics given history of recurrent UTI.  Urine culture yielded no growth.

 Patient noted to be on multiple psychotropic medications.  Altered mental 

status could have been related to the psychotropic medications and hypotension. 

Her blood pressure has been stable since hospitalization, afebrile.  Mental 

status had also improved.  Patient is currently alert and oriented.  She is 

currently asymptomatic.  Seen by PT for therapy.  Patient was bradycardic during

the hospital stay.  Metoprolol was held and flecainide continued. Lasix was also

held during the hospital stay but resumed on discharge. Patient is clinically 

stable for discharge.





Vital Signs/Physical Exam: 














Temp Pulse Resp BP Pulse Ox


 


 97.2 F   76   18   136/60   96 


 


 01/01/22 08:00  01/01/22 08:00  01/01/22 08:00  01/01/22 08:00  01/01/22 08:00








General: Alert, In no apparent distress, Obese


HEENT: Mucous membr. moist/pink


Neck: JVD not distended


Respiratory: Clear to auscultation bilaterally, Normal air movement


Cardiovascular: No edema, Normal S1 S2, Irregular heart rate/rhythm


Gastrointestinal: Soft and benign, Non-distended


Musculoskeletal: No swelling


Integumentary: No rashes


Neurological: Normal strength at 5/5 x4 extr


Laboratory Data at Discharge: 














WBC  7.40 K/uL (4.3-10.9)  D 01/01/22  06:44    


 


Hgb  11.5 g/dL (12.0-15.0)  L  01/01/22  06:44    


 


Hct  35.3 % (36.0-45.0)  L  01/01/22  06:44    


 


Plt Count  194 K/uL (152-406)   01/01/22  06:44    


 


PT  17.2 SECONDS (9.5-12.5)  H  12/30/21  13:25    


 


INR  1.49   12/30/21  13:25    


 


Sodium  140 mmol/L (136-145)   01/01/22  06:44    


 


Potassium  3.8 mmol/L (3.5-5.1)   01/01/22  06:44    


 


BUN  18 mg/dL (7-18)   01/01/22  06:44    


 


Creatinine  0.94 mg/dL (0.55-1.3)   01/01/22  06:44    


 


Glucose  99 mg/dL ()   01/01/22  06:44    


 


Phosphorus  2.9 mg/dL (2.5-4.9)   12/31/21  04:22    


 


Magnesium  1.8 mg/dL (1.8-2.4)   12/31/21  04:22    


 


Total Bilirubin  0.4 mg/dL (0.2-1.0)   12/31/21  04:22    


 


AST  10 U/L (15-37)  L  12/31/21  04:22    


 


ALT  12 U/L (12-78)   12/31/21  04:22    


 


Alkaline Phosphatase  94 U/L ()   12/31/21  04:22    


 


Troponin I  < 0.02 ng/mL (0.0-0.045)   12/31/21  04:22    


 


Triglycerides  60 mg/dL (<150)   12/31/21  04:22    


 


Cholesterol  144 mg/dL (<200)   12/31/21  04:22    


 


HDL Cholesterol  49 mg/dL (40-60)   12/31/21  04:22    


 


Cholesterol/HDL Ratio  2.94   12/31/21  04:22    








Home Medications: 








Simvastatin 40 mg PO BEDTIME 04/17/12 


Memantine HCl 10 mg PO BID 09/24/18 


Metoprolol Succinate [Toprol Xl*] 25 mg PO DAILY 08/29/19 


Gabapentin 300 mg PO TID 02/05/20 


Pantoprazole Sodium [Protonix] 1 tab PO DAILY 10/17/20 


Topiramate [Topamax] 200 mg PO BID 10/17/20 


Magnesium Oxide 1 tab PO DAILY 03/24/21 


Bupropion *Xl* [Wellbutrin XL*] 300 mg PO DAILY 07/02/21 


Clopidogrel Bisulfate [Plavix*] 75 mg PO DAILY 07/02/21 


Cranberry Fruit Extract [Ellura] 200 mg PO DAILY 07/02/21 


Donepezil HCl 10 mg PO BEDTIME 07/02/21 


Doxepin HCl 50 mg PO BEDTIME 07/02/21 


Duloxetine HCl 60 mg PO BID 07/02/21 


Fluticasone/Umeclidin/Vilanter [Trelegy Ellipta 100-62.5-25] 1 each IH PRN 

07/02/21 


Levothyroxine [Synthroid*] 75 mcg PO [id number] 07/02/21 


Mupirocin Oint [Bactroban 2% Ointment*] 1 appl TP BID 07/02/21 


Potassium Chloride 20 meq PO BID 07/02/21 


Apixaban [Eliquis] 5 mg PO BID  tablet 01/01/22 


Flecainide [Tambocor*] 50 mg PO BID  tab 01/01/22 


Furosemide [Lasix] 20 mg PO DAILY #30 tab 01/01/22 


Montelukast [Singulair*] 10 mg PO DAILY  tab 01/01/22 


Quetiapine [Seroquel] 100 mg PO BEDTIME #30 tab 01/01/22 


levoFLOXacin [Levaquin] 750 mg PO DAILY #2 tab 01/01/22 





New Medications: 


Furosemide [Lasix] 20 mg PO DAILY #30 tab


levoFLOXacin [Levaquin] 750 mg PO DAILY #2 tab


Quetiapine [Seroquel] 100 mg PO BEDTIME #30 tab


Diet: AHA


Activity: Fall precautions


Followup: 


Javier Mcwilliams MD [Primary Care Provider] - 1 Week


Time spent managing pt's care (in minutes): 38

## 2022-02-21 ENCOUNTER — HOSPITAL ENCOUNTER (INPATIENT)
Dept: HOSPITAL 97 - ER | Age: 82
LOS: 4 days | Discharge: NURSING, REHAB OR LONG-TERM CARE | DRG: 193 | End: 2022-02-25
Attending: HOSPITALIST | Admitting: HOSPITALIST
Payer: COMMERCIAL

## 2022-02-21 VITALS — BODY MASS INDEX: 43.4 KG/M2

## 2022-02-21 DIAGNOSIS — Z86.73: ICD-10-CM

## 2022-02-21 DIAGNOSIS — Z20.822: ICD-10-CM

## 2022-02-21 DIAGNOSIS — G93.41: ICD-10-CM

## 2022-02-21 DIAGNOSIS — F03.90: ICD-10-CM

## 2022-02-21 DIAGNOSIS — Z79.01: ICD-10-CM

## 2022-02-21 DIAGNOSIS — E66.01: ICD-10-CM

## 2022-02-21 DIAGNOSIS — I48.20: ICD-10-CM

## 2022-02-21 DIAGNOSIS — Z85.3: ICD-10-CM

## 2022-02-21 DIAGNOSIS — J44.0: ICD-10-CM

## 2022-02-21 DIAGNOSIS — J18.9: Primary | ICD-10-CM

## 2022-02-21 LAB
ALBUMIN SERPL BCP-MCNC: 2.7 G/DL (ref 3.4–5)
ALP SERPL-CCNC: 108 U/L (ref 45–117)
ALT SERPL W P-5'-P-CCNC: 21 U/L (ref 12–78)
AST SERPL W P-5'-P-CCNC: 15 U/L (ref 15–37)
BUN BLD-MCNC: 12 MG/DL (ref 7–18)
GLUCOSE SERPLBLD-MCNC: 143 MG/DL (ref 74–106)
HCT VFR BLD CALC: 41.5 % (ref 36–45)
INR BLD: 1.32
LYMPHOCYTES # SPEC AUTO: 0.9 K/UL (ref 0.7–4.9)
MAGNESIUM SERPL-MCNC: 1.8 MG/DL (ref 1.8–2.4)
NT-PROBNP SERPL-MCNC: 687 PG/ML (ref ?–450)
PMV BLD: 7.9 FL (ref 7.6–11.3)
POTASSIUM SERPL-SCNC: 4.4 MMOL/L (ref 3.5–5.1)
RBC # BLD: 4.41 M/UL (ref 3.86–4.86)
SARS-COV-2 RNA RESP QL NAA+PROBE: NEGATIVE
TROPONIN I SERPL HS-MCNC: 9.5 PG/ML (ref ?–58.9)

## 2022-02-21 PROCEDURE — 97530 THERAPEUTIC ACTIVITIES: CPT

## 2022-02-21 PROCEDURE — 36415 COLL VENOUS BLD VENIPUNCTURE: CPT

## 2022-02-21 PROCEDURE — 83735 ASSAY OF MAGNESIUM: CPT

## 2022-02-21 PROCEDURE — 80048 BASIC METABOLIC PNL TOTAL CA: CPT

## 2022-02-21 PROCEDURE — 97110 THERAPEUTIC EXERCISES: CPT

## 2022-02-21 PROCEDURE — 71045 X-RAY EXAM CHEST 1 VIEW: CPT

## 2022-02-21 PROCEDURE — 70450 CT HEAD/BRAIN W/O DYE: CPT

## 2022-02-21 PROCEDURE — 84439 ASSAY OF FREE THYROXINE: CPT

## 2022-02-21 PROCEDURE — 96365 THER/PROPH/DIAG IV INF INIT: CPT

## 2022-02-21 PROCEDURE — 81015 MICROSCOPIC EXAM OF URINE: CPT

## 2022-02-21 PROCEDURE — 94010 BREATHING CAPACITY TEST: CPT

## 2022-02-21 PROCEDURE — 83880 ASSAY OF NATRIURETIC PEPTIDE: CPT

## 2022-02-21 PROCEDURE — 80076 HEPATIC FUNCTION PANEL: CPT

## 2022-02-21 PROCEDURE — 83605 ASSAY OF LACTIC ACID: CPT

## 2022-02-21 PROCEDURE — 71260 CT THORAX DX C+: CPT

## 2022-02-21 PROCEDURE — 97161 PT EVAL LOW COMPLEX 20 MIN: CPT

## 2022-02-21 PROCEDURE — 85610 PROTHROMBIN TIME: CPT

## 2022-02-21 PROCEDURE — 84484 ASSAY OF TROPONIN QUANT: CPT

## 2022-02-21 PROCEDURE — 85025 COMPLETE CBC W/AUTO DIFF WBC: CPT

## 2022-02-21 PROCEDURE — 74177 CT ABD & PELVIS W/CONTRAST: CPT

## 2022-02-21 PROCEDURE — 81003 URINALYSIS AUTO W/O SCOPE: CPT

## 2022-02-21 PROCEDURE — 93005 ELECTROCARDIOGRAM TRACING: CPT

## 2022-02-21 PROCEDURE — 87088 URINE BACTERIA CULTURE: CPT

## 2022-02-21 PROCEDURE — 84145 PROCALCITONIN (PCT): CPT

## 2022-02-21 PROCEDURE — 99285 EMERGENCY DEPT VISIT HI MDM: CPT

## 2022-02-21 PROCEDURE — 84443 ASSAY THYROID STIM HORMONE: CPT

## 2022-02-21 PROCEDURE — 0240U: CPT

## 2022-02-21 PROCEDURE — 80061 LIPID PANEL: CPT

## 2022-02-21 PROCEDURE — 97116 GAIT TRAINING THERAPY: CPT

## 2022-02-21 PROCEDURE — 87086 URINE CULTURE/COLONY COUNT: CPT

## 2022-02-21 PROCEDURE — 87040 BLOOD CULTURE FOR BACTERIA: CPT

## 2022-02-21 PROCEDURE — 80053 COMPREHEN METABOLIC PANEL: CPT

## 2022-02-21 RX ADMIN — APIXABAN SCH MG: 5 TABLET, FILM COATED ORAL at 20:47

## 2022-02-21 RX ADMIN — Medication SCH ML: at 20:47

## 2022-02-21 RX ADMIN — SODIUM CHLORIDE SCH MLS: 0.9 INJECTION, SOLUTION INTRAVENOUS at 20:47

## 2022-02-21 NOTE — EDPHYS
Physician Documentation                                                                           

 [facility]                                                                 

Name: Leona Peres                                                                              

Age: 81 yrs                                                                                       

Sex: Female                                                                                       

: 1940                                                                                   

MRN: [id number]                                                                                   

Arrival Date: 2022                                                                          

Time: 14:08                                                                                       

Account#: [id number]                                                                            

Bed 28                                                                                            

Private MD:                                                                                       

ED Physician Og Cotter                                                                      

HPI:                                                                                              

                                                                                             

14:30 This 81 yrs old Female presents to ER via EMS with complaints of Altered Mental Status. cp  

14:30 The patient presents with confusion.                                                    cp  

14:30 Onset: The symptoms/episode began/occurred this morning.                                cp  

14:30 Possible causes: urinary tract infection. Associated signs and symptoms: Pertinent      cp  

      positives: fever, cough, Pertinent negatives: abdominal pain, chest pain, headache.         

      Current symptoms: In the emergency department the patient's symptoms are unchanged from     

      the initial presentation, despite home interventions.                                       

                                                                                                  

Historical:                                                                                       

- Allergies:                                                                                      

14:12 clindamycin HCl (bulk);                                                                 vg1 

14:12 Darvocet-N 100;                                                                         vg1 

14:12 PENICILLINS;                                                                            vg1 

14:12 Strawberries;                                                                           vg1 

14:12 Sulfa (Sulfonamide Antibiotics);                                                        vg1 

- Home Meds:                                                                                      

14:12 alprazolam 0.25 mg Oral tab 1 tab twice a day for Anxiety [Active]; benzonatate 200 mg  vg1 

      Oral cap 1 cap 3 times per day [Active]; bupropion HCl 150 mg Oral Tb24 1 tab once          

      daily [Active]; bupropion HCl 300 mg Oral Tb24 1 tab once daily [Active]; donepezil 10      

      mg Oral tab 1 tab once daily [Active]; doxepin 50 mg Oral cap 1 cap once daily              

      [Active]; duloxetine 60 mg Oral cpDR 2 caps once daily [Active]; Eliquis 5 mg Oral tab      

      [Active]; Ellura 200 mg Oral cap daily [Active]; gabapentin 300 mg Oral cap 1 cap 3         

      times per day [Active];                                                                     

- PMHx:                                                                                           

14:12 Cancer; Depression; Fibromyalgia; GERD; insomnia; lymphedema; skipped heart beats;      vg1 

      Alzheimer's disease;                                                                        

                                                                                                  

- Immunization history:: Client reports receiving the 2nd dose of the Covid vaccine.              

- Social history:: Smoking status: Patient denies any tobacco usage or history of.                

                                                                                                  

                                                                                                  

ROS:                                                                                              

14:35 Constitutional: Positive for fever.                                                     cp  

14:35 Neuro: Positive for altered mental status.                                              cp  

                                                                                                  

Exam:                                                                                             

14:40 Constitutional: The patient appears in no acute distress, alert, awake,                 cp  

      non-diaphoretic, non-toxic, well developed, well nourished.                                 

14:40 Head/Face:  Normocephalic, atraumatic.                                                  cp  

14:40 Eyes: Periorbital structures: appear normal, Pupils: equal, round, and reactive to          

      light and accomodation, Conjunctiva: normal, no exudate, no injection, Sclera: no           

      appreciated abnormality, Lids and lashes: appear normal, bilaterally.                       

14:40 ENT: External ear(s): are unremarkable, Nose: is normal, Mouth: Lips: dry, Oral mucosa:     

      dry, Posterior pharynx: Airway: no evidence of obstruction, patent.                         

14:40 Neck: ROM/movement: is normal, is supple, without pain, no range of motions limitations.    

14:40 Chest/axilla: Inspection: normal.                                                           

14:40 Cardiovascular: Rate: normal, Rhythm: regular, Edema: is not appreciated, JVD: is not       

      appreciated.                                                                                

14:40 Respiratory: the patient does not display signs of respiratory distress,  Respirations:     

      labored breathing, is not present, shallow respirations, that is mild, Breath sounds:       

      decreased breath sounds, that are mild, throughout, stridor, is not appreciated,            

      wheezing: is not appreciated.                                                               

14:40 Abdomen/GI: Inspection: obese Bowel sounds: active, all quadrants, Palpation: abdomen       

      is soft and non-tender, in all quadrants.                                                   

14:40 Back: pain, is absent, ROM is normal.                                                       

14:40 Neuro: Orientation: to person, Mentation: confused, Motor: moves all fours, strength is     

      normal.                                                                                     

15:22 ECG was reviewed by the Attending Physician.                                            cp  

                                                                                                  

Vital Signs:                                                                                      

14:09  / 74; Pulse 70; Resp 23; Temp 102.5; Pulse Ox 97% on 2 lpm NC; Weight 111.13 kg; vg1 

      Height 5 ft. 3 in. (160.02 cm);                                                             

15:00  / 55; Pulse 73; Resp 20; Pulse Ox 98% ; Pain 5/10;                               eo2 

15:30  / 83; Pulse 63; Resp 19; Pulse Ox 98% on 2 lpm NC;                               eo2 

16:24  / 93; Pulse 65; Resp 17; Pulse Ox 98% ; Pain 5/10;                               eo2 

17:00 BP 96 / 54; Pulse 57; Resp 16; Pulse Ox 98% on 2 lpm NC;                                eo2 

18:00 BP 92 / 58; Pulse 55; Resp 14; Temp 99.2(O); Pulse Ox 99% 2 lpm ;                       eo2 

18:30  / 66; Pulse 52; Resp 14; Pulse Ox 98% ;                                          eo2 

14:09 Body Mass Index 43.40 (111.13 kg, 160.02 cm)                                            vg1 

                                                                                                  

MDM:                                                                                              

14:16 Patient medically screened.                                                             ragini 

15:00 Differential Diagnosis: electrolyte abnormality, pneumonia, sepsis, UTI, volume         cp  

      depletion.                                                                                  

18:15 Data reviewed: vital signs, nurses notes, lab test result(s), EKG, radiologic studies,  cp  

      CT scan, plain films.                                                                       

18:15 Test interpretation: by ED physician or midlevel provider: ECG, plain radiologic        cp  

      studies. Physician consultation: Reema MARES was called at 18:15, was contacted at      

      18:15, regarding admission, to the telemetry unit. patient's condition.                     

                                                                                                  

                                                                                             

14:23 Order name: Basic Metabolic Panel; Complete Time: 15:06                                 cp  

                                                                                             

15:06 Interpretation: Normal except: ; GLUC 143; GFR 50.                                cp  

                                                                                             

14:23 Order name: CBC with Diff; Complete Time: 15:06                                         cp  

                                                                                             

15:06 Interpretation: Normal except: WBC 12.30; SUZANNE% 87.6; LYM% 7.0; NEUT A 10.8.             cp  

                                                                                             

14:23 Order name: LFT's; Complete Time: 15:06                                                 cp  

                                                                                             

14:23 Order name: Magnesium; Complete Time: 15:06                                             cp  

                                                                                             

14:23 Order name: NT PRO-BNP; Complete Time: 15:06                                            cp  

                                                                                             

14:23 Order name: PT-INR; Complete Time: 15:06                                                cp  

                                                                                             

14:23 Order name: Troponin HS; Complete Time: 15:06                                           cp  

                                                                                             

14:23 Order name: Lactate; Complete Time: 15:06                                               cp  

                                                                                             

14:23 Order name: Procalcitonin; Complete Time: 16:47                                         cp  

                                                                                             

16:47 Interpretation: Reviewed.                                                               cp  

                                                                                             

14:23 Order name: Blood Culture Adult (2)                                                     cp  

                                                                                             

14:23 Order name: Urine Microscopic Only; Complete Time: 16:47                                cp  

                                                                                             

16:47 Interpretation: Reviewed.                                                                 

                                                                                             

15:08 Order name: Urine Dipstick-Ancillary; Complete Time: 15:10                              EDMS

                                                                                             

15:11 Order name: COVID-19/FLU A+B (Document "Date of Onset" if Symptomatic)                  iw  

                                                                                             

14:23 Order name: XRAY Chest (1 view); Complete Time: 15:26                                   cp  

                                                                                             

16:47 Interpretation: Report reviewed.                                                          

                                                                                             

14:23 Order name: EKG; Complete Time: 14:24                                                   cp  

                                                                                             

14:23 Order name: Cardiac monitoring; Complete Time: 14:38                                      

                                                                                             

14:23 Order name: EKG - Nurse/Tech; Complete Time: 15:09                                      cp  

                                                                                             

14:23 Order name: IV Saline Lock; Complete Time: 14:38                                        cp  

                                                                                             

14:23 Order name: Labs collected and sent; Complete Time: 14:38                                 

                                                                                             

15:12 Order name: COVID-19/FLU A+B; Complete Time: 18:55                                      EDMS

                                                                                             

15:27 Order name: CT Head Brain wo Cont; Complete Time: 17:30                                 cp  

                                                                                             

15:27 Order name: CT Chest, Abdomen, Pelvis - W/Contrast; Complete Time: 17:30                  

                                                                                             

03:16 Order name: CBC with Automated Diff                                                     EDMS

                                                                                             

03:32 Order name: Comprehensive Metabolic Panel                                               EDMS

                                                                                             

03:32 Order name: Lipid Profile                                                               EDMS

                                                                                             

03:32 Order name: T4 Free                                                                     EDMS

                                                                                             

03:32 Order name: Thyroid Stimulating Hormone                                                 EDMS

                                                                                             

14:23 Order name: O2 Per Protocol; Complete Time: 14:38                                       cp  

                                                                                             

14:23 Order name: O2 Sat Monitoring; Complete Time: 14:38                                       

                                                                                             

14:23 Order name: Samuels; Complete Time: 15:09                                                 cp  

                                                                                             

14:23 Order name: Urine Dipstick-Ancillary (obtain specimen); Complete Time: 15:09            cp  

                                                                                                  

ECG:                                                                                              

15:22 Rate is 64 beats/min. Rhythm is regular. OH interval is prolonged at 210 msec. QRS      cp  

      interval is normal. QT interval is normal. T waves are Inverted in lead V2. Interpreted     

      by me. Reviewed by me.                                                                      

                                                                                                  

Administered Medications:                                                                         

14:38 Drug: Tylenol 1000 mg Route: PO;                                                        iw  

16:00 Follow up: Response: No adverse reaction; Temperature is decreased                      eo2 

17:06 Drug: LevaQUIN (levofloxacin) 750 mg Route: IVPB; Site: right antecubital;              eo2 

18:27 Follow up: Response: No adverse reaction; IV Status: Completed infusion; IV Intake:     eo2 

      100ml                                                                                       

                                                                                                  

                                                                                                  

Disposition Summary:                                                                              

22 18:23                                                                                    

Hospitalization Ordered                                                                           

      Hospitalization Status: Inpatient Admission                                             cp  

      Condition: Stable                                                                       cp  

      Problem: new                                                                            cp  

      Symptoms: have improved                                                                 cp  

      Bed/Room Type: Standard                                                                 cp  

      Provider: Tyrell Treviño(22 18:59)                                                cp  

      Location: Telemetry/MedSurg (Inpatient)(22 09:16)                                 bd  

      Room Assignment: [facility](22 09:16)                                                    bd  

      Diagnosis                                                                                   

        - Pneumonia, unspecified organism                                                     cp  

        - Altered mental status, unspecified                                                  cp  

      Forms:                                                                                      

        - Medication Reconciliation Form                                                      cp  

        - SBAR form                                                                           cp  

Signatures:                                                                                       

Dispatcher [employer]                           EDMS                                                 

Chery Coreas Corey, MD MD cha Williams, Irene RN                     RN                                                      

Og Abreu PA PA cp Garcia, Cindy, RN RN   cg                                                   

Serenity Braswell RN                    RN   vg1                                                  

Geeta Moore RN                      RN   eo2                                                  

Reema Moulton PA                       PA   sb3                                                  

                                                                                                  

Corrections: (The following items were deleted from the chart)                                    

18:59 18:23 Reema Moulton cp                                                                  cp  

22:09 18:23 Telemetry/MedSurg (Inpatient) cp                                                  cg  

22:09 18:23 cp                                                                                cg  

                                                                                             

09:16  22:09 [facility] ER HOLD cg                                                             bd  

                                                                                             

09:16  22:09 ERHOLD- cg                                                                  bd  

                                                                                                  

**************************************************************************************************

## 2022-02-21 NOTE — RAD REPORT
EXAM DESCRIPTION:  CT - Head Brain Wo Cont - 2/21/2022 4:48 pm

 

CLINICAL HISTORY:  Alteration of awareness/confusion

 

COMPARISON:  January 2022

 

TECHNIQUE:  Computed axial tomography of the head was obtained. IV contrast was not requested.

 

All CT scans are performed using dose optimization technique as appropriate and may include automated
 exposure control or mA/KV adjustment according to patient size.

 

FINDINGS:  An intracranial  bleed is not seen .

 

The ventricles are normal in caliber.

 

No extra-axial fluid collection is noted. .

 

Fluid within the sinuses/ mastoids is not seen.

 

IMPRESSION:  No acute intracranial abnormality is seen. If patient's symptoms persist  MRI of the bra
in would be recommended.

## 2022-02-21 NOTE — ER
Nurse's Notes                                                                                     

 [facility]                                                                 

Name: Leona Peres                                                                              

Age: 81 yrs                                                                                       

Sex: Female                                                                                       

: 1940                                                                                   

MRN: [id number]                                                                                   

Arrival Date: 2022                                                                          

Time: 14:08                                                                                       

Account#: [id number]                                                                            

Bed 28                                                                                            

Private MD:                                                                                       

Diagnosis: Pneumonia, unspecified organism;Altered mental status, unspecified                     

                                                                                                  

Presentation:                                                                                     

                                                                                             

14:09 Chief complaint: EMS states: pt from [facility]; was toned out by family due to pt    vg1 

      'acting out of the normal' and 'not acting right'; Pt uses O2 at nursing home PRN.          

      Currently on O2, 97% 2 L NC. Coronavirus screen: Vaccine status: Patient reports            

      receiving the 2nd dose of the covid vaccine. Client denies travel out of the [place] in        

      the last 14 days. Ebola Screen: Patient negative for fever greater than or equal to         

      101.5 degrees Fahrenheit, and additional compatible Ebola Virus Disease symptoms.           

      Initial Sepsis Screen: Does the patient meet any 2 criteria? RR > 20 per min. Temp          

      <36.0*C (96.8*F)) or > 38.3*C (100.9*F). Yes Does the patient have a suspected source       

      of infection? No. Patient's initial sepsis screen is negative. Risk Assessment: Do you      

      want to hurt yourself or someone else? Patient reports no desire to harm self or            

      others. Onset of symptoms was 2022.                                            

14:09 Method Of Arrival: EMS: [facility]                                                vg1 

14:09 Acuity: ANUSHKA 3                                                                           vg1 

                                                                                                  

Triage Assessment:                                                                                

14:12 General: Appears in no apparent distress. uncomfortable, Behavior is calm, cooperative. vg1 

      Pain: Complains of pain in right arm Pain currently is 10 out of 10 on a pain scale.        

      EENT: No signs and/or symptoms were reported regarding the EENT system. Neuro: Level of     

      Consciousness is awake, alert, obeys commands, Oriented to person, place, time,             

      situation. Cardiovascular: Patient's skin is warm and dry. Respiratory: Airway is           

      patent Respiratory effort is even, unlabored, Respiratory pattern is tachypnea. GI:         

      Reports diarrhea. : No signs and/or symptoms were reported regarding the                  

      genitourinary system. Derm: Skin is pink, warm \T\ dry. Musculoskeletal: Circulation,       

      motion, and sensation intact.                                                               

                                                                                                  

Historical:                                                                                       

- Allergies:                                                                                      

14:12 clindamycin HCl (bulk);                                                                 vg1 

14:12 Darvocet-N 100;                                                                         vg1 

14:12 PENICILLINS;                                                                            vg1 

14:12 Strawberries;                                                                           vg1 

14:12 Sulfa (Sulfonamide Antibiotics);                                                        vg1 

- Home Meds:                                                                                      

14:12 alprazolam 0.25 mg Oral tab 1 tab twice a day for Anxiety [Active]; benzonatate 200 mg  vg1 

      Oral cap 1 cap 3 times per day [Active]; bupropion HCl 150 mg Oral Tb24 1 tab once          

      daily [Active]; bupropion HCl 300 mg Oral Tb24 1 tab once daily [Active]; donepezil 10      

      mg Oral tab 1 tab once daily [Active]; doxepin 50 mg Oral cap 1 cap once daily              

      [Active]; duloxetine 60 mg Oral cpDR 2 caps once daily [Active]; Eliquis 5 mg Oral tab      

      [Active]; Ellura 200 mg Oral cap daily [Active]; gabapentin 300 mg Oral cap 1 cap 3         

      times per day [Active];                                                                     

- PMHx:                                                                                           

14:12 Cancer; Depression; Fibromyalgia; GERD; insomnia; lymphedema; skipped heart beats;      vg1 

      Alzheimer's disease;                                                                        

                                                                                                  

- Immunization history:: Client reports receiving the 2nd dose of the Covid vaccine.              

- Social history:: Smoking status: Patient denies any tobacco usage or history of.                

                                                                                                  

                                                                                                  

Screenin:16 Abuse screen: Denies threats or abuse. Nutritional screening: No deficits noted.        vg1 

      Tuberculosis screening: No symptoms or risk factors identified. Fall Risk Fall in past      

      12 months (25 points). No secondary diagnosis (0 pts). IV access (20 points).               

      Ambulatory Aid- Crutches/Cane/Walker (15 pts). Gait- Normal/Bed Rest/Wheelchair (0 pts)     

      Mental Status- Oriented to own ability (0 pts). Total Martinez Fall Scale indicates High       

      Risk Score (45 or more points). Fall prevention measures have been instituted. Side         

      Rails Up X 2 Placed Close to Nursing Station.                                               

                                                                                                  

Assessment:                                                                                       

15:30 General: Appears in no apparent distress. comfortable, Behavior is calm, cooperative.   eo2 

      Pain: Complains of pain in right arm. Neuro: Level of Consciousness is awake, alert,        

      obeys commands, Oriented to person, place, Denies dizziness, headache. Cardiovascular:      

      Denies chest pain, Heart tones S1 S2 Capillary refill < 3 seconds Patient's skin is         

      warm and dry. Respiratory: Reports shortness of breath on exertion cough that is Airway     

      is patent Trachea midline Respiratory effort is even, unlabored, Breath sounds are          

      diminished bilaterally. GI: Abdomen is round non-distended, Bowel sounds present X 4        

      quads. :. : Urine is clear. Musculoskeletal: Reports pain in right arm.                 

16:34 Reassessment: r upper arm 20g IV infiltrated, difficult to obtain PIV on pt, 2          eo2 

      attempts, then able to obtain with ultrasound guidance. Pt to CT at this time, will         

      start abx upon return.                                                                      

17:31 Reassessment: Tanner Gates leaving bedside [phone], left pt's phone, hearing aide    eo2 

       at bedside.                                                                         

                                                                                                  

Vital Signs:                                                                                      

14:09  / 74; Pulse 70; Resp 23; Temp 102.5; Pulse Ox 97% on 2 lpm NC; Weight 111.13 kg; vg1 

      Height 5 ft. 3 in. (160.02 cm);                                                             

15:00  / 55; Pulse 73; Resp 20; Pulse Ox 98% ; Pain 5/10;                               eo2 

15:30  / 83; Pulse 63; Resp 19; Pulse Ox 98% on 2 lpm NC;                               eo2 

16:24  / 93; Pulse 65; Resp 17; Pulse Ox 98% ; Pain 5/10;                               eo2 

17:00 BP 96 / 54; Pulse 57; Resp 16; Pulse Ox 98% on 2 lpm NC;                                eo2 

18:00 BP 92 / 58; Pulse 55; Resp 14; Temp 99.2(O); Pulse Ox 99% 2 lpm ;                       eo2 

18:30  / 66; Pulse 52; Resp 14; Pulse Ox 98% ;                                          eo2 

14:09 Body Mass Index 43.40 (111.13 kg, 160.02 cm)                                            vg1 

                                                                                                  

ED Course:                                                                                        

14:08 Patient arrived in ED.                                                                  vg1 

14:12 Triage completed.                                                                       vg1 

14:14 Og Abreu PA is PHCP.                                                                cp  

14:14 Og Cotter MD is Attending Physician.                                             cp  

14:16 Arm band placed on.                                                                     vg1 

14:16 Patient has correct armband on for positive identification. Placed in gown. Bed in low  vg1 

      position. Call light in reach. Side rails up X2.                                            

14:17 Serenity Braswell, RN is Primary Nurse.                                                  vg1 

15:04 XRAY Chest (1 view) In Process Unspecified.                                             EDMS

15:09 Urine Microscopic Only Sent.                                                            mh5 

15:09 Initial lab(s) drawn, by ED staff, sent to lab. Urine collected: Samuels catheter         5 

      specimen, cloudy, EKG done, by ED staff, reviewed by Og MARES.                         

15:35 COVID-19/FLU A+B Sent.                                                                  eo2 

16:24 Cardiac monitor on. Pulse ox on. NIBP on. Door closed. Noise minimized. Warm blanket    eo2 

      given. Pillow given.                                                                        

16:24 No provider procedures requiring assistance completed.                                  eo2 

16:30 Inserted saline lock: 18 gauge in right antecubital area, using aseptic technique.      eo2 

16:48 CT Head Brain wo Cont In Process Unspecified.                                           EDMS

16:48 CT Chest, Abdomen, Pelvis - W/Contrast In Process Unspecified.                          EDMS

17:06 COVID-19/FLU A+B (Document "Date of Onset" if Symptomatic) Sent.                        eo2 

18:21 Reema Moulton PA is Hospitalizing Provider.                                            cp  

18:59 Tyrell Treviño is Hospitalizing Provider.                                               cp  

19:11 Report given to Joshua CHURCHILL.                                                               eo2 

19:28 Primary Nurse role handed off by Serenity Braswell RN                                   mw2 

                                                                                                  

Administered Medications:                                                                         

14:38 Drug: Tylenol 1000 mg Route: PO;                                                        iw  

16:00 Follow up: Response: No adverse reaction; Temperature is decreased                      eo2 

17:06 Drug: LevaQUIN (levofloxacin) 750 mg Route: IVPB; Site: right antecubital;              eo2 

18:27 Follow up: Response: No adverse reaction; IV Status: Completed infusion; IV Intake:     eo2 

      100ml                                                                                       

                                                                                                  

                                                                                                  

Intake:                                                                                           

18:27 IV: 100ml; Total: 100ml.                                                                eo2 

                                                                                                  

Outcome:                                                                                          

18:23 Decision to Hospitalize by Provider.                                                    cp  

                                                                                             

13:08 Patient left the ED.                                                                    tf1 

                                                                                                  

Signatures:                                                                                       

Dispatcher [employer]                           EDLexy Dennison RN                     RN   iw                                                   

Og Abreu PA PA cp Martinez, Maria                              5                                                  

Kirk Serrano                            mw2                                                  

Grzegorz Simpson RN RN   tf1                                                  

Serenity Braswell RN                    RN   1                                                  

Geeta Moore RN                      RN   eo2                                                  

                                                                                                  

Corrections: (The following items were deleted from the chart)                                    

                                                                                             

14:16 14:12 Pain: Denies pain. vg1                                                            vg1 

16:36 16:34 Reassessment: difficult to obtain PIV on pt, 2 attempts, then able to obtain with eo2 

      ultrasound guidance. Pt to CT at this time, will start abx upon return eo2                  

17:32 17:31 Reassessment: Tanner Gates leaving bedside [phone] eo2                         eo2 

18:25 15:30 Respiratory: Reports shortness of breath on exertion Airway is patent Trachea     eo2 

      midline Respiratory effort is even, unlabored, Breath sounds are diminished                 

      bilaterally. eo2                                                                            

                                                                                                  

**************************************************************************************************

## 2022-02-21 NOTE — RAD REPORT
EXAM DESCRIPTION:  CT - Chest Abdomen Pelvis W Cont - 2/21/2022 4:48 pm

 

CLINICAL HISTORY:   Chest and abdominal pain/fever

 

COMPARISON:   CT abdomen 2020 and 2018

 

TECHNIQUE:  Computed axial tomography of the chest, abdomen and pelvis was obtained. 100 cc Isovue-30
0 was administered intravenously. Oral contrast was not requested. This limits evaluation of bowel.

 

All CT scans are performed using dose optimization technique as appropriate and may include automated
 exposure control or mA/KV adjustment according to patient size.

 

FINDINGS:  Mild bilateral patchy pulmonary alveolar opacities.

 

Small calcified mediastinal lymph node. Otherwise no mediastinal/hilar lymphadenopathy

 

No pericardial/pleural effusion

 

Liver, pancreas, right adrenal and left kidney are unremarkable

 

A 25 millimeter splenic cyst. A 15 millimeter right renal cyst. Small left adrenal nodule unchanged f
rom 2018 likely an adenoma.

 

A Samuels catheter has the balloon in the bladder. Hysterectomy. No evidence of diverticulitis. Mild na
rrowing celiac artery. Postsurgical changes spine

 

IMPRESSION:  Mild bilateral patchy lung opacities probably pneumonia

## 2022-02-21 NOTE — P.HP
Certification for Inpatient


With expected LOS: <2 Midnights


Patient will require the following post-hospital care: None


Practitioner: I am a practitioner with admitting privileges, knowledge of 

patient current condition, hospital course, and medical plan of care.


Services: Services provided to patient in accordance with Admission requirements

found in Title 42 Section 412.3 of the Code of Federal Regulations





Patient History


Date of Service: 02/21/22


Primary Care Provider: Dr. López


Reason for admission: Pneumonia,AMS


History of Present Illness: 


Patient is an 81-year-old  female with past medical history of afib, 

mild dementia, recurrent UTIs, cataracts, and TIAs who presented to the ED with 

AMS, fever, and cough.  In the ED patient was oriented x1, WBC 12.3, Covid 

negative, no UTI.  Chest CT showed "mild bilateral patchy lung opacities 

probably pneumonia."  Head CT negative.  Upon my assessment patient is oriented 

x3 and only complains of a cough.  She states she has had a cough for 6 months 

now.  She reports feeling febrile today.  Patient received Levaquin in the ED. 

Ms. Peres will be admitted for further evaluation and treatment.





Allergies





clindamycin HCl [From Cleocin] Allergy (Severe, Verified 07/02/21 20:52)


   Hives/Rash


clindamycin palmitate HCl [From Cleocin] Allergy (Severe, Verified 07/02/21 

20:52)


   Hives/Rash


clindamycin phosphate [From Cleocin] Allergy (Severe, Verified 07/02/21 20:52)


   Hives/Rash


Penicillins Allergy (Severe, Verified 07/02/21 20:52)


   Hives/Rash


Sulfa (Sulfonamide Antibiotics) [Sulfa(Sulfonamide Antibiotics)] Allergy 

(Severe, Verified 07/02/21 20:52)


   Hives/Rash


acetaminophen [From Darvocet-N 100] Allergy (Verified 07/02/21 20:52)


   Unknown


propoxyphene [From Darvocet-N 100] Allergy (Verified 07/02/21 20:52)


   Unknown


strawberries Allergy (Severe, Uncoded 03/24/21 05:19)


   Hives/Rash





Home medications list reviewed: Yes


Home Medications: 








Simvastatin 40 mg PO BEDTIME 04/17/12 


Memantine HCl 10 mg PO BID 09/24/18 


Gabapentin 300 mg PO TID 02/05/20 


Mupirocin Oint [Bactroban 2% Ointment*] 1 appl TP BID 07/02/21 


Potassium Chloride 20 meq PO BID 07/02/21 


Apixaban [Eliquis] 5 mg PO BID  tablet 01/01/22 


Bupropion *Xl* [Wellbutrin XL*] 300 mg PO DAILY #60 tab 01/01/22 


Clopidogrel Bisulfate [Plavix*] 75 mg PO DAILY #30 01/01/22 


Cranberry Fruit Extract [Ellura] 200 mg PO DAILY #30 01/01/22 


Donepezil HCl 10 mg PO BEDTIME #30 01/01/22 


Doxepin HCl 50 mg PO BEDTIME #30 01/01/22 


Duloxetine HCl 60 mg PO BID #60 01/01/22 


Flecainide [Tambocor*] 50 mg PO BID  tab 01/01/22 


Fluticasone/Umeclidin/Vilanter [Trelegy Ellipta 100-62.5-25] 1 each IH PRN #1 

01/01/22 


Furosemide [Lasix] 20 mg PO DAILY #30 tab 01/01/22 


Levothyroxine [Synthroid*] 75 mcg PO LKEKJ2PO #30 tab 01/01/22 


Magnesium Oxide 1 tab PO DAILY #30 01/01/22 


Montelukast [Singulair*] 10 mg PO DAILY  tab 01/01/22 


Pantoprazole Sodium [Protonix] 1 tab PO DAILY #30 01/01/22 


Quetiapine [Seroquel] 100 mg PO BEDTIME #30 tab 01/01/22 


Topiramate [Topamax] 200 mg PO BID #60 01/01/22 


levoFLOXacin [Levaquin] 750 mg PO DAILY #2 tab 01/01/22 








- Past Medical/Surgical History


Diabetic: No


-: Lymphedema


-: heart arrhythmia w/ PVCs


-: L breast cancer


-: PE


-: Meniere's Disease


-: Former smoker


-: Recurrent UTIs


-: Fibromyalgia


-: GERD


-: Insomnia


-: Depression


-: Neuropathy


-: s/p Left Mastectomy


-: SANTOSH TKA


-: Cervical fusion


-: Lumbar fusion


-: Appendectomy


-: Hysterectomy


Psychosocial/ Personal History: Patient lives in an assisted living facility.





- Family History


  ** Father


-: Diabetes


Notes: heart attack.  alcoholic





- Social History


Smoking Status: Former smoker


Alcohol use: No


CD- Drugs: No


Caffeine use: No


Place of Residence: Home





Review of Systems


10-point ROS is otherwise unremarkable


General: Fever, As per HPI


Respiratory: Cough





Physical Examination





- Physical Exam


General: Alert, In no apparent distress, Oriented x3


HEENT: Atraumatic, PERRLA, EOMI, Sclerae nonicteric


Neck: Supple, 2+ carotid pulse no bruit, No LAD, Without JVD or thyroid 

abnormality


Respiratory: Crackles/rales


Cardiovascular: Regular rate/rhythm, Normal S1 S2


Gastrointestinal: Normal bowel sounds, No tenderness


Musculoskeletal: No tenderness


Integumentary: No rashes


Neurological: Normal speech, Normal strength at 5/5 x4 extr, Normal tone, Normal

 affect


Urinary: Smauels catheter





- Studies


Laboratory Data (last 24 hrs)





02/21/22 14:25: PT 15.2 H, INR 1.32


02/21/22 14:25: WBC 12.30 H, Hgb 13.7, Hct 41.5, Plt Count 210


02/21/22 14:25: Sodium 138, Potassium 4.4, BUN 12, Creatinine 1.06, Glucose 143 

H, Magnesium 1.8, Total Bilirubin 0.4, AST 15, ALT 21, Alkaline Phosphatase 108








Assessment and Plan





- Problems (Diagnosis)


(1) Pneumonia


Current Visit: Yes   Status: Acute   


Qualifiers: 


   Pneumonia type: due to unspecified organism   Laterality: bilateral 





(2) Afib


Current Visit: Yes   Status: Chronic   


Qualifiers: 


   Atrial fibrillation type: unspecified chronic   Qualified Code(s): I48.20 - 

Chronic atrial fibrillation, unspecified; I48.2 - Chronic atrial fibrillation   





(3) Dementia


Current Visit: Yes   Status: Chronic   


Qualifiers: 


   Dementia type: unspecified type   Dementia behavioral disturbance: without 

behavioral disturbance   Qualified Code(s): F03.90 - Unspecified dementia 

without behavioral disturbance   





(4) History of TIA (transient ischemic attack)


Current Visit: Yes   Status: Chronic   





(5) Altered mental status


Current Visit: Yes   Status: Acute   


Qualifiers: 


   Altered mental status type: unspecified   Qualified Code(s): R41.82 - Altered

 mental status, unspecified   





- Plan


-continue levaquin for pneumonia. patient has pcn and sulfa allergy. 


-tessalon pearls for cough 


-Tylenol for fever 


-patient is currently A&Ox3


-blood cultures drawn


-continue home medications 





DVT ppx: eliquis


Code: Full 


Discharge Plan: Home


Plan to discharge in: 48 Hours





- Advance Directives


Does patient have a Living Will: No


Does patient have a Durable POA for Healthcare: Yes





- Code Status/Comfort Care


Code Status Assessed: Yes (Full)


Critical Care: No


Time Spent Managing Pts Care (In Minutes): 70

## 2022-02-21 NOTE — XMS REPORT
Continuity of Care Document

                          Created on:2022



Patient:NEGRO GONZALES

Sex:Female

:1940

External Reference #:[id number]





Demographics







                          Address                   [address] 



                                                    [place] [address]

 

                          Home Phone                [phone]

 

                          Mobile Phone              [phone]

 

                          Email Address             [email or web address]

 

                          Preferred Language        English

 

                          Marital Status            Unknown

 

                          Worship Affiliation     Unknown

 

                          Race                      Unknown

 

                          Additional Race(s)        White



                                                    Unavailable

 

                          Ethnic Group              Unknown









Author







                          Organization              [facility]

t

 

                          Address                   [address]



                                                    [place] [address]

 

                          Phone                     [phone]









Support







                Name            Relationship    Address         Phone

 

                Homero          Child           Unavailable     [phone]

 

                Homero          Other           Unavailable     [phone]

 

                Ja         Spouse          [address]   [phone]



                                                [place] [address] 

 

                Ja         Child           Unavailable     [phone]

 

                Prabhu         Daughter        Unavailable     [phone]









Care Team Providers







                    Name                Role                Phone

 

                    Denita RIZVI            Primary Care Physician [phone]

 

                    [id number]              Attending Clinician Unavailable

 

                    TUCKER BASSETT   Attending Clinician Unavailable

 

                    Doctor Unassigned,  Name Attending Clinician Unavailable

 

                    [place] RIZVI,  L     Attending Clinician [phone]

 

                    Tucker Bassett MD Attending Clinician [phone]

 

                    Leni Ramos MD  Attending Clinician [phone]

 

                    GRANT Christopher MD       Attending Clinician [phone]

 

                    Marcelino Kay MD   Attending Clinician [phone]

 

                    Abbe RIZVI           Attending Clinician [phone]

 

                    [id number]              Admitting Clinician Unavailable

 

                    TUCKER BASSETT   Admitting Clinician Unavailable

 

                    LENI RAMOS     Admitting Clinician Unavailable









Payers







           Payer Name Policy Type Policy Number Effective Date Expiration Date S

ource







Problems







       Condition Condition Condition Status Onset  Resolution Last   Treating Co

mments 

Source



       Name   Details Category        Date   Date   Treatment Clinician        



                                                 Date                 

 

       Atrial Atrial Disease Active                              [facility]



       fibrillati fibrillati               8-16                               [facility]

[facility] -



       on with on with               00:00:                             Medical



       RVR    RVR                  00                                 Center

 

       Status Status Disease Active 2016                             [facility]



       post total post total               2-20                               it

y of



       knee   knee                 00:00:                             [place]



       replacemen replacemen               00                                 Me

dical



       t, left t, left                                                  Branch

 

       Morbid Morbid Disease Active 2016                             [facility]



       obesity obesity               2-20                               ity of



       with body with body               00:00:                             [facility]

s



       mass index mass index               00                                 Me

dical



       of 50 or of 50 or                                                  Branch



       higher higher                                                  

 

       Morbid Morbid Disease Active 2016                             [facility]



       obesity obesity               2-20                               ity of



       with body with body               00:00:                             [facility]

s



       mass index mass index               00                                 Me

dical



       of     of                                                      Branch



       40.0-49.9 40.0-49.9                                                  

 

       CORNELIA    CORNELIA    Disease Active                                    [facility]



       (obstructi (obstructi                                                  [facility]

kes -



       ve sleep ve sleep                                                  [facility]

l



       apnea) apnea)                                                  Center







Allergies, Adverse Reactions, Alerts







       Allergy Allergy Status Severity Reaction(s) Onset  Inactive Treating Comm

ents 

Source



       Name   Type                        Date   Date   Clinician        

 

       STRAWBER Allergy Active Low    Rash   1-0                      [facility]



       RY                                 8-17                        [facility]



                                          00:00:                      [facility]



                                          00                          Center

 

       Strawber Propensi Active        Rash   -0                      [facility]



       ry     ty to                       817                        [facility]



              adverse                      00:00:                      Medical



              reaction                      00                          Center



              s                                                       

 

       CLINDAMY Allergy Active High   Rash   -0                      [facility]



       JAYNA HCL                             8-16                        [facility]



                                          00:00:                      [facility]



                                          00                          Center

 

       PENICILL Allergy Active High   Rash   1-0                      [facility]



       IN                                 8-16                        [facility]



                                          00:00:                      [facility]



                                          00                          Center

 

       SULFA  Allergy Active High   Rash   -0                      [facility]



       (SULFONA                             8-16                        [facility]



       MIDE                               00:00:                      Medical



       ANTIBIOT                             00                          Center



       [facility])                                                           

 

       Clindamy Propensi Active        Rash   -0                      [facility]



       jayna Hcl ty to                       8-16                        [facility]



              adverse                      00:00:                      Medical



              reaction                      00                          Center



              s                                                       

 

       Penicill Propensi Active        Rash   -0                      [facility]



       in     ty to                       8-16                        [facility]



              adverse                      00:00:                      Medical



              reaction                      00                          Center



              s                                                       

 

       Sulfa  Propensi Active        Rash   1-0                      [facility]



       (Sulfona ty to                       8-16                        [facility]



       mide   adverse                      00:00:                      Medical



       Antibiot reaction                      00                          Center



       [facility])   s                                                       

 

       Clindamy Propensi Active        Unknown 2020-0                      [facility]

di



       jayna    ty to                Reaction 6-30                        st



       Phosphat adverse                      00:00:                      Hospita



       e      reaction                      00                          l



              s to                                                    



              drug                                                    

 

       Propoxyp Propensi Active        Unknown 2020-0                      [facility]

di



       hene   ty to                Reaction 6-30                        st



       N-Acetam adverse                      00:00:                      Hospita



       inophen reaction                      00                          l



              s to                                                    



              drug                                                    

 

       Penicill Propensi Active        Unknown 2020-0                      [facility]

di



       ins    ty to                Reaction 6-30                        st



              adverse                      00:00:                      Hospita



              reaction                      00                          l



              s to                                                    



              drug                                                    

 

       Sulfa  Propensi Active        Unknown 2020-0                      [facility]



       (Sulfona ty to                Reaction 6-30                        st



       mide   adverse                      00:00:                      Hospita



       Antibiot reaction                      00                          l



       ics)   s to                                                    



              drug                                                    

 

       Acetamin Propensi Active        Unknown 2020-0                      [facility]

di



       ophen  ty to                Reaction 6-30                        st



              adverse                      00:00:                      Hospita



              reaction                      00                          l



              s to                                                    



              drug                                                    

 

       Acetamin Propensi Active        Unknown - 2020-0                      Uni

vers



       ophen  ty to                See comments 6-30                        ity 

of



              adverse                      00:00:                      [place]



              reaction                      00                          Medical



              s                                                       Branch

 

       Propoxyp Propensi Active        Unknown - 2020-0                      Uni

vers



       hene   ty to                See comments 6-30                        ity 

of



       N-Acetam adverse                      00:00:                      [place]



       inophen reaction                      00                          Medical



              s                                                       Branch

 

       Clindamy Propensi Active        Rash   -                      Univer

s



       jayna Hcl ty to                       2-19                        ity of



              adverse                      00:00:                      [place]



              reaction                      00                          Medical



              s                                                       Branch

 

       Penicill Propensi Active        Anaphylaxis -                      U

nivers



       in     ty to                       2-19                        ity of



              adverse                      00:00:                      [place]



              reaction                      00                          Medical



              s                                                       Branch

 

       Sulfa  Propensi Active        Rash   -                      [facility]



       (Sulfona ty to                       2-19                        ity of



       mide   adverse                      00:00:                      [place]



       Antibiot reaction                      00                          Medica

l



       ics)   s                                                       Branch







Family History







           Family Member Diagnosis  Comments   Start Date Stop Date  Source

 

           Natural father Heart disease                                  [facility]

 

           Natural father Hypertension                                  [facility]

 

           Natural father Diabetes                                    [facility]

 

           Natural mother Asthma                                      [facility]







Social History







           Social Habit Start Date Stop Date  Quantity   Comments   Source

 

           Exposure to                       Not sure              [facility]

 [place]



           SARS-CoV-2 (event)                                             [facility]

l Branch

 

           History SDOH                                             [facility]

 -



           Alcohol Std Drinks                                             [facility]

l Center

 

           History SDOH                                             [facility]

 -



           Alcohol Binge                                             [facility]

ter

 

           History SDOH                                             [facility]

 -



           Alcohol Comment                                             [facility]

enter

 

           History of tobacco                       Smoker                [facility]



           use                                                    

 

           Alcohol intake 2021 0 /d                  [facility] [place]



                      00:00:00   00:00:00                         Medical Branch

 

           Tobacco use and 2021 Never used            [facility]

[facility] -



           exposure   00:00:00   00:00:00                         [facility] Center

 

           History SDOH 2021 1                     [facility]

 -



           Alcohol Frequency 00:00:00   00:00:00                         Medical

 Center

 

           Cigarettes smoked 2020                       [facility]



           current (pack per 00:00:00   00:00:00                         



           day) - Reported                                             

 

           Cigarette  2020                       [facility]

ital



           pack-years 00:00:00   00:00:00                         

 

           [facility] At [birth year]                       [facility]

[facility] [place]



           Birth      00:00:00   00:00:00                         [facility]









                Smoking Status  Start Date      Stop Date       Source

 

                Never smoker                                    [facility]

 

                Former smoker   2020 00:00:00 2020 00:00:00 [facility]







Medications







       Ordered Filled Start  Stop   Current Ordering Indication Dosage Frequency

 Signature

                    Comments            Components          Source



     Medication Medication Date Date Medication? Clinician                (SIG) 

          



     Name Name                                                   

 

     buPROPion            Yes                                     [facility]



      mg      8-26                                              ity of



     24 hr      00:00:                                              [place]



     tablet      00                                                Medical



                                                                 Branch

 

     buPROPion            Yes                                     [facility]



      mg      8-26                                              ity of



     24 hr      00:00:                                              [place]



     tablet      00                                                Medical



                                                                 Branch

 

     buPROPion            Yes                                     [facility]



      mg      8-26                                              ity of



     24 hr      00:00:                                              [place]



     tablet      00                                                Medical



                                                                 Branch

 

     furosemide      2022- No             20mg QD   Take 1           [facility]



     (LASIX) 20      8- 08-23                          tablet (20           [facility]

kes -



     MG tablet      00:00: 23:59                          mg total)           Me

dical



               00   :00                           by mouth           Center



                                                  daily.           

 

     zolpidem            Yes            10mg      Take 10 mg           [facility]



     (AMBIEN) 10      -22                               by mouth           [facility]

s -



     mg tablet      12:43:                               every           Medical



               24                                 night as           Center



                                                  needed for           



                                                  Insomnia.           

 

     donepeziL            Yes            10mg QD   Take 10 mg           [facility]



     (ARICEPT)      8-22                               by mouth           [facility] 

-



     10 MG      12:43:                               nightly.           Medical



     tablet      24                                                Center

 

     DULoxetine            Yes            60mg Q.5D Take 60 mg           [facility]

[facility]



     (CYMBALTA)      8-22                               by mouth 2           [facility]

es -



     60 MG      12:43:                               (two)           Medical



     capsule      24                                 times           Center



                                                  daily.           

 

     gabapentin            Yes            300mg Q.[id number] Take 300       

    [facility]



     (NEURONTIN)      8-22                          [id number] mg by           L

ukes -



     300 MG      12:43:                          3D   mouth 3           Medical



     capsule      24                                 (three)           Center



                                                  times           



                                                  daily.           

 

     indapamide            Yes            2.5mg QD   Take 2.5           [facility]



     (LOZOL) 2.5      8-22                               mg by           [facility] -



     MG tablet      12:43:                               mouth           Medical



               24                                 every           Center



                                                  morning.           

 

     memantine            Yes            10mg Q.5D Take 10 mg           [facility]



     (NAMENDA)      8-22                               by mouth 2           [facility]

s -



     10 MG      12:43:                               (two)           Medical



     tablet      24                                 times           Center



                                                  daily.           

 

     metoprolol            Yes            25mg QD   Take 25 mg           [facility]

[facility]



     succinate      8-22                               by mouth           [facility] 

-



     (TOPROL-XL)      12:43:                               nightly.           Me

dical



     25 MG 24 hr      24                                                Center



     tablet                                                        

 

     HYDROcodone            Yes            1{tbl}      Take 1           [facility]



     -acetaminop      8-22                               tablet by           [facility]

es -



     hen (NORCO      12:43:                               mouth 3           Medi

bella



     7.5-325)      24                                 (three)           Center



     7.5-325 mg                                         times           



     per tablet                                         daily as           



                                                  needed for           



                                                  Pain.           

 

     potassium            Yes            20meq Q.5D Take 20           [facility]



     chloride      8-22                               mEq by           [facility]



     (KLOR-CON)      12:43:                               mouth 2           Medi

bella



     20 mEq      24                                 (two)           Center



     packet                                         times           



                                                  daily.           

 

     QUEtiapine            Yes            200mg QD   Take 200           [facility]



     (SEROquel)      8-22                               mg by           [facility] -



     200 MG      12:43:                               mouth           Medical



     tablet      24                                 nightly.           Center

 

     simvastatin            Yes            40mg QD   Take 40 mg           

[facility]



     (ZOCOR) 40      8-22                               by mouth           [facility]

 -



     MG tablet      12:43:                               nightly.           Medi

bella



               24                                                Center

 

     topiramate            Yes            200mg Q.5D Take 200           [facility]



     (TOPAMAX)      8-22                               mg by           [facility] -



     200 MG      12:43:                               mouth 2           Medical



     tablet      24                                 (two)           Center



                                                  times           



                                                  daily.           

 

     buPROPion            Yes            150mg QD   Take 150           [facility]



     (WELLBUTRIN      8-22                               mg by           [facility]



     XL) 150 MG      12:43:                               mouth           Medica

l



     24 hr      24                                 daily.           Center



     tablet                                                        

 

     ALPRAZolam            Yes            .25mg      Take 0.25           [facility]

[facility]



     (XANAX)      8-22                               mg by           [facility]



     0.25 MG      12:43:                               mouth           Medical



     tablet      24                                 every           Center



                                                  night as           



                                                  needed for           



                                                  Anxiety.           

 

     tiZANidine            Yes            4mg  QD   Take 4 mg           [facility]



     (ZANAFLEX)      8-22                               by mouth           [facility]

 -



     4 MG tablet      12:43:                               daily.           Medi

bella



               [facility]                                                Center

 

     fluticasone            Yes                 QD   Inhale 1           [facility]



     -umeclidin-      8-                               Inhalation           [facility]

[facility] -



     vilanter      12:43:                               by mouth           Medic

al



     (Trelegy      24                                 via            Center



     Ellipta)                                         inhaler           



     100-62.5-25                                         daily .           



     mcg DsDv                                                        

 

     clopidogreL      2021- No             75mg QD   Take 75 mg          

 [facility]



     (PLAVIX) 75                                by mouth           [facility]

es -



     mg tablet      10:48: 00:00                          daily.           Medic

al



               43   :00                                          [place]

 

     ELIQUIS 5            Yes            5mg       Take 5 mg           Uni

vers



     mg tablet      8-22                               by mouth 2           ity 

of



               00:00:                               (two)           [place]



               00                                 times           Medical



                                                  daily.           Branch

 

     benzonatate            Yes                      TAKE 1           Univ

ers



     100 mg      8-22                               CAPSULE           ity of



     capsule      00:00:                               (100 MG           [place]



               00                                 TOTAL) BY           Medical



                                                  MOUTH 3           Branch



                                                  (THREE)           



                                                  TIMES           



                                                  DAILY AS           



                                                  NEEDED FOR           



                                                  COUGH FOR           



                                                  UP TO 7           



                                                  DAYS.           

 

     ELIQUIS 5            Yes            5mg       Take 5 mg           Uni

vers



     mg tablet      8-22                               by mouth 2           ity 

of



               00:00:                               (two)           [place]



               00                                 times           Medical



                                                  daily.           Branch

 

     benzonatate            Yes                      TAKE 1           Univ

ers



     100 mg      8-22                               CAPSULE           ity of



     capsule      00:00:                               (100 MG           [place]



               00                                 TOTAL) BY           Medical



                                                  MOUTH 3           Branch



                                                  (THREE)           



                                                  TIMES           



                                                  DAILY AS           



                                                  NEEDED FOR           



                                                  COUGH FOR           



                                                  UP TO 7           



                                                  DAYS.           

 

     ELIQUIS 5            Yes            5mg       Take 5 mg           Uni

vers



     mg tablet      8-22                               by mouth 2           ity 

of



               00:00:                               (two)           [place]



               00                                 times           Medical



                                                  daily.           Branch

 

     benzonatate            Yes                      TAKE 1           Univ

ers



     100 mg      8-22                               CAPSULE           ity of



     capsule      00:00:                               (100 MG           [place]



               00                                 TOTAL) BY           Medical



                                                  MOUTH 3           Branch



                                                  (THREE)           



                                                  TIMES           



                                                  DAILY AS           



                                                  NEEDED FOR           



                                                  COUGH FOR           



                                                  UP TO 7           



                                                  DAYS.           

 

     apixaban      2022- No             5mg  Q.5D Take 1           [facility]



     (ELIQUIS) 5      -                          tablet (5           [facility]

[facility] -



     mg Tab      00:00: 23:59                          mg total)           Medic

al



     tablet      00   :00                           by mouth 2           Center



                                                  (two)           



                                                  times           



                                                  daily.           

 

     cyanocobala       No             1000ug QD   Take 1           [facility]

[facility]



     min,                                tablet           [facility]



     vitamin      00:00: 23:59                          (1,000 mcg           Med

ical



     B-12, 1000      00   :00                           total) by           Cent

er



     MCG tablet                                         mouth           



                                                  daily.           

 

     flecainide       No             50mg      Take 1           [facility]



     (TAMBOCOR)                                tablet (50           [facility]

[facility] -



     50 MG      00:00: 23:59                          mg total)           [facility]

l



     tablet      00   :00                           by mouth           Center



                                                  every 12           



                                                  (twelve)           



                                                  hours.           

 

     benzonatate       No             100mg      Take 1           [facility]



     (TESSALON)                                capsule           [facility]

 -



     100 MG      00:00: 23:59                          (100 mg           Medical



     capsule      00   :00                           total) by           Center



                                                  mouth 3           



                                                  (three)           



                                                  times           



                                                  daily as           



                                                  needed for           



                                                  Cough for           



                                                  up to 7           



                                                  days.           

 

     ALPRAZolam            Yes                                     [facility]



     0.25 mg      8-02                                              ity of



     tablet      00:00:                                              [place]



               00                                                Medical



                                                                 Branch

 

     ALPRAZolam            Yes                                     [facility]



     0.25 mg      8-02                                              ity of



     tablet      00:00:                                              [place]



               00                                                Medical



                                                                 Branch

 

     ALPRAZolam            Yes                                     [facility]



     0.25 mg      8-02                                              ity of



     tablet      00:00:                                              [place]



               00                                                Medical



                                                                 Branch

 

     Cholecalcif      2016      Yes            1{tbl}      Take 1           Un

bob



     edith,      2-28                               tablet by           ity of



     Vitamin D3,      11:30:                               mouth           [place]



     (VITAMIN      04                                 daily.           Medical



     D3) 5,000                                                        Branch



     unit tablet                                                        

 

     QUEtiapine      2016      Yes            50mg      Take 50 mg           U

nivers



     (SEROQUEL)      2-28                               by mouth           ity o

f



     50 mg      11:30:                               daily.           [place]



     tablet      04                                                Medical



                                                                 Branch

 

     simvastatin      2016      Yes            40mg      Take 40 mg           

[facility]



     (ZOCOR) 40      2-28                               by mouth           ity o

f



     mg tablet      11:30:                               at             [place]



               04                                 bedtime.           Medical



                                                                 Branch

 

     topiramate      2016      Yes            200mg      Take 200           Un

bob



     (TOPAMAX)      2-28                               mg by           ity of



     100 mg      11:30:                               mouth           [place]



     tablet      04                                 every           Medical



                                                  evening.           Branch

 

     KCL       2016      Yes            10meq      Take 10           [facility]



     (KLOR-CON      2-28                               mEq by           ity of



     M10) 10 mEq      11:30:                               mouth 2           [facility]

as



     tablet      04                                 (two)           Medical



                                                  times           Branch



                                                  daily.           

 

     pregabalin      2016      Yes            225mg      Take 225           Un

bob



     (LYRICA)      2-28                               mg by           ity of



     225 mg      11:30:                               mouth 2           [place]



     capsule      04                                 (two)           Medical



                                                  times           Branch



                                                  daily.           

 

     memantine      2016      Yes            5mg       Take 5 mg           Uni

vers



     (NAMENDA) 5      2-28                               by mouth 2           it

y of



     mg tablet      11:30:                               (two)           [place]



               04                                 times           Medical



                                                  daily.           Branch

 

     metoprolol      2016      Yes            50mg      Take 50 mg           U

nivers



     tartrate      2-28                               by mouth 2           ity o

f



     (LOPRESSOR)      11:30:                               (two)           [place]



     50 mg      04                                 times           Medical



     tablet                                         daily.           Branch

 

     esomeprazol      2016      Yes            20mg      Take 20 mg           

[facility]



     e (NEXIUM)      2-28                               by mouth 2           ity

 of



     20 mg      11:30:                               (two)           [place]



     capsule      04                                 times           Medical



                                                  daily.           Branch

 

     LACTOBAC/BI      2016      Yes            1{tbl}      Take 1           Un

bob



     FIDOBAC/TYLER      2-28                               tablet by           ity

 of



     B DC CON      11:30:                               mouth 2           [place]



     (ULTRA      04                                 (two)           Medical



     PATRICK PLUS                                         times           Branch



     ORAL)                                         daily.           



                                                  Indication           



                                                  s:             



                                                  Ultimate           



                                                  [facility]           

 

     calcium      2016      Yes            2{tbl}      Take 2           Univer

s



     carb and      2-28                               tablets by           ity o

f



     citrate-vit      11:30:                               mouth           [place]



     D3        04                                 daily.           Medical



     (CITRACAL +                                                        Branch



     D SLOW                                                        



     RELEASE)                                                        



     600 mg                                                        



     calcium-                                                        



     500 unit                                                        



     TbSR                                                        

 

     DULoxetine      2016      Yes            60mg      Take 60 mg           U

nivers



     (CYMBALTA)      2-28                               by mouth           ity o

f



     60 mg      11:30:                               daily.           [place]



     capsule      04                                                Medical



                                                                 Branch

 

     indapamide      2016      Yes            2.5mg      Take 2.5           Un

bob



     (LOZOL) 2.5      2-28                               mg by           ity of



     mg tablet      11:30:                               mouth           [place]



               04                                 daily.           Medical



                                                                 Branch

 

     Vit C-Vit      2016      Yes            1{capsu      Take 1           Uni

vers



     E-Lutein-Mi      2-28                     le}       capsule by           it

y of



     n-OM-3      11:30:                               mouth           [place]



     (OCUVITE)      04                                 daily.           Medical



     150-30-5-15                                                        Branch



     0                                                           



     mg-unit-mg-                                                        



     mg Cap                                                        

 

     multivitami      2016      Yes            1{tbl}      Take 1           Un

bob



     n         2-28                               tablet by           ity of



     (ONE-A-DAY      11:30:                               mouth           [place]



     ESSENTIAL)      04                                 daily.           Medical



     tablet                                                        Branch

 

     FERROUS      2016      Yes            1{tbl}      Take 1           Univer

s



     FUMARATE/VI      2-28                               tablet by           ity

 of



     T BCOMP,C      11:30:                               mouth           [place]



     (SUPER B      04                                 daily.           Medical



     COMPLEX                                                        Branch



     ORAL)                                                        

 

     ascorbic      2016      Yes            500mg      Take 500           Univ

ers



     acid,      2-28                               mg by           ity of



     vitamin C,      11:30:                               mouth           [place]



     (VITAMIN C)      04                                 daily.           Medica

l



     500 mg                                                        [facility]anch



     tablet                                                        

 

     Cholecalcif      2016      Yes            1{tbl}      Take 1           Un

bob



     edith,      2-28                               tablet by           ity of



     Vitamin D3,      11:30:                               mouth           [place]



     (VITAMIN      04                                 daily.           Medical



     D3) 5,000                                                        [facility]



     unit tablet                                                        

 

     QUEtiapine      2016      Yes            50mg      Take 50 mg           U

nivers



     (SEROQUEL)      2-28                               by mouth           ity o

f



     50 mg      11:30:                               daily.           [place]



     tablet      04                                                Medical



                                                                 Branch

 

     simvastatin      2016      Yes            40mg      Take 40 mg           

[facility]



     (ZOCOR) 40      2-28                               by mouth           ity o

f



     mg tablet      11:30:                               at             [place]



               04                                 bedtime.           Medical



                                                                 Branch

 

     topiramate      2016      Yes            200mg      Take 200           Un

bob



     (TOPAMAX)      2-28                               mg by           ity of



     100 mg      11:30:                               mouth           [place]



     tablet      04                                 every           Medical



                                                  evening.           Branch

 

     KCL       2016      Yes            10meq      Take 10           [facility]



     (KLOR-CON      2-28                               mEq by           ity of



     M10) 10 mEq      11:30:                               mouth 2           [facility]

as



     tablet      04                                 (two)           Medical



                                                  times           Branch



                                                  daily.           

 

     pregabalin      2016      Yes            225mg      Take 225           Un

bob



     (LYRICA)      2-28                               mg by           ity of



     225 mg      11:30:                               mouth 2           [place]



     capsule      04                                 (two)           Medical



                                                  times           Branch



                                                  daily.           

 

     memantine      2016      Yes            5mg       Take 5 mg           Uni

vers



     (NAMENDA) 5      2-28                               by mouth 2           it

y of



     mg tablet      11:30:                               (two)           [place]



               04                                 times           Medical



                                                  daily.           Branch

 

     metoprolol      2016      Yes            50mg      Take 50 mg           U

nivers



     tartrate      2-28                               by mouth 2           ity o

f



     (LOPRESSOR)      11:30:                               (two)           [place]



     50 mg      04                                 times           Medical



     tablet                                         daily.           Branch

 

     esomeprazol      2016      Yes            20mg      Take 20 mg           

[facility]



     e (NEXIUM)      2-28                               by mouth 2           ity

 of



     20 mg      11:30:                               (two)           [place]



     capsule      04                                 times           Medical



                                                  daily.           Branch

 

     LACTOBAC/BI      2016      Yes            1{tbl}      Take 1           Un

bob



     FIDOBAC/TYLER      2-28                               tablet by           ity

 of



     B DC CON      11:30:                               mouth 2           [place]



     (ULTRA      04                                 (two)           Medical



     PATRICK PLUS                                         times           Branch



     ORAL)                                         daily.           



                                                  Indication           



                                                  s:             



                                                  Ultimate           



                                                  [facility]           

 

     calcium      2016      Yes            2{tbl}      Take 2           Univer

s



     carb and      2-28                               tablets by           ity o

f



     citrate-vit      11:30:                               mouth           [place]



     D3        04                                 daily.           Medical



     (CITRACAL +                                                        Branch



     D SLOW                                                        



     RELEASE)                                                        



     600 mg                                                        



     calcium-                                                        



     500 unit                                                        



     TbSR                                                        

 

     DULoxetine      2016      Yes            60mg      Take 60 mg           U

nivers



     (CYMBALTA)      2-28                               by mouth           ity o

f



     60 mg      11:30:                               daily.           [place]



     capsule      04                                                Medical



                                                                 Branch

 

     indapamide      2016      Yes            2.5mg      Take 2.5           Un

bob



     (LOZOL) 2.5      2-28                               mg by           ity of



     mg tablet      11:30:                               mouth           [place]



               04                                 daily.           Medical



                                                                 Branch

 

     Vit C-Vit      2016      Yes            1{capsu      Take 1           Uni

vers



     E-Lutein-Mi      2-28                     le}       capsule by           it

y of



     n-OM-3      11:30:                               mouth           [place]



     (OCUVITE)      04                                 daily.           Medical



     150-30-5-15                                                        Branch



     0                                                           



     mg-unit-mg-                                                        



     mg Cap                                                        

 

     multivitami      2016      Yes            1{tbl}      Take 1           Un

bob



     n         2-28                               tablet by           ity of



     (ONE-A-DAY      11:30:                               mouth           [place]



     ESSENTIAL)      04                                 daily.           Medical



     tablet                                                        Branch

 

     FERROUS      2016      Yes            1{tbl}      Take 1           Univer

s



     FUMARATE/VI      2-28                               tablet by           ity

 of



     T BCOMP,C      11:30:                               mouth           [place]



     (SUPER B      04                                 daily.           Medical



     COMPLEX                                                        Branch



     ORAL)                                                        

 

     ascorbic      2016      Yes            500mg      Take 500           Univ

ers



     acid,      2-28                               mg by           ity of



     vitamin C,      11:30:                               mouth           [place]



     (VITAMIN C)      04                                 daily.           Medica

l



     500 mg                                                        Branch



     tablet                                                        

 

     Cholecalcif      2016      Yes            1{tbl}      Take 1           Un

bob



     edith,      2-28                               tablet by           ity of



     Vitamin D3,      11:30:                               mouth           [place]



     (VITAMIN      04                                 daily.           Medical



     D3) 5,000                                                        Branch



     unit tablet                                                        

 

     QUEtiapine      2016      Yes            50mg      Take 50 mg           U

nivers



     (SEROQUEL)      2-28                               by mouth           ity o

f



     50 mg      11:30:                               daily.           [place]



     tablet      04                                                Medical



                                                                 Branch

 

     simvastatin      2016      Yes            40mg      Take 40 mg           

Univers



     (ZOCOR) 40      2-28                               by mouth           ity o

f



     mg tablet      11:30:                               at             [place]



               04                                 bedtime.           Medical



                                                                 Branch

 

     topiramate      2016      Yes            200mg      Take 200           Un

bob



     (TOPAMAX)      2-28                               mg by           ity of



     100 mg      11:30:                               mouth           [place]



     tablet      04                                 every           Medical



                                                  evening.           Branch

 

     KCL       2016      Yes            10meq      Take 10           Univers



     (KLOR-CON      2-28                               mEq by           ity of



     M10) 10 mEq      11:30:                               mouth 2           [facility]

as



     tablet      04                                 (two)           Medical



                                                  times           Branch



                                                  daily.           

 

     pregabalin      2016      Yes            225mg      Take 225           Un

bob



     (LYRICA)      2-28                               mg by           ity of



     225 mg      11:30:                               mouth 2           [place]



     capsule      04                                 (two)           Medical



                                                  times           Branch



                                                  daily.           

 

     memantine      2016      Yes            5mg       Take 5 mg           Uni

vers



     (NAMENDA) 5      2-28                               by mouth 2           it

y of



     mg tablet      11:30:                               (two)           [place]



               04                                 times           Medical



                                                  daily.           Branch

 

     metoprolol      2016      Yes            50mg      Take 50 mg           U

nivers



     tartrate      2-28                               by mouth 2           ity o

f



     (LOPRESSOR)      11:30:                               (two)           [place]



     50 mg      04                                 times           Medical



     tablet                                         daily.           Branch

 

     esomeprazol      2016      Yes            20mg      Take 20 mg           

Univers



     e (NEXIUM)      2-28                               by mouth 2           ity

 of



     20 mg      11:30:                               (two)           [place]



     capsule      04                                 times           Medical



                                                  daily.           Branch

 

     LACTOBAC/BI      2016      Yes            1{tbl}      Take 1           Un

bob



     FIDOBAC/TYLER      2-28                               tablet by           ity

 of



     B DC CON      11:30:                               mouth 2           [place]



     (ULTRA      04                                 (two)           Medical



     PATRICK PLUS                                         times           Branch



     ORAL)                                         daily.           



                                                  Indication           



                                                  s:             



                                                  Ultimate           



                                                  Patrick           

 

     calcium      2016      Yes            2{tbl}      Take 2           Univer

s



     carb and      2-28                               tablets by           ity o

f



     citrate-vit      11:30:                               mouth           [place]



     D3        04                                 daily.           Medical



     (CITRACAL +                                                        Branch



     D SLOW                                                        



     RELEASE)                                                        



     600 mg                                                        



     calcium-                                                        



     500 unit                                                        



     TbSR                                                        

 

     DULoxetine      2016      Yes            60mg      Take 60 mg           U

nivers



     (CYMBALTA)      2-28                               by mouth           ity o

f



     60 mg      11:30:                               daily.           [place]



     capsule      04                                                Medical



                                                                 Branch

 

     indapamide      2016      Yes            2.5mg      Take 2.5           Un

bob



     (LOZOL) 2.5      2-28                               mg by           ity of



     mg tablet      11:30:                               mouth           [place]



               04                                 daily.           Medical



                                                                 Branch

 

     Vit C-Vit      2016      Yes            1{capsu      Take 1           Uni

vers



     E-Lutein-Mi      2-28                     le}       capsule by           it

y of



     n-OM-3      11:30:                               mouth           [place]



     (OCUVITE)      04                                 daily.           Medical



     150-30-5-15                                                        Branch



     0                                                           



     mg-unit-mg-                                                        



     mg Cap                                                        

 

     multivitami      2016      Yes            1{tbl}      Take 1           Un

bob



     n         2-28                               tablet by           ity of



     (ONE-A-DAY      11:30:                               mouth           [place]



     ESSENTIAL)      04                                 daily.           Medical



     tablet                                                        Branch

 

     FERROUS      2016      Yes            1{tbl}      Take 1           Univer

s



     FUMARATE/VI      2-28                               tablet by           ity

 of



     T BCOMP,C      11:30:                               mouth           [place]



     (SUPER B      04                                 daily.           Medical



     COMPLEX                                                        Branch



     ORAL)                                                        

 

     ascorbic      2016      Yes            500mg      Take 500           Univ

ers



     acid,      2-28                               mg by           ity of



     vitamin C,      11:30:                               mouth           [place]



     (VITAMIN C)      04                                 daily.           Medica

l



     500 mg                                                        Branch



     tablet                                                        







Immunizations







           Ordered    Filled Immunization Date       Status     Comments   [facility]



           Immunization Name Name                                        

 

           SARS-COV-2 COVID-19            2021 Completed             Unive

rsity of



           PFIZER VACCINE            00:00:00                         [facility]

 

           SARS-COV-2 COVID-19            2021 Completed             Unive

rsity of



           PFIZER VACCINE            00:00:00                         [facility]

 

           SARS-COV-2 COVID-19            2021 Completed             Unive

rsity of



           PFIZER VACCINE            00:00:00                         [facility]

 

           SARS-COV-2 COVID-19            2021 Completed             Unive

rsity of



           PFIZER VACCINE            00:00:00                         [facility]

 

           SARS-COV-2 COVID-19            2021 Completed             Unive

rsity of



           PFIZER VACCINE            00:00:00                         [facility]

 

           SARS-COV-2 COVID-19            2021 Completed             Unive

rsity of



           PFIZER VACCINE            00:00:00                         [place] [facility]

bella



                                                                  Branch







Vital Signs







             Vital Name   Observation Time Observation Value Comments     Source

 

             WEIGHT       2021 06:47:00 97.478 kg                 

 

             WEIGHT       2021 23:00:00 98.839 kg                 

 

             HEIGHT       2021 22:25:00 160 cm                    

 

             WEIGHT       2021 22:25:00 100.562 kg                

 

             WEIGHT       2021 06:47:00 97.478 kg                 

 

             WEIGHT       2021 23:00:00 98.839 kg                 

 

             HEIGHT       2021 22:25:00 160 cm                    

 

             WEIGHT       2021 22:25:00 100.562 kg                

 

             Heart rate   2021 08:01:00 56 /min                   [facility]

 

             Respiratory rate 2021 08:01:00 16 /min                   [facility]

 

             Oxygen saturation in 2021 08:01:00 93 /min                   

[facility] -



             Arterial blood by                                        Medical Ce

nter



             Pulse oximetry                                        

 

             Systolic blood 2021 07:00:00 126 mm[Hg]                [facility]

 

             Diastolic blood 2021 07:00:00 60 mm[Hg]                 [facility]

 

             Body temperature 2021 07:00:00 36.61 Brittany                 [facility]

 

             Body weight  2021 06:47:00 97.478 kg                 [facility]

 

             BMI          2021 06:47:00 38.07 kg/m2               [facility]

 

             Body height  2021 22:25:00 160 cm                    [facility]







Procedures







                Procedure       Date / Time     Performing Clinician Source



                                Performed                       

 

                PHYSICIAN CERTIFICATION 2022 06:01:00 Doctor Unassigned, U

[facility]



                STATEMENT                       [place]         [facility]

 

                HIGH SENSITIVITY TROPONIN 2021 16:40:00 Tara Kay 

I [facility]

 

                ECG 12-LEAD     2021 15:59:02 Unknown, Hl7 Doctor [facility]

 

                ECG 12-LEAD     2021 15:59:02 Unknown, Hl7 Doctor [facility]

 

                XR CHEST 1 VIEW PORTABLE / 2021 07:54:00 REA Abarca [facility]

 -



                [facility]

 

                CBC W/PLT COUNT & AUTO 2021 04:39:00 Mullins SyedaNATIVIDAD S

[facility]



                DIFFERENTIAL                    [facility]

 

                BASIC METABOLIC PANEL (7) 2021 04:39:00 Mullinsyessi Powellsar, CH

I [facility]

 

                CBC W/PLT COUNT & AUTO 2021 04:39:00 Mullinsyessi Gonzalezavsar, NATIVIDAD S

[facility]



                DIFFERENTIAL                    [facility]

 

                MAGNESIUM       2021 04:39:00 Susanna Landa [facility]

 

                B-TYPE NATRIURETIC FACTOR 2021 03:34:00 Ivanna Abarca [facility]



                (BNP)                           [facility]

 

                XR CHEST 1 VIEW PORTABLE / 2021 13:03:00 Tara Kay

[facility]

 

                BASIC METABOLIC PANEL (7) 2021 11:41:00 Tara Kay CH

I [facility]

 

                MAGNESIUM       2021 11:41:00 Tara Kay [facility]

 

                B-TYPE NATRIURETIC FACTOR 2021 11:41:00 Tara Kay CH

I [facility]



                (BNP)                           [facility]

 

                ECG 12-LEAD     2021 10:53:46 Cornelius Garzon   [facility]

 

                TRANSESOPHAGEAL ECHO 2021 09:39:19 Alfredo Young [facility]

 

                CBC W/PLT COUNT & AUTO 2021 05:17:00 Chao Ramos [facility]

[facility]



                DIFFERENTIAL                    [facility]

 

                BASIC METABOLIC PANEL (7) 2021 05:17:00 Chao Ramos CH

I [facility]

 

                HEPATIC FUNCTION PANEL 2021 05:17:00 Chao Ramos   [facility] S

[facility]

 

                CBC W/PLT COUNT & AUTO 2021 05:17:00 RachelChao thakkar   [facility]



                DIFFERENTIAL                    [facility]

 

                CARDIOVERSION   2021 00:31:19 Alfredo Young [facility]

 

                HIGH SENSITIVITY TROPONIN 2021 18:35:00 Rachel, [facility]

 

                2D ECHO W/ DOPPLER 2021 14:07:13 Sammy Armstrong [facility]



                (CW/PW/COLOR)                   [facility]

 

                CT CHEST FOR PULMONARY 2021 05:36:00 Rachel, [facility]



                EMBOLUS                         [facility]

 

                XR CHEST 1 VIEW PORTABLE / 2021 04:08:00 Chao Ramos

[facility] -



                BEDSIDE                         [facility]

 

                SARS-COV2/RT-PCR ([facility] & 2021 03:48:00 Rachel, [facility] -



                REF LABS)                       [facility]

 

                CBC W/PLT COUNT & AUTO 2021 03:41:00 Rachel, [facility]

 

                BASIC METABOLIC PANEL (7) 2021 03:41:00 Rachel, [facility]

 

                HEPATIC FUNCTION PANEL 2021 03:41:00 Rachel, [facility]

 

                CBC W/PLT COUNT & AUTO 2021 03:41:00 Rachel [facility]

 

                HIGH SENSITIVITY TROPONIN 2021 03:41:00 Rachel, [facility]

 

                B-TYPE NATRIURETIC FACTOR 2021 03:41:00 Rachel [facility]



                (BNP)                           [facility]

 

                CBC W/PLT COUNT & AUTO 2021 23:21:00 Rcahel, [facility]



                DIFFERENTIAL                    [facility]

 

                BASIC METABOLIC PANEL (7) 2021 23:21:00 Rachel, [facility]

 

                HEPATIC FUNCTION PANEL 2021 23:21:00 Rachel [facility]

 

                MAGNESIUM       2021 23:21:00 Rachel [facility]

 

                PHOSPHORUS      2021 23:21:00 Rachel [facility]

 

                PROTHROMBIN TIME/INR 2021 23:21:00 Rachel [facility]

 

                CBC W/PLT COUNT & AUTO 2021 23:21:00 Rachel, [facility]

 

                TSH/FREE T4 IF INDICATED 2021 23:21:00 Rachel [facility]

 

                HIGH SENSITIVITY TROPONIN 2021 23:21:00 Rachel [facility]

 

                D-DIMER         2021 23:21:00 Rachel [facility]

 

                VITAMIN B12 AND FOLATE 2021 23:21:00 Rachel [facility]

 

                ECG 12[facility]LEAD     [facility]

 

                ECG 12-LEAD     2021 23:07:40 Unknown, Hl7 Doctor [facility]

 

                REPORT OF PROCEDURE - 2021 00:00:00 Provider, [facility] -



                ENDOSCOPY SCAN                  Scanning        [facility]







Plan of Care







             Planned Activity Planned Date Details      Comments     Source

 

             Future Scheduled 2021   INFLUENZA VACCINE (#1)              C

[facility]



             Test         00:00:00     [code = INFLUENZA              Medical Ce

nter



                                       VACCINE (#1)]              

 

             Future Scheduled 2021   DEPRESSION SCREENING              [facility]



             [facility]         00:00:00     (12+) [code =              Medical Center



                                       DEPRESSION SCREENING              



                                       (12+)]                    

 

             Future Scheduled 2021   FALLS RISK SCREENING              [facility]



             Test         00:00:00     [code = FALLS RISK              Medical C

enter



                                       SCREENING]                

 

             Future Scheduled 2021   Medicare IPPE (WELCOME              C

[facility]



             Test         00:00:00     TO MEDICARE) [code =              Medical

 Center



                                       Medicare IPPE (WELCOME              



                                       TO MEDICARE)]              

 

             Future Scheduled 2006-10-07   PNEUMOCOCCAL 65+ YRS              [facility]



             Test         00:00:00     (1 of 1 -                 Medical Center



                                       [id number])              



                                       [code = PNEUMOCOCCAL              



                                       65+ YRS (1 of 1 -              



                                       [id number])]              

 

             Future Scheduled 1991-10-07   SHINGLES VACCINES (1              [facility]



             Test         00:00:00     of 2) [code = SHINGLES              Medic

al Center



                                       VACCINES (1 of 2)]              

 

             Future Scheduled 1960-10-07   DTAP/TDAP/TD VACCINES              [facility]

[facility]



             Test         00:00:00     (1 - Tdap) [code =              Medical C

enter



                                       DTAP/TDAP/TD VACCINES              



                                       (1 - Tdap)]               

 

             Future Scheduled 1959-10-07   HEPATITIS C SCREENING              [facility]

[facility]



             Test         00:00:00     [code = HEPATITIS C              Medical 

Center



                                       SCREENING]                

 

             Future Scheduled              [facility]atitis C screening              [facility]



             Test                      (procedure) [code =              



                                       882693793]                

 

             Future Scheduled              SHINGLES VACCINES (#1)              [facility]



             Test                      [code = SHINGLES              



                                       VACCINES (#1)]              

 

             Future Scheduled              65+ PNEUMOCOCCAL              [facility]

st Hospital



             Test                      VACCINE (1 of 1 -              



                                       PPSV23) [code = 65+              



                                       PNEUMOCOCCAL VACCINE              



                                       (1 of 1 - PPSV23)]              

 

             Future Scheduled              INFLUENZA VACCINE              [facility]

[facility] Hospital



             Test                      [code = INFLUENZA              



                                       VACCINE]                  







Encounters







        Start   End     Encounter Admission Attending Care    Care    Encounter 

Source



        Date/Time Date/Time Type    Type    Clinicians Facility Department ID   

   

 

        2022         Outpatient 3       [id number]  [facility]   OT     

 [facility]



        13:02:17                                                 [id number]    

 

        2022         Outpatient 3       [id number]  [facility]   REF     

 [facility]



        12:59:17                                                 [id number]    

 

        2022         Outpatient                 [facility]  [facility]  [id number]

 [facility]



        13:11:24                                                 [id number]   Sharon bo



                                                                        [facility]



                                                                        [facility]



                                                                        Clinics

 

        2021         Inpatient ER      DANYELLE [facility]    Cardiology [id number]

[id number] [facility]



        22:07:00                         MANOLO                         

 

        2022 Orders          Doctor [facility]    [id number]

[id number] [facility]



        00:00:00 00:00:00 Only            Unassigned, CHARIS   [id number]       

  ity of



                                        [place] [facility] [id number]         [place]

as



                                                        [phone]         [address]

 

        2022 Telephone         [facility]    [id number]

[id number] Univers



        00:00:00 00:00:00                 [place] BO [facility]  [id number]         it

y of



                                                [place] [id number]         [place]

as



                                                MARYBETH?BLEA [phone]         Me

wendy SANON    [address] [place] [address]



                                                MEDICAL                 



                                                OFFICE                  



                                                BUILDING                 

 

        2022 Telephone         [facility]    [id number]

[id number] [employer]



        00:00:00 00:00:00                 [facility]  [id number]         it

y of



                                                [place] [id number]         [place]

as



                                                MARYBETH?BLEA [phone]         Me

wendy



                                                [address]                 



                                                OFFICE                  



                                                [facility]                 

 

        2021 [facility] Manolo Feldman [facility] 

  [id number] 

[id number]                              [facility]



        22:07:00 12:43:00 Encounter         Chao Ramos, Yarosenda BURNS                       

  [facility]

 

        2021 Orders                  [facility]   [id number]

[id number] [facility]



        00:00:00 00:00:00 Only                                            [facility]

 

        2021 Outpatient                 [facility]     [id number]

[id number]



        00:00:00 23:59:00                                                 [place]

 

        2021 Travel                  [facility]   [id number]

 [facility]



        00:00:00 00:00:00                                                 [facility]

 

        2021-05-10 2021-05-10 Hospital         [place], 1.2.840.1 [id number]

[id number] [facility]



        08:36:31 23:59:00 Encounter         [facility]    03545.1.1         [address]



                                                3.430.2.7                 Hospit

a



                                                .3.421032                 l



                                                .8                      

 

        2021 Office          [place], 1.2.840.1 [id number]

5389 [facility]



        13:03:28 13:29:49 Visit           [facility]    61404.1.1         [address]



                                                3.430.2.7                 Hospit

a



                                                .3.648524                 l



                                                .8                      

 

        2021 Travel                  1.2.840.1 [id number] [id number]

[id number] [facility]i



        00:00:00 00:00:00                         45662.1.1 350.1.13.43 383     

st



                                                3.430.2.7 0.2.7.3.698         Ho

spita



                                                .3.564853 084.8           l



                                                .8                      







Results







           Test Description Test Time  Test Comments Results    Result Comments 

Source









                    High Sensitivity Troponin I ([facility] Only) 2021 1

7:20:00 









                      Test Item  Value      Reference Range Interpretation Comme

nts









             Troponin I HS    (test 6 pg/ml      See_Comment                [Aut

omated message] The



             code = 98810-8)                                        system which

 generated



                                                                 this result tra

nsmitted



                                                                 reference range

: <=17.



                                                                 The reference r

nurys was



                                                                 not used to int

erpret



                                                                 this result as



                                                                 normal/abnormal

.

 

             MAGGI (test code = MAGGI)  ID - DBThe                          

 



                           STAT High                           



                          Sensitivity Troponin-I                           



                          results should be used                           



                          in conjunction with                           



                          other diagnostic                           



                          information such as                           



                          ECG, clinical                           



                          observations and                           



                          information, and                           



                          patient symptoms to                           



                          aid in the diagnosis                           



                          of MI.                                 

 

             Lab Interpretation (test Normal                                 



             code = 36471-7)                                        



[id number] [facility]20[facility]00





             Test Item    Value        Reference Range Interpretation Comments

 

             HIGH SENSITIVITY 6 pg/ml      See_Comment                [Automated

 message]



             TROPONIN I (test code =                                        The 

system which



             5329528)                                            generated this 

result



                                                                 transmitted ref

erence



                                                                 range: <=17. Th

e



                                                                 reference range

 was not



                                                                 used to interpr

et this



                                                                 result as



                                                                 normal/abnormal

.



 ID - DBThe  STAT High Sensitivity Troponin-I results should be
used in conjunctionwith other diagnostic information such as ECG, clinical 
observations and information, and patient symptoms to aid in the diagnosis of 
MI.RAD, CHEST, 1 VIEW, NON [id number] 10:23:00Reason for exam:-&gt;pulm 
edemaShould this be performed at the bedside?-&gt;Yes
************************************************************[facility]        : 1940        Sex: 
F************************************************************FINAL REPORT 
PATIENT ID:   [id number]  HISTORY: Pulmonary edema COMPARISON: 2021  FIND
INGS: Mild interstitial pulmonary edema. No pleural effusions or pneumothorax. 
The heart shadow is borderline enlarged. The thoracic aorta is mildly tortuous. 
There are postoperative changes in the cervical spine. A right shoulder 
arthroplasty is partially imaged. Signed: Nikolas Ricketts MDReport Verified 
Date/Time:  2021 10:23:36 Reading Location: [address] Transitional 
Reading Room       Electronically signed by: NIKOLAS RICKETTS MD on 2021 
10:23 AMBasic Metabolic [id number] 05:46:00





             Test Item    Value        Reference Range Interpretation Comments

 

             Sodium (test code = 140 meq/L    136-145                   



             2951-2)                                             

 

             Potassium (test code = 3.4 meq/L    3.5-5.1      L            



             2823-3)                                             

 

             Chloride (test code = 108 meq/L           H            



             2075-0)                                             

 

             CO2 (test code = 25 meq/L     -29                     



             -9)                                             

 

             BUN (test code = 11 mg/dL     -                      



             3094-0)                                             

 

             Creatinine (test code 1.12 mg/dL   0.57-1.25                 



             = 2160-0)                                           

 

             Glucose (test code = 113 mg/dL           H            



             2345-7)                                             

 

             Calcium (test code = 8.9 mg/dL    8.4-10.2                  



             26751-6)                                            

 

             EGFR (test code = 47           mL/min/1.73 sq m              ESTIMA

MANI GFR IS



             33914-3)                                            NOT AS ACCURATE



                                                                 AS CREATININE



                                                                 CLEARANCE IN



                                                                 PREDICTING



                                                                 GLOMERULAR



                                                                 FILTRATION RATE

.



                                                                 ESTIMATED GFR I

S



                                                                 NOT APPLICABLE



                                                                 FOR DIALYSIS



                                                                 PATIENTS.

 

             MAGGI (test code = MAGGI)  ID -                           



                          NICOLAS M                                 

 

             Lab Interpretation Abnormal                               



             (test code = 38031-0)                                        



[facility]





             Test Item    Value        Reference Range Interpretation Comments

 

             Magnesium (test code = 1.7 mg/dL    1.6-2.6                   



             41542-3)                                            

 

             MAGGI (test code = MAGGI)  ID Felicity MEDELLIN                                      

 

             Lab Interpretation (test Normal                                 



             code = 13350-6)                                        



[facility] [id number] 05:46:00





             Test Item    Value        Reference Range Interpretation Comments

 

             SODIUM (BEAKER) 140 meq/L    136-145                   



             (test code = 381)                                        

 

             POTASSIUM (BEAKER) 3.4 meq/L    3.5-5.1      L            



             (test code = 379)                                        

 

             CHLORIDE (BEAKER) 108 meq/L           H            



             (test code = 382)                                        

 

             CO2 (BEAKER) (test 25 meq/L     22-29                     



             code = 355)                                         

 

             BLOOD UREA NITROGEN 11 mg/dL     7-21                      



             (BEAKER) (test code                                        



             = 354)                                              

 

             CREATININE (BEAKER) 1.12 mg/dL   0.57-1.25                 



             (test code = 358)                                        

 

             GLUCOSE RANDOM 113 mg/dL           H            



             (BEAKER) (test code                                        



             = 652)                                              

 

             CALCIUM (BEAKER) 8.9 mg/dL    8.4-10.2                  



             (test code = 697)                                        

 

             EGFR (BEAKER) (test 47 mL/min/1.73                           ESTIMA

MANI GFR IS



             code = 1092) sq m                                   NOT AS ACCURATE

 AS



                                                                 CREATININE



                                                                 CLEARANCE IN



                                                                 PREDICTING



                                                                 GLOMERULAR



                                                                 FILTRATION RATE

.



                                                                 ESTIMATED GFR I

S



                                                                 NOT APPLICABLE 

FOR



                                                                 DIALYSIS PATIEN

TS.



 ID - NICOLAS [id number] 05:46:00





             Test Item    Value        Reference Range Interpretation Comments

 

             MAGNESIUM (BEAKER) (test code = 1.7 mg/dL    1.6-2.6               

    



             627)                                                



 ID - NICOLAS MCBC with platelet count + automated [id number] 05:04:00





             Test Item    Value        Reference Range Interpretation Comments

 

             WBC (test code = 6690-2) 5.7          See_Comment                [A

utomated message]



                                                                 The system [employer]



                                                                 generated this 

result



                                                                 transmitted ref

erence



                                                                 range: 3.5 - 10

.5



                                                                 K/L. The refe

rence



                                                                 range was not u

sed to



                                                                 interpret this 

result



                                                                 as normal/abnor

mal.

 

             RBC (test code = 789-8) 3.85         See_Comment  L             [Au

tomated message]



                                                                 The system [employer]



                                                                 generated this 

result



                                                                 transmitted ref

erence



                                                                 range: 3.93 - 5

.22



                                                                 M/L. The refe

rence



                                                                 range was not u

sed to



                                                                 interpret this 

result



                                                                 as normal/abnor

mal.

 

             MCHC (test code = 786-4) 30.9         See_Comment  L             [A

utomated message]



                                                                 The system [employer]



                                                                 generated this 

result



                                                                 transmitted ref

erence



                                                                 range: 32.2 - 3

5.5



                                                                 GM/DL. The refe

rence



                                                                 range was not u

sed to



                                                                 interpret this 

result



                                                                 as normal/abnor

mal.

 

             Hematocrit (test code = 39.1 %       34.1-44.9                 



             4544-3)                                             

 

             MCV (test code = 787-2) 101.6 fL     79.4-94.8    H            

 

             MCH (test code = 785-6) 31.4 pg      25.6-32.2                 

 

             RDW (test code = 788-0) 13.8 %       11.7-14.4                 

 

             Platelets (test code = 204          See_Comment                [Aut

omated message]



             777-3)                                              The system [employer]



                                                                 generated this 

result



                                                                 transmitted ref

erence



                                                                 range: 150 - 45

0 K/CU



                                                                 MM. The referen

ce



                                                                 range was not u

sed to



                                                                 interpret this 

result



                                                                 as normal/abnor

mal.

 

             MPV (test code = 9.5 fL       9.4-12.3                  



             10419-0)                                            

 

             nRBC (test code = 413) 0            See_Comment                [Aut

omated message]



                                                                 The system [employer]



                                                                 generated this 

result



                                                                 transmitted ref

erence



                                                                 range: 0 - 0 /1

00



                                                                 WBC. The refere

nce



                                                                 range was not u

sed to



                                                                 interpret this 

result



                                                                 as normal/abnor

mal.

 

             % Neutros (test code = 52 %                                   



             429)                                                

 

             % Lymphs (test code = 32 %                                   



             430)                                                

 

             % Monos (test code = 11 %                                   



             431)                                                

 

             % Eos (test code = 432) 5 %                                    

 

             % Baso (test code = 437) 1 %                                    

 

             # Neutros (test code = 2.96         See_Comment                [Aut

omated message]



             670)                                                The system [employer]



                                                                 generated this 

result



                                                                 transmitted ref

erence



                                                                 range: 1.56 - 6

.13



                                                                 K/L. The refe

rence



                                                                 range was not u

sed to



                                                                 interpret this 

result



                                                                 as normal/abnor

mal.

 

             # Lymphs (test code = 1.83         See_Comment                [Auto

mated message]



             414)                                                The system [employer]



                                                                 generated this 

result



                                                                 transmitted ref

erence



                                                                 range: 1.18 - 3

.74



                                                                 K/L. The refe

rence



                                                                 range was not u

sed to



                                                                 interpret this 

result



                                                                 as normal/abnor

mal.

 

             # Monos (test code = 0.60         See_Comment  H             [Autom

ated message]



             415)                                                The system [employer]



                                                                 generated this 

result



                                                                 transmitted ref

erence



                                                                 range: 0.24 - 0

.36



                                                                 K/L. The refe

rence



                                                                 range was not u

sed to



                                                                 interpret this 

result



                                                                 as normal/abnor

mal.

 

             # Eos (test code = 416) 0.26         See_Comment                [Au

tomated message]



                                                                 The system [employer]



                                                                 generated this 

result



                                                                 transmitted ref

erence



                                                                 range: 0.04 - 0

.36



                                                                 K/L. The refe

rence



                                                                 range was not u

sed to



                                                                 interpret this 

result



                                                                 as normal/abnor

mal.

 

             # Baso (test code = 417) 0.05         See_Comment                [A

utomated message]



                                                                 The system [employer]



                                                                 generated this 

result



                                                                 transmitted ref

erence



                                                                 range: 0.01 - 0

.08



                                                                 K/L. The refe

rence



                                                                 range was not u

sed to



                                                                 interpret this 

result



                                                                 as normal/abnor

mal.

 

             Immature     1 %          0-1                       



             Granulocytes-Relative                                        



             (test code = 2801)                                        

 

             Lab Interpretation (test Abnormal                               



             code = 17899-1)                                        



[facility] W/PLT COUNT &amp; AUTO [id number] 
05:04:00





             Test Item    Value        Reference Range Interpretation Comments

 

             WHITE BLOOD CELL COUNT (BEAKER) 5.7 K/ L     3.5-10.5              

    



             (test code = 775)                                        

 

             RED BLOOD CELL COUNT (BEAKER) 3.85 M/ L    3.93-5.22    L          

  



             (test code = 761)                                        

 

             HEMOGLOBIN (BEAKER) (test code = 12.1 GM/DL   11.2-15.7            

     



             410)                                                

 

             HEMATOCRIT (BEAKER) (test code = 39.1 %       34.1-44.9            

     



             411)                                                

 

             MEAN CORPUSCULAR VOLUME (BEAKER) 101.6 fL     79.4-94.8    H       

     



             (test code = 753)                                        

 

             MEAN CORPUSCULAR HEMOGLOBIN 31.4 pg      25.6-32.2                 



             (BEAKER) (test code = 751)                                        

 

             MEAN CORPUSCULAR HEMOGLOBIN CONC 30.9 GM/DL   32.2-35.5    L       

     



             (BEAKER) (test code = 752)                                        

 

             RED CELL DISTRIBUTION WIDTH 13.8 %       11.7-14.4                 



             (BEAKER) (test code = 412)                                        

 

             PLATELET COUNT (BEAKER) (test 204 K/CU MM  150-450                 

  



             code = 756)                                         

 

             MEAN PLATELET VOLUME (BEAKER) 9.5 fL       9.4-12.3                

  



             (test code = 754)                                        

 

             NUCLEATED RED BLOOD CELLS 0 /100 WBC   0-0                       



             (BEAKER) (test code = 413)                                        

 

             NEUTROPHILS RELATIVE PERCENT 52 %                                  

 



             (BEAKER) (test code = 429)                                        

 

             LYMPHOCYTES RELATIVE PERCENT 32 %                                  

 



             (BEAKER) (test code = 430)                                        

 

             MONOCYTES RELATIVE PERCENT 11 %                                   



             (BEAKER) (test code = 431)                                        

 

             EOSINOPHILS RELATIVE PERCENT 5 %                                   

 



             (BEAKER) (test code = 432)                                        

 

             BASOPHILS RELATIVE PERCENT 1 %                                    



             (BEAKER) (test code = 437)                                        

 

             NEUTROPHILS ABSOLUTE COUNT 2.96 K/ L    1.56-6.13                 



             (BEAKER) (test code = 670)                                        

 

             LYMPHOCYTES ABSOLUTE COUNT 1.83 K/ L    1.18-3.74                 



             (BEAKER) (test code = 414)                                        

 

             MONOCYTES ABSOLUTE COUNT (BEAKER) 0.60 K/ L    0.24-0.36    H      

      



             (test code = 415)                                        

 

             EOSINOPHILS ABSOLUTE COUNT 0.26 K/ L    0.04-0.36                 



             (BEAKER) (test code = 416)                                        

 

             BASOPHILS ABSOLUTE COUNT (BEAKER) 0.05 K/ L    0.01-0.08           

      



             (test code = 417)                                        

 

             IMMATURE GRANULOCYTES-RELATIVE 1 %          0-1                    

   



             PERCENT (BEAKER) (test code =                                      

  



             2801)                                               



B-type Natriuretic Factor (BNP)2021 04:52:00





             Test Item    Value        Reference Range Interpretation Comments

 

             BNP (test code = 29418-1) 97 pg/mL     0-100                     

 

             MAGGI (test code = MAGGI)  ID - PIAYA                          

 



                          L                                      

 

             Lab Interpretation (test Normal                                 



             code = 28980-4)                                        



[facility]52[facility]00





             Test Item    Value        Reference Range Interpretation Comments

 

             B-TYPE NATRIURETIC PEPTIDE (BEAKER) 97 pg/mL     0-100             

        



             (test code = 700)                                        



 ID - PIAYA LRAD, CHEST, 1 VIEW, NON [id number] 14:01:00Reason for 
exam:-&gt;dyspneaShould this be performed at the bedside?-&gt;Yes
************************************************************[facility]        : 1940        Sex: 
F************************************************************FINAL REPORT 
PATIENT ID:   [id number] CLINICAL HISTORY: dyspnea  TECHNIQUE: 1 view of the c
hest. COMPARISON: 2021 IMPRESSION: Pulmonary vascular congestion is again 
seen with slightly increased prominence of the interstitial lung markings 
bilaterally. There is no focal lobar consolidation or significant pleural fluid.
The cardiomediastinal silhouette is magnified by technique. Cervicalfusion 
hardware right shoulder hardware is again seen. Signed: Juan Francisco MDReport 
Verified Date/Time:  2021 14:01:32 Reading Location: [facility] 
Radiology Reading Room      Electronicallysigned by: JUAN FRANCISCO M.D. on 
2021 02:01 PMBASI METABOLIC [id number] 12:22:00





             Test Item    Value        Reference Range Interpretation Comments

 

             SODIUM (BEAKER) 134 meq/L    136-145      L            



             (test code = 381)                                        

 

             POTASSIUM (BEAKER) 3.7 meq/L    3.5-5.1                   



             (test code = 379)                                        

 

             CHLORIDE (BEAKER) 103 meq/L                        



             (test code = 382)                                        

 

             CO2 (BEAKER) (test 25 meq/L     22-29                     



             code = 355)                                         

 

             BLOOD UREA NITROGEN 15 mg/dL     7-21                      



             (BEAKER) (test code                                        



             = 354)                                              

 

             CREATININE (BEAKER) 1.24 mg/dL   0.57-1.25                 



             (test code = 358)                                        

 

             GLUCOSE RANDOM 168 mg/dL           H            



             (BEAKER) (test code                                        



             = 652)                                              

 

             CALCIUM (BEAKER) 8.3 mg/dL    8.4-10.2     L            



             (test code = 697)                                        

 

             EGFR (BEAKER) (test 42 mL/min/1.73                           ESTIMA

MANI GFR IS



             code = 1092) sq m                                   NOT AS ACCURATE

 AS



                                                                 CREATININE



                                                                 CLEARANCE IN



                                                                 PREDICTING



                                                                 GLOMERULAR



                                                                 FILTRATION RATE

.



                                                                 ESTIMATED GFR I

S



                                                                 NOT APPLICABLE 

FOR



                                                                 DIALYSIS PATIEN

TS.



 ID - [id number] 12:22:00





             Test Item    Value        Reference Range Interpretation Comments

 

             MAGNESIUM (BEAKER) (test code = 1.7 mg/dL    1.6-2.6               

    



             627)                                                



 ID - EMERSONB-TYPE NATRIURETIC FACTOR (BNP)2021 12:13:00





             Test Item    Value        Reference Range Interpretation Comments

 

             B-TYPE NATRIURETIC PEPTIDE (BEAKER) 88 pg/mL     0-100             

        



             (test code = 700)                                        



 ID - EMERSONTransesophageal [id number] 21:25:57Ejection 
FractionSLEH ECHO HEARTLAB MKCKESSON CPACSMenlo Park VA HospitalHepatic 
function [id number] 06:35:00





             Test Item    Value        Reference Range Interpretation Comments

 

             Protein, Total (test 5.4          See_Comment  L             [Autom

ated



             code = 2885-2)                                        message] The



                                                                 system which



                                                                 generated this



                                                                 result transmit

mnai



                                                                 reference range

:



                                                                 6.0 - 8.3 gm/dL

.



                                                                 The reference



                                                                 range was not u

sed



                                                                 to interpret th

is



                                                                 result as



                                                                 normal/abnormal

.

 

             Albumin (test code = 2.7 g/dL     3.5-5.0      L            



             78445-7)                                            

 

             Total Bilirubin (test 0.1 mg/dL    0.2-1.2      L            



             code = 1975-2)                                        

 

             Bilirubin, Direct 0.1 mg/dL    0.1-0.5                   



             (test code = 1968-7)                                        

 

             Alkaline Phosphatase 80 U/L                           



             (test code = 6768-6)                                        

 

             AST (test code = 15 U/L       5-34                      



             1920-8)                                             

 

             ALT (test code = 8 U/L        6-55                      



             1742-6)                                             

 

             MAGGI (test code = MAGGI)  ID -                           



                          CLAYTONRAJIV L                                

 

             Lab Interpretation Abnormal                               



             (test code = 75321-6)                                        



[facility] Hospital[facility]OL[facility] [id number] 06:35:00





             Test Item    Value        Reference Range Interpretation Comments

 

             SODIUM (BEAKER) 138 meq/L    136-145                   



             (test code = 381)                                        

 

             POTASSIUM (BEAKER) 3.6 meq/L    3.5-5.1                   



             (test code = 379)                                        

 

             CHLORIDE (BEAKER) 106 meq/L                        



             (test code = 382)                                        

 

             CO2 (BEAKER) (test 22 meq/L     22-29                     



             code = 355)                                         

 

             BLOOD UREA NITROGEN 15 mg/dL     7-21                      



             (BEAKER) (test code                                        



             = 354)                                              

 

             CREATININE (BEAKER) 1.12 mg/dL   0.57-1.25                 



             (test code = 358)                                        

 

             GLUCOSE RANDOM 69 mg/dL            L            



             (BEAKER) (test code                                        



             = 652)                                              

 

             CALCIUM (BEAKER) 8.4 mg/dL    8.4-10.2                  



             (test code = 697)                                        

 

             EGFR (BEAKER) (test 47 mL/min/1.73                           ESTIMA

MANI GFR IS



             code = 1092) sq m                                   NOT AS ACCURATE

 AS



                                                                 CREATININE



                                                                 CLEARANCE IN



                                                                 PREDICTING



                                                                 GLOMERULAR



                                                                 FILTRATION RATE

.



                                                                 ESTIMATED GFR I

S



                                                                 NOT APPLICABLE 

FOR



                                                                 DIALYSIS PATIEN

TS.



 ID - PIAYA LHEPATIC FUNCTION [id number] 06:35:00





             Test Item    Value        Reference Range Interpretation Comments

 

             TOTAL PROTEIN (BEAKER) (test code = 5.4 gm/dL    6.0-8.3      L    

        



             770)                                                

 

             ALBUMIN (BEAKER) (test code = 1145) 2.7 g/dL     3.5-5.0      L    

        

 

             BILIRUBIN TOTAL (BEAKER) (test code 0.1 mg/dL    0.2-1.2      L    

        



             = 377)                                              

 

             BILIRUBIN DIRECT (BEAKER) (test 0.1 mg/dL    0.1-0.5               

    



             code = 706)                                         

 

             ALKALINE PHOSPHATASE (BEAKER) (test 80 U/L                   

        



             code = 346)                                         

 

             AST (SGOT) (BEAKER) (test code = 15 U/L       5-34                 

     



             353)                                                

 

             ALT (SGPT) (BEAKER) (test code = 8 U/L        6-55                 

     



             347)                                                



 ID - PIAYA LCBC W/PLT COUNT &amp; AUTO [id number] 05:44:00





             Test Item    Value        Reference Range Interpretation Comments

 

             WHITE BLOOD CELL COUNT (BEAKER) 5.8 K/ L     3.5-10.5              

    



             (test code = 775)                                        

 

             RED BLOOD CELL COUNT (BEAKER) 3.95 M/ L    3.93-5.22               

  



             (test code = 761)                                        

 

             HEMOGLOBIN (BEAKER) (test code = 12.7 GM/DL   11.2-15.7            

     



             410)                                                

 

             HEMATOCRIT (BEAKER) (test code = 40.1 %       34.1-44.9            

     



             411)                                                

 

             MEAN CORPUSCULAR VOLUME (BEAKER) 101.5 fL     79.4-94.8    H       

     



             (test code = 753)                                        

 

             MEAN CORPUSCULAR HEMOGLOBIN 32.2 pg      25.6-32.2                 



             (BEAKER) (test code = 751)                                        

 

             MEAN CORPUSCULAR HEMOGLOBIN CONC 31.7 GM/DL   32.2-35.5    L       

     



             (BEAKER) (test code = 752)                                        

 

             RED CELL DISTRIBUTION WIDTH 13.4 %       11.7-14.4                 



             (BEAKER) (test code = 412)                                        

 

             PLATELET COUNT (BEAKER) (test 213 K/CU MM  150-450                 

  



             code = 756)                                         

 

             MEAN PLATELET VOLUME (BEAKER) 9.8 fL       9.4-12.3                

  



             (test code = 754)                                        

 

             NUCLEATED RED BLOOD CELLS 0 /100 WBC   0-0                       



             (BEAKER) (test code = 413)                                        

 

             NEUTROPHILS RELATIVE PERCENT 41 %                                  

 



             (BEAKER) (test code = 429)                                        

 

             LYMPHOCYTES RELATIVE PERCENT 41 %                                  

 



             (BEAKER) (test code = 430)                                        

 

             MONOCYTES RELATIVE PERCENT 10 %                                   



             (BEAKER) (test code = 431)                                        

 

             EOSINOPHILS RELATIVE PERCENT 6 %                                   

 



             (BEAKER) (test code = 432)                                        

 

             BASOPHILS RELATIVE PERCENT 1 %                                    



             (BEAKER) (test code = 437)                                        

 

             NEUTROPHILS ABSOLUTE COUNT 2.34 K/ L    1.56-6.13                 



             (BEAKER) (test code = 670)                                        

 

             LYMPHOCYTES ABSOLUTE COUNT 2.38 K/ L    1.18-3.74                 



             (BEAKER) (test code = 414)                                        

 

             MONOCYTES ABSOLUTE COUNT (BEAKER) 0.57 K/ L    0.24-0.36    H      

      



             (test code = 415)                                        

 

             EOSINOPHILS ABSOLUTE COUNT 0.34 K/ L    0.04-0.36                 



             (BEAKER) (test code = 416)                                        

 

             BASOPHILS ABSOLUTE COUNT (BEAKER) 0.07 K/ L    0.01-0.08           

      



             (test code = 417)                                        

 

             IMMATURE GRANULOCYTES-RELATIVE 1 %          0-1                    

   



             PERCENT (BEAKER) (test code =                                      

  



             2801)                                               



SARS-CoV2/RT-PCR (Asymptomatic ONLY)2021 23:22:00





             Test Item    Value        Reference Range Interpretation Comments

 

             SARS-COV2/RT-PCR (test Negative     Negative                  



             code = 13212-4)                                        

 

             MAGGI (test code = MAGGI) Negative result for                          

 



                          this test determines                           



                          that SARS-CoV-2 RNA was                           



                          not present in the                           



                          specimen above the                           



                          Limit of Detection                           



                          (LOD).  However,                           



                          Negative results do not                           



                          preclude SARS-CoV-2                           



                          infection and should                           



                          not be used as the sole                           



                          basis for treatment or                           



                          patient management                           



                          decisions.  Negative                           



                          results must be                           



                          combined with clinical                           



                          observations, patient                           



                          history, and                           



                          epidemiological                           



                          information.  A false                           



                          negative result may                           



                          occur if a specimen is                           



                          improperly collected,                           



                          transported, or                           



                          handled.  A false                           



                          negative result should                           



                          be considered if                           



                          patient's recent                           



                          exposures or clinical                           



                          presentation indicate                           



                          that COVID-19                           



                          (SARS-CoV-2) is likely                           



                          and diagnostic tests                           



                          for other causes of                           



                          illness are negative.                           



                          Re-testing should be                           



                          considered in cases of                           



                          suspected false                           



                          negatives. The limit of                           



                          detection for this                           



                          assay is 100 copies/mL.                           



                          This SARS-CoV-2 test is                           



                          a real-time RT_PCR test                           



                          intended for the                           



                          qualitative detection                           



                          of nucleic acid from                           



                          SARS-CoV-2 in a                           



                          nasopharyngeal swab                           



                          specimen collected from                           



                          individuals suspected                           



                          of COVID-19 by their                           



                          healthcare provider.                           



                          This test has not been                           



                          Food and Drug                           



                          Administration (FDA)                           



                          cleared or approved.                           



                          This is a modified                           



                          version of an approved                           



                          Emergency Use                           



                          Authorization (EUA) and                           



                          is in the process of                           



                          review by the FDA.                           



                          Once authorized by the                           



                          FDA, the issued EUA                           



                          will be effective until                           



                          the declaration that                           



                          circumstances exist                           



                          justifying the                           



                          authorization of the                           



                          emergency use of in                           



                          vitro diagnostic tests                           



                          for detection and/or                           



                          diagnosis of COVID-19                           



                          is terminated under                           



                          Section 564(b)(2) of                           



                          the Act or the EUA is                           



                          revoked under Section                           



                          564(g) of the Act.                           



                          Testing was performed                           



                          using the [employer]                           



                          SARS-CoV-2 assay. Fact                           



                          Sheet for Healthcare                           



                          Providers:https://www.natty davey/sal/RT                           



                          SARS-CoV-2 HCP Fact                           



                          Sheet 51-162953.pdf                           



                          Fact Sheet for                           



                          Healthcare                             



                          Patients:[email or web address]/[email or web address]                           



                          SARS-CoV-2 Patient Fact                           



                          Sheet EN                               



                          [id number].pdf                           

 

             Lab Interpretation Normal                                 



             (test code = 27698-3)                                        



[facility]2021 
23:22:00





             Test Item    Value        Reference Range Interpretation Comments

 

             SARS-COV2/RT-PCR (test code = Negative     Negative                

  



             7824854)                                            



Negative result for this test determines that SARS-CoV-2 RNA was not present in 
the specimen above the Limit of Detection (LOD).  However, Negative results do 
not preclude SARS-CoV-2 infection and should not be used as the sole basis for 
treatment or patient management decisions.  Negative results must be combined 
with clinical observations, patient history, and epidemiological information.  A
false negative result may occur if a specimen is improperly collected, 
transported, or handled.  A false negative result should be considered if 
patient's recent exposures or clinical presentation indicate that COVID-19 
(SARS-CoV-2) is likely and diagnostic tests for other causes of illness are 
negative.  Re-testing should be considered in cases of suspected false 
negatives.The limit of detection for this assay is 100 copies/mL.This SARS-CoV-2
test is a real-time RT_PCR test intended for the qualitative detection of 
nucleic acid from SARS-CoV-2 in a nasopharyngeal swab specimen collected from 
individuals suspected of COVID-19 by their healthcare provider.This test has not
been Food and Drug Administration (FDA) cleared or approved.  This is a modified
version of an approved Emergency Use Authorization (EUA) and is in the process 
of review by the FDA.  Once authorized by the FDA, the issued EUA will be e
ffective until the declaration that circumstances exist justifying the 
authorization of the emergency use of in vitro diagnostic tests for detection 
and/or diagnosis of COVID-19 is terminated under Section 564(b)(2) of the Act or
the EUA is revoked under Section 564(g) of the Act.Testing was performedusing 
the Abbott SARS-CoV-2 assay.Fact Sheet for Healthcare 
Providers:https://www.[employer].abbott/sal/RT SARS-CoV-2 HCP Fact Sheet 51-
977192.pdfFact Sheet for Healthcare Patients:https://www.molecular.abbott/sal/RT
SARS-CoV-2 Patient Fact Sheet EN 51-738604P8.pdfHIGH SENSITIVITY TROPONIN I
2021 19:21:00





             Test Item    Value        Reference Range Interpretation Comments

 

             HIGH SENSITIVITY 8 pg/ml      See_Comment                [Automated

 message]



             TROPONIN I (test code =                                        The 

system which



             8252379)                                            generated this 

result



                                                                 transmitted ref

erence



                                                                 range: <=17. Th

e



                                                                 reference range

 was not



                                                                 used to interpr

et this



                                                                 result as



                                                                 normal/abnormal

.



 ID - dwayne Godwine  STAT High Sensitivity Troponin-I results 
should be used in conjunction with other diagnostic information such as ECG, 
clinical observations and information, and patient symptoms to aid in the 
diagnosis of MI.2D Echo W/Doppler(CW/PW/Color)2021 17:56:11Ejection 
FractionSLEH ECHO HEARTLAB MKCKESSON CPACSCHI O'Connor HospitalCT, CHEST
WITH IV CONTRAST- PE TEST [id number] 06:05:00Unlisted Reason for Exam - 
Click Yes and Enter Reason Below-&gt;No
************************************************************[facility]        : 1940        Sex: 
F************************************************************FINAL REPORT 
PATIENT ID:   [id number] EXAM/TECHNIQUE: CT angiography of the chest pulmonary 
embolus protocol INDICATION: Concern for pulmonary embolism. COMPARISON: None. 
FINDINGS:  Lower Neck: Unremarkable. Parenchyma/Pleura: Bilateral patchy 
groundglass opacities are present in the lungs. 13 mm left lower lobe pulmonary 
nodule. Small bilateral pleural effusions. Septal thickening and peribronchial 
thickening is present. Airways: Unremarkable. Cardiac: Cardiomegaly. 
Mediastinum/lymph nodes: No lymphadenopathy. Vessels: No filling defects in the 
main, lobar, or segmental pulmonary arteries. Osseous: No acute osseous process.
No suspicious osseous lesion. Upper abdomen: Unremarkable. Impression: 1. No 
acute pulmonary embolism.2. Bilateral groundglass opacities, bilateral pleural 
effusions, and interstitial thickening may represent pulmonary edema and/or 
infection.3. Left lower lobe pulmonary nodule measuring 13 mm. Consider follow-
up CT in three months, PET/CT, or tissue sampling. Signed: Will Matthews 
MDRort Verified Date/Time:  2021 06:05:30       Electronically signed 
by:WILL MATTHEWS MD on 2021 06:05 AMHEPATIC FUNCTION [id number] 
06:00:00





             Test Item    Value        Reference Range Interpretation Comments

 

             TOTAL PROTEIN (BEAKER) (test code = 5.2 gm/dL    6.0-8.3      L    

        



             770)                                                

 

             ALBUMIN (BEAKER) (test code = 1145) 2.7 g/dL     3.5-5.0      L    

        

 

             BILIRUBIN TOTAL (BEAKER) (test code 0.6 mg/dL    0.2-1.2           

        



             = 377)                                              

 

             BILIRUBIN DIRECT (BEAKER) (test 0.3 mg/dL    0.1-0.5               

    



             code = 706)                                         

 

             ALKALINE PHOSPHATASE (BEAKER) (test 92 U/L                   

        



             code = 346)                                         

 

             AST (SGOT) (BEAKER) (test code = 14 U/L       5-34                 

     



             353)                                                

 

             ALT (SGPT) (BEAKER) (test code = 7 U/L        6-55                 

     



             347)                                                



 ID - NICOLAS MOperator ID - CLAYTONAYA LBASIC METABOLIC [id number] 
05:13:00





             Test Item    Value        Reference Range Interpretation Comments

 

             SODIUM (BEAKER) 139 meq/L    136-145                   



             (test code = 381)                                        

 

             POTASSIUM (BEAKER) 4.0 meq/L    3.5-5.1                   



             (test code = 379)                                        

 

             CHLORIDE (BEAKER) 110 meq/L           H            



             (test code = 382)                                        

 

             CO2 (BEAKER) (test 24 meq/L     22-29                     



             code = 355)                                         

 

             BLOOD UREA NITROGEN 14 mg/dL     7-21                      



             (BEAKER) (test code                                        



             = 354)                                              

 

             CREATININE (BEAKER) 1.05 mg/dL   0.57-1.25                 



             (test code = 358)                                        

 

             GLUCOSE RANDOM 81 mg/dL                         



             (BEAKER) (test code                                        



             = 652)                                              

 

             CALCIUM (BEAKER) 8.4 mg/dL    8.4-10.2                  



             (test code = 697)                                        

 

             EGFR (BEAKER) (test 51 mL/min/1.73                           ESTIMA

MANI GFR IS



             code = 1092) sq m                                   NOT AS ACCURATE

 AS



                                                                 CREATININE



                                                                 CLEARANCE IN



                                                                 PREDICTING



                                                                 GLOMERULAR



                                                                 FILTRATION RATE

.



                                                                 ESTIMATED GFR I

S



                                                                 NOT APPLICABLE 

FOR



                                                                 DIALYSIS PATIEN

TS.



 ID - NICOLAS MVitamin B12 and [id number] 04:44:00





             Test Item    Value        Reference Range Interpretation Comments

 

             Vitamin B12 (test 272 pg/mL    213-816                   



             code = 2132-9)                                        

 

             Folate (test code = 7.70 ng/mL   See_Comment                [Automa

mani



             2284-8)                                             message] The



                                                                 system which



                                                                 generated this



                                                                 result transmit

mani



                                                                 reference range

:



                                                                 >=7.00. The



                                                                 reference range



                                                                 was not used to



                                                                 interpret this



                                                                 result as



                                                                 normal/abnormal

.

 

             MAGGI (test code = MAGGI)  ID -                           



                          NICOLAS MEDELLIN                                 

 

             Lab Interpretation Normal                                 



             (test code = 42722-0)                                        



[facility] [id number] 04:44:00





             Test Item    Value        Reference Range Interpretation Comments

 

             VITAMIN B12 (BEAKER) 272 pg/mL    213-816                   



             (test code = 774)                                        

 

             FOLATE (BEAKER) 7.70 ng/mL   See_Comment                [Automated 

message]



             (test code = 362)                                        The system

 which



                                                                 generated this 

result



                                                                 transmitted ref

erence



                                                                 range: >=7.00. 

The



                                                                 reference range

 was not



                                                                 used to interpr

et this



                                                                 result as



                                                                 normal/abnormal

.



 ID - NICOLAS MRAD, CHEST, 1 VIEW, NON [id number] 04:41:00Reason for 
exam:-&gt;afibShould this be performed at the bedside?-&gt;Yes
************************************************************[facility]        : 1940        Sex: 
F************************************************************FINAL REPORT 
PATIENT ID:   [id number] EXAM/TECHNIQUE: Single view frontal radiograph of the 
chest. INDICATION: Atrial fibrillation. COMPARISON: None. FINDINGS:  
Devices/Objects: None. Lungs: No focal consolidation. No pleural effusion. No 
pneumothorax. Heart/Mediastinum: No cardiomegaly. Mild interstitial thickening. 
Osseous: No acute osseous process. No suspicious osseous lesion. Upper abdomen: 
Unremarkable. Impression: Mild interstitial thickening may represent 
interstitial pulmonary edema. Signed: Will Matthews MDRBridge[facility] Verified 
Date/Time:  2021 04:41:03       Electronically signed by: WILL MATTHEWS MD on 2021 04:41 AMHIGH SENSITIVITY TROPONIN -46-65 04:36:00





             Test Item    Value        Reference Range Interpretation Comments

 

             HIGH SENSITIVITY 8 pg/ml      See_Comment                [Automated

 message]



             TROPONIN I (test code =                                        The 

system which



             2501725)                                            generated this 

result



                                                                 transmitted ref

erence



                                                                 range: <=17. Th

e



                                                                 reference range

 was not



                                                                 used to interpr

et this



                                                                 result as



                                                                 normal/abnormal

.



 ID - NICOLAS MThe  STAT High Sensitivity Troponin-I results 
should be used in conjunction with other diagnostic information such as ECG, 
clinical observations and information, and patient symptoms to aid in the 
diagnosis of MI.B-TYPE NATRIURETIC FACTOR (BNP)2021 04:27:00





             Test Item    Value        Reference Range Interpretation Comments

 

             B-TYPE NATRIURETIC PEPTIDE (BEAKER) 144 pg/mL    0-100        H    

        



             (test code = 700)                                        



 ID - NICOLAS MCBC W/PLT COUNT &amp; AUTO [id number] 04:01:00





             Test Item    Value        Reference Range Interpretation Comments

 

             WHITE BLOOD CELL COUNT (BEAKER) 6.7 K/ L     3.5-10.5              

    



             (test code = 775)                                        

 

             RED BLOOD CELL COUNT (BEAKER) 3.83 M/ L    3.93-5.22    L          

  



             (test code = 761)                                        

 

             HEMOGLOBIN (BEAKER) (test code = 12.3 GM/DL   11.2-15.7            

     



             410)                                                

 

             HEMATOCRIT (BEAKER) (test code = 38.7 %       34.1-44.9            

     



             411)                                                

 

             MEAN CORPUSCULAR VOLUME (BEAKER) 101.0 fL     79.4-94.8    H       

     



             (test code = 753)                                        

 

             MEAN CORPUSCULAR HEMOGLOBIN 32.1 pg      25.6-32.2                 



             (BEAKER) (test code = 751)                                        

 

             MEAN CORPUSCULAR HEMOGLOBIN CONC 31.8 GM/DL   32.2-35.5    L       

     



             (BEAKER) (test code = 752)                                        

 

             RED CELL DISTRIBUTION WIDTH 13.5 %       11.7-14.4                 



             (BEAKER) (test code = 412)                                        

 

             PLATELET COUNT (BEAKER) (test 205 K/CU MM  150-450                 

  



             code = 756)                                         

 

             MEAN PLATELET VOLUME (BEAKER) 9.7 fL       9.4-12.3                

  



             (test code = 754)                                        

 

             NUCLEATED RED BLOOD CELLS 0 /100 WBC   0-0                       



             (BEAKER) (test code = 413)                                        

 

             NEUTROPHILS RELATIVE PERCENT 47 %                                  

 



             (BEAKER) (test code = 429)                                        

 

             LYMPHOCYTES RELATIVE PERCENT 38 %                                  

 



             (BEAKER) (test code = 430)                                        

 

             MONOCYTES RELATIVE PERCENT 10 %                                   



             (BEAKER) (test code = 431)                                        

 

             EOSINOPHILS RELATIVE PERCENT 4 %                                   

 



             (BEAKER) (test code = 432)                                        

 

             BASOPHILS RELATIVE PERCENT 1 %                                    



             (BEAKER) (test code = 437)                                        

 

             NEUTROPHILS ABSOLUTE COUNT 3.14 K/ L    1.56-6.13                 



             (BEAKER) (test code = 670)                                        

 

             LYMPHOCYTES ABSOLUTE COUNT 2.55 K/ L    1.18-3.74                 



             (BEAKER) (test code = 414)                                        

 

             MONOCYTES ABSOLUTE COUNT (BEAKER) 0.66 K/ L    0.24-0.36    H      

      



             (test code = 415)                                        

 

             EOSINOPHILS ABSOLUTE COUNT 0.23 K/ L    0.04-0.36                 



             (BEAKER) (test code = 416)                                        

 

             BASOPHILS ABSOLUTE COUNT (BEAKER) 0.05 K/ L    0.01-0.08           

      



             (test code = 417)                                        

 

             IMMATURE GRANULOCYTES-RELATIVE 1 %          0-1                    

   



             PERCENT (BEAKER) (test code =                                      

  



             2801)                                               



TSH/Free T4 If [id number] 00:19:00





             Test Item    Value        Reference Range Interpretation Comments

 

             TSH (test code = 1.723        See_Comment                [Automated



             46246-5)                                            message] The



                                                                 system which



                                                                 generated this



                                                                 result transmit

mani



                                                                 reference range

:



                                                                 0.350 - 4.940



                                                                 uIU/mL. The



                                                                 reference range



                                                                 was not used to



                                                                 interpret this



                                                                 result as



                                                                 normal/abnormal

.

 

             MAGGI (test code = MAGGI)  ID -                           



                          ROBYN L                                

 

             Lab Interpretation Normal                                 



             (test code = 97041-8)                                        



[facility] [id number] 00:19:00





             Test Item    Value        Reference Range Interpretation Comments

 

             THYROID STIMULATING HORMONE 1.723 uIU/mL 0.350-4.940               



             (BEAKER) (test code = 772)                                        



 ID - ROBYN LHIGH SENSITIVITY TROPONIN -[id number] 00:02:00





             Test Item    Value        Reference Range Interpretation Comments

 

             HIGH SENSITIVITY 7 pg/ml      See_Comment                [Automated

 message]



             TROPONIN I (test code =                                        The 

system which



             0539917)                                            generated this 

result



                                                                 transmitted ref

erence



                                                                 range: <=17. Th

e



                                                                 reference range

 was not



                                                                 used to interpr

et this



                                                                 result as



                                                                 normal/abnormal

.



 ID - JOYhe  STAT High Sensitivity Troponin-I results should be
used in conjunctionwith other diagnostic information such as ECG, clinical 
observations and information, and patient symptoms to aid in the diagnosis of 
MI.[id number] 23:58:00





             Test Item    Value        Reference Range Interpretation Comments

 

             Phosphorus (test code = 3.3 mg/dL    2.3-4.7                   



             2777-1)                                             

 

             MAGGI (test code = MAGGI)  ID - DB                           

 

             Lab Interpretation (test Normal                                 



             code = 35834-7)                                        



[facility] METABOLIC [id number] 23:58:00





             Test Item    Value        Reference Range Interpretation Comments

 

             SODIUM (BEAKER) 138 meq/L    136-145                   



             (test code = 381)                                        

 

             POTASSIUM (BEAKER) 4.0 meq/L    3.5-5.1                   



             (test code = 379)                                        

 

             CHLORIDE (BEAKER) 109 meq/L           H            



             (test code = 382)                                        

 

             CO2 (BEAKER) (test 20 meq/L     22-29        L            



             code = 355)                                         

 

             BLOOD UREA NITROGEN 14 mg/dL     7-21                      



             (BEAKER) (test code                                        



             = 354)                                              

 

             CREATININE (BEAKER) 1.09 mg/dL   0.57-1.25                 



             (test code = 358)                                        

 

             GLUCOSE RANDOM 83 mg/dL                         



             (BEAKER) (test code                                        



             = 652)                                              

 

             CALCIUM (BEAKER) 8.1 mg/dL    8.4-10.2     L            



             (test code = 697)                                        

 

             EGFR (BEAKER) (test 48 mL/min/1.73                           ESTIMA

MANI GFR IS



             code = 1092) sq m                                   NOT AS ACCURATE

 AS



                                                                 CREATININE



                                                                 CLEARANCE IN



                                                                 PREDICTING



                                                                 GLOMERULAR



                                                                 FILTRATION RATE

.



                                                                 ESTIMATED GFR I

S



                                                                 NOT APPLICABLE 

FOR



                                                                 DIALYSIS PATIEN

TS.



 ID - [id number] 23:58:00





             Test Item    Value        Reference Range Interpretation Comments

 

             MAGNESIUM (BEAKER) (test code = 1.7 mg/dL    1.6-2.6               

    



             627)                                                



 ID - [id number] 23:58:00





             Test Item    Value        Reference Range Interpretation Comments

 

             PHOSPHORUS (BEAKER) (test code = 3.3 mg/dL    2.3-4.7              

     



             604)                                                



 ID - DBHEPATIC FUNCTION [id number] 23:58:00





             Test Item    Value        Reference Range Interpretation Comments

 

             TOTAL PROTEIN (BEAKER) (test code = 5.4 gm/dL    6.0-8.3      L    

        



             770)                                                

 

             ALBUMIN (BEAKER) (test code = 1145) 2.8 g/dL     3.5-5.0      L    

        

 

             BILIRUBIN TOTAL (BEAKER) (test code 0.7 mg/dL    0.2-1.2           

        



             = 377)                                              

 

             BILIRUBIN DIRECT (BEAKER) (test 0.3 mg/dL    0.1-0.5               

    



             code = 706)                                         

 

             ALKALINE PHOSPHATASE (BEAKER) (test 92 U/L                   

        



             code = 346)                                         

 

             AST (SGOT) (BEAKER) (test code = 14 U/L       5-34                 

     



             353)                                                

 

             ALT (SGPT) (BEAKER) (test code = 9 U/L        6-55                 

     



             347)                                                



 ID - [id number] 23:42:00





             Test Item    Value        Reference Range Interpretation Comments

 

             D-Dimer, Quant (test 1.26         See_Comment  H             [Autom

ated



             code = 93367-7)                                        message] The



                                                                 system which



                                                                 generated this



                                                                 result



                                                                 transmitted



                                                                 reference range

:



                                                                 <0.50 MG/L FEU.



                                                                 The reference



                                                                 range was not



                                                                 used to interpr

et



                                                                 this result as



                                                                 normal/abnormal

.

 

             MAGGI (test code = MAGGI) Intended Use: The                           



                          D-Dimer Assay can                           



                          be used to aid in                           



                          the diagnosis of                           



                          Deep Vein                              



                          Thrombosis (DVT)                           



                          and Pulmonary                           



                          Embolism Disease                           



                          (PED).In patients                           



                          with low pre-test                           



                          probability,                           



                          various studies                           



                          concerning STA                           



                          Liatest D-dimer                           



                          test have                              



                          reported that                           



                          with a cutoff                           



                          value of 0.50                           



                          MG/L FEU, the                           



                          Negative                               



                          Predictive Value                           



                          (NPV) regarding                           



                          the exclusion of                           



                          thrombosis is                           



                          within %                           



                          range.                                 

 

             Lab Interpretation Abnormal                               



             (test code = 41682-9)                                        



[id number]





             Test Item    Value        Reference Range Interpretation Comments

 

             D-DIMER QUANTITATIVE (BEAKER) 1.26 MG/L FEU <0.50        H         

   



             (test code = 671)                                        



Intended Use: The D-Dimer Assay can be used to aid in the diagnosis of Deep Vein
Thrombosis (DVT) and Pulmonary Embolism Disease (PED).In patients with low pre-
test probability, various studies concerning STA Liatest D-dimer test have 
reported that with a cutoff value of 0.50 MG/L FEU, the Negative Predictive 
Value (NPV) regarding the exclusion of thrombosis is within % range.
Prothrombin time/[id number] 23:39:00





             Test Item    Value        Reference    Interpretation Comments



                                       Range                     

 

             Protime (test code = 15.4         See_Comment  H             [Autom

ated



             5902-2)                                             message] The



                                                                 system which



                                                                 generated this



                                                                 result



                                                                 transmitted



                                                                 reference range

:



                                                                 11.9 - 14.2



                                                                 seconds. The



                                                                 reference range



                                                                 was not used to



                                                                 interpret this



                                                                 result as



                                                                 normal/abnormal

.

 

             INR (test code = 1.24         See_Comment                [Automated



             6301-6)                                             message] The



                                                                 system which



                                                                 generated this



                                                                 result



                                                                 transmitted



                                                                 reference range

:



                                                                 <=5.90. The



                                                                 reference range



                                                                 was not used to



                                                                 interpret this



                                                                 result as



                                                                 normal/abnormal

.

 

             MAGGI (test code = RECOMMENDED                            



             MAGGI)         COUMADIN/WARFARIN                           



                          INR THERAPY                            



                          RANGESSTANDARD DOSE:                           



                          2.0 - 3.0                              



                          Includes:                              



                          PROPHYLAXIS for                           



                          venous thrombosis,                           



                          systemic                               



                          embolization;                           



                          TREATMENT for venous                           



                          thrombosis and/or                           



                          pulmonary                              



                          embolus.HIGH RISK:                           



                          Target INR is                           



                          2.5-3.5 for patients                           



                          with mechanical                           



                          heart valves.                           

 

             Lab Interpretation Abnormal                               



             (test code =                                        



             00878-0)                                            



[id number]39:00





             Test Item    Value        Reference Range Interpretation Comments

 

             PROTIME (BEAKER) 15.4 seconds 11.9-14.2    H            



             (test code = 759)                                        

 

             INR (BEAKER) (test 1.24         See_Comment                [Automat

ed message]



             code = 370)                                         The system whic

h



                                                                 generated this 

result



                                                                 transmitted ref

erence



                                                                 range: <=5.90. 

The



                                                                 reference range

 was



                                                                 not used to int

erpret



                                                                 this result as



                                                                 normal/abnormal

.



RECOMMENDED COUMADIN/WARFARIN INR THERAPY RANGESSTANDARD DOSE: 2.0 - 3.0   
Includes: PROPHYLAXIS forvenous thrombosis, systemic embolization; TREATMENT for
venous thrombosis and/or pulmonary embolus.HIGH RISK: Target INR is 2.5-3.5 for 
patients with mechanical heart valves.CBC W/PLT COUNT &amp; AUTO DIFFERENTIAL
2021 23:32:00





             Test Item    Value        Reference Range Interpretation Comments

 

             WHITE BLOOD CELL COUNT (BEAKER) 7.9 K/ L     3.5-10.5              

    



             (test code = 775)                                        

 

             RED BLOOD CELL COUNT (BEAKER) 3.96 M/ L    3.93-5.22               

  



             (test code = 761)                                        

 

             HEMOGLOBIN (BEAKER) (test code = 12.7 GM/DL   11.2-15.7            

     



             410)                                                

 

             HEMATOCRIT (BEAKER) (test code = 40.3 %       34.1-44.9            

     



             411)                                                

 

             MEAN CORPUSCULAR VOLUME (BEAKER) 101.8 fL     79.4-94.8    H       

     



             (test code = 753)                                        

 

             MEAN CORPUSCULAR HEMOGLOBIN 32.1 pg      25.6-32.2                 



             (BEAKER) (test code = 751)                                        

 

             MEAN CORPUSCULAR HEMOGLOBIN CONC 31.5 GM/DL   32.2-35.5    L       

     



             (BEAKER) (test code = 752)                                        

 

             RED CELL DISTRIBUTION WIDTH 13.3 %       11.7-14.4                 



             (BEAKER) (test code = 412)                                        

 

             PLATELET COUNT (BEAKER) (test 210 K/CU MM  150-450                 

  



             code = 756)                                         

 

             MEAN PLATELET VOLUME (BEAKER) 9.3 fL       9.4-12.3     L          

  



             (test code = 754)                                        

 

             NUCLEATED RED BLOOD CELLS 0 /100 WBC   0-0                       



             (BEAKER) (test code = 413)                                        

 

             NEUTROPHILS RELATIVE PERCENT 58 %                                  

 



             (BEAKER) (test code = 429)                                        

 

             LYMPHOCYTES RELATIVE PERCENT 31 %                                  

 



             (BEAKER) (test code = 430)                                        

 

             MONOCYTES RELATIVE PERCENT 8 %                                    



             (BEAKER) (test code = 431)                                        

 

             EOSINOPHILS RELATIVE PERCENT 2 %                                   

 



             (BEAKER) (test code = 432)                                        

 

             BASOPHILS RELATIVE PERCENT 1 %                                    



             (BEAKER) (test code = 437)                                        

 

             NEUTROPHILS ABSOLUTE COUNT 4.59 K/ L    1.56-6.13                 



             (BEAKER) (test code = 670)                                        

 

             LYMPHOCYTES ABSOLUTE COUNT 2.42 K/ L    1.18-3.74                 



             (BEAKER) (test code = 414)                                        

 

             MONOCYTES ABSOLUTE COUNT (BEAKER) 0.63 K/ L    0.24-0.36    H      

      



             (test code = 415)                                        

 

             EOSINOPHILS ABSOLUTE COUNT 0.17 K/ L    0.04-0.36                 



             (BEAKER) (test code = 416)                                        

 

             BASOPHILS ABSOLUTE COUNT (BEAKER) 0.06 K/ L    0.01-0.08           

      



             (test code = 417)                                        

 

             IMMATURE GRANULOCYTES-RELATIVE 1 %          0-1                    

   



             PERCENT (BEAKER) (test code =                                      

  



             6393)

## 2022-02-21 NOTE — RAD REPORT
EXAM DESCRIPTION:  Vanna Single View2/21/2022 3:03 pm

 

CLINICAL HISTORY:  Cough

 

COMPARISON:   December 2021

 

FINDINGS:  Upper lobe vessels are prominent indicative of pulmonary venous hypertension

 

 The lungs appear clear of acute infiltrate. The heart is mildly to moderately enlarged

## 2022-02-22 LAB
ALBUMIN SERPL BCP-MCNC: 2.3 G/DL (ref 3.4–5)
ALP SERPL-CCNC: 89 U/L (ref 45–117)
ALT SERPL W P-5'-P-CCNC: 17 U/L (ref 12–78)
AST SERPL W P-5'-P-CCNC: 15 U/L (ref 15–37)
BUN BLD-MCNC: 12 MG/DL (ref 7–18)
GLUCOSE SERPLBLD-MCNC: 82 MG/DL (ref 74–106)
HCT VFR BLD CALC: 39.4 % (ref 36–45)
HDLC SERPL-MCNC: 52 MG/DL (ref 40–60)
LDLC SERPL CALC-MCNC: 67 MG/DL (ref ?–130)
LYMPHOCYTES # SPEC AUTO: 1.4 K/UL (ref 0.7–4.9)
PMV BLD: 8.3 FL (ref 7.6–11.3)
POTASSIUM SERPL-SCNC: 4 MMOL/L (ref 3.5–5.1)
RBC # BLD: 4.16 M/UL (ref 3.86–4.86)
TSH SERPL DL<=0.05 MIU/L-ACNC: 1.11 UIU/ML (ref 0.36–3.74)
UA DIPSTICK W REFLEX MICRO PNL UR: (no result)

## 2022-02-22 RX ADMIN — ACETAMINOPHEN PRN MG: 500 TABLET, FILM COATED ORAL at 02:53

## 2022-02-22 RX ADMIN — APIXABAN SCH MG: 5 TABLET, FILM COATED ORAL at 08:56

## 2022-02-22 RX ADMIN — QUETIAPINE FUMARATE SCH MG: 100 TABLET, FILM COATED ORAL at 20:33

## 2022-02-22 RX ADMIN — TOPIRAMATE SCH MG: 100 TABLET, FILM COATED ORAL at 20:28

## 2022-02-22 RX ADMIN — DULOXETINE HYDROCHLORIDE SCH MG: 30 CAPSULE, DELAYED RELEASE ORAL at 20:28

## 2022-02-22 RX ADMIN — DOXEPIN HYDROCHLORIDE SCH MG: 25 CAPSULE ORAL at 20:28

## 2022-02-22 RX ADMIN — METOPROLOL SUCCINATE SCH MG: 25 TABLET, EXTENDED RELEASE ORAL at 20:29

## 2022-02-22 RX ADMIN — POTASSIUM CHLORIDE SCH MEQ: 10 TABLET, FILM COATED, EXTENDED RELEASE ORAL at 20:26

## 2022-02-22 RX ADMIN — SODIUM CHLORIDE SCH MLS: 0.9 INJECTION, SOLUTION INTRAVENOUS at 08:55

## 2022-02-22 RX ADMIN — ACETAMINOPHEN PRN MG: 500 TABLET, FILM COATED ORAL at 15:35

## 2022-02-22 RX ADMIN — MEMANTINE HYDROCHLORIDE SCH MG: 10 TABLET ORAL at 20:28

## 2022-02-22 RX ADMIN — Medication SCH: at 20:30

## 2022-02-22 RX ADMIN — Medication SCH ML: at 08:57

## 2022-02-22 RX ADMIN — ATORVASTATIN CALCIUM SCH MG: 20 TABLET, FILM COATED ORAL at 20:29

## 2022-02-22 RX ADMIN — Medication SCH ML: at 20:30

## 2022-02-22 RX ADMIN — APIXABAN SCH MG: 5 TABLET, FILM COATED ORAL at 20:29

## 2022-02-22 RX ADMIN — GABAPENTIN SCH MG: 300 CAPSULE ORAL at 18:51

## 2022-02-22 RX ADMIN — SODIUM CHLORIDE SCH MLS: 0.9 INJECTION, SOLUTION INTRAVENOUS at 21:42

## 2022-02-22 RX ADMIN — FLECAINIDE ACETATE SCH MG: 100 TABLET ORAL at 20:27

## 2022-02-22 NOTE — P.PN
Subjective


Date of Service: 02/22/22


Primary Care Provider: Dr. López


Chief Complaint: Pneumonia,AMS


Patient is currently alert and oriented.


She has been coughing intermittently.  Cough is nonproductive.


She denies any chest pain.


She has been afebrile today.








Physical Examination





- Vital Signs


Temperature: 97 F


Blood Pressure: 119/58


Pulse: 56


Respirations: 14


Pulse Ox (%): 99





Assessment And Plan





- Plan


Physical Exam


General: Alert, In no apparent distress, Oriented x3


HEENT: Atraumatic, PERRLA, EOMI, Sclerae nonicteric


Neck: Supple, 2+ carotid pulse no bruit, No LAD, Without JVD or thyroid 

abnormality


Respiratory: Mild diffuse crackles/rales


Cardiovascular: Regular rate/rhythm, Normal S1 S2


Gastrointestinal: Normal bowel sounds, No tenderness


Musculoskeletal: No tenderness


Integumentary: No rashes


Neurological: Normal speech, Normal strength at 5/5 x4 extr, Normal tone, Normal

affect


Urinary: Samuels catheter





Assessment and plan


Altered mental status resolved


Continue current antibiotics.


Supportive measures with IV hydration.  Antitussives.


Supplemental oxygen as needed.


Chest physiotherapy.


Continue Eliquis.


Resume other home medications.

## 2022-02-22 NOTE — EKG
Test Date:    2022-02-21               Test Time:    15:16:02

Technician:   MIN                                    

                                                     

MEASUREMENT RESULTS:                                       

Intervals:                                           

Rate:         64                                     

DC:           210                                    

QRSD:         90                                     

QT:           400                                    

QTc:          412                                    

Axis:                                                

P:            79                                     

DC:           210                                    

QRS:          90                                     

T:            90                                     

                                                     

INTERPRETIVE STATEMENTS:                                       

                                                     

Sinus rhythm with 1st degree AV block

Rightward axis

Nonspecific ST abnormality

Abnormal ECG

Compared to ECG 12/30/2021 13:43:12

Right-axis deviation now present

ST (T wave) deviation now present

Sinus bradycardia no longer present



Electronically Signed On 02-22-22 07:22:38 CST by Darrian Packer

## 2022-02-23 LAB
ALBUMIN SERPL BCP-MCNC: 2.3 G/DL (ref 3.4–5)
ALP SERPL-CCNC: 82 U/L (ref 45–117)
ALT SERPL W P-5'-P-CCNC: 13 U/L (ref 12–78)
AST SERPL W P-5'-P-CCNC: 12 U/L (ref 15–37)
BUN BLD-MCNC: 10 MG/DL (ref 7–18)
GLUCOSE SERPLBLD-MCNC: 76 MG/DL (ref 74–106)
HCT VFR BLD CALC: 37.2 % (ref 36–45)
LYMPHOCYTES # SPEC AUTO: 1.3 K/UL (ref 0.7–4.9)
PMV BLD: 8.6 FL (ref 7.6–11.3)
POTASSIUM SERPL-SCNC: 4.1 MMOL/L (ref 3.5–5.1)
RBC # BLD: 3.93 M/UL (ref 3.86–4.86)

## 2022-02-23 RX ADMIN — DOXEPIN HYDROCHLORIDE SCH MG: 25 CAPSULE ORAL at 20:34

## 2022-02-23 RX ADMIN — Medication SCH ML: at 20:34

## 2022-02-23 RX ADMIN — GABAPENTIN SCH MG: 300 CAPSULE ORAL at 16:39

## 2022-02-23 RX ADMIN — GABAPENTIN SCH MG: 300 CAPSULE ORAL at 09:55

## 2022-02-23 RX ADMIN — APIXABAN SCH MG: 5 TABLET, FILM COATED ORAL at 09:54

## 2022-02-23 RX ADMIN — FLECAINIDE ACETATE SCH MG: 100 TABLET ORAL at 20:34

## 2022-02-23 RX ADMIN — MONTELUKAST SODIUM SCH MG: 10 TABLET, FILM COATED ORAL at 09:00

## 2022-02-23 RX ADMIN — POTASSIUM CHLORIDE SCH MEQ: 10 TABLET, FILM COATED, EXTENDED RELEASE ORAL at 09:53

## 2022-02-23 RX ADMIN — MEMANTINE HYDROCHLORIDE SCH MG: 10 TABLET ORAL at 20:33

## 2022-02-23 RX ADMIN — DULOXETINE HYDROCHLORIDE SCH MG: 30 CAPSULE, DELAYED RELEASE ORAL at 20:32

## 2022-02-23 RX ADMIN — FLECAINIDE ACETATE SCH MG: 100 TABLET ORAL at 09:53

## 2022-02-23 RX ADMIN — FUROSEMIDE SCH MG: 20 TABLET ORAL at 09:53

## 2022-02-23 RX ADMIN — LEVOTHYROXINE SODIUM SCH MG: 75 TABLET ORAL at 05:15

## 2022-02-23 RX ADMIN — TOPIRAMATE SCH MG: 100 TABLET, FILM COATED ORAL at 20:35

## 2022-02-23 RX ADMIN — TIZANIDINE SCH MG: 4 TABLET ORAL at 09:53

## 2022-02-23 RX ADMIN — CYANOCOBALAMIN TAB 1000 MCG SCH MCG: 1000 TAB at 09:53

## 2022-02-23 RX ADMIN — DULOXETINE HYDROCHLORIDE SCH MG: 30 CAPSULE, DELAYED RELEASE ORAL at 09:54

## 2022-02-23 RX ADMIN — POTASSIUM CHLORIDE SCH MEQ: 10 TABLET, FILM COATED, EXTENDED RELEASE ORAL at 20:38

## 2022-02-23 RX ADMIN — GABAPENTIN SCH MG: 300 CAPSULE ORAL at 00:30

## 2022-02-23 RX ADMIN — APIXABAN SCH MG: 5 TABLET, FILM COATED ORAL at 20:33

## 2022-02-23 RX ADMIN — Medication SCH: at 20:32

## 2022-02-23 RX ADMIN — TOPIRAMATE SCH MG: 100 TABLET, FILM COATED ORAL at 09:54

## 2022-02-23 RX ADMIN — Medication SCH: at 09:00

## 2022-02-23 RX ADMIN — ATORVASTATIN CALCIUM SCH MG: 20 TABLET, FILM COATED ORAL at 20:33

## 2022-02-23 RX ADMIN — METOPROLOL SUCCINATE SCH: 25 TABLET, EXTENDED RELEASE ORAL at 20:35

## 2022-02-23 RX ADMIN — BUPROPION HYDROCHLORIDE SCH MG: 150 TABLET, EXTENDED RELEASE ORAL at 09:53

## 2022-02-23 RX ADMIN — MEMANTINE HYDROCHLORIDE SCH MG: 10 TABLET ORAL at 09:53

## 2022-02-23 RX ADMIN — BENZONATATE PRN MG: 100 CAPSULE ORAL at 12:32

## 2022-02-23 RX ADMIN — PANTOPRAZOLE SODIUM SCH MG: 40 TABLET, DELAYED RELEASE ORAL at 09:53

## 2022-02-23 RX ADMIN — QUETIAPINE FUMARATE SCH MG: 100 TABLET, FILM COATED ORAL at 20:34

## 2022-02-23 RX ADMIN — DONEPEZIL HYDROCHLORIDE SCH MG: 5 TABLET ORAL at 09:54

## 2022-02-23 RX ADMIN — Medication SCH ML: at 09:00

## 2022-02-23 NOTE — P.PN
Subjective


Date of Service: 02/23/22


Primary Care Provider: Dr. López


Chief Complaint: Pneumonia,AMS


Patient had bouts of coughing spells today.


She is maintained on 2 L oxygen by nasal cannula.


She has been wheelchair-bound for several months but able to transfer at 

baseline.








Physical Examination





- Vital Signs


Temperature: 97.9 F


Blood Pressure: 103/57


Pulse: 48


Respirations: 16


Pulse Ox (%): 95





Assessment And Plan





- Plan


Physical Exam


General: Alert, In no apparent distress, Oriented x3


HEENT: Atraumatic, PERRLA, EOMI, Sclerae nonicteric


Neck: Supple, 2+ carotid pulse no bruit, No LAD, Without JVD or thyroid 

abnormality


Respiratory: Mild diffuse crackles/rales


Cardiovascular: Regular rate/rhythm, Normal S1 S2


Gastrointestinal: Normal bowel sounds, No tenderness


Musculoskeletal: No tenderness


Integumentary: No rashes


Neurological: Normal speech, Normal strength at 5/5 x4 extr, Normal tone, Normal

affect


Urinary: Samuels catheter





Assessment and plan


Altered mental status resolved


Transition IV antibiotic to oral Levaquin


Supportive measures with IV hydration.  Antitussives.


Supplemental oxygen as needed.


Chest physiotherapy.


Continue Eliquis.


PT.


Increase activity as tolerated.

## 2022-02-23 NOTE — RAD REPORT
EXAM DESCRIPTION:  RAD - Chest Single View - 2/23/2022 2:24 pm

 

CLINICAL HISTORY:  SOB

Chest pain.

 

COMPARISON:  Chest Single View dated 2/21/2022; Chest Single View dated 12/30/2021; Chest Single View
 dated 8/16/2021; Chest Single View dated 7/2/2021

 

FINDINGS:  Portable technique limits examination quality.

 

Mild bilateral pulmonary opacities are present compatible with pulmonary edema. The heart is mildly e
nlarged in size. Cervical hardware plate.Right humeral prosthesis.

 

IMPRESSION:  Mild CHF.

## 2022-02-24 LAB
ALBUMIN SERPL BCP-MCNC: 2.3 G/DL (ref 3.4–5)
ALP SERPL-CCNC: 79 U/L (ref 45–117)
ALT SERPL W P-5'-P-CCNC: 13 U/L (ref 12–78)
AST SERPL W P-5'-P-CCNC: 13 U/L (ref 15–37)
BUN BLD-MCNC: 8 MG/DL (ref 7–18)
GLUCOSE SERPLBLD-MCNC: 85 MG/DL (ref 74–106)
HCT VFR BLD CALC: 36.5 % (ref 36–45)
LYMPHOCYTES # SPEC AUTO: 1.5 K/UL (ref 0.7–4.9)
PMV BLD: 8 FL (ref 7.6–11.3)
POTASSIUM SERPL-SCNC: 3.9 MMOL/L (ref 3.5–5.1)
RBC # BLD: 3.91 M/UL (ref 3.86–4.86)

## 2022-02-24 RX ADMIN — TOPIRAMATE SCH MG: 100 TABLET, FILM COATED ORAL at 20:07

## 2022-02-24 RX ADMIN — POTASSIUM CHLORIDE SCH MEQ: 10 TABLET, FILM COATED, EXTENDED RELEASE ORAL at 20:06

## 2022-02-24 RX ADMIN — MONTELUKAST SODIUM SCH MG: 10 TABLET, FILM COATED ORAL at 08:27

## 2022-02-24 RX ADMIN — ATORVASTATIN CALCIUM SCH MG: 20 TABLET, FILM COATED ORAL at 20:06

## 2022-02-24 RX ADMIN — BENZONATATE PRN MG: 100 CAPSULE ORAL at 16:23

## 2022-02-24 RX ADMIN — FUROSEMIDE SCH MG: 20 TABLET ORAL at 09:38

## 2022-02-24 RX ADMIN — PANTOPRAZOLE SODIUM SCH MG: 40 TABLET, DELAYED RELEASE ORAL at 08:26

## 2022-02-24 RX ADMIN — LEVOTHYROXINE SODIUM SCH MG: 75 TABLET ORAL at 05:30

## 2022-02-24 RX ADMIN — MEMANTINE HYDROCHLORIDE SCH MG: 10 TABLET ORAL at 08:25

## 2022-02-24 RX ADMIN — Medication SCH: at 09:00

## 2022-02-24 RX ADMIN — BUPROPION HYDROCHLORIDE SCH MG: 150 TABLET, EXTENDED RELEASE ORAL at 08:29

## 2022-02-24 RX ADMIN — FLECAINIDE ACETATE SCH MG: 100 TABLET ORAL at 20:26

## 2022-02-24 RX ADMIN — TIZANIDINE SCH MG: 4 TABLET ORAL at 08:29

## 2022-02-24 RX ADMIN — TOPIRAMATE SCH MG: 100 TABLET, FILM COATED ORAL at 08:25

## 2022-02-24 RX ADMIN — QUETIAPINE FUMARATE SCH MG: 100 TABLET, FILM COATED ORAL at 20:05

## 2022-02-24 RX ADMIN — DULOXETINE HYDROCHLORIDE SCH MG: 30 CAPSULE, DELAYED RELEASE ORAL at 20:05

## 2022-02-24 RX ADMIN — DONEPEZIL HYDROCHLORIDE SCH MG: 5 TABLET ORAL at 08:25

## 2022-02-24 RX ADMIN — Medication SCH ML: at 20:25

## 2022-02-24 RX ADMIN — APIXABAN SCH MG: 5 TABLET, FILM COATED ORAL at 20:05

## 2022-02-24 RX ADMIN — GABAPENTIN SCH MG: 300 CAPSULE ORAL at 08:25

## 2022-02-24 RX ADMIN — DOXEPIN HYDROCHLORIDE SCH MG: 25 CAPSULE ORAL at 20:05

## 2022-02-24 RX ADMIN — GABAPENTIN SCH MG: 300 CAPSULE ORAL at 00:35

## 2022-02-24 RX ADMIN — MEMANTINE HYDROCHLORIDE SCH MG: 10 TABLET ORAL at 20:05

## 2022-02-24 RX ADMIN — GABAPENTIN SCH MG: 300 CAPSULE ORAL at 16:21

## 2022-02-24 RX ADMIN — POTASSIUM CHLORIDE SCH MEQ: 10 TABLET, FILM COATED, EXTENDED RELEASE ORAL at 08:26

## 2022-02-24 RX ADMIN — METOPROLOL SUCCINATE SCH MG: 25 TABLET, EXTENDED RELEASE ORAL at 20:25

## 2022-02-24 RX ADMIN — FLECAINIDE ACETATE SCH MG: 100 TABLET ORAL at 08:27

## 2022-02-24 RX ADMIN — APIXABAN SCH MG: 5 TABLET, FILM COATED ORAL at 08:26

## 2022-02-24 RX ADMIN — DULOXETINE HYDROCHLORIDE SCH MG: 30 CAPSULE, DELAYED RELEASE ORAL at 08:25

## 2022-02-24 RX ADMIN — Medication SCH DROP: at 20:07

## 2022-02-24 RX ADMIN — CYANOCOBALAMIN TAB 1000 MCG SCH MCG: 1000 TAB at 08:26

## 2022-02-24 NOTE — P.PN
Subjective


Date of Service: 02/24/22


Primary Care Provider: Dr. López


Chief Complaint: Pneumonia,AMS


Patient states she is feeling better today


She is maintained on 2 L oxygen by nasal cannula.


No issues overnight.








Physical Examination





- Vital Signs


Temperature: 97.0 F


Blood Pressure: 119/62


Pulse: 61


Respirations: 18


Pulse Ox (%): 93





Assessment And Plan





- Plan


Physical Exam


General: Alert, In no apparent distress, Oriented x3


HEENT: Atraumatic, PERRLA, EOMI, Sclerae nonicteric


Neck: Supple, No LAD, Without JVD or thyroid abnormality


Respiratory: Mild diffuse crackles/rales, adequate breath sounds bilaterally.


Cardiovascular: Regular rate/rhythm, Normal S1 S2


Gastrointestinal: Normal bowel sounds, No tenderness


Musculoskeletal: No tenderness


Integumentary: No rashes


Neurological: Normal speech, Normal strength at 5/5 x4 extr, Normal affect


Urinary: Samuels catheter





Assessment and plan


Altered mental status resolved


Continue oral Levaquin


Supportive measures with IV hydration.  Antitussives.


Supplemental oxygen as needed.


Chest physiotherapy.


Continue Eliquis.


Discontinue Samuels.  Patient is able to transfer.


Family requesting for rehab


Continue PT.


Increase activity as tolerated.


Social service assisting with arrangements for skilled rehab placement.

## 2022-02-25 VITALS — SYSTOLIC BLOOD PRESSURE: 91 MMHG | DIASTOLIC BLOOD PRESSURE: 53 MMHG | TEMPERATURE: 97 F

## 2022-02-25 VITALS — OXYGEN SATURATION: 98 %

## 2022-02-25 RX ADMIN — FUROSEMIDE SCH MG: 20 TABLET ORAL at 08:52

## 2022-02-25 RX ADMIN — Medication SCH: at 08:53

## 2022-02-25 RX ADMIN — TIZANIDINE SCH MG: 4 TABLET ORAL at 08:42

## 2022-02-25 RX ADMIN — BENZONATATE PRN MG: 100 CAPSULE ORAL at 10:52

## 2022-02-25 RX ADMIN — LEVOTHYROXINE SODIUM SCH MG: 75 TABLET ORAL at 06:40

## 2022-02-25 RX ADMIN — DONEPEZIL HYDROCHLORIDE SCH MG: 5 TABLET ORAL at 08:42

## 2022-02-25 RX ADMIN — GABAPENTIN SCH MG: 300 CAPSULE ORAL at 03:16

## 2022-02-25 RX ADMIN — FLECAINIDE ACETATE SCH MG: 100 TABLET ORAL at 09:00

## 2022-02-25 RX ADMIN — MEMANTINE HYDROCHLORIDE SCH MG: 10 TABLET ORAL at 08:42

## 2022-02-25 RX ADMIN — GABAPENTIN SCH MG: 300 CAPSULE ORAL at 08:42

## 2022-02-25 RX ADMIN — MONTELUKAST SODIUM SCH MG: 10 TABLET, FILM COATED ORAL at 08:42

## 2022-02-25 RX ADMIN — PANTOPRAZOLE SODIUM SCH MG: 40 TABLET, DELAYED RELEASE ORAL at 08:42

## 2022-02-25 RX ADMIN — CYANOCOBALAMIN TAB 1000 MCG SCH MCG: 1000 TAB at 08:42

## 2022-02-25 RX ADMIN — APIXABAN SCH MG: 5 TABLET, FILM COATED ORAL at 08:42

## 2022-02-25 RX ADMIN — TOPIRAMATE SCH MG: 100 TABLET, FILM COATED ORAL at 08:41

## 2022-02-25 RX ADMIN — DULOXETINE HYDROCHLORIDE SCH MG: 30 CAPSULE, DELAYED RELEASE ORAL at 08:42

## 2022-02-25 RX ADMIN — ACETAMINOPHEN PRN MG: 500 TABLET, FILM COATED ORAL at 03:38

## 2022-02-25 RX ADMIN — Medication SCH DROP: at 08:43

## 2022-02-25 RX ADMIN — Medication SCH: at 09:00

## 2022-02-25 RX ADMIN — BUPROPION HYDROCHLORIDE SCH MG: 150 TABLET, EXTENDED RELEASE ORAL at 08:42

## 2022-02-25 RX ADMIN — POTASSIUM CHLORIDE SCH MEQ: 10 TABLET, FILM COATED, EXTENDED RELEASE ORAL at 08:42

## 2022-02-25 NOTE — P.DS
Admission Date: 02/21/22


Discharge Date: 02/25/22


Primary Care Provider: Dr. López


Disposition: TRANSFER TO NURSING HOME


Discharge Condition: FAIR


Reason for Admission: Pneumonia,AMS





- Problems


(1) Pneumonia


Current Visit: Yes   Status: Acute   


Qualifiers: 


   Pneumonia type: due to unspecified organism   Laterality: bilateral 





(2) History of TIA (transient ischemic attack)


Current Visit: Yes   Status: Chronic   





(3) Acute metabolic encephalopathy


Current Visit: No   Status: Acute   





(4) COPD (chronic obstructive pulmonary disease)


Current Visit: No   Status: Acute   





(5) Afib


Current Visit: Yes   Status: Chronic   


Qualifiers: 


   Atrial fibrillation type: unspecified chronic   Qualified Code(s): I48.20 - 

Chronic atrial fibrillation, unspecified; I48.2 - Chronic atrial fibrillation   





(6) Dementia


Current Visit: Yes   Status: Chronic   


Qualifiers: 


   Dementia type: unspecified type   Dementia behavioral disturbance: without 

behavioral disturbance   Qualified Code(s): F03.90 - Unspecified dementia 

without behavioral disturbance   





(7) Morbid obesity


Current Visit: No   Status: Chronic   


Brief History of Present Illness: 


Patient is an 81-year-old  female with past medical history of afib, 

mild dementia, recurrent UTIs, cataracts, and TIAs who presented to the ED with 

AMS, fever, and cough.  In the ED patient was oriented x1, WBC 12.3, Covid n

egative, no UTI.  Chest CT showed "mild bilateral patchy lung opacities probably

pneumonia."  Head CT negative.  Upon my assessment patient is oriented x3 and 

only complains of a cough.  She states she has had a cough for 6 months now.  

She reports feeling febrile today.  Patient received Levaquin in the ED. Ms. Peres admitted for further evaluation and treatment.








Hospital Course: 


Patient admitted to the medical floor and treated for pneumonia with IV 

antibiotic


Altered mental status resolved.  Antibiotics transition to oral Levaquin


She was also hydrated briefly with IV fluid. Supplemental oxygen as needed.


Continue Eliquis for A. fib anticoagulation.  Patient clinically improved.  She 

was weaned off oxygen. She was seen and evaluated by PT. She is currently able 

to transfer.  Samuels was inserted on admission which was discontinued


PT recommended rehab.  Patient has been accepted to skilled rehab.


She is deemed stable for discharge.








Vital Signs/Physical Exam: 














Temp Pulse Resp BP Pulse Ox


 


 97.0 F   53   16   91/53 L  99 


 


 02/25/22 12:00  02/25/22 12:00  02/25/22 12:00  02/25/22 12:00  02/25/22 12:00








General: Alert, In no apparent distress, Oriented x3


HEENT: Mucous membr. moist/pink


Neck: JVD not distended


Respiratory: Clear to auscultation bilaterally, Diminished


Cardiovascular: No edema


Gastrointestinal: Soft and benign, Non-distended, No tenderness


Musculoskeletal: No swelling


Integumentary: No cyanosis


Neurological: Normal strength at 5/5 x4 extr


Laboratory Data at Discharge: 














WBC  5.90 K/uL (4.3-10.9)  D 02/24/22  06:08    


 


Hgb  12.3 g/dL (12.0-15.0)   02/24/22  06:08    


 


Hct  36.5 % (36.0-45.0)   02/24/22  06:08    


 


Plt Count  170 K/uL (152-406)   02/24/22  06:08    


 


PT  15.2 SECONDS (9.5-12.5)  H  02/21/22  14:25    


 


INR  1.32   02/21/22  14:25    


 


Sodium  141 mmol/L (136-145)   02/24/22  06:08    


 


Potassium  3.9 mmol/L (3.5-5.1)   02/24/22  06:08    


 


BUN  8 mg/dL (7-18)   02/24/22  06:08    


 


Creatinine  0.98 mg/dL (0.55-1.3)   02/24/22  06:08    


 


Glucose  85 mg/dL ()   02/24/22  06:08    


 


Magnesium  1.8 mg/dL (1.8-2.4)   02/21/22  14:25    


 


Total Bilirubin  0.4 mg/dL (0.2-1.0)   02/24/22  06:08    


 


AST  13 U/L (15-37)  L  02/24/22  06:08    


 


ALT  13 U/L (12-78)   02/24/22  06:08    


 


Alkaline Phosphatase  79 U/L ()   02/24/22  06:08    


 


Triglycerides  60 mg/dL (<150)   02/22/22  02:49    


 


Cholesterol  131 mg/dL (<200)   02/22/22  02:49    


 


HDL Cholesterol  52 mg/dL (40-60)   02/22/22  02:49    


 


Cholesterol/HDL Ratio  2.52   02/22/22  02:49    








Home Medications: 








ALPRAZolam [Alprazolam] 0.25 mg PO Q8HP PRN 02/22/22 


Albuterol Sulfate [Proair Respiclick] 2 puff IH Q6HP PRN 02/22/22 


Apixaban [Eliquis] 5 mg PO BID 02/22/22 


Cranberry Fruit Extract [Ellura] 200 mg PO DAILY 02/22/22 


Cyanocobalamin (Vitamin B-12) [Vitamin B12] 1,000 mcg PO DAILY 02/22/22 


Cyclosporine [Restasis] 1 drop EACH EYE BID 02/22/22 


Donepezil HCl 10 mg PO DAILY 02/22/22 


Doxepin HCl 75 mg PO BEDTIME 02/22/22 


Duloxetine HCl 60 mg PO BID 02/22/22 


Estradiol 0.01% Cream 0.5 g VAG SEECOM 02/22/22 


Flecainide Acetate 50 mg PO Q12H 02/22/22 


Fluticasone/Umeclidin/Vilanter [Trelegy Ellipta 100-62.5-25] 1 each IH DAILY 

02/22/22 


Furosemide 20 mg PO DAILY 02/22/22 


Gabapentin 300 mg PO Q8H 02/22/22 


Levothyroxine [Synthroid*] 75 mcg PO [id number] 02/22/22 


Loperamide HCl/Simethicone [Imodium Multi-Symptom Rel Cplt] 1 each PO DAILY PRN 

02/22/22 


Memantine HCl 10 mg PO BID 02/22/22 


Metoprolol Succinate 25 mg PO BEDTIME 02/22/22 


Montelukast [Singulair*] 10 mg PO DAILY 02/22/22 


Pantoprazole [Protonix Tab*] 40 mg PO DAILY 02/22/22 


Potassium Chloride 20 meq PO BID 02/22/22 


Quetiapine Fumarate [Seroquel] 100 mg PO BEDTIME 02/22/22 


Simvastatin 40 mg PO BEDTIME 02/22/22 


Tizanidine HCl 4 mg PO DAILY 02/22/22 


Topiramate 100 mg PO BID 02/22/22 


buPROPion HCL [Bupropion Xl] 300 mg PO DAILY 02/22/22 


Benzonatate [Tessalon Perle*] 100 mg PO TID PRN  cap 02/25/22 


levoFLOXacin [Levaquin*] 750 mg PO Q48H #3 tab 02/25/22 





Diet: AHA


Activity: Fall precautions


Time spent managing pt's care (in minutes): 36

## 2022-07-22 ENCOUNTER — HOSPITAL ENCOUNTER (EMERGENCY)
Dept: HOSPITAL 97 - ER | Age: 82
LOS: 1 days | Discharge: HOME | End: 2022-07-23
Payer: COMMERCIAL

## 2022-07-22 DIAGNOSIS — Z88.3: ICD-10-CM

## 2022-07-22 DIAGNOSIS — G30.9: ICD-10-CM

## 2022-07-22 DIAGNOSIS — S00.83XA: Primary | ICD-10-CM

## 2022-07-22 DIAGNOSIS — F02.80: ICD-10-CM

## 2022-07-22 DIAGNOSIS — Z88.5: ICD-10-CM

## 2022-07-22 DIAGNOSIS — Z88.2: ICD-10-CM

## 2022-07-22 DIAGNOSIS — F32.A: ICD-10-CM

## 2022-07-22 DIAGNOSIS — Z79.01: ICD-10-CM

## 2022-07-22 DIAGNOSIS — Z88.0: ICD-10-CM

## 2022-07-22 PROCEDURE — 70486 CT MAXILLOFACIAL W/O DYE: CPT

## 2022-07-22 PROCEDURE — 72125 CT NECK SPINE W/O DYE: CPT

## 2022-07-22 PROCEDURE — 76377 3D RENDER W/INTRP POSTPROCES: CPT

## 2022-07-22 PROCEDURE — 99284 EMERGENCY DEPT VISIT MOD MDM: CPT

## 2022-07-22 PROCEDURE — 70450 CT HEAD/BRAIN W/O DYE: CPT

## 2022-07-23 VITALS — SYSTOLIC BLOOD PRESSURE: 132 MMHG | OXYGEN SATURATION: 94 % | DIASTOLIC BLOOD PRESSURE: 77 MMHG

## 2022-07-23 VITALS — TEMPERATURE: 98.5 F

## 2022-07-23 NOTE — ER
Nurse's Notes                                                                                     

 [facility]                                                                 

Name: Leona Peres                                                                              

Age: 81 yrs                                                                                       

Sex: Female                                                                                       

: 1940                                                                                   

MRN: [id number]                                                                                   

Arrival Date: 2022                                                                          

Time: 00:07                                                                                       

Account#: [id number]                                                                            

Bed 17                                                                                            

Private MD:                                                                                       

Diagnosis: Forehead contusion, facial contusion                                                   

                                                                                                  

Presentation:                                                                                     

                                                                                             

00:09 Chief complaint: EMS states: Toned out for fall, pt denies LOC, pt states she was       ll3 

      changing brief on toilet when she leaned too far forward and hit forehead on wall, pt       

      c/o of neck stiffness and soreness to bilateral knees. Coronavirus screen: Vaccine          

      status: Patient reports receiving the 2nd dose of the covid vaccine. At this time, the      

      client does not indicate any symptoms associated with coronavirus-19. Ebola Screen: No      

      symptoms or risks identified at this time. Initial Sepsis Screen: Does the patient meet     

      any 2 criteria? No. Patient's initial sepsis screen is negative. Does the patient have      

      a suspected source of infection? No. Patient's initial sepsis screen is negative. Risk      

      Assessment: Do you want to hurt yourself or someone else? Patient reports no desire to      

      harm self or others. Onset of symptoms was 2022. Transition of care: patient       

      was received from another setting of care (long-term care facility), [facility].          

00:09 Method Of Arrival: EMS: [facility]                                                ll3 

00:09 Acuity: ANUSHKA 3                                                                           ll3 

                                                                                                  

Triage Assessment:                                                                                

00:13 General: Appears uncomfortable, Behavior is calm, cooperative. Pain: Complains of pain  ll3 

      in face Pain currently is 4 out of 10 on a pain scale. Neuro: Level of Consciousness is     

      awake, alert, obeys commands, Oriented to person, place, time, situation.                   

      Cardiovascular: Patient's skin is warm and dry. Respiratory: Respiratory effort is          

      even, unlabored, Respiratory pattern is regular, symmetrical. Derm: Skin is pink, warm      

      \T\ dry. Musculoskeletal: Circulation, motion, and sensation intact.                        

                                                                                                  

Historical:                                                                                       

- Allergies:                                                                                      

00:13 clindamycin HCl (bulk);                                                                 ll3 

00:13 Darvocet-N 100;                                                                         ll3 

00:13 PENICILLINS;                                                                            ll3 

00:13 Strawberries;                                                                           ll3 

00:13 Sulfa (Sulfonamide Antibiotics);                                                        ll3 

- Home Meds:                                                                                      

00:13 Eliquis 5 mg Oral tab 2 times per day [Active];                                         ll3 

00:15 Trelegy Ellipta 100-62.5-25 mcg inhalation dsdv 1 puff once daily [Active]; clopidogrel kd3 

      75 mg Oral tab 1 tab once daily [Active]; benzonatate 200 mg Oral cap 1 cap 3 times per     

      day [Active]; bupropion HCl 150 mg Oral Tb24 1 tab once daily [Active]; alprazolam 0.25     

      mg Oral tab 1 tab twice a day for Anxiety [Active]; bupropion HCl 300 mg Oral Tb24 1        

      tab once daily [Active];                                                                    

00:16 donepezil 10 mg Oral tab 1 tab once daily [Active]; Ellura 200 mg Oral cap daily        kd3 

      [Active]; gabapentin 300 mg Oral cap 1 cap 3 times per day [Active]; doxepin 50 mg Oral     

      cap 1 cap once daily [Active]; duloxetine 60 mg Oral cpDR 2 caps once daily [Active];       

      hydrocodone-acetaminophen 7.5-325 mg Oral tab 1 tab every 8 hours [Active]; indapamide      

      2.5 mg Oral tab 1 tab once daily [Active]; magnesium oxide 500 mg Oral tab 500 mg after     

      meals and before bedtime [Active]; metoprolol succinate 25 mg Oral Tb24 1 tab once          

      daily [Active]; pantoprazole 40 mg Oral grps 1 packet once daily [Active]; potassium        

      chloride 20 mEq Oral TbER 1 tab 2 times per day [Active]; memantine 10 mg Oral tab 1        

      tab 2 times per day [Active]; levothyroxine 75 mcg tab 1 tab once daily [Active];           

      tizanidine 4 mg Oral tab 1 tab daily [Active]; simvastatin 40 mg Oral tab 1 tab once        

      daily [Active]; topiramate 200 mg Oral cp24 1 cap twice a day [Active]; quetiapine 200      

      mg Oral Tb24 1 tab once daily [Active];                                                     

- PMHx:                                                                                           

00:13 Alzheimer's disease; Cancer; GERD; Fibromyalgia; Depression; insomnia; lymphedema;      ll3 

      skipped heart beats;                                                                        

- PSHx:                                                                                           

00:13 Total abdominal hysterectomy;                                                           ll3 

                                                                                                  

- Immunization history:: Client reports receiving the 2nd dose of the Covid vaccine.              

- Social history:: Smoking status: Patient denies any tobacco usage or history of.                

                                                                                                  

                                                                                                  

Screenin:14 Abuse screen: Denies threats or abuse. Denies injuries from another. Nutritional        kd3 

      screening: No deficits noted. Tuberculosis screening: No symptoms or risk factors           

      identified. Fall Risk Fall in past 12 months (25 points).                                   

                                                                                                  

Assessment:                                                                                       

00:14 General: Appears uncomfortable, Behavior is calm, cooperative. Pain: Complains of pain  kd3 

      in face. Neuro: Level of Consciousness is awake, alert, obeys commands, Oriented to         

      person, place, time, situation. Respiratory: Airway is patent Trachea midline               

      Respiratory effort is even, unlabored, Respiratory pattern is regular, symmetrical.         

00:14 Injury Description: Bruise sustained to forehead.                                       kd3 

                                                                                                  

Vital Signs:                                                                                      

00:09  / 88; Pulse 55; Resp 18; Temp 98.5(O); Pulse Ox 96% on R/A; Weight 88.9 kg (R);  ll3 

      Height 5 ft. 3 in. (160.02 cm) (R); Pain 4/10;                                              

01:35  / 77; Pulse 58; Resp 19; Pulse Ox 94% on R/A;                                    kd3 

00:09 Body Mass Index 34.72 (88.90 kg, 160.02 cm)                                             ll3 

                                                                                                  

ED Course:                                                                                        

00:07 Patient arrived in ED.                                                                  kd3 

00:08 Vijaya Ross MD is Attending Physician.                                                sp3 

00:09 Carlita Mai, ZHAO is Primary Nurse.                                                    kd3 

00:13 Triage completed.                                                                       ll3 

00:13 Arm band placed on Patient placed in an exam room, on a stretcher, on pulse oximetry.   ll3 

00:14 Patient has correct armband on for positive identification.                             kd3 

00:14 No provider procedures requiring assistance completed.                                  kd3 

01:04 CT Head C Spine In Process Unspecified.                                                 EDMS

01:05 Facial Bones W/ Mpr In Process Unspecified.                                             EDMS

02:17 IV discontinued, intact, bleeding controlled, No redness/swelling at site. Pressure     ll3 

      dressing applied.                                                                           

                                                                                                  

Administered Medications:                                                                         

00:14 Drug: HYDROcodone-acetaminophen 5 mg-325 mg 2 tabs Route: PO;                           kd3 

                                                                                                  

                                                                                                  

Medication:                                                                                       

00:14 VIS not applicable for this client.                                                     kd3 

                                                                                                  

Outcome:                                                                                          

01:42 Discharge ordered by MD.                                                                sp3 

02:17 Discharged to home via wheelchair, with family.                                         ll3 

02:17 Condition: stable                                                                           

02:17 Discharge instructions given to patient, family, Instructed on discharge instructions,      

      follow up and referral plans. Demonstrated understanding of instructions, follow-up         

      care.                                                                                       

02:18 Patient left the ED.                                                                    ll3 

                                                                                                  

Signatures:                                                                                       

Dispatcher [employer]                           Vijaya Raza MD MD   sp3                                                  

Ernesto Lopez RN                      RN   ll3                                                  

Carlita Mai RN                      RN   kd3                                                  

                                                                                                  

**************************************************************************************************

## 2022-07-23 NOTE — XMS REPORT
Continuity of Care Document

                            Created on:2022



Patient:NEGRO GONZALES

Sex:Female

:1940

External Reference #:[id number]





Demographics







                          Address                   [address] 



                                                    [place] [address]

 

                          Home Phone                [phone]

 

                          Mobile Phone              [phone]

 

                          Email Address             [email or web address]

 

                          Preferred Language        English

 

                          Marital Status            Unknown

 

                          Congregation Affiliation     Unknown

 

                          Race                      Unknown

 

                          Additional Race(s)        White



                                                    Unavailable

 

                          Ethnic Group              Unknown









Author







                          Organization              [facility]

t

 

                          Address                   [address]



                                                    [place] [address]

 

                          Phone                     [phone]









Support







                Name            Relationship    Address         Phone

 

                Ja         Unavailable     [address] [phone]



                                                [place] [address] 

 

                norma          Child           Unavailable     [phone]

 

                Norma          Other           Unavailable     [phone]

 

                Ja         Spouse          [address]   [phone]



                                                [place] [address] 

 

                Ja         Child           Unavailable     [phone]

 

                GUANAKITO         D               Unavailable     [phone]









Care Team Providers







                    Name                Role                Phone

 

                    Denita RIZVI            Primary Care Physician [phone]

 

                    CATHI Mcwilliams            Attending Clinician Unavailable

 

                    [id number]              Attending Clinician Unavailable

 

                    TUCKER BASSETT   Attending Clinician Unavailable

 

                    Doctor Unassigned,  Name Attending Clinician Unavailable

 

                    PETE Urrutia       Attending Clinician [phone]

 

                    CARROLL Mead MD     Attending Clinician [phone]

 

                    Tucker Bassett MD Attending Clinician [phone]

 

                    Leni Ramos MD  Attending Clinician [phone]

 

                    GRANT Christopher MD       Attending Clinician [phone]

 

                    Marcelino Kay MD   Attending Clinician [phone]

 

                    Abbe RIZVI           Attending Clinician [phone]

 

                    [id number]              Admitting Clinician Unavailable

 

                    TUCKER BASSETT   Admitting Clinician Unavailable

 

                    LENI RAMOS     Admitting Clinician Unavailable









Payers







           Payer Name Policy Type Policy Number Effective Date Expiration Date S

ource







Problems







       Condition Condition Condition Status Onset  Resolution Last   Treating Co

mments 

Source



       Name   Details Category        Date   Date   Treatment Clinician        



                                                 Date                 

 

       Atrial Atrial Disease Active                              [facility]



       fibrillati fibrillati               8-16                               [facility]

[facility]



       on with on with               00:00:                             Medical



       RVR    RVR                  00                                 Center

 

       Status Status Disease Active 2016                             [facility]



       post total post total               2-20                               it

y of



       knee   knee                 00:00:                             [place]



       replacemen replacemen               00                                 Me

dical



       t, left t, left                                                  Branch

 

       Morbid Morbid Disease Active 2016                             [facility]



       obesity obesity               2-20                               ity of



       with body with body               00:00:                             [place]

[place]



       mass index mass index               00                                 Me

dical



       of 50 or of 50 or                                                  Branch



       higher higher                                                  

 

       Morbid Morbid Disease Active 2016                             [facility]



       obesity obesity               2-20                               ity of



       with body with body               00:00:                             [place]

s



       mass index mass index               00                                 Me

dical



       of     of                                                      Branch



       40.0-49.9 40.0-49.9                                                  

 

       CORNELIA    CORNELIA    Disease Active                                    [facility]



       (obstructi (obstructi                                                  [facility]

kes



       ve sleep ve sleep                                                  [facility]

l



       apnea) apnea)                                                  Center







Allergies, Adverse Reactions, Alerts







       Allergy Allergy Status Severity Reaction(s) Onset  Inactive Treating Comm

ents 

Source



       Name   Type                        Date   Date   Clinician        

 

       STRAWBER Allergy Active Low    Rash   -0                      [facility]



       RY                                 8-17                        [facility]



                                          00:00:                      [facility]



                                          00                          Center

 

       Strawber Propensi Active        Rash   -0                      [facility]



       ry     ty to                       8-17                        [facility]



              adverse                      00:00:                      Medical



              reaction                      00                          Center



              s                                                       

 

       CLINDAMY Allergy Active High   Rash   -0                      [facility]



       JAYNA HCL                             8-16                        [facility]



                                          00:00:                      [facility]



                                          00                          Center

 

       PENICILL Allergy Active High   Rash   -0                      [facility]



       IN                                 8-16                        [facility]



                                          00:00:                      [facility]



                                          00                          Center

 

       SULFA  Allergy Active High   Rash   -0                      [facility]



       (SULFONA                             8-16                        [facility]



       MIDE                               00:00:                      Medical



       ANTIBIOT                             00                          Center



       [facility])                                                           

 

       Clindamy Propensi Active        Rash   -0                      [facility]



       jayna Hcl ty to                       8-16                        [facility]



              adverse                      00:00:                      Medical



              reaction                      00                          Center



              s                                                       

 

       Penicill Propensi Active        Rash   -0                      [facility]



       in     ty to                       8-16                        [facility]



              adverse                      00:00:                      Medical



              reaction                      00                          Center



              s                                                       

 

       Sulfa  Propensi Active        Rash   -0                      [facility]



       (Sulfona ty to                       8-16                        [facility]



       mide   adverse                      00:00:                      Medical



       Antibiot reaction                      00                          [facility])   s                                                       

 

       Clindamy Propensi Active        Unknown 2020-0                      [facility]

di



       jayna    ty to                Reaction 6-30                        st



       Phosphat adverse                      00:00:                      [facility]osp[facility]



       e      reaction                      00                          l



              s to                                                    



              drug                                                    

 

       Propoxyp Propensi Active        Unknown 2020-0                      [facility]

di



       hene   ty to                Reaction 630                        st



       N-Acetam adverse                      00:00:                      [facility]



       inophen reaction                      00                          l



              s to                                                    



              drug                                                    

 

       Penicill Propensi Active        Unknown 2020-0                      [facility]

di



       ins    ty to                Reaction 630                        st



              adverse                      00:00:                      [facility]osp[facility]



              reaction                      00                          l



              s to                                                    



              drug                                                    

 

       Sulfa  Propensi Active        Unknown 2020-0                      [facility]



       (Sulfona ty to                Reaction 30                        st



       mide   adverse                      00:00:                      [facility]ospita



       Antibiot reaction                      00                          l



       ics)   s to                                                    



              drug                                                    

 

       Acetamin Propensi Active        Unknown 2020-0                      [facility]

di



       ophen  ty to                Reaction 630                        st



              adverse                      00:00:                      [facility]osp[facility]



              reaction                      00                          l



              s to                                                    



              drug                                                    

 

       Acetamin Propensi Active        Unknown - 2020-0                      Uni

vers



       ophen  ty to                See comments 6-30                        ity 

of



              adverse                      00:00:                      [place]



              reaction                      00                          Medical



              s                                                       Branch

 

       Propoxyp Propensi Active        Unknown - 2020-0                      Uni

vers



       hene   ty to                See comments 630                        ity 

of



       N-Acetam adverse                      00:00:                      [place]



       inophen reaction                      00                          Medical



              s                                                       Branch

 

       Clindamy Propensi Active        Rash   2016                      Univer

s



       jayna Hcl ty to                       2-19                        ity of



              adverse                      00:00:                      [place]



              reaction                      00                          Medical



              s                                                       Branch

 

       Penicill Propensi Active        Anaphylaxis 2016                      U

nivers



       in     ty to                       2-19                        ity of



              adverse                      00:00:                      [place]



              reaction                      00                          Medical



              s                                                       Branch

 

       Sulfa  Propensi Active        Rash   2016                      [facility]



       (Sulfona ty to                       2-19                        ity of



       mide   adverse                      00:00:                      [place]



       Antibiot reaction                      00                          Medica

l



       ics)   s                                                       Branch

 

       Sulfa  Propensi Active        Rash   2016                      [facility]



       (Sulfona ty to                       2-19                        ity of



       mide   adverse                      00:00:                      [place]



       Antibiot reaction                      00                          Medica

l



       ics)   s                                                       Branch







Family History







           Family Member Diagnosis  Comments   Start Date Stop Date  Source

 

           Natural father Heart disease                                  [facility]

[facility]

 

           Natural father Hypertension                                  [facility]

 

           Natural father Diabetes                                    [facility] 

[facility]

 

           Natural mother Asthma                                      [facility] 

[facility]







Social History







           Social Habit Start Date Stop Date  Quantity   Comments   Source

 

           History SDOH                                             [facility]



           Alcohol Std Drinks                                             [facility]

l Center

 

           History SDOH                                             [facility]



           Alcohol Binge                                             [facility]

ter

 

           History SDOH                                             [facility]



           Alcohol Comment                                             Medical C

enter

 

           History [facility] tobacco                       [facility]                [facility]



           use                                                    

 

           Exposure to 2022 Not sure              [facility]

 [place]



           SARS-CoV-2 (event) 00:00:00   14:26:00                         [facility]

l Branch

 

           Alcohol intake 2022 0 /d                  [facility] [place]



                      00:00:00   00:00:00                         [facility]

 

           Tobacco use and 2021 Never used            [facility]

[facility]



           exposure   00:00:00   00:00:00                         [facility]

 

           History SDOH 2021 1                     [facility]



           Alcohol Frequency 00:00:00   00:00:00                         [facility]

 

           Cigarettes smoked 2020                       [facility]



           current (pack per 00:00:00   00:00:00                         



           day) - Reported                                             

 

           Cigarette  2020                       [facility]

ital



           pack-years 00:00:00   00:00:00                         

 

           Sex Assigned At [birth year]                       [facility]



           Birth      00:00:00   00:00:00                         [facility]









                Smoking Status  Start Date      Stop Date       Source

 

                Never smoker                                    [facility]

 

                Former smoker   2020 00:00:00 2020 00:00:00 [facility]







Medications







       Ordered Filled Start  Stop   Current Ordering Indication Dosage Frequency

 Signature

                    Comments            Components          Source



     Medication Medication Date Date Medication? Clinician                (SIG) 

          



     Name Name                                                   

 

     buPROPion            Yes                                     [facility]



      mg      8-26                                              ity of



     24 hr      00:00:                                              [place]



     tablet      00                                                [facility]

 

     buPROPion            Yes                                     [facility]



      mg      8-26                                              ity of



     24 hr      00:00:                                              [place]



     tablet      00                                                [facility]

 

     buPROPion            Yes                                     [facility]



      mg      8-26                                              ity of



     24 hr      00:00:                                              [place]



     tablet      00                                                [facility]

 

     buPROPion            Yes                                     [facility]



      mg      8-26                                              ity of



     24 hr      00:00:                                              [place]



     tablet      00                                                [facility]

 

     buPROPion            Yes                                     [facility]



      mg      8-26                                              ity of



     24 hr      00:00:                                              [place]



     tablet                                                      [facility]

 

     furosemide      2022- No             20mg QD   Take 1           [facility]



     (LASIX) 20      8- 08-23                          tablet (20           [facility]

kes



     MG tablet      00:00: 23:59                          mg total)           Me

dical



               00   :00                           by mouth           Center



                                                  daily.           

 

     zolpidem            Yes            10mg      Take 10 mg           [facility]



     (AMBIEN) 10      8-22                               by mouth           [facility]

s



     mg tablet      12:43:                               every           Medical



               24                                 night as           Center



                                                  needed for           



                                                  Insomnia.           

 

     donepeziL            Yes            10mg QD   Take 10 mg           [facility]



     (ARICEPT)      8-22                               by mouth           [facility]



     10 MG      12:43:                               nightly.           Medical



     tablet      24                                                Center

 

     DULoxetine            Yes            60mg Q.5D Take 60 mg           [facility]

[facility]



     (CYMBALTA)      8-22                               by mouth 2           [facility]

es



     60 MG      12:43:                               (two)           Medical



     capsule      24                                 times           Center



                                                  daily.           

 

     gabapentin            Yes            300mg Q.[id number] Take 300       

    [facility]



     (NEURONTIN)      8-22                          [id number] mg by           L

ukes



     300 MG      12:43:                          3D   mouth 3           Medical



     capsule      24                                 (three)           Center



                                                  times           



                                                  daily.           

 

     indapamide            Yes            2.5mg QD   Take 2.5           [facility]

[facility]



     (LOZOL) 2.5      8-22                               mg by           [facility]



     MG tablet      12:43:                               mouth           Medical



               24                                 every           Center



                                                  morning.           

 

     memantine            Yes            10mg Q.5D Take 10 mg           [facility]



     (NAMENDA)      8-22                               by mouth 2           [facility]

s



     10 MG      12:43:                               (two)           Medical



     tablet      24                                 times           Center



                                                  daily.           

 

     metoprolol            Yes            25mg QD   Take 25 mg           [facility]

[facility]



     succinate      8-22                               by mouth           [facility]



     (TOPROL-XL)      12:43:                               nightly.           Me

dical



     25 MG 24 hr      24                                                Center



     tablet                                                        

 

     HYDROcodone            Yes            1{tbl}      Take 1           [facility]



     -acetaminop      8-22                               tablet by           [facility]

es



     hen (NORCO      12:43:                               mouth 3           Medi

bella



     7.5-325)      24                                 (three)           Center



     7.5-325 mg                                         times           



     per tablet                                         daily as           



                                                  needed for           



                                                  Pain.           

 

     potassium            Yes            20meq Q.5D Take 20           [facility]



     chloride      8-22                               mEq by           [facility]



     (KLOR-CON)      12:43:                               mouth 2           Medi

bella



     20 mEq      24                                 (two)           Center



     packet                                         times           



                                                  daily.           

 

     QUEtiapine            Yes            200mg QD   Take 200           [facility]



     (SEROquel)      8-22                               mg by           [facility]



     200 MG      12:43:                               mouth           Medical



     tablet      24                                 nightly.           Center

 

     simvastatin            Yes            40mg QD   Take 40 mg           

[facility]



     (ZOCOR) 40      8-22                               by mouth           [facility]



     MG tablet      12:43:                               nightly.           Medi

bella



               24                                                Center

 

     topiramate            Yes            200mg Q.5D Take 200           [facility]



     (TOPAMAX)      8-22                               mg by           [facility]



     200 MG      12:43:                               mouth 2           Medical



     tablet      24                                 (two)           Center



                                                  times           



                                                  daily.           

 

     buPROPion            Yes            150mg QD   Take 150           [facility]



     (WELLBUTRIN      8-22                               mg by           [facility]



     XL) 150 MG      12:43:                               mouth           [facility]

l



     24 hr      24                                 daily.           Center



     tablet                                                        

 

     ALPRAZolam            Yes            .25mg      Take 0.25           [facility]

[facility]



     (XANAX)      8-22                               mg by           [facility]



     0.25 MG      12:43:                               mouth           [facility]



     tablet      24                                 every           Center



                                                  night as           



                                                  needed for           



                                                  Anxiety.           

 

     tiZANidine            Yes            4mg  QD   Take 4 mg           [facility]



     (ZANAFLEX)      8-22                               by mouth           [facility]



     4 MG tablet      12:43:                               daily.           Medi

[place]



               24                                                [place]

 

     fluticasone            Yes                 QD   Inhale 1           [facility]



     -umeclidin-      8                               Inhalation           [facility]

[facility]



     vilanter      12:43:                               by mouth           Medic

al



     (Trelegy      24                                 via            Center



     Ellipta)                                         inhaler           



     100-62.5-25                                         daily .           



     mcg DsDv                                                        

 

     clopidogreL      2021- No             75mg QD   Take 75 mg          

 [facility]



     (PLAVIX) 75      - 08-22                          by mouth           [facility]

es



     mg tablet      10:48: 00:00                          daily.           Medic

al



               43   :00                                          [place]

 

     ELIQUIS 5            Yes            5mg       Take 5 mg           [facility]

vers



     mg tablet      8-22                               by mouth 2           ity 

of



               00:00:                               (two)           [place]



               00                                 times           Medical



                                                  daily.           Branch

 

     benzonatate            Yes                      TAKE 1           [facility]

ers



     100 mg      8-22                               CAPSULE           ity of



     capsule      00:00:                               (100 MG           [place]



               00                                 TOTAL) BY           Medical



                                                  MOUTH 3           Branch



                                                  (THREE)           



                                                  TIMES           



                                                  DAILY AS           



                                                  NEEDED FOR           



                                                  COUGH FOR           



                                                  UP TO 7           



                                                  DAYS.           

 

     ELIQUIS 5            Yes            5mg       Take 5 mg           Uni

vers



     mg tablet      8-22                               by mouth 2           ity 

of



               00:00:                               (two)           [place]



               00                                 times           Medical



                                                  daily.           Branch

 

     benzonatate            Yes                      TAKE 1           Univ

ers



     100 mg      8-22                               CAPSULE           ity of



     capsule      00:00:                               (100 MG           [place]



               00                                 TOTAL) BY           Medical



                                                  MOUTH 3           Branch



                                                  (THREE)           



                                                  TIMES           



                                                  DAILY AS           



                                                  NEEDED FOR           



                                                  COUGH FOR           



                                                  UP TO 7           



                                                  DAYS.           

 

     ELIQUIS 5      2021-0      Yes            5mg       Take 5 mg           Uni

vers



     mg tablet      8-22                               by mouth 2           ity 

of



               00:00:                               (two)           [place]



               00                                 times           Medical



                                                  daily.           Branch

 

     benzonatate            Yes                      TAKE 1           Univ

ers



     100 mg      8-22                               CAPSULE           ity of



     capsule      00:00:                               (100 MG           [place]



               00                                 TOTAL) BY           Medical



                                                  MOUTH 3           Branch



                                                  (THREE)           



                                                  TIMES           



                                                  DAILY AS           



                                                  NEEDED FOR           



                                                  COUGH FOR           



                                                  UP TO 7           



                                                  DAYS.           

 

     ELIQUIS 5            Yes            5mg       Take 5 mg           Uni

vers



     mg tablet      8-22                               by mouth 2           ity 

of



               00:00:                               (two)           [place]



               00                                 times           Medical



                                                  daily.           Branch

 

     benzonatate            Yes                      TAKE 1           Univ

ers



     100 mg      8-22                               CAPSULE           ity of



     capsule      00:00:                               (100 MG           [place]



               00                                 TOTAL) BY           Medical



                                                  MOUTH 3           Branch



                                                  (THREE)           



                                                  TIMES           



                                                  DAILY AS           



                                                  NEEDED FOR           



                                                  COUGH FOR           



                                                  UP TO 7           



                                                  DAYS.           

 

     ELIQUIS 5            Yes            5mg       Take 5 mg           Uni

vers



     mg tablet      8-22                               by mouth 2           ity 

of



               00:00:                               (two)           [place]



               00                                 times           Medical



                                                  daily.           Branch

 

     benzonatate            Yes                      TAKE 1           Univ

ers



     100 mg      8-22                               CAPSULE           ity of



     capsule      00:00:                               (100 MG           [place]



               00                                 TOTAL) BY           Medical



                                                  MOUTH 3           Branch



                                                  (THREE)           



                                                  TIMES           



                                                  DAILY AS           



                                                  NEEDED FOR           



                                                  COUGH FOR           



                                                  UP TO 7           



                                                  DAYS.           

 

     apixaban      2022- No             5mg  Q.5D Take 1           [facility]



     (ELIQUIS) 5                                tablet (5           [facility]

[facility]



     mg Tab      00:00: 23:59                          mg total)           Medic

al



     tablet      00   :00                           by mouth 2           Center



                                                  (two)           



                                                  times           



                                                  daily.           

 

     cyanocobala      2022- No             1000ug QD   Take 1           [facility]

[facility]



     min,                                tablet           [facility]



     vitamin      00:00: 23:59                          (1,000 mcg           Med

ical



     B-12, 1000      00   :00                           total) by           Cent

er



     MCG tablet                                         mouth           



                                                  daily.           

 

     flecainide      2022- No             50mg      Take 1           [facility]



     (TAMBOCOR)                                tablet (50           [facility]

[facility]



     50 MG      00:00: 23:59                          mg total)           [facility]

l



     tablet      00   :00                           by mouth           Center



                                                  every 12           



                                                  (twelve)           



                                                  hours.           

 

     benzonatate      2021- No             100mg      Take 1           [facility]



     (TESSALON)                                capsule           [facility]



     100 MG      00:00: 23:59                          (100 mg           Medical



     capsule      00   :00                           total) by           Center



                                                  mouth 3           



                                                  (three)           



                                                  times           



                                                  daily as           



                                                  needed for           



                                                  Cough for           



                                                  up to 7           



                                                  days.           

 

     ALPRAZolam            Yes                                     [facility]



     0.25 mg      8-02                                              ity of



     tablet      00:00:                                              [place]



               00                                                Medical



                                                                 [facility]anch

 

     ALPRAZolam            Yes                                     [facility]



     0.25 mg      8-02                                              ity of



     tablet      00:00:                                              [place]



                                                               [facility]



                                                                 [facility]

 

     ALPRAZolam            Yes                                     [facility]



     0.25 mg      8-02                                              ity of



     tablet      00:00:                                              [place]



               00                                                Medical



                                                                 [facility]anch

 

     ALPRAZolam            Yes                                     [facility]



     0.25 mg      8-02                                              ity of



     tablet      00:00:                                              [place]



                                                               Medical



                                                                 [facility]

 

     ALPRAZolam            Yes                                     [facility]



     0.25 mg      8-02                                              ity of



     tablet      00:00:                                              [place]



               00                                                Medical



                                                                 [facility]

 

     calcium      2016      Yes            2{tbl}      Take 2           Un[facility]

s



     carb and      2-28                               tablets by           ity o

f



     citrate-vit      11:30:                               mouth           [place]



     D3        04                                 daily.           Medical



     (CITRACAL +                                                        Branch



     D SLOW                                                        



     RELEASE)                                                        



     600 mg                                                        



     calcium-                                                        



     500 unit                                                        



     TbSR                                                        

 

     DULoxetine      2016      Yes            60mg      Take 60 mg           U

nivers



     (CYMBALTA)      2-28                               by mouth           ity o

f



     60 mg      11:30:                               daily.           [place]



     capsule      04                                                Medical



                                                                 Branch

 

     indapamide      2016      Yes            2.5mg      Take 2.5           Un

bob



     (LOZOL) 2.5      2-28                               mg by           ity of



     mg tablet      11:30:                               mouth           [place]



               04                                 daily.           Medical



                                                                 Branch

 

     Vit C-Vit      2016      Yes            1{capsu      Take 1           Uni

vers



     E-Lutein-Mi      2-28                     le}       capsule by           it

y of



     n-OM-3      11:30:                               mouth           [place]



     (OCUVITE)      04                                 daily.           Medical



     150-30-5-15                                                        Branch



     0                                                           



     mg-unit-mg-                                                        



     mg Cap                                                        

 

     multivitami      2016      Yes            1{tbl}      Take 1           Un

bob



     n         2-28                               tablet by           ity of



     (ONE-A-DAY      11:30:                               mouth           [place]



     ESSENTIAL)      04                                 daily.           Medical



     tablet                                                        Branch

 

     FERROUS      2016      Yes            1{tbl}      Take 1           Univer

s



     FUMARATE/VI      2-28                               tablet by           ity

 of



     T BCOMP,C      11:30:                               mouth           [place]



     (SUPER B      04                                 daily.           Medical



     COMPLEX                                                        Branch



     ORAL)                                                        

 

     ascorbic      2016      Yes            500mg      Take 500           Univ

ers



     acid,      2-28                               mg by           ity of



     vitamin C,      11:30:                               mouth           [place]



     (VITAMIN C)      04                                 daily.           [facility]

l



     500 mg                                                        [facility]



     tablet                                                        

 

     Cholecalcif      2016      Yes            1{tbl}      Take 1           Un

bob



     edith,      2-28                               tablet by           ity of



     Vitamin D3,      11:30:                               mouth           [place]



     (VITAMIN      04                                 daily.           Medical



     D3) 5,000                                                        [facility]



     unit tablet                                                        

 

     QUEtiapine      2016      Yes            50mg      Take 50 mg           U

nivers



     (SEROQUEL)      2-28                               by mouth           ity o

f



     50 mg      11:30:                               daily.           [place]



     tablet      04                                                Medical



                                                                 Branch

 

     simvastatin      2016      Yes            40mg      Take 40 mg           

[facility]



     (ZOCOR) 40      2-28                               by mouth           ity o

f



     mg tablet      11:30:                               at             [place]



               04                                 bedtime.           Medical



                                                                 Branch

 

     topiramate      2016      Yes            200mg      Take 200           Un

bob



     (TOPAMAX)      2-28                               mg by           ity of



     100 mg      11:30:                               mouth           [place]



     tablet      04                                 every           Medical



                                                  evening.           Branch

 

     KCL       2016      Yes            10meq      Take 10           [facility]



     (KLOR-CON      2-28                               mEq by           ity of



     M10) 10 mEq      11:30:                               mouth 2           [facility]

[facility]



     tablet      04                                 (two)           Medical



                                                  times           [facility]anch



                                                  daily.           

 

     pregabalin      2016      Yes            225mg      Take 225           Un

bob



     (LYRICA)      2-28                               mg by           ity of



     225 mg      11:30:                               mouth 2           [place]



     capsule      04                                 (two)           Medical



                                                  times           Branch



                                                  daily.           

 

     memantine      2016      Yes            5mg       Take 5 mg           Uni

vers



     (NAMENDA) 5      2-28                               by mouth 2           it

y of



     mg tablet      11:30:                               (two)           [place]



               04                                 times           Medical



                                                  daily.           Branch

 

     metoprolol      2016      Yes            50mg      Take 50 mg           U

nivers



     tartrate      2-28                               by mouth 2           ity o

f



     (LOPRESSOR)      11:30:                               (two)           [place]



     50 mg      04                                 times           Medical



     tablet                                         daily.           Branch

 

     esomeprazol      2016      Yes            20mg      Take 20 mg           

[facility]



     e (NEXIUM)      2-28                               by mouth 2           ity

 of



     20 mg      11:30:                               (two)           [place]



     capsule      04                                 times           Medical



                                                  daily.           Branch

 

     LACTOBAC/BI      2016      Yes            1{tbl}      Take 1           Un

bob



     FIDOBAC/TYLER      2-28                               tablet by           ity

 of



     B NJ CON      11:30:                               mouth 2           [place]



     (ULTRA      04                                 (two)           Medical



     PATRICK PLUS                                         times           Branch



     ORAL)                                         daily.           



                                                  Indication           



                                                  s:             



                                                  Ultimate           



                                                  Patrick           

 

     calcium      2016      Yes            2{tbl}      Take 2           Univer

s



     carb and      2-28                               tablets by           ity o

f



     citrate-vit      11:30:                               mouth           [place]



     D3        04                                 daily.           Medical



     (CITRACAL +                                                        Branch



     D SLOW                                                        



     RELEASE)                                                        



     600 mg                                                        



     calcium-                                                        



     500 unit                                                        



     TbSR                                                        

 

     DULoxetine      2016      Yes            60mg      Take 60 mg           U

nivers



     (CYMBALTA)      2-28                               by mouth           ity o

f



     60 mg      11:30:                               daily.           [place]



     capsule      04                                                Medical



                                                                 Branch

 

     indapamide      2016      Yes            2.5mg      Take 2.5           Un

bob



     (LOZOL) 2.5      2-28                               mg by           ity of



     mg tablet      11:30:                               mouth           [place]



               04                                 daily.           Medical



                                                                 Branch

 

     Vit C-Vit      2016      Yes            1{capsu      Take 1           Uni

vers



     E-Lutein-Mi      2-28                     le}       capsule by           it

y of



     n-OM-3      11:30:                               mouth           [place]



     (OCUVITE)      04                                 daily.           Medical



     150-30-5-15                                                        Branch



     0                                                           



     mg-unit-mg-                                                        



     mg Cap                                                        

 

     multivitami      2016      Yes            1{tbl}      Take 1           Un

bob



     n         2-28                               tablet by           ity of



     (ONE-A-DAY      11:30:                               mouth           [place]



     ESSENTIAL)      04                                 daily.           Medical



     tablet                                                        Branch

 

     FERROUS      2016      Yes            1{tbl}      Take 1           Univer

s



     FUMARATE/VI      2-28                               tablet by           ity

 of



     T BCOMP,C      11:30:                               mouth           [place]



     (SUPER B      04                                 daily.           Medical



     COMPLEX                                                        [facility]



     ORAL)                                                        

 

     ascorbic      2016      Yes            500mg      Take 500           [facility]

ers



     acid,      2-28                               mg by           [facility] of



     vitamin C,      11:30:                               mouth           [place]



     (VITAMIN C)      04                                 daily.           [facility]

l



     500 mg                                                        [facility]



     tablet                                                        

 

     Cholecalcif      2016      Yes            1{tbl}      Take 1           Un

bob



     edith,      2-28                               tablet by           ity of



     Vitamin D3,      11:30:                               mouth           [place]



     (VITAMIN      04                                 daily.           Medical



     D3) 5,000                                                        [facility]



     unit tablet                                                        

 

     QUEtiapine      2016      Yes            50mg      Take 50 mg           U

nivers



     (SEROQUEL)      2-28                               by mouth           ity o

f



     50 mg      11:30:                               daily.           [place]



     tablet      04                                                Medical



                                                                 Branch

 

     simvastatin      2016      Yes            40mg      Take 40 mg           

[facility]



     (ZOCOR) 40      2-28                               by mouth           ity o

f



     mg tablet      11:30:                               at             [place]



               04                                 bedtime.           Medical



                                                                 Branch

 

     topiramate      2016      Yes            200mg      Take 200           Un

bob



     (TOPAMAX)      2-28                               mg by           ity of



     100 mg      11:30:                               mouth           [place]



     tablet      04                                 every           Medical



                                                  evening.           Branch

 

     KCL       2016      Yes            10meq      Take 10           [facility]



     (KLOR-CON      2-28                               mEq by           ity of



     M10) 10 mEq      11:30:                               mouth 2           [facility]

[facility]



     tablet      04                                 (two)           Medical



                                                  times           [facility]



                                                  daily.           

 

     pregabalin      2016      Yes            225mg      Take 225           Un

bob



     (LYRICA)      2-28                               mg by           ity of



     225 mg      11:30:                               mouth 2           [place]



     capsule      04                                 (two)           Medical



                                                  times           Br[facility]



                                                  daily.           

 

     memantine      2016      Yes            5mg       Take 5 mg           Uni

vers



     (NAMENDA) 5      2-28                               by mouth 2           it

y of



     mg tablet      11:30:                               (two)           [place]



               04                                 times           Medical



                                                  daily.           Branch

 

     metoprolol      2016      Yes            50mg      Take 50 mg           U

nivers



     tartrate      2-28                               by mouth 2           ity o

f



     (LOPRESSOR)      11:30:                               (two)           [place]



     50 mg      04                                 times           Medical



     tablet                                         daily.           Branch

 

     esomeprazol      2016      Yes            20mg      Take 20 mg           

[facility]



     e (NEXIUM)      2-28                               by mouth 2           ity

 of



     20 mg      11:30:                               (two)           [place]



     capsule      04                                 times           Medical



                                                  daily.           Branch

 

     LACTOBAC/BI      2016      Yes            1{tbl}      Take 1           Un

bob



     FIDOBAC/TYLER      2-28                               tablet by           ity

 of



     B NJ CON      11:30:                               mouth 2           [place]



     (ULTRA      04                                 (two)           Medical



     PATRICK PLUS                                         times           [facility]



     ORAL)                                         daily.           



                                                  Indication           



                                                  s:             



                                                  Ultimate           



                                                  [facility]           

 

     calcium      2016      Yes            2{tbl}      Take 2           [facility]

s



     carb and      2-28                               tablets by           ity o

f



     citrate-vit      11:30:                               mouth           [place]



     D3        04                                 daily.           Medical



     (CITRACAL +                                                        Br[facility]



     D SLOW                                                        



     RELEASE)                                                        



     600 mg                                                        



     calcium-                                                        



     500 unit                                                        



     TbSR                                                        

 

     DULoxetine      2016      Yes            60mg      Take 60 mg           U

nivers



     (CYMBALTA)      2-28                               by mouth           ity o

f



     60 mg      11:30:                               daily.           [place]



     capsule      04                                                Medical



                                                                 Branch

 

     indapamide      2016      Yes            2.5mg      Take 2.5           Un

bob



     (LOZOL) 2.5      2-28                               mg by           ity of



     mg tablet      11:30:                               mouth           [place]



               04                                 daily.           Medical



                                                                 Branch

 

     Vit C-Vit      2016      Yes            1{capsu      Take 1           Uni

vers



     E-Lutein-Mi      2-28                     le}       capsule by           it

y of



     n-OM-3      11:30:                               mouth           [place]



     (OCUVITE)      04                                 daily.           Medical



     150-30-5-15                                                        Branch



     0                                                           



     mg-unit-mg-                                                        



     mg Cap                                                        

 

     multivitami      2016      Yes            1{tbl}      Take 1           Un

bob



     n         2-28                               tablet by           ity of



     (ONE-A-DAY      11:30:                               mouth           [place]



     ESSENTIAL)      04                                 daily.           Medical



     tablet                                                        Branch

 

     FERROUS      2016      Yes            1{tbl}      Take 1           Un[facility]

s



     FUMARATE/VI      2-28                               tablet by           ity

 of



     T BCOMP,C      11:30:                               mouth           [place]



     (SUPER B      04                                 daily.           Medical



     COMPLEX                                                        Br[facility]



     ORAL)                                                        

 

     ascorbic      2016      Yes            500mg      Take 500           [facility]

ers



     acid,      2-28                               mg by           [facility] of



     vitamin C,      11:30:                               mouth           [place]



     (VITAMIN C)      04                                 daily.           [facility]

l



     500 mg                                                        [facility]



     tablet                                                        

 

     Cholecalcif      2016      Yes            1{tbl}      Take 1           Un

bbo



     edith,      2-28                               tablet by           ity of



     Vitamin D3,      11:30:                               mouth           [place]



     (VITAMIN      04                                 daily.           Medical



     D3) 5,000                                                        [facility]



     unit tablet                                                        

 

     QUEtiapine      2016      Yes            50mg      Take 50 mg           U

nivers



     (SEROQUEL)      2-28                               by mouth           ity o

f



     50 mg      11:30:                               daily.           [place]



     tablet      04                                                Medical



                                                                 Branch

 

     simvastatin      2016      Yes            40mg      Take 40 mg           

[facility]



     (ZOCOR) 40      2-28                               by mouth           ity o

f



     mg tablet      11:30:                               at             [place]



               04                                 bedtime.           Medical



                                                                 Branch

 

     topiramate      2016      Yes            200mg      Take 200           Un

bob



     (TOPAMAX)      2-28                               mg by           ity of



     100 mg      11:30:                               mouth           [place]



     tablet      04                                 every           Medical



                                                  evening.           Branch

 

     KCL       2016      Yes            10meq      Take 10           [facility]



     (KLOR-CON      2-28                               mEq by           [facility] of



     M10) 10 mEq      11:30:                               mouth 2           [facility]

[facility]



     tablet      04                                 (two)           Medical



                                                  times           [facility]



                                                  daily.           

 

     pregabalin      2016      Yes            225mg      Take 225           Un

bob



     (LYRICA)      2-28                               mg by           ity of



     225 mg      11:30:                               mouth 2           [place]



     capsule      04                                 (two)           Medical



                                                  times           [facility]



                                                  daily.           

 

     memantine      2016      Yes            5mg       Take 5 mg           [facility]

vers



     (NAMENDA) 5      2-28                               by mouth 2           it

y of



     mg tablet      11:30:                               (two)           [place]



               04                                 times           Medical



                                                  daily.           Branch

 

     metoprolol      2016      Yes            50mg      Take 50 mg           U

nivers



     tartrate      2-28                               by mouth 2           ity o

f



     (LOPRESSOR)      11:30:                               (two)           [place]



     50 mg      04                                 times           Medical



     tablet                                         daily.           [facility]

 

     esomeprazol      2016      Yes            20mg      Take 20 mg           

[facility]



     e (NEXIUM)      2-28                               by mouth 2           ity

 of



     20 mg      11:30:                               (two)           [place]



     capsule      04                                 times           Medical



                                                  daily.           Branch

 

     LACTOBAC/BI      2016      Yes            1{tbl}      Take 1           Un

bob



     FIDOBAC/TYLER      2-28                               tablet by           ity

 of



     B NJ CON      11:30:                               mouth 2           [place]



     (ULTRA      04                                 (two)           Medical



     PATRICK PLUS                                         times           [facility]



     ORAL)                                         daily.           



                                                  Indication           



                                                  s:             



                                                  Ultimate           



                                                  [facility]           

 

     calcium      2016      Yes            2{tbl}      Take 2           [facility]

s



     carb and      2-28                               tablets by           ity o

f



     citrate-vit      11:30:                               mouth           [place]



     D3        04                                 daily.           Medical



     (CITRACAL +                                                        [facility]



     D SLOW                                                        



     RELEASE)                                                        



     600 mg                                                        



     calcium-                                                        



     500 unit                                                        



     TbSR                                                        

 

     DULoxetine      2016      Yes            60mg      Take 60 mg           U

nivers



     (CYMBALTA)      2-28                               by mouth           ity o

f



     60 mg      11:30:                               daily.           [place]



     capsule      04                                                Medical



                                                                 Branch

 

     indapamide      2016      Yes            2.5mg      Take 2.5           Un

bob



     (LOZOL) 2.5      2-28                               mg by           ity of



     mg tablet      11:30:                               mouth           [place]



               04                                 daily.           Medical



                                                                 Br[facility]

 

     Vit C-Vit      2016      Yes            1{capsu      Take 1           Uni

vers



     E-Lutein-Mi      2-28                     le}       capsule by           it

y of



     n-OM-3      11:30:                               mouth           [place]



     (OCUVITE)      04                                 daily.           Medical



     150-30-5-15                                                        [facility]



     0                                                           



     mg-unit-mg-                                                        



     mg Cap                                                        

 

     multivitami      2016      Yes            1{tbl}      Take 1           Un

bob



     n         2-28                               tablet by           ity of



     (ONE-A-DAY      11:30:                               mouth           [place]



     ESSENTIAL)      04                                 daily.           Medical



     tablet                                                        Br[facility]

 

     FERROUS      2016      Yes            1{tbl}      Take 1           Univer

s



     FUMARATE/VI      2-28                               tablet by           ity

 of



     T BCOMP,C      11:30:                               mouth           [place]



     (SUPER B      04                                 daily.           Medical



     COMPLEX                                                        [facility]



     ORAL)                                                        

 

     ascorbic      2016      Yes            500mg      Take 500           [facility]

ers



     acid,      2-28                               mg by           ity of



     vitamin C,      11:30:                               mouth           [place]



     (VITAMIN C)      04                                 daily.           [facility]

l



     500 mg                                                        [facility]



     tablet                                                        

 

     Cholecalcif      2016      Yes            1{tbl}      Take 1           Un

bob



     edith,      2-28                               tablet by           ity of



     Vitamin D3,      11:30:                               mouth           [place]



     (VITAMIN      04                                 daily.           Medical



     D3) 5,000                                                        [facility]



     unit tablet                                                        

 

     QUEtiapine      2016      Yes            50mg      Take 50 mg           U

nivers



     (SEROQUEL)      2-28                               by mouth           ity o

f



     50 mg      11:30:                               daily.           [place]



     tablet      04                                                Medical



                                                                 Branch

 

     simvastatin      2016      Yes            40mg      Take 40 mg           

[facility]



     (ZOCOR) 40      2-28                               by mouth           ity o

f



     mg tablet      11:30:                               at             [place]



               04                                 bedtime.           Medical



                                                                 Branch

 

     topiramate      2016      Yes            200mg      Take 200           Un

bob



     (TOPAMAX)      2-28                               mg by           ity of



     100 mg      11:30:                               mouth           [place]



     tablet      04                                 every           Medical



                                                  evening.           Branch

 

     KCL       2016      Yes            10meq      Take 10           [facility]



     (KLOR-CON      2-28                               mEq by           ity of



     M10) 10 mEq      11:30:                               mouth 2           [facility]

[facility]



     tablet      04                                 (two)           Medical



                                                  times           [facility]



                                                  daily.           

 

     pregabalin      2016      Yes            225mg      Take 225           Un

bob



     (LYRICA)      2-28                               mg by           ity of



     225 mg      11:30:                               mouth 2           [place]



     capsule      04                                 (two)           Medical



                                                  times           [facility]



                                                  daily.           

 

     memantine      2016      Yes            5mg       Take 5 mg           Uni

vers



     (NAMENDA) 5      2-28                               by mouth 2           it

y of



     mg tablet      11:30:                               (two)           [place]



               04                                 times           Medical



                                                  daily.           Branch

 

     metoprolol      2016      Yes            50mg      Take 50 mg           U

nivers



     tartrate      2-28                               by mouth 2           ity o

f



     (LOPRESSOR)      11:30:                               (two)           [place]



     50 mg      04                                 times           Medical



     tablet                                         daily.           [facility]

 

     esomeprazol      2016      Yes            20mg      Take 20 mg           

[facility]



     e (NEXIUM)      2-28                               by mouth 2           ity

 of



     20 mg      11:30:                               (two)           [place]



     capsule      04                                 times           Medical



                                                  daily.           Branch

 

     LACTOBAC/BI      2016      Yes            1{tbl}      Take 1           Un

bob



     FIDOBAC/TYLER      2-28                               tablet by           ity

 of



     B NJ CON      11:30:                               mouth 2           [place]



     (ULTRA      04                                 (two)           Medical



     PATRICK PLUS                                         times           [facility]



     ORAL)                                         daily.           



                                                  Indication           



                                                  s:             



                                                  Ultimate           



                                                  [facility]           

 

     calcium      2016      Yes            2{tbl}      Take 2           Univer

s



     carb and      2-28                               tablets by           ity o

f



     citrate-vit      11:30:                               mouth           [place]



     D3        04                                 daily.           Medical



     (CITRACAL +                                                        [facility]



     D SLOW                                                        



     RELEASE)                                                        



     600 mg                                                        



     calcium-                                                        



     500 unit                                                        



     TbSR                                                        

 

     DULoxetine      2016      Yes            60mg      Take 60 mg           U

nivers



     (CYMBALTA)      2-28                               by mouth           ity o

f



     60 mg      11:30:                               daily.           [place]



     capsule      04                                                Medical



                                                                 Branch

 

     indapamide      2016      Yes            2.5mg      Take 2.5           Un

bob



     (LOZOL) 2.5      2-28                               mg by           ity of



     mg tablet      11:30:                               mouth           [place]



               04                                 daily.           Medical



                                                                 Branch

 

     Vit C-Vit      2016      Yes            1{capsu      Take 1           Uni

vers



     E-Lutein-Mi      2-28                     le}       capsule by           it

y of



     n-OM-3      11:30:                               mouth           [place]



     (OCUVITE)      04                                 daily.           Medical



     150-30-5-15                                                        [facility]



     0                                                           



     mg-unit-mg-                                                        



     mg Cap                                                        

 

     multivitami      2016      Yes            1{tbl}      Take 1           Un

bob



     n         2-28                               tablet by           ity of



     (ONE-A-DAY      11:30:                               mouth           [place]



     ESSENTIAL)      04                                 daily.           Medical



     tablet                                                        Branch

 

     FERROUS      2016      Yes            1{tbl}      Take 1           Univer

s



     FUMARATE/VI      2-28                               tablet by           ity

 of



     T BCOMP,C      11:30:                               mouth           [place]



     (SUPER B      04                                 daily.           Medical



     COMPLEX                                                        Branch



     ORAL)                                                        

 

     ascorbic      2016      Yes            500mg      Take 500           Univ

ers



     acid,      2-28                               mg by           ity of



     vitamin C,      11:30:                               mouth           [place]



     (VITAMIN C)      04                                 daily.           Medica

l



     500 mg                                                        Branch



     tablet                                                        

 

     Cholecalcif      2016      Yes            1{tbl}      Take 1           Un

bob



     edith,      2-28                               tablet by           ity of



     Vitamin D3,      11:30:                               mouth           [place]



     (VITAMIN      04                                 daily.           Medical



     D3) 5,000                                                        Branch



     unit tablet                                                        

 

     QUEtiapine      2016      Yes            50mg      Take 50 mg           U

nivers



     (SEROQUEL)      2-28                               by mouth           ity o

f



     50 mg      11:30:                               daily.           [place]



     tablet      04                                                Medical



                                                                 Branch

 

     simvastatin      2016      Yes            40mg      Take 40 mg           

Univers



     (ZOCOR) 40      2-28                               by mouth           ity o

f



     mg tablet      11:30:                               at             [place]



               04                                 bedtime.           Medical



                                                                 Branch

 

     topiramate      2016      Yes            200mg      Take 200           Un

bob



     (TOPAMAX)      2-28                               mg by           ity of



     100 mg      11:30:                               mouth           [place]



     tablet      04                                 every           Medical



                                                  evening.           Branch

 

     KCL       2016      Yes            10meq      Take 10           Univers



     (KLOR-CON      2-28                               mEq by           ity of



     M10) 10 mEq      11:30:                               mouth 2           [facility]

as



     tablet      04                                 (two)           Medical



                                                  times           [place]



                                                  daily.           

 

     pregabalin      2016      Yes            225mg      Take 225           Un

bob



     (LYRICA)      2-28                               mg by           ity of



     225 mg      11:30:                               mouth 2           [place]



     capsule      04                                 (two)           Medical



                                                  times           [place]



                                                  daily.           

 

     memantine      2016      Yes            5mg       Take 5 mg           Uni

vers



     (NAMENDA) 5      2-28                               by mouth 2           it

y of



     mg tablet      11:30:                               (two)           [place]



               04                                 times           Medical



                                                  daily.           Branch

 

     metoprolol      2016      Yes            50mg      Take 50 mg           U

nivers



     tartrate      2-28                               by mouth 2           ity o

f



     (LOPRESSOR)      11:30:                               (two)           [place]



     50 mg      04                                 times           Medical



     tablet                                         daily.           Branch

 

     esomeprazol      2016      Yes            20mg      Take 20 mg           

Univers



     e (NEXIUM)      2-28                               by mouth 2           ity

 of



     20 mg      11:30:                               (two)           [place]



     capsule      04                                 times           Medical



                                                  daily.           Branch

 

     LACTOBAC/BI      2016      Yes            1{tbl}      Take 1           Un

bob



     FIDOBAC/TYLER      2-28                               tablet by           ity

 of



     B NJ CON      11:30:                               mouth 2           [place]



     (ULTRA      04                                 (two)           Medical



     PATRICK PLUS                                         times           Branch



     ORAL)                                         daily.           



                                                  Indication           



                                                  s:             



                                                  Ultimate           



                                                  Patrick           







Immunizations







           Ordered    Filled Immunization Date       Status     Comments   Sour

e



           Immunization Name Name                                        

 

           SARS-COV-2 COVID-19            2021 Completed             Unive

rsity of



           PFIZER VACCINE            00:00:00                         [facility]

 

           SARS-COV-2 COVID-19            2021 Completed             Unive

rsity of



           PFIZER VACCINE            00:00:00                         [facility]

 

           SARS-COV-2 COVID-19            2021 Completed             Unive

rsity of



           PFIZER VACCINE            00:00:00                         [facility]

 

           SARS-COV-2 COVID-19            2021 Completed             Unive

rsity of



           PFIZER VACCINE            00:00:00                         [facility]

 

           SARS-COV-2 COVID-19            2021 Completed             Unive

rsity of



           PFIZER VACCINE            00:00:00                         [facility]

 

           SARS-COV-2 COVID-19            2021 Completed             Unive

rsity of



           PFIZER VACCINE            00:00:00                         [facility]

 

           SARS-COV-2 COVID-19            2021 Completed             Unive

rsity of



           PFIZER VACCINE            00:00:00                         [facility]

 

           SARS-COV-2 COVID-19            2021 Completed             Unive

rsity of



           PFIZER VACCINE            00:00:00                         [facility]

 

           SARS-COV-2 COVID-19            2021 Completed             Unive

rsity of



           PFIZER VACCINE            00:00:00                         [facility]

 

           SARS-COV-2 COVID-19            2021 Completed             Unive

rsity of



           PFIZER VACCINE            00:00:00                         [place] Medi

bella



                                                                  Branch







Vital Signs







             Vital Name   Observation Time Observation Value Comments     Source

 

             WEIGHT       2021 06:47:00 97.478 kg                 

 

             WEIGHT       2021 23:00:00 98.839 kg                 

 

             HEIGHT       2021 22:25:00 160 cm                    

 

             WEIGHT       2021 22:25:00 100.562 kg                

 

             Systolic blood 2022 19:34:00 115 mm[Hg]                Univer

sity of



             pressure                                            [facility]

 

             Diastolic blood 2022 19:34:00 59 mm[Hg]                 Unive

rsity of



             pressure                                            [facility]

 

             Heart rate   2022 19:34:00 56 /min                   [facility]

 

             Body height  2022 19:34:00 160 cm                    [facility]

 

             Body weight  2022 19:34:00 102.513 kg                [facility]

 

             BMI          2022 19:34:00 40.03 kg/m2               [facility]

 

             WEIGHT       2021 06:47:00 97.478 kg                 

 

             WEIGHT       2021 23:00:00 98.839 kg                 

 

             HEIGHT       2021 22:25:00 160 cm                    

 

             WEIGHT       2021 22:25:00 100.562 kg                

 

             Heart rate   2021 08:01:00 56 /min                   [facility]

 

             Respiratory rate 2021 08:01:00 16 /min                   [facility]

 

             Oxygen saturation in 2021 08:01:00 93 /min                   

[facility]



             Arterial blood by                                        Medical Ce

nter



             Pulse oximetry                                        

 

             Systolic blood 2021 07:00:00 126 mm[Hg]                [facility]

 

             Diastolic blood 2021 07:00:00 60 mm[Hg]                 [facility]

 

             Body temperature 2021 07:00:00 36.61 Brittany                 [facility]

 

             Body weight  2021 06:47:00 97.478 kg                 [facility]

 

             BMI          2021 06:47:00 38.07 kg/m2               [facility]

 

             Body height  2021 22:25:00 160 cm                    [facility]







Procedures







                Procedure       Date / Time     Performing Clinician Source



                                Performed                       

 

                EXTERNAL PROVIDER RECORDS 2022 05:01:00 Doctor Unassigned,

 [facility]



                                                [place]         [facility]

 

                HOME HEALTH - OTHER 2022 06:01:00 Doctor Unassigned, [facility]

[facility] [place]



                                                [place]         [facility]

 

                HIGH SENSITIVITY TROPONIN 2021 16:40:00 Tara Kay [facility]                               Reshad          [place]

 

                ECG 12-LEAD     2021 15:59:02 Unknown, Hl7 Doctor [facility]

 

                ECG 12-LEAD     2021 15:59:02 Unknown, Hl7 Doctor [facility]

 

                XR CHEST 1 VIEW PORTABLE / 2021 07:54:00 REA Abarca [facility]



                BEDSIDE                         D.              Center

 

                CBC W/PLT COUNT & AUTO 2021 04:39:00 Susanna Landa [facility]



                DIFFERENTIAL                    [place]          Center

 

                BASIC METABOLIC PANEL (7) 2021 04:39:00 Susanna Landa [facility]



                                                [place]          Center

 

                CBC W/PLT COUNT & AUTO 2021 04:39:00 Susanna Landa [facility]



                DIFFERENTIAL                    [place]          Center

 

                MAGNESIUM       2021 04:39:00 Susanna Landa [facility]



                                                [place]          Center

 

                B-TYPE NATRIURETIC FACTOR 2021 03:34:00 Ivanna Abarca [facility]



                (BNP)                           D.              Center

 

                XR CHEST 1 VIEW PORTABLE / 2021 13:03:00 Tara Kay

[facility]

 

                BASIC METABOLIC PANEL (7) 2021 11:41:00 Tara Kay [facility]          Center

 

                MAGNESIUM       2021 11:41:00 [facility]

 

                B-TYPE NATRIURETIC FACTOR 2021 11:41:00 [facility]



                (BNP)                           [facility]

 

                ECG 12-LEAD     2021 10:53:46 Cornelius Garzon   [facility]

 

                TRANSES[facility]HAGEAL E[facility] 2021 [facility]39:19 [facility]

 

                [facility] W/PLT COUNT & AUTO 2021 05:17:00 [facility]

 

                BASIC METABOLIC PANEL (7) 2021 05:17:00 Chao Ramos   [facility]

 

                HEPATIC FUNCTION PANEL 2021 05:17:00 [facility]

 

                CBC W/PLT COUNT & AUTO 2021 05:17:00 [facility]

 

                C[facility]ERSION   2021 00:31:19 [facility]

 

                HIGH SENSITIVITY TROPONIN 2021 18:35:00 Rachel, [facility]

 

                2D ECHO W/ DOPPLER 2021 14:07:13 [facility]



                (CW/PW/COLOR)                   [facility]

 

                CT CHEST FOR PULMONARY 2021 05:36:00 [facility]



                EMBOLUS                         [facility]

 

                XR CHEST 1 VIEW PORTABLE / 2021 04:08:00 Chao Ramos

[facility]

 

                SARS-COV2/RT-PCR ([facility] & 2021 03:48:00 [facility]



                REF LABS)                       [facility]

 

                CBC W/PLT COUNT & AUTO 2021 03:41:00 Rachel, [facility]

 

                BASIC METABOLIC PANEL (7) 2021 03:41:00 [facility]

 

                HEPATIC FUNCTION PANEL 2021 03:41:00 [facility], [facility]

 

                CBC W/PLT COUNT & AUTO 2021 03:41:00 Rachel, [facility]

 

                HIGH SENSITIVITY TROPONIN 2021 03:41:00 [facility]

 

                B-TYPE NATRIURETIC FACTOR 2021 03:41:00 Rachel [facility]



                (BNP)                           [facility]

 

                CBC W/PLT COUNT & AUTO 2021 23:21:00 Rachel, [facility]

 

                BASIC METABOLIC PANEL (7) 2021 23:21:00 Rachel, [facility]

 

                HEPATIC FUNCTION PANEL 2021 23:21:00 [facility]

 

                MAGNESIUM       20[facility]

 

                PHOSPHORUS      2021 23:21:00 [facility]

 

                PROTHROMBIN TIME/INR [facility]

 

                CBC W/PLT COUNT & AUTO 2021 23:21:00 [facility]

 

                TSH/FREE T4 IF INDICATED 2021 23:21:00 [facility]

 

                HIGH SENSITIVITY TROPONIN 2021 23:21:00 [facility]

 

                D-DIMER         2021 23:21:00 [facility]

 

                VITAMIN B12 AND FOLATE 2021 23:21:00 Rachel [facility]ao [address] [facility]

 

                ECG 12-LEAD     2021 23:12:15 Rachel, [facility]

 

                ECG 12-LEAD     2021 23:07:40 Unknown, Hl7 Doctor [facility]

 

                REPORT OF PROCEDURE - 2021 00:00:00 Provider, Luc [facility]



                ENDOSCOPY SCAN                  Scanning        Center







Plan of Care







             Planned Activity Planned Date Details      Comments     Source

 

             Future Scheduled 2021   INFLUENZA VACCINE (#1)              C

[facility]



             Test         00:00:00     [code = INFLUENZA              Medical Ce

nter



                                       VACCINE (#1)]              

 

             Future Scheduled 2021   DEPRESSION SCREENING              [facility]



             Test         00:00:00     (12+) [code =              Medical Center



                                       DEPRESSION SCREENING              



                                       (12+)]                    

 

             Future Scheduled 2021   FALLS RISK SCREENING              [facility]



             Test         00:00:00     [code = FALLS RISK              Medical C

enter



                                       SCREENING]                

 

             Future Scheduled 2021   Medicare IPPE (WELCOME              C

[facility]



             Test         00:00:00     TO MEDICARE) [code =              Medical

 Center



                                       Medicare IPPE (WELCOME              



                                       TO MEDICARE)]              

 

             Future Scheduled 2006-10-07   PNEUMOCOCCAL 65+ YRS              [facility]



             Test         00:00:00     (1 of 1 -                 Medical Center



                                       [id number])              



                                       [code = PNEUMOCOCCAL              



                                       65+ YRS (1 of 1 -              



                                       [id number])]              

 

             Future Scheduled 1991-10-07   SHINGLES VACCINES (1              [facility]



             Test         00:00:00     of 2) [code = SHINGLES              Medic

al Center



                                       VACCINES (1 of 2)]              

 

             Future Scheduled 1960-10-07   DTAP/TDAP/TD VACCINES              [facility]

[facility]



             Test         00:00:00     (1 - Tdap) [code =              Medical C

enter



                                       DTAP/TDAP/TD VACCINES              



                                       (1 - Tdap)]               

 

             Future Scheduled 1959-10-07   HEPATITIS C SCREENING              [facility]

[facility]



             Test         00:00:00     [code = HEPATITIS C              Medical 

Center



                                       SCREENING]                

 

             Future Scheduled              Hepatitis C screening              [facility]



             Test                      (procedure) [code =              



                                       373761428]                

 

             Future Scheduled              SHINGLES VACCINES (#1)              [facility]



             Test                      [code = SHINGLES              



                                       VACCINES (#1)]              

 

             Future Scheduled              65+ PNEUMOCOCCAL              Methodi

st Hospital



             Test                      VACCINE (1 of 1 -              



                                       PPSV23) [code = 65+              



                                       PNEUMOCOCCAL VACCINE              



                                       (1 of 1 - PPSV23)]              

 

             Future Scheduled              INFLUENZA VACCINE              [facility]

ist Hospital



             Test                      [code = INFLUENZA              



                                       VACCINE]                  







Encounters







        Start   End     Encounter Admission Attending Care    Care    Encounter 

Source



        Date/Time Date/Time Type    Type    Clinicians Facility Department ID   

   

 

        2022         Outpatient         Nikolas Mcwilliams [facility]  [id number] Common



        10:58:02                                                    [facility]

 

        2022         [facility]         [facility]  [id number] [facility]



        08:43:01                                                    [facility]

 

        [facility]  [id number]

 Common



        16:25:02                                                    [facility]

 

        2022         Outpatient                 [facility]  [id number]

 Common



        14:23:02                                                    [facility]

 

        2022         Outpatient 3       [id number]  [facility]   OTH     

 [facility]



        13:02:17                                                 [id number]    

 

        2022         Outpatient 3       [id number]  ENC[facility]   REF     

 E[facility]



        12:59:17                                                 [id number]    

 

        2022         Outpatient                 [facility]  [id number]

 Common



        13:11:24                                                 [id number]   [facility]

 

        2021         Inpatient ER      [place]    Cardiology [id number]

[id number] [facility]



        22:07:00                         SELIN                         

 

        20[facility]  [id number]

 Common



        00:00:00 00:00:00                                                 [facility]

 

        2022 Orders          Doctor  ABHIJEET    [id number]

[id number] [facility]



        00:00:00 00:00:00 Only            UnassignedCHARIS   [id number]       

  ity of



                                        [place] [facility] [id number]         [place]

as



                                                        [phone]         Medi

bella



                                                        [address]             Branch

 

        2022 Telephone         STACEY Acevedo    [phone]

[id number] [facility]



        00:00:00 00:00:00                 [facility]  [id number]         it

y of



                                                [facility] [id number]         [place]

as



                                                MARYBETH?BLEA [phone]         Me

wendy



                                                [facility]    [address]             [place]



                                                MEDICAL                 



                                                OFFICE                  



                                                BUILDING                 

 

        2022 Office          [facility]    [id number]

[id number] [facility]



        14:30:00 15:15:52 Visit           [facility]  [id number]         it

y of



                                                [place] [id number]         [place]

as



                                                MARYBETH?BLEA [phone]         Me

wendy



                                                [facility]    [address]             [place]



                                                MEDICAL                 



                                                OFFICE                  



                                                BUILDING                 

 

        2022 Orders          Doctor JHA    [id number]

[id number] [employer]



        00:00:00 00:00:00 Only            Unassigned, CHARIS   350.1.13.10       

  ity of



                                        [facility] [id number]         [place]

as



                                                        [phone]         [address]

 

        2021 [facility] Selin Feldman [facility] 

  [id number] 

[id number]                              [facility]



        22:07:00 12:43:00 Encounter         Chao Ramos Yashash D                       

  [facility]

 

        2021 Orders                  [facility]   [id number]

[id number] [facility]



        00:00:00 00:00:00 Only                                            [facility]

 

        2021 Outpatient                 [facility]     [id number]

[id number]



        00:00:00 23:59:00                                                 [place]



                                                                        Medicbaldomero alvarez

 

        2021 Travel                  [facility]   [id number]

 [facility]



        00:00:00 00:00:00                                                 [facility]

 

        2021-05-10 2021-05-10 Hospital         [facility]be 1.2.840.1 [id number]

[id number] [facility]



        08:36:31 23:59:00 Encounter         [facility]    44491.1.1         [address]



                                                3.430.2.7                 Hospit

a



                                                .3.397556                 l



                                                .8                      

 

        2021 Office          [facility]be 1.2.840.1 [id number]

[address] [facility]



        13:03:28 13:29:49 Visit           [facility]    60966.1.1         [address]



                                                3.430.2.7                 Hospit

a



                                                .3.235185                 l



                                                .8                      

 

        2021 Travel                  1.2.840.1 [id number] [id number]

[id number] [facility]



        00:00:00 00:00:00                         33092.1.1 350.1.13.43 [address]     

st



                                                3.430.2.7 0.2.7.3.698         Ho

spita



                                                .3.191095 084.8           l



                                                .8                      







Results







           Test Description Test Time  Test Comments Results    Result Comments 

Source









                    High Sensitivity Troponin I ([facility] Only) 2021 1

7:20:00 









                      Test Item  Value      Reference Range Interpretation Comme

nts









             Troponin I HS    (test 6 pg/ml      See_Comment                [Aut

omated message] The



             code = 17368-3)                                        system which

 generated



                                                                 this result tra

nsmitted



                                                                 reference range

: <=17.



                                                                 The reference r

nuyrs was



                                                                 not used to int

erpret



                                                                 this result as



                                                                 normal/abnormal

.

 

             MAGGI (test code = MAGGI)  ID - DBThe                          

 



                           STAT High                           



                          Sensitivity Troponin-I                           



                          results should be used                           



                          in conjunction with                           



                          other diagnostic                           



                          information such as                           



                          ECG, clinical                           



                          observations and                           



                          information, and                           



                          patient symptoms to                           



                          aid in the diagnosis                           



                          of MI.                                 

 

             Lab Interpretation (test Normal                                 



             code = 73972-8)                                        



[facility] -[id number] 17:20:00





             Test Item    Value        Reference Range Interpretation Comments

 

             HIGH SENSITIVITY 6 pg/ml      See_Comment                [Automated

 message]



             TROPONIN I (test code =                                        The 

system which



             7909361)                                            generated this 

result



                                                                 transmitted ref

erence



                                                                 range: <=17. Th

e



                                                                 reference range

 was not



                                                                 used to interpr

et this



                                                                 result as



                                                                 normal/abnormal

.



 ID - DBThe  STAT High Sensitivity Troponin-I results should be
used in conjunctionwith other diagnostic information such as ECG, clinical 
observations and information, and patient symptoms to aid in the diagnosis of 
MI.RAD, CHEST, 1 VIEW, NON [id number] 10:23:00Reason for exam:-&gt;pulm 
edemaShould this be performed at the bedside?-&gt;Yes
************************************************************[facility]AL[facility]        : 1940        Sex: 
F************************************************************FINAL REPORT 
PATIENT ID:   [id number]  HISTORY: Pulmonary edema COMPARISON: 2021  FIND
INGS: Mild interstitial pulmonary edema. No pleural effusions or pneumothorax. 
The heart shadow is borderline enlarged. The thoracic aorta is mildly tortuous. 
There are postoperative changes in the cervical spine. A right shoulder 
arthroplasty is partially imaged. Signed: Nikolas Rickettseport Verified 
Date/Time:  2021 10:23:36 Reading Location: [address] Transitional 
Reading Room       Electronically signed by: NIKOLAS RICKETTS MD on 2021 
10:23 AMBasic Metabolic [id number] 05:46:00





             Test Item    Value        Reference Range Interpretation Comments

 

             Sodium (test code = 140 meq/L    136-145                   



             2951-2)                                             

 

             Potassium (test code = 3.4 meq/L    3.5-5.1      L            



             2823-3)                                             

 

             Chloride (test code = 108 meq/L           H            



             5-0)                                             

 

             CO2 (test code = 25 meq/L     22-29                     



             -9)                                             

 

             BUN (test code = 11 mg/dL     -                      



             3094-0)                                             

 

             Creatinine (test code 1.12 mg/dL   0.57-1.25                 



             = 2160-0)                                           

 

             Glucose (test code = 113 mg/dL           H            



             2345-7)                                             

 

             Calcium (test code = 8.9 mg/dL    8.4-10.2                  



             90577-3)                                            

 

             EGFR (test code = 47           mL/min/1.73 sq m              ESTIMA

ARIADNE GFR IS



             33914-3)                                            NOT AS ACCURATE



                                                                 AS CREATININE



                                                                 CLEARANCE IN



                                                                 PREDICTING



                                                                 GLOMERULAR



                                                                 FILTRATION RATE

.



                                                                 ESTIMATED GFR I

S



                                                                 NOT APPLICABLE



                                                                 FOR DIALYSIS



                                                                 PATIENTS.

 

             MAGGI (test code = MAGGI)  ID -                           



                          NICOLAS M                                 

 

             Lab Interpretation Abnormal                               



             (test code = 60014-8)                                        



[facility]





             Test Item    Value        Reference Range Interpretation Comments

 

             Magnesium (test code = 1.7 mg/dL    1.6-2.6                   



             36703-4)                                            

 

             MAGGI (test code = MAGGI)  ID Felicity MEDELLIN                                      

 

             Lab Interpretation (test Normal                                 



             code = 86327-5)                                        



[facility] [id number] 05:46:00





             Test Item    Value        Reference Range Interpretation Comments

 

             SODIUM (BEAKER) 140 meq/L    136-145                   



             (test code = 381)                                        

 

             POTASSIUM (BEAKER) 3.4 meq/L    3.5-5.1      L            



             (test code = 379)                                        

 

             CHLORIDE (BEAKER) 108 meq/L           H            



             (test code = 382)                                        

 

             CO2 (BEAKER) (test 25 meq/L     22-29                     



             code = 355)                                         

 

             BLOOD UREA NITROGEN 11 mg/dL     7-21                      



             (BEAKER) (test code                                        



             = 354)                                              

 

             CREATININE (BEAKER) 1.12 mg/dL   0.57-1.25                 



             (test code = 358)                                        

 

             GLUCOSE RANDOM 113 mg/dL           H            



             (BEAKER) (test code                                        



             = 652)                                              

 

             CALCIUM (BEAKER) 8.9 mg/dL    8.4-10.2                  



             (test code = 697)                                        

 

             EGFR (BEAKER) (test 47 mL/min/1.73                           ESTIMA

ARIADNE GFR IS



             code = 1092) sq m                                   NOT AS ACCURATE

 AS



                                                                 CREATININE



                                                                 CLEARANCE IN



                                                                 PREDICTING



                                                                 GLOMERULAR



                                                                 FILTRATION RATE

.



                                                                 ESTIMATED GFR I

S



                                                                 NOT APPLICABLE 

FOR



                                                                 DIALYSIS PATIEN

TS.



 ID - NICOLAS [id number] 05:46:00





             Test Item    Value        Reference Range Interpretation Comments

 

             MAGNESIUM (BEAKER) (test code = 1.7 mg/dL    1.6-2.6               

    



             627)                                                



 ID - NICOLAS MCBC with platelet count + automated [id number] 05:04:00





             Test Item    Value        Reference Range Interpretation Comments

 

             WBC (test code = 6690-2) 5.7          See_Comment                [A

utomated message]



                                                                 The system [employer]



                                                                 generated this 

result



                                                                 transmitted ref

erence



                                                                 range: 3.5 - 10

.5



                                                                 K/L. The refe

rence



                                                                 range was not u

sed to



                                                                 interpret this 

result



                                                                 as normal/abnor

mal.

 

             RBC (test code = 789-8) 3.85         See_Comment  L             [Au

tomated message]



                                                                 The system [employer]



                                                                 generated this 

result



                                                                 transmitted ref

erence



                                                                 range: 3.93 - 5

.22



                                                                 M/L. The refe

rence



                                                                 range was not u

sed to



                                                                 interpret this 

result



                                                                 as normal/abnor

mal.

 

             MCHC (test code = 786-4) 30.9         See_Comment  L             [A

utomated message]



                                                                 The system [employer]



                                                                 generated this 

result



                                                                 transmitted ref

erence



                                                                 range: 32.2 - 3

5.5



                                                                 GM/DL. The refe

rence



                                                                 range was not u

sed to



                                                                 interpret this 

result



                                                                 as normal/abnor

mal.

 

             Hematocrit (test code = 39.1 %       34.1-44.9                 



             4544-3)                                             

 

             MCV (test code = 787-2) 101.6 fL     79.4-94.8    H            

 

             MCH (test code = 785-6) 31.4 pg      25.6-32.2                 

 

             RDW (test code = 788-0) 13.8 %       11.7-14.4                 

 

             Platelets (test code = 204          See_Comment                [Aut

omated message]



             777-3)                                              The system [employer]



                                                                 generated this 

result



                                                                 transmitted ref

erence



                                                                 range: 150 - 45

0 K/CU



                                                                 MM. The referen

ce



                                                                 range was not u

sed to



                                                                 interpret this 

result



                                                                 as normal/abnor

mal.

 

             MPV (test code = 9.5 fL       9.4-12.3                  



             33440-5)                                            

 

             nRBC (test code = 413) 0            See_Comment                [Aut

omated message]



                                                                 The system [employer]



                                                                 generated this 

result



                                                                 transmitted ref

erence



                                                                 range: 0 - 0 /1

00



                                                                 WBC. The refere

nce



                                                                 range was not u

sed to



                                                                 interpret this 

result



                                                                 as normal/abnor

mal.

 

             % Neutros (test code = 52 %                                   



             429)                                                

 

             % Lymphs (test code = 32 %                                   



             430)                                                

 

             % Monos (test code = 11 %                                   



             431)                                                

 

             % Eos (test code = 432) 5 %                                    

 

             % Baso (test code = 437) 1 %                                    

 

             # Neutros (test code = 2.96         See_Comment                [Aut

omated message]



             670)                                                The system [employer]



                                                                 generated this 

result



                                                                 transmitted ref

erence



                                                                 range: 1.56 - 6

.13



                                                                 K/L. The refe

rence



                                                                 range was not u

sed to



                                                                 interpret this 

result



                                                                 as normal/abnor

mal.

 

             # Lymphs (test code = 1.83         See_Comment                [Auto

mated message]



             414)                                                The system [employer]



                                                                 generated this 

result



                                                                 transmitted ref

erence



                                                                 range: 1.18 - 3

.74



                                                                 K/L. The refe

rence



                                                                 range was not u

sed to



                                                                 interpret this 

result



                                                                 as normal/abnor

mal.

 

             # Monos (test code = 0.60         See_Comment  H             [Autom

ated message]



             415)                                                The system [employer]



                                                                 generated this 

result



                                                                 transmitted ref

erence



                                                                 range: 0.24 - 0

.36



                                                                 K/L. The refe

rence



                                                                 range was not u

sed to



                                                                 interpret this 

result



                                                                 as normal/abnor

mal.

 

             # Eos (test code = 416) 0.26         See_Comment                [Au

tomated message]



                                                                 The system [employer]



                                                                 generated this 

result



                                                                 transmitted ref

erence



                                                                 range: 0.04 - 0

.36



                                                                 K/L. The refe

rence



                                                                 range was not u

sed to



                                                                 interpret this 

result



                                                                 as normal/abnor

mal.

 

             # Baso (test code = 417) 0.05         See_Comment                [A

utomated message]



                                                                 The system [employer]



                                                                 generated this 

result



                                                                 transmitted ref

erence



                                                                 range: 0.01 - 0

.08



                                                                 K/L. The refe

rence



                                                                 range was not u

sed to



                                                                 interpret this 

result



                                                                 as normal/abnor

mal.

 

             Immature     1 %          0-1                       



             Granulocytes-Relative                                        



             (test code = 2801)                                        

 

             Lab Interpretation (test Abnormal                               



             code = 86625-7)                                        



[facility] W/PLT COUNT &amp; AUTO [id number] 
05:04:00





             Test Item    Value        Reference Range Interpretation Comments

 

             WHITE BLOOD CELL COUNT (BEAKER) 5.7 K/ L     3.5-10.5              

    



             (test code = 775)                                        

 

             RED BLOOD CELL COUNT (BEAKER) 3.85 M/ L    3.93-5.22    L          

  



             (test code = 761)                                        

 

             HEMOGLOBIN (BEAKER) (test code = 12.1 GM/DL   11.2-15.7            

     



             410)                                                

 

             HEMATOCRIT (BEAKER) (test code = 39.1 %       34.1-44.9            

     



             411)                                                

 

             MEAN CORPUSCULAR VOLUME (BEAKER) 101.6 fL     79.4-94.8    H       

     



             (test code = 753)                                        

 

             MEAN CORPUSCULAR HEMOGLOBIN 31.4 pg      25.6-32.2                 



             (BEAKER) (test code = 751)                                        

 

             MEAN CORPUSCULAR HEMOGLOBIN CONC 30.9 GM/DL   32.2-35.5    L       

     



             (BEAKER) (test code = 752)                                        

 

             RED CELL DISTRIBUTION WIDTH 13.8 %       11.7-14.4                 



             (BEAKER) (test code = 412)                                        

 

             PLATELET COUNT (BEAKER) (test 204 K/CU MM  150-450                 

  



             code = 756)                                         

 

             MEAN PLATELET VOLUME (BEAKER) 9.5 fL       9.4-12.3                

  



             (test code = 754)                                        

 

             NUCLEATED RED BLOOD CELLS 0 /100 WBC   0-0                       



             (BEAKER) (test code = 413)                                        

 

             NEUTROPHILS RELATIVE PERCENT 52 %                                  

 



             (BEAKER) (test code = 429)                                        

 

             LYMPHOCYTES RELATIVE PERCENT 32 %                                  

 



             (BEAKER) (test code = 430)                                        

 

             MONOCYTES RELATIVE PERCENT 11 %                                   



             (BEAKER) (test code = 431)                                        

 

             EOSINOPHILS RELATIVE PERCENT 5 %                                   

 



             (BEAKER) (test code = 432)                                        

 

             BASOPHILS RELATIVE PERCENT 1 %                                    



             (BEAKER) (test code = 437)                                        

 

             NEUTROPHILS ABSOLUTE COUNT 2.96 K/ L    1.56-6.13                 



             (BEAKER) (test code = 670)                                        

 

             LYMPHOCYTES ABSOLUTE COUNT 1.83 K/ L    1.18-3.74                 



             (BEAKER) (test code = 414)                                        

 

             MONOCYTES ABSOLUTE COUNT (BEAKER) 0.60 K/ L    0.24-0.36    H      

      



             (test code = 415)                                        

 

             EOSINOPHILS ABSOLUTE COUNT 0.26 K/ L    0.04-0.36                 



             (BEAKER) (test code = 416)                                        

 

             BASOPHILS ABSOLUTE COUNT (BEAKER) 0.05 K/ L    0.01-0.08           

      



             (test code = 417)                                        

 

             IMMATURE GRANULOCYTES-RELATIVE 1 %          0-1                    

   



             PERCENT (BEAKER) (test code =                                      

  



             2801)                                               



B-type Natriuretic Factor (BNP)2021 04:52:00





             Test Item    Value        Reference Range Interpretation Comments

 

             BNP (test code = 89642-8) 97 pg/mL     0-100                     

 

             MAGGI (test code = MAGGI)  ID - PIAYA                          

 



                          L                                      

 

             Lab Interpretation (test Normal                                 



             code = 63344-6)                                        



[facility]52:00





             Test Item    Value        Reference Range Interpretation Comments

 

             B-TYPE NATRIURETIC PEPTIDE (BEAKER) 97 pg/mL     0-100             

        



             (test code = 700)                                        



 ID - PIAYA LRAD, CHEST, 1 VIEW, NON [id number] 14:01:00Reason for 
exam:-&gt;dyspneaShould this be performed at the bedside?-&gt;Yes
************************************************************[facility]        : 1940        Sex: 
F************************************************************FINAL REPORT 
PATIENT ID:   [id number] CLINICAL HISTORY: dyspnea  TECHNIQUE: 1 view of the c
hest. COMPARISON: 2021 IMPRESSION: Pulmonary vascular congestion is again 
seen with slightly increased prominence of the interstitial lung markings 
bilaterally. There is no focal lobar consolidation or significant pleural fluid.
The cardiomediastinal silhouette is magnified by technique. Cervicalfusion 
hardware right shoulder hardware is again seen. Signed: Juan Francisco MDReport 
Verified Date/Time:  2021 14:01:32 Reading Location: [facility] 
Radiology Reading Room      Electronicallysigned by: JUAN FRANCISCO M.D. on 
2021 02:01 PMBAK[facility] METABOLIC [id number] 12:22:00





             Test Item    Value        Reference Range Interpretation Comments

 

             SODIUM (BEAKER) 134 meq/L    136-145      L            



             (test code = 381)                                        

 

             POTASSIUM (BEAKER) 3.7 meq/L    3.5-5.1                   



             (test code = 379)                                        

 

             CHLORIDE (BEAKER) 103 meq/L                        



             (test code = 382)                                        

 

             CO2 (BEAKER) (test 25 meq/L     22-29                     



             code = 355)                                         

 

             BLOOD UREA NITROGEN 15 mg/dL     7-21                      



             (BEAKER) (test code                                        



             = 354)                                              

 

             CREATININE (BEAKER) 1.24 mg/dL   0.57-1.25                 



             (test code = 358)                                        

 

             GLUCOSE RANDOM 168 mg/dL           H            



             (BEAKER) (test code                                        



             = 652)                                              

 

             CALCIUM (BEAKER) 8.3 mg/dL    8.4-10.2     L            



             (test code = 697)                                        

 

             EGFR (BEAKER) (test 42 mL/min/1.73                           ESTIMA

ARIADNE GFR IS



             code = 1092) sq m                                   NOT AS ACCURATE

 AS



                                                                 CREATININE



                                                                 CLEARANCE IN



                                                                 PREDICTING



                                                                 GLOMERULAR



                                                                 FILTRATION RATE

.



                                                                 ESTIMATED GFR I

S



                                                                 NOT APPLICABLE 

FOR



                                                                 DIALYSIS PATIEN

TS.



 ID - [id number] 12:22:00





             Test Item    Value        Reference Range Interpretation Comments

 

             MAGNESIUM (BEAKER) (test code = 1.7 mg/dL    1.6-2.6               

    



             627)                                                



 ID - KANSONB-TYPE NATRIURETIC FACTOR (BNP)2021 12:13:00





             Test Item    Value        Reference Range Interpretation Comments

 

             B-TYPE NATRIURETIC PEPTIDE (BEAKER) 88 pg/mL     0-100             

        



             (test code = 700)                                        



 ID - EMERSONTransesophageal [id number] 21:25:57Ejection 
FractionSLEH ECHO HEARTLAB MKCKESSON [facility] CenterHepatic 
function [id number] 06:35:00





             Test Item    Value        Reference Range Interpretation Comments

 

             Protein, Total (test 5.4          See_Comment  L             [Autom

ated



             code = 2885-2)                                        message] The



                                                                 system which



                                                                 generated this



                                                                 result transmit

ariadne



                                                                 reference range

:



                                                                 6.0 - 8.3 gm/dL

.



                                                                 The reference



                                                                 range was not u

sed



                                                                 to interpret th

is



                                                                 result as



                                                                 normal/abnormal

.

 

             Albumin (test code = 2.7 g/dL     3.5-5.0      L            



             86651-8)                                            

 

             Total Bilirubin (test 0.1 mg/dL    0.2-1.2      L            



             code = 1975-2)                                        

 

             Bilirubin, Direct 0.1 mg/dL    0.1-0.5                   



             (test code = 1968-7)                                        

 

             Alkaline Phosphatase 80 U/L                           



             (test code = 6768-6)                                        

 

             AST (test code = 15 U/L       5-34                      



             1920-8)                                             

 

             ALT (test code = 8 U/L        6-55                      



             1742-6)                                             

 

             MAGGI (test code = MAGGI)  ID -                           



                          ROBYN L                                

 

             Lab Interpretation Abnormal                               



             (test code = 99605-3)                                        



[facility]OL[facility] [id number] 06:35:00





             Test Item    Value        Reference Range Interpretation Comments

 

             SODIUM (BEAKER) 138 meq/L    136-145                   



             (test code = 381)                                        

 

             POTASSIUM (BEAKER) 3.6 meq/L    3.5-5.1                   



             (test code = 379)                                        

 

             CHLORIDE (BEAKER) 106 meq/L                        



             (test code = 382)                                        

 

             CO2 (BEAKER) (test 22 meq/L     22-29                     



             code = 355)                                         

 

             BLOOD UREA NITROGEN 15 mg/dL     7-21                      



             (BEAKER) (test code                                        



             = 354)                                              

 

             CREATININE (BEAKER) 1.12 mg/dL   0.57-1.25                 



             (test code = 358)                                        

 

             GLUCOSE RANDOM 69 mg/dL            L            



             (BEAKER) (test code                                        



             = 652)                                              

 

             CALCIUM (BEAKER) 8.4 mg/dL    8.4-10.2                  



             (test code = 697)                                        

 

             EGFR (BEAKER) (test 47 mL/min/1.73                           ESTIMA

ARIADNE GFR IS



             code = 1092) sq m                                   NOT AS ACCURATE

 AS



                                                                 CREATININE



                                                                 CLEARANCE IN



                                                                 PREDICTING



                                                                 GLOMERULAR



                                                                 FILTRATION RATE

.



                                                                 ESTIMATED GFR I

S



                                                                 NOT APPLICABLE 

FOR



                                                                 DIALYSIS PATIEN

TS.



 ID - PIAYA LHEPATIC FUNCTION [id number] 06:35:00





             Test Item    Value        Reference Range Interpretation Comments

 

             TOTAL PROTEIN (BEAKER) (test code = 5.4 gm/dL    6.0-8.3      L    

        



             770)                                                

 

             ALBUMIN (BEAKER) (test code = 1145) 2.7 g/dL     3.5-5.0      L    

        

 

             BILIRUBIN TOTAL (BEAKER) (test code 0.1 mg/dL    0.2-1.2      L    

        



             = 377)                                              

 

             BILIRUBIN DIRECT (BEAKER) (test 0.1 mg/dL    0.1-0.5               

    



             code = 706)                                         

 

             ALKALINE PHOSPHATASE (BEAKER) (test 80 U/L                   

        



             code = 346)                                         

 

             AST (SGOT) (BEAKER) (test code = 15 U/L       5-34                 

     



             353)                                                

 

             ALT (SGPT) (BEAKER) (test code = 8 U/L        6-55                 

     



             347)                                                



 ID - PIAYA LCBC W/PLT COUNT &amp; AUTO [id number] 05:44:00





             Test Item    Value        Reference Range Interpretation Comments

 

             WHITE BLOOD CELL COUNT (BEAKER) 5.8 K/ L     3.5-10.5              

    



             (test code = 775)                                        

 

             RED BLOOD CELL COUNT (BEAKER) 3.95 M/ L    3.93-5.22               

  



             (test code = 761)                                        

 

             HEMOGLOBIN (BEAKER) (test code = 12.7 GM/DL   11.2-15.7            

     



             410)                                                

 

             HEMATOCRIT (BEAKER) (test code = 40.1 %       34.1-44.9            

     



             411)                                                

 

             MEAN CORPUSCULAR VOLUME (BEAKER) 101.5 fL     79.4-94.8    H       

     



             (test code = 753)                                        

 

             MEAN CORPUSCULAR HEMOGLOBIN 32.2 pg      25.6-32.2                 



             (BEAKER) (test code = 751)                                        

 

             MEAN CORPUSCULAR HEMOGLOBIN CONC 31.7 GM/DL   32.2-35.5    L       

     



             (BEAKER) (test code = 752)                                        

 

             RED CELL DISTRIBUTION WIDTH 13.4 %       11.7-14.4                 



             (BEAKER) (test code = 412)                                        

 

             PLATELET COUNT (BEAKER) (test 213 K/CU MM  150-450                 

  



             code = 756)                                         

 

             MEAN PLATELET VOLUME (BEAKER) 9.8 fL       9.4-12.3                

  



             (test code = 754)                                        

 

             NUCLEATED RED BLOOD CELLS 0 /100 WBC   0-0                       



             (BEAKER) (test code = 413)                                        

 

             NEUTROPHILS RELATIVE PERCENT 41 %                                  

 



             (BEAKER) (test code = 429)                                        

 

             LYMPHOCYTES RELATIVE PERCENT 41 %                                  

 



             (BEAKER) (test code = 430)                                        

 

             MONOCYTES RELATIVE PERCENT 10 %                                   



             (BEAKER) (test code = 431)                                        

 

             EOSINOPHILS RELATIVE PERCENT 6 %                                   

 



             (BEAKER) (test code = 432)                                        

 

             BASOPHILS RELATIVE PERCENT 1 %                                    



             (BEAKER) (test code = 437)                                        

 

             NEUTROPHILS ABSOLUTE COUNT 2.34 K/ L    1.56-6.13                 



             (BEAKER) (test code = 670)                                        

 

             LYMPHOCYTES ABSOLUTE COUNT 2.38 K/ L    1.18-3.74                 



             (BEAKER) (test code = 414)                                        

 

             MONOCYTES ABSOLUTE COUNT (BEAKER) 0.57 K/ L    0.24-0.36    H      

      



             (test code = 415)                                        

 

             EOSINOPHILS ABSOLUTE COUNT 0.34 K/ L    0.04-0.36                 



             (BEAKER) (test code = 416)                                        

 

             BASOPHILS ABSOLUTE COUNT (BEAKER) 0.07 K/ L    0.01-0.08           

      



             (test code = 417)                                        

 

             IMMATURE GRANULOCYTES-RELATIVE 1 %          0-1                    

   



             PERCENT (BEAKER) (test code =                                      

  



             2801)                                               



SARS-CoV2/RT-PCR (Asymptomatic ONLY)2021 23:22:00





             Test Item    Value        Reference Range Interpretation Comments

 

             SARS-COV2/RT-PCR (test Negative     Negative                  



             code = 72238-6)                                        

 

             MAGGI (test code = MAGGI) Negative result for                          

 



                          this test determines                           



                          that SARS-CoV-2 RNA was                           



                          not present in the                           



                          specimen above the                           



                          Limit of Detection                           



                          (LOD).  However,                           



                          Negative results do not                           



                          preclude SARS-CoV-2                           



                          infection and should                           



                          not be used as the sole                           



                          basis for treatment or                           



                          patient management                           



                          decisions.  Negative                           



                          results must be                           



                          combined with clinical                           



                          observations, patient                           



                          history, and                           



                          epidemiological                           



                          information.  A false                           



                          negative result may                           



                          occur if a specimen is                           



                          improperly collected,                           



                          transported, or                           



                          handled.  A false                           



                          negative result should                           



                          be considered if                           



                          patient's recent                           



                          exposures or clinical                           



                          presentation indicate                           



                          that COVID-19                           



                          (SARS-CoV-2) is likely                           



                          and diagnostic tests                           



                          for other causes of                           



                          illness are negative.                           



                          Re-testing should be                           



                          considered in cases of                           



                          suspected false                           



                          negatives. The limit of                           



                          detection for this                           



                          assay is 100 copies/mL.                           



                          This SARS-CoV-2 test is                           



                          a real-time RT_PCR test                           



                          intended for the                           



                          qualitative detection                           



                          of nucleic acid from                           



                          SARS-CoV-2 in a                           



                          nasopharyngeal swab                           



                          specimen collected from                           



                          individuals suspected                           



                          of COVID-19 by their                           



                          healthcare provider.                           



                          This test has not been                           



                          Food and Drug                           



                          Administration (FDA)                           



                          cleared or approved.                           



                          This is a modified                           



                          version of an approved                           



                          Emergency Use                           



                          Authorization (EUA) and                           



                          is in the process of                           



                          review by the FDA.                           



                          Once authorized by the                           



                          FDA, the issued EUA                           



                          will be effective until                           



                          the declaration that                           



                          circumstances exist                           



                          justifying the                           



                          authorization of the                           



                          emergency use of in                           



                          vitro diagnostic tests                           



                          for detection and/or                           



                          diagnosis of COVID-19                           



                          is terminated under                           



                          Section 564(b)(2) of                           



                          the Act or the EUA is                           



                          revoked under Section                           



                          564(g) of the Act.                           



                          Testing was performed                           



                          using the [employer]                           



                          SARS-CoV-2 assay. Fact                           



                          Sheet for Healthcare                           



                          Providers:[email or web address]://www.natty davey/sal/RT                           



                          SARS-CoV-2 HCP Fact                           



                          Sheet [id number].pdf                           



                          Fact Sheet for                           



                          Healthcare                             



                          Patients:[email or web address]                           



                          SARS-CoV-2 Patient Fact                           



                          Sheet EN                               



                          [id number].pdf                           

 

             Lab Interpretation Normal                                 



             (test code = 38541-5)                                        



[facility] RE[facility])2021 
23:22:00





             Test Item    Value        Reference Range Interpretation Comments

 

             SARS-COV2/RT-PCR (test code = Negative     Negative                

  



             [id number])                                            



Negative result for this test determines that SARS-CoV-2 RNA was not present in 
the specimen above the Limit of Detection (LOD).  However, Negative results do 
not preclude SARS-CoV-2 infection and should not be used as the sole basis for 
treatment or patient management decisions.  Negative results must be combined 
with clinical observations, patient history, and epidemiological information.  A
false negative result may occur if a specimen is improperly collected, 
transported, or handled.  A false negative result should be considered if 
patient's recent exposures or clinical presentation indicate that COVID-19 
(SARS-CoV-2) is likely and diagnostic tests for other causes of illness are 
negative.  Re-testing should be considered in cases of suspected false 
negatives.The limit of detection for this assay is 100 copies/mL.This SARS-CoV-2
test is a real-time RT_PCR test intended for the qualitative detection of 
nucleic acid from SARS-CoV-2 in a nasopharyngeal swab specimen collected from 
individuals suspected of COVID-19 by their healthcare provider.This test has not
been Food and Drug Administration (FDA) cleared or approved.  This is a modified
version of an approved Emergency Use Authorization (EUA) and is in the process 
of review by the FDA.  Once authorized by the FDA, the issued EUA will be e
ffective until the declaration that circumstances exist justifying the 
authorization of the emergency use of in vitro diagnostic tests for detection 
and/or diagnosis of COVID-19 is terminated under Section 564(b)(2) of the Act or
the EUA is revoked under Section 564(g) of the Act.Testing was performedusing 
the Abbott SARS-CoV-2 assay.Fact Sheet for Healthcare 
Providers:https://www.molecular.abbott/sal/RT SARS-CoV-2 HCP Fact Sheet 51-
203532.pdfFact Sheet for Healthcare Patients:https://www.molecular.abbott/sal/RT
SARS-CoV-2 Patient Fact Sheet EN 51-650311V0.pdfHIGH SENSITIVITY TROPONIN I
2021 19:21:00





             Test Item    Value        Reference Range Interpretation Comments

 

             HIGH SENSITIVITY 8 pg/ml      See_Comment                [Automated

 message]



             TROPONIN I (test code =                                        The 

system which



             5699323)                                            generated this 

result



                                                                 transmitted ref

erence



                                                                 range: <=17. Th

e



                                                                 reference range

 was not



                                                                 used to interpr

et this



                                                                 result as



                                                                 normal/abnormal

.



 ID - dwayne Godwine  STAT High Sensitivity Troponin-I results 
should be used in conjunction with other diagnostic information such as ECG, 
clinical observations and information, and patient symptoms to aid in the 
diagnosis of MI.2D Echo W/Doppler(CW/PW/Color)2021 17:56:11Ejection 
FractionSLEH ECHO HEARTLAB MKCKESSON CPACSCHI [facility] HospitalCT, CHEST 
WITH IV CONTRAST- PE TEST [id number] 06:05:00Unlisted Reason for Exam - 
Click Yes and Enter Reason Below-&gt;No
************************************************************[facility]        : 1940        Sex: 
F************************************************************FINAL REPORT 
PATIENT ID:   [id number] EXAM/TECHNIQUE: CT angiography of the chest pulmonary 
embolus protocol INDICATION: Concern for pulmonary embolism. COMPARISON: None. 
FINDINGS:  Lower Neck: Unremarkable. Parenchyma/Pleura: Bilateral patchy 
groundglass opacities are present in the lungs. 13 mm left lower lobe pulmonary 
nodule. Small bilateral pleural effusions. Septal thickening and peribronchial 
thickening is present. Airways: Unremarkable. Cardiac: Cardiomegaly. 
Mediastinum/lymph nodes: No lymphadenopathy. Vessels: No filling defects in the 
main, lobar, or segmental pulmonary arteries. Osseous: No acute osseous process.
No suspicious osseous lesion. Upper abdomen: Unremarkable. Impression: 1. No 
acute pulmonary embolism.2. Bilateral groundglass opacities, bilateral pleural 
effusions, and interstitial thickening may represent pulmonary edema and/or 
infection.3. Left lower lobe pulmonary nodule measuring 13 mm. Consider follow-
up CT in three months, PET/CT, or tissue sampling. Signed: Will Matthews 
[facility] Verified Date/Time:  2021 06:05:30       Electronically signed 
by:WILL MATTHEWS MD on 2021 06:05 AMHEPATIC FUNCTION [id number] 
06:00:00





             Test Item    Value        Reference Range Interpretation Comments

 

             TOTAL PROTEIN (BEAKER) (test code = 5.2 gm/dL    6.0-8.3      L    

        



             770)                                                

 

             ALBUMIN (BEAKER) (test code = 1145) 2.7 g/dL     3.5-5.0      L    

        

 

             BILIRUBIN TOTAL (BEAKER) (test code 0.6 mg/dL    0.2-1.2           

        



             = 377)                                              

 

             BILIRUBIN DIRECT (BEAKER) (test 0.3 mg/dL    0.1-0.5               

    



             code = 706)                                         

 

             ALKALINE PHOSPHATASE (BEAKER) (test 92 U/L                   

        



             code = 346)                                         

 

             AST (SGOT) (BEAKER) (test code = 14 U/L       5-34                 

     



             353)                                                

 

             ALT (SGPT) (BEAKER) (test code = 7 U/L        6-55                 

     



             347)                                                



 ID - NICOLAS MOperator ID - CLAYTONRAJIV LBASIC METABOLIC [id number] 
05:13:00





             Test Item    Value        Reference Range Interpretation Comments

 

             SODIUM (BEAKER) 139 meq/L    136-145                   



             (test code = 381)                                        

 

             POTASSIUM (BEAKER) 4.0 meq/L    3.5-5.1                   



             (test code = 379)                                        

 

             CHLORIDE (BEAKER) 110 meq/L           H            



             (test code = 382)                                        

 

             CO2 (BEAKER) (test 24 meq/L     22-29                     



             code = 355)                                         

 

             BLOOD UREA NITROGEN 14 mg/dL     7-21                      



             (BEAKER) (test code                                        



             = 354)                                              

 

             CREATININE (BEAKER) 1.05 mg/dL   0.57-1.25                 



             (test code = 358)                                        

 

             GLUCOSE RANDOM 81 mg/dL                         



             (BEAKER) (test code                                        



             = 652)                                              

 

             CALCIUM (BEAKER) 8.4 mg/dL    8.4-10.2                  



             (test code = 697)                                        

 

             EGFR (BEAKER) (test 51 mL/min/1.73                           ESTIMA

ARIADNE GFR IS



             code = 1092) sq m                                   NOT AS ACCURATE

 AS



                                                                 CREATININE



                                                                 CLEARANCE IN



                                                                 PREDICTING



                                                                 GLOMERULAR



                                                                 FILTRATION RATE

.



                                                                 ESTIMATED GFR I

S



                                                                 NOT APPLICABLE 

FOR



                                                                 DIALYSIS PATIEN

TS.



 ID - NICOLAS MVitamin B12 and [id number] 04:44:00





             Test Item    Value        Reference Range Interpretation Comments

 

             Vitamin B12 (test 272 pg/mL    213-816                   



             code = 2132-9)                                        

 

             Folate (test code = 7.70 ng/mL   See_Comment                [Automa

ariadne



             2284-8)                                             message] The



                                                                 system which



                                                                 generated this



                                                                 result transmit

ariadne



                                                                 reference range

:



                                                                 >=7.00. The



                                                                 reference range



                                                                 was not used to



                                                                 interpret this



                                                                 result as



                                                                 normal/abnormal

.

 

             MAGGI (test code = MAGGI)  ID -                           



                          NICOLAS MEDELLIN                                 

 

             Lab Interpretation Normal                                 



             (test code = 02325-0)                                        



[facility]IT[facility]IN [facility] AND [id number] 04:44:00





             Test Item    Value        Reference Range Interpretation Comments

 

             VITAMIN B12 (BEAKER) 272 pg/mL    213-816                   



             (test code = 774)                                        

 

             FOLATE (BEAKER) 7.70 ng/mL   See_Comment                [Automated 

message]



             (test code = 362)                                        The system

 which



                                                                 generated this 

result



                                                                 transmitted ref

erence



                                                                 range: >=7.00. 

The



                                                                 reference range

 was not



                                                                 used to interpr

et this



                                                                 result as



                                                                 normal/abnormal

.



 ID - NICOLAS MRAD, CHEST, 1 VIEW, NON [id number] 04:41:00Reason for 
exam:-&gt;afibShould this be performed at the bedside?-&gt;Yes
************************************************************[facility]        : 1940        Sex: 
F************************************************************FINAL REPORT 
PATIENT ID:   [id number] EXAM/TECHNIQUE: Single view frontal radiograph of the 
chest. INDICATION: Atrial fibrillation. COMPARISON: None. FINDINGS:  
Devices/Objects: None. Lungs: No focal consolidation. No pleural effusion. No 
pneumothorax. Heart/Mediastinum: No cardiomegaly. Mild interstitial thickening. 
Osseous: No acute osseous process. No suspicious osseous lesion. Upper abdomen: 
Unremarkable. Impression: Mild interstitial thickening may represent 
interstitial pulmonary edema. Signed: Will Matthews [facility]DRV[facility] Verified 
Date/Time:  2021 04:41:03       Electronically signed by: WILL MATTHEWS MD on 2021 04:41 AMHIGH SENSITIVITY TROPONIN -72-96 04:36:00





             Test Item    Value        Reference Range Interpretation Comments

 

             HIGH SENSITIVITY 8 pg/ml      See_Comment                [Automated

 message]



             TROPONIN I (test code =                                        The 

system which



             3292050)                                            generated this 

result



                                                                 transmitted ref

erence



                                                                 range: <=17. Th

e



                                                                 reference range

 was not



                                                                 used to interpr

et this



                                                                 result as



                                                                 normal/abnormal

.



 ID - NICOLAS MThe  STAT High Sensitivity Troponin-I results 
should be used in conjunction with other diagnostic information such as ECG, 
clinical observations and information, and patient symptoms to aid in the 
diagnosis of MI.B-TYPE NATRIURETIC FACTOR (BNP)2021 04:27:00





             Test Item    Value        Reference Range Interpretation Comments

 

             B-TYPE NATRIURETIC PEPTIDE (BEAKER) 144 pg/mL    0-100        H    

        



             (test code = 700)                                        



 ID - NICOLAS MCBC W/PLT COUNT &amp; AUTO [id number] 04:01:00





             Test Item    Value        Reference Range Interpretation Comments

 

             WHITE BLOOD CELL COUNT (BEAKER) 6.7 K/ L     3.5-10.5              

    



             (test code = 775)                                        

 

             RED BLOOD CELL COUNT (BEAKER) 3.83 M/ L    3.93-5.22    L          

  



             (test code = 761)                                        

 

             HEMOGLOBIN (BEAKER) (test code = 12.3 GM/DL   11.2-15.7            

     



             410)                                                

 

             HEMATOCRIT (BEAKER) (test code = 38.7 %       34.1-44.9            

     



             411)                                                

 

             MEAN CORPUSCULAR VOLUME (BEAKER) 101.0 fL     79.4-94.8    H       

     



             (test code = 753)                                        

 

             MEAN CORPUSCULAR HEMOGLOBIN 32.1 pg      25.6-32.2                 



             (BEAKER) (test code = 751)                                        

 

             MEAN CORPUSCULAR HEMOGLOBIN CONC 31.8 GM/DL   32.2-35.5    L       

     



             (BEAKER) (test code = 752)                                        

 

             RED CELL DISTRIBUTION WIDTH 13.5 %       11.7-14.4                 



             (BEAKER) (test code = 412)                                        

 

             PLATELET COUNT (BEAKER) (test 205 K/CU MM  150-450                 

  



             code = 756)                                         

 

             MEAN PLATELET VOLUME (BEAKER) 9.7 fL       9.4-12.3                

  



             (test code = 754)                                        

 

             NUCLEATED RED BLOOD CELLS 0 /100 WBC   0-0                       



             (BEAKER) (test code = 413)                                        

 

             NEUTROPHILS RELATIVE PERCENT 47 %                                  

 



             (BEAKER) (test code = 429)                                        

 

             LYMPHOCYTES RELATIVE PERCENT 38 %                                  

 



             (BEAKER) (test code = 430)                                        

 

             MONOCYTES RELATIVE PERCENT 10 %                                   



             (BEAKER) (test code = 431)                                        

 

             EOSINOPHILS RELATIVE PERCENT 4 %                                   

 



             (BEAKER) (test code = 432)                                        

 

             BASOPHILS RELATIVE PERCENT 1 %                                    



             (BEAKER) (test code = 437)                                        

 

             NEUTROPHILS ABSOLUTE COUNT 3.14 K/ L    1.56-6.13                 



             (BEAKER) (test code = 670)                                        

 

             LYMPHOCYTES ABSOLUTE COUNT 2.55 K/ L    1.18-3.74                 



             (BEAKER) (test code = 414)                                        

 

             MONOCYTES ABSOLUTE COUNT (BEAKER) 0.66 K/ L    0.24-0.36    H      

      



             (test code = 415)                                        

 

             EOSINOPHILS ABSOLUTE COUNT 0.23 K/ L    0.04-0.36                 



             (BEAKER) (test code = 416)                                        

 

             BASOPHILS ABSOLUTE COUNT (BEAKER) 0.05 K/ L    0.01-0.08           

      



             (test code = 417)                                        

 

             IMMATURE GRANULOCYTES-RELATIVE 1 %          0-1                    

   



             PERCENT (BEAKER) (test code =                                      

  



             2801)                                               



TSH/Free T4 If [id number] 00:19:00





             Test Item    Value        Reference Range Interpretation Comments

 

             TSH (test code = 1.723        See_Comment                [Automated



             62693-8)                                            message] The



                                                                 system which



                                                                 generated this



                                                                 result transmit

ariadne



                                                                 reference range

:



                                                                 0.350 - 4.940



                                                                 uIU/mL. The



                                                                 reference range



                                                                 was not used to



                                                                 interpret this



                                                                 result as



                                                                 normal/abnormal

.

 

             MAGGI (test code = MAGGI)  ID -                           



                          ROBYN L                                

 

             Lab Interpretation Normal                                 



             (test code = 20706-4)                                        



[facility] Hospital[facility]SH[facility] T[facility] IF [id number] 00:19:00





             Test Item    Value        Reference Range Interpretation Comments

 

             THYROID STIMULATING HORMONE 1.723 uIU/mL 0.350-4.940               



             (BEAKER) (test code = 772)                                        



 ID - ROBYN LHIGH SENSITIVITY TROPONIN -[id number] 00:02:00





             Test Item    Value        Reference Range Interpretation Comments

 

             HIGH SENSITIVITY 7 pg/ml      See_Comment                [Automated

 message]



             TROPONIN I (test code =                                        The 

system which



             7705104)                                            generated this 

result



                                                                 transmitted ref

erence



                                                                 range: <=17. Th

e



                                                                 reference range

 was not



                                                                 used to interpr

et this



                                                                 result as



                                                                 normal/abnormal

.



 ID - JOYhe  STAT High Sensitivity Troponin-I results should be
used in conjunctionwith other diagnostic information such as ECG, clinical 
observations and information, and patient symptoms to aid in the diagnosis of 
MI.[id number] 23:58:00





             Test Item    Value        Reference Range Interpretation Comments

 

             Phosphorus (test code = 3.3 mg/dL    2.3-4.7                   



             2777-1)                                             

 

             MAGGI (test code = MAGGI)  ID - DB                           

 

             Lab Interpretation (test Normal                                 



             code = 31745-6)                                        



[facility] HospitalBASI METABOLIC [id number] 23:58:00





             Test Item    Value        Reference Range Interpretation Comments

 

             SODIUM (BEAKER) 138 meq/L    136-145                   



             (test code = 381)                                        

 

             POTASSIUM (BEAKER) 4.0 meq/L    3.5-5.1                   



             (test code = 379)                                        

 

             CHLORIDE (BEAKER) 109 meq/L           H            



             (test code = 382)                                        

 

             CO2 (BEAKER) (test 20 meq/L     22-29        L            



             code = 355)                                         

 

             BLOOD UREA NITROGEN 14 mg/dL     7-21                      



             (BEAKER) (test code                                        



             = 354)                                              

 

             CREATININE (BEAKER) 1.09 mg/dL   0.57-1.25                 



             (test code = 358)                                        

 

             GLUCOSE RANDOM 83 mg/dL                         



             (BEAKER) (test code                                        



             = 652)                                              

 

             CALCIUM (BEAKER) 8.1 mg/dL    8.4-10.2     L            



             (test code = 697)                                        

 

             EGFR (BEAKER) (test 48 mL/min/1.73                           ESTIMA

ARIADNE GFR IS



             code = 1092) sq m                                   NOT AS ACCURATE

 AS



                                                                 CREATININE



                                                                 CLEARANCE IN



                                                                 PREDICTING



                                                                 GLOMERULAR



                                                                 FILTRATION RATE

.



                                                                 ESTIMATED GFR I

S



                                                                 NOT APPLICABLE 

FOR



                                                                 DIALYSIS PATIEN

TS.



 ID - [id number] 23:58:00





             Test Item    Value        Reference Range Interpretation Comments

 

             MAGNESIUM (BEAKER) (test code = 1.7 mg/dL    1.6-2.6               

    



             627)                                                



 ID - [id number] 23:58:00





             Test Item    Value        Reference Range Interpretation Comments

 

             PHOSPHORUS (BEAKER) (test code = 3.3 mg/dL    2.3-4.7              

     



             604)                                                



 ID - DBHEPATIC FUNCTION [id number] 23:58:00





             Test Item    Value        Reference Range Interpretation Comments

 

             TOTAL PROTEIN (BEAKER) (test code = 5.4 gm/dL    6.0-8.3      L    

        



             770)                                                

 

             ALBUMIN (BEAKER) (test code = 1145) 2.8 g/dL     3.5-5.0      L    

        

 

             BILIRUBIN TOTAL (BEAKER) (test code 0.7 mg/dL    0.2-1.2           

        



             = 377)                                              

 

             BILIRUBIN DIRECT (BEAKER) (test 0.3 mg/dL    0.1-0.5               

    



             code = 706)                                         

 

             ALKALINE PHOSPHATASE (BEAKER) (test 92 U/L                   

        



             code = 346)                                         

 

             AST (SGOT) (BEAKER) (test code = 14 U/L       5-34                 

     



             353)                                                

 

             ALT (SGPT) (BEAKER) (test code = 9 U/L        6-55                 

     



             347)                                                



 ID - [id number] 23:42:00





             Test Item    Value        Reference Range Interpretation Comments

 

             D-Dimer, Quant (test 1.26         See_Comment  H             [Autom

ated



             code = 16675-9)                                        message] The



                                                                 system which



                                                                 generated this



                                                                 result



                                                                 transmitted



                                                                 reference range

:



                                                                 <0.50 MG/L FEU.



                                                                 The reference



                                                                 range was not



                                                                 used to interpr

et



                                                                 this result as



                                                                 normal/abnormal

.

 

             MAGGI (test code = MAGGI) Intended Use: The                           



                          D-Dimer Assay can                           



                          be used to aid in                           



                          the diagnosis of                           



                          Deep Vein                              



                          Thrombosis (DVT)                           



                          and Pulmonary                           



                          Embolism Disease                           



                          (PED).In patients                           



                          with low pre-test                           



                          probability,                           



                          various studies                           



                          concerning STA                           



                          Liatest D-dimer                           



                          test have                              



                          reported that                           



                          with a cutoff                           



                          value of 0.50                           



                          MG/L FEU, the                           



                          Negative                               



                          Predictive Value                           



                          (NPV) regarding                           



                          the exclusion of                           



                          thrombosis is                           



                          within %                           



                          range.                                 

 

             Lab Interpretation Abnormal                               



             (test code = 63235-0)                                        



[id number]





             Test Item    Value        Reference Range Interpretation Comments

 

             D-DIMER QUANTITATIVE (BEAKER) 1.26 MG/L FEU <0.50        H         

   



             (test code = 671)                                        



Intended Use: The D-Dimer Assay can be used to aid in the diagnosis of Deep Vein
Thrombosis (DVT) and Pulmonary Embolism Disease (PED).In patients with low pre-
test probability, various studies concerning STA Liatest D-dimer test have 
reported that with a cutoff value of 0.50 MG/L FEU, the Negative Predictive 
Value (NPV) regarding the exclusion of thrombosis is within % range.
Prothrombin time/[id number] 23:39:00





             Test Item    Value        Reference    Interpretation Comments



                                       Range                     

 

             Protime (test code = 15.4         See_Comment  H             [Autom

ated



             5902-2)                                             message] The



                                                                 system which



                                                                 generated this



                                                                 result



                                                                 transmitted



                                                                 reference range

:



                                                                 11.9 - 14.2



                                                                 seconds. The



                                                                 reference range



                                                                 was not used to



                                                                 interpret this



                                                                 result as



                                                                 normal/abnormal

.

 

             INR (test code = 1.24         See_Comment                [Automated



             6301-6)                                             message] The



                                                                 system which



                                                                 generated this



                                                                 result



                                                                 transmitted



                                                                 reference range

:



                                                                 <=5.90. The



                                                                 reference range



                                                                 was not used to



                                                                 interpret this



                                                                 result as



                                                                 normal/abnormal

.

 

             MAGGI (test code = RECOMMENDED                            



             MAGGI)         COUMADIN/WARFARIN                           



                          INR THERAPY                            



                          RANGESSTANDARD DOSE:                           



                          2.0 - 3.0                              



                          Includes:                              



                          PROPHYLAXIS for                           



                          venous thrombosis,                           



                          systemic                               



                          embolization;                           



                          TREATMENT for venous                           



                          thrombosis and/or                           



                          pulmonary                              



                          embolus.HIGH RISK:                           



                          Target INR is                           



                          2.5-3.5 for patients                           



                          with mechanical                           



                          heart valves.                           

 

             Lab Interpretation Abnormal                               



             (test code =                                        



             28243-5)                                            



[id number]39:00





             Test Item    Value        Reference Range Interpretation Comments

 

             PROTIME (BEAKER) 15.4 seconds 11.9-14.2    H            



             (test code = 759)                                        

 

             INR (BEAKER) (test 1.24         See_Comment                [Automat

ed message]



             code = 370)                                         The system whic

h



                                                                 generated this 

result



                                                                 transmitted ref

erence



                                                                 range: <=5.90. 

The



                                                                 reference range

 was



                                                                 not used to int

erpret



                                                                 this result as



                                                                 normal/abnormal

.



RECOMMENDED COUMADIN/WARFARIN INR THERAPY RANGESSTANDARD DOSE: 2.0 - 3.0   
Includes: PROPHYLAXIS forvenous thrombosis, systemic embolization; TREATMENT for
venous thrombosis and/or pulmonary embolus.HIGH RISK: Target INR is 2.5-3.5 for 
patients with mechanical heart valves.CBC W/PLT COUNT &amp; AUTO DIFFERENTIAL
2021 23:32:00





             Test Item    Value        Reference Range Interpretation Comments

 

             WHITE BLOOD CELL COUNT (BEAKER) 7.9 K/ L     3.5-10.5              

    



             (test code = 775)                                        

 

             RED BLOOD CELL COUNT (BEAKER) 3.96 M/ L    3.93-5.22               

  



             (test code = 761)                                        

 

             HEMOGLOBIN (BEAKER) (test code = 12.7 GM/DL   11.2-15.7            

     



             410)                                                

 

             HEMATOCRIT (BEAKER) (test code = 40.3 %       34.1-44.9            

     



             411)                                                

 

             MEAN CORPUSCULAR VOLUME (BEAKER) 101.8 fL     79.4-94.8    H       

     



             (test code = 753)                                        

 

             MEAN CORPUSCULAR HEMOGLOBIN 32.1 pg      25.6-32.2                 



             (BEAKER) (test code = 751)                                        

 

             MEAN CORPUSCULAR HEMOGLOBIN CONC 31.5 GM/DL   32.2-35.5    L       

     



             (BEAKER) (test code = 752)                                        

 

             RED CELL DISTRIBUTION WIDTH 13.3 %       11.7-14.4                 



             (BEAKER) (test code = 412)                                        

 

             PLATELET COUNT (BEAKER) (test 210 K/CU MM  150-450                 

  



             code = 756)                                         

 

             MEAN PLATELET VOLUME (BEAKER) 9.3 fL       9.4-12.3     L          

  



             (test code = 754)                                        

 

             NUCLEATED RED BLOOD CELLS 0 /100 WBC   0-0                       



             (BEAKER) (test code = 413)                                        

 

             NEUTROPHILS RELATIVE PERCENT 58 %                                  

 



             (BEAKER) (test code = 429)                                        

 

             LYMPHOCYTES RELATIVE PERCENT 31 %                                  

 



             (BEAKER) (test code = 430)                                        

 

             MONOCYTES RELATIVE PERCENT 8 %                                    



             (BEAKER) (test code = 431)                                        

 

             EOSINOPHILS RELATIVE PERCENT 2 %                                   

 



             (BEAKER) (test code = 432)                                        

 

             BASOPHILS RELATIVE PERCENT 1 %                                    



             (BEAKER) (test code = 437)                                        

 

             NEUTROPHILS ABSOLUTE COUNT 4.59 K/ L    1.56-6.13                 



             (BEAKER) (test code = 670)                                        

 

             LYMPHOCYTES ABSOLUTE COUNT 2.42 K/ L    1.18-3.74                 



             (BEAKER) (test code = 414)                                        

 

             MONOCYTES ABSOLUTE COUNT (BEAKER) 0.63 K/ L    0.24-0.36    H      

      



             (test code = 415)                                        

 

             EOSINOPHILS ABSOLUTE COUNT 0.17 K/ L    0.04-0.36                 



             (BEAKER) (test code = 416)                                        

 

             BASOPHILS ABSOLUTE COUNT (BEAKER) 0.06 K/ L    0.01-0.08           

      



             (test code = 417)                                        

 

             IMMATURE GRANULOCYTES-RELATIVE 1 %          0-1                    

   



             PERCENT (BEAKER) (test code =                                      

  



             5935)

## 2022-07-23 NOTE — EDPHYS
Physician Documentation                                                                           

 [facility]                                                                 

Name: Leona Peres                                                                              

Age: 81 yrs                                                                                       

Sex: Female                                                                                       

: 1940                                                                                   

MRN: [id number]                                                                                   

Arrival Date: 2022                                                                          

Time: 00:07                                                                                       

Account#: [id number]                                                                            

Bed 17                                                                                            

Private MD:                                                                                       

ED Physician Vijaya Ross                                                                         

HPI:                                                                                              

                                                                                             

00:32 This 81 yrs old Female presents to ER via EMS with complaints of face and head injury.  sp3 

00:32 81-year-old female with a history of Alzheimer's disease, cancer history, fibromyalgia  sp3 

      and atrial fibrillation on Eliquis presents to the ED with mechanical fall in her           

      bathroom with injury to forehead and anterior face. Negative LOC, neck pain, chest          

      pain, back pain, shortness of breath, abdominal pain, neurological deficit reported,        

      any other ROS at this time..                                                                

                                                                                                  

Historical:                                                                                       

- Allergies:                                                                                      

00:13 clindamycin HCl (bulk);                                                                 ll3 

00:13 Darvocet-N 100;                                                                         ll3 

00:13 PENICILLINS;                                                                            ll3 

00:13 Strawberries;                                                                           ll3 

00:13 Sulfa (Sulfonamide Antibiotics);                                                        ll3 

- Home Meds:                                                                                      

00:13 Eliquis 5 mg Oral tab 2 times per day [Active];                                         ll3 

00:15 Trelegy Ellipta 100-62.5-25 mcg inhalation dsdv 1 puff once daily [Active]; clopidogrel kd3 

      75 mg Oral tab 1 tab once daily [Active]; benzonatate 200 mg Oral cap 1 cap 3 times per     

      day [Active]; bupropion HCl 150 mg Oral Tb24 1 tab once daily [Active]; alprazolam 0.25     

      mg Oral tab 1 tab twice a day for Anxiety [Active]; bupropion HCl 300 mg Oral Tb24 1        

      tab once daily [Active];                                                                    

00:16 donepezil 10 mg Oral tab 1 tab once daily [Active]; Ellura 200 mg Oral cap daily        kd3 

      [Active]; gabapentin 300 mg Oral cap 1 cap 3 times per day [Active]; doxepin 50 mg Oral     

      cap 1 cap once daily [Active]; duloxetine 60 mg Oral cpDR 2 caps once daily [Active];       

      hydrocodone-acetaminophen 7.5-325 mg Oral tab 1 tab every 8 hours [Active]; indapamide      

      2.5 mg Oral tab 1 tab once daily [Active]; magnesium oxide 500 mg Oral tab 500 mg after     

      meals and before bedtime [Active]; metoprolol succinate 25 mg Oral Tb24 1 tab once          

      daily [Active]; pantoprazole 40 mg Oral grps 1 packet once daily [Active]; potassium        

      chloride 20 mEq Oral TbER 1 tab 2 times per day [Active]; memantine 10 mg Oral tab 1        

      tab 2 times per day [Active]; levothyroxine 75 mcg tab 1 tab once daily [Active];           

      tizanidine 4 mg Oral tab 1 tab daily [Active]; simvastatin 40 mg Oral tab 1 tab once        

      daily [Active]; topiramate 200 mg Oral cp24 1 cap twice a day [Active]; quetiapine 200      

      mg Oral Tb24 1 tab once daily [Active];                                                     

- PMHx:                                                                                           

00:13 Alzheimer's disease; Cancer; GERD; Fibromyalgia; Depression; insomnia; lymphedema;      ll3 

      skipped heart beats;                                                                        

- PSHx:                                                                                           

00:13 Total abdominal hysterectomy;                                                           ll3 

                                                                                                  

- Immunization history:: Client reports receiving the 2nd dose of the Covid vaccine.              

- Social history:: Smoking status: Patient denies any tobacco usage or history of.                

                                                                                                  

                                                                                                  

ROS:                                                                                              

00:33 Constitutional: Negative for fever, chills, and weight loss, Eyes: Negative for injury, sp3 

      pain, redness, and discharge, ENT: Negative for injury, pain, and discharge, Neck:          

      Negative for injury, pain, and swelling, Cardiovascular: Negative for chest pain,           

      palpitations, and edema, Respiratory: Negative for shortness of breath, cough,              

      wheezing, and pleuritic chest pain, Abdomen/GI: Negative for abdominal pain, nausea,        

      vomiting, diarrhea, and constipation, Back: Negative for injury and pain, MS/Extremity:     

      Negative for injury and deformity, Skin: Negative for injury, rash, and discoloration,      

      Neuro: Negative for headache, weakness, numbness, tingling, and seizure, Psych:             

      Negative for depression, anxiety, suicide ideation, homicidal ideation, and                 

      hallucinations, Allergy/Immunology: Negative for hives, rash, and allergies.                

00:33 Constitutional: Positive for                                                                

00:33 All other systems are negative.                                                             

                                                                                                  

Exam:                                                                                             

00:34 Constitutional:  This is a well developed, well nourished patient who is awake, alert,  sp3 

      and in no acute distress. Eyes:  Pupils equal round and reactive to light, extra-ocular     

      motions intact.  Lids and lashes normal.  Conjunctiva and sclera are non-icteric and        

      not injected.  Cornea within normal limits.  Periorbital areas with no swelling,            

      redness, or edema. ENT:  Nares patent. No nasal discharge, no septal abnormalities          

      noted.  External auditory canals are clear.  Oropharynx with no redness, swelling, or       

      masses, exudates, or evidence of obstruction, uvula midline.  Mucous membranes moist.       

      Neck:  Trachea midline, no thyromegaly or masses palpated, and no cervical                  

      lymphadenopathy.  Supple, full range of motion without nuchal rigidity, or vertebral        

      point tenderness.  No Meningismus. Chest/axilla:  Normal chest wall appearance and          

      motion.  Nontender with no deformity.  No lesions are appreciated. Cardiovascular:          

      Regular rate and rhythm with a normal S1 and S2.  No gallops, murmurs, or rubs.  Normal     

      PMI, no JVD.  No pulse deficits. Respiratory:  Lungs have equal breath sounds               

      bilaterally, clear to auscultation and percussion.  No rales, rhonchi or wheezes noted.     

       No increased work of breathing, no retractions or nasal flaring. Abdomen/GI:  Soft,        

      non-tender, with normal bowel sounds.  No distension or tympany.  No guarding or            

      rebound.  No evidence of tenderness throughout. Back:  No spinal tenderness.  No            

      costovertebral tenderness.  Full range of motion. Skin:  Warm, dry with normal turgor.      

      Normal color with no rashes, no lesions, and no evidence of cellulitis. MS/ Extremity:      

      Pulses equal, no cyanosis.  Neurovascular intact.  Full, normal range of motion. Neuro:     

       Awake and alert, GCS 15, oriented to person, place, time, and situation.  Cranial          

      nerves II-XII grossly intact.  Motor strength 5/5 in all extremities.  Sensory grossly      

      intact.  Cerebellar exam normal.  Normal gait. Psych:  Awake, alert, with orientation       

      to person, place and time.  Behavior, mood, and affect are within normal limits.            

00:34 Head/face: Patient hand contusion noted on forehead, anterior nose, and right side of       

      face.  Remainder of maxillofacial exam is normal Nexus criteria is negative with no         

      distracting injury noted..                                                                  

                                                                                                  

Vital Signs:                                                                                      

00:09  / 88; Pulse 55; Resp 18; Temp 98.5(O); Pulse Ox 96% on R/A; Weight 88.9 kg (R);  ll3 

      Height 5 ft. 3 in. (160.02 cm) (R); Pain /10;                                              

01:35  / 77; Pulse 58; Resp 19; Pulse Ox 94% on R/A;                                    kd3 

00:09 Body Mass Index 34.72 (88.90 kg, 160.02 cm)                                             ll3 

                                                                                                  

MDM:                                                                                              

00:09 Patient medically screened.                                                             sp3 

00:34 Data reviewed: vital signs, nurses notes. ED course: 81-year-old female with isolated   sp3 

      injury to the head and face. Patient is completely stable with normal vital signs and       

      normal neurological status. Will obtain CT scan of the head, facial bones, and C-spine      

      and treat pain with p.o. pain control. If work-up is negative, will discharge patient       

      home with general precautions and follow-up with her PCP..                                  

                                                                                                  

                                                                                             

00:09 Order name: CT Head C Spine                                                             sp3 

                                                                                             

00:09 Order name: CT Facial Bones W/O Con                                                     sp3 

                                                                                             

00:09 Order name: NPO; Complete Time: 00:10                                                   sp3 

                                                                                             

00:15 Order name: Facial Bones W/ Mpr                                                         EDMS

                                                                                                  

Administered Medications:                                                                         

00:14 Drug: HYDROcodone-acetaminophen 5 mg-325 mg 2 tabs Route: PO;                           kd3 

                                                                                                  

                                                                                                  

Disposition Summary:                                                                              

22 01:42                                                                                    

Discharge Ordered                                                                                 

      Location: Home                                                                          sp3 

      Condition: Stable                                                                       sp3 

      Diagnosis                                                                                   

        - Forehead contusion, facial contusion                                                sp3 

      Followup:                                                                               sp3 

        - With: Private Physician                                                                  

        - When: Upon discharge from the Emergency Department                                       

        - Reason: Recheck today's complaints                                                       

      Discharge Instructions:                                                                     

        - Discharge Summary Sheet                                                             sp3 

        - Facial or Scalp Contusion                                                           sp3 

      Forms:                                                                                      

        - Medication Reconciliation Form                                                      sp3 

        - Thank You Letter                                                                    sp3 

        - Antibiotic Education                                                                sp3 

        - Prescription Opioid Use                                                             sp3 

Signatures:                                                                                       

Dispatcher [employer]                           EDMS                                                 

Vijaya Ross MD MD   sp3                                                  

Ernesto Lopez RN                      RN   ll3                                                  

Carlita Mai RN                      RN   kd3                                                  

                                                                                                  

**************************************************************************************************

## 2022-07-25 NOTE — RAD REPORT
EXAM DESCRIPTION:  CT - Facial Bones W/ Mpr - 7/23/2022 6:47 am

 

CLINICAL HISTORY:  81 years Female Facial trauma, blunt

 

TECHNIQUE:  Axial CT of the facial bones was performed without intravenous contrast with sagittal and
 coronal reformatted images. The CT study is performed according to ALARA (as low as reasonably achie
vable) or ALARA/IMAGE GENTLY, with automatic adjustment of mA and/or kV according to patient size.

Performed on: 7/23/2022 and 12:38 AM

 

COMPARISON:  Head CT performed on 2/21/2022.

 

FINDINGS:  There is mild frontal scalp soft tissue swelling.

There is no evidence of acute facial bone fracture. The mandible is intact.   The temporomandibular j
oints are preserved. There is an old right nasal bone fracture.

Both globes are intact and are symmetric.   The extraocular muscles and optic nerves are symmetric. T
he intraconal fat is preserved. There is no evidence of intraorbital emphysema.

There is mild mucosal thickening of the left maxillary sinus.

The nasal bones are intact. As noted above, there is an old right nasal bone fracture. The bony nasal
 septum slightly deviated towards the right. The anterior maxillary spine is intact.

Mastoid air cells and middle ear cavities are incompletely visualized on this examination.

 

IMPRESSION:  1.   No evidence of acute facial bone fracture.

2.   Mild frontal scalp soft tissue swelling.

3.   Old right nasal bone fracture.

 

Electronically signed by:   Barbi Haro DO   7/23/2022 1:21 AM CDT Workstation: [id number]

 

 

 

Due to temporary technical issues with the PACS/Fluency reporting system, reports are being signed by
 the in house radiologists without review as a courtesy to insure prompt reporting. The interpreting 
radiologist is fully responsible for the content of the report.

## 2022-07-25 NOTE — RAD REPORT
EXAM DESCRIPTION:  CT - Head C Spine Mpr Wo Con - 7/23/2022 6:46 am

 

CLINICAL HISTORY:  81 years Female trauma - head/face injury

 

TECHNIQUE:  Multiple axial CT images of the brain and cervical spine were performed followed by sagit
mely and coronal reconstructed images. The CT study is performed according to ALARA (as low as reasona
markie achievable) or ALARA/IMAGE GENTLY, with automatic adjustment of mA and/or kV according to patient
 size.

Performed on: 7/23/2022 at 12:46 AM

 

COMPARISON:  CT head performed on 2/21/2022 and CT cervical spine report from 7-21. The CT cervical s
pine images were not available for review..

 

FINDINGS:  CT HEAD:

There is no evidence of mass, acute mass effect or midline shift. There are no acute extra-axial flui
d collections.   There is no evidence of acute intracranial hemorrhage.

The cerebral sulci and ventricles are normal in size and configuration.

There are no focal abnormal areas of increased or decreased attenuation.

There is mild mucosal thickening of the paranasal sinuses.

The mastoid air cells are clear.

The orbital contents are grossly unremarkable.

No acute osseous abnormalities are identified. There is benign hyperostosis frontalis interna. There 
is an old right nasal bone fracture.

There is mild frontal scalp soft tissue swelling.

CT CERVICAL SPINE:

There are remote postsurgical changes of the cervical spine consistent with anterior cervical discect
ingrid with fusion from C4 through C7. There is no evidence to suggest hardware failure. The cervical ve
rtebrae are normal in height. The cervical vertebrae are grossly normal in alignment considering the 
presence of surgical hardware. There is slight retrolisthesis of C4 relative to C5 as described previ
ously. There is disc spaces above the level of fusion are relatively well preserved in height. There 
is degenerative spondylosis along the anterior inferior endplate of C3. There is mild disc space narr
owing at C7-T1. Bone mineralization is   within normal limits. Hhe atlanto-axial articulation is pres
erved and the odontoid process is intact.

There is normal alignment of the facet joints on the parasagittal images. There are mild degenerative
 changes of the facet joints.

There is no evidence of acute fracture or acute subluxation. There is mild C4-C5 canal stenosis due t
o mild retrolisthesis at this level. Otherwise, there is no significant canal stenosis. There is mild
 bilateral C4-C5 and C5-C6 neural foraminal stenosis secondary to uncovertebral joint hypertrophy.

The prevertebral and paraspinal soft tissues are unremarkable. The lung apices are grossly clear

 

IMPRESSION:  CT HEAD:

1.   No evidence of acute intracranial pathology.

2.   Mild frontal scalp soft tissue swelling.

3.   Old right nasal bone fracture.

CT CERVICAL SPINE:

1.   No evidence of acute osseous injury involving the cervical spine.

2.   Remote anterior cervical discectomy with fusion from C4 through C7 without evidence of hardware 
failure.

3.   Slight retrolisthesis of C4 relative to C5 as described previously.

4.   Mild bilateral neural foraminal stenosis at C4-C5 and C5-C6 secondary to uncovertebral joint hyp
ertrophy.

 

Electronically signed by:   Barbi Haro DO   7/23/2022 1:35 AM CDT Workstation: [id number]

 

 

 

Due to temporary technical issues with the PACS/Fluency reporting system, reports are being signed by
 the in house radiologists without review as a courtesy to insure prompt reporting. The interpreting 
radiologist is fully responsible for the content of the report.

## 2022-08-16 ENCOUNTER — HOSPITAL ENCOUNTER (EMERGENCY)
Dept: HOSPITAL 97 - ER | Age: 82
Discharge: HOME | End: 2022-08-16
Payer: COMMERCIAL

## 2022-08-16 DIAGNOSIS — I10: ICD-10-CM

## 2022-08-16 DIAGNOSIS — F02.80: ICD-10-CM

## 2022-08-16 DIAGNOSIS — Z88.3: ICD-10-CM

## 2022-08-16 DIAGNOSIS — S00.83XA: Primary | ICD-10-CM

## 2022-08-16 DIAGNOSIS — I48.91: ICD-10-CM

## 2022-08-16 DIAGNOSIS — G30.9: ICD-10-CM

## 2022-08-16 DIAGNOSIS — Z91.018: ICD-10-CM
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
## 2022-08-16 DIAGNOSIS — Z88.5: ICD-10-CM

## 2022-08-16 DIAGNOSIS — S20.211A: ICD-10-CM

## 2022-08-16 DIAGNOSIS — Z88.2: ICD-10-CM

## 2022-08-16 DIAGNOSIS — Z88.0: ICD-10-CM

## 2022-08-16 LAB
BUN BLD-MCNC: 21 MG/DL (ref 7–18)
GLUCOSE SERPLBLD-MCNC: 129 MG/DL (ref 74–106)
HCT VFR BLD CALC: 42.4 % (ref 36–45)
LYMPHOCYTES # SPEC AUTO: 1.6 K/UL (ref 0.7–4.9)
MCV RBC: 94.6 FL (ref 80–100)
PMV BLD: 7.8 FL (ref 7.6–11.3)
POTASSIUM SERPL-SCNC: 4.7 MMOL/L (ref 3.5–5.1)
RBC # BLD: 4.48 M/UL (ref 3.86–4.86)

## 2022-08-16 PROCEDURE — 72125 CT NECK SPINE W/O DYE: CPT

## 2022-08-16 PROCEDURE — 80048 BASIC METABOLIC PNL TOTAL CA: CPT

## 2022-08-16 PROCEDURE — 71250 CT THORAX DX C-: CPT

## 2022-08-16 PROCEDURE — 36415 COLL VENOUS BLD VENIPUNCTURE: CPT

## 2022-08-16 PROCEDURE — 74176 CT ABD & PELVIS W/O CONTRAST: CPT

## 2022-08-16 PROCEDURE — 86901 BLOOD TYPING SEROLOGIC RH(D): CPT

## 2022-08-16 PROCEDURE — 70450 CT HEAD/BRAIN W/O DYE: CPT

## 2022-08-16 PROCEDURE — 86850 RBC ANTIBODY SCREEN: CPT

## 2022-08-16 PROCEDURE — 85025 COMPLETE CBC W/AUTO DIFF WBC: CPT

## 2022-08-16 PROCEDURE — 86900 BLOOD TYPING SEROLOGIC ABO: CPT

## 2022-08-16 NOTE — XMS REPORT
Continuity of Care Document

                           Created on:2022



Patient:LEONA PERES

Sex:Female

:1940

External Reference #:[id number]





Demographics







                          Address                   [address] 



                                                    [place] [address]

 

                          Home Phone                [phone]

 

                          Mobile Phone              [phone]

 

                          Email Address             [email or web address]

 

                          Preferred Language        English

 

                          Marital Status            Unknown

 

                          Shinto Affiliation     Unknown

 

                          Race                      Unknown

 

                          Additional Race(s)        White



                                                    Unavailable

 

                          Ethnic Group              Unknown









Author







                          Organization              [facility]

 

                          Address                   [address]



                                                    [place] [address]

 

                          Phone                     [phone]









Support







                Name            Relationship    Address         Phone

 

                Leona Peres Unavailable     [address] [phone]



                                                [place] [address] 

 

                magy green   Child           Unavailable     [phone]

 

                HomeroFrancesMaida Other           Unavailable     [phone]

 

                Blaine Peres Spouse          [address]   [phone]



                                                [place] [address] 

 

                Kody Peres Child           Unavailable     [phone]

 

                MISSY CALABRESE               Unavailable     [phone]









Care Team Providers







                    Name                Role                Phone

 

                    Denita RIZVI, Kendall    Primary Care Physician [phone]

 

                    Nikolas Mcwilliams       Attending Clinician Unavailable

 

                    [id number]              Attending Clinician Unavailable

 

                    MANOLO BASSETT Attending Clinician Unavailable

 

                    Doctor Unassigned, No Name Attending Clinician Unavailable

 

                    Aftab Urrutia  Attending Clinician [phone]

 

                    Carson Mead MD Attending Clinician [phone]

 

                    Manolo Bassett MD Attending Clinician [phone]

 

                    Say Ramos MD Attending Clinician [phone]

 

                    Arias Christopher MD Attending Clinician [phone]

 

                    Rahul RIZVI, Tara Benson Attending Clinician [phone]

[phone]

 

                    Ellis Mcadams MD     Attending Clinician [phone]

 

                    [id number]              Admitting Clinician Unavailable

 

                    MANOLO BASSETT Admitting Clinician Unavailable

 

                    SAY RAMOS Admitting Clinician Unavailable









Payers







           Payer Name Policy Type Policy Number Effective Date Expiration Date S

mikaela







Problems







       Condition Condition Condition Status Onset  Resolution Last   Treating Co

mments 

Source



       Name   Details Category        Date   Date   Treatment Clinician        



                                                 Date                 

 

       Atrial Atrial Disease Active                              [facility]



       fibrillati fibrillati               8-16                               [facility]

[facility]



       on with on with               00:00:                             Medical



       RVR    RVR                  00                                 Center

 

       Status Status Disease Active 2016                             [facility]



       post total post total               2-20                               it

y of



       knee   knee                 00:00:                             [place]



       replacemen replacemen               00                                 Me

dical



       t, left t, left                                                  Branch

 

       Morbid Morbid Disease Active 2016                             [facility]



       obesity obesity               2-20                               ity of



       with body with body               00:00:                             [place]

[place]



       mass index mass index               00                                 Me

dical



       of 50 or of 50 or                                                  Branch



       higher higher                                                  

 

       Morbid Morbid Disease Active 2016                             [facility]



       obesity obesity               2-20                               ity of



       with body with body               00:00:                             [place]

[place]



       mass index mass index               00                                 Me

dical



       of     of                                                      Branch



       40.0-49.9 40.0-49.9                                                  

 

       CORNELIA    CORNELIA    Disease Active                                    [facility]



       (obstructi (obstructi                                                  [facility]

kes



       ve sleep ve sleep                                                  [facility]

l



       apnea) apnea)                                                  Center







Allergies, Adverse Reactions, Alerts







       Allergy Allergy Status Severity Reaction(s) Onset  Inactive Treating Comm

ents 

Source



       Name   Type                        Date   Date   Clinician        

 

       STRAWBER Allergy Active Low    Rash   -0                      [facility]



       RY                                 8-17                        [facility]



                                          00:[facility]:                      [facility]



                                          00                          Center

 

       Strawber Propensi Active        Rash   -0                      [facility]



       ry     ty to                       8-17                        [facility]



              adverse                      00:00:                      Medical



              reaction                      00                          Center



              s                                                       

 

       CLINDAMY Allergy Active High   Rash   -0                      [facility]



       JAYNA HCL                             8-16                        [facility]



                                          00:00:                      [facility]



                                          00                          Center

 

       PENICILL Allergy Active High   Rash   -0                      [facility]



       IN                                 8-16                        [facility]



                                          00:00:                      [facility]



                                          00                          Center

 

       SULFA  Allergy Active High   Rash   -0                      [facility]



       (SULFONA                             8-16                        [facility]



       MIDE                               00:00:                      Medical



       ANTIBIOT                             00                          Center



       ICS)                                                           

 

       Clindamy Propensi Active        Rash   -0                      [facility]



       jayna Hcl ty to                       8-16                        [facility]



              adverse                      00:00:                      Medical



              reaction                      00                          Center



              s                                                       

 

       Penicill Propensi Active        Rash   -0                      [facility]



       in     ty to                       8-16                        [facility]



              adverse                      00:00:                      Medical



              reaction                      00                          Center



              s                                                       

 

       Sulfa  Propensi Active        Rash   -0                      [facility]



       (Sulfona ty to                       8-16                        [facility]



       mide   adverse                      00:00:                      Medical



       Antibiot reaction                      00                          Center



       ics)   s                                                       

 

       Clindamy Propensi Active        Unknown 2020-0                      [facility]

[facility]



       jayna    ty to                Reaction 6-30                        st



       Phosphat adverse                      00:00:                      Hospita



       e      reaction                      00                          l



              s to                                                    



              drug                                                    

 

       Propoxyp Propensi Active        Unknown 2020-0                      [facility]

di



       hene   ty to                Reaction 630                        st



       N-Acetam adverse                      00:00:                      Hospita



       inophen reaction                      00                          l



              s to                                                    



              drug                                                    

 

       Penicill Propensi Active        Unknown 2020-0                      [facility]

di



       ins    ty to                Reaction 630                        st



              adverse                      00:00:                      Hospita



              reaction                      00                          l



              s to                                                    



              drug                                                    

 

       Sulfa  Propensi Active        Unknown 2020-0                      [facility]



       (Sulfona ty to                Reaction 630                        st



       mide   adverse                      00:00:                      Hospita



       Antibiot reaction                      00                          l



       ics)   s to                                                    



              drug                                                    

 

       Acetamin Propensi Active        Unknown 2020-0                      [facility]

di



       ophen  ty to                Reaction 6-30                        st



              adverse                      00:00:                      Hospita



              reaction                      00                          l



              s to                                                    



              drug                                                    

 

       Acetamin Propensi Active        Unknown - 2020-0                      Uni

vers



       ophen  ty to                See comments 6-30                        ity 

of



              adverse                      00:00:                      [place]



              reaction                      00                          Medical



              s                                                       Branch

 

       Propoxyp Propensi Active        Unknown - 2020-0                      Uni

vers



       hene   ty to                See comments 6-30                        ity 

of



       N-Acetam adverse                      00:00:                      [place]



       inophen reaction                      00                          Medical



              s                                                       Branch

 

       Clindamy Propensi Active        Rash   2016                      Univer

s



       jayna Hcl ty to                       2-19                        ity of



              adverse                      00:00:                      [place]



              reaction                      00                          Medical



              s                                                       Branch

 

       Penicill Propensi Active        Anaphylaxis 2016                      U

nivers



       in     ty to                       2-19                        ity of



              adverse                      00:00:                      [place]



              reaction                      00                          Medical



              s                                                       Branch

 

       Sulfa  Propensi Active        Rash   2016                      [facility]



       (Sulfona ty to                       2-19                        ity of



       mide   adverse                      00:00:                      [place]



       Antibiot reaction                      00                          Medica

l



       ics)   s                                                       Branch

 

       Sulfa  Propensi Active        Rash   2016                      [facility]



       (Sulfona ty to                       2-19                        ity of



       mide   adverse                      00:00:                      [place]



       Antibiot reaction                      00                          Medica

l



       ics)   s                                                       Branch







Family History







           Family Member Diagnosis  Comments   Start Date Stop Date  Source

 

           Natural father Heart disease                                  [facility]

 

           Natural father Hypertension                                  [facility]

 

           Natural father Diabetes                                    [facility] mother Asthma                                      [facility]







Social History







           Social Habit Start Date Stop Date  Quantity   Comments   Source

 

           History SDOH                                             [facility]



           Alcohol Std Drinks                                             [facility]

l Center

 

           History SDOH                                             [facility]



           Alcohol Binge                                             [facility]

ter

 

           History SDOH                                             [facility]



           Alcohol Comment                                             [facility]

enter

 

           History of tobacco                       Smoker                [facility]



           use                                                    

 

           Exposure to 2022 Not sure              [facility]

 [place]



           SARS-CoV-2 (event) 00:00:00   14:26:00                         [facility]

l Branch

 

           Alcohol intake 2022 0 /d                  [facility] [place]



                      00:00:00   00:00:00                         [facility] [place]

 

           Tobacco use and 2021 Never used            [facility]

[facility]



           exposure   00:00:00   00:00:00                         [facility]

 

           History SDOH 2021 1                     [facility]



           Alcohol Frequency 00:00:00   00:00:00                         [facility]

 

           Cigarettes smoked 2020                       [facility]



           current (pack per 00:00:00   00:00:00                         



           day) - Reported                                             

 

           Cigarette  2020                       [facility]

ital



           pack-years 00:00:00   00:00:00                         

 

           Sex Assigned At [birth year]                       [facility]



           Birth      00:00:00   00:00:00                         [facility]









                Smoking Status  Start Date      Stop Date       Source

 

                Never smoker                                    [facility]

 

                Former smoker   2020 00:00:00 2020 00:00:00 [facility]







Medications







       Ordered Filled Start  Stop   Current Ordering Indication Dosage Frequency

 Signature

                    Comments            Components          Source



     Medication Medication Date Date Medication? Clinician                (SIG) 

          



     Name Name                                                   

 

     buPROPion            Yes                                     [facility]



      mg      8-26                                              ity of



     24 hr      00:00:                                              [place]



     tablet                                                      [facility]

 

     buPROPion            Yes                                     [facility]



      mg      8-26                                              ity of



     24 hr      00:00:                                              [place]



     tablet      00                                                [facility]

 

     buPROPion            Yes                                     [facility]



      mg      8-26                                              ity of



     24 hr      00:00:                                              [place]



     tablet      00                                                [facility]

 

     buPROPion            Yes                                     [facility]



      mg      8-26                                              ity of



     24 hr      00:00:                                              [place]



     tablet      00                                                [facility]

 

     buPROPion            Yes                                     [facility]



      mg      8-26                                              ity of



     24 hr      00:00:                                              [place]



     tablet      00                                                Medical



                                                                 Branch

 

     furosemide      2022- No             20mg QD   Take 1           [facility]



     (LASIX) 20      8-23 08-23                          tablet (20           [facility]

kes



     MG tablet      00:00: 23:59                          mg total)           Me

dical



               00   :00                           by mouth           Center



                                                  daily.           

 

     zolpidem            Yes            10mg      Take 10 mg           [facility]



     (AMBIEN) 10      8-22                               by mouth           [facility]

s



     mg tablet      12:43:                               every           Medical



               24                                 night as           Center



                                                  needed for           



                                                  Insomnia.           

 

     donepeziL            Yes            10mg QD   Take 10 mg           [facility]



     (ARICEPT)      8-22                               by mouth           [facility]



     10 MG      12:43:                               nightly.           Medical



     tablet      24                                                Center

 

     DULoxetine            Yes            60mg Q.5D Take 60 mg           [facility]

[facility]



     (CYMBALTA)      8-22                               by mouth 2           [facility]

es



     60 MG      12:43:                               (two)           Medical



     capsule      24                                 times           Center



                                                  daily.           

 

     gabapentin            Yes            300mg [id number] Take 300       

    [facility]



     (NEURONTIN)      8-22                          [id number] mg by           L

ukes



     300 MG      12:43:                          3D   mouth 3           Medical



     capsule      24                                 (three)           Center



                                                  times           



                                                  daily.           

 

     indapamide            Yes            2.5mg QD   Take 2.5           [facility]



     (LOZOL) 2.5      8-22                               mg by           [facility]



     MG tablet      12:43:                               mouth           Medical



               24                                 every           Center



                                                  morning.           

 

     memantine            Yes            10mg Q.5D Take 10 mg           [facility]



     (NAMENDA)      8-22                               by mouth 2           [facility]

s



     10 MG      12:43:                               (two)           Medical



     tablet      24                                 times           Center



                                                  daily.           

 

     metoprolol            Yes            25mg QD   Take 25 mg           [facility]

[facility]



     succinate      8-22                               by mouth           [facility]



     (TOPROL-XL)      12:43:                               nightly.           Me

dical



     25 MG 24 hr      24                                                Center



     tablet                                                        

 

     HYDROcodone            Yes            1{tbl}      Take 1           [facility]



     -acetaminop      8-22                               tablet by           [facility]

es



     hen (NORCO      12:43:                               mouth 3           Medi

bella



     7.5-325)      24                                 (three)           Center



     7.5-325 mg                                         times           



     per tablet                                         daily as           



                                                  needed for           



                                                  Pain.           

 

     potassium            Yes            20meq Q.5D Take 20           [facility]



     chloride      8-22                               mEq by           [facility]



     (KLOR-CON)      12:43:                               mouth 2           Medi

bella



     20 mEq      24                                 (two)           Center



     packet                                         times           



                                                  daily.           

 

     QUEtiapine            Yes            200mg QD   Take 200           [facility]



     (SEROquel)      8-22                               mg by           [facility]



     200 MG      12:43:                               mouth           Medical



     tablet      24                                 nightly.           Center

 

     simvastatin            Yes            40mg QD   Take 40 mg           

[facility]



     (ZOCOR) 40      8-22                               by mouth           [facility]



     MG tablet      12:43:                               nightly.           Medi

bella



               24                                                [place]

 

     topiramate            Yes            200mg Q.5D Take 200           [facility]



     (TOPAMAX)      8-22                               mg by           [facility]



     200 MG      12:43:                               mouth 2           Medical



     tablet      24                                 (two)           Center



                                                  times           



                                                  daily.           

 

     buPROPion            Yes            150mg QD   Take 150           [facility]



     (WELLBUTRIN      8-22                               mg by           [facility]



     XL) 150 MG      12:43:                               mouth           [facility]

l



     24 hr      24                                 daily.           Center



     tablet                                                        

 

     ALPRAZolam            Yes            .25mg      Take 0.25           [facility]

[facility]



     (XANAX)      8-22                               mg by           [facility]



     0.25 MG      12:43:                               mouth           [facility]



     tablet      24                                 every           Center



                                                  night as           



                                                  needed for           



                                                  Anxiety.           

 

     tiZANidine            Yes            4mg  QD   Take 4 mg           [facility]



     (ZANAFLEX)      8-22                               by mouth           [facility]



     4 MG tablet      12:43:                               daily.           [address]

 

     fluticasone            Yes                 QD   Inhale 1           [facility]



     -umeclidin-                                     Inhalation           [facility]

[facility]



     vilanter      12:43:                               by mouth           Medic

al



     (Trelegy      24                                 via            Center



     Ellipta)                                         inhaler           



     100-62.5-25                                         daily .           



     mcg DsDv                                                        

 

     clopidogreL      2021-              75mg QD   Take 75 mg          

 [facility]



     (PLAVIX) 75      --22                          by mouth           [facility]

es



     mg tablet      10:48: 00:00                          daily.           Medic

al



               43   :00                                          [place]

 

     ELIQUIS 5            Yes            5mg       Take 5 mg           Uni

vers



     mg tablet      8-22                               by mouth 2           ity 

of



               00:00:                               (two)           [place]



               00                                 times           Medical



                                                  daily.           [facility]anch

 

     benzonatate            Yes                      TAKE 1           [facility]

ers



     100 mg      8-22                               CAPSULE           ity of



     capsule      00:00:                               (100 MG           [place]



               00                                 TOTAL) BY           Medical



                                                  MOUTH 3           Branch



                                                  (THREE)           



                                                  TIMES           



                                                  DAILY AS           



                                                  NEEDED FOR           



                                                  COUGH FOR           



                                                  UP TO 7           



                                                  DAYS.           

 

     ELIQUIS 5            Yes            5mg       Take 5 mg           Uni

vers



     mg tablet      8-22                               by mouth 2           ity 

of



               00:00:                               (two)           [place]



               00                                 times           Medical



                                                  daily.           [facility]anch

 

     benzonatate            Yes                      TAKE 1           Univ

ers



     100 mg      8-22                               CAPSULE           ity of



     capsule      00:00:                               (100 MG           [place]



               00                                 TOTAL) BY           Medical



                                                  MOUTH 3           Branch



                                                  (THREE)           



                                                  TIMES           



                                                  DAILY AS           



                                                  NEEDED FOR           



                                                  COUGH FOR           



                                                  UP TO 7           



                                                  DAYS.           

 

     ELIQUIS 5            Yes            5mg       Take 5 mg           Uni

vers



     mg tablet      8-22                               by mouth 2           ity 

of



               00:00:                               (two)           [place]



               00                                 times           Medical



                                                  daily.           [facility]anch

 

     benzonatate            Yes                      TAKE 1           Univ

ers



     100 mg      8-22                               CAPSULE           ity of



     capsule      00:00:                               (100 MG           [place]



               00                                 TOTAL) BY           Medical



                                                  MOUTH 3           Branch



                                                  (THREE)           



                                                  TIMES           



                                                  DAILY AS           



                                                  NEEDED FOR           



                                                  COUGH FOR           



                                                  UP TO 7           



                                                  DAYS.           

 

     ELIQUIS 5            Yes            5mg       Take 5 mg           Uni

vers



     mg tablet      8-22                               by mouth 2           ity 

of



               00:00:                               (two)           [place]



               00                                 times           Medical



                                                  daily.           [facility]anch

 

     benzonatate            Yes                      TAKE 1           Univ

ers



     100 mg      8-22                               CAPSULE           ity of



     capsule      00:00:                               (100 MG           [place]



               00                                 TOTAL) BY           Medical



                                                  MOUTH 3           Branch



                                                  (THREE)           



                                                  TIMES           



                                                  DAILY AS           



                                                  NEEDED FOR           



                                                  COUGH FOR           



                                                  UP TO 7           



                                                  DAYS.           

 

     ELIQUIS 5            Yes            5mg       Take 5 mg           Uni

vers



     mg tablet      8-22                               by mouth 2           ity 

of



               00:00:                               (two)           [place]



               00                                 times           Medical



                                                  daily.           [facility]

 

     benzonatate            Yes                      TAKE 1           Univ

ers



     100 mg      8-22                               CAPSULE           ity of



     capsule      00:00:                               (100 MG           [place]



               00                                 TOTAL) BY           Medical



                                                  MOUTH 3           Branch



                                                  (THREE)           



                                                  TIMES           



                                                  DAILY AS           



                                                  NEEDED FOR           



                                                  COUGH FOR           



                                                  UP TO 7           



                                                  DAYS.           

 

     apixaban      2022- No             5mg  Q.5D Take 1           [facility]



     (ELIQUIS) 5                                tablet (5           [facility]

[facility]



     mg Tab      00:00: 23:59                          mg total)           Med[facility]

al



     tablet      00   :00                           by mouth 2           Center



                                                  (two)           



                                                  times           



                                                  daily.           

 

     cyanocobala      2022- No             1000ug QD   Take 1           [facility]

[facility]



     min,                                tablet           [facility]



     vitamin      00:00: 23:59                          (1,000 mcg           Med

ical



     B-12, 1000      00   :00                           total) by           Cent

er



     MCG tablet                                         mouth           



                                                  daily.           

 

     flecainide      2022- No             50mg      Take 1           [facility]



     (TAMBOCOR)                                tablet (50           [facility]

[facility]



     50 MG      00:00: 23:59                          mg total)           [facility]

l



     tablet      00   :00                           by mouth           Center



                                                  every 12           



                                                  (twelve)           



                                                  hours.           

 

     benzonatate      2021- No             100mg      Take 1           [facility]



     (TESSALON)                                capsule           [facility]



     100 MG      00:00: 23:59                          (100 mg           Medical



     capsule      00   :00                           total) by           Center



                                                  mouth 3           



                                                  (three)           



                                                  times           



                                                  daily as           



                                                  needed for           



                                                  Cough for           



                                                  up to 7           



                                                  days.           

 

     ALPRAZolam            Yes                                     [facility]



     0.25 mg      8-02                                              ity of



     tablet      00:00:                                              [place]



               00                                                Medical



                                                                 [facility]

 

     ALPRAZolam      0      Yes                                     [facility]



     0.25 mg      8-02                                              ity of



     tablet      00:00:                                              [place]



                                                               [facility]

 

     ALPRAZolam            Yes                                     [facility]



     0.25 mg      8-02                                              ity of



     tablet      00:00:                                              [place]



                                                               [facility]

 

     ALPRAZolam            Yes                                     [facility]



     0.25 mg      8-02                                              ity of



     tablet      00:00:                                              [place]



               00                                                [facility]

 

     ALPRAZolam            Yes                                     [facility]



     0.25 mg      8-02                                              ity of



     tablet      00:00:                                              [place]



               00                                                Medical



                                                                 [facility]anch

 

     calcium      2016      Yes            2{tbl}      Take 2           Univer

s



     carb and      2-28                               tablets by           ity o

f



     citrate-vit      11:30:                               mouth           [place]



     D3        04                                 daily.           Medical



     (CITRACAL +                                                        Branch



     D SLOW                                                        



     RELEASE)                                                        



     600 mg                                                        



     calcium-                                                        



     500 unit                                                        



     TbSR                                                        

 

     DULoxetine      2016      Yes            60mg      Take 60 mg           U

nivers



     (CYMBALTA)      2-28                               by mouth           ity o

f



     60 mg      11:30:                               daily.           [place]



     capsule      04                                                Medical



                                                                 Branch

 

     indapamide      2016      Yes            2.5mg      Take 2.5           Un

bob



     (LOZOL) 2.5      2-28                               mg by           ity of



     mg tablet      11:30:                               mouth           [place]



               04                                 daily.           Medical



                                                                 Branch

 

     Vit C-Vit      2016      Yes            1{capsu      Take 1           Uni

vers



     E-Lutein-Mi      2-28                     le}       capsule by           it

y of



     n-OM-3      11:30:                               mouth           [place]



     (OCUVITE)      04                                 daily.           Medical



     150-30-5-15                                                        Branch



     0                                                           



     mg-unit-mg-                                                        



     mg Cap                                                        

 

     multivitami      2016      Yes            1{tbl}      Take 1           Un

bob



     n         2-28                               tablet by           ity of



     (ONE-A-DAY      11:30:                               mouth           [place]



     ESSENTIAL)      04                                 daily.           Medical



     tablet                                                        Branch

 

     FERROUS      2016      Yes            1{tbl}      Take 1           Univer

s



     FUMARATE/VI      2-28                               tablet by           ity

 of



     T BCOMP,C      11:30:                               mouth           [place]



     (SUPER B      04                                 daily.           Medical



     COMPLEX                                                        Branch



     ORAL)                                                        

 

     ascorbic      2016      Yes            500mg      Take 500           Univ

ers



     acid,      2-28                               mg by           ity of



     vitamin C,      11:30:                               mouth           [place]



     (VITAMIN C)      04                                 daily.           [facility]

l



     500 mg                                                        [facility]



     tablet                                                        

 

     Cholecalcif      2016      Yes            1{tbl}      Take 1           Un

bob



     edith,      2-28                               tablet by           ity of



     Vitamin D3,      11:30:                               mouth           [place]



     (VITAMIN      04                                 daily.           Medical



     D3) 5,000                                                        [facility]anch



     unit tablet                                                        

 

     QUEtiapine      2016      Yes            50mg      Take 50 mg           U

nivers



     (SEROQUEL)      2-28                               by mouth           ity o

f



     50 mg      11:30:                               daily.           [place]



     tablet      04                                                Medical



                                                                 [facility]anch

 

     simvastatin      2016      Yes            40mg      Take 40 mg           

[facility]



     (ZOCOR) 40      2-28                               by mouth           ity o

f



     mg tablet      11:30:                               at             [place]



               04                                 bedtime.           Medical



                                                                 Branch

 

     topiramate      2016      Yes            200mg      Take 200           Un

bob



     (TOPAMAX)      2-28                               mg by           ity of



     100 mg      11:30:                               mouth           [place]



     tablet      04                                 every           Medical



                                                  evening.           Branch

 

     KCL       2016      Yes            10meq      Take 10           [facility]



     (KLOR-CON      2-28                               mEq by           ity of



     M10) 10 mEq      11:30:                               mouth 2           [facility]

[facility]



     tablet      04                                 (two)           Medical



                                                  times           Branch



                                                  daily.           

 

     pregabalin      2016      Yes            225mg      Take 225           Un

bob



     (LYRICA)      2-28                               mg by           ity of



     225 mg      11:30:                               mouth 2           [place]



     capsule      04                                 (two)           Medical



                                                  times           Branch



                                                  daily.           

 

     memantine      2016      Yes            5mg       Take 5 mg           Uni

vers



     (NAMENDA) 5      2-28                               by mouth 2           it

y of



     mg tablet      11:30:                               (two)           [place]



               04                                 times           Medical



                                                  daily.           Branch

 

     metoprolol      2016      Yes            50mg      Take 50 mg           U

nivers



     tartrate      2-28                               by mouth 2           ity o

f



     (LOPRESSOR)      11:30:                               (two)           [place]



     50 mg      04                                 times           Medical



     tablet                                         daily.           Branch

 

     esomeprazol      2016      Yes            20mg      Take 20 mg           

[facility]



     e (NEXIUM)      2-28                               by mouth 2           ity

 of



     20 mg      11:30:                               (two)           [place]



     capsule      04                                 times           Medical



                                                  daily.           Branch

 

     LACTOBAC/BI      2016      Yes            1{tbl}      Take 1           Un

bob



     FIDOBAC/TYLER      2-28                               tablet by           ity

 of



     B NV CON      11:30:                               mouth 2           [place]



     (ULTRA      04                                 (two)           Medical



     PATRICK PLUS                                         times           Branch



     ORAL)                                         daily.           



                                                  Indication           



                                                  s:             



                                                  Ultimate           



                                                  Patrick           

 

     calcium      2016      Yes            2{tbl}      Take 2           Univer

s



     carb and      2-28                               tablets by           ity o

f



     citrate-vit      11:30:                               mouth           [place]



     D3        04                                 daily.           Medical



     (CITRACAL +                                                        Branch



     D SLOW                                                        



     RELEASE)                                                        



     600 mg                                                        



     calcium-                                                        



     500 unit                                                        



     TbSR                                                        

 

     DULoxetine      2016      Yes            60mg      Take 60 mg           U

nivers



     (CYMBALTA)      2-28                               by mouth           ity o

f



     60 mg      11:30:                               daily.           [place]



     capsule      04                                                Medical



                                                                 Branch

 

     indapamide      2016      Yes            2.5mg      Take 2.5           Un

bob



     (LOZOL) 2.5      2-28                               mg by           ity of



     mg tablet      11:30:                               mouth           [place]



               04                                 daily.           Medical



                                                                 Branch

 

     Vit C-Vit      2016      Yes            1{capsu      Take 1           Uni

vers



     E-Lutein-Mi      2-28                     le}       capsule by           it

y of



     n-OM-3      11:30:                               mouth           [place]



     (OCUVITE)      04                                 daily.           Medical



     150-30-5-15                                                        Branch



     0                                                           



     mg-unit-mg-                                                        



     mg Cap                                                        

 

     multivitami      2016      Yes            1{tbl}      Take 1           Un

bob



     n         2-28                               tablet by           ity of



     (ONE-A-DAY      11:30:                               mouth           [place]



     ESSENTIAL)      04                                 daily.           Medical



     tablet                                                        Branch

 

     FERROUS      2016      Yes            1{tbl}      Take 1           Univer

s



     FUMARATE/VI      2-28                               tablet by           ity

 of



     T BCOMP,C      11:30:                               mouth           [place]



     (SUPER B      04                                 daily.           Medical



     COMPLEX                                                        Branch



     ORAL)                                                        

 

     ascorbic      2016      Yes            500mg      Take 500           Univ

ers



     acid,      2-28                               mg by           ity of



     vitamin C,      11:30:                               mouth           [place]



     (VITAMIN C)      04                                 daily.           [facility]

l



     500 mg                                                        [facility]



     tablet                                                        

 

     Cholecalcif      2016      Yes            1{tbl}      Take 1           Un

bob



     edith,      2-28                               tablet by           ity of



     Vitamin D3,      11:30:                               mouth           [place]



     (VITAMIN      04                                 daily.           Medical



     D3) 5,000                                                        [facility]



     unit tablet                                                        

 

     QUEtiapine      2016      Yes            50mg      Take 50 mg           U

nivers



     (SEROQUEL)      2-28                               by mouth           ity o

f



     50 mg      11:30:                               daily.           [place]



     tablet      04                                                Medical



                                                                 Branch

 

     simvastatin      2016      Yes            40mg      Take 40 mg           

[facility]



     (ZOCOR) 40      2-28                               by mouth           ity o

f



     mg tablet      11:30:                               at             [place]



               04                                 bedtime.           Medical



                                                                 Branch

 

     topiramate      2016      Yes            200mg      Take 200           Un

bob



     (TOPAMAX)      2-28                               mg by           ity of



     100 mg      11:30:                               mouth           [place]



     tablet      04                                 every           Medical



                                                  evening.           Branch

 

     KCL       2016      Yes            10meq      Take 10           [facility]



     (KLOR-CON      2-28                               mEq by           ity of



     M10) 10 mEq      11:30:                               mouth 2           [facility]

[facility]



     tablet      04                                 (two)           Medical



                                                  times           [facility]



                                                  daily.           

 

     pregabalin      2016      Yes            225mg      Take 225           Un

bob



     (LYRICA)      2-28                               mg by           ity of



     225 mg      11:30:                               mouth 2           [place]



     capsule      04                                 (two)           Medical



                                                  times           [facility]



                                                  daily.           

 

     memantine      2016      Yes            5mg       Take 5 mg           Uni

vers



     (NAMENDA) 5      2-28                               by mouth 2           it

y of



     mg tablet      11:30:                               (two)           [place]



               04                                 times           Medical



                                                  daily.           Branch

 

     metoprolol      2016      Yes            50mg      Take 50 mg           U

nivers



     tartrate      2-28                               by mouth 2           ity o

f



     (LOPRESSOR)      11:30:                               (two)           [place]



     50 mg      04                                 times           Medical



     tablet                                         daily.           [facility]

 

     esomeprazol      2016      Yes            20mg      Take 20 mg           

[facility]



     e (NEXIUM)      2-28                               by mouth 2           ity

 of



     20 mg      11:30:                               (two)           [place]



     capsule      04                                 times           Medical



                                                  daily.           [facility]

 

     LACTOBAC/BI      2016      Yes            1{tbl}      Take 1           Un

bob



     FIDOBAC/TYLER      2-28                               tablet by           ity

 of



     B NV CON      11:30:                               mouth 2           [place]



     (ULTRA      04                                 (two)           Medical



     PATRICK PLUS                                         times           [facility]



     ORAL)                                         daily.           



                                                  Indication           



                                                  s:             



                                                  Ultimate           



                                                  [facility]           

 

     calcium      2016      Yes            2{tbl}      Take 2           Univer

s



     carb and      2-28                               tablets by           ity o

f



     citrate-vit      11:30:                               mouth           [place]



     D3        04                                 daily.           Medical



     (CITRACAL +                                                        [facility]



     D SLOW                                                        



     RELEASE)                                                        



     600 mg                                                        



     calcium-                                                        



     500 unit                                                        



     TbSR                                                        

 

     DULoxetine      2016      Yes            60mg      Take 60 mg           U

nivers



     (CYMBALTA)      2-28                               by mouth           ity o

f



     60 mg      11:30:                               daily.           [place]



     capsule      04                                                Medical



                                                                 Branch

 

     indapamide      2016      Yes            2.5mg      Take 2.5           Un

bbo



     (LOZOL) 2.5      2-28                               mg by           ity of



     mg tablet      11:30:                               mouth           [place]



               04                                 daily.           Medical



                                                                 [facility]

 

     Vit C-Vit      2016      Yes            1{capsu      Take 1           Uni

vers



     E-Lutein-Mi      2-28                     le}       capsule by           it

y of



     n-OM-3      11:30:                               mouth           [place]



     (OCUVITE)      04                                 daily.           Medical



     150-30-5-15                                                        [facility]



     0                                                           



     mg-unit-mg-                                                        



     mg Cap                                                        

 

     multivitami      2016      Yes            1{tbl}      Take 1           Un

bob



     n         2-28                               tablet by           ity of



     (ONE-A-DAY      11:30:                               mouth           [place]



     ESSENTIAL)      04                                 daily.           Medical



     tablet                                                        [facility]

 

     FERROUS      2016      Yes            1{tbl}      Take 1           Univer

s



     FUMARATE/VI      2-28                               tablet by           ity

 of



     T BCOMP,C      11:30:                               mouth           [place]



     (SUPER B      04                                 daily.           Medical



     COMPLEX                                                        [facility]



     ORAL)                                                        

 

     ascorbic      2016      Yes            500mg      Take 500           Univ

ers



     acid,      2-28                               mg by           ity of



     vitamin C,      11:30:                               mouth           [place]



     (VITAMIN C)      04                                 daily.           [facility]

l



     500 mg                                                        [facility]



     tablet                                                        

 

     Cholecalcif      2016      Yes            1{tbl}      Take 1           Un

bob



     edith,      2-28                               tablet by           ity of



     Vitamin D3,      11:30:                               mouth           [place]



     (VITAMIN      04                                 daily.           Medical



     D3) 5,000                                                        [facility]



     unit tablet                                                        

 

     QUEtiapine      2016      Yes            50mg      Take 50 mg           U

nivers



     (SEROQUEL)      2-28                               by mouth           ity o

f



     50 mg      11:30:                               daily.           [place]



     tablet      04                                                Medical



                                                                 [facility]

 

     simvastatin      2016      Yes            40mg      Take 40 mg           

[facility]



     (ZOCOR) 40      2-28                               by mouth           ity o

f



     mg tablet      11:30:                               at             [place]



               04                                 bedtime.           Medical



                                                                 Br[facility]

 

     topiramate      2016      Yes            200mg      Take 200           Un

bob



     (TOPAMAX)      2-28                               mg by           ity of



     100 mg      11:30:                               mouth           [place]



     tablet      04                                 every           Medical



                                                  evening.           [facility]

 

     KCL       2016      Yes            10meq      Take 10           [facility]



     (KLOR-CON      2-28                               mEq by           [facility] of



     M10) 10 mEq      11:30:                               mouth 2           [facility]

[facility]



     tablet      04                                 (two)           [facility]



                                                  times           [facility]



                                                  daily.           

 

     pregabalin      2016      Yes            225mg      Take 225           Un

bob



     (LYRICA)      2-28                               mg by           ity of



     225 mg      11:30:                               mouth 2           [place]



     capsule      04                                 (two)           Medical



                                                  times           [facility]



                                                  daily.           

 

     memantine      2016      Yes            5mg       Take 5 mg           Uni

vers



     (NAMENDA) 5      2-28                               by mouth 2           it

y of



     mg tablet      11:30:                               (two)           [place]



               04                                 times           Medical



                                                  daily.           [facility]

 

     metoprolol      2016      Yes            50mg      Take 50 mg           U

nivers



     tartrate      2-28                               by mouth 2           ity o

f



     (LOPRESSOR)      11:30:                               (two)           [place]



     50 mg      04                                 times           Medical



     tablet                                         daily.           [facility]

 

     esomeprazol      2016      Yes            20mg      Take 20 mg           

[facility]



     e (NEXIUM)      2-28                               by mouth 2           ity

 of



     20 mg      11:30:                               (two)           [place]



     capsule      04                                 times           Medical



                                                  daily.           [facility]

 

     LACTOBAC/BI      2016      Yes            1{tbl}      Take 1           Un

bob



     FIDOBAC/TYLER      2-28                               tablet by           ity

 of



     B NV CON      11:30:                               mouth 2           [place]



     (ULTRA      04                                 (two)           Medical



     PATRICK PLUS                                         times           [facility]



     ORAL)                                         daily.           



                                                  Indication           



                                                  s:             



                                                  Ultimate           



                                                  [facility]           

 

     calcium      2016      Yes            2{tbl}      Take 2           Univer

s



     carb and      2-28                               tablets by           ity o

f



     citrate-vit      11:30:                               mouth           [place]



     D3        04                                 daily.           Medical



     (CITRACAL +                                                        [facility]



     D SLOW                                                        



     RELEASE)                                                        



     600 mg                                                        



     calcium-                                                        



     500 unit                                                        



     TbSR                                                        

 

     DULoxetine      2016      Yes            60mg      Take 60 mg           U

nivers



     (CYMBALTA)      2-28                               by mouth           ity o

f



     60 mg      11:30:                               daily.           [place]



     capsule      04                                                Medical



                                                                 Br[facility]

 

     indapamide      2016      Yes            2.5mg      Take 2.5           Un

bob



     (LOZOL) 2.5      2-28                               mg by           ity of



     mg tablet      11:30:                               mouth           [place]



               04                                 daily.           Medical



                                                                 [facility]

 

     Vit C-Vit      2016      Yes            1{capsu      Take 1           Uni

vers



     E-Lutein-Mi      2-28                     le}       capsule by           it

y of



     n-OM-3      11:30:                               mouth           [place]



     (OCUVITE)      04                                 daily.           Medical



     150-30-5-15                                                        [facility]



     0                                                           



     mg-unit-mg-                                                        



     mg Cap                                                        

 

     multivitami      2016      Yes            1{tbl}      Take 1           Un

bob



     n         2-28                               tablet by           ity of



     (ONE-A-DAY      11:30:                               mouth           [place]



     ESSENTIAL)      04                                 daily.           Medical



     tablet                                                        [facility]

 

     FERROUS      2016      Yes            1{tbl}      Take 1           Univer

s



     FUMARATE/VI      2-28                               tablet by           ity

 of



     T BCOMP,C      11:30:                               mouth           [place]



     (SUPER B      04                                 daily.           Medical



     COMPLEX                                                        [facility]



     ORAL)                                                        

 

     ascorbic      2016      Yes            500mg      Take 500           Univ

ers



     acid,      2-28                               mg by           ity of



     vitamin C,      11:30:                               mouth           [place]



     (VITAMIN C)      04                                 daily.           [facility]

l



     500 mg                                                        [facility]



     tablet                                                        

 

     Cholecalcif      2016      Yes            1{tbl}      Take 1           Un

bob



     edith,      2-28                               tablet by           ity of



     Vitamin D3,      11:30:                               mouth           [place]



     (VITAMIN      04                                 daily.           Medical



     D3) 5,000                                                        [facility]



     unit tablet                                                        

 

     QUEtiapine      2016      Yes            50mg      Take 50 mg           U

nivers



     (SEROQUEL)      2-28                               by mouth           ity o

f



     50 mg      11:30:                               daily.           [place]



     tablet      04                                                Medical



                                                                 [facility]

 

     simvastatin      2016      Yes            40mg      Take 40 mg           

[facility]



     (ZOCOR) 40      2-28                               by mouth           ity o

f



     mg tablet      11:30:                               at             [place]



               04                                 bedtime.           Medical



                                                                 Branch

 

     topiramate      2016      Yes            200mg      Take 200           Un

bob



     (TOPAMAX)      2-28                               mg by           ity of



     100 mg      11:30:                               mouth           [place]



     tablet      04                                 every           Medical



                                                  evening.           [facility]anch

 

     KCL       2016      Yes            10meq      Take 10           [facility]



     (KLOR-CON      2-28                               mEq by           ity of



     M10) 10 mEq      11:30:                               mouth 2           [facility]

[facility]



     tablet      04                                 (two)           Medical



                                                  times           [facility]



                                                  daily.           

 

     pregabalin      2016      Yes            225mg      Take 225           Un

bob



     (LYRICA)      2-28                               mg by           ity of



     225 mg      11:30:                               mouth 2           [place]



     capsule      04                                 (two)           Medical



                                                  times           [facility]



                                                  daily.           

 

     memantine      2016      Yes            5mg       Take 5 mg           Uni

vers



     (NAMENDA) 5      2-28                               by mouth 2           it

y of



     mg tablet      11:30:                               (two)           [place]



               04                                 times           Medical



                                                  daily.           [facility]

 

     metoprolol      2016      Yes            50mg      Take 50 mg           U

nivers



     tartrate      2-28                               by mouth 2           ity o

f



     (LOPRESSOR)      11:30:                               (two)           [place]



     50 mg      04                                 times           Medical



     tablet                                         daily.           [facility]

 

     esomeprazol      2016      Yes            20mg      Take 20 mg           

[facility]



     e (NEXIUM)      2-28                               by mouth 2           ity

 of



     20 mg      11:30:                               (two)           [place]



     capsule      04                                 times           Medical



                                                  daily.           [facility]

 

     LACTOBAC/BI      2016      Yes            1{tbl}      Take 1           Un

bob



     FIDOBAC/TYLER      2-28                               tablet by           ity

 of



     B NV CON      11:30:                               mouth 2           [place]



     (ULTRA      04                                 (two)           Medical



     PATRICK PLUS                                         times           [facility]



     ORAL)                                         daily.           



                                                  Indication           



                                                  s:             



                                                  Ultimate           



                                                  [facility]           

 

     calcium      2016      Yes            2{tbl}      Take 2           Univer

s



     carb and      2-28                               tablets by           ity o

f



     citrate-vit      11:30:                               mouth           [place]



     D3        04                                 daily.           Medical



     (CITRACAL +                                                        [facility]



     D SLOW                                                        



     RELEASE)                                                        



     600 mg                                                        



     calcium-                                                        



     500 unit                                                        



     TbSR                                                        

 

     DULoxetine      2016      Yes            60mg      Take 60 mg           U

nivers



     (CYMBALTA)      2-28                               by mouth           ity o

f



     60 mg      11:30:                               daily.           [place]



     capsule      04                                                Medical



                                                                 Br[facility]

 

     indapamide      2016      Yes            2.5mg      Take 2.5           Un

bob



     (LOZOL) 2.5      2-28                               mg by           ity of



     mg tablet      11:30:                               mouth           [place]



               04                                 daily.           Medical



                                                                 Br[facility]

 

     Vit C-Vit      2016      Yes            1{capsu      Take 1           Uni

vers



     E-Lutein-Mi      2-28                     le}       capsule by           it

y of



     n-OM-3      11:30:                               mouth           [place]



     (OCUVITE)      04                                 daily.           Medical



     150-30-5-15                                                        [facility]



     0                                                           



     mg-unit-mg-                                                        



     mg Cap                                                        

 

     multivitami      2016      Yes            1{tbl}      Take 1           Un

bob



     n         2-28                               tablet by           ity of



     (ONE-A-DAY      11:30:                               mouth           [place]



     ESSENTIAL)      04                                 daily.           Medical



     tablet                                                        Br[facility]

 

     FERROUS      2016      Yes            1{tbl}      Take 1           Univer

s



     FUMARATE/VI      2-28                               tablet by           ity

 of



     T BCOMP,C      11:30:                               mouth           [place]



     (SUPER B      04                                 daily.           Medical



     COMPLEX                                                        Branch



     ORAL)                                                        

 

     ascorbic      2016      Yes            500mg      Take 500           Univ

ers



     acid,      2-28                               mg by           ity of



     vitamin C,      11:30:                               mouth           [place]



     (VITAMIN C)      04                                 daily.           Medica

l



     500 mg                                                        Branch



     tablet                                                        

 

     Cholecalcif      2016      Yes            1{tbl}      Take 1           Un

bob



     edith,      2-28                               tablet by           ity of



     Vitamin D3,      11:30:                               mouth           [place]



     (VITAMIN      04                                 daily.           Medical



     D3) 5,000                                                        Branch



     unit tablet                                                        

 

     QUEtiapine      2016      Yes            50mg      Take 50 mg           U

nivers



     (SEROQUEL)      2-28                               by mouth           ity o

f



     50 mg      11:30:                               daily.           [place]



     tablet      04                                                Medical



                                                                 Branch

 

     simvastatin      2016      Yes            40mg      Take 40 mg           

Univers



     (ZOCOR) 40      2-28                               by mouth           ity o

f



     mg tablet      11:30:                               at             [place]



               04                                 bedtime.           Medical



                                                                 Branch

 

     topiramate      2016      Yes            200mg      Take 200           Un

bob



     (TOPAMAX)      2-28                               mg by           ity of



     100 mg      11:30:                               mouth           [place]



     tablet      04                                 every           Medical



                                                  evening.           Branch

 

     KCL       2016      Yes            10meq      Take 10           Univers



     (KLOR-CON      2-28                               mEq by           ity of



     M10) 10 mEq      11:30:                               mouth 2           [place]

as



     tablet      04                                 (two)           Medical



                                                  times           [place]



                                                  daily.           

 

     pregabalin      2016      Yes            225mg      Take 225           Un

bob



     (LYRICA)      2-28                               mg by           ity of



     225 mg      11:30:                               mouth 2           [place]



     capsule      04                                 (two)           Medical



                                                  times           [place]



                                                  daily.           

 

     memantine      2016      Yes            5mg       Take 5 mg           Uni

vers



     (NAMENDA) 5      2-28                               by mouth 2           it

y of



     mg tablet      11:30:                               (two)           [place]



               04                                 times           Medical



                                                  daily.           Branch

 

     metoprolol      2016      Yes            50mg      Take 50 mg           U

nivers



     tartrate      2-28                               by mouth 2           ity o

f



     (LOPRESSOR)      11:30:                               (two)           [place]



     50 mg      04                                 times           Medical



     tablet                                         daily.           Branch

 

     esomeprazol      2016      Yes            20mg      Take 20 mg           

Univers



     e (NEXIUM)      2-28                               by mouth 2           ity

 of



     20 mg      11:30:                               (two)           [place]



     capsule      04                                 times           Medical



                                                  daily.           Branch

 

     LACTOBAC/BI      2016      Yes            1{tbl}      Take 1           Un

bob



     FIDOBAC/TYLER      2-28                               tablet by           ity

 of



     B NV CON      11:30:                               mouth 2           [place]



     (ULTRA      04                                 (two)           Medical



     PATRICK PLUS                                         times           Branch



     ORAL)                                         daily.           



                                                  Indication           



                                                  s:             



                                                  Ultimate           



                                                  Patrick           







Immunizations







           Ordered    Filled Immunization Date       Status     Comments   [facility]

e



           Immunization Name Name                                        

 

           SARS-COV-2 COVID-19            2021 Completed             Unive

rsity of



           PFIZER VACCINE            00:00:00                         [facility]

 

           SARS-COV-2 COVID-19            2021 Completed             Unive

rsity of



           PFIZER VACCINE            00:00:00                         [facility]

 

           SARS-COV-2 COVID-19            2021 Completed             Unive

rsity of



           PFIZER VACCINE            00:00:00                         [facility]

 

           SARS-COV-2 COVID-19            2021 Completed             Unive

rsity of



           PFIZER VACCINE            00:00:00                         [facility]

 

           SARS-COV-2 COVID-19            2021 Completed             Unive

rsity of



           PFIZER VACCINE            00:00:00                         [facility]

 

           SARS-COV-2 COVID-19            2021 Completed             Unive

rsity of



           PFIZER VACCINE            00:00:00                         [facility]

 

           SARS-COV-2 COVID-19            2021 Completed             Unive

rsity of



           PFIZER VACCINE            00:00:00                         [facility]

 

           SARS-COV-2 COVID-19            2021 Completed             Unive

rsity of



           PFIZER VACCINE            00:00:00                         [facility]

 

           SARS-COV-2 COVID-19            2021 Completed             Unive

rsity of



           PFIZER VACCINE            00:00:00                         [facility]

 

           SARS-COV-2 COVID-19            2021 Completed             Unive

rsity of



           PFIZER VACCINE            00:00:00                         [place] Med[facility]

bella



                                                                  Branch







Vital Signs







             Vital Name   Observation Time Observation Value Comments     Source

 

             WEIGHT       2021 06:47:00 97.478 kg                 

 

             WEIGHT       2021 23:00:00 98.839 kg                 

 

             HEIGHT       2021 22:25:00 160 cm                    

 

             WEIGHT       2021 22:25:00 100.562 kg                

 

             Systolic blood 2022 19:34:00 115 mm[Hg]                Univer

sity of



             pressure                                            [facility]

 

             Diastolic blood 2022 19:34:00 59 mm[Hg]                 Unive

rsity of



             pressure                                            [facility]

 

             Heart rate   2022 19:34:00 56 /min                   [facility]

 

             Body height  2022 19:34:00 160 cm                    [facility]

 

             Body weight  2022 19:34:00 102.513 kg                [facility]

 

             BMI          2022 19:34:00 40.03 kg/m2               [facility]

 

             WEIGHT       2021 06:47:00 97.478 kg                 

 

             WEIGHT       2021 23:00:00 98.839 kg                 

 

             HEIGHT       2021 22:25:00 160 cm                    

 

             WEIGHT       2021 22:25:00 100.562 kg                

 

             Heart rate   2021 08:01:00 56 /min                   [facility]

 

             Respiratory rate 2021 08:01:00 16 /min                   [facility]

 

             Oxygen saturation in 2021 08:01:00 93 /min                   

[facility]



             Arterial blood by                                        Medical Ce

nter



             Pulse oximetry                                        

 

             Systolic blood 2021 07:00:00 126 mm[Hg]                [facility]

 

             Diastolic blood 2021 07:00:00 60 mm[Hg]                 [facility]

 

             Body temperature 2021 07:00:00 36.61 Brittany                 [facility]

 

             Body weight  2021 06:47:00 97.478 kg                 [facility]

 

             BMI          2021 06:47:00 38.07 kg/m2               [facility]

 

             Body height  2021 22:25:00 160 cm                    [facility]







Procedures







                Procedure       Date / Time     Performing Clinician Source



                                Performed                       

 

                EXTERNAL PROVIDER RECORDS 2022 05:01:00 Doctor Unassigned,

 [facility]



                                                [place]         [facility]

 

                HOME HEALTH - OTHER 2022 06:01:00 Doctor Unassigned, [facility]

[facility] [place]



                                                [place]         Medical [place]

 

                HIGH SENSITIVITY TROPONIN 2021 16:40:00 Tara Kay [facility]                               Reshad          [place]

 

                ECG 12-LEAD     2021 15:59:02 Unknown, Hl7 Doctor [facility]

 

                ECG 12-LEAD     2021 15:59:02 Unknown, Hl7 Doctor [facility]

 

                XR CHEST 1 VIEW PORTABLE / 2021 07:54:00 REA Abarca [facility]



                BEDSIDE                         D.              Center

 

                CBC W/PLT COUNT & AUTO 2021 04:39:00 Susanna Landa [facility]



                DIFFERENTIAL                    Preeya          [place]

 

                BASIC METABOLIC PANEL (7) 2021 04:39:00 Susanna Landa [facility]



                                                Preeya          Center

 

                CBC W/PLT COUNT & AUTO 2021 04:39:00 Susanna Landa [facility]



                DIFFERENTIAL                    Preeya          Center

 

                MAGNESIUM       2021 04:39:00 Susanna Landa [facility]



                                                Preeya          [place]

 

                B-TYPE NATRIURETIC FACTOR 2021 03:34:00 Ivanna Abarca [facility]



                (BNP)                           [facility]

 

                XR CHEST 1 VIEW PORTABLE / 2021 13:03:00 Tara Kay

[facility]



                BEDSIDE                         [facility]

 

                BASIC METABOLIC PANEL (7) 2021 11:41:00 Tara Kay [facility]

 

                MAGNESIUM       2021 11:41:00 RahulTara [facility]

 

                B-TYPE NATRIURETIC FACTOR 2021 11:41:00 [facility]



                (BNP)                           [facility]

 

                ECG 12-LEAD     2021 10:53:46 Cornelius Garzon   [facility]

 

                TRANSESOPHAGEAL ECHO 2021 09:39:19 [facility]

 

                [facility] W[facility]PLT COUNT & AUTO 2021 05:17[facility]00 [facility]

 

                BASIC METABOLIC PANEL (7) 2021 05:17:00 Rachel [facility]

 

                HEPATIC FUNCTION PANEL 2021 [facility]:00 [facility]

 

                CBC W/PLT COUNT & AUTO 2021 05:17:00 [facility]

 

                CARDIOVERSION   2021 00:31:19 Hannah [facility]

 

                HIGH SENSITIVITY TROPONIN 2021 18:35:00 Rachel, [facility]

 

                2D ECHO W/ DOPPLER 2021 14:07:13 Sammy Armstrong [facility]



                (CW/PW/COLOR)                   [facility]

 

                CT CHEST FOR PULMONARY 2021 [facility]:36[facility]



                EMBOLUS                         [facility]

 

                XR CHEST 1 VIEW PORTABLE / 2021 04:08:00 Say Ramos

[facility]

 

                SARS-COV2/RT-PCR ([facility] & [facility]



                REF LAB[facility])                       [facility]

 

                CBC W/PLT COUNT & AUTO 2021 03:41:00 [facility]

 

                BASIC METABOLIC PANEL (7) 2021 03:41:00 Rachel, [facility]

 

                HEPATIC FUNCTION PANEL 2021 03:41:00 [facility]

 

                CBC W/PLT COUNT & AUTO 2021 03:41:00 [facility]

 

                HIGH SENSITIVITY TROPONIN 2021 03:41:00 Rachel [facility]

 

                B-TYPE NATRIURETIC FACTOR 2021 03:41:00 Rachel [facility]



                (BNP)                           [facility]

 

                CBC W/PLT COUNT & AUTO 2021 23:21:00 [facility]

 

                BASIC METABOLIC PANEL (7) 2021 23:21:00 Rachel, [facility]

 

                HEPATIC FUNCTION PANEL 2021 23:21:00 [facility]

 

                MAGNESIUM       2021 23:21:00 Rachel [facility]

 

                PHOSPHORUS      2021 23:21:00 Rachel, [facility]

 

                [facility]IN[facility]

 

                CBC W/PLT COUNT & AUTO 2021 23:21:00 [facility]

 

                TSH/FREE T4 IF INDICATED 2021 [facility]:21:00 [facility]

 

                HIGH SENSITIVITY TROPONIN 2021 23:21:00 RachelSay thakkar [facility]

[facility]

 

                D-DIMER         2021 23:21:00 RachelSay thakkar   [facility]

 

                VITAMIN B12 AND FOLATE 2021 23:21:00 Say Ramos   [facility]

t [facility]

 

                ECG 12-LEAD     2021 23:12:15 RachelSay morrow   [facility]

 

                ECG 12-LEAD     2021 23:07:40 Unknown, Hl7 Doctor [facility]

 

                REPORT OF PROCEDURE - 2021 00:00:00 Provider, Luc [facility]



                ENDOSCOPY SCAN                  Scanning        Center







Plan of Care







             Planned Activity Planned Date Details      Comments     Source

 

             Future Scheduled 2021   INFLUENZA VACCINE (#1)              C

[facility]



             Test         00:00:00     [code = INFLUENZA              Medical Ce

nter



                                       VACCINE (#1)]              

 

             Future Scheduled 2021   DEPRESSION SCREENING              [facility]



             Test         00:00:00     (12+) [code =              Medical Center



                                       DEPRESSION SCREENING              



                                       (12+)]                    

 

             Future Scheduled 2021   FALLS RISK SCREENING              [facility]



             Test         00:00:00     [code = FALLS RISK              Medical C

enter



                                       SCREENING]                

 

             Future Scheduled 2021   Medicare IPPE (WELCOME              C

[facility]



             Test         00:00:00     TO MEDICARE) [code =              Medical

 Center



                                       Medicare IPPE (WELCOME              



                                       TO MEDICARE)]              

 

             Future Scheduled 2006-10-07   PNEUMOCOCCAL 65+ YRS              [facility]



             Test         00:00:00     (1 of 1 -                 Medical Center



                                       [id number])              



                                       [code = PNEUMOCOCCAL              



                                       65+ YRS (1 of 1 -              



                                       [id number])]              

 

             Future Scheduled 1991-10-07   SHINGLES VACCINES (1              [facility]



             Test         00:00:00     of 2) [code = SHINGLES              Medic

al Center



                                       VACCINES (1 of 2)]              

 

             Future Scheduled 1960-10-07   DTAP/TDAP/TD VACCINES              [facility]

[facility]



             Test         00:00:00     (1 - Tdap) [code =              Medical C

enter



                                       DTAP/TDAP/TD VACCINES              



                                       (1 - Tdap)]               

 

             Future Scheduled 1959-10-07   HEPATITIS C SCREENING              [facility]

[facility]



             Test         00:00:00     [code = HEPATITIS C              Medical 

Center



                                       SCREENING]                

 

             Future Scheduled              [facility]atitis C screening              [facility]



             Test                      (procedure) [code =              



                                       682563248]                

 

             Future Scheduled              SHINGLES VACCINES (#1)              [facility]



             Test                      [code = SHINGLES              



                                       VACCINES (#1)]              

 

             Future Scheduled              65+ PNEUMOCOCCAL              Methodi

st Hospital



             Test                      VACCINE (1 of 1 -              



                                       PPSV23) [code = 65+              



                                       PNEUMOCOCCAL VACCINE              



                                       (1 of 1 - PPSV23)]              

 

             Future Scheduled              INFLUENZA VACCINE              Method

ist Hospital



             Test                      [code = INFLUENZA              



                                       VACCINE]                  







Encounters







        Start   End     Encounter Admission Attending Care    Care    Encounter 

Source



        Date/Time Date/Time Type    Type    Clinicians Facility Department ID   

   

 

        2022         [facility]  [id number]



        10:58:02                                                    [facility]

 

        2022         Outpatient         [facility]  [id number] [facility]



        08:43:01                                                    [facility]

 

        20[facility]  [id number]

 [facility]



        16:25:02                                                    [facility]

 

        2022         [facility]                 [facility]  [id number]

 [facility]



        14:23:02                                                    [facility]

 

        2022         Outpatient 3       [id number]  ENC[facility]   [facility]     

 E[facility]



        13:02:17                                                 [id number]    

 

        2022         Outpatient 3       [id number]  ENC[facility]   REF     [id number]

 E[facility]



        12:59:17                                                 [id number]    

 

        2022         [facility]                 [facility]  [id number]

 Common



        13:11:24                                                 [id number]   [facility]

 

        2021         Inpatient ER      [facility]    Cardiology [id number]

[id number] [facility]



        22:07:00                         MANOLO                         

 

        2022-08-10 2022-08-10 ambulatory                 [facility]  [facility]  [id number]

 Common



        00:00:00 00:00:00                                                 [facility]

 

        2022 ambulatory                 [facility]  [facility]  [id number]

 Common



        00:00:00 00:00:00                                                 [facility]

 

        2022 ambulatory                 [facility]  [facility]  [id number]

 Common



        00:00:00 00:00:00                                                 [facility]



                                                                        - [facility]

 

        2022 Orders          Doctor  ABHIJEET    [id number]

[id number] Univers



        00:00:00 00:00:00 Only            Unassigned, CHARIS   [id number]       

  ity of



                                        [place] [facility] [id number]         [place]

as



                                                        [phone]         [address]

 

        2022 Telephone         [facility]    [id number]

[id number] Univers



        00:00:00 00:00:00                 [facility]  [id number]         it

y of



                                                [facility] [id number]         [place]

as



                                                MARYBETH?BLEA [phone]         Me

dical



                                                [address]



                                                MEDICAL                 



                                                OFFICE                  



                                                BUILDING                 

 

        2022 Office          [facility]    [id number]

[id number] Univers



        14:30:00 15:15:52 Visit           [facility]  [id number]         it

y of



                                                [facility] [id number]         [place]

as



                                                MARYBETH?BLEA [phone]         Me

dical



                                                [address]



                                                MEDICAL                 



                                                OFFICE                  



                                                BUILDING                 

 

        2022 Orders          Doctor  ABHIJEET    [id number]

[id number] [employer]



        00:00:00 00:00:00 Only            Unassigned, CHARIS   [id number]       

  ity of



                                        [facility] [id number]         [place]

as



                                                        [phone]         [address]

 

        2021 [facility] Manolo Feldman [facility] 

  [id number] 

[id number]                              [facility]



        22:07:00 12:43:00 Encounter         Say Ramos, Arias MORROW                       

  [facility]

 

        2021 Orders                  [facility]   [id number]

446 [facility]



        00:00:00 00:00:00 Only                                            [facility]

 

        2021 Outpatient                 BCM     [facility]     [id number]

9 [facility]



        00:00:00 23:59:00                                                 [facility]

 

        2021 Travel                  [facility]   [id number]

 [facility]



        00:00:00 00:00:00                                                 [facility]

 

        2021-05-10 2021-05-10 Hospital         [place], 1.2.840.1 [id number]

[id number] [facility]



        08:36:31 23:59:00 Encounter         [facility]    50758.1.1         [address]



                                                3.430.2.7                 Hospit

a



                                                .3.848044                 l



                                                .8                      

 

        2021 Office          Abbe, 1.2.840.1 [id number]

5389 [facility]



        13:03:28 13:29:49 Visit           [facility]    92076.1.1         [address]



                                                3.430.2.7                 Hospit

a



                                                .3.249880                 l



                                                .8                      

 

        2021 Travel                  1.2.840.1 [id number] [id number]

[id number] [facility]i



        00:00:00 00:00:00                         86880.1.1 350.1.13.43 383     

st



                                                3.430.2.7 0.2.7.3.698         Ho

spita



                                                .3.243447 084.8           l



                                                .8                      







Results







           Test Description Test Time  Test Comments Results    Result Comments 

Source









                    High Sensitivity Troponin I ([facility] Only) 2021 1

7:20:00 









                      Test Item  Value      Reference Range Interpretation Comme

nts









             Troponin I HS (test code 6 pg/ml      See_Comment                [A

utomated message] The



             = 52766-6)                                          system which ge

nerated



                                                                 this result tra

nsmitted



                                                                 reference range

: <=17.



                                                                 The reference r

nurys was



                                                                 not used to int

erpret



                                                                 this result as



                                                                 normal/abnormal

.

 

             MAGGI (test code = MAGGI)  ID - DBThe                          

 



                           STAT High                           



                          Sensitivity Troponin-I                           



                          results should be used                           



                          in conjunction with                           



                          other diagnostic                           



                          information such as                           



                          ECG, clinical                           



                          observations and                           



                          information, and                           



                          patient symptoms to                           



                          aid in the diagnosis                           



                          of MI.                                 

 

             Lab Interpretation (test Normal                                 



             code = 68001-3)                                        



[facility] -[id number] 17:20:00





             Test Item    Value        Reference Range Interpretation Comments

 

             HIGH SENSITIVITY 6 pg/ml      See_Comment                [Automated

 message]



             TROPONIN I (test code =                                        The 

system which



             9291499)                                            generated this 

result



                                                                 transmitted ref

erence



                                                                 range: <=17. Th

e



                                                                 reference range

 was not



                                                                 used to interpr

et this



                                                                 result as



                                                                 normal/abnormal

.



 ID - DBThe  STAT High Sensitivity Troponin-I results should be
used in conjunctionwith other diagnostic information such as ECG, clinical 
observations and information, and patient symptoms to aid in the diagnosis of 
MI.RAD, CHEST, 1 VIEW, NON [id number] 10:23:00Reason for exam:-&gt;pulm 
edemaShould this be performed at the bedside?-&gt;Yes
************************************************************[facility] 1940 Sex: 
F************************************************************FINAL REPORT 
PATIENT ID: [id number] HISTORY: Pulmonary edema COMPARISON: 2021 FINDINGS: 
Mild interstitial pulmonary edema. No pleural effusions or pneumothorax. The 
heart shadow is borderline enlarged. The thoracic aorta is mildly tortuous. 
There are postoperative changes in the cervical spine. A right shoulder 
arthroplasty is partially imaged. Signed: Nikolas Ricketts [facility] Verified 
Date/Time: 2021 10:23:36 Reading Location: [address] Transitional 
Reading Room Electronically signed by: NIKOLAS RICKETTS MD on 2021 10:23 AM
Basic Metabolic [id number] 05:46:00





             Test Item    Value        Reference Range Interpretation Comments

 

             Sodium (test code = 140 meq/L    136-145                   



             2951-2)                                             

 

             Potassium (test code = 3.4 meq/L    3.5-5.1      L            



             2823-3)                                             

 

             Chloride (test code = 108 meq/L           H            



             -0)                                             

 

             CO2 (test code = 25 meq/L     -29                     



             2028-9)                                             

 

             BUN (test code = 11 mg/dL     7-21                      



             3094-0)                                             

 

             Creatinine (test code 1.12 mg/dL   0.57-1.25                 



             = 2160-0)                                           

 

             Glucose (test code = 113 mg/dL           H            



             2345-7)                                             

 

             Calcium (test code = 8.9 mg/dL    8.4-10.2                  



             65221-9)                                            

 

             EGFR (test code = 47           mL/min/1.73 sq m              ESTIMA

MANI GFR IS



             33914-3)                                            NOT AS ACCURATE



                                                                 AS CREATININE



                                                                 CLEARANCE IN



                                                                 PREDICTING



                                                                 GLOMERULAR



                                                                 FILTRATION RATE

.



                                                                 ESTIMATED GFR I

S



                                                                 NOT APPLICABLE



                                                                 FOR DIALYSIS



                                                                 PATIENTS.

 

             MAGGI (test code = MAGGI)  ID -                           



                          NICOLAS M                                 

 

             Lab Interpretation Abnormal                               



             (test code = 42588-2)                                        



[facility]





             Test Item    Value        Reference Range Interpretation Comments

 

             Magnesium (test code = 1.7 mg/dL    1.6-2.6                   



             64637-6)                                            

 

             MAGGI (test code = MAGGI)  ID - NICOLAS                           



                          M                                      

 

             Lab Interpretation (test Normal                                 



             code = 95733-7)                                        



[facility] [id number] 05:46:00





             Test Item    Value        Reference Range Interpretation Comments

 

             SODIUM (BEAKER) 140 meq/L    136-145                   



             (test code = 381)                                        

 

             POTASSIUM (BEAKER) 3.4 meq/L    3.5-5.1      L            



             (test code = 379)                                        

 

             CHLORIDE (BEAKER) 108 meq/L           H            



             (test code = 382)                                        

 

             CO2 (BEAKER) (test 25 meq/L     22-29                     



             code = 355)                                         

 

             BLOOD UREA NITROGEN 11 mg/dL     7-21                      



             (BEAKER) (test code                                        



             = 354)                                              

 

             CREATININE (BEAKER) 1.12 mg/dL   0.57-1.25                 



             (test code = 358)                                        

 

             GLUCOSE RANDOM 113 mg/dL           H            



             (BEAKER) (test code                                        



             = 652)                                              

 

             CALCIUM (BEAKER) 8.9 mg/dL    8.4-10.2                  



             (test code = 697)                                        

 

             EGFR (BEAKER) (test 47 mL/min/1.73                           ESTIMA

MANI GFR IS



             code = 1092) sq m                                   NOT AS ACCURATE

 AS



                                                                 CREATININE



                                                                 CLEARANCE IN



                                                                 PREDICTING



                                                                 GLOMERULAR



                                                                 FILTRATION RATE

.



                                                                 ESTIMATED GFR I

S



                                                                 NOT APPLICABLE 

FOR



                                                                 DIALYSIS PATIEN

TS.



 ID - NICOLAS [id number] 05:46:00





             Test Item    Value        Reference Range Interpretation Comments

 

             MAGNESIUM (BEAKER) (test code = 1.7 mg/dL    1.6-2.6               

    



             627)                                                



 ID - NICOLAS [facility]BC with platelet count + automated [id number] 05:04:00





             Test Item    Value        Reference Range Interpretation Comments

 

             WBC (test code = 6690-2) 5.7          See_Comment                [A

utomated message]



                                                                 The system [employer]



                                                                 generated this 

result



                                                                 transmitted ref

erence



                                                                 range: 3.5 - 10

.5



                                                                 K/L. The refe

rence



                                                                 range was not u

sed to



                                                                 interpret this 

result



                                                                 as normal/abnor

mal.

 

             RBC (test code = 789-8) 3.85         See_Comment  L             [Au

tomated message]



                                                                 The system [employer]



                                                                 generated this 

result



                                                                 transmitted ref

erence



                                                                 range: 3.93 - 5

.22



                                                                 M/L. The refe

rence



                                                                 range was not u

sed to



                                                                 interpret this 

result



                                                                 as normal/abnor

mal.

 

             MCHC (test code = 786-4) 30.9         See_Comment  L             [A

utomated message]



                                                                 The system [employer]



                                                                 generated this 

result



                                                                 transmitted ref

erence



                                                                 range: 32.2 - 3

5.5



                                                                 GM/DL. The refe

rence



                                                                 range was not u

sed to



                                                                 interpret this 

result



                                                                 as normal/abnor

mal.

 

             Hematocrit (test code = 39.1 %       34.1-44.9                 



             4544-3)                                             

 

             MCV (test code = 787-2) 101.6 fL     79.4-94.8    H            

 

             MCH (test code = 785-6) 31.4 pg      25.6-32.2                 

 

             RDW (test code = 788-0) 13.8 %       11.7-14.4                 

 

             Platelets (test code = 204          See_Comment                [Aut

omated message]



             777-3)                                              The system [employer]



                                                                 generated this 

result



                                                                 transmitted ref

erence



                                                                 range: 150 - 45

0 K/CU



                                                                 MM. The referen

ce



                                                                 range was not u

sed to



                                                                 interpret this 

result



                                                                 as normal/abnor

mal.

 

             MPV (test code = 9.5 fL       9.4-12.3                  



             53494-1)                                            

 

             nRBC (test code = 413) 0            See_Comment                [Aut

omated message]



                                                                 The system [employer]



                                                                 generated this 

result



                                                                 transmitted ref

erence



                                                                 range: 0 - 0 /1

00



                                                                 WBC. The refere

nce



                                                                 range was not u

sed to



                                                                 interpret this 

result



                                                                 as normal/abnor

mal.

 

             % Neutros (test code = 52 %                                   



             429)                                                

 

             % Lymphs (test code = 32 %                                   



             430)                                                

 

             % Monos (test code = 11 %                                   



             431)                                                

 

             % Eos (test code = 432) 5 %                                    

 

             % Baso (test code = 437) 1 %                                    

 

             # Neutros (test code = 2.96         See_Comment                [Aut

omated message]



             670)                                                The system [employer]



                                                                 generated this 

result



                                                                 transmitted ref

erence



                                                                 range: 1.56 - 6

.13



                                                                 K/L. The refe

rence



                                                                 range was not u

sed to



                                                                 interpret this 

result



                                                                 as normal/abnor

mal.

 

             # Lymphs (test code = 1.83         See_Comment                [Auto

mated message]



             414)                                                The system [employer]



                                                                 generated this 

result



                                                                 transmitted ref

erence



                                                                 range: 1.18 - 3

.74



                                                                 K/L. The refe

rence



                                                                 range was not u

sed to



                                                                 interpret this 

result



                                                                 as normal/abnor

mal.

 

             # Monos (test code = 0.60         See_Comment  H             [Autom

ated message]



             415)                                                The system [employer]



                                                                 generated this 

result



                                                                 transmitted ref

erence



                                                                 range: 0.24 - 0

.36



                                                                 K/L. The refe

rence



                                                                 range was not u

sed to



                                                                 interpret this 

result



                                                                 as normal/abnor

mal.

 

             # Eos (test code = 416) 0.26         See_Comment                [Au

tomated message]



                                                                 The system [employer]



                                                                 generated this 

result



                                                                 transmitted ref

erence



                                                                 range: 0.04 - 0

.36



                                                                 K/L. The refe

rence



                                                                 range was not u

sed to



                                                                 interpret this 

result



                                                                 as normal/abnor

mal.

 

             # Baso (test code = 417) 0.05         See_Comment                [A

utomated message]



                                                                 The system [employer]



                                                                 generated this 

result



                                                                 transmitted ref

erence



                                                                 range: 0.01 - 0

.08



                                                                 K/L. The refe

rence



                                                                 range was not u

sed to



                                                                 interpret this 

result



                                                                 as normal/abnor

mal.

 

             Immature     1 %          0-1                       



             Granulocytes-Relative                                        



             (test code = 2801)                                        

 

             Lab Interpretation (test Abnormal                               



             code = 17351-7)                                        



[facility] W/PLT COUNT &amp; AUTO [id number] 
05:04:00





             Test Item    Value        Reference Range Interpretation Comments

 

             WHITE BLOOD CELL COUNT (BEAKER) 5.7 K/ L     3.5-10.5              

    



             (test code = 775)                                        

 

             RED BLOOD CELL COUNT (BEAKER) 3.85 M/ L    3.93-5.22    L          

  



             (test code = 761)                                        

 

             HEMOGLOBIN (BEAKER) (test code = 12.1 GM/DL   11.2-15.7            

     



             410)                                                

 

             HEMATOCRIT (BEAKER) (test code = 39.1 %       34.1-44.9            

     



             411)                                                

 

             MEAN CORPUSCULAR VOLUME (BEAKER) 101.6 fL     79.4-94.8    H       

     



             (test code = 753)                                        

 

             MEAN CORPUSCULAR HEMOGLOBIN 31.4 pg      25.6-32.2                 



             (BEAKER) (test code = 751)                                        

 

             MEAN CORPUSCULAR HEMOGLOBIN CONC 30.9 GM/DL   32.2-35.5    L       

     



             (BEAKER) (test code = 752)                                        

 

             RED CELL DISTRIBUTION WIDTH 13.8 %       11.7-14.4                 



             (BEAKER) (test code = 412)                                        

 

             PLATELET COUNT (BEAKER) (test 204 K/CU MM  150-450                 

  



             code = 756)                                         

 

             MEAN PLATELET VOLUME (BEAKER) 9.5 fL       9.4-12.3                

  



             (test code = 754)                                        

 

             NUCLEATED RED BLOOD CELLS 0 /100 WBC   0-0                       



             (BEAKER) (test code = 413)                                        

 

             NEUTROPHILS RELATIVE PERCENT 52 %                                  

 



             (BEAKER) (test code = 429)                                        

 

             LYMPHOCYTES RELATIVE PERCENT 32 %                                  

 



             (BEAKER) (test code = 430)                                        

 

             MONOCYTES RELATIVE PERCENT 11 %                                   



             (BEAKER) (test code = 431)                                        

 

             EOSINOPHILS RELATIVE PERCENT 5 %                                   

 



             (BEAKER) (test code = 432)                                        

 

             BASOPHILS RELATIVE PERCENT 1 %                                    



             (BEAKER) (test code = 437)                                        

 

             NEUTROPHILS ABSOLUTE COUNT 2.96 K/ L    1.56-6.13                 



             (BEAKER) (test code = 670)                                        

 

             LYMPHOCYTES ABSOLUTE COUNT 1.83 K/ L    1.18-3.74                 



             (BEAKER) (test code = 414)                                        

 

             MONOCYTES ABSOLUTE COUNT (BEAKER) 0.60 K/ L    0.24-0.36    H      

      



             (test code = 415)                                        

 

             EOSINOPHILS ABSOLUTE COUNT 0.26 K/ L    0.04-0.36                 



             (BEAKER) (test code = 416)                                        

 

             BASOPHILS ABSOLUTE COUNT (BEAKER) 0.05 K/ L    0.01-0.08           

      



             (test code = 417)                                        

 

             IMMATURE GRANULOCYTES-RELATIVE 1 %          0-1                    

   



             PERCENT (BEAKER) (test code =                                      

  



             2801)                                               



B-type Natriuretic Factor (BNP)2021 04:52:00





             Test Item    Value        Reference Range Interpretation Comments

 

             BNP (test code = 31196-5) 97 pg/mL     0-100                     

 

             MAGGI (test code = MAGGI)  ID - PIAYA                          

 



                          L                                      

 

             Lab Interpretation (test Normal                                 



             code = 33071-8)                                        



[facility] 04[facility]52:00





             Test Item    Value        Reference Range Interpretation Comments

 

             B-TYPE NATRIURETIC PEPTIDE (BEAKER) 97 pg/mL     0-100             

        



             (test code = 700)                                        



 ID - PIAYA LRAD, CHEST, 1 VIEW, NON [id number] 14:01:00Reason for 
exam:-&gt;dyspneaShould this be performed at the bedside?-&gt;Yes
************************************************************[facility] : 1940 Sex: 
F************************************************************FINAL REPORT 
PATIENT ID: [id number] CLINICAL HISTORY: dyspnea TECHNIQUE: 1 view of the chest. 
COMPARISON: 2021 IMPRESSION: Pulmonary vascular congestion is again seen 
with slightly increased prominence of the interstitial lung markings 
bilaterally. There is no focal lobar consolidation or significantpleural fluid. 
The cardiomediastinal silhouette is magnified by technique. Cervical fusion 
hardware right shoulder hardware is again seen. Signed: Juan Francisco MDReport 
Verified Date/Time: 2021 14:01:32 Reading Location: [facility] 
Radiology Reading Room Electronically signed by: JUAN FRANCISCO M.D. on 
2021 02:01 PMBASI METABOLIC [id number] 12:22:00





             Test Item    Value        Reference Range Interpretation Comments

 

             SODIUM (BEAKER) 134 meq/L    136-145      L            



             (test code = 381)                                        

 

             POTASSIUM (BEAKER) 3.7 meq/L    3.5-5.1                   



             (test code = 379)                                        

 

             CHLORIDE (BEAKER) 103 meq/L                        



             (test code = 382)                                        

 

             CO2 (BEAKER) (test 25 meq/L     22-29                     



             code = 355)                                         

 

             BLOOD UREA NITROGEN 15 mg/dL     7-21                      



             (BEAKER) (test code                                        



             = 354)                                              

 

             CREATININE (BEAKER) 1.24 mg/dL   0.57-1.25                 



             (test code = 358)                                        

 

             GLUCOSE RANDOM 168 mg/dL           H            



             (BEAKER) (test code                                        



             = 652)                                              

 

             CALCIUM (BEAKER) 8.3 mg/dL    8.4-10.2     L            



             (test code = 697)                                        

 

             EGFR (BEAKER) (test 42 mL/min/1.73                           ESTIMA

MANI GFR IS



             code = 1092) sq m                                   NOT AS ACCURATE

 AS



                                                                 CREATININE



                                                                 CLEARANCE IN



                                                                 PREDICTING



                                                                 GLOMERULAR



                                                                 FILTRATION RATE

.



                                                                 ESTIMATED GFR I

S



                                                                 NOT APPLICABLE 

FOR



                                                                 DIALYSIS PATIEN

TS.



 ID - [id number] 12:22:00





             Test Item    Value        Reference Range Interpretation Comments

 

             MAGNESIUM (BEAKER) (test code = 1.7 mg/dL    1.6-2.6               

    



             627)                                                



 ID - KANSONB-TYPE NATRIURETIC FACTOR (BNP)2021 12:13:00





             Test Item    Value        Reference Range Interpretation Comments

 

             B-TYPE NATRIURETIC PEPTIDE (BEAKER) 88 pg/mL     0-100             

        



             (test code = 700)                                        



 ID - EMERSONTransesophageal [id number] 21:25:57Ejection 
FractionSLEH ECHO HEARTLAB MKCKESSON [facility] 
function [id number] 06:35:00





             Test Item    Value        Reference Range Interpretation Comments

 

             Protein, Total (test 5.4          See_Comment  L             [Autom

ated



             code = 2885-2)                                        message] The



                                                                 system which



                                                                 generated this



                                                                 result transmit

mani



                                                                 reference range

:



                                                                 6.0 - 8.3 gm/dL

.



                                                                 The reference



                                                                 range was not u

sed



                                                                 to interpret th

is



                                                                 result as



                                                                 normal/abnormal

.

 

             Albumin (test code = 2.7 g/dL     3.5-5.0      L            



             00046-0)                                            

 

             Total Bilirubin (test 0.1 mg/dL    0.2-1.2      L            



             code = 1975-2)                                        

 

             Bilirubin, Direct 0.1 mg/dL    0.1-0.5                   



             (test code = 1968-7)                                        

 

             Alkaline Phosphatase 80 U/L                           



             (test code = 6768-6)                                        

 

             AST (test code = 15 U/L       5-34                      



             1920-8)                                             

 

             ALT (test code = 8 U/L        6-55                      



             1742-6)                                             

 

             MAGGI (test code = MAGGI)  ID Felicity HODGES                                

 

             Lab Interpretation Abnormal                               



             (test code = 44881-2)                                        



[facility] [id number] 06:35:00





             Test Item    Value        Reference Range Interpretation Comments

 

             SODIUM (BEAKER) 138 meq/L    136-145                   



             (test code = 381)                                        

 

             POTASSIUM (BEAKER) 3.6 meq/L    3.5-5.1                   



             (test code = 379)                                        

 

             CHLORIDE (BEAKER) 106 meq/L                        



             (test code = 382)                                        

 

             CO2 (BEAKER) (test 22 meq/L     22-29                     



             code = 355)                                         

 

             BLOOD UREA NITROGEN 15 mg/dL     7-21                      



             (BEAKER) (test code                                        



             = 354)                                              

 

             CREATININE (BEAKER) 1.12 mg/dL   0.57-1.25                 



             (test code = 358)                                        

 

             GLUCOSE RANDOM 69 mg/dL            L            



             (BEAKER) (test code                                        



             = 652)                                              

 

             CALCIUM (BEAKER) 8.4 mg/dL    8.4-10.2                  



             (test code = 697)                                        

 

             EGFR (BEAKER) (test 47 mL/min/1.73                           ESTIMA

MANI GFR IS



             code = 1092) sq m                                   NOT AS ACCURATE

 AS



                                                                 CREATININE



                                                                 CLEARANCE IN



                                                                 PREDICTING



                                                                 GLOMERULAR



                                                                 FILTRATION RATE

.



                                                                 ESTIMATED GFR I

S



                                                                 NOT APPLICABLE 

FOR



                                                                 DIALYSIS PATIEN

TS.



 ID - ROBYN LHEPATIC FUNCTION [id number] 06:35:00





             Test Item    Value        Reference Range Interpretation Comments

 

             TOTAL PROTEIN (BEAKER) (test code = 5.4 gm/dL    6.0-8.3      L    

        



             770)                                                

 

             ALBUMIN (BEAKER) (test code = 1145) 2.7 g/dL     3.5-5.0      L    

        

 

             BILIRUBIN TOTAL (BEAKER) (test code 0.1 mg/dL    0.2-1.2      L    

        



             = 377)                                              

 

             BILIRUBIN DIRECT (BEAKER) (test 0.1 mg/dL    0.1-0.5               

    



             code = 706)                                         

 

             ALKALINE PHOSPHATASE (BEAKER) (test 80 U/L                   

        



             code = 346)                                         

 

             AST (SGOT) (BEAKER) (test code = 15 U/L       5-34                 

     



             353)                                                

 

             ALT (SGPT) (BEAKER) (test code = 8 U/L        6-55                 

     



             347)                                                



 ID - PIAYA LCBC W/PLT COUNT &amp; AUTO [id number] 05:44:00





             Test Item    Value        Reference Range Interpretation Comments

 

             WHITE BLOOD CELL COUNT (BEAKER) 5.8 K/ L     3.5-10.5              

    



             (test code = 775)                                        

 

             RED BLOOD CELL COUNT (BEAKER) 3.95 M/ L    3.93-5.22               

  



             (test code = 761)                                        

 

             HEMOGLOBIN (BEAKER) (test code = 12.7 GM/DL   11.2-15.7            

     



             410)                                                

 

             HEMATOCRIT (BEAKER) (test code = 40.1 %       34.1-44.9            

     



             411)                                                

 

             MEAN CORPUSCULAR VOLUME (BEAKER) 101.5 fL     79.4-94.8    H       

     



             (test code = 753)                                        

 

             MEAN CORPUSCULAR HEMOGLOBIN 32.2 pg      25.6-32.2                 



             (BEAKER) (test code = 751)                                        

 

             MEAN CORPUSCULAR HEMOGLOBIN CONC 31.7 GM/DL   32.2-35.5    L       

     



             (BEAKER) (test code = 752)                                        

 

             RED CELL DISTRIBUTION WIDTH 13.4 %       11.7-14.4                 



             (BEAKER) (test code = 412)                                        

 

             PLATELET COUNT (BEAKER) (test 213 K/CU MM  150-450                 

  



             code = 756)                                         

 

             MEAN PLATELET VOLUME (BEAKER) 9.8 fL       9.4-12.3                

  



             (test code = 754)                                        

 

             NUCLEATED RED BLOOD CELLS 0 /100 WBC   0-0                       



             (BEAKER) (test code = 413)                                        

 

             NEUTROPHILS RELATIVE PERCENT 41 %                                  

 



             (BEAKER) (test code = 429)                                        

 

             LYMPHOCYTES RELATIVE PERCENT 41 %                                  

 



             (BEAKER) (test code = 430)                                        

 

             MONOCYTES RELATIVE PERCENT 10 %                                   



             (BEAKER) (test code = 431)                                        

 

             EOSINOPHILS RELATIVE PERCENT 6 %                                   

 



             (BEAKER) (test code = 432)                                        

 

             BASOPHILS RELATIVE PERCENT 1 %                                    



             (BEAKER) (test code = 437)                                        

 

             NEUTROPHILS ABSOLUTE COUNT 2.34 K/ L    1.56-6.13                 



             (BEAKER) (test code = 670)                                        

 

             LYMPHOCYTES ABSOLUTE COUNT 2.38 K/ L    1.18-3.74                 



             (BEAKER) (test code = 414)                                        

 

             MONOCYTES ABSOLUTE COUNT (BEAKER) 0.57 K/ L    0.24-0.36    H      

      



             (test code = 415)                                        

 

             EOSINOPHILS ABSOLUTE COUNT 0.34 K/ L    0.04-0.36                 



             (BEAKER) (test code = 416)                                        

 

             BASOPHILS ABSOLUTE COUNT (BEAKER) 0.07 K/ L    0.01-0.08           

      



             (test code = 417)                                        

 

             IMMATURE GRANULOCYTES-RELATIVE 1 %          0-1                    

   



             PERCENT (BEAKER) (test code =                                      

  



             2801)                                               



SARS-CoV2/RT-PCR (Asymptomatic ONLY)2021 23:22:00





             Test Item    Value        Reference Range Interpretation Comments

 

             SARS-COV2/RT-PCR (test Negative     Negative                  



             code = 29226-6)                                        

 

             MAGGI (test code = MAGGI) Negative result for                          

 



                          this test determines                           



                          that SARS-CoV-2 RNA was                           



                          not present in the                           



                          specimen above the                           



                          Limit of Detection                           



                          (LOD). However,                           



                          Negative results do not                           



                          preclude SARS-CoV-2                           



                          infection and should                           



                          not be used as the sole                           



                          basis for treatment or                           



                          patient management                           



                          decisions. Negative                           



                          results must be                           



                          combined with clinical                           



                          observations, patient                           



                          history, and                           



                          epidemiological                           



                          information. A false                           



                          negative result may                           



                          occur if a specimen is                           



                          improperly collected,                           



                          transported, or                           



                          handled. A false                           



                          negative result should                           



                          be considered if                           



                          patient's recent                           



                          exposures or clinical                           



                          presentation indicate                           



                          that COVID-19                           



                          (SARS-CoV-2) is likely                           



                          and diagnostic tests                           



                          for other causes of                           



                          illness are negative.                           



                          Re-testing should be                           



                          considered in cases of                           



                          suspected false                           



                          negatives. The limit of                           



                          detection for this                           



                          assay is 100 copies/mL.                           



                          This SARS-CoV-2 test is                           



                          a real-time RT_PCR test                           



                          intended for the                           



                          qualitative detection                           



                          of nucleic acid from                           



                          SARS-CoV-2 in a                           



                          nasopharyngeal swab                           



                          specimen collected from                           



                          individuals suspected                           



                          of COVID-19 by their                           



                          healthcare provider.                           



                          This test has not been                           



                          Food and Drug                           



                          Administration (FDA)                           



                          cleared or approved.                           



                          This is a modified                           



                          version of an approved                           



                          Emergency Use                           



                          Authorization (EUA) and                           



                          is in the process of                           



                          review by the FDA. Once                           



                          authorized by the FDA,                           



                          the issued EUA will be                           



                          effective until the                           



                          declaration that                           



                          circumstances exist                           



                          justifying the                           



                          authorization of the                           



                          emergency use of in                           



                          vitro diagnostic tests                           



                          for detection and/or                           



                          diagnosis of COVID-19                           



                          is terminated under                           



                          Section 564(b)(2) of                           



                          the Act or the EUA is                           



                          revoked under Section                           



                          564(g) of the Act.                           



                          Testing was performed                           



                          using the [employer]                           



                          SARS-CoV-2 assay. Fact                           



                          Sheet for Healthcare                           



                          Providers:https://www.natty davey/sal/RT                           



                          SARS-CoV-2 HCP Fact                           



                          Sheet 51-275071.pdf                           



                          Fact Sheet for                           



                          Healthcare                             



                          Patients:https://www.mo                           



                          lecular.abbott/sal/RT                           



                          SARS-CoV-2 Patient Fact                           



                          Sheet EN                               



                          [id number].pdf                           

 

             Lab Interpretation Normal                                 



             (test code = 44493-1)                                        



[facility]2021 
23:22:00





             Test Item    Value        Reference Range Interpretation Comments

 

             SARS-COV2/RT-PCR (test code = Negative     Negative                

  



             1741897)                                            



Negative result for this test determines that SARS-CoV-2 RNA was not present in 
the specimen above the Limit of Detection (LOD). However, Negative results do 
not preclude SARS-CoV-2 infection and should not be used as the sole basis for 
treatment or patient management decisions. Negative results must be combined 
with clinical observations, patient history, and epidemiological information. A 
false negative result may occur if a specimen is improperly collected, 
transported, or handled. A false negative result should be considered if 
patient's recent exposures or clinical presentation indicate that COVID-19 
(SARS-CoV-2) is likely and diagnostic tests for other causes of illness are 
negative. Re-testing should be considered in cases of suspected false 
negatives.The limit of detection for this assay is 100 copies/mL.This SARS-CoV-2
test is a real-time RT_PCR test intended for the qualitative detection of 
nucleic acid from SARS-CoV-2 in a nasopharyngeal swab specimen collected from 
individuals suspected of COVID-19 by their healthcare provider.This test has not
been Food and Drug Administration (FDA) cleared or approved. This is a modified 
version of an approved Emergency Use Authorization (EUA) and is in the process 
of review by the FDA. Once authorized by the FDA, the issued EUA will be 
effective until the declaration that circumstances exist justifying the 
authorization of the emergency use of in vitro diagnostic tests for detection 
and/or diagnosis of COVID-19 is terminated under Section 564(b)(2) of the Act or
the EUA is revoked under Section 564(g) of the Act.Testing was performed using [employer] SARS-CoV-2 assay.Fact Sheet for Healthcare 
Providers:https://www.[employer].abbott/sal/RT SARS-CoV-2 HCP Fact Sheet 51-
160098.pdfFact Sheet for Healthcare Patients:https://www.[employer].abbott/sal/RT
SARS-CoV-2 Patient Fact Sheet EN 51-558463T8.pdfHIGH SENSITIVITY TROPONIN I
2021 19:21:00





             Test Item    Value        Reference Range Interpretation Comments

 

             HIGH SENSITIVITY 8 pg/ml      See_Comment                [Automated

 message]



             TROPONIN I (test code =                                        The 

system which



             1071041)                                            generated this 

result



                                                                 transmitted ref

erence



                                                                 range: <=17. Th

e



                                                                 reference range

 was not



                                                                 used to interpr

et this



                                                                 result as



                                                                 normal/abnormal

.



 ID - dwayne Gomez  STAT High Sensitivity Troponin-I results 
should be used in conjunction with other diagnostic information such as ECG, 
clinical observations and information, and patient symptoms to aid in the 
diagnosis of MI.2D Echo W/Doppler(CW/PW/Color)2021 17:56:11Ejection 
FractionSLEH ECHO HEARTLAB MKCKESSON CPASt. Mary Regional Medical CenterCT, CHEST 
WITH IV CONTRAST- PE TEST [id number] 06:05:00Unlisted Reason for Exam - 
Click Yes and Enter Reason Below-&gt;No
************************************************************[facility] : 1940 Sex: 
F************************************************************FINAL REPORT 
PATIENT ID: [id number] EXAM/TECHNIQUE: CT angiography of the chest pulmonary 
embolus protocol INDICATION: Concern for pulmonary embolism. COMPARISON: None. 
FINDINGS: Lower Neck: Unremarkable. Parenchyma/Pleura: Bilateral patchy 
groundglass opacities are present in the lungs. 13 mm left lower lobe pulmonary 
nodule. Small bilateral pleural effusions. Septal thickening and peribronchial 
thickening is present. Airways: Unremarkable. Cardiac: Cardiomegaly. 
Mediastinum/lymph nodes: No lymphadenopathy. Vessels: No filling defects in the 
main, lobar, or segmental pulmonary arteries. Osseous: No acute osseous process.
No suspicious osseous lesion. Upper abdomen: Unremarkable. Impression: 1. No 
acute pulmonary embolism.2. Bilateral groundglass opacities, bilateral pleural 
effusions, and interstitial thickening may represent pulmonary edema and/or 
infection.3. Left lower lobe pulmonary nodule measuring 13 mm. Consider follow-
up CT in three months, PET/CT, or tissue sampling. Signed: Will Matthews 
MDReport Verified Date/Time: 2021 06:05:30 Electronically signed by: WILL MATTHEWS MD on 2021 06:05 [facility] CenterEPAT[facility] FUNCTION [id number] 06:00:00





             Test Item    Value        Reference Range Interpretation Comments

 

             TOTAL PROTEIN (BEAKER) (test code = 5.2 gm/dL    6.0-8.3      L    

        



             770)                                                

 

             ALBUMIN (BEAKER) (test code = 1145) 2.7 g/dL     3.5-5.0      L    

        

 

             BILIRUBIN TOTAL (BEAKER) (test code 0.6 mg/dL    0.2-1.2           

        



             = 377)                                              

 

             BILIRUBIN DIRECT (BEAKER) (test 0.3 mg/dL    0.1-0.5               

    



             code = 706)                                         

 

             ALKALINE PHOSPHATASE (BEAKER) (test 92 U/L                   

        



             code = 346)                                         

 

             AST (SGOT) (BEAKER) (test code = 14 U/L       5-34                 

     



             353)                                                

 

             ALT (SGPT) (BEAKER) (test code = 7 U/L        6-55                 

     



             347)                                                



 ID - NICOLAS MOperator ID - PIAYA LBASIC METABOLIC [id number] 
05:13:00





             Test Item    Value        Reference Range Interpretation Comments

 

             SODIUM (BEAKER) 139 meq/L    136-145                   



             (test code = 381)                                        

 

             POTASSIUM (BEAKER) 4.0 meq/L    3.5-5.1                   



             (test code = 379)                                        

 

             CHLORIDE (BEAKER) 110 meq/L           H            



             (test code = 382)                                        

 

             CO2 (BEAKER) (test 24 meq/L     22-29                     



             code = 355)                                         

 

             BLOOD UREA NITROGEN 14 mg/dL     7-21                      



             (BEAKER) (test code                                        



             = 354)                                              

 

             CREATININE (BEAKER) 1.05 mg/dL   0.57-1.25                 



             (test code = 358)                                        

 

             GLUCOSE RANDOM 81 mg/dL                         



             (BEAKER) (test code                                        



             = 652)                                              

 

             CALCIUM (BEAKER) 8.4 mg/dL    8.4-10.2                  



             (test code = 697)                                        

 

             EGFR (BEAKER) (test 51 mL/min/1.73                           ESTIMA

MANI GFR IS



             code = 1092) sq m                                   NOT AS ACCURATE

 AS



                                                                 CREATININE



                                                                 CLEARANCE IN



                                                                 PREDICTING



                                                                 GLOMERULAR



                                                                 FILTRATION RATE

.



                                                                 ESTIMATED GFR I

S



                                                                 NOT APPLICABLE 

FOR



                                                                 DIALYSIS PATIEN

TS.



 ID - NICOLAS MVitamin B12 and [id number] 04:44:00





             Test Item    Value        Reference Range Interpretation Comments

 

             Vitamin B12 (test 272 pg/mL    213-816                   



             code = 2132-9)                                        

 

             Folate (test code = 7.70 ng/mL   See_Comment                [Automa

mani



             2284-8)                                             message] The



                                                                 system which



                                                                 generated this



                                                                 result transmit

mani



                                                                 reference range

:



                                                                 >=7.00. The



                                                                 reference range



                                                                 was not used to



                                                                 interpret this



                                                                 result as



                                                                 normal/abnormal

.

 

             MAGGI (test code = MAGGI)  ID -                           



                          NICOLAS NATTY                                 

 

             Lab Interpretation Normal                                 



             (test code = 39822-8)                                        



[facility] HospitalVITAMIN B12 AND [id number] 04:44:00





             Test Item    Value        Reference Range Interpretation Comments

 

             VITAMIN B12 (BEAKER) 272 pg/mL    213-816                   



             (test code = 774)                                        

 

             FOLATE (BEAKER) 7.70 ng/mL   See_Comment                [Automated 

message]



             (test code = 362)                                        The system

 which



                                                                 generated this 

result



                                                                 transmitted ref

erence



                                                                 range: >=7.00. 

The



                                                                 reference range

 was not



                                                                 used to interpr

et this



                                                                 result as



                                                                 normal/abnormal

.



 ID - NICOLAS MRAD, CHEST, 1 VIEW, NON [id number] 04:41:00Reason for 
exam:-&gt;afibShould this be performed at the bedside?-&gt;Yes
************************************************************[facility] : 1940 Sex: 
F************************************************************FINAL REPORT 
PATIENT ID: [id number] EXAM/TECHNIQUE: Single view frontal radiograph of the 
chest. INDICATION: Atrial fibrillation. COMPARISON: None. FINDINGS: 
Devices/Objects: None. Lungs: No focal consolidation. No pleural effusion. No 
pneumothorax. Heart/Mediastinum: No cardiomegaly. Mild interstitial thickening. 
Osseous: No acute osseous process. No suspicious osseous lesion. Upper abdomen: 
Unremarkable. Impression: Mild interstitial thickening may represent 
interstitial pulmonary edema. Signed: Will Matthews MDReport Verified 
Date/Time: 2021 04:41:03 Electronically signed by: WILL MATTHEWS MD on 
2021 04:41 AMHIGH SENSITIVITY TROPONIN -59-97 04:36:00





             Test Item    Value        Reference Range Interpretation Comments

 

             HIGH SENSITIVITY 8 pg/ml      See_Comment                [Automated

 message]



             TROPONIN I (test code =                                        The 

system which



             5559761)                                            generated this 

result



                                                                 transmitted ref

erence



                                                                 range: <=17. Th

e



                                                                 reference range

 was not



                                                                 used to interpr

et this



                                                                 result as



                                                                 normal/abnormal

.



 ID - NICOLAS MThe  STAT High Sensitivity Troponin-I results 
should be used in conjunction with other diagnostic information such as ECG, 
clinical observations and information, and patient symptoms to aid in the 
diagnosis of MI.B-TYPE NATRIURETIC FACTOR (BNP)2021 04:27:00





             Test Item    Value        Reference Range Interpretation Comments

 

             B-TYPE NATRIURETIC PEPTIDE (BEAKER) 144 pg/mL    0-100        H    

        



             (test code = 700)                                        



 ID - NICOLAS MCBC W/PLT COUNT &amp; AUTO [id number] 04:01:00





             Test Item    Value        Reference Range Interpretation Comments

 

             WHITE BLOOD CELL COUNT (BEAKER) 6.7 K/ L     3.5-10.5              

    



             (test code = 775)                                        

 

             RED BLOOD CELL COUNT (BEAKER) 3.83 M/ L    3.93-5.22    L          

  



             (test code = 761)                                        

 

             HEMOGLOBIN (BEAKER) (test code = 12.3 GM/DL   11.2-15.7            

     



             410)                                                

 

             HEMATOCRIT (BEAKER) (test code = 38.7 %       34.1-44.9            

     



             411)                                                

 

             MEAN CORPUSCULAR VOLUME (BEAKER) 101.0 fL     79.4-94.8    H       

     



             (test code = 753)                                        

 

             MEAN CORPUSCULAR HEMOGLOBIN 32.1 pg      25.6-32.2                 



             (BEAKER) (test code = 751)                                        

 

             MEAN CORPUSCULAR HEMOGLOBIN CONC 31.8 GM/DL   32.2-35.5    L       

     



             (BEAKER) (test code = 752)                                        

 

             RED CELL DISTRIBUTION WIDTH 13.5 %       11.7-14.4                 



             (BEAKER) (test code = 412)                                        

 

             PLATELET COUNT (BEAKER) (test 205 K/CU MM  150-450                 

  



             code = 756)                                         

 

             MEAN PLATELET VOLUME (BEAKER) 9.7 fL       9.4-12.3                

  



             (test code = 754)                                        

 

             NUCLEATED RED BLOOD CELLS 0 /100 WBC   0-0                       



             (BEAKER) (test code = 413)                                        

 

             NEUTROPHILS RELATIVE PERCENT 47 %                                  

 



             (BEAKER) (test code = 429)                                        

 

             LYMPHOCYTES RELATIVE PERCENT 38 %                                  

 



             (BEAKER) (test code = 430)                                        

 

             MONOCYTES RELATIVE PERCENT 10 %                                   



             (BEAKER) (test code = 431)                                        

 

             EOSINOPHILS RELATIVE PERCENT 4 %                                   

 



             (BEAKER) (test code = 432)                                        

 

             BASOPHILS RELATIVE PERCENT 1 %                                    



             (BEAKER) (test code = 437)                                        

 

             NEUTROPHILS ABSOLUTE COUNT 3.14 K/ L    1.56-6.13                 



             (BEAKER) (test code = 670)                                        

 

             LYMPHOCYTES ABSOLUTE COUNT 2.55 K/ L    1.18-3.74                 



             (BEAKER) (test code = 414)                                        

 

             MONOCYTES ABSOLUTE COUNT (BEAKER) 0.66 K/ L    0.24-0.36    H      

      



             (test code = 415)                                        

 

             EOSINOPHILS ABSOLUTE COUNT 0.23 K/ L    0.04-0.36                 



             (BEAKER) (test code = 416)                                        

 

             BASOPHILS ABSOLUTE COUNT (BEAKER) 0.05 K/ L    0.01-0.08           

      



             (test code = 417)                                        

 

             IMMATURE GRANULOCYTES-RELATIVE 1 %          0-1                    

   



             PERCENT (BEAKER) (test code =                                      

  



             2801)                                               



TSH/Free T4 If [id number] 00:19:00





             Test Item    Value        Reference Range Interpretation Comments

 

             TSH (test code = 1.723        See_Comment                [Automated



             54241-6)                                            message] The



                                                                 system which



                                                                 generated this



                                                                 result transmit

mani



                                                                 reference range

:



                                                                 0.350 - 4.940



                                                                 uIU/mL. The



                                                                 reference range



                                                                 was not used to



                                                                 interpret this



                                                                 result as



                                                                 normal/abnormal

.

 

             MAGGI (test code = MAGGI)  ID -                           



                          PIAYA L                                

 

             Lab Interpretation Normal                                 



             (test code = 56248-9)                                        



[facility] HospitalTSH[facility] T4 IF [id number] 00:19:00





             Test Item    Value        Reference Range Interpretation Comments

 

             THYROID STIMULATING HORMONE 1.723 uIU/mL 0.350-4.940               



             (BEAKER) (test code = 772)                                        



 ID - PIAYA LHIGH SENSITIVITY TROPONIN -44-23 00:02:00





             Test Item    Value        Reference Range Interpretation Comments

 

             HIGH SENSITIVITY 7 pg/ml      See_Comment                [Automated

 message]



             TROPONIN I (test code =                                        The 

system which



             6762465)                                            generated this 

result



                                                                 transmitted ref

erence



                                                                 range: <=17. Th

e



                                                                 reference range

 was not



                                                                 used to interpr

et this



                                                                 result as



                                                                 normal/abnormal

.



 ID - DBThe  STAT High Sensitivity Troponin-I results should be
used in conjunctionwith other diagnostic information such as ECG, clinical 
observations and information, and patient symptoms to aid in the diagnosis of 
MI.[id number] 23:58:00





             Test Item    Value        Reference Range Interpretation Comments

 

             Phosphorus (test code = 3.3 mg/dL    2.3-4.7                   



             2777-1)                                             

 

             MAGGI (test code = MAGGI)  ID - DB                           

 

             Lab Interpretation (test Normal                                 



             code = 09996-1)                                        



[facility] HospitalBASIC METABOLIC [id number] 23:58:00





             Test Item    Value        Reference Range Interpretation Comments

 

             SODIUM (BEAKER) 138 meq/L    136-145                   



             (test code = 381)                                        

 

             POTASSIUM (BEAKER) 4.0 meq/L    3.5-5.1                   



             (test code = 379)                                        

 

             CHLORIDE (BEAKER) 109 meq/L           H            



             (test code = 382)                                        

 

             CO2 (BEAKER) (test 20 meq/L     22-29        L            



             code = 355)                                         

 

             BLOOD UREA NITROGEN 14 mg/dL     7-21                      



             (BEAKER) (test code                                        



             = 354)                                              

 

             CREATININE (BEAKER) 1.09 mg/dL   0.57-1.25                 



             (test code = 358)                                        

 

             GLUCOSE RANDOM 83 mg/dL                         



             (BEAKER) (test code                                        



             = 652)                                              

 

             CALCIUM (BEAKER) 8.1 mg/dL    8.4-10.2     L            



             (test code = 697)                                        

 

             EGFR (BEAKER) (test 48 mL/min/1.73                           ESTIMA

MANI GFR IS



             code = 1092) sq m                                   NOT AS ACCURATE

 AS



                                                                 CREATININE



                                                                 CLEARANCE IN



                                                                 PREDICTING



                                                                 GLOMERULAR



                                                                 FILTRATION RATE

.



                                                                 ESTIMATED GFR I

S



                                                                 NOT APPLICABLE 

FOR



                                                                 DIALYSIS PATIEN

TS.



 ID - [id number] 23:58:00





             Test Item    Value        Reference Range Interpretation Comments

 

             MAGNESIUM (BEAKER) (test code = 1.7 mg/dL    1.6-2.6               

    



             627)                                                



 ID - [id number] 23:58:00





             Test Item    Value        Reference Range Interpretation Comments

 

             PHOSPHORUS (BEAKER) (test code = 3.3 mg/dL    2.3-4.7              

     



             604)                                                



 ID - DBHEPATIC FUNCTION [id number] 23:58:00





             Test Item    Value        Reference Range Interpretation Comments

 

             TOTAL PROTEIN (BEAKER) (test code = 5.4 gm/dL    6.0-8.3      L    

        



             770)                                                

 

             ALBUMIN (BEAKER) (test code = 1145) 2.8 g/dL     3.5-5.0      L    

        

 

             BILIRUBIN TOTAL (BEAKER) (test code 0.7 mg/dL    0.2-1.2           

        



             = 377)                                              

 

             BILIRUBIN DIRECT (BEAKER) (test 0.3 mg/dL    0.1-0.5               

    



             code = 706)                                         

 

             ALKALINE PHOSPHATASE (BEAKER) (test 92 U/L                   

        



             code = 346)                                         

 

             AST (SGOT) (BEAKER) (test code = 14 U/L       5-34                 

     



             353)                                                

 

             ALT (SGPT) (BEAKER) (test code = 9 U/L        6-55                 

     



             347)                                                



 ID - [id number] 23:42:00





             Test Item    Value        Reference Range Interpretation Comments

 

             D-Dimer, Quant (test 1.26         See_Comment  H             [Autom

ated



             code = 79484-4)                                        message] The



                                                                 system which



                                                                 generated this



                                                                 result



                                                                 transmitted



                                                                 reference range

:



                                                                 <0.50 MG/L FEU.



                                                                 The reference



                                                                 range was not



                                                                 used to interpr

et



                                                                 this result as



                                                                 normal/abnormal

.

 

             MAGGI (test code = MAGGI) Intended Use: The                           



                          D-Dimer Assay can                           



                          be used to aid in                           



                          the diagnosis of                           



                          Deep Vein                              



                          Thrombosis (DVT)                           



                          and Pulmonary                           



                          Embolism Disease                           



                          (PED).In patients                           



                          with low pre-test                           



                          probability,                           



                          various studies                           



                          concerning STA                           



                          Liatest D-dimer                           



                          test have                              



                          reported that                           



                          with a cutoff                           



                          value of 0.50                           



                          MG/L FEU, the                           



                          Negative                               



                          Predictive Value                           



                          (NPV) regarding                           



                          the exclusion of                           



                          thrombosis is                           



                          within %                           



                          range.                                 

 

             Lab Interpretation Abnormal                               



             (test code = 28009-1)                                        



[id number]





             Test Item    Value        Reference Range Interpretation Comments

 

             D-DIMER QUANTITATIVE (BEAKER) 1.26 MG/L FEU <0.50        H         

   



             (test code = 671)                                        



Intended Use: The D-Dimer Assay can be used to aid in the diagnosis of Deep Vein
Thrombosis (DVT) and Pulmonary Embolism Disease (PED).In patients with low pre-
test probability, various studies concerning STA Liatest D-dimer test have 
reported that with a cutoff value of 0.50 MG/L FEU, the Negative Predictive 
Value (NPV) regarding the exclusion of thrombosis is within % range.
Prothrombin time/[id number] 23:39:00





             Test Item    Value        Reference    Interpretation Comments



                                       Range                     

 

             Protime (test code = 15.4         See_Comment  H             [Autom

ated



             5902-2)                                             message] The



                                                                 system which



                                                                 generated this



                                                                 result



                                                                 transmitted



                                                                 reference range

:



                                                                 11.9 - 14.2



                                                                 seconds. The



                                                                 reference range



                                                                 was not used to



                                                                 interpret this



                                                                 result as



                                                                 normal/abnormal

.

 

             INR (test code = 1.24         See_Comment                [Automated



             6301-6)                                             message] The



                                                                 system which



                                                                 generated this



                                                                 result



                                                                 transmitted



                                                                 reference range

:



                                                                 <=5.90. The



                                                                 reference range



                                                                 was not used to



                                                                 interpret this



                                                                 result as



                                                                 normal/abnormal

.

 

             MAGGI (test code = RECOMMENDED                            



             MAGGI)         COUMADIN/WARFARIN                           



                          INR THERAPY                            



                          RANGESSTANDARD DOSE:                           



                          2.0 - 3.0 Includes:                           



                          PROPHYLAXIS for                           



                          venous thrombosis,                           



                          systemic                               



                          embolization;                           



                          TREATMENT for venous                           



                          thrombosis and/or                           



                          pulmonary                              



                          embolus.HIGH RISK:                           



                          Target INR is                           



                          2.5-3.5 for patients                           



                          with mechanical                           



                          heart valves.                           

 

             Lab Interpretation Abnormal                               



             (test code =                                        



             51810-8)                                            



[facility][id number] 23:39:00





             Test Item    Value        Reference Range Interpretation Comments

 

             PROTIME (BEAKER) 15.4 seconds 11.9-14.2    H            



             (test code = 759)                                        

 

             INR (BEAKER) (test 1.24         See_Comment                [Automat

ed message]



             code = 370)                                         The system whic

h



                                                                 generated this 

result



                                                                 transmitted ref

erence



                                                                 range: <=5.90. 

The



                                                                 reference range

 was



                                                                 not used to int

erpret



                                                                 this result as



                                                                 normal/abnormal

.



RECOMMENDED COUMADIN/WARFARIN INR THERAPY RANGESSTANDARD DOSE: 2.0 - 3.0 
Includes: PROPHYLAXIS for venous thrombosis, systemic embolization; TREATMENT 
for venous thrombosis and/or pulmonary embolus.HIGH RISK: Target INR is 2.5-3.5 
for patients with mechanical heart valves.CBC W/PLT COUNT &amp; AUTO 
[id number] 23:32:00





             Test Item    Value        Reference Range Interpretation Comments

 

             WHITE BLOOD CELL COUNT (BEAKER) 7.9 K/ L     3.5-10.5              

    



             (test code = 775)                                        

 

             RED BLOOD CELL COUNT (BEAKER) 3.96 M/ L    3.93-5.22               

  



             (test code = 761)                                        

 

             HEMOGLOBIN (BEAKER) (test code = 12.7 GM/DL   11.2-15.7            

     



             410)                                                

 

             HEMATOCRIT (BEAKER) (test code = 40.3 %       34.1-44.9            

     



             411)                                                

 

             MEAN CORPUSCULAR VOLUME (BEAKER) 101.8 fL     79.4-94.8    H       

     



             (test code = 753)                                        

 

             MEAN CORPUSCULAR HEMOGLOBIN 32.1 pg      25.6-32.2                 



             (BEAKER) (test code = 751)                                        

 

             MEAN CORPUSCULAR HEMOGLOBIN CONC 31.5 GM/DL   32.2-35.5    L       

     



             (BEAKER) (test code = 752)                                        

 

             RED CELL DISTRIBUTION WIDTH 13.3 %       11.7-14.4                 



             (BEAKER) (test code = 412)                                        

 

             PLATELET COUNT (BEAKER) (test 210 K/CU MM  150-450                 

  



             code = 756)                                         

 

             MEAN PLATELET VOLUME (BEAKER) 9.3 fL       9.4-12.3     L          

  



             (test code = 754)                                        

 

             NUCLEATED RED BLOOD CELLS 0 /100 WBC   0-0                       



             (BEAKER) (test code = 413)                                        

 

             NEUTROPHILS RELATIVE PERCENT 58 %                                  

 



             (BEAKER) (test code = 429)                                        

 

             LYMPHOCYTES RELATIVE PERCENT 31 %                                  

 



             (BEAKER) (test code = 430)                                        

 

             MONOCYTES RELATIVE PERCENT 8 %                                    



             (BEAKER) (test code = 431)                                        

 

             EOSINOPHILS RELATIVE PERCENT 2 %                                   

 



             (BEAKER) (test code = 432)                                        

 

             BASOPHILS RELATIVE PERCENT 1 %                                    



             (BEAKER) (test code = 437)                                        

 

             NEUTROPHILS ABSOLUTE COUNT 4.59 K/ L    1.56-6.13                 



             (BEAKER) (test code = 670)                                        

 

             LYMPHOCYTES ABSOLUTE COUNT 2.42 K/ L    1.18-3.74                 



             (BEAKER) (test code = 414)                                        

 

             MONOCYTES ABSOLUTE COUNT (BEAKER) 0.63 K/ L    0.24-0.36    H      

      



             (test code = 415)                                        

 

             EOSINOPHILS ABSOLUTE COUNT 0.17 K/ L    0.04-0.36                 



             (BEAKER) (test code = 416)                                        

 

             BASOPHILS ABSOLUTE COUNT (BEAKER) 0.06 K/ L    0.01-0.08           

      



             (test code = 417)                                        

 

             IMMATURE GRANULOCYTES-RELATIVE 1 %          0-1                    

   



             PERCENT (ALICE) (test code =                                      

  



             2801) Didi Williamson, Med Ass't  You; Valeria Yancey M.D. 41 minutes ago (2:35 PM)     Gerald for the CXR    Routing comment            Requested CXR from [facility]

## 2022-08-16 NOTE — RAD REPORT
EXAM DESCRIPTION:  CT - Head C Spine Mpr Wo Con - 8/16/2022 5:43 pm

 

CLINICAL HISTORY:  Head and neck injury status post fall. Head and neck pain

 

COMPARISON:  July 2022

 

TECHNIQUE:  Computed axial tomography of the head and cervical spine was obtained.

 

Sagittal and coronal reconstruction was performed.

 

All CT scans are performed using dose optimization technique as appropriate and may include automated
 exposure control or mA/KV adjustment according to patient size.

 

FINDINGS:  Right frontal scalp hematoma.

 

An intracranial bleed is not seen. The ventricles are normal in caliber. An extra-axial fluid collect
ion is not noted.Fluid within the visualized sinuses and mastoids is not seen

 

A cervical fracture is not visualized. No dislocation is noted. Mild chronic posterior subluxation C4
 on C5. Postsurgical changes C4 through C7.

 

IMPRESSION:  No acute intracranial abnormality is seen.

 

A cervical fracture is not visualized.  If the patient continues to have symptoms to suggest intracra
nial /spinal cord pathology then MRI would be recommended

## 2022-08-16 NOTE — ER
Nurse's Notes                                                                                     

 [facility]                                                                 

Name: Leona Peres                                                                              

Age: 81 yrs                                                                                       

Sex: Female                                                                                       

: 1940                                                                                   

MRN: [id number]                                                                                   

Arrival Date: 2022                                                                          

Time: 16:48                                                                                       

Account#: [id number]                                                                            

Bed 7                                                                                             

Private MD:                                                                                       

Diagnosis: Contusion of other part of head, initial encounter;Fall in (into) shower or empty      

  bathtub, initial encounter;Contusion of right front wall of thorax                              

                                                                                                  

Presentation:                                                                                     

                                                                                             

17:05 Chief complaint: EMS states: toned out to [facility] in for a fall in the shower.         tp1 

      reported PT lost balance and hit right side of forehead. PT did not lose consciousness.     

      reported tenderness to the right side of forehead and pain 8/10, PT also CO neck pain.      

      states PT ambulated to stretcher with assistance. PT is on Eliques. Reports PT uses 2 L     

      O2 at home, O2 \T\ 94%, placed on 1.5 L O2 via NC for comfort measures.                       

17:05 Coronavirus screen: Vaccine status: Patient reports receiving the 2nd dose of the covid tp1 

      vaccine. Ebola Screen: Patient denies exposure to infectious person. Patient denies         

      travel to an Ebola-affected area in the 21 days before illness onset. Initial Sepsis        

      Screen: Does the patient meet any 2 criteria? No. Patient's initial sepsis screen is        

      negative. Does the patient have a suspected source of infection? No. Patient's initial      

      sepsis screen is negative. Risk Assessment: Do you want to hurt yourself or someone         

      else? Patient reports no desire to harm self or others. Onset of symptoms was 2022.                                                                                   

17:05 Method Of Arrival: EMS: [facility]                                                tp1 

17:05 Acuity: ANUSHKA 3                                                                           tp1 

                                                                                                  

Triage Assessment:                                                                                

17:05 General: Appears in no apparent distress. comfortable, Behavior is calm, cooperative.   tp1 

      Pain: Complains of pain in right side of forehead and neck Pain does not radiate. Pain      

      currently is 9 out of 10 on a pain scale. Quality of pain is described as sharp, Pain       

      began suddenly, Is continuous. EENT: No signs and/or symptoms were reported regarding       

      the EENT system. EENT: Nares are clear Reports use of a hearing aid in right ear.           

      Neuro: Level of Consciousness is awake, alert, obeys commands, Oriented to person,          

      place, time, situation, Pupils are PERRLA, Denies blurred vision. Cardiovascular:           

      Capillary refill < 3 seconds Patient's skin is warm and dry. Respiratory: Airway is         

      patent Respiratory effort is even, unlabored. GI: Abdomen is round non-distended. :       

      No signs and/or symptoms were reported regarding the genitourinary system. Derm: Skin       

      is pink, warm \T\ dry. Musculoskeletal: Circulation, motion, and sensation intact. Injury   

      Description: Bruise sustained to right forhead.                                             

17:05 General: left mastectomy .                                                              tp1 

                                                                                                  

Historical:                                                                                       

- Allergies:                                                                                      

17:25 Strawberries;                                                                           tp1 

17:25 PENICILLINS;                                                                            tp1 

17:25 Sulfa (Sulfonamide Antibiotics);                                                        tp1 

17:25 Darvocet-N 100;                                                                         tp1 

17:25 clindamycin HCl (bulk);                                                                 tp1 

- PMHx:                                                                                           

17:25 hypothyroidism; Alzheimer's disease; hyperlipidemia; Depression; COPD; Hypertension;    tp1 

      GERD; Afib;                                                                                 

17:25 left breast cancer;                                                                     tp1 

                                                                                                  

- Immunization history:: Client reports receiving the 2nd dose of the Covid vaccine.              

- Social history:: Smoking status: Patient/guardian denies using tobacco, the patient             

  reports quitting approximately 1657 years ago.                                                  

                                                                                                  

                                                                                                  

Screenin:20 Abuse screen: Denies threats or abuse. Nutritional screening: No deficits noted.        tp1 

      Tuberculosis screening: No symptoms or risk factors identified. Fall Risk Fall in past      

      12 months (25 points). Secondary diagnosis (15 points) Alzheimer's, No IV (0 pts).          

      Ambulatory Aid- Crutches/Cane/Walker (15 pts). Gait- Normal/Bed Rest/Wheelchair (0 pts)     

      Mental Status- Oriented to own ability (0 pts). Total Martinez Fall Scale indicates High       

      Risk Score (45 or more points). Fall prevention measures have been instituted. Side         

      Rails Up X 2 Placed Close to Nursing Station Frequent Obs/Assessments Occuring Family       

      Present and informed to notify staff if the need to leave the bedside.                      

                                                                                                  

Assessment:                                                                                       

17:22 General: see triage notes.                                                              tp1 

18:10 Pain: Complains of pain in right side Pain does not radiate. Pain currently is 4 out of tp1 

      10 on a pain scale. Quality of pain is described as sharp, Pain began suddenly,             

      provider notified.                                                                          

18:10 Reassessment: Patient appears in no apparent distress at this time. No changes from     tp1 

      previously documented assessment. Patient and/or family updated on plan of care and         

      expected duration. Pain level reassessed. Patient is alert, oriented x 3, equal             

      unlabored respirations, skin warm/dry/pink.                                                 

                                                                                                  

Vital Signs:                                                                                      

17:05  / 73; Pulse 60; Resp 14; Temp 97.6; Pulse Ox 96% ; Weight 104.78 kg; Height 5    tp1 

      ft. 3 in. (160.02 cm);                                                                      

17:05  / 73; Pulse 60; Resp 14; Temp 97.6; Pulse Ox 96% on 1.5 lpm NC;                  tp1 

18:35  / 89; Pulse 58; Resp 14; Pulse Ox 100% 1.5 lpm ;                                 tp1 

23:33 Pulse 61; Resp 14; Pulse Ox 94% on R/A;                                                 kd3 

17:05 Body Mass Index 40.92 (104.78 kg, 160.02 cm)                                            tp1 

                                                                                                  

ED Course:                                                                                        

16:48 Patient arrived in ED.                                                                  iw  

16:50 Og Abreu PA is PHCP.                                                                cp  

16:50 Daniel Gambino DO is Attending Physician.                                                cp  

17:10 Door closed. Noise minimized. Warm blanket given.                                       tp1 

17:14 Sera Puente, RN is Primary Nurse.                                                   tp1 

17:22 Triage completed.                                                                       tp1 

17:45 CT Head C Spine In Process Unspecified.                                                 EDMS

18:19 Patient has correct armband on for positive identification. Bed in low position. Call   tp1 

      light in reach. Side rails up X 1. Client placed on continuous cardiac and pulse            

      oximetry monitoring. NIBP monitoring applied.                                               

19:02 Missed attempt(s): 20 gauge in right antecubital area. Bleeding controlled, band aid    tp1 

      applied, catheter tip intact.                                                               

20:14 Radiology exam delayed due to IV insertion attempt and/or patient not having            kk6 

      appropriate IV at this time.                                                                

21:53 Missed attempt(s): 20 gauge in right antecubital area. Bleeding controlled, band aid    bb  

      applied, catheter tip intact.                                                               

21:54 Inserted saline lock: 22 gauge in right ,using aseptic technique. ankle.                bb  

22:20 Chest Abd Pelvis Wo Con In Process Unspecified.                                         EDMS

23:33 Arm band placed on right wrist.                                                         kd3 

23:33 No provider procedures requiring assistance completed. IV discontinued, intact,         kd3 

      bleeding controlled, No redness/swelling at site. Pressure dressing applied.                

                                                                                                  

Administered Medications:                                                                         

18:15 Drug: Tylenol 1000 mg Route: PO;                                                        tp1 

22:25 Follow up: Response: No adverse reaction                                                kd3 

22:24 Drug: fentaNYL (PF) 25 mcg Route: IVP; Site: Other;                                     kd3 

22:25 Follow up: Response: No adverse reaction                                                kd3 

                                                                                                  

                                                                                                  

Medication:                                                                                       

23:33 VIS not applicable for this client.                                                     kd3 

                                                                                                  

Outcome:                                                                                          

23:16 Discharge ordered by MD.                                                                cp  

23:33 Discharged to home via wheelchair.                                                      kd3 

23:33 Condition: stable                                                                           

23:33 Discharge instructions given to patient, family, Instructed on discharge instructions,      

      follow up and referral plans. Demonstrated understanding of instructions, follow-up         

      care.                                                                                       

23:41 Patient left the ED.                                                                    mw2 

                                                                                                  

Signatures:                                                                                       

Dispatcher [employer]                           EDMS                                                 

Keli Gillis RN                     Lexy Can RN RN iw Page, Corey, PA PA cp [place], MyKena                            mw2                                                  

Alisson Hinton                               kk6                                                  

Carlita Mai RN RN   kd3                                                  

Sera Puente RN RN   tp1                                                  

                                                                                                  

Corrections: (The following items were deleted from the chart)                                    

17:25 17:05 Chief complaint: EMS states: toned out to [facility] in for a fall in the shower.   tp1 

      reported PT lost balance and hit right side of forehead. PT did not lose consciousness.     

      reported tenderness to the right side of forehead and pain 8/10, PT also CO neck pain.      

      states PT ambulated to stretcher with assistance. PT is on Eliques. tp1                     

17:34 17:05  / 73; Pulse 60bpm; Resp 14bpm; Pulse Ox 96% 2 lpm Nasal Cannula; Temp      tp1 

      97.6F; tp1                                                                                  

18:16 17:05 EENT: Nares are clear tp1                                                         tp1 

                                                                                                  

**************************************************************************************************

## 2022-08-16 NOTE — EDPHYS
Physician Documentation                                                                           

 [facility]                                                                 

Name: Leona Peres                                                                              

Age: 81 yrs                                                                                       

Sex: Female                                                                                       

: 1940                                                                                   

MRN: [id number]                                                                                   

Arrival Date: 2022                                                                          

Time: 16:48                                                                                       

Account#: [id number]                                                                            

Bed 7                                                                                             

Private MD:                                                                                       

ED Physician Daniel Gambino                                                                         

HPI:                                                                                              

                                                                                             

17:10 This 81 yrs old Female presents to ER via EMS with complaints of Fall Injury.           cp  

17:10 Details of fall: The patient fell from an upright position, while standing, and struck  cp  

      a tile surface. Onset: The symptoms/episode began/occurred just prior to arrival.           

      Associated injuries: The patient sustained injury to the head, contusion, swelling,         

      tenderness. Severity of symptoms: in the emergency department the symptoms are              

      unchanged, despite EMS interventions. Patient reports slip and fall in shower causing       

      her to strike right side of head. No LOC.                                                   

                                                                                                  

Historical:                                                                                       

- Allergies:                                                                                      

17:25 Strawberries;                                                                           tp1 

17:25 PENICILLINS;                                                                            tp1 

17:25 Sulfa (Sulfonamide Antibiotics);                                                        tp1 

17:25 Darvocet-N 100;                                                                         tp1 

17:25 clindamycin HCl (bulk);                                                                 tp1 

- PMHx:                                                                                           

17:25 hypothyroidism; Alzheimer's disease; hyperlipidemia; Depression; COPD; Hypertension;    tp1 

      GERD; Afib;                                                                                 

17:25 left breast cancer;                                                                     tp1 

                                                                                                  

- Immunization history:: Client reports receiving the 2nd dose of the Covid vaccine.              

- Social history:: Smoking status: Patient/guardian denies using tobacco, the patient             

  reports quitting approximately 1657 years ago.                                                  

                                                                                                  

                                                                                                  

ROS:                                                                                              

17:15 Constitutional: Negative for body aches, chills, fever, poor PO intake.                 cp  

17:15 Eyes: Negative for injury, pain, redness, and discharge.                                cp  

17:15 ENT: Negative for drainage from ear(s), ear pain, sore throat, difficulty swallowing,       

      difficulty handling secretions.                                                             

17:15 Neck: Negative for stiffness.                                                               

17:15 Cardiovascular: Negative for chest pain, edema, palpitations.                               

17:15 Respiratory: Negative for cough, shortness of breath, wheezing.                             

17:15 Abdomen/GI: Negative for abdominal pain, nausea, vomiting, and diarrhea, constipation.      

17:15 Back: Negative for pain at rest, pain with movement.                                        

17:15 MS/extremity: Negative for injury or acute deformity, decreased range of motion,            

      paresthesias.                                                                               

17:15 Neuro: Positive for headache, Negative for altered mental status, dizziness, loss of        

      consciousness, syncope, weakness.                                                           

17:15 All other systems are negative.                                                             

                                                                                                  

Exam:                                                                                             

17:20 Constitutional: The patient appears in no acute distress, alert, awake,                 cp  

      non-diaphoretic, non-toxic, well developed, well nourished, obese.                          

17:20 Head/face: Noted is contusion, that is superficial, of the  forehead and right temple,  cp  

      ecchymosis, that is mild, of the  forehead and right temple, swelling, that is mild, of     

      the  forehead and right temple, tenderness, that is moderate, of the  forehead and          

      right temple.                                                                               

17:20 Eyes: Periorbital structures: appear normal, Pupils: equal, round, and reactive to          

      light and accomodation, Extraocular movements: intact throughout, Conjunctiva: normal,      

      no exudate, no injection, Sclera: no appreciated abnormality, Lids and lashes: appear       

      normal, bilaterally.                                                                        

17:20 ENT: External ear(s): are unremarkable, Ear canal(s): are normal, clear, TM's:              

      dullness, bilaterally, Nose: is normal, Mouth: Lips: moist, Oral mucosa: pink and           

      intact, moist, Posterior pharynx: Airway: no evidence of obstruction, patent.               

17:20 Neck: C-spine: C-collar placed in ED, vertebral tenderness, that is mild, appreciated       

      at  C6 and C7, crepitus, is not appreciated, ROM/movement: pain, that is mild, with         

      flexion, limited range of motion, is not appreciated.                                       

17:20 Chest/axilla: Inspection: normal, Palpation: crepitus, is not appreciated, tenderness,      

      is not appreciated.                                                                         

17:20 Cardiovascular: Rate: normal, Rhythm: regular, JVD: is not appreciated.                     

17:20 Respiratory: the patient does not display signs of respiratory distress,  Respirations:     

      normal, no use of accessory muscles, no retractions, labored breathing, is not present,     

      Breath sounds: are clear throughout, no decreased breath sounds, no stridor, no             

      wheezing.                                                                                   

17:20 Abdomen/GI: Inspection: abdomen appears normal, Bowel sounds: normal, in all quadrants,     

      Palpation: abdomen is soft and non-tender, in all quadrants.                                

17:20 Back: pain, is absent, ROM is normal.                                                       

17:20 Musculoskeletal/extremity: Extremities: all appear grossly normal, with no appreciated      

      pain with palpation.                                                                        

17:20 Neuro: Orientation: to person, place \T\ time. Mentation: is normal, Memory: is normal,     

      immediate memory is intact, recent memory is intact, Motor: moves all fours, strength       

      is normal, Sensation: is normal.                                                            

                                                                                                  

Vital Signs:                                                                                      

17:05  / 73; Pulse 60; Resp 14; Temp 97.6; Pulse Ox 96% ; Weight 104.78 kg; Height 5    tp1 

      ft. 3 in. (160.02 cm);                                                                      

17:05  / 73; Pulse 60; Resp 14; Temp 97.6; Pulse Ox 96% on 1.5 lpm NC;                  tp1 

18:35  / 89; Pulse 58; Resp 14; Pulse Ox 100% 1.5 lpm ;                                 tp1 

23:33 Pulse 61; Resp 14; Pulse Ox 94% on R/A;                                                 kd3 

17:05 Body Mass Index 40.92 (104.78 kg, 160.02 cm)                                            tp1 

                                                                                                  

MDM:                                                                                              

16:55 Patient medically screened.                                                             cp  

18:00 Differential diagnosis: closed head injury, contusion, fracture, laceration, multiple   cp  

      trauma.                                                                                     

18:35 Data reviewed: vital signs, nurses notes, radiologic studies, CT scan.                  cp  

18:35 Counseling: I had a detailed discussion with the patient and/or guardian regarding: the cp  

      historical points, exam findings, and any diagnostic results supporting the                 

      discharge/admit diagnosis, radiology results, negative head and c-spine CT. Patient now     

      c/o right lower rib pain and tenderness to palpation noted. Will order CT                   

      chest/abdomen/pelvis.                                                                       

23:15 Response to treatment: the patient's symptoms have markedly improved after treatment,   cp  

      and as a result, I will discharge patient.                                                  

23:15 Special discussion: Based on the patient's history, exam and DX evaluation, there is no cp  

      indication for emergent intervention or inpatient TX. It is understood by the               

      patient/guardian that if the SXs persist or worsen they need to return immediately for      

      re-evaluation.                                                                              

                                                                                                  

                                                                                             

18:47 Order name: Basic Metabolic Panel; Complete Time: 23:10                                 cp  

                                                                                             

23:10 Interpretation: Normal except: ; GLUC 129; BUN 21; GFR 44.                        cp  

                                                                                             

18:47 Order name: CBC with Diff; Complete Time: 23:10                                         cp  

                                                                                             

23:10 Interpretation: Normal except: WBC 11.50; SUZANNE% 76.4; LYM% 13.8; NEUT A 8.8.             cp  

                                                                                             

16:58 Order name: CT Head C Spine; Complete Time: 18:22                                       cp  

                                                                                             

18:22 Interpretation: Reviewed report.                                                        cp  

                                                                                             

18:47 Order name: Type And Screen                                                             cp  

                                                                                             

22:14 Order name: Chest Abd Pelvis Wo Con; Complete Time: 23:10                               EDMS

                                                                                             

23:12 Interpretation: Report reviewed.                                                        cp  

                                                                                             

18:47 Order name: Labs collected and sent; Complete Time: 22:36                               cp  

                                                                                                  

Administered Medications:                                                                         

18:15 Drug: Tylenol 1000 mg Route: PO;                                                        tp1 

22:25 Follow up: Response: No adverse reaction                                                kd3 

22:24 Drug: fentaNYL (PF) 25 mcg Route: IVP; Site: Other;                                     kd3 

22:25 Follow up: Response: No adverse reaction                                                kd3 

                                                                                                  

                                                                                                  

Disposition Summary:                                                                              

22 23:16                                                                                    

Discharge Ordered                                                                                 

      Location: Home                                                                          cp  

      Problem: new                                                                            cp  

      Symptoms: have improved                                                                 cp  

      Condition: Stable                                                                       cp  

      Diagnosis                                                                                   

        - Contusion of other part of head, initial encounter                                  cp  

        - Fall in (into) shower or empty bathtub, initial encounter                           cp  

        - Contusion of right front wall of thorax                                             cp  

      Followup:                                                                               cp  

        - With: Private Physician                                                                  

        - When: 1 - 2 days                                                                         

        - Reason: Recheck today's complaints                                                       

      Discharge Instructions:                                                                     

        - Discharge Summary Sheet                                                             cp  

        - Facial or Scalp Contusion                                                           cp  

        - Hematoma                                                                            cp  

        - Fall Prevention in the Home, Adult                                                  cp  

        - Blunt Chest Trauma                                                                  cp  

      Forms:                                                                                      

        - Medication Reconciliation Form                                                      cp  

        - Thank You Letter                                                                    cp  

        - Antibiotic Education                                                                cp  

        - Prescription Opioid Use                                                             cp  

Signatures:                                                                                       

Dispatcher [employer]                           EDMS                                                 

Og Abreu PA PA   cp                                                   

Carlita Mai RN                      RN   kd3                                                  

Sera Puente RN                     RN   tp1                                                  

                                                                                                  

Corrections: (The following items were deleted from the chart)                                    

22:14 18:56 Chest Abdomen Pelvis W Con+CT.RAD.BRZ ordered. EDMS                               EDMS

                                                                                                  

**************************************************************************************************

## 2022-08-16 NOTE — RAD REPORT
EXAM DESCRIPTION:  CT - Chest Abd Pelvis Wo Con - 8/16/2022 10:18 pm

 

CLINICAL HISTORY:   Chest and abdominal pain status post fall

 

COMPARISON:

February 2022

 

TECHNIQUE:  Computed axial tomography of the chest, abdomen and pelvis was obtained. Oral contrast wa
s given. IV contrast was not requested.

 

All CT scans are performed using dose optimization technique as appropriate and may include automated
 exposure control or mA/KV adjustment according to patient size.

 

FINDINGS:   The evaluation of mediastinum, trenton, vessels and solid organs is limited secondary to the
 lack of IV contrast administration

 

A mediastinal hematoma is not seen.

 

A displaced rib fracture is not visualized.

 

A pleural effusion is not present. A pericardial effusion is not seen.

 

No pulmonary contusion. 14 millimeter left lower lobe nodule has increased in size from 2018 in which
 it measured 1 centimeter. Mild bilateral ground-glass opacities

 

The liver, spleen, pancreas, adrenals kidneys and bladder do not demonstrate a traumatic injury.

 

Splenic cyst. Small right renal calculus

 

There is no evidence of diverticulitis.

 

IMPRESSION:  An acute traumatic injury involving the chest, abdomen/ pelvis not seen

 

14 millimeter left lung nodule has enlarged from 2018 and may represent neoplasm

## 2022-08-17 VITALS — SYSTOLIC BLOOD PRESSURE: 141 MMHG | DIASTOLIC BLOOD PRESSURE: 89 MMHG

## 2022-08-17 VITALS — OXYGEN SATURATION: 94 %

## 2022-08-17 VITALS — TEMPERATURE: 97.6 F

## 2022-09-12 ENCOUNTER — HOSPITAL ENCOUNTER (OUTPATIENT)
Dept: HOSPITAL 97 - ER | Age: 82
Setting detail: OBSERVATION
LOS: 4 days | Discharge: HOME HEALTH | End: 2022-09-16
Attending: INTERNAL MEDICINE | Admitting: INTERNAL MEDICINE
Payer: COMMERCIAL

## 2022-09-12 VITALS — BODY MASS INDEX: 41.3 KG/M2

## 2022-09-12 DIAGNOSIS — J44.9: ICD-10-CM

## 2022-09-12 DIAGNOSIS — Z91.81: ICD-10-CM

## 2022-09-12 DIAGNOSIS — Z91.018: ICD-10-CM

## 2022-09-12 DIAGNOSIS — E03.9: ICD-10-CM

## 2022-09-12 DIAGNOSIS — M48.02: ICD-10-CM

## 2022-09-12 DIAGNOSIS — Z88.2: ICD-10-CM

## 2022-09-12 DIAGNOSIS — Z88.3: ICD-10-CM

## 2022-09-12 DIAGNOSIS — K21.9: ICD-10-CM

## 2022-09-12 DIAGNOSIS — E87.2: ICD-10-CM

## 2022-09-12 DIAGNOSIS — D72.822: ICD-10-CM

## 2022-09-12 DIAGNOSIS — R53.1: Primary | ICD-10-CM

## 2022-09-12 DIAGNOSIS — I48.0: ICD-10-CM

## 2022-09-12 DIAGNOSIS — E78.5: ICD-10-CM

## 2022-09-12 DIAGNOSIS — Z20.822: ICD-10-CM

## 2022-09-12 DIAGNOSIS — Z99.81: ICD-10-CM

## 2022-09-12 DIAGNOSIS — Z88.0: ICD-10-CM

## 2022-09-12 DIAGNOSIS — R06.82: ICD-10-CM

## 2022-09-12 DIAGNOSIS — Z79.01: ICD-10-CM

## 2022-09-12 DIAGNOSIS — I10: ICD-10-CM

## 2022-09-12 LAB
ALBUMIN SERPL BCP-MCNC: 2.6 G/DL (ref 3.4–5)
ALP SERPL-CCNC: 100 U/L (ref 45–117)
ALT SERPL W P-5'-P-CCNC: 15 U/L (ref 12–78)
AST SERPL W P-5'-P-CCNC: 7 U/L (ref 15–37)
BUN BLD-MCNC: 14 MG/DL (ref 7–18)
GLUCOSE SERPLBLD-MCNC: 199 MG/DL (ref 74–106)
HCT VFR BLD CALC: 43.3 % (ref 36–45)
INR BLD: 1.42
LIPASE SERPL-CCNC: 40 U/L (ref 73–393)
LYMPHOCYTES # SPEC AUTO: 0.9 K/UL (ref 0.7–4.9)
MAGNESIUM SERPL-MCNC: 1.9 MG/DL (ref 1.8–2.4)
MCV RBC: 96.2 FL (ref 80–100)
NT-PROBNP SERPL-MCNC: 449 PG/ML (ref ?–450)
PMV BLD: 8.5 FL (ref 7.6–11.3)
POTASSIUM SERPL-SCNC: 4.1 MMOL/L (ref 3.5–5.1)
RBC # BLD: 4.5 M/UL (ref 3.86–4.86)
TROPONIN I SERPL HS-MCNC: 7 PG/ML (ref ?–58.9)
TSH SERPL DL<=0.05 MIU/L-ACNC: 0.7 UIU/ML (ref 0.36–3.74)

## 2022-09-12 PROCEDURE — 84484 ASSAY OF TROPONIN QUANT: CPT

## 2022-09-12 PROCEDURE — 96361 HYDRATE IV INFUSION ADD-ON: CPT

## 2022-09-12 PROCEDURE — 81003 URINALYSIS AUTO W/O SCOPE: CPT

## 2022-09-12 PROCEDURE — 83690 ASSAY OF LIPASE: CPT

## 2022-09-12 PROCEDURE — 97161 PT EVAL LOW COMPLEX 20 MIN: CPT

## 2022-09-12 PROCEDURE — 71045 X-RAY EXAM CHEST 1 VIEW: CPT

## 2022-09-12 PROCEDURE — 84439 ASSAY OF FREE THYROXINE: CPT

## 2022-09-12 PROCEDURE — 84443 ASSAY THYROID STIM HORMONE: CPT

## 2022-09-12 PROCEDURE — 97116 GAIT TRAINING THERAPY: CPT

## 2022-09-12 PROCEDURE — 97110 THERAPEUTIC EXERCISES: CPT

## 2022-09-12 PROCEDURE — 97165 OT EVAL LOW COMPLEX 30 MIN: CPT

## 2022-09-12 PROCEDURE — 82550 ASSAY OF CK (CPK): CPT

## 2022-09-12 PROCEDURE — 83605 ASSAY OF LACTIC ACID: CPT

## 2022-09-12 PROCEDURE — 99285 EMERGENCY DEPT VISIT HI MDM: CPT

## 2022-09-12 PROCEDURE — 80053 COMPREHEN METABOLIC PANEL: CPT

## 2022-09-12 PROCEDURE — 70450 CT HEAD/BRAIN W/O DYE: CPT

## 2022-09-12 PROCEDURE — 85610 PROTHROMBIN TIME: CPT

## 2022-09-12 PROCEDURE — 87040 BLOOD CULTURE FOR BACTERIA: CPT

## 2022-09-12 PROCEDURE — 72125 CT NECK SPINE W/O DYE: CPT

## 2022-09-12 PROCEDURE — 72141 MRI NECK SPINE W/O DYE: CPT

## 2022-09-12 PROCEDURE — 80048 BASIC METABOLIC PNL TOTAL CA: CPT

## 2022-09-12 PROCEDURE — 71250 CT THORAX DX C-: CPT

## 2022-09-12 PROCEDURE — 93005 ELECTROCARDIOGRAM TRACING: CPT

## 2022-09-12 PROCEDURE — 85025 COMPLETE CBC W/AUTO DIFF WBC: CPT

## 2022-09-12 PROCEDURE — 36415 COLL VENOUS BLD VENIPUNCTURE: CPT

## 2022-09-12 PROCEDURE — 97535 SELF CARE MNGMENT TRAINING: CPT

## 2022-09-12 PROCEDURE — 87811 SARS-COV-2 COVID19 W/OPTIC: CPT

## 2022-09-12 PROCEDURE — 96365 THER/PROPH/DIAG IV INF INIT: CPT

## 2022-09-12 PROCEDURE — 83880 ASSAY OF NATRIURETIC PEPTIDE: CPT

## 2022-09-12 PROCEDURE — 82553 CREATINE MB FRACTION: CPT

## 2022-09-12 PROCEDURE — 87086 URINE CULTURE/COLONY COUNT: CPT

## 2022-09-12 PROCEDURE — 80076 HEPATIC FUNCTION PANEL: CPT

## 2022-09-12 PROCEDURE — 97530 THERAPEUTIC ACTIVITIES: CPT

## 2022-09-12 PROCEDURE — 83735 ASSAY OF MAGNESIUM: CPT

## 2022-09-12 PROCEDURE — 87088 URINE BACTERIA CULTURE: CPT

## 2022-09-12 RX ADMIN — Medication SCH ML: at 22:21

## 2022-09-12 RX ADMIN — SODIUM CHLORIDE, SODIUM LACTATE, POTASSIUM CHLORIDE, AND CALCIUM CHLORIDE SCH MLS: .6; .31; .03; .02 INJECTION, SOLUTION INTRAVENOUS at 22:20

## 2022-09-12 NOTE — P.HP
Certification for Inpatient


Patient admitted to: Observation


With expected LOS: <2 Midnights


Patient will require the following post-hospital care: None


Practitioner: I am a practitioner with admitting privileges, knowledge of 

patient current condition, hospital course, and medical plan of care.


Services: Services provided to patient in accordance with Admission requirements

found in Title 42 Section 412.3 of the Code of Federal Regulations





<Vipul Luna - Last Filed: 09/12/22 19:56>





Patient History


Date of Service: 09/12/22


Reason for admission: Weakness, multiple falls


History of Present Illness: 





81-year-old female with history of atrial fibrillation on chronic 

anticoagulation therapy, COPD with home oxygen, hypertension, hypothyroidism, 

hyperlipidemia, GERD who currently resides at an assisted living 

facilitycarriage in presents the emergency department for weakness, multiple 

falls.  She reports in the past 3 days she has had 2 falls today she was 

ambulating back from her restroom when she feels like her legs "gave out on her"

which caused her to fall, she was unable to get up without assistance.  She 

reports feeling generally weak in all of her extremities the past few days as 

well.  She was evaluated in the emergency department her labs were significant 

for mild leukocytosis, mild lactic acidosis initial lactate 2.1 SIRS criteria 

were present including leukocytosis, tachypnea during her stay in the emergency 

department but no source of infection was identified she had a chest x-ray which

demonstrated no acute abnormalities, CT of the head/chest/C-spine/abdomen and 

pelvis without contrast showed no acute abnormalities.  ED wishes to admit 

patient under observation for weakness, multiple falls.





- Past Medical/Surgical History


Diabetic: No


-: Lymphedema


-: Afib


-: L breast cancer


-: PE


-: Meniere's Disease


-: Former smoker


-: Recurrent UTIs


-: Fibromyalgia


-: GERD


-: Insomnia


-: Depression


-: Neuropathy


-: s/p Left Mastectomy


-: SANTOSH TKA


-: Cervical fusion


-: Lumbar fusion


-: Appendectomy


-: Hysterectomy


Psychosocial/ Personal History: Patient lives in an assisted living facility-

[facility]





- Family History


  ** Father


-: Diabetes


Notes: heart attack.  alcoholic





- Social History


Smoking Status: Never smoker


Alcohol use: No


CD- Drugs: No


Caffeine use: No


Place of Residence: Home





<Vipul Luna - Last Filed: 09/12/22 19:56>


Date of Service: 09/13/22





<Chao Ramos - Last Filed: 09/13/22 19:11>


Allergies





clindamycin HCl [From Cleocin] Allergy (Severe, Verified 02/21/22 22:02)


   Hives/Rash


clindamycin palmitate HCl [From Cleocin] Allergy (Severe, Verified 02/21/22 

22:02)


   Hives/Rash


clindamycin phosphate [From Cleocin] Allergy (Severe, Verified 02/21/22 22:02)


   Hives/Rash


Penicillins Allergy (Severe, Verified 02/21/22 22:02)


   Hives/Rash


Sulfa (Sulfonamide Antibiotics) [Sulfa(Sulfonamide Antibiotics)] Allergy 

(Severe, Verified 02/21/22 22:02)


   Hives/Rash


propoxyphene [From Darvocet-N 100] Allergy (Verified 02/21/22 22:02)


   Unknown


strawberries Allergy (Severe, Uncoded 03/24/21 05:19)


   Hives/Rash





Home Medications: 








Estradiol 0.01% Cream 1 appl VAG SEECOM 02/22/22 


RX: Albuterol Sulfate [Proair Respiclick] 2 puff IH Q6HP PRN 02/22/22 


RX: Apixaban [Eliquis] 5 mg PO BID 02/22/22 


RX: Cranberry Fruit Extract [Ellura] 200 mg PO DAILY 02/22/22 


RX: Cyanocobalamin (Vitamin B-12) [Vitamin B12] 1,000 mcg PO DAILY 02/22/22 


RX: Cyclosporine [Restasis] 1 drop EACH EYE TID 02/22/22 


RX: Donepezil HCl 10 mg PO DAILY 02/22/22 


RX: Doxepin HCl 100 mg PO BEDTIME 02/22/22 


RX: Duloxetine HCl 60 mg PO BID 02/22/22 


RX: Flecainide Acetate 50 mg PO BID 02/22/22 


RX: Fluticasone/Umeclidin/Vilanter [Trelegy Ellipta 100-62.5-25] 1 puff IH DAILY

02/22/22 


RX: Furosemide 20 mg PO DAILY 02/22/22 


RX: Gabapentin 300 mg PO BEDTIME 02/22/22 


RX: Levothyroxine [Synthroid*] 75 mcg PO [id number] 02/22/22 


RX: Memantine HCl 10 mg PO BID 02/22/22 


RX: Metoprolol Succinate 25 mg PO BEDTIME 02/22/22 


RX: Montelukast [Singulair*] 10 mg PO DAILY 02/22/22 


RX: Pantoprazole [Protonix Tab*] 40 mg PO DAILY 02/22/22 


RX: Potassium Chloride 20 meq PO BID 02/22/22 


RX: Quetiapine Fumarate [Seroquel] 25 mg PO BEDTIME 02/22/22 


RX: Simvastatin 40 mg PO BEDTIME 02/22/22 


RX: Tizanidine HCl 4 mg PO DAILY 02/22/22 


RX: Topiramate 100 mg PO BID 02/22/22 


RX: buPROPion HCL [Bupropion Xl] 300 mg PO DAILY 02/22/22 


Pataday 0.7% Drops  1 drop OPTH DAILY 09/13/22 


Propylene Glycol/Peg 400 [Lubricating Eye Drop] 1 drop OPTH BID 09/13/22 


RX: Acetaminophen 650 mg PO Q6H PRN 09/13/22 


RX: Benzonatate [Tessalon Perle*] 200 mg PO Q8H PRN 09/13/22 








Review of Systems


10-point ROS is otherwise unremarkable


General: Weakness, Malaise





<Vipul Luna Renan - Last Filed: 09/12/22 19:56>





Physical Examination





- Physical Exam


General: Alert, In no apparent distress, Oriented x3


HEENT: Atraumatic, PERRLA, Mucous membr. moist/pink, EOMI, Sclerae nonicteric


Neck: Supple, 2+ carotid pulse no bruit, No LAD, Without JVD or thyroid 

abnormality


Respiratory: Clear to auscultation bilaterally, Normal air movement


Cardiovascular: Regular rate/rhythm, Normal S1 S2


Capillary refill: <2 Seconds


Gastrointestinal: Normal bowel sounds, No tenderness


Musculoskeletal: No tenderness


Integumentary: No rashes, No significant lesion


Neurological: Normal gait, Normal speech, Normal tone, Sensation intact, Normal 

affect, Abnormal strength (4/5 Strength all extremities)





- Studies


Laboratory Data (last 24 hrs)





09/12/22 16:15: PT 15.7 H, INR 1.42


09/12/22 16:15: WBC 12.10 H, Hgb 14.0, Hct 43.3, Plt Count 225


09/12/22 16:15: Sodium 135 L, Potassium 4.1, BUN 14, Creatinine 1.28, Glucose 

199 H, Magnesium 1.9, Total Bilirubin 0.3, AST 7 L, ALT 15, Alkaline Phosphatase

100, Lipase 40 L








<Vipul Luna - Last Filed: 09/12/22 19:56>





Assessment and Plan





- Plan


Assessment:


Deconditioning/weakness with multiple falls


SIRS criteria


Atrial fibrillation on chronic anticoagulation therapy


COPD with home oxygen as needed


Hypertension


Hypothyroidism


GERD





Plan:


Deconditioning/weakness with multiple falls: CT head/chest abdomen pelvis 

negative for acute findings, patient denies syncope, chest pain, palpitations, 

headache prior to falls reports feeling as if her legs are "giving out on her". 

Patient had PT at home was recently discharged from PT services she uses a 

walker currently at her assisted living facility.  We will have her evaluated by

physical therapy as well as obtaining orthostatic vital signs.  Patient may 

require PT at discharge versus rehab.  Await further recommendations from PT.


SIRS criteria: Tachypnea and leukocytosis present as well as mildly elevated 

lactate. No source of infection identified. Will repeat lactate. 


Atrial fibrillation on chronic anticoagulation therapy: Continue home 

medications, monitor on telemetry, electrolyte protocol in place.


COPD with home oxygen as needed: Currently saturating 99% on room air no signs 

of exacerbation currently chest x-ray unremarkable.  We will continue patient's 

on medications and provide with as needed nebulizer treatments.


Hypertension: Continue medications once verified.


Hypothyroidism: Continue medications once verified.


GERD: Continue medications once verified.





DVT PPX: Continue Eliquis


Code status: Full code


Discharge Plan: Home


Plan to discharge in: 24 Hours





- Advance Directives


Does patient have a Living Will: No


Does patient have a Durable POA for Healthcare: Yes





- Code Status/Comfort Care


Code Status Assessed: Yes (Full code)


Critical Care: No


Time Spent Managing Pts Care (In Minutes): 55





<Vipul Luna - Last Filed: 09/12/22 19:56>


Physician Review: Patient Assessed, Agree with Above Assessment and Plan





<Chao Ramos - Last Filed: 09/13/22 19:11>

## 2022-09-12 NOTE — EDPHYS
Physician Documentation                                                                           

 [facility]                                                                 

Name: Leona Peres                                                                              

Age: 81 yrs                                                                                       

Sex: Female                                                                                       

: 1940                                                                                   

MRN: [id number]                                                                                   

Arrival Date: 2022                                                                          

Time: 14:15                                                                                       

Account#: [id number]                                                                            

Bed 4                                                                                             

Private MD:                                                                                       

ED Physician Og Cotter                                                                      

HPI:                                                                                              

                                                                                             

17:07 This 81 yrs old  Female presents to ER via EMS with complaints of General      ragini 

      Weakness.                                                                                   

17:07 WEAKNESS, MULTIPLE FALLS. Onset: The symptoms/episode began/occurred 5 day(s) ago.      ragini 

      Severity of symptoms: At their worst the symptoms were mild moderate in the emergency       

      department the symptoms are unchanged. The patient has experienced similar episodes in      

      the past, several times.                                                                    

                                                                                                  

Historical:                                                                                       

- Allergies:                                                                                      

14:18 clindamycin HCl (bulk);                                                                 jg9 

14:18 Darvocet-N 100;                                                                         jg9 

14:18 PENICILLINS;                                                                            jg9 

14:18 Strawberries;                                                                           jg9 

14:18 Sulfa (Sulfonamide Antibiotics);                                                        jg9 

- Home Meds:                                                                                      

14:18 alprazolam 0.25 mg Oral tab 1 tab twice a day for Anxiety [Active]; benzonatate 200 mg  jg9 

      Oral cap 1 cap 3 times per day [Active]; bupropion HCl 150 mg Oral Tb24 1 tab once          

      daily [Active]; bupropion HCl 300 mg Oral Tb24 1 tab once daily [Active]; clopidogrel       

      75 mg Oral tab 1 tab once daily [Active]; donepezil 10 mg Oral tab 1 tab once daily         

      [Active]; doxepin 50 mg Oral cap 1 cap once daily [Active]; duloxetine 60 mg Oral cpDR      

      2 caps once daily [Active]; Eliquis 5 mg Oral tab 2 times per day [Active]; Ellura 200      

      mg Oral cap daily [Active]; gabapentin 300 mg Oral cap 1 cap 3 times per day [Active];      

      hydrocodone-acetaminophen 7.5-325 mg Oral tab 1 tab every 8 hours [Active]; indapamide      

      2.5 mg Oral tab 1 tab once daily [Active]; levothyroxine 75 mcg tab 1 tab once daily        

      [Active]; magnesium oxide 500 mg Oral tab 500 mg after meals and before bedtime             

      [Active]; memantine 10 mg Oral tab 1 tab 2 times per day [Active]; metoprolol succinate     

      25 mg Oral Tb24 1 tab once daily [Active]; pantoprazole 40 mg Oral grps 1 packet once       

      daily [Active]; potassium chloride 20 mEq Oral TbER 1 tab 2 times per day [Active];         

      quetiapine 200 mg Oral Tb24 1 tab once daily [Active]; simvastatin 40 mg Oral tab 1 tab     

      once daily [Active]; tizanidine 4 mg Oral tab 1 tab daily [Active]; topiramate 200 mg       

      Oral cp24 1 cap twice a day [Active]; Trelegy Ellipta 100-62.5-25 mcg inhalation dsdv 1     

      puff once daily [Active];                                                                   

- PMHx:                                                                                           

14:18 AFIB; Alzheimer's disease; Cancer; COPD; Depression; Fibromyalgia; GERD;                jg9 

      Hyperlipidemia; Hypertension; Hypothyroidism; insomnia; left breast cancer; lymphedema;     

      skipped heart beats;                                                                        

- PSHx:                                                                                           

14:18 Total abdominal hysterectomy;                                                           jg9 

                                                                                                  

- Immunization history:: Adult Immunizations up to date.                                          

- Social history:: Smoking status: Patient denies any tobacco usage or history of.                

                                                                                                  

                                                                                                  

ROS:                                                                                              

17:08 Constitutional: Negative for fever, chills, and weight loss, Eyes: Negative for injury, ragini 

      pain, redness, and discharge, ENT: Negative for injury, pain, and discharge, Neck:          

      Negative for injury, pain, and swelling, Cardiovascular: Negative for chest pain,           

      palpitations, and edema, Respiratory: Negative for shortness of breath, cough,              

      wheezing, and pleuritic chest pain, Back: Negative for injury and pain, : Negative        

      for injury, bleeding, discharge, and swelling, Skin: Negative for injury, rash, and         

      discoloration, Psych: Negative for depression, anxiety, suicide ideation, homicidal         

      ideation, and hallucinations, Allergy/Immunology: Negative for hives, rash, and             

      allergies, Endocrine: Negative for neck swelling, polydipsia, polyuria, polyphagia, and     

      marked weight changes, Hematologic/Lymphatic: Negative for swollen nodes, abnormal          

      bleeding, and unusual bruising.                                                             

17:08 Abdomen/GI: Positive for nausea.                                                            

17:08 Neuro: Positive for dizziness, weakness.                                                    

                                                                                                  

Exam:                                                                                             

17:08 Constitutional:  This is a well developed, well nourished patient who is awake, alert,  ragini 

      and in no acute distress. Head/Face:  Normocephalic, atraumatic. Eyes:  Pupils equal        

      round and reactive to light, extra-ocular motions intact.  Lids and lashes normal.          

      Conjunctiva and sclera are non-icteric and not injected.  Cornea within normal limits.      

      Periorbital areas with no swelling, redness, or edema. ENT:  Nares patent. No nasal         

      discharge, no septal abnormalities noted.  Tympanic membranes are normal and external       

      auditory canals are clear.  Oropharynx with no redness, swelling, or masses, exudates,      

      or evidence of obstruction, uvula midline.  Mucous membranes moist. Neck:  Trachea          

      midline, no thyromegaly or masses palpated, and no cervical lymphadenopathy.  Supple,       

      full range of motion without nuchal rigidity, or vertebral point tenderness.  No            

      Meningismus. Chest/axilla:  Normal chest wall appearance and motion.  Nontender with no     

      deformity.  No lesions are appreciated. Cardiovascular:  Regular rate and rhythm with a     

      normal S1 and S2.  No gallops, murmurs, or rubs.  Normal PMI, no JVD.  No pulse             

      deficits. Respiratory:  Lungs have equal breath sounds bilaterally, clear to                

      auscultation and percussion.  No rales, rhonchi or wheezes noted.  No increased work of     

      breathing, no retractions or nasal flaring. Abdomen/GI:  Soft, non-tender, with normal      

      bowel sounds.  No distension or tympany.  No guarding or rebound.  No evidence of           

      tenderness throughout. Back:  No spinal tenderness.  No costovertebral tenderness.          

      Full range of motion. Female :  Normal external genitalia. Skin:  Warm, dry with          

      normal turgor.  Normal color with no rashes, no lesions, and no evidence of cellulitis.     

      MS/ Extremity:  Pulses equal, no cyanosis.  Neurovascular intact.  Full, normal range       

      of motion. Psych:  Awake, alert, with orientation to person, place and time.  Behavior,     

      mood, and affect are within normal limits.                                                  

17:08 Neuro: Orientation: is normal, appropriate for stated age, no acute changes, Mentation:     

      is normal, appropriate for stated age, no acute changes, Memory: is normal, appropriate     

      for stated age, no acute changes, Cranial nerves: grossly normal, is grossly normal         

      based on the patient's age, no acute changes, Cerebellar function: is grossly normal,       

      is grossly normal based on the patient's age, no acute changes, Motor: is normal, Gait:     

      not tested. seizure activity, is not displayed by the patient.                              

17:13 ECG was reviewed by the Attending Physician.                                            [facility] 

                                                                                                  

Vital Signs:                                                                                      

14:00  / 62; Pulse 65; Resp 20 S; Pulse Ox 100% on R/A;                                 jg9 

15:00  / 55; Pulse 65; Resp 18 S; Temp 98.3(O); Pulse Ox 99% on 2 lpm NC; Weight 108.5  jg9 

      kg (R); Height 5 ft. 2 in. (157.48 cm) (R);                                                 

16:00  / 59; Pulse 58; Resp 21 S; Pulse Ox 97% on 2 lpm NC;                             jg9 

17:00  / 56; Pulse 60; Resp 22 S; Pulse Ox 96% on 2 lpm NC;                             jg9 

18:00  / 80 LL; Pulse 55; Resp 17 S; Pulse Ox 98% on 2 lpm NC;                          jg9 

19:30  / 55; Pulse 57; Resp 16; Pulse Ox 97% on R/A;                                    jb4 

20:30  / 53; Pulse 54; Resp 16; Pulse Ox 95% on R/A;                                    jb4 

15:00 Body Mass Index 43.75 (108.50 kg, 157.48 cm)                                            jg9 

                                                                                                  

Procedures:                                                                                       

17:19 Peripheral line: by aseptic technique a peripheral line was placed in the left external [facility] 

      jugular vein.                                                                               

                                                                                                  

MDM:                                                                                              

14:48 Patient medically screened.                                                             [facility] 

17:12 Differential Diagnosis sepsis. Data reviewed: vital signs, nurses notes, lab test       [facility] 

      result(s), EKG, radiologic studies, CT scan, plain films. Data interpreted: Cardiac         

      monitor: rate is 58 beats/min, rhythm is regular, Pulse oximetry: on room air is 97 %.      

      Test interpretation: by ED physician or midlevel provider: ECG, plain radiologic            

      studies. Counseling: I had a detailed discussion with the patient and/or guardian           

      regarding: the historical points, exam findings, and any diagnostic results supporting      

      the discharge/admit diagnosis, lab results, radiology results, the need for further         

      work-up and treatment in the hospital.                                                      

                                                                                                  

                                                                                             

14:52 Order name: Basic Metabolic Panel; Complete Time: 16:59                                 [facility] 

                                                                                             

14:52 Order name: CBC with Diff; Complete Time: 16:59                                         [facility] 

                                                                                             

14:52 Order name: LFT's; Complete Time: 16:59                                                 [facility] 

                                                                                             

14:52 Order name: Magnesium; Complete Time: 16:59                                             [facility] 

                                                                                             

14:52 Order name: NT PRO-BNP; Complete Time: 16:59                                            [facility] 

                                                                                             

14:52 Order name: PT-INR; Complete Time: 16:45                                                [facility] 

                                                                                             

14:52 Order name: Troponin HS; Complete Time: 16:59                                           [facility] 

                                                                                             

14:52 Order name: Lipase; Complete Time: 16:59                                                [facility] 

                                                                                             

14:52 Order name: Blood Culture Adult (2)                                                     [facility] 

                                                                                             

14:52 Order name: Urine Culture                                                               [facility] 

                                                                                             

14:52 Order name: SARS RAPID; Complete Time: 16:45                                            [facility] 

                                                                                             

14:52 Order name: TSH; Complete Time: 16:59                                                   [facility] 

                                                                                             

14:52 Order name: Lactate; Complete Time: 16:59                                               [facility] 

                                                                                             

14:54 Order name: CK; Complete Time: 16:59                                                    [facility] 

                                                                                             

14:52 Order name: XRAY Chest (1 view); Complete Time: 16:45                                   [facility] 

                                                                                             

14:52 Order name: EKG; Complete Time: 14:53                                                   [facility] 

                                                                                             

14:52 Order name: Cardiac monitoring; Complete Time: 15:24                                    [facility] 

                                                                                             

14:52 Order name: EKG - Nurse/Tech; Complete Time: 15:24                                      [facility] 

                                                                                             

14:52 Order name: IV Saline Lock; Complete Time: 16:19                                        [facility] 

                                                                                             

14:54 Order name: CT Traumagram (Head C Spine CAP wo con)                                     [facility] 

                                                                                             

14:54 Order name: Ckmb; Complete Time: 16:59                                                  [facility] 

                                                                                             

14:58 Order name: Head C Spine Cap Wo Con; Complete Time: 16:45                               [facility]

                                                                                             

17:05 Order name: Urine Dipstick-Ancillary; Complete Time: 17:14                              [facility]

                                                                                             

20:10 Order name: Lactate Sepsis 2 HR Follow-up                                               [facility]

                                                                                             

14:52 Order name: Labs collected and sent; Complete Time: 16:19                               [facility] 

                                                                                             

14:52 Order name: O2 Per Protocol; Complete Time: 16:19                                       [facility] 

                                                                                             

14:52 Order name: O2 Sat Monitoring; Complete Time: 15:24                                     [facility] 

                                                                                             

14:52 Order name: Urine Dipstick-Ancillary (obtain specimen); Complete Time: 17:05            [facility] 

                                                                                                  

EC:13 Rate is 60 beats/min. Rhythm is regular. QRS Axis is Normal. [place] interval is normal. QRS ragini 

      interval is normal. QT interval is normal. No Q waves. T waves are Normal. No ST            

      changes noted. Clinical impression: NSR w/ Non-specific ST/T Changes and No evidence of     

      ischemia. Interpreted by me. Reviewed by me.                                                

                                                                                                  

Administered Medications:                                                                         

16:30 Drug: NS 0.9% 500 ml Route: IV; Rate: bolus; Site: right wrist;                         jg9 

17:33 Follow up: IV Status: Completed infusion; IV Intake: 500ml                              jg9 

16:30 Drug: Rocephin (cefTRIAXone) 1 grams Route: IV; Rate: per protocol; Site: right wrist;  jg9 

17:00 Follow up: IV Status: Completed infusion; IV Intake: 10ml                               jg9 

17:32 Not Given (Other Intervention Used): NS 0.9% 1000 ml IV at 125 ml/hr continuous         jg9 

17:32 Drug: NS 0.9% 1000 ml Route: IV; Rate: 1 bolus; Site: left jugular;                     jg9 

19:03 Follow up: IV Status: Completed infusion; IV Intake: 1000ml                             jg9 

19:09 Drug: Tylenol 500 mg Route: PO;                                                         jg9 

                                                                                                  

                                                                                                  

Disposition Summary:                                                                              

22 17:17                                                                                    

Hospitalization Ordered                                                                           

      Hospitalization Status: Inpatient Admission                                             ragini 

      Provider: Chao Ramos cha 

      [facility] ([facility]                                                 ragini 

      Condition: Fair                                                                         ragini 

      Problem: new                                                                            ragini 

      Symptoms: have improved                                                                 ragini 

      Bed/Room Type: Standard                                                                 ragini 

      Room Assignment: 431(22 20:15)                                                    bb  

      Diagnosis                                                                                   

        - Morbid (severe) obesity due to excess calories                                      ragini 

        - Weakness                                                                            ragini 

        - Repeated falls                                                                      ragini 

        - Abnormal finding of blood chemistry, unspecified - ELEVATED LACTATE                 ragini 

      Forms:                                                                                      

        - Medication Reconciliation Form                                                      ragini 

        - SBAR form                                                                           ragini 

Signatures:                                                                                       

Dispatcher [employer]                           EDMS                                                 

Og Cotter MD MD cha Ballard, Brenda, RN                     RN   Carri Muñoz RN                   RN   jg9                                                  

                                                                                                  

Corrections: (The following items were deleted from the chart)                                    

19:32 17:57 Lactate ordered. [facility]

20:15 17:17 ragini                                                                               bb  

                                                                                                  

**************************************************************************************************

## 2022-09-12 NOTE — RAD REPORT
EXAM DESCRIPTION:  Vanna Single View9/12/2022 3:43 pm

 

CLINICAL HISTORY:  cough

 

COMPARISON:  July 2022

 

FINDINGS:   The lungs appear clear of acute infiltrate. The heart is mildly enlarged

 

IMPRESSION:   No acute abnormalities displayed

## 2022-09-12 NOTE — ER
Nurse's Notes                                                                                     

 [facility]                                                                 

Name: Leona Peres                                                                              

Age: 81 yrs                                                                                       

Sex: Female                                                                                       

: 1940                                                                                   

MRN: [id number]                                                                                   

Arrival Date: 2022                                                                          

Time: 14:15                                                                                       

Account#: [id number]                                                                            

Bed 4                                                                                             

Private MD:                                                                                       

Diagnosis: Morbid (severe) obesity due to excess calories;Weakness;Repeated falls;Abnormal finding

  of blood chemistry, unspecified-ELEVATED LACTATE                                                

                                                                                                  

Presentation:                                                                                     

                                                                                             

14:00 Chief complaint: EMS states: Patient has had multiple falls over the last week that we  jg9 

      have gone out to assist her on by helping her off the floor, today was the 4th time and     

      today patient c/o generalized weakness. Coronavirus screen: Vaccine status: Patient         

      reports receiving the 2nd dose of the covid vaccine. Ebola Screen: Patient negative for     

      fever greater than or equal to 101.5 degrees Fahrenheit, and additional compatible          

      Ebola Virus Disease symptoms Patient denies exposure to infectious person. Patient          

      denies travel to an Ebola-affected area in the 21 days before illness onset. Initial        

      Sepsis Screen: Does the patient meet any 2 criteria? No. Patient's initial sepsis           

      screen is negative. Does the patient have a suspected source of infection? No.              

      Patient's initial sepsis screen is negative. Risk Assessment: Do you want to hurt           

      yourself or someone else? Patient reports no desire to harm self or others. Onset of        

      symptoms is unknown.                                                                        

14:00 Method Of Arrival: EMS: [facility]                                                jg9 

14:00 Acuity: ANUSHKA 3                                                                           jg9 

                                                                                                  

Triage Assessment:                                                                                

14:00 General: Appears in no apparent distress. Behavior is calm, cooperative. Pain:          jg9 

      Complains of pain in left lateral ankle Pain currently is 2 out of 10 on a pain scale.      

      EENT: No deficits noted. Neuro: Reports weakness generalized. Cardiovascular: No            

      deficits noted. Respiratory: No deficits noted. GI: No deficits noted. : No deficits      

      noted. Derm: Bruising that is on forehead, right ear and right temple. Musculoskeletal:     

      Reports weakness in right arm, left arm, right leg and left leg.                            

                                                                                                  

Historical:                                                                                       

- Allergies:                                                                                      

14:18 clindamycin HCl (bulk);                                                                 jg9 

14:18 Darvocet-N 100;                                                                         jg9 

14:18 PENICILLINS;                                                                            jg9 

14:18 Strawberries;                                                                           jg9 

14:18 Sulfa (Sulfonamide Antibiotics);                                                        g9 

- Home Meds:                                                                                      

14:18 alprazolam 0.25 mg Oral tab 1 tab twice a day for Anxiety [Active]; benzonatate 200 mg  jg9 

      Oral cap 1 cap 3 times per day [Active]; bupropion HCl 150 mg Oral Tb24 1 tab once          

      daily [Active]; bupropion HCl 300 mg Oral Tb24 1 tab once daily [Active]; clopidogrel       

      75 mg Oral tab 1 tab once daily [Active]; donepezil 10 mg Oral tab 1 tab once daily         

      [Active]; doxepin 50 mg Oral cap 1 cap once daily [Active]; duloxetine 60 mg Oral cpDR      

      2 caps once daily [Active]; Eliquis 5 mg Oral tab 2 times per day [Active]; Ellura 200      

      mg Oral cap daily [Active]; gabapentin 300 mg Oral cap 1 cap 3 times per day [Active];      

      hydrocodone-acetaminophen 7.5-325 mg Oral tab 1 tab every 8 hours [Active]; indapamide      

      2.5 mg Oral tab 1 tab once daily [Active]; levothyroxine 75 mcg tab 1 tab once daily        

      [Active]; magnesium oxide 500 mg Oral tab 500 mg after meals and before bedtime             

      [Active]; memantine 10 mg Oral tab 1 tab 2 times per day [Active]; metoprolol succinate     

      25 mg Oral Tb24 1 tab once daily [Active]; pantoprazole 40 mg Oral grps 1 packet once       

      daily [Active]; potassium chloride 20 mEq Oral TbER 1 tab 2 times per day [Active];         

      quetiapine 200 mg Oral Tb24 1 tab once daily [Active]; simvastatin 40 mg Oral tab 1 tab     

      once daily [Active]; tizanidine 4 mg Oral tab 1 tab daily [Active]; topiramate 200 mg       

      Oral cp24 1 cap twice a day [Active]; Trelegy Ellipta 100-62.5-25 mcg inhalation dsdv 1     

      puff once daily [Active];                                                                   

- PMHx:                                                                                           

14:18 AFIB; Alzheimer's disease; Cancer; COPD; Depression; Fibromyalgia; GERD;                jg9 

      Hyperlipidemia; Hypertension; Hypothyroidism; insomnia; left breast cancer; lymphedema;     

      skipped heart beats;                                                                        

- PSHx:                                                                                           

14:18 Total abdominal hysterectomy;                                                           jg9 

                                                                                                  

- Immunization history:: Adult Immunizations up to date.                                          

- Social history:: Smoking status: Patient denies any tobacco usage or history of.                

                                                                                                  

                                                                                                  

Screenin:21 Abuse screen: Denies threats or abuse. Denies injuries from another. Nutritional        jg9 

      screening: No deficits noted. Fall Risk Fall in past 12 months (25 points).                 

14:23 Tuberculosis screening: No symptoms or risk factors identified.                         jg9 

                                                                                                  

Assessment:                                                                                       

15:00 Reassessment: No changes from previously documented assessment. Patient and/or family   jg9 

      updated on plan of care and expected duration. Pain level reassessed. Patient is alert,     

      oriented x 3, equal unlabored respirations, skin warm/dry/pink.                             

16:00 Reassessment: No changes from previously documented assessment. Patient and/or family   jg9 

      updated on plan of care and expected duration. Pain level reassessed. Patient is alert,     

      oriented x 3, equal unlabored respirations, skin warm/dry/pink.                             

17:00 Reassessment: No changes from previously documented assessment. Patient and/or family   jg9 

      updated on plan of care and expected duration. Pain level reassessed. Patient is alert,     

      oriented x 3, equal unlabored respirations, skin warm/dry/pink.                             

18:00 Reassessment: No changes from previously documented assessment. Patient and/or family   jg9 

      updated on plan of care and expected duration. Pain level reassessed. Patient is alert,     

      oriented x 3, equal unlabored respirations, skin warm/dry/pink.                             

19:00 Reassessment: Patient appears in no apparent distress at this time. Patient and/or      jb4 

      family updated on plan of care and expected duration. Pain level reassessed. Patient is     

      alert, oriented x 3, equal unlabored respirations, skin warm/dry/pink.                      

20:54 Reassessment: Patient appears in no apparent distress at this time. Patient and/or      jb4 

      family updated on plan of care and expected duration. Pain level reassessed. Patient is     

      alert, oriented x 3, equal unlabored respirations, skin warm/dry/pink.                      

                                                                                                  

Vital Signs:                                                                                      

14:00  / 62; Pulse 65; Resp 20 S; Pulse Ox 100% on R/A;                                 jg9 

15:00  / 55; Pulse 65; Resp 18 S; Temp 98.3(O); Pulse Ox 99% on 2 lpm NC; Weight 108.5  jg9 

      kg (R); Height 5 ft. 2 in. (157.48 cm) (R);                                                 

16:00  / 59; Pulse 58; Resp 21 S; Pulse Ox 97% on 2 lpm NC;                             jg9 

17:00  / 56; Pulse 60; Resp 22 S; Pulse Ox 96% on 2 lpm NC;                             jg9 

18:00  / 80 LL; Pulse 55; Resp 17 S; Pulse Ox 98% on 2 lpm NC;                          jg9 

19:30  / 55; Pulse 57; Resp 16; Pulse Ox 97% on R/A;                                    jb4 

20:30  / 53; Pulse 54; Resp 16; Pulse Ox 95% on R/A;                                    jb4 

15:00 Body Mass Index 43.75 (108.50 kg, 157.48 cm)                                            jg9 

                                                                                                  

ED Course:                                                                                        

14:15 Patient arrived in ED.                                                                  jg9 

14:15 Carri Hayes, RN is Primary Nurse.                                                 jg9 

14:18 Triage completed.                                                                       jg9 

14:23 Arm band placed on right wrist.                                                         jg9 

14:23 Patient has correct armband on for positive identification. Bed in low position. Call   jg9 

      light in reach. Side rails up X 1.                                                          

14:48 Og Cotter MD is Attending Physician.                                             ragini 

15:33 Head C Spine Cap Wo Con In Process Unspecified.                                         EDMS

15:45 XRAY Chest (1 view) In Process Unspecified.                                             EDMS

16:00 Inserted saline lock: 22 gauge in right wrist, using aseptic technique. Blood collected.jg9 

17:15 Inserted left jugular site performed by BOBBI Cotter 18g.                                jg9 

17:16 Chao Ramos MD is Hospitalizing Provider.                                            ragini 

21:28 No provider procedures requiring assistance completed. Patient admitted, IV remains in  jb4 

      place.                                                                                      

                                                                                                  

Administered Medications:                                                                         

16:30 Drug: NS 0.9% 500 ml Route: IV; Rate: bolus; Site: right wrist;                         jg9 

17:33 Follow up: IV Status: Completed infusion; IV Intake: 500ml                              jg9 

16:30 Drug: Rocephin (cefTRIAXone) 1 grams Route: IV; Rate: per protocol; Site: right wrist;  jg9 

17:00 Follow up: IV Status: Completed infusion; IV Intake: 10ml                               jg9 

17:32 Not Given (Other Intervention Used): NS 0.9% 1000 ml IV at 125 ml/hr continuous         jg9 

17:32 Drug: NS 0.9% 1000 ml Route: IV; Rate: 1 bolus; Site: left jugular;                     jg9 

19:03 Follow up: IV Status: Completed infusion; IV Intake: 1000ml                             jg9 

19:09 Drug: Tylenol 500 mg Route: PO;                                                         jg9 

                                                                                                  

                                                                                                  

Intake:                                                                                           

17:00 IV: 10ml; Total: 10ml.                                                                  jg9 

17:33 IV: 500ml; Total: 510ml.                                                                jg9 

19:03 IV: 1000ml; Total: 1510ml.                                                              jg9 

                                                                                                  

Outcome:                                                                                          

17:17 Decision to Hospitalize by Provider.                                                    ragini 

21:28 Admitted to Tele accompanied by tech, via stretcher, room 431, with chart.              jb4 

21:28 Condition: stable                                                                           

21:28 Discharge instructions given to patient, Instructed on the need for admit, Demonstrated     

      understanding of instructions.                                                              

21:29 Patient left the ED.                                                                    jb4 

                                                                                                  

Signatures:                                                                                       

Dispatcher [employer]                           EDMS                                                 

Og Cotter MD MD cha Bryson, James, RN                       RN   jb4                                                  

Carri Hayes, ZHAO                   RN   jg9                                                  

                                                                                                  

Corrections: (The following items were deleted from the chart)                                    

18:16 15:00  / 55; Pulse 65bpm; Resp 18bpm; Spontaneous; Pulse Ox 99% 2 lpm Nasal       jg9 

      Cannula; jg9                                                                                

                                                                                                  

**************************************************************************************************

## 2022-09-12 NOTE — RAD REPORT
EXAM DESCRIPTION:

CT - Head C Spine Cap Wo Con - 9/12/2022 3:31 pm

 

CLINICAL HISTORY:  Head and neck injury with chest and abdominal pain status post fall

 

TECHNIQUE:

Computed axial tomography of head, neck, chest, abdomen and pelvis obtained. IV and oral contrast not
 requested. Coronal and sagittal reconstruction performed.

 

All CT scans are performed using dose optimization technique as appropriate and may include automated
 exposure control or mA/KV adjustment according to patient size.

 

COMPARISON:  August 2022

 

FINDINGS:

An intracranial bleed is not seen.

 

The ventricles are normal in caliber. An extra-axial fluid collection is not noted. .

 

Fluid within the sinuses/mastoids is not seen.

 

A cervical fracture is not seen. No dislocation is noted. Postsurgical changes

 

The evaluation of mediastinum, t[place], vessels, solid organs and bowel are limited secondary to the lac
k of contrast administration.

 

A mediastinal hematoma is not noted. A pleural effusion is not seen. A lung contusion is not present.


 

The liver,spleen, pancreas, adrenals,kidneys and bladder do not demonstrate a traumatic injury

 

Splenic and renal cysts

 

IMPRESSION:

 No acute intracranial abnormality is seen.

 

 A cervical fracture is not visualized. If the patient continues have symptoms to suggest intracrania
l/spinal cord pathology MRI be recommended

 

 No traumatic abnormality involving the chest/abdomen/pelvis.

## 2022-09-12 NOTE — XMS REPORT
Continuity of Care Document

                          Created on:2022



Patient:LEONA PERES

Sex:Female

:1940

External Reference #:[id number]





Demographics







                          Address                   [address] 



                                                    [place] [address]

 

                          Home Phone                [phone]

 

                          Work Phone                [phone]

 

                          Mobile Phone              [phone]

 

                          Email Address             [email or web address]

 

                          Preferred Language        English

 

                          Marital Status            Unknown

 

                          Latter day Affiliation     Unknown

 

                          Race                      Unknown

 

                          Additional Race(s)        Unavailable



                                                    White

 

                          Ethnic Group              Unknown









Author







                          Organization              [facility]

t

 

                          Address                   [address]



                                                    [place] [address]

 

                          Phone                     [phone]









Support







                Name            Relationship    Address         Phone

 

                BLAINE PERES Unavailable     [id number] [facility]    [phone]



                                                [place] [address] 

 

                GIVEN, NONE     Unavailable     [address] [facility]    [phone]



                                                [place] [address] 

 

                Leona Peres Unavailable     [address] [facility] [phone]



                                                [place] [address] 

 

                magy green   Child           Unavailable     [phone]

 

                HomeroFrancesMaida Other           Unavailable     [phone]

 

                Blaine Peres Spouse          [address]   [phone]



                                                [place] [address] 

 

                Kody Peres Child           Unavailable     [phone]

 

                MISSY CALABRESE               Unavailable     [phone]









Care Team Providers







                    Name                Role                Phone

 

                    Denita RIZVI, Kendall    Primary Care Physician [phone]

 

                    Rebecca Cerrato       Attending Clinician Unavailable

 

                    Nikolas Mcwilliams       Attending Clinician Unavailable

 

                    [id number]              Attending Clinician Unavailable

 

                    SELIN CHRISTOPHER Attending Clinician Unavailable

 

                    Steven Cannon Attending Clinician [phone]

 

                    Doctor Unassigned, No Name Attending Clinician Unavailable

 

                    Aftab Urrutia  Attending Clinician [phone]

 

                    Carson Mead MD Attending Clinician [phone]

 

                    Rahul RIZVI, Tara Benson Attending Clinician [phone]

[phone]

 

                    Selin Christopher MD Attending Clinician [phone]

 

                    Say Ramos MD Attending Clinician [phone]

 

                    Ashwin RIZVI, Arias BURNS Attending Clinician [phone]

 

                    Ellis Mcadams MD     Attending Clinician [phone]

 

                    Kendall Barger     Admitting Clinician Unavailable

 

                    [id number]              Admitting Clinician Unavailable

 

                    SELIN CHRISTOPHER Admitting Clinician Unavailable

 

                    SAY RAMOS Admitting Clinician Unavailable









Payers







           Payer Name Policy Type Policy Number Effective Date Expiration Date S

ource







Problems







       Condition Condition Condition Status Onset  Resolution Last   Treating Co

mments 

Source



       Name   Details Category        Date   Date   Treatment Clinician        



                                                 Date                 

 

       Atrial Atrial Disease Active                              [facility]



       fibrillati fibrillati               8-16                               [facility]

kes



       on with on with               00:00:                             [facility]



       [facility]    [facility]                  00                                 Center

 

       At risk   At risk Problem Active 2017-0        2022-06-10               [facility]

[facility]



       for falls for falls               5-02          04:45:19               l



       (finding) (finding)               00:00:                             [facility]

carla



              Active               00                                 



              2017                                                  



              Problem                                                  



              06/10/2022                                                  



               [facility]                                                  



              Ne[facility]                                                   

 

       Hallucinat  Hallucina Problem Active 2017-0        2022-06-10            

   [facility]



       ions   tions                5-          04:45:19               l



       (finding) (finding)               00:00:                             [facility]

carla



              Active               00                                 



              2017                                                  



              Problem                                                  



              06/10/2022                                                  



              [facility]                                                  



              Neuro                                                   

 

       Status Status Disease Active 2016                             [facility]



       post total post total               2-20                               it

y of



       knee   knee                 00:00:                             [place]



       replacemen replacemen               00                                 Me

dical



       t, left t, left                                                  Branch

 

       Morbid Morbid Disease Active 2016                             [facility]



       obesity obesity               2-20                               ity of



       with body with body               00:00:                             [place]

[place]



       mass index mass index               00                                 Me

dical



       of 50 or of 50 or                                                  Branch



       higher higher                                                  

 

       Morbid Morbid Disease Active 2016                             [facility]



       obesity obesity               2-20                               ity of



       with body with body               00:00:                             [place]

[place]



       mass index mass index               00                                 Me

dical



       of     of                                                      Branch



       40.0-49.9 40.0-49.9                                                  

 

       Paresthesi  Paresthes Problem Active 2016-0        2022-06-10            

   [facility]



       a      ia                   4-20          04:45:19               l



       (finding) (finding)               00:00:                             [facility]

carla



              Active               00                                 



              2016                                                  



              Problem                                                  



              06/10/2022                                                  



               [facility]                                                  



              Neuro                                                   

 

       Essential  Essential Problem Active 2015-1        2022-06-10             

  [facility]



       hypertensi hypertensi               2-16          04:45:19               

l



       on     on                   00:00:                             [place]



       (disorder) (disorder)               00                                 



              Active                                                  



              2015                                                  



              Problem                                                  



              06/10/2022                                                  



               [facility]                                                  



              Neuro                                                   

 

       Essential  Essential Problem Active 2015-1        2022-06-10             

  [facility]



       tremor tremor               2-16          04:45:19               l



       (disorder) (disorder)               00:00:                             He

rmann



              Active               00                                 



              2015                                                  



              Problem                                                  



              06/10/2022                                                  



              [facility]                                                  



              Neuro                                                   

 

       CORNELIA    CORNELIA    Disease Active                                    [facility]



       (obstructi (obstructi                                                  [facility]

kes



       ve sleep ve sleep                                                  [facility]

l



       apnea) apnea)                                                  Center

 

       Carpal  Carpal Problem Active               2022-06-10               [facility]

remi



       tunnel tunnel                             04:45:19               l



       syndrome syndrome                                                  [place]

n



       (disorder) (disorder)                                                  



              Active                                                  



              Problem                                                  



              06/10/2022                                                  



              [facility]                                                  



              Neuro                                                   

 

       Dementia  Dementia Problem Active               2022-06-10               

[facility]



       (disorder) (disorder)                             04:45:19               

l



              Active                                                  [place]



              Problem                                                  



              06/10/2022                                                  



              [facility]                                                  



              Neuro                                                   

 

       Gout    Gout  Problem Active               2022-06-10               [facility]

ia



       (disorder) (disorder)                             04:45:19               

l



              Active                                                  [facility]



              Problem                                                  



              06/10/2022                                                  



              [facility]                                                  



              Neuro                                                   

 

       Hyperlipid  Hyperlipi Problem Active               2022-06-10            

   [facility]



       emia   demia                              04:45:19               l



       (disorder) (disorder)                                                  He

rmann



              Active                                                  



              Problem                                                  



              06/10/2022                                                  



               [facility]                                                  



              Neuro                                                   

 

       Depressive  Depressiv Problem Active               2022-06-10            

   [facility]



       disorder e disorder                             04:45:19               l



       (disorder) (disorder)                                                  He

rmann



              Active                                                  



              Problem                                                  



              06/10/2022                                                  



              [facility]                                                  



              Neuro                                                   

 

       Fibromyalg  Fibromyal Problem Active               2022-06-10            

   [facility]



       ia     marlen                                04:45:19               l



       (disorder) (disorder)                                                  He

rmann



              Active                                                  



              Problem                                                  



              06/10/2022                                                  



              [facility]                                                  



              Neuro                                                   

 

       Concussion  Concussio Problem Active               2022-06-10            

   [facility]



       injury of n injury                             04:45:19               l



       body   of body                                                  [facility]



       structure structure                                                  



       (disorder) (disorder)                                                  



              Active                                                  



              Problem                                                  



              06/10/2022                                                  



              [facility]                                                  



              Neuro                                                   

 

       Polymyalgi  Polymyalg Problem Active               2022-06-10            

   [facility]



       a      ia                                 04:45:19               l



       rheumatica rheumatica                                                  He

rmann



       (disorder) (disorder)                                                  



              Active                                                  



              Problem                                                  



              06/10/2022                                                  



              [facility]                                                  



              Neuro                                                   

 

       Recurrent  Recurrent Problem Active               2022-06-10             

  [facility]



       falls  falls                              04:45:19               l



       (finding) (finding)                                                  [facility]

carla



              Active                                                  



              Problem                                                  



              06/10/2022                                                  



              [facility]                                                  



              Neuro                                                   

 

       Transient  Transient Problem Active               2022-06-10             

  [facility]



       ischemic ischemic                             04:45:19               l



       attack attack                                                  [facility]



       (disorder) (disorder)                                                  



               Active                                                  



              Problem                                                  



              06/10/2022                                                  



              [facility]                                                  



              Neuro                                                   

 

       Syncope  Syncope Problem Active               2022-06-10               Me

izaiaha



       (disorder) (disorder)                             04:45:19               

l



              Active                                                  [facility]



              Problem                                                  



              06/10/2022                                                  



              [facility]                                                  



              Neuro                                                   

 

       Tremor   Tremor Problem Resolve -0 2022-06-10 2022-06-10             

  [facility]



       (finding) (finding)        d      -   04:45:19 04:45:19               

l



              Resolved               00:00:                             [place]



              2017               00                                 



              Problem                                                  



              06/10/2022                                                  



               [facility]                                                  



              Neuro                                                   

 

       Amnesia  Amnesia Problem Resolve 2015-1 2022-06-10 2022-06-10            

   [facility]



       (finding) (finding)        d      -   04:45:19 04:45:19               

l



              Resolved               00:00:                             [facility]



              2015               00                                 



              Problem                                                  



              06/10/2022                                                  



              [facility]                                                  



              Neuro                                                   







Allergies, Adverse Reactions, Alerts







       Allergy Allergy Status Severity Reaction(s) Onset  Inactive Treating Comm

ents 

Source



       Name   Type                        Date   Date   Clinician        

 

       STRAWBER Allergy Active Low    Rash   -0                      [facility]



       RY                                 8-17                        [facility]



                                          [facility]                      [facility]



                                          00                          Center

 

       Strawber Propensi Active        Rash   -0                      [facility]



       ry     ty to                       8-17                        [facility]



              adverse                      00:00:                      [facility]



              reaction                      00                          Center



              s                                                       

 

       CLINDAMY Allergy Active High   Rash   -0                      [facility]



       JAYNA HCL                             8-16                        [facility]



                                          00:00[facility]                      [facility]



                                          00                          Center

 

       PENICILL Allergy Active High   Rash   -0                      [facility]



       IN                                 8-16                        [facility]



                                          00:00:                      [facility]



                                          00                          Center

 

       SULFA  Allergy Active High   Rash   -0                      [facility]



       (SULFONA                             8-16                        [facility]



       MIDE                               00:00:                      Medical



       ANTIBIOT                             00                          Center



       [facility])                                                           

 

       Clindamy Propensi Active        Rash   -0                      [facility]



       jayna [facility]cl ty to                       8-16                        [facility]



              adverse                      00:00:                      Medical



              reaction                      00                          Center



              s                                                       

 

       Penicill Propensi Active        Rash   -0                      [facility]



       in     ty to                       8-16                        [facility]



              adverse                      00:00:                      Medical



              reaction                      00                          Center



              s                                                       

 

       Sulfa  Propensi Active        Rash   -0                      [facility]



       (Sulfona ty to                       8-16                        [facility]



       mide   adverse                      00:00:                      Medical



       Antibiot reaction                      00                          Center



       [facility])   s                                                       

 

       Clindamy Propensi Active        Unknown 2020-0                      [facility]

di



       jayna    ty to                Reaction 6-30                        st



       Phosphat adverse                      00:00:                      [facility]



       e      reaction                      00                          l



              s to                                                    



              drug                                                    

 

       Propoxyp Propensi Active        Unknown 2020-0                      [facility]

di



       hene   ty to                Reaction 630                        st



       N-Acetam adverse                      00:00:                      [facility]



       inophen reaction                      00                          l



              s to                                                    



              drug                                                    

 

       Penicill Propensi Active        Unknown 2020-0                      [facility]

di



       ins    ty to                Reaction 630                        st



              adverse                      00:00:                      [facility]ospita



              reaction                      00                          l



              s to                                                    



              drug                                                    

 

       Sulfa  Propensi Active        Unknown 2020-0                      [facility]



       (Sulfona ty to                Reaction 630                        st



       mide   adverse                      00:00:                      [facility]osp[facility]



       Antibiot reaction                      00                          l



       ics)   s to                                                    



              drug                                                    

 

       Acetamin Propensi Active        Unknown 2020-0                      [facility]

di



       ophen  ty to                Reaction 630                        st



              adverse                      00:00:                      [facility]ospita



              reaction                      00                          l



              s to                                                    



              drug                                                    

 

       Penicill Propensi Active        Unknown 2020-0                      [facility]

di



       ins    ty to                Reaction 630                        st



              adverse                      00:00:                      [facility]



              reaction                      00                          l



              s to                                                    



              drug                                                    

 

       Acetamin Propensi Active        Unknown - 2020-0                      Uni

vers



       ophen  ty to                See comments 6-30                        ity 

of



              adverse                      00:00:                      [place]



              reaction                      00                          Medical



              s                                                       Branch

 

       Propoxyp Propensi Active        Unknown - 2020-0                      Uni

vers



       hene   ty to                See comments 6-30                        ity 

of



       N-Acetam adverse                      00:00:                      [place]



       inophen reaction                      00                          Medical



              s                                                       Branch

 

       Clindamy Propensi Active        Rash   2016                      Univer

s



       jayna Hcl ty to                       2-19                        ity of



              adverse                      00:00:                      [place]



              reaction                      00                          Medical



              s                                                       Branch

 

       Penicill Propensi Active        Anaphylaxis 2016                      U

nivers



       in     ty to                       2-19                        ity of



              adverse                      00:00:                      [place]



              reaction                      00                          Medical



              s                                                       Branch

 

       Sulfa  Propensi Active        Rash   2016                      [facility]



       (Sulfona ty to                       2-19                        ity of



       mide   adverse                      00:00:                      [place]



       Antibiot reaction                      00                          Medica

l



       ics)   s                                                       Branch

 

       Sulfa  Propensi Active        Rash   2016                      [facility]



       (Sulfona ty to                       2-19                        ity of



       mide   adverse                      00:00:                      [place]



       Antibiot reaction                      00                          Medica

l



       ics)   s                                                       Branch

 

       sulfa  sulfa  Active Mild                                      [facility]



       drugs  drugs                                                   l



                                                                      [facility]

 

       penicill penicill Active                                           [facility]

a



       ins    ins                                                     l



                                                                      [facility]







Family History







           Family Member Diagnosis  Comments   Start Date Stop Date  Source

 

           Natural mother Asthma                                      [facility] father Diabetes                                    [facility] Heart disease                                  [facility] Hypertension                                  [facility]







Social History







           Social Habit Start Date Stop Date  Quantity   Comments   Source

 

           History SDOH                                             [facility]



           Alcohol Std Drinks                                             [facility]

l Center

 

           History SDOH                                             [facility]



           Alcohol Binge                                             [facility]

ter

 

           History SDOH                                             [facility]



           Alcohol Comment                                             [facility]

enter

 

           History of tobacco                       Smoker                [facility]

ist



           use                                                    [facility]ospital

 

           [facility] 2022 [facility] sure              [facility]00   14[facility]26:00                         [facility]

 

           History SDOH 2021 1                     [facility]



           Alcohol Frequency 00:00:00   00:00:00                         [facility]

 

           Tobacco use and 2021 Never used            [facility]

kes



           exposure   00:00:00   00:00:00                         [facility]

 

           Alcohol intake 2021 Lifetime              [facility]

es



                      00:00:00   00:00:00   non-drinker            [facility] [place]

r



                                            (finding)             

 

           Cigarettes smoked 2020                       [facility]

st



           current (pack per 00:00:00   00:00:00                         Hospita

l



           day) - Reported                                             

 

           Cigarette  2020                       [facility]



           pack-years 00:00:00   00:00:00                         Hospital

 

           Sex Assigned At [birth year]                       [facility]

[facility]



           Birth      00:00:00   00:00:00                         [facility]









                Smoking Status  Start Date      Stop Date       Source

 

                Never smoker                                    [facility]

 

                Social History  2022 22:10:18 2022 22:10:18 [facility]







Medications







       Ordered Filled Start  Stop   Current Ordering Indication Dosage Frequency

 Signature

                    Comments            Components          Source



     Medication Medication Date Date Medication? Clinician                (SIG) 

          



     Name Name                                                   

 

     topiramate            Yes                      100 mg = 1           M

emoria



     100 mg oral      5-19                               tab, PO,           l



     tablet      23:43:                               BID, # 180           Re[facility]a

nn



               00                                 tab, 2           



                                                  Refill(s),           



                                                  Pharmacy:           



                                                  [facility],           



                                                  154.94,           



                                                  cm,            



                                                  08/10/21           



                                                  15:33:00           



                                                  CDT,           



                                                  Height,           



                                                  93.75, kg,           



                                                  08/10/21           



                                                  15:33:00           



                                                  CDT,           



                                                  Weight           

 

     memantine            Yes                      = 1 tab,           [facility]

remi



     10 mg oral      5-19                               PO, BID, #           l



     tablet      23:42:                               180 tab, 2           [facility]

nn



               00                                 Refill(s),           



                                                  Pharmacy:           



                                                  [facility],           



                                                  154.94,           



                                                  cm,            



                                                  08/10/21           



                                                  15:33:00           



                                                  CDT,           



                                                  Height,           



                                                  93.75, kg,           



                                                  08/10/21           



                                                  15:33:00           



                                                  CDT,           



                                                  Weight           

 

     Flecainide            No                       50 mg = 1           Me

moria



     Acetate 50      1-04                               tab, PO,           l



     MG Oral      22:34:                               Q12H, #           [facility]



     Tablet      00                                 180 tab, 0           



     [Tambocor]                                         Refill(s)           

 

     apixaban 5            Yes                      5 mg, PO,           Me

moria



     MG Oral      1-04                               Q12H, tab,           l



     Tablet      22:33:                               0              [place]



     [Eliquis]      00                                 Refill(s)           

 

     Furosemide            Yes                      20 mg = 1           Me

moria



     20 MG Oral      1-04                               tab, PO,           l



     Tablet      22:33:                               Daily, #           [facility]



     [Lasix]      00                                 30 tab, 0           



                                                  Refill(s)           

 

     quetiapine            Yes                      100 mg = 1           M

emoria



     100 MG Oral      1-04                               tab, PO,           l



     Tablet      22:33:                               Daily, 0           [place]



     [Seroquel]      00                                 Refill(s)           

 

     Levofloxaci            Yes                      See            [facility]

a



     n 750 MG      -04                               Instructio           l



     Oral Tablet      22:32:                               ns, 2 tab           H

ermann



     [Levaquin]      00                                 PO Q24H, 0           



                                                  Refill(s)           

 

     topiramate      2021      Yes                      100 mg = 1           M

[facility]



     100 mg oral      2-10                               tab, PO,           l



     tablet      17:37:                               BID, # 180           [facility]

nn



               00                                 tab, 2           



                                                  Refill(s),           



                                                  154.94,           



                                                  cm,            



                                                  08/10/21           



                                                  15:33:00           



                                                  CDT,           



                                                  Height,           



                                                  93.75, kg,           



                                                  08/10/21           



                                                  15:33:00           



                                                  CDT,           



                                                  Weight           

 

     montelukast      2021      Yes                      10 mg = 1           M

emoria



     10 mg oral      2-10                               tab, PO,           l



     tablet      17:27:                               Bedtime, #           [facility]

nn



               00                                 90 tab, 1           



                                                  Refill(s)           

 

     Trelegy      2021      Yes                      = 1            [facility]



     Ellipta 100      2-10                               inhalation           l



     mcg-62.5      17:26:                               , PO,           [place]



     mcg-25      00                                 Daily, # 1           



     mcg/inh                                         ea, 0           



     inhalation                                         Refill(s)           



     powder                                                        

 

     levothyroxi      2021      Yes                      75             [facility]

a



     ne 75 mcg      2-10                               microgram           l



     (0.075 mg)      17:26:                               = 1 tab,           Her

hess



     oral tablet      00                                 PO, Daily,           



                                                  # 90 tab,           



                                                  1              



                                                  Refill(s)           

 

     flecainide      2021      Yes                      50 mg = 1           Me

moria



     50 mg oral      2-10                               tab, PO,           l



     tablet      17:25:                               Q12H, #           [place]



               00                                 180 tab, 3           



                                                  Refill(s)           

 

     buPROPion            Yes                                     [facility]



      mg      8-26                                              ity of



     24 hr      00:00:                                              [place]



     tablet      00                                                [facility]

 

     buPROPion            Yes                                     [facility]



      mg      8-26                                              ity of



     24 hr      00:00:                                              [place]



     tablet                                                      [facility]

 

     buPROPion            Yes                                     [facility]



      mg      8-26                                              ity of



     24 hr      00:00:                                              [place]



     tablet      00                                                [facility]

 

     buPROPion            Yes                                     [facility]



      mg      8-26                                              ity of



     24 hr      00:00:                                              [place]



     tablet      00                                                [facility]

 

     buPROPion            Yes                                     [facility]



      mg      8-26                                              ity of



     24 hr      00:00:                                              [place]



     tablet      00                                                [facility]

 

     furosemide      2022- No             20mg QD   Take 1           [facility]



     (LASIX) 20      --23                          tablet (20           [facility]

kes



     MG tablet      00:00: 23:59                          mg total)           Me

dical



               00   :00                           by mouth           Center



                                                  daily.           

 

     furosemide      2022- No             20mg QD   Take 1           [facility]



     (LASIX) 20      8--23                          tablet (20           [facility]

kes



     MG tablet      00:00: 23:59                          mg total)           Me

dical



               00   :00                           by mouth           Center



                                                  daily.           

 

     zolpidem            Yes            10mg      Take 10 mg           [facility]



     (AMBIEN) 10      -                               by mouth           [facility]

s



     mg tablet      12:43:                               every           Medical



               24                                 night as           Center



                                                  needed for           



                                                  Insomnia.           

 

     donepeziL            Yes            10mg QD   Take 10 mg           [facility]



     (ARICEPT)      8-                               by mouth           [facility]



     10 MG      12:43:                               nightly.           Medical



     tablet      24                                                Center

 

     DULoxetine            Yes            60mg Q.5D Take 60 mg           [facility]



     (CYMBALTA)      8                               by mouth 2           [facility]

es



     60 MG      12:43:                               (two)           Medical



     capsule      24                                 times           Center



                                                  daily.           

 

     gabapentin      0      Yes            300mg [id number] Take 300       

    [facility]



     (NEURONTIN)      8-22                          [id number] mg by           [facility]

[facility]



     300 MG      12:43:                          3D   mouth 3           Medical



     capsule      24                                 (three)           Center



                                                  times           



                                                  daily.           

 

     indapamide      0      Yes            2.5mg QD   Take 2.5           [facility]



     (LOZOL) 2.5      8-22                               mg by           [facility]



     MG tablet      12:43:                               mouth           Medical



               24                                 every           Center



                                                  morning.           

 

     memantine      0      Yes            10mg Q.5D Take 10 mg           [facility]



     (NAMENDA)      8-22                               by mouth 2           [facility]

s



     10 MG      12:43:                               (two)           Medical



     tablet      24                                 times           Center



                                                  daily.           

 

     metoprolol            Yes            25mg QD   Take 25 mg           [facility]



     succinate      8-22                               by mouth           [facility]



     (TOPROL-XL)      12:43:                               nightly.           Me

dical



     25 MG 24 hr      24                                                Center



     tablet                                                        

 

     HYDROcodone            Yes            1{tbl}      Take 1           [facility]



     -acetaminop      8-22                               tablet by           [facility]

es



     hen (NORCO      12:43:                               mouth 3           Medi

bella



     7.5-325)      24                                 (three)           Center



     7.5-325 mg                                         times           



     per tablet                                         daily as           



                                                  needed for           



                                                  Pain.           

 

     potassium      0      Yes            20meq Q.5D Take 20           [facility]



     chloride      8-22                               mEq by           [facility]



     (KLOR-CON)      12:43:                               mouth 2           Medi

bella



     20 mEq      24                                 (two)           Center



     packet                                         times           



                                                  daily.           

 

     QUEtiapine      0      Yes            200mg QD   Take 200           [facility]



     (SEROquel)      8-22                               mg by           [facility]



     200 MG      12:43:                               mouth           Medical



     tablet      24                                 nightly.           Center

 

     simvastatin      0      Yes            40mg QD   Take 40 mg           

[facility]



     (ZOCOR) 40      8-22                               by mouth           [facility]



     MG tablet      12:43:                               nightly.           Medi

bella



               24                                                Center

 

     topiramate      0      Yes            200mg Q.5D Take 200           [facility]



     (TOPAMAX)      8-22                               mg by           [facility]



     200 MG      12:43:                               mouth 2           Medical



     tablet      24                                 (two)           Center



                                                  times           



                                                  daily.           

 

     buPROPion      0      Yes            150mg QD   Take 150           [facility]



     (WELLBUTRIN      8-22                               mg by           [facility]



     XL) 150 MG      12:43:                               mouth           Medica

l



     24 hr      24                                 daily.           Center



     tablet                                                        

 

     ALPRAZolam            Yes            .25mg      Take 0.25           [facility]



     (XANAX)      8-22                               mg by           [facility]



     0.25 MG      12:43:                               mouth           Medical



     tablet      24                                 every           Center



                                                  night as           



                                                  needed for           



                                                  Anxiety.           

 

     tiZANidine            Yes            4mg  QD   Take 4 mg           [facility]



     (ZANAFLEX)      8-22                               by mouth           [facility]



     4 MG tablet      12:43:                               daily.           Medi

bella



               24                                                Center

 

     fluticasone            Yes                 QD   Inhale 1           [facility]



     -umeclidin-      8-22                               Inhalation           [facility]

kes



     vilanter      12:43:                               by mouth           Medic

al



     (Trelegy      24                                 via            Center



     Ellipta)                                         inhaler           



     100-62.5-25                                         daily .           



     mcg DsDv                                                        

 

     zolpidem            Yes            10mg      Take 10 mg           [facility]



     (AMBIEN) 10      8-22                               by mouth           [facility]

s



     mg tablet      12:43:                               every           Medical



               24                                 night as           Center



                                                  needed for           



                                                  Insomnia.           

 

     donepeziL            Yes            10mg QD   Take 10 mg           [facility]



     (ARICEPT)      8-22                               by mouth           [facility]



     10 MG      12:43:                               nightly.           Medical



     tablet      24                                                Center

 

     DULoxetine            Yes            60mg Q.5D Take 60 mg           [facility]

[facility]



     (CYMBALTA)      8-22                               by mouth 2           [facility]

es



     60 MG      12:43:                               (two)           Medical



     capsule      24                                 times           Center



                                                  daily.           

 

     gabapentin            Yes            300mg [id number] Take 300       

    [facility]



     (NEURONTIN)      8-22                          [id number] mg by           [facility]

[facility]



     300 MG      12:43:                          3D   mouth 3           Medical



     capsule      24                                 (three)           Center



                                                  times           



                                                  daily.           

 

     indapamide            Yes            2.5mg QD   Take 2.5           [facility]



     (LOZOL) 2.5      8-22                               mg by           [facility]



     MG tablet      12:43:                               mouth           [facility]



               24                                 every           Center



                                                  morning.           

 

     memantine            Yes            10mg Q.5D Take 10 mg           [facility]



     (NAMENDA)      8-22                               by mouth 2           [facility]

s



     10 MG      12:43:                               (two)           Medical



     tablet      24                                 times           Center



                                                  daily.           

 

     metoprolol            Yes            25mg QD   Take 25 mg           [facility]



     succinate      8-22                               by mouth           [facility]



     (TOPROL-XL)      12:43:                               nightly.           Me

dical



     25 MG 24 hr      24                                                Center



     tablet                                                        

 

     HYDROcodone            Yes            1{tbl}      Take 1           [facility]



     -acetaminop      8-22                               tablet by           [facility]

es



     hen (NORCO      12:43:                               mouth 3           Medi

bella



     7.5-325)      24                                 (three)           Center



     7.5-325 mg                                         times           



     per tablet                                         daily as           



                                                  needed for           



                                                  Pain.           

 

     potassium            Yes            20meq Q.5D Take 20           [facility]



     chloride      8-22                               mEq by           [facility]



     (KLOR-CON)      12:43:                               mouth 2           Medi

bella



     20 mEq      24                                 (two)           Center



     packet                                         times           



                                                  daily.           

 

     QUEtiapine            Yes            200mg QD   Take 200           [facility]



     (SEROquel)      8-22                               mg by           [facility]



     200 MG      12:43:                               mouth           Medical



     tablet      24                                 nightly.           Center

 

     simvastatin            Yes            40mg QD   Take 40 mg           

[facility]



     (ZOCOR) 40      8-22                               by mouth           [facility]



     MG tablet      12:43:                               nightly.           Medi

bella



               24                                                Center

 

     topiramate            Yes            200mg Q.5D Take 200           [facility]



     (TOPAMAX)      8-22                               mg by           [facility]



     200 MG      12:43:                               mouth 2           Medical



     tablet      24                                 (two)           Center



                                                  times           



                                                  daily.           

 

     buPROPion            Yes            150mg QD   Take 150           [facility]



     (WELLBUTRIN      8-22                               mg by           [facility]



     XL) 150 MG      12:43:                               mouth           [facility]

l



     24 hr      24                                 daily.           Center



     tablet                                                        

 

     ALPRAZolam            Yes            .25mg      Take 0.25           [facility]



     (XANAX)      8-22                               mg by           [facility]



     0.25 MG      12:43:                               mouth           Medical



     tablet      24                                 every           Center



                                                  night as           



                                                  needed for           



                                                  Anxiety.           

 

     tiZANidine            Yes            4mg  QD   Take 4 mg           [facility]



     (ZANAFLEX)      8-22                               by mouth           [facility]



     4 MG tablet      12:43:                               daily.           Medi

bella



               24                                                Center

 

     fluticasone            Yes                 QD   Inhale 1           [facility]



     -umeclidin-      8-22                               Inhalation           [facility]

[facility]



     vilanter      12:43:                               by mouth           Medic

al



     (Trelegy      24                                 via            Center



     Ellipta)                                         inhaler           



     100-62.5-25                                         daily .           



     mcg DsDv                                                        

 

     clopidogreL      2021- No             75mg QD   Take 75 mg          

 [facility]



     (PLAVIX) 75      8-22 08-22                          by mouth           [facility]

es



     mg tablet      10:48: 00:00                          daily.           Medic

al



               43   :00                                          Center

 

     ELIQUIS 5            Yes            5mg       Take 5 mg           [facility]

[facility]



     mg tablet      8-22                               by mouth 2           [facility] 

of



               00:00:                               (two)           [place]



               00                                 times           Medical



                                                  daily.           Branch

 

     benzonatate            Yes                      TAKE 1           [facility]

ers



     100 mg      8-22                               CAPSULE           ity of



     capsule      00:00:                               (100 MG           [place]



               00                                 TOTAL) BY           Medical



                                                  MOUTH 3           Branch



                                                  (THREE)           



                                                  TIMES           



                                                  DAILY AS           



                                                  NEEDED FOR           



                                                  COUGH FOR           



                                                  UP TO 7           



                                                  DAYS.           

 

     ELIQUIS 5            Yes            5mg       Take 5 mg           [facility]

[facility]



     mg tablet      8-22                               by mouth 2           ity 

of



               00:00:                               (two)           [place]



               00                                 times           Medical



                                                  daily.           Branch

 

     benzonatate            Yes                      TAKE 1           [facility]

ers



     100 mg      8-22                               CAPSULE           ity of



     capsule      00:00:                               (100 MG           [place]



               00                                 TOTAL) BY           Medical



                                                  MOUTH 3           Branch



                                                  (THREE)           



                                                  TIMES           



                                                  DAILY AS           



                                                  NEEDED FOR           



                                                  COUGH FOR           



                                                  UP TO 7           



                                                  DAYS.           

 

     ELIQUIS 5            Yes            5mg       Take 5 mg           [facility]

vers



     mg tablet      8-22                               by mouth 2           ity 

of



               00:00:                               (two)           [place]



               00                                 times           Medical



                                                  daily.           Branch

 

     benzonatate            Yes                      TAKE 1           [facility]

ers



     100 mg      8-22                               CAPSULE           ity of



     capsule      00:00:                               (100 MG           [place]



               00                                 TOTAL) BY           Medical



                                                  MOUTH 3           Branch



                                                  (THREE)           



                                                  TIMES           



                                                  DAILY AS           



                                                  NEEDED FOR           



                                                  COUGH FOR           



                                                  UP TO 7           



                                                  DAYS.           

 

     ELIQUIS 5            Yes            5mg       Take 5 mg           [facility]

vers



     mg tablet      8-22                               by mouth 2           ity 

of



               00:00:                               (two)           [place]



               00                                 times           Medical



                                                  daily.           Branch

 

     benzonatate            Yes                      TAKE 1           [facility]

ers



     100 mg      8-22                               CAPSULE           ity of



     capsule      00:00:                               (100 MG           [place]



               00                                 TOTAL) BY           Medical



                                                  MOUTH 3           Branch



                                                  (THREE)           



                                                  TIMES           



                                                  DAILY AS           



                                                  NEEDED FOR           



                                                  COUGH FOR           



                                                  UP TO 7           



                                                  DAYS.           

 

     ELIQUIS 5            Yes            5mg       Take 5 mg           [facility]

vers



     mg tablet      8-22                               by mouth 2           ity 

of



               00:00:                               (two)           [place]



               00                                 times           Medical



                                                  daily.           Branch

 

     benzonatate            Yes                      TAKE 1           [facility]

ers



     100 mg      8-22                               CAPSULE           ity of



     capsule      00:00:                               (100 MG           [place]



               00                                 TOTAL) BY           Medical



                                                  MOUTH 3           Branch



                                                  (THREE)           



                                                  TIMES           



                                                  DAILY AS           



                                                  NEEDED FOR           



                                                  COUGH FOR           



                                                  UP TO 7           



                                                  DAYS.           

 

     apixaban      2- No             5mg  Q.5D Take 1           [facility]



     (ELIQUIS) 5      8-22 08-22                          tablet (5           [facility]

[facility]



     mg Tab      00:00: 23:59                          mg total)           Medic

al



     tablet      00   :00                           by mouth 2           Center



                                                  (two)           



                                                  times           



                                                  daily.           

 

     cyanocobala      2022- No             1000ug QD   Take 1           [facility]



     min,                                tablet           [facility]



     vitamin      00:00: 23:59                          (1,000 mcg           Med

ical



     B-12, 1000      00   :00                           total) by           Cent

er



     MCG tablet                                         mouth           



                                                  daily.           

 

     flecainide      2022- No             50mg      Take 1           [facility]



     (TAMBOCOR)                                tablet (50           [facility]

[facility]



     50 MG      00:00: 23:59                          mg total)           [facility]

l



     tablet      00   :00                           by mouth           Center



                                                  every 12           



                                                  (twelve)           



                                                  hours.           

 

     apixaban      2022- No             5mg  Q.5D Take 1           [facility]



     (ELIQUIS) 5                                tablet (5           [facility]

[facility]



     mg Tab      00:00: 23:59                          mg total)           Medic

al



     tablet      00   :00                           by mouth 2           Center



                                                  (two)           



                                                  times           



                                                  daily.           

 

     cyanocobala      2022- No             1000ug QD   Take 1           [facility]



     min,                                tablet           [facility]



     vitamin      00:00: 23:59                          (1,000 mcg           Med

ical



     B-12, 1000      00   :00                           total) by           Cent

er



     MCG tablet                                         mouth           



                                                  daily.           

 

     flecainide      2022- No             50mg      Take 1           [facility]



     (TAMBOCOR)                                tablet (50           [facility]

[facility]



     50 MG      00:00: 23:59                          mg total)           [facility]

l



     tablet      00   :00                           by mouth           Center



                                                  every 12           



                                                  (twelve)           



                                                  hours.           

 

     benzonatate      2021- No             100mg      Take 1           [facility]



     (TESSALON)                                capsule           [facility]



     100 MG      00:00: 23:59                          (100 mg           Medical



     capsule      00   :00                           total) by           Center



                                                  mouth 3           



                                                  (three)           



                                                  times           



                                                  daily as           



                                                  needed for           



                                                  Cough for           



                                                  up to 7           



                                                  days.           

 

     ALPRAZolam            Yes                                     [facility]



     0.25 mg      8-02                                              ity of



     tablet      00:00:                                              [address]



                                                                 Branch

 

     ALPRAZolam      0      Yes                                     [facility]



     0.25 mg      8-02                                              ity of



     tablet      00:00:                                              [address]



                                                                 Branch

 

     ALPRAZolam            Yes                                     [facility]



     0.25 mg      8-02                                              ity of



     tablet      00:00:                                              [place]



                                                               [facility]

 

     ALPRAZolam            Yes                                     [facility]



     0.25 mg      8-02                                              ity of



     tablet      00:00:                                              [place]



                                                               Medical



                                                                 Branch

 

     ALPRAZolam            Yes                                     [facility]



     0.25 mg      8-02                                              ity of



     tablet      00:00:                                              [address]

 

     clopidogrel            Yes                      75 mg = 1           [facility]

[facility]



     75 MG Oral      5-10                               tab, PO,           l



     Tablet      19:06:                               Daily, #           [facility]



     [Plavix]      00                                 30 tab, 3           



                                                  Refill(s),           



                                                  Pharmacy:           



                                                  [facility],           



                                                  160.02,           



                                                  cm,            



                                                  05/10/21           



                                                  13:30:00           



                                                  CDT,           



                                                  Height,           



                                                  93.636,           



                                                  kg,            



                                                  05/10/21           



                                                  13:30:00           



                                                  CDT,           



                                                  Weight           

 

     donepezil            Yes                      10 mg = 1           [facility]

[facility]



     10 mg oral      3-09                               tab, PO,           l



     tablet      20:52:                               Bedtime, #           [facility]

nn



               00                                 90 tab, 2           



                                                  Refill(s),           



                                                  157.48,           



                                                  cm,            



                                                  21           



                                                  14:30:00           



                                                  CST,           



                                                  Height,           



                                                  91.818,           



                                                  kg,            



                                                  21           



                                                  14:30:00           



                                                  CST,           



                                                  Weight           

 

     memantine            Yes                      = 1 tab,           [facility]

[facility]



     10 mg oral      3-09                               PO, BID, #           l



     tablet      20:52:                               180 tab, 2           [facility]

nn



               00                                 Refill(s),           



                                                  157.48,           



                                                  cm,            



                                                  21           



                                                  14:30:00           



                                                  CST,           



                                                  Height,           



                                                  91.818,           



                                                  kg,            



                                                  21           



                                                  14:30:00           



                                                  CST,           



                                                  Weight           

 

     topiramate            Yes                      200 mg = 1           [facility]

[facility]



     200 mg oral      3-09                               tab, PO,           l



     tablet      20:52:                               BID, # 180           [facility]

nn



               00                                 tab, 1           



                                                  Refill(s),           



                                                  157.48,           



                                                  cm,            



                                                  21           



                                                  14:30:00           



                                                  CST,           



                                                  Height,           



                                                  91.818,           



                                                  kg,            



                                                  21           



                                                  14:30:00           



                                                  CST,           



                                                  Weight           

 

     Zolpidem            Yes                      10 mg = 1           [facility]

[facility]



     tartrate 10      3-09                               tab, PO,           l



     MG Oral      20:41:                               Bedtime,           [place]

n



     Tablet      00                                 PRN for           



     [Ambien]                                         sleep, 0           



                                                  Refill(s)           

 

     topiramate      2020-1      Yes                      200 mg = 1           [facility]

[facility]



     200 mg oral      2-09                               tab, PO,           l



     tablet      20:49:                               BID, # 180           [facility]

nn



               00                                 tab, 1           



                                                  Refill(s),           



                                                  Pharmacy:           



                                                  [id number],           



                                                  160.02,           



                                                  cm,            



                                                  20           



                                                  14:19:00           



                                                  CST,           



                                                  Height,           



                                                  93.636,           



                                                  kg,            



                                                  20           



                                                  14:19:00           



                                                  CST,           



                                                  Weight           

 

     donepezil      -1      Yes                      10 mg = 1           [facility]

[facility]



     10 mg oral      1-09                               tab, PO,           l



     tablet      21:19:                               Bedtime, #           [facility]bec[facility]

nn



               00                                 90 tab, 2           



                                                  Refill(s),           



                                                  Pharmacy:           



                                                  [id number],           



                                                  160.02,           



                                                  cm,            



                                                  20           



                                                  14:33:00           



                                                  CST,           



                                                  Height,           



                                                  93.636,           



                                                  kg,            



                                                  20           



                                                  14:33:00           



                                                  CST,           



                                                  Weight           

 

     tizanidine      -0      Yes                      4 mg = 1           Mem

[facility]



     4 mg oral      8-07                               tab, PO,           l



     tablet      18:57:                               Daily, 0           [facility]



               00                                 Refill(s)           

 

     Ellura      2020-0      Yes                      0              [facility]



               8-07                               Refill(s)           l



               18:57:                                              [place]



               00                                                

 

     predniSONE      2020-0      Yes                      5 mg = 1           [facility]

[facility]



     5 mg oral      8-06                               tab, PO,           l



     tablet      20:23:                               Daily, #           [place]



               00                                 30 tab, 2           



                                                  Refill(s),           



                                                  Pharmacy:           



                                                  [id number],           



                                                  160.02,           



                                                  cm,            



                                                  20           



                                                  14:46:00           



                                                  CDT,           



                                                  Height,           



                                                  95.455,           



                                                  kg,            



                                                  20           



                                                  14:46:00           



                                                  CDT,           



                                                  Weight           

 

     predniSONE      2020-0      Yes                      5 mg = 1           [facility]

[facility]



     5 mg oral      6-03                               tab, PO,           l



     tablet      19:17:                               Daily, #           [place]



               00                                 30 tab, 2           



                                                  Refill(s),           



                                                  Pharmacy:           



                                                  [id number]           

 

     Acetaminoph      2020-0      Yes                      1 tab, PO,           

[facility]



     en 325 MG /      6-03                               Q8H, PRN           l



     Hydrocodone      18:41:                               Pain, # 90           

[facility]



     Bitartrate      00                                 tab, 0           



     7.5 MG Oral                                         Refill(s)           



     Tablet                                                        



     [Norco                                                        



     7.5/325]                                                        

 

     methocarbam      2020-0      Yes                      750 mg = 1           

[facility]



     ol 750 mg      6-03                               tab, PO,           l



     oral tablet      18:41:                               Q8H, PRN           He

rmann



               00                                 Spasms, #           



                                                  60 tab, 0           



                                                  Refill(s)           

 

     predniSONE            No                       10 mg = 1           Me

moria



     10 mg oral      6-03                               tab, PO,           l



     tablet      18:41:                               Daily, 0           [facility]



               00                                 Refill(s)           

 

     24 HR            Yes                      150 mg = 1           [facility]

a



     Bupropion      3-16                               tab, PO,           l



     Hydrochlori      15:38:                               Daily, #           He

rmann



     de 150 MG      00                                 30 tab, 2           



     Extended                                         Refill(s),           



     Release                                         Pharmacy:           



     Tablet                                         [facility]           



     [Wellbutrin                                         [facility] #[id number]           



     ]                                                           

 

     predniSONE            Yes                      10 mg = 1           Me

moria



     10 mg oral      3-03                               tab, PO,           l



     tablet      21:30:                               Daily, X           [facility]



               00                                 30 day, #           



                                                  30 tab, 1           



                                                  Refill(s),           



                                                  Pharmacy:           



                                                  [facility]           



                                                  PHARMACY           



                                                  #[id number]           

 

     predniSONE      0      No                       20 mg = 1           Me

moria



     20 mg oral      3-03                               tab, PO,           l



     tablet      20:38:                               BID,           [place]



               00                                 Quantity           



                                                  sufficient           



                                                  , X 30           



                                                  day, # 60           



                                                  tab, 1           



                                                  Refill(s),           



                                                  Pharmacy:           



                                                  [facility]           



                                                  PHARMACY           



                                                  #[id number]           

 

     predniSONE      0      No                       20 mg = 1           Me

moria



     20 mg oral      3-03                               tab, PO,           l



     tablet      20:31:                               Daily,           [facility]



               00                                 Quantity           



                                                  sufficient           



                                                  , X 30           



                                                  day, # 30           



                                                  tab, 1           



                                                  Refill(s),           



                                                  Pharmacy:           



                                                  [facility]           



                                                  cy #[id number]           

 

     Prednisone      0      No                       50 mg, PO,           M

emoria



               3-03                               Daily,           l



               20:08:                               Quantity           [facility]



               00                                 sufficient           



                                                  , 0            



                                                  Refill(s)           

 

     donepezil 5            Yes                      = 1 tab,           Me

moria



     mg oral      2-12                               PO,            l



     tablet      17:10:                               Bedtime, #           [facility]

nn



               00                                 90 tab, 3           



                                                  Refill(s),           



                                                  Pharmacy:           



                                                  [facility]           



                                                  cy #[id number]           

 

     24 HR      2019      Yes                      150 mg = 1           [facility]

a



     Bupropion      2-03                               tab, PO,           l



     Hydrochlori      20:03:                               Q24H, 0           Her

hess



     de 150 MG      00                                 Refill(s)           



     Extended                                                        



     Release                                                        



     Tablet                                                        

 

     spironolact      2019      Yes                      25 mg = 1           M

emoria



     one 25 mg      2-03                               tab, PO,           l



     oral tablet      20:03:                               BID, 0           [facility]

carla



               00                                 Refill(s)           

 

     sucralfate      2019-1      Yes                      1 gm = 1           Mem

oria



     1 g oral      2-03                               tab, PO,           l



     tablet      20:03:                               Daily, 0           [facility]



               00                                 Refill(s)           

 

     24 HR      2019      Yes                      150 mg = 1           [facility]

a



     Bupropion      2-03                               tab, PO,           l



     Hydrochlori      19:59:                               Daily, #           He

rmann



     de 150 MG      00                                 30 tab, 1           



     Extended                                         Refill(s),           



     Release                                         Pharmacy:           



     Tablet                                         [facility]           



     [Wellbutrin                                         cy #[id number]           



     ]                                                           

 

     Nitrofurant      2019      Yes                      50 mg, PO,           

[facility]



     oin       2-03                               Daily, #           l



               19:52:                               14 cap, 0           [facility]



               00                                 Refill(s)           

 

     Donepezil            Yes                      5 mg = 1           [facility]

remi



     hydrochlori      9-03                               tab, PO,           l



     de 5 MG      20:02:                               Bedtime, #           [facility]

carla



     Oral Tablet      00                                 30 tab, 3           



     [Aricept]                                         Refill(s),           



                                                  Pharmacy:           



                                                  [facility]           



                                                  cy #[id number]           

 

     potassium            Yes                      20 mEq = 1           Me

moria



     chloride 20      9-03                               tab, PO,           l



     mEq oral      19:33:                               BID, # 10           [facility]

carla



     tablet,      00                                 tab, 0           



     extended                                         Refill(s)           



     release                                                        

 

     calcium      2016      Yes            2{tbl}      Take 2           Univer

s



     carb and      2-28                               tablets by           ity o

f



     citrate-vit      11:30:                               mouth           [place]



     D3        04                                 daily.           Medical



     (CITRACAL +                                                        [facility]anch



     D SLOW                                                        



     RELEASE)                                                        



     600 mg                                                        



     calcium-                                                        



     500 unit                                                        



     TbSR                                                        

 

     DULoxetine      2016      Yes            60mg      Take 60 mg           U

nivers



     (CYMBALTA)      2-28                               by mouth           ity o

f



     60 mg      11:30:                               daily.           [place]



     capsule      04                                                Medical



                                                                 Branch

 

     indapamide      2016      Yes            2.5mg      Take 2.5           Un

bob



     (LOZOL) 2.5      2-28                               mg by           ity of



     mg tablet      11:30:                               mouth           [place]



               04                                 daily.           Medical



                                                                 Branch

 

     Vit C-Vit      2016      Yes            1{capsu      Take 1           Uni

vers



     E-Lutein-Mi      2-28                     le}       capsule by           it

y of



     n-OM-3      11:30:                               mouth           [place]



     (OCUVITE)      04                                 daily.           Medical



     150-30-5-15                                                        Branch



     0                                                           



     mg-unit-mg-                                                        



     mg Cap                                                        

 

     multivitami      2016      Yes            1{tbl}      Take 1           Un

obb



     n         2-28                               tablet by           ity of



     (ONE-A-DAY      11:30:                               mouth           [place]



     ESSENTIAL)      04                                 daily.           Medical



     tablet                                                        Branch

 

     FERROUS      2016      Yes            1{tbl}      Take 1           Univer

s



     FUMARATE/VI      2-28                               tablet by           ity

 of



     T BCOMP,C      11:30:                               mouth           [place]



     (SUPER B      04                                 daily.           Medical



     COMPLEX                                                        Branch



     ORAL)                                                        

 

     ascorbic      2016      Yes            500mg      Take 500           Univ

ers



     acid,      2-28                               mg by           ity of



     vitamin C,      11:30:                               mouth           [place]



     (VITAMIN C)      04                                 daily.           Medica

l



     500 mg                                                        Branch



     tablet                                                        

 

     Cholecalcif      2016      Yes            1{tbl}      Take 1           Un

bob



     edith,      2-28                               tablet by           ity of



     Vitamin D3,      11:30:                               mouth           [place]



     (VITAMIN      04                                 daily.           Medical



     D3) 5,000                                                        Branch



     unit tablet                                                        

 

     QUEtiapine      2016      Yes            50mg      Take 50 mg           U

nivers



     (SEROQUEL)      2-28                               by mouth           ity o

f



     50 mg      11:30:                               daily.           [place]



     tablet      04                                                Medical



                                                                 Branch

 

     simvastatin      2016      Yes            40mg      Take 40 mg           

[facility]



     (ZOCOR) 40      2-28                               by mouth           ity o

f



     mg tablet      11:30:                               at             [place]



               04                                 bedtime.           Medical



                                                                 Branch

 

     topiramate      2016      Yes            200mg      Take 200           Un

bob



     (TOPAMAX)      2-28                               mg by           ity of



     100 mg      11:30:                               mouth           [place]



     tablet      04                                 every           Medical



                                                  evening.           Branch

 

     KCL       2016      Yes            10meq      Take 10           [facility]



     (KLOR-CON      2-28                               mEq by           ity of



     M10) 10 mEq      11:30:                               mouth 2           [place]

as



     tablet      04                                 (two)           Medical



                                                  times           [place]



                                                  daily.           

 

     pregabalin      2016      Yes            225mg      Take 225           Un

bob



     (LYRICA)      2-28                               mg by           ity of



     225 mg      11:30:                               mouth 2           [place]



     capsule      04                                 (two)           Medical



                                                  times           [place]



                                                  daily.           

 

     memantine      2016      Yes            5mg       Take 5 mg           Uni

vers



     (NAMENDA) 5      2-28                               by mouth 2           it

y of



     mg tablet      11:30:                               (two)           [place]



               04                                 times           Medical



                                                  daily.           Branch

 

     metoprolol      2016      Yes            50mg      Take 50 mg           U

nivers



     tartrate      2-28                               by mouth 2           ity o

f



     (LOPRESSOR)      11:30:                               (two)           [place]



     50 mg      04                                 times           Medical



     tablet                                         daily.           Branch

 

     esomeprazol      2016      Yes            20mg      Take 20 mg           

[facility]



     e (NEXIUM)      2-28                               by mouth 2           ity

 of



     20 mg      11:30:                               (two)           [place]



     capsule      04                                 times           Medical



                                                  daily.           Branch

 

     LACTOBAC/BI      2016      Yes            1{tbl}      Take 1           Un

bob



     FIDOBAC/TYLER      2-28                               tablet by           ity

 of



     B PA CON      11:30:                               mouth 2           [place]



     (ULTRA      04                                 (two)           Medical



     PATRICK PLUS                                         times           Branch



     ORAL)                                         daily.           



                                                  Indication           



                                                  s:             



                                                  Ultimate           



                                                  Patrick           

 

     calcium      2016      Yes            2{tbl}      Take 2           Univer

s



     carb and      2-28                               tablets by           ity o

f



     citrate-vit      11:30:                               mouth           [place]



     D3        04                                 daily.           Medical



     (CITRACAL +                                                        Branch



     D SLOW                                                        



     RELEASE)                                                        



     600 mg                                                        



     calcium-                                                        



     500 unit                                                        



     TbSR                                                        

 

     DULoxetine      2016      Yes            60mg      Take 60 mg           U

nivers



     (CYMBALTA)      2-28                               by mouth           ity o

f



     60 mg      11:30:                               daily.           [place]



     capsule      04                                                Medical



                                                                 Branch

 

     indapamide      2016      Yes            2.5mg      Take 2.5           Un

bob



     (LOZOL) 2.5      2-28                               mg by           ity of



     mg tablet      11:30:                               mouth           [place]



               04                                 daily.           Medical



                                                                 [facility]anch

 

     Vit C-Vit      2016      Yes            1{capsu      Take 1           Uni

vers



     E-Lutein-Mi      2-28                     le}       capsule by           it

y of



     n-OM-3      11:30:                               mouth           [place]



     (OCUVITE)      04                                 daily.           Medical



     150-30-5-15                                                        Branch



     0                                                           



     mg-unit-mg-                                                        



     mg Cap                                                        

 

     multivitami      2016      Yes            1{tbl}      Take 1           Un

bob



     n         2-28                               tablet by           ity of



     (ONE-A-DAY      11:30:                               mouth           [place]



     ESSENTIAL)      04                                 daily.           Medical



     tablet                                                        Branch

 

     FERROUS      2016      Yes            1{tbl}      Take 1           Univer

s



     FUMARATE/VI      2-28                               tablet by           ity

 of



     T BCOMP,C      11:30:                               mouth           [place]



     (SUPER B      04                                 daily.           Medical



     COMPLEX                                                        Br[facility]



     ORAL)                                                        

 

     ascorbic      2016      Yes            500mg      Take 500           Univ

ers



     acid,      2-28                               mg by           ity of



     vitamin C,      11:30:                               mouth           [place]



     (VITAMIN C)      04                                 daily.           Medica

l



     500 mg                                                        [facility]



     tablet                                                        

 

     Cholecalcif      2016      Yes            1{tbl}      Take 1           Un

bob



     edith,      2-28                               tablet by           ity of



     Vitamin D3,      11:30:                               mouth           [place]



     (VITAMIN      04                                 daily.           Medical



     D3) 5,000                                                        Branch



     unit tablet                                                        

 

     QUEtiapine      2016      Yes            50mg      Take 50 mg           U

nivers



     (SEROQUEL)      2-28                               by mouth           ity o

f



     50 mg      11:30:                               daily.           [place]



     tablet      04                                                Medical



                                                                 Branch

 

     simvastatin      2016      Yes            40mg      Take 40 mg           

[facility]



     (ZOCOR) 40      2-28                               by mouth           ity o

f



     mg tablet      11:30:                               at             [place]



               04                                 bedtime.           Medical



                                                                 Branch

 

     topiramate      2016      Yes            200mg      Take 200           Un

bob



     (TOPAMAX)      2-28                               mg by           ity of



     100 mg      11:30:                               mouth           [place]



     tablet      04                                 every           Medical



                                                  evening.           Branch

 

     KCL       2016      Yes            10meq      Take 10           [facility]



     (KLOR-CON      2-28                               mEq by           ity of



     M10) 10 mEq      11:30:                               mouth 2           [facility]

as



     tablet      04                                 (two)           Medical



                                                  times           [facility]



                                                  daily.           

 

     pregabalin      2016      Yes            225mg      Take 225           Un

bob



     (LYRICA)      2-28                               mg by           ity of



     225 mg      11:30:                               mouth 2           [place]



     capsule      04                                 (two)           Medical



                                                  times           Branch



                                                  daily.           

 

     memantine      2016      Yes            5mg       Take 5 mg           Uni

vers



     (NAMENDA) 5      2-28                               by mouth 2           it

y of



     mg tablet      11:30:                               (two)           [place]



               04                                 times           Medical



                                                  daily.           Branch

 

     metoprolol      2016      Yes            50mg      Take 50 mg           U

nivers



     tartrate      2-28                               by mouth 2           ity o

f



     (LOPRESSOR)      11:30:                               (two)           [place]



     50 mg      04                                 times           Medical



     tablet                                         daily.           Branch

 

     esomeprazol      2016      Yes            20mg      Take 20 mg           

[facility]



     e (NEXIUM)      2-28                               by mouth 2           ity

 of



     20 mg      11:30:                               (two)           [place]



     capsule      04                                 times           Medical



                                                  daily.           Branch

 

     LACTOBAC/BI      2016      Yes            1{tbl}      Take 1           Un

bob



     FIDOBAC/TYLER      2-28                               tablet by           ity

 of



     B PA CON      11:30:                               mouth 2           [place]



     (ULTRA      04                                 (two)           Medical



     PATRICK PLUS                                         times           Br[facility]



     ORAL)                                         daily.           



                                                  Indication           



                                                  s:             



                                                  Ultimate           



                                                  [facility]           

 

     calcium      2016      Yes            2{tbl}      Take 2           Univer

s



     carb and      2-28                               tablets by           ity o

f



     citrate-vit      11:30:                               mouth           [place]



     D3        04                                 daily.           Medical



     (CITRACAL +                                                        [facility]



     D SLOW                                                        



     RELEASE)                                                        



     600 mg                                                        



     calcium-                                                        



     500 unit                                                        



     TbSR                                                        

 

     DULoxetine      2016      Yes            60mg      Take 60 mg           U

nivers



     (CYMBALTA)      2-28                               by mouth           ity o

f



     60 mg      11:30:                               daily.           [place]



     capsule      04                                                Medical



                                                                 Branch

 

     indapamide      2016      Yes            2.5mg      Take 2.5           Un

bob



     (LOZOL) 2.5      2-28                               mg by           ity of



     mg tablet      11:30:                               mouth           [place]



               04                                 daily.           Medical



                                                                 Branch

 

     Vit C-Vit      2016      Yes            1{capsu      Take 1           Uni

vers



     E-Lutein-Mi      2-28                     le}       capsule by           it

y of



     n-OM-3      11:30:                               mouth           [place]



     (OCUVITE)      04                                 daily.           Medical



     150-30-5-15                                                        Branch



     0                                                           



     mg-unit-mg-                                                        



     mg Cap                                                        

 

     multivitami      2016      Yes            1{tbl}      Take 1           Un

bob



     n         2-28                               tablet by           ity of



     (ONE-A-DAY      11:30:                               mouth           [place]



     ESSENTIAL)      04                                 daily.           Medical



     tablet                                                        Branch

 

     FERROUS      2016      Yes            1{tbl}      Take 1           Univer

s



     FUMARATE/VI      2-28                               tablet by           ity

 of



     T BCOMP,C      11:30:                               mouth           [place]



     (SUPER B      04                                 daily.           Medical



     COMPLEX                                                        Br[facility]



     ORAL)                                                        

 

     ascorbic      2016      Yes            500mg      Take 500           Univ

ers



     acid,      2-28                               mg by           ity of



     vitamin C,      11:30:                               mouth           [place]



     (VITAMIN C)      04                                 daily.           Medica

l



     500 mg                                                        [facility]



     tablet                                                        

 

     Cholecalcif      2016      Yes            1{tbl}      Take 1           Un

bob



     edith,      2-28                               tablet by           ity of



     Vitamin D3,      11:30:                               mouth           [place]



     (VITAMIN      04                                 daily.           Medical



     D3) 5,000                                                        [facility]



     unit tablet                                                        

 

     QUEtiapine      2016      Yes            50mg      Take 50 mg           U

nivers



     (SEROQUEL)      2-28                               by mouth           ity o

f



     50 mg      11:30:                               daily.           [place]



     tablet      04                                                Medical



                                                                 Branch

 

     simvastatin      2016      Yes            40mg      Take 40 mg           

[facility]



     (ZOCOR) 40      2-28                               by mouth           ity o

f



     mg tablet      11:30:                               at             [place]



               04                                 bedtime.           Medical



                                                                 Branch

 

     topiramate      2016      Yes            200mg      Take 200           Un

bob



     (TOPAMAX)      2-28                               mg by           ity of



     100 mg      11:30:                               mouth           [place]



     tablet      04                                 every           Medical



                                                  evening.           Branch

 

     KCL       2016      Yes            10meq      Take 10           [facility]



     (KLOR-CON      2-28                               mEq by           ity of



     M10) 10 mEq      11:30:                               mouth 2           [facility]

as



     tablet      04                                 (two)           Medical



                                                  times           [facility]



                                                  daily.           

 

     pregabalin      2016      Yes            225mg      Take 225           Un

bob



     (LYRICA)      2-28                               mg by           ity of



     225 mg      11:30:                               mouth 2           [place]



     capsule      04                                 (two)           Medical



                                                  times           Branch



                                                  daily.           

 

     memantine      2016      Yes            5mg       Take 5 mg           Uni

vers



     (NAMENDA) 5      2-28                               by mouth 2           it

y of



     mg tablet      11:30:                               (two)           [place]



               04                                 times           Medical



                                                  daily.           Branch

 

     metoprolol      2016      Yes            50mg      Take 50 mg           U

nivers



     tartrate      2-28                               by mouth 2           ity o

f



     (LOPRESSOR)      11:30:                               (two)           [place]



     50 mg      04                                 times           Medical



     tablet                                         daily.           Branch

 

     esomeprazol      2016      Yes            20mg      Take 20 mg           

[facility]



     e (NEXIUM)      2-28                               by mouth 2           ity

 of



     20 mg      11:30:                               (two)           [place]



     capsule      04                                 times           Medical



                                                  daily.           Branch

 

     LACTOBAC/BI      2016      Yes            1{tbl}      Take 1           Un

bob



     FIDOBAC/TYLER      2-28                               tablet by           ity

 of



     B PA CON      11:30:                               mouth 2           [place]



     (ULTRA      04                                 (two)           Medical



     PATRICK PLUS                                         times           [facility]



     ORAL)                                         daily.           



                                                  Indication           



                                                  s:             



                                                  Ultimate           



                                                  [facility]           

 

     calcium      2016      Yes            2{tbl}      Take 2           Univer

s



     carb and      2-28                               tablets by           ity o

f



     citrate-vit      11:30:                               mouth           [place]



     D3        04                                 daily.           Medical



     (CITRACAL +                                                        [facility]



     D SLOW                                                        



     RELEASE)                                                        



     600 mg                                                        



     calcium-                                                        



     500 unit                                                        



     TbSR                                                        

 

     DULoxetine      2016      Yes            60mg      Take 60 mg           U

nivers



     (CYMBALTA)      2-28                               by mouth           ity o

f



     60 mg      11:30:                               daily.           [place]



     capsule      04                                                Medical



                                                                 Branch

 

     indapamide      2016      Yes            2.5mg      Take 2.5           Un

bob



     (LOZOL) 2.5      2-28                               mg by           ity of



     mg tablet      11:30:                               mouth           [place]



               04                                 daily.           Medical



                                                                 Branch

 

     Vit C-Vit      2016      Yes            1{capsu      Take 1           Uni

vers



     E-Lutein-Mi      2-28                     le}       capsule by           it

y of



     n-OM-3      11:30:                               mouth           [place]



     (OCUVITE)      04                                 daily.           Medical



     150-30-5-15                                                        [facility]



     0                                                           



     mg-unit-mg-                                                        



     mg Cap                                                        

 

     multivitami      2016      Yes            1{tbl}      Take 1           Un

bob



     n         2-28                               tablet by           ity of



     (ONE-A-DAY      11:30:                               mouth           [place]



     ESSENTIAL)      04                                 daily.           Medical



     tablet                                                        Branch

 

     FERROUS      2016      Yes            1{tbl}      Take 1           Univer

s



     FUMARATE/VI      2-28                               tablet by           ity

 of



     T BCOMP,C      11:30:                               mouth           [place]



     (SUPER B      04                                 daily.           Medical



     COMPLEX                                                        [facility]



     ORAL)                                                        

 

     ascorbic      2016      Yes            500mg      Take 500           Univ

ers



     acid,      2-28                               mg by           ity of



     vitamin C,      11:30:                               mouth           [place]



     (VITAMIN C)      04                                 daily.           Medica

l



     500 mg                                                        [facility]



     tablet                                                        

 

     Cholecalcif      2016      Yes            1{tbl}      Take 1           Un

bob



     edith,      2-28                               tablet by           ity of



     Vitamin D3,      11:30:                               mouth           [place]



     (VITAMIN      04                                 daily.           Medical



     D3) 5,000                                                        [facility]



     unit tablet                                                        

 

     QUEtiapine      2016      Yes            50mg      Take 50 mg           U

nivers



     (SEROQUEL)      2-28                               by mouth           ity o

f



     50 mg      11:30:                               daily.           [place]



     tablet      04                                                Medical



                                                                 Branch

 

     simvastatin      2016      Yes            40mg      Take 40 mg           

[facility]



     (ZOCOR) 40      2-28                               by mouth           ity o

f



     mg tablet      11:30:                               at             [place]



               04                                 bedtime.           Medical



                                                                 Branch

 

     topiramate      2016      Yes            200mg      Take 200           Un

bob



     (TOPAMAX)      2-28                               mg by           ity of



     100 mg      11:30:                               mouth           [place]



     tablet      04                                 every           Medical



                                                  evening.           Branch

 

     KCL       2016      Yes            10meq      Take 10           [facility]



     (KLOR-CON      2-28                               mEq by           ity of



     M10) 10 mEq      11:30:                               mouth 2           [facility]

as



     tablet      04                                 (two)           Medical



                                                  times           [facility]



                                                  daily.           

 

     pregabalin      2016      Yes            225mg      Take 225           Un

bob



     (LYRICA)      2-28                               mg by           ity of



     225 mg      11:30:                               mouth 2           [place]



     capsule      04                                 (two)           Medical



                                                  times           Branch



                                                  daily.           

 

     memantine      2016      Yes            5mg       Take 5 mg           Uni

vers



     (NAMENDA) 5      2-28                               by mouth 2           it

y of



     mg tablet      11:30:                               (two)           [place]



               04                                 times           Medical



                                                  daily.           Branch

 

     metoprolol      2016      Yes            50mg      Take 50 mg           U

nivers



     tartrate      2-28                               by mouth 2           ity o

f



     (LOPRESSOR)      11:30:                               (two)           [place]



     50 mg      04                                 times           Medical



     tablet                                         daily.           Branch

 

     esomeprazol      2016      Yes            20mg      Take 20 mg           

[facility]



     e (NEXIUM)      2-28                               by mouth 2           ity

 of



     20 mg      11:30:                               (two)           [place]



     capsule      04                                 times           Medical



                                                  daily.           Branch

 

     LACTOBAC/BI      2016      Yes            1{tbl}      Take 1           Un

bob



     FIDOBAC/TYLER      2-28                               tablet by           ity

 of



     B PA CON      11:30:                               mouth 2           [place]



     (ULTRA      04                                 (two)           Medical



     PATRICK PLUS                                         times           Branch



     ORAL)                                         daily.           



                                                  Indication           



                                                  s:             



                                                  Ultimate           



                                                  [facility]           

 

     calcium      2016      Yes            2{tbl}      Take 2           Univer

s



     carb and      2-28                               tablets by           ity o

f



     citrate-vit      11:30:                               mouth           [place]



     D3        04                                 daily.           Medical



     (CITRACAL +                                                        [facility]



     D SLOW                                                        



     RELEASE)                                                        



     600 mg                                                        



     calcium-                                                        



     500 unit                                                        



     TbSR                                                        

 

     DULoxetine      2016      Yes            60mg      Take 60 mg           U

nivers



     (CYMBALTA)      2-28                               by mouth           ity o

f



     60 mg      11:30:                               daily.           [place]



     capsule      04                                                Medical



                                                                 Branch

 

     indapamide      2016      Yes            2.5mg      Take 2.5           Un

bob



     (LOZOL) 2.5      2-28                               mg by           ity of



     mg tablet      11:30:                               mouth           [place]



               04                                 daily.           Medical



                                                                 Branch

 

     Vit C-Vit      2016      Yes            1{capsu      Take 1           Uni

vers



     E-Lutein-Mi      2-28                     le}       capsule by           it

y of



     n-OM-3      11:30:                               mouth           [place]



     (OCUVITE)      04                                 daily.           Medical



     150-30-5-15                                                        Branch



     0                                                           



     mg-unit-mg-                                                        



     mg Cap                                                        

 

     multivitami      2016      Yes            1{tbl}      Take 1           Un

bob



     n         2-28                               tablet by           ity of



     (ONE-A-DAY      11:30:                               mouth           [place]



     ESSENTIAL)      04                                 daily.           Medical



     tablet                                                        Branch

 

     FERROUS      2016      Yes            1{tbl}      Take 1           Univer

s



     FUMARATE/VI      2-28                               tablet by           ity

 of



     T BCOMP,C      11:30:                               mouth           [place]



     (SUPER B      04                                 daily.           Medical



     COMPLEX                                                        Branch



     ORAL)                                                        

 

     ascorbic      2016      Yes            500mg      Take 500           Univ

ers



     acid,      2-28                               mg by           ity of



     vitamin C,      11:30:                               mouth           [place]



     (VITAMIN C)      04                                 daily.           Medica

l



     500 mg                                                        Branch



     tablet                                                        

 

     Cholecalcif      2016      Yes            1{tbl}      Take 1           Un

bob



     edith,      2-28                               tablet by           ity of



     Vitamin D3,      11:30:                               mouth           [place]



     (VITAMIN      04                                 daily.           Medical



     D3) 5,000                                                        Branch



     unit tablet                                                        

 

     QUEtiapine      2016      Yes            50mg      Take 50 mg           U

nivers



     (SEROQUEL)      2-28                               by mouth           ity o

f



     50 mg      11:30:                               daily.           [place]



     tablet      04                                                Medical



                                                                 Branch

 

     simvastatin      2016      Yes            40mg      Take 40 mg           

Univers



     (ZOCOR) 40      2-28                               by mouth           ity o

f



     mg tablet      11:30:                               at             [place]



               04                                 bedtime.           Medical



                                                                 Branch

 

     topiramate      2016      Yes            200mg      Take 200           Un

bob



     (TOPAMAX)      2-28                               mg by           ity of



     100 mg      11:30:                               mouth           [place]



     tablet      04                                 every           Medical



                                                  evening.           Branch

 

     KCL       2016      Yes            10meq      Take 10           [facility]



     (KLOR-CON      2-28                               mEq by           ity of



     M10) 10 mEq      11:30:                               mouth 2           [place]

as



     tablet      04                                 (two)           Medical



                                                  times           [place]



                                                  daily.           

 

     pregabalin      2016      Yes            225mg      Take 225           Un

bob



     (LYRICA)      2-28                               mg by           ity of



     225 mg      11:30:                               mouth 2           [place]



     capsule      04                                 (two)           Medical



                                                  times           [place]



                                                  daily.           

 

     memantine      2016      Yes            5mg       Take 5 mg           Uni

vers



     (NAMENDA) 5      2-28                               by mouth 2           it

y of



     mg tablet      11:30:                               (two)           [place]



               04                                 times           Medical



                                                  daily.           Branch

 

     metoprolol      2016      Yes            50mg      Take 50 mg           U

nivers



     tartrate      2-28                               by mouth 2           ity o

f



     (LOPRESSOR)      11:30:                               (two)           [place]



     50 mg      04                                 times           Medical



     tablet                                         daily.           Branch

 

     esomeprazol      2016      Yes            20mg      Take 20 mg           

Univers



     e (NEXIUM)      2-28                               by mouth 2           ity

 of



     20 mg      11:30:                               (two)           [place]



     capsule      04                                 times           Medical



                                                  daily.           Branch

 

     LACTOBAC/BI      2016      Yes            1{tbl}      Take 1           Un

bob



     FIDOBAC/TYLER      2-28                               tablet by           ity

 of



     B PA CON      11:30:                               mouth 2           [place]



     (ULTRA      04                                 (two)           Medical



     PATRICK PLUS                                         times           Branch



     ORAL)                                         daily.           



                                                  Indication           



                                                  s:             



                                                  Ultimate           



                                                  Patrick           







Immunizations







           Ordered    Filled Immunization Date       Status     Comments   [facility]

e



           Immunization Name Name                                        

 

           SARS-COV-2 COVID-19            2021 Completed             Unive

rsity of



           PFIZER VACCINE            00:00:00                         [facility]

 

           SARS-COV-2 COVID-19            2021 Completed             Unive

rsity of



           PFIZER VACCINE            00:00:00                         [facility]

 

           SARS-COV-2 COVID-19            2021 Completed             Unive

rsity of



           PFIZER VACCINE            00:00:00                         [facility]

 

           SARS-COV-2 COVID-19            2021 Completed             Unive

rsity of



           PFIZER VACCINE            00:00:00                         [facility]

 

           SARS-COV-2 COVID-19            2021 Completed             Unive

rsity of



           PFIZER VACCINE            00:00:00                         [facility]

 

           SARS-COV-2 COVID-19            2021 Completed             Unive

rsity of



           PFIZER VACCINE            00:00:00                         [facility]

 

           SARS-COV-2 COVID-19            2021 Completed             Unive

rsity of



           PFIZER VACCINE            00:00:00                         [facility]

 

           SARS-COV-2 COVID-19            2021 Completed             Unive

rsity of



           PFIZER VACCINE            00:00:00                         [facility]

 

           SARS-COV-2 COVID-19            2021 Completed             Unive

rsity of



           PFIZER VACCINE            00:00:00                         [facility]

 

           SARS-COV-2 COVID-19            2021 Completed             Unive

rsity of



           PFIZER VACCINE            00:00:00                         [place] [facility]

bella



                                                                  Branch







Vital Signs







             Vital Name   Observation Time Observation Value Comments     Source

 

             WEIGHT       2021 06:47:00 97.478 kg                 

 

             WEIGHT       2021 23:00:00 98.839 kg                 

 

             HEIGHT       2021 22:25:00 160 cm                    

 

             WEIGHT       2021 22:25:00 100.562 kg                

 

             Systolic blood 2022 19:34:00 115 mm[Hg]                Univer

sity of



             pressure                                            [facility]

 

             Diastolic blood 2022 19:34:00 59 mm[Hg]                 Unive

rsity of



             pressure                                            [facility]

 

             Heart rate   2022 19:34:00 56 /min                   [facility]

 

             Body height  2022 19:34:00 160 cm                    [facility]

 

             Body weight  2022 19:34:00 102.513 kg                [facility]

 

             BMI          2022 19:34:00 40.03 kg/m2               [facility]

 

             WEIGHT       2021 06:47:00 97.478 kg                 

 

             WEIGHT       2021 23:00:00 98.839 kg                 

 

             HEIGHT       2021 22:25:00 160 cm                    

 

             WEIGHT       2021 22:25:00 100.562 kg                

 

             Systolic (mm Hg) 2022 21:20:00                           [facility]

 

             Diastolic (mm Hg) 2022 21:20:00                           [facility] [place]

 

             Heart Rate   2022 21:20:00                           [facility]

 

             Respitory Rate 2022 21:20:00                           [facility]

[facility] [place]

 

             Systolic (mm Hg) 2022 22:09:00                           [facility]

[facility] [place]

 

             Diastolic (mm Hg) 2022 22:09:00                           [facility]

 

             Heart Rate   2022 22:09:00                           [facility]

 [place]

 

             Respitory Rate 2022 22:09:00                           [facility]

[facility] [place]

 

             Systolic (mm Hg) 2021-12-10 16:49:00                           [facility]

[facility] [place]

 

             Diastolic (mm Hg) 2021-12-10 16:49:00                           [facility] [place]

 

             Heart Rate   2021-12-10 16:49:00                           [facility]

 [place]

 

             Respitory Rate 2021-12-10 16:49:00                           [facility]

[facility] [place]

 

             Heart rate   2021 08:01:00 56 /min                   [facility]

 

             Respiratory rate 2021 08:01:00 16 /min                   [facility]

 

             Oxygen saturation in 2021 08:01:00 93 /min                   

[facility]



             Arterial blood by                                        Medical Ce

nter



             Pulse oximetry                                        

 

             Systolic blood 2021 07:00:00 126 mm[Hg]                [facility]

 

             Diastolic blood 2021 07:00:00 60 mm[Hg]                 [facility]

 

             Body temperature 2021 07:00:00 36.61 Brittany                 [facility]

 

             Body weight  2021 06:47:00 97.478 kg                 [facility]

 

             BMI          2021 06:47:00 38.07 kg/m2               [facility]

 

             Body height  2021 22:25:00 160 cm                    [facility]

 

             Systolic (mm Hg) 2021-08-10 20:24:00                           [facility]

[facility] [place]

 

             Diastolic (mm Hg) 2021-08-10 20:24:00                           [facility]

 

             Heart Rate   2021-08-10 20:24:00                           [facility]

 [place]

 

             Respitory Rate 2021-08-10 20:24:00                           [facility]

[facility] [place]

 

             Height       2021-08-10 20:24:00 154.94 cm                 [facility]

 [place]

 

             Weight       2021-08-10 20:24:00                           [facility]

 [place]

 

             BMI Calculated 2021-08-10 20:24:00                           [facility]

[facility] [place]

 

             Systolic (mm Hg) 2021-05-10 18:30:00                           [facility]

[facility] [place]

 

             Diastolic (mm Hg) 2021-05-10 18:30:00                           [facility]

 

             Heart Rate   2021-05-10 18:30:00                           [facility]

 [place]

 

             Respitory Rate 2021-05-10 18:30:00                           [facility]

[facility] [place]

 

             Height       2021-05-10 18:30:00 160.02 cm                 [facility]

 

             Weight       2021-05-10 18:30:00                           [facility]

 [place]

 

             BMI Calculated 2021-05-10 18:30:00                           [facility]

[facility] [place]

 

             Systolic (mm Hg) 2021 18:26:00                           [facility]

[facility] [place]

 

             Heart Rate   2021 18:26:00                           [facility]

 [place]

 

             Respitory Rate 2021 18:26:00                           [facility]

[facility] [place]

 

             Weight       2021 18:26:00                           [facility]

 [place]

 

             Systolic (mm Hg) 2021 20:30:00                           [facility]

[facility] [place]

 

             Diastolic (mm Hg) 2021 20:30:00                           [facility] [place]

 

             Heart Rate   2021 20:30:00                           [facility]

 [place]

 

             Respitory Rate 2021 20:30:00                           [facility]

[facility] [place]

 

             Height       2021 20:30:00 157.48 cm                 [facility]

 [place]

 

             Weight       2021 20:30:00                           [facility]

 [place]

 

             BMI Calculated 2021 20:30:00                           [facility] [place]

 

             Systolic (mm Hg) 2020 20:19:00                           [facility]

[facility] [place]

 

             Diastolic (mm Hg) 2020 20:19:00                           [facility] [place]

 

             Heart Rate   2020 20:19:00                           [facility]

 [place]

 

             Respitory Rate 2020 20:19:00                           [facility] [place]

 

             Height       2020 20:19:00 160.02 cm                 [facility]

 [place]

 

             Weight       2020 20:19:00                           [facility]

 [place]

 

             BMI Calculated 2020 20:19:00                           [facility]

[facility] [place]

 

             Systolic (mm Hg) 2020 20:33:00                           [facility]

[facility] [place]

 

             Diastolic (mm Hg) 2020 20:33:00                           [facility] [place]

 

             Heart Rate   2020 20:33:00                           [facility]

 [place]

 

             Respitory Rate 2020 20:33:00                           [facility]

[facility] [place]

 

             Height       2020 20:33:00 160.02 cm                 [facility]

 [place]

 

             Weight       2020 20:33:00                           [facility]

 [place]

 

             BMI Calculated 2020 20:33:00                           [facility]

[facility] [place]

 

             Systolic (mm Hg) 2020 20:09:00                           [facility]

[facility] [place]

 

             Diastolic (mm Hg) 2020 20:09:00                           [facility] [place]

 

             Heart Rate   2020 20:09:00                           [facility]

 [place]

 

             Respitory Rate 2020 20:09:00                           [facility]

[facility] [place]

 

             Height       2020 20:09:00 160.02 cm                 [facility]

 [place]

 

             Weight       2020 20:09:00                           [facility]

 [place]

 

             BMI Calculated 2020 20:09:00                           [facility]

[facility] [place]

 

             Systolic (mm Hg) 2020 19:46:00                           [facility]

[facility] [place]

 

             Diastolic (mm Hg) 2020 19:46:00                           [facility] [place]

 

             Heart Rate   2020 19:46:00                           [facility]

 [place]

 

             Respitory Rate 2020 19:46:00                           [facility]

[facility] [place]

 

             Height       2020 19:46:00 160.02 cm                 [facility]

 [place]

 

             Weight       2020 19:46:00                           [facility]

 [place]

 

             BMI Calculated 2020 19:46:00                           [facility] [place]

 

             Systolic (mm Hg) 2020 18:37:00                           [facility]

[facility] [place]

 

             Diastolic (mm Hg) 2020 18:37:00                           [facility]

 

             Heart Rate   2020 18:37:00                           [facility]

 [place]

 

             Systolic (mm Hg) 2020 20:05:00                           [facility]

[facility] [place]

 

             Diastolic (mm Hg) 2020 20:05:00                           [facility] [place]

 

             Heart Rate   2020 20:05:00                           [facility]

 [place]

 

             Respitory Rate 2020 20:05:00                           [facility]

[facility] [place]

 

             Height       2020 20:05:00 160.02 cm                 [facility]

 [place]

 

             Weight       2020 20:05:00                           [facility]

 [place]

 

             BMI Calculated 2020 20:05:00                           [facility]

[facility] [place]

 

             Systolic (mm Hg) 2019 19:34:00                           [facility]

[facility] [place]

 

             Diastolic (mm Hg) 2019 19:34:00                           [facility]

 

             Heart Rate   2019 19:34:00                           [facility]

 [place]

 

             Respitory Rate 2019 19:34:00                           [facility]

[facility] [place]

 

             Height       2019 19:34:00 160.02 cm                 [facility]

 [place]

 

             Weight       2019 19:34:00                           [facility]

 [place]

 

             BMI Calculated 2019 19:34:00                           [facility]

[facility] [place]

 

             Systolic (mm Hg) 2019 19:08:00                           [facility]

[facility] [place]

 

             Diastolic (mm Hg) 2019 19:08:00                           [facility] [place]

 

             Heart Rate   2019 19:08:00                           [facility]

 [place]

 

             Respitory Rate 2019 19:08:00                           [facility]

[facility] [place]

 

             Height       2019 19:08:00 160.02 cm                 [facility]

 [place]

 

             Weight       2019 19:08:00                           [facility]

 [place]

 

             BMI Calculated 2019 19:08:00                           [facility]

[facility] [place]







Procedures







                Procedure       Date / Time     Performing Clinician Source



                                Performed                       

 

                EXTERNAL PROVIDER RECORDS 2022 05:01:00 Doctor Unassigned,

 [facility]



                                                [facility]         [facility]

 

                HOME HEALTH - OTHER 2022 06:01:00 Doctor Unassigned, [facility]



                                                [facility]         [facility]

 

                HIGH SENSITIVITY TROPONIN 2021 16:40:00 Tara Kay 

I [facility]



                I                               Resd          [place]

 

                ECG 12-LEAD     2021 15:59:02 Unknown, Hl7 Doctor [facility]

 

                ECG 12-LEAD     2021 15:59:02 Unknown, Hl7 Doctor [facility]

 

                XR CHEST 1 VIEW PORTABLE / 2021 07:54:00 REA Abarca [facility]



                BEDSIDE                         D.              Center

 

                CBC W/PLT COUNT & AUTO 2021 04:39:00 Mullins Syeda, [facility]



                DIFFERENTIAL                    Preeya          [place]

 

                BASIC METABOLIC PANEL (7) 2021 04:39:00 Mullins Syeda, 

I [facility]

 

                CBC W/PLT COUNT & AUTO 2021 04:39:00 Mullins Syeda, [facility]



                DIFFERENTIAL                    Preeya          Center

 

                MAGNESIUM       2021 04:39:00 Mullins Syeda, [facility]

 

                B-TYPE NATRIURETIC FACTOR 2021 03:34:00 Ivanna Abarca [facility]



                (BNP)                           [facility]

 

                XR CHEST 1 VIEW PORTABLE / 2021 13:03:00 Tara Kay

[facility]

 

                BASIC METABOLIC PANEL (7) 2021 11:41:00 Tara Kay [facility]

 

                MAGNESIUM       2021 11:41:00 Tara Kay [facility]

 

                B-TYPE NATRIURETIC FACTOR 2021 11:41:00 Tara Kay [facility]



                (BNP)                           [facility][note 3]

 

                ECG 12-LEAD     2021 10:53:46 Cornelius Garzon   [facility]

 

                TRANSESOPHAGEAL ECHO 2021 09:39:19 Alfredo Young [facility]

 

                CBC W/PLT COUNT & AUTO 2021 05:17:00 Rachel [facility]

 

                BASIC METABOLIC PANEL (7) 2021 05:17:00 Say Ramos   [facility]

 

                HEPATIC FUNCTION PANEL 2021 05:17:00 Rachel [facility]

 

                CBC W/PLT COUNT & AUTO 2021 05:17:00 Rachel, [facility]



                DIFFERENTIAL                    [facility]

 

                CARDIOVERSION   2021 00:31:19 Alfredo Young [facility]

 

                HIGH SENSITIVITY TROPONIN 2021 18:35:00 Rachel [facility]

 

                2D ECHO W/ DOPPLER 2021 14:07:13 Patti Sammy [facility]



                (CW/PW/COLOR)                   [facility]

 

                CT CHEST FOR PULMONARY 2021 05:36:00 Rachel [facility]



                EMBOLUS                         [facility]

 

                XR CHEST 1 VIEW PORTABLE / 2021 04:08:00 Say Ramos

[facility]



                BEDSIDE                         [facility]

 

                SARS-COV2/RT-PCR ([facility] & 2021 03:48:00 [facility]



                REF LAB[facility])                       [facility]

 

                CBC W/PLT COUNT & AUTO 2021 03:41:00 [facility]



                DIFFERENTIAL                    [facility]

 

                BASIC METABOLIC PANEL (7) 2021 03:41:00 Rachel, [facility]

 

                HEPAT[facility]

 

                [facility]PLT [facility] & [facility]O 2021 03:41[facility]00 [facility]



                DIFFERENTIAL                    [facility]

 

                HIGH SENSITIVITY TROPONIN 2021 03:41:00 [facility]

 

                B-TYPE NATRIURETIC FACTOR 2021 03:41:00 [facility]



                (BNP)                           [facility]

 

                CBC W/PLT COUNT & AUTO 2021 23:21:00 [facility]



                DIFFERENTIAL                    [facility]

 

                BASIC METABOLIC PANEL (7) 2021 23:21:00 [facility]

 

                HEPATIC FUNCTION PANEL 2021 23:21:00 Rachel, [facility]

 

                [facility]NESIUM       [facility]

 

                PHOSPHORUS      2021 23:21:00 [facility]

 

                PROTHROMBIN TIME/INR 2021 23:21:00 [facility]

 

                CBC W/PLT COUNT & AUTO 2021 23:21:00 Rachel, [facility]



                DIFFERENTIAL                    [facility]

 

                TSH/FREE T4 IF INDICATED 2021 23:21:00 [facility]

 

                HIGH SENSITIVITY TROPONIN 2021 23:21:00 Rachel, [facility]



                I                               [facility]

 

                D-DIMER         2021 23:21:00 [facility]

 

                VITAMIN B12 AND FOLATE 2021 23:21:00 Rachel, [facility]

 

                ECG 12-LEAD     2021 23:12:15 [facility], [facility]

 

                ECG 12-LEAD     2021 23:07:40 Unknown, [facility]

 

                REPORT OF PROCEDURE - 2021 00:00:00 Provider, [facility]



                ENDOSCOPY SCAN                  Scanning        Center







Plan of Care







             Planned Activity Planned Date Details      Comments     Source

 

             Future Scheduled 2022   INFLUENZA VACCINE (#1)              C

[facility]



             Test         00:00:00     [code = INFLUENZA              Medical Ce

nter



                                       VACCINE (#1)]              

 

             Future Scheduled 2022   HEPATITIS B VACCINES              Met

[facility]



             Test         02:08:28     (1 of 3 - 3-dose              



                                       series) [code =              



                                       HEPATITIS B VACCINES              



                                       (1 of 3 - 3-dose              



                                       series)]                  

 

             Future Scheduled 2022   SHINGLES VACCINES (1              Met

[facility]



             Test         02:08:28     of 2) [code = SHINGLES              



                                       VACCINES (1 of 2)]              

 

             Future Scheduled 2022   65+ PNEUMOCOCCAL              Methodi

[facility]



             Test         02:08:28     VACCINE (1 - PCV)              



                                       [code = 65+               



                                       PNEUMOCOCCAL VACCINE              



                                       (1 - PCV)]                

 

             Future Scheduled 2022   COVID-19 VACCINE (3 -              [facility]



             Test         02:08:28     Booster for Pfizer              



                                       series) [code =              



                                       COVID-19 VACCINE (3 -              



                                       Booster for Pfizer              



                                       series)]                  

 

             Future Scheduled 2022   INFLUENZA VACCINE              [facility]

[facility]



             Test         02:08:28     [code = INFLUENZA              



                                       VACCINE]                  

 

             Future Scheduled 2022   MEDICARE ANNUAL              [facility]



             Test         00:00:00     WELLNESS (YEAR 2 or              Medical 

Center



                                       FIRST YEAR if no IPPE)              



                                       [code = MEDICARE              



                                       ANNUAL WELLNESS (YEAR              



                                       2 or FIRST YEAR if no              



                                       IPPE)]                    

 

             Future Scheduled 2022   DEPRESSION SCREENING              [facility]



             Test         00:00:00     (12+) [code =              Medical Center



                                       DEPRESSION SCREENING              



                                       (12+)]                    

 

             Future Scheduled 2022   FALLS RISK SCREENING              [facility]



             Test         00:00:00     [code = FALLS RISK              Medical C

enter



                                       SCREENING]                

 

             Future Scheduled 2021   INFLUENZA VACCINE (#1)              C

[facility]



             Test         00:00:00     [code = INFLUENZA              Medical Ce

nter



                                       VACCINE (#1)]              

 

             Future Scheduled 2021   COVID-19 VACCINE (3 -              [facility]

[facility]



             Test         00:00:00     Booster for Pfizer              Medical C

enter



                                       series) [code =              



                                       COVID-19 VACCINE (3 -              



                                       Booster for Pfizer              



                                       series)]                  

 

             Future Scheduled 2021   DEPRESSION SCREENING              [facility]



             Test         00:00:00     (12+) [code =              Medical Center



                                       DEPRESSION SCREENING              



                                       (12+)]                    

 

             Future Scheduled 2021   FALLS RISK SCREENING              [facility]



             Test         00:00:00     [code = FALLS RISK              Medical C

enter



                                       SCREENING]                

 

             Future Scheduled 2021   Medicare IPPE (WELCOME              C

[facility]



             Test         00:00:00     TO MEDICARE) [code =              Medical

 Center



                                       Medicare IPPE (WELCOME              



                                       TO MEDICARE)]              

 

             Future Scheduled 2006-10-07   PNEUMOCOCCAL 65+ YRS              [facility]



             Test         00:00:00     (1 of 1 -                 Medical Center



                                       [id number] PCV13)              



                                       [code = PNEUMOCOCCAL              



                                       65+ YRS (1 of 1 -              



                                       [id number] PCV13)]              

 

             Future Scheduled 2006-10-07   PNEUMOCOCCAL 65+ YRS              [facility]



             Test         00:00:00     (1 - PCV) [code =              Medical Ce

nter



                                       PNEUMOCOCCAL 65+ YRS              



                                       (1 - PCV)]                

 

             Future Scheduled 1991-10-07   SHINGLES VACCINES (1              [facility]



             Test         00:00:00     of 2) [code = SHINGLES              Medic

al Center



                                       VACCINES (1 of 2)]              

 

             Future Scheduled 1991-10-07   SHINGLES VACCINES (1              [facility]



             Test         00:00:00     of 2) [code = SHINGLES              Medic

al Center



                                       VACCINES (1 of 2)]              

 

             Future Scheduled 1960-10-07   DTAP/TDAP/TD VACCINES              [facility]

[facility]



             Test         00:00:00     (1 - Tdap) [code =              Medical C

enter



                                       DTAP/TDAP/TD VACCINES              



                                       (1 - Tdap)]               

 

             Future Scheduled 1960-10-07   DTAP/TDAP/TD VACCINES              [facility]

[facility]



             Test         00:00:00     (1 - Tdap) [code =              Medical C

enter



                                       DTAP/TDAP/TD VACCINES              



                                       (1 - Tdap)]               

 

             Future Scheduled 1959-10-07   HEPATITIS C SCREENING              [facility]

[facility]



             Test         00:00:00     [code = HEPATITIS C              Medical 

Center



                                       SCREENING]                

 

             Future Scheduled 1941   DXA SCAN [code = DXA              [facility]



             Test         00:00:00     SCAN]                     [facility]

 

             Future Scheduled              Hepatitis C screening              [facility]



             Test                      (procedure) [code =              



                                       [id number]]                

 

             Future Scheduled              SHINGLES VACCINES (#1)              [facility]

[facility]



             Test                      [code = SHINGLES              



                                       VACCINES (#1)]              

 

             Future Scheduled              65+ PNEUMOCOCCAL              [facility]i

st Hospital



             Test                      VACCINE (1 of 1 -              



                                       PPSV23) [code = 65+              



                                       PNEUMOCOCCAL VACCINE              



                                       (1 of 1 - PPSV23)]              

 

             Future Scheduled              INFLUENZA VACCINE              Method

[facility] Hospital



             Test                      [code = INFLUENZA              



                                       VACCINE]                  







Encounters







        Start   End     Encounter Admission Attending Care    Care    Encounter 

Source



        Date/Time Date/Time Type    Type    Clinicians Facility Department ID   

   

 

        2022         Inpatient         Rebecca Cerrato [facility]   ENDO    [id number]

[address] [facility]



        11:30:00                                                 81      [facility]

 

        2022         Outpatient         Nikolas Mcwilliams [facility]  [id number]

- Common



        10:58:02                                                    [facility]

 

        2022         Outpatient         Nikolas Mcwilliams [facility]  [id number]

 Common



        08:43:01                                                    [facility]

 

        2022         Outpatient                 [facility]  [id number]

 Common



        16:25:02                                                    [facility]

 

        2022         Outpatient                 [facility]  [id number]

 Common



        14:23:02                                                    [facility]

 

        2022         Outpatient 3       [id number]  ENCPL   OTH     

 ENC[facility]



        13:02:17                                                 1006    

 

        2022         Outpatient 3       [id number]  ENCPL   REF     

 ENCPL



        12:59:17                                                 0929    

 

        2022         Outpatient                 [facility]  STL[facility]  [id number]

 Common



        13:11:24                                                 [id number]   [facility]

 

        2021         Inpatient ER      DANYELLE [facility]    Cardiology [id number]

[address] [facility]



        22:07:00                         SELIN                         

 

        2022 Outpatient                 [facility]    [facility]    [id number]

[id number] [facility]



        15:00:00 15:00:00                                         23      l



                                                                        Brien

 

        2022-08-10 2022-08-10 ambulatory                 [facility]  [facility]  [id number]

 Common



        00:00:00 00:00:00                                                 [facility]

 

        2022 ambulatory                 [facility]  ST[facility]  [id number]

 Common



        00:00:00 00:00:00                                                 [facility]

 

        2022 ambulatory                 STLMLC  STLMLC  [id number]

 Common



        00:00:00 00:00:00                                                 [facility]

 

        2022 Outpatient                 nullFlavo MNA     [id number]

[id number] [facility]



        21:15:00 04:59:59                         r       Neurology 19      l



                                                        Tali Tesfaye

 

        2022 [facility]         [facility]

[id number] 



        16:15:00 23:59:59                 Steven                   19      



                                        Georgi                         

 

        2022 Outpatient                 [facility]    [facility]    [id number]

[id number] [facility]



        16:15:00 16:15:00                                         19      l



                                                                        Brien

 

        2022 Orders          Doctor  ABHIJEET    [id number]

30 



        00:00:00 00:00:00 Only            Unassigned, CHARIS   350.1.13.10       

  ity of



                                        [facility] 4.2.7.2.686         Amari

as



                                                        [phone]         [address]

 

        2022 Telephone         STACEY Acevedo    [id number]

[address]



        00:00:00 00:00:00                 Aftab CELAYA  350.1.13.10         it

y of



                                                [facility] 4.2.7.2.686         Amari

as



                                                MARYBETH?BLEA [phone]         Me

wendy SANON    [address]             [facility]                 



                                                OFFICE                  



                                                [facility]                 

 

        2022 [facility]          [facility]    [id number]

[address]



        14:30:00 15:15:52 Visit           Carson CELAYA  350.1.13.10         it

y of



                                                [facility] 4.2.7.2.686         Amari

as



                                                MARYBETH?BLEA [phone]         Me

wendy SANON    [address]             [facility]                 

 

        2022-02-15 2022-02-15 Ambulatory                 nullFlavo MNA     [id number]

[id number] [facility]



        20:15:00 20:15:00 Pre-Reg                 r       Neurology 22      l



                                                        Tali Tesfyae

 

        2022-02-15 2022-02-15 Outpatient                 [facility]    SANGEETA    [id number]

[id number] [facility]



        14:15:00 14:15:00                                         22      l



                                                                        Brien

 

        2022-02-15 2022-02-15 Outpatient         KARINE CannonSCHCAITY MHMISCHER 407

[id number] 



        14:15:00 14:15:00                 Steven                   22      



                                        [facility]                         

 

        2022 Orders          Doctor JHA    [id number]

[address] [facility]



        00:00:00 00:00:00 Only            Unassigned, CHARIS   350.1.13.10       

  ity of



                                        [facility] 4.2.7.2.686         Amari

as



                                                        [phone]         [address]

 

        2022 Outpatient                 nullFlavo MNA     [id number]

[id number] [facility]



        19:15:00 05:59:59                         r       Neurology 21      l



                                                        Tali Tesfaye

 

        2022 Outpatient         BIA CannonMISCHCAITY MHMISCHER 407

[id number] 



        13:15:00 23:59:59                 Steven                   21      



                                        [facility]                         

 

        2022 Outpatient                 [facility]    SANGEETA    [id number]

[id number] [facility]



        13:15:00 13:15:00                                         21      anupam Tesfaye

 

        2022 Outpatient                 nullFlavo MNA     [id number]

[id number] [facility]



        22:00:00 05:59:59                         r       Neurology 20      l



                                                        Tali Tesfaye

 

        2022 Outpatient         [facility]

[id number] 



        16:00:00 23:59:59                 Steven                   20      



                                        Georgi                         

 

        2022 Outpatient                 [facility]    [facility]    [id number]

[id number] [facility]



        16:00:00 16:00:00                                         20      l



                                                                        Brien

 

        2021-12-10 2021 Outpatient                 nullFlavo MNA     [id number]

[id number] [facility]



        17:00:00 05:59:59                         r       Neurology 18      l



                                                        Tali Tesfaye

 

        2021-12-10 2021-12-10 Outpatient         [facility]ass[facility]  [facility]

[id number] 



        11:00:00 23:59:59                 Steven                   18      



                                        Georgi                         

 

        2021-12-10 2021-12-10 Outpatient                 [facility]    [facility]    [id number]

[id number] [facility]



        11:00:00 11:00:00                                         18      anupam



                                                                        Brien

 

        [facility]   [id number]

[address] [facility]



        00:00:00 00:00:00                 [facility]

 

        2021 [facility] 

  [id number] 

[id number]                              [facility]



        22:07:00 12:43:00 Encounter         Say Ramos, Yasahilash GRANT                       

  [facility]

 

        2021 Orders                  [facility]   [id number]

[address] [facility]



        00:00:00 00:00:00 Only                                            [facility]

 

        2021 Outpatient                 [facility]     [id number]

[facility]



        00:00:00 23:59:00                                                 Munir

 

        2021 Travel                  [facility]   [id number]

 [facility]



        00:00:00 00:00:00                                                 [facility]

 

        2021-08-10 2021 Outpatient                 nullFlavo MNA     [id number]

[id number] [facility]



        20:15:00 04:59:59                         r       Neurology 17      l



                                                        [place]         Brien

 

        2021-08-10 2021-08-10 Outpatient         ROMA Cannon [address]IS[facility] [address]

[id number] 



        15:15:00 23:59:59                 Steven                   17      



                                        Georgi                         

 

        2021-08-10 2021-08-10 Outpatient                 [facility]    [facility]    [id number]

[id number] [facility]



        15:15:00 15:15:00                                         17      anupam



                                                                        [place]

 

        2021 Ambulatory                 nullFlavo MNA     [id number]

[id number] [facility]



        19:30:00 19:30:00 Pre-Reg                 r       Neurology 14      l



                                                        [place]         Brien

 

        2021 Outpatient                 [facility]    [facility]    [id number]

[id number]



        14:30:00 14:30:00                                         14      anupam



                                                                        [place]

 

        2021 Outpatient         KARINE CannonSCHCAITY MISCHER 407

[id number] 



        14:30:00 14:30:00                 Steven                   14      



                                        Georgi                         

 

        2021-05-10 2021 Outpatient                 nullFlavo MNA     [id number]

[id number] [facility]



        18:15:00 04:59:59                         r       Neurology 16      l



                                                        Tali Tesfaye

 

        2021-05-10 2021-05-10 Outpatient         ROMA Cannon [address]

[id number] 



        13:15:00 23:59:59                 Steven                   16      



                                        Georgi                         

 

        2021-05-10 2021-05-10 [address].840.1 [id number]

[id number] Methodi



        08:36:31 23:59:00 Encounter         [facility]    [id number]         [address]



                                                3.430.2.7                 Hospit

a



                                                .3.909295                 l



                                                .8                      

 

        2021-05-10 2021-05-10 Outpatient                 [facility]    [facility]    [id number]

[facility]



        13:15:00 13:15:00                                         16      anupam



                                                                        Brien

 

        2021 Outpatient                 nullFlavo MNA     [id number]

[id number] [facility]



        18:15:00 04:59:59                         r       Neurology 15      l



                                                        Tali Tesfaye

 

        2021 Outpatient         KARINE CannonSCHCAITY MISCHER 407

[id number] 



        13:15:00 23:59:59                 Steven                   15      



                                        Georgi                         

 

        2021 Outpatient                 [facility]    [facility]    [id number]

[id number] [facility]



        13:15:00 13:15:00                                         15      anupam Tesfaye

 

        2021 Office          Abbe, 1.2.840.1 [id number]

[id number] [facility]



        13:03:28 13:29:49 Visit           [facility]    [id number]         [address]



                                                3.430.2.7                 Hospit

a



                                                .[id number]                 l



                                                .8                      

 

        2021 Travel                  1.2.840.1 [id number] [id number]

[id number] Methodi



        00:00:00 00:00:00                         31489.1.1 [id number]     

st



                                                [id number]         Ho

spita



                                                [id number] 084.8           l



                                                .8                      

 

        2021 2021-03-10 Outpatient                 nullFlavo MNA     [id number]

[id number] Memoria



        20:30:00 05:59:59                         r       Neurology 13      



                                                        Tali Tesfaye

 

        2021 Outpatient         KARINE CannonSCHER [facility] [address]

[id number] 



        14:30:00 23:59:59                 Steven                   13      



                                        Georgi                         

 

        2021 Outpatient                 MHIE    MHIE    [id number]

665 [facility]



        14:30:00 14:30:00                                         13      anupam



                                                                        Brien

 

        2021 Outpatient                 MHIE    MHIE    [id number]

665 [facility]



        14:00:00 14:00:00                                         10      anupam Tesfaye

 

        2020 2020-12-10 Outpatient                 nullFlavo MNA     [id number]

[id number] Memoria



        20:15:00 05:59:59                         r       Neurology 12      anupam Tesfaye

 

        2020 Outpatient         BIA CannonMISCHER MHMISCHER [address]

[id number] 



        14:15:00 23:59:59                 Steven                   12      



                                        Georgi                         

 

        2020 Outpatient                 MHIE    MHIE    [id number]

665 [facility]



        14:15:00 14:15:00                                         12      anupam Tesfaye

 

        2020 2020-11-10 Outpatient                 nullFlavo MNA     [id number]

[id number] [facility]



        20:15:00 05:59:59                         r       Neurology 11      l



                                                        Tali Tesfaye

 

        2020 Outpatient         Kassandra  [facility] [address] [address]

[id number] 



        14:15:00 23:59:59                 Steven                   11      



                                        [place]i                         

 

        2020 Outpatient                 MHIE    MHIE    [id number]

665 [facility]



        14:15:00 14:15:00                                         11      l



                                                                        [place]

 

        2020 Outpatient                 nullFlavo MNA     [id number]

[id number] Memoria



        20:00:00 04:59:59                         r       Neurology 09      l



                                                        Tali Allenann

 

        2020 [facility]         [facility]

[id number] 



        15:00:00 23:59:59                 Steven                   09      



                                        Georgi                         

 

        2020 Outpatient                 MHIE    MHIE    [id number]

665 [facility]



        15:00:00 15:00:00                                         09      naupam Allenann

 

        2020 Outpatient                 nullFlavo MNA     [id number]

[id number] [facility]oria



        19:30:00 04:59:59                         r       Neurology 08      l



                                                        Tali Allenann

 

        2020 Out[facility]         [facility]

[id number] 



        14:30:00 23:59:59                 Steven                   08      



                                        Georgi                         

 

        2020 Ambulatory                 nullFlavo MNA     [id number]

[id number] [facility]oria



        19:30:00 19:30:00 Pre-Reg                 r       Neurology 07      l



                                                        Tali Allenann

 

        2020 Outpatient                 MHIE    MHIE    [id number]

665 [facility]



        14:30:00 14:30:00                                         08      anupam



                                                                        [place]

 

        2020 Outpatient                 MHIE    MHIE    [id number]

665 [facility]



        14:30:00 14:30:00                                         07      anupam Allenann

 

        2020 Outpatient         BIA CannonMISCHER MISCHER 407

[id number] 



        14:30:00 14:30:00                 Steven                   07      



                                        [place]                         

 

        2020 Outpatient                 nullFlavo MNA     [id number]

[id number] [facility]oria



        18:30:00 04:59:59                         r       Neurology 06      anupam Tesfaye

 

        2020 Outpatient         Kassandra,  MHMISCHER MHM[facility] [address]

[id number] 



        13:30:00 23:59:59                 Steven                   06      



                                        [place]                         

 

        2020 Outpatient                 MHIE    MHIE    [id number]

665 [facility]



        13:30:00 13:30:00                                         06      anupam



                                                                        Brien

 

        2020 Outpatient                 nullFlavo MNA     [id number]

[id number] [facility]oria



        19:45:00 05:59:59                         r       Neurology 05      anupam Tesfaye

 

        2020 Outpatient         Kassandra,  MHMISCHER MHMISCH[facility] [address]

[id number] 



        13:45:00 23:59:59                 Steven                   Yariel Laureano                         

 

        2020 Outpatient                 MHIE    MHIE    [id number]

665 [facility]



        13:45:00 13:45:00                                         05      anupam



                                                                        [place]

 

        2019 Outpatient                 nullFlavo MNA     [id number]

[id number] [facility]oria



        19:30:00 05:59:59                         r       Neurology 04      anupam Cesar         Brien

 

        2019 Outpatient         [place],  MHMISCHER MHMISCHER 407

[id number] 



        13:30:00 23:59:59                 Steven                   Alicia      



                                        [place]i                         

 

        2019 Outpatient                 MHIE    MHIE    [id number]

665 [facility]



        13:30:00 13:30:00                                         04      l



                                                                        Brien

 

        2019 Outpatient                 nullFlavo MNA     [id number]

[id number] [facility]



        19:15:00 04:59:59                         r       Neurology 03      l



                                                        [place]         Brien

 

        2019 Outpatient         Kassandra,  MHMISCHER MHMISCH[facility] [address]

[id number] 



        14:15:00 23:59:59                 Steven                   Jayda      



                                        [place]i                         

 

        2019 Outpatient                 MHIE    MHIE    [id number]

665 [facility]



        14:15:00 14:15:00                                         03      anupam Tesfaye

 

        2019 Outpatient                 MHIE    MHIE    [id number]

665 [facility]



        13:45:00 13:45:00                                         02      anupam Tesfaye

 

        2018 Outpatient                 MHIE    MHIE    [id number]

665 [facility]



        13:30:00 13:30:00                                               anupam Tesfaye

 

        2018 Outpatient                 [facility]    [id number]

665 [facility]



        14:30:00 14:30:00                                               anupam Tesfaye







Results







           Test Description Test Time  Test Comments Results    Result Comments 

Source









                    CHEM PANEL          2022 15:57:00 









                      Test Item  Value      Reference Range Interpretation Comme

nts









             BUN (test code = BUN) 20           7-                      



[facility] [id number] 15:57:00





             Test Item    Value        Reference Range Interpretation Comments

 

             Creatinine Lvl (test code = Creatinine 1.21         0.60-0.88      

           



             Lvl)                                                



[facility] [id number] 15:57:00





             Test Item    Value        Reference Range Interpretation Comments

 

             eGFR NON-AFR. AMERICAN (test code = 42                             

        



             eGFR NON-AFR. AMERICAN)                                        



[facility] [id number] 15:57:00





             Test Item    Value        Reference Range Interpretation Comments

 

             eGFR  (test code = eGFR 49                         

            



             )                                        



[facility] [id number] 15:57:00





             Test Item    Value        Reference Range Interpretation Comments

 

             B/C Ratio (test code = B/C Ratio) 17           6-                

      



[facility]QbsqurlYQIWJGKZOEIM8203-32-10 15:57:00





             Test Item    Value        Reference Range Interpretation Comments

 

             Sodium Lvl (test code = Sodium Lvl) 138          135-146           

        



[facility]NhqigrqBGSBYJEHWHKB0342-60-38 15:57:00





             Test Item    Value        Reference Range Interpretation Comments

 

             Potassium Lvl (test code = Potassium 4.5          3.5-5.3          

         



             Lvl)                                                



[facility]villeXogqlvuMDQDDGACIBFT9812-31-28 15:57:00





             Test Item    Value        Reference Range Interpretation Comments

 

             Chloride Lvl (test code = Chloride Lvl) 106                  

            



[facility]villeNwceewcLTZZFIOBMBNM1593-62-96 15:57:00





             Test Item    Value        Reference Range Interpretation Comments

 

             CO2 (test code = CO2) 21           -32                     



[facility]QkvjegqCMKJPIJYCO4851-76-72 15:57:00





             Test Item    Value        Reference Range Interpretation Comments

 

             WBC X 10x3 (test code = WBC X 10x3) 7.0          3.8-10.8          

        



[facility]zjkyfpIFZAQJCREW4432-85-82 15:57:00





             Test Item    Value        Reference Range Interpretation Comments

 

             RBC X 10x6 (test code = RBC X 10x6) 4.65         3.80-5.10         

        



[place] [id number] 15:57:00





             Test Item    Value        Reference Range Interpretation Comments

 

             Hgb (test code = Hgb) 14.7         11.7-15.5                 



[facility] HospitalCfwxfvrRPESYDTDZG5761-69-29 15:57:00





             Test Item    Value        Reference Range Interpretation Comments

 

             Hct (test code = Hct) 45.1         35.0-45.0                 



[facility] HospitalRyvoxabKSODVZPMWP2020-79-17 15:57:00





             Test Item    Value        Reference Range Interpretation Comments

 

             MCV (test code = MCV) 97.0         80.0-100.0                



[facility] HospitalYfinyheBKDMPHKGVI6913-30-59 15:57:00





             Test Item    Value        Reference Range Interpretation Comments

 

             MCH (test code = MCH) 31.6 pg      27.0-33.0                 



[facility] HospitalLmslrifOAQBORRLIG2478-95-55 15:57:00





             Test Item    Value        Reference Range Interpretation Comments

 

             MCHC (test code = MCHC) 32.6         32.0-36.0                 



[facility] HospitalUrfnrxkHGWDUFPPFQ5393-98-22 15:57:00





             Test Item    Value        Reference Range Interpretation Comments

 

             RDW (test code = RDW) 12.9         11.0-15.0                 



[facility] HospitalHtlefzlHPSEUWDPFV4348-07-44 15:57:00





             Test Item    Value        Reference Range Interpretation Comments

 

             Platelet (test code = Platelet) 241          140-400               

    



[facility] HospitalHebouauTMFDXCPKMV8700-78-01 15:57:00





             Test Item    Value        Reference Range Interpretation Comments

 

             MPV (test code = MPV) 10.9         7.5-12.5                  



[facility] HospitalDhsjcomCNBVONBFDC1467-06-05 15:57:00





             Test Item    Value        Reference Range Interpretation Comments

 

             Neutrophils # (test code = Neutrophils 4060         2719-1423      

           



             #)                                                  



[facility] HospitalEmvcvihHOUBKXKWDP1483-96-67 15:57:00





             Test Item    Value        Reference Range Interpretation Comments

 

             Lymphocytes # (test code = Lymphocytes 2191         850-3900       

           



             #)                                                  



[facility] HospitalEigcppgRPGCWFWJSO2499-56-55 15:57:00





             Test Item    Value        Reference Range Interpretation Comments

 

             Monocytes # (test code = Monocytes #) 511          200-950         

          



[facility] HospitalGaectpvIIZGRNHFCS9745-60-06 15:57:00





             Test Item    Value        Reference Range Interpretation Comments

 

             Eosinophils # (test code = Eosinophils 161                   

           



             #)                                                  



[facility] HospitalOrruwboMMEGHYAJCY1944-81-73 15:57:00





             Test Item    Value        Reference Range Interpretation Comments

 

             Basophils # (test code 77           See_Comment                [Aut

omated message] The



             = Basophils #)                                        system which 

generated



                                                                 this result tra

nsmitted



                                                                 reference range

: <=200.



                                                                 The reference r

nurys was



                                                                 not used to int

erpret



                                                                 this result as



                                                                 normal/abnormal

.



[facility] HospitalFmlpmjuPBVCHOOMYX2306-32-24 15:57:00





             Test Item    Value        Reference Range Interpretation Comments

 

             Segs (test code = Segs) 58                                     



[facility] HospitalLkwydeiUVOKONNYBF5678-85-51 15:57:00





             Test Item    Value        Reference Range Interpretation Comments

 

             Lymphocytes (test code = Lymphocytes) 31.3                         

          



[facility] HospitalQvelykcZAMKIHMFGT6684-69-96 15:57:00





             Test Item    Value        Reference Range Interpretation Comments

 

             Monocytes (test code = Monocytes) 7.3                              

      



[facility] HospitalWwyzfwxRGLKWHMROY8598-98-88 15:57:00





             Test Item    Value        Reference Range Interpretation Comments

 

             Eosinophils (test code = Eosinophils) 2.3                          

          



[facility] HospitalLnpeycdAIIPEGQPXS7354-81-84 15:57:00





             Test Item    Value        Reference Range Interpretation Comments

 

             Basophils (test code = Basophils) 1.1                              

      



[facility] Sensitivity Troponin I ([facility]/[place] Only)2021 
17:20:00





             Test Item    Value        Reference Range Interpretation Comments

 

             Troponin I HS (test 6 pg/ml      See_Comment                [Automa

ariadne



             code = 55115-0)                                        message] The



                                                                 system which



                                                                 generated this



                                                                 result



                                                                 transmitted



                                                                 reference range

:



                                                                 <=17. The



                                                                 reference range



                                                                 was not used to



                                                                 interpret this



                                                                 result as



                                                                 normal/abnormal

.

 

             MAGGI (test code =  ID -                           



             MAGGI)         DBThe                            



                          STAT High                              



                          Sensitivity                            



                          Troponin-I results                           



                          should be used in                           



                          conjunction with                           



                          other diagnostic                           



                          information such                           



                          as ECG, clinical                           



                          observations and                           



                          information, and                           



                          patient symptoms                           



                          to aid in the                           



                          diagnosis of MI.                           

 

             Lab Interpretation Normal                                 



             (test code =                                        



             18982-1)                                            



[facility] -36-06 17:20:00





             Test Item    Value        Reference Range Interpretation Comments

 

             HIGH SENSITIVITY 6 pg/ml      See_Comment                [Automated

 message]



             TROPONIN I (test code =                                        The 

system which



             2029901)                                            generated this 

result



                                                                 transmitted ref

erence



                                                                 range: <=17. Th

e



                                                                 reference range

 was not



                                                                 used to interpr

et this



                                                                 result as



                                                                 normal/abnormal

.



 ID - DBThe  STAT High Sensitivity Troponin-I results should be
used in conjunctionwith other diagnostic information such as ECG, clinical 
observations and information, and patient symptoms to aid in the diagnosis of 
MI.RAD, CHEST, 1 VIEW, NON [id number] 10:23:00Reason for exam:-&gt;pulm 
edemaShould this be performed at the bedside?-&gt;Yes
************************************************************[facility] : 1940 Sex: 
F************************************************************FINAL REPORT 
PATIENT ID: [id number] HISTORY: Pulmonary edema COMPARISON: 2021 FINDINGS: 
Mild interstitial pulmonary edema. No pleural effusions or pneumothorax. The 
heart shadow is borderline enlarged. The thoracic aorta is mildly tortuous. 
There are postoperative changes in the cervical spine. A right shoulder 
arthroplasty is partially imaged. Signed: Nikolas Rickettsort Verified 
Date/Time: 2021 10:23:36 Reading Location: [address] Transitional 
Reading Room Electronically signed by: NIKOLAS RICKETTS MD on 2021 10:23 AM
Basic Metabolic Hohid6842-97-15 05:46:00





             Test Item    Value        Reference Range Interpretation Comments

 

             Sodium (test code = 140 meq/L    136-145                   



             2951-2)                                             

 

             Potassium (test code = 3.4 meq/L    3.5-5.1      L            



             2823-3)                                             

 

             Chloride (test code = 108 meq/L           H            



             2075-0)                                             

 

             CO2 (test code = 25 meq/L     22-29                     



             8-9)                                             

 

             BUN (test code = 11 mg/dL     -                      



             3094-0)                                             

 

             Creatinine (test code 1.12 mg/dL   0.57-1.25                 



             = 2160-0)                                           

 

             Glucose (test code = 113 mg/dL           H            



             2345-7)                                             

 

             Calcium (test code = 8.9 mg/dL    8.4-10.2                  



             70608-1)                                            

 

             EGFR (test code = 47           mL/min/1.73 sq m              ESTIMA

ARIADNE GFR IS



             33914-3)                                            NOT AS ACCURATE



                                                                 AS CREATININE



                                                                 CLEARANCE IN



                                                                 PREDICTING



                                                                 GLOMERULAR



                                                                 FILTRATION RATE

.



                                                                 ESTIMATED GFR I

S



                                                                 NOT APPLICABLE



                                                                 FOR DIALYSIS



                                                                 PATIENTS.

 

             MAGGI (test code = MAGGI)  ID -                           



                          NICOLAS MEDELLIN                                 

 

             Lab Interpretation Abnormal                               



             (test code = 68002-2)                                        



[facility]Magnesium[facility] 05:46:00





             Test Item    Value        Reference Range Interpretation Comments

 

             Magnesium (test code = 1.7 mg/dL    1.6-2.6                   



             27652-4)                                            

 

             MAGGI (test code = MAGGI)  ID - NICOLAS MEDELLIN                                      

 

             Lab Interpretation (test Normal                                 



             code = 07608-7)                                        



[facility] HospitalBASIC METABOLIC [id number] 05:46:00





             Test Item    Value        Reference Range Interpretation Comments

 

             SODIUM (BEAKER) 140 meq/L    136-145                   



             (test code = 381)                                        

 

             POTASSIUM (BEAKER) 3.4 meq/L    3.5-5.1      L            



             (test code = 379)                                        

 

             CHLORIDE (BEAKER) 108 meq/L           H            



             (test code = 382)                                        

 

             CO2 (BEAKER) (test 25 meq/L     22-29                     



             code = 355)                                         

 

             BLOOD UREA NITROGEN 11 mg/dL     7-21                      



             (BEAKER) (test code                                        



             = 354)                                              

 

             CREATININE (BEAKER) 1.12 mg/dL   0.57-1.25                 



             (test code = 358)                                        

 

             GLUCOSE RANDOM 113 mg/dL           H            



             (BEAKER) (test code                                        



             = 652)                                              

 

             CALCIUM (BEAKER) 8.9 mg/dL    8.4-10.2                  



             (test code = 697)                                        

 

             EGFR (BEAKER) (test 47 mL/min/1.73                           ESTIMA

ARIADNE GFR IS



             code = 1092) sq m                                   NOT AS ACCURATE

 AS



                                                                 CREATININE



                                                                 CLEARANCE IN



                                                                 PREDICTING



                                                                 GLOMERULAR



                                                                 FILTRATION RATE

.



                                                                 ESTIMATED GFR I

S



                                                                 NOT APPLICABLE 

FOR



                                                                 DIALYSIS PATIEN

TS.



 ID - NICOLAS [id number] 05:46:00





             Test Item    Value        Reference Range Interpretation Comments

 

             MAGNESIUM (BEAKER) (test code = 1.7 mg/dL    1.6-2.6               

    



             627)                                                



 ID - NICOLAS MCBC with platelet count + automated [id number] 05:04:00





             Test Item    Value        Reference Range Interpretation Comments

 

             WBC (test code = 6690-2) 5.7          See_Comment                [A

utomated message]



                                                                 The system [email or web address]



                                                                 generated this 

result



                                                                 transmitted ref

erence



                                                                 range: 3.5 - 10

.5



                                                                 K/L. The refe

rence



                                                                 range was not u

sed to



                                                                 interpret this 

result



                                                                 as normal/abnor

mal.

 

             RBC (test code = 789-8) 3.85         See_Comment  L             [Au

tomated message]



                                                                 The system [employer]



                                                                 generated this 

result



                                                                 transmitted ref

erence



                                                                 range: 3.93 - 5

.22



                                                                 M/L. The refe

rence



                                                                 range was not u

sed to



                                                                 interpret this 

result



                                                                 as normal/abnor

mal.

 

             MCHC (test code = 786-4) 30.9         See_Comment  L             [A

utomated message]



                                                                 The system [employer]



                                                                 generated this 

result



                                                                 transmitted ref

erence



                                                                 range: 32.2 - 3

5.5



                                                                 GM/DL. The refe

rence



                                                                 range was not u

sed to



                                                                 interpret this 

result



                                                                 as normal/abnor

mal.

 

             Hematocrit (test code = 39.1 %       34.1-44.9                 



             4544-3)                                             

 

             MCV (test code = 787-2) 101.6 fL     79.4-94.8    H            

 

             MCH (test code = 785-6) 31.4 pg      25.6-32.2                 

 

             RDW (test code = 788-0) 13.8 %       11.7-14.4                 

 

             Platelets (test code = 204          See_Comment                [Aut

omated message]



             777-3)                                              The system [email or web address]



                                                                 generated this 

result



                                                                 transmitted ref

erence



                                                                 range: 150 - 45

0 K/CU



                                                                 MM. The referen

ce



                                                                 range was not u

sed to



                                                                 interpret this 

result



                                                                 as normal/abnor

mal.

 

             MPV (test code = 9.5 fL       9.4-12.3                  



             37857-6)                                            

 

             nRBC (test code = 413) 0            See_Comment                [Aut

omated message]



                                                                 The system [employer]



                                                                 generated this 

result



                                                                 transmitted ref

erence



                                                                 range: 0 - 0 /1

00



                                                                 WBC. The refere

nce



                                                                 range was not u

sed to



                                                                 interpret this 

result



                                                                 as normal/abnor

mal.

 

             % Neutros (test code = 52 %                                   



             429)                                                

 

             % Lymphs (test code = 32 %                                   



             430)                                                

 

             % Monos (test code = 11 %                                   



             431)                                                

 

             % Eos (test code = 432) 5 %                                    

 

             % Baso (test code = 437) 1 %                                    

 

             # Neutros (test code = 2.96         See_Comment                [Aut

omated message]



             670)                                                The system [employer]



                                                                 generated this 

result



                                                                 transmitted ref

erence



                                                                 range: 1.56 - 6

.13



                                                                 K/L. The refe

rence



                                                                 range was not u

sed to



                                                                 interpret this 

result



                                                                 as normal/abnor

mal.

 

             # Lymphs (test code = 1.83         See_Comment                [Auto

mated message]



             414)                                                The system [email or web address]



                                                                 generated this 

result



                                                                 transmitted ref

erence



                                                                 range: 1.18 - 3

.74



                                                                 K/L. The refe

rence



                                                                 range was not u

sed to



                                                                 interpret this 

result



                                                                 as normal/abnor

mal.

 

             # Monos (test code = 0.60         See_Comment  H             [Autom

ated message]



             415)                                                The system [email or web address]



                                                                 generated this 

result



                                                                 transmitted ref

erence



                                                                 range: 0.24 - 0

.36



                                                                 K/L. The refe

rence



                                                                 range was not u

sed to



                                                                 interpret this 

result



                                                                 as normal/abnor

mal.

 

             # Eos (test code = 416) 0.26         See_Comment                [Au

tomated message]



                                                                 The system [email or web address]



                                                                 generated this 

result



                                                                 transmitted ref

erence



                                                                 range: 0.04 - 0

.36



                                                                 K/L. The refe

rence



                                                                 range was not u

sed to



                                                                 interpret this 

result



                                                                 as normal/abnor

mal.

 

             # Baso (test code = 417) 0.05         See_Comment                [A

utomated message]



                                                                 The system [email or web address]



                                                                 generated this 

result



                                                                 transmitted ref

erence



                                                                 range: 0.01 - 0

.08



                                                                 K/L. The refe

rence



                                                                 range was not u

sed to



                                                                 interpret this 

result



                                                                 as normal/abnor

mal.

 

             Immature     1 %          0-1                       



             Granulocytes-Relative                                        



             (test code = 2801)                                        

 

             Lab Interpretation (test Abnormal                               



             code = 30899-8)                                        



[facility] W/PLT COUNT &amp; AUTO [id number] 
05:04:00





             Test Item    Value        Reference Range Interpretation Comments

 

             WHITE BLOOD CELL COUNT (BEAKER) 5.7 K/ L     3.5-10.5              

    



             (test code = 775)                                        

 

             RED BLOOD CELL COUNT (BEAKER) 3.85 M/ L    3.93-5.22    L          

  



             (test code = 761)                                        

 

             HEMOGLOBIN (BEAKER) (test code = 12.1 GM/DL   11.2-15.7            

     



             410)                                                

 

             HEMATOCRIT (BEAKER) (test code = 39.1 %       34.1-44.9            

     



             411)                                                

 

             MEAN CORPUSCULAR VOLUME (BEAKER) 101.6 fL     79.4-94.8    H       

     



             (test code = 753)                                        

 

             MEAN CORPUSCULAR HEMOGLOBIN 31.4 pg      25.6-32.2                 



             (BEAKER) (test code = 751)                                        

 

             MEAN CORPUSCULAR HEMOGLOBIN CONC 30.9 GM/DL   32.2-35.5    L       

     



             (BEAKER) (test code = 752)                                        

 

             RED CELL DISTRIBUTION WIDTH 13.8 %       11.7-14.4                 



             (BEAKER) (test code = 412)                                        

 

             PLATELET COUNT (BEAKER) (test 204 K/CU MM  150-450                 

  



             code = 756)                                         

 

             MEAN PLATELET VOLUME (BEAKER) 9.5 fL       9.4-12.3                

  



             (test code = 754)                                        

 

             NUCLEATED RED BLOOD CELLS 0 /100 WBC   0-0                       



             (BEAKER) (test code = 413)                                        

 

             NEUTROPHILS RELATIVE PERCENT 52 %                                  

 



             (BEAKER) (test code = 429)                                        

 

             LYMPHOCYTES RELATIVE PERCENT 32 %                                  

 



             (BEAKER) (test code = 430)                                        

 

             MONOCYTES RELATIVE PERCENT 11 %                                   



             (BEAKER) (test code = 431)                                        

 

             EOSINOPHILS RELATIVE PERCENT 5 %                                   

 



             (BEAKER) (test code = 432)                                        

 

             BASOPHILS RELATIVE PERCENT 1 %                                    



             (BEAKER) (test code = 437)                                        

 

             NEUTROPHILS ABSOLUTE COUNT 2.96 K/ L    1.56-6.13                 



             (BEAKER) (test code = 670)                                        

 

             LYMPHOCYTES ABSOLUTE COUNT 1.83 K/ L    1.18-3.74                 



             (BEAKER) (test code = 414)                                        

 

             MONOCYTES ABSOLUTE COUNT (BEAKER) 0.60 K/ L    0.24-0.36    H      

      



             (test code = 415)                                        

 

             EOSINOPHILS ABSOLUTE COUNT 0.26 K/ L    0.04-0.36                 



             (BEAKER) (test code = 416)                                        

 

             BASOPHILS ABSOLUTE COUNT (BEAKER) 0.05 K/ L    0.01-0.08           

      



             (test code = 417)                                        

 

             IMMATURE GRANULOCYTES-RELATIVE 1 %          0-1                    

   



             PERCENT (BEAKER) (test code =                                      

  



             2801)                                               



B-type Natriuretic Factor (BNP)2021 04:52:00





             Test Item    Value        Reference Range Interpretation Comments

 

             BNP (test code = 80405-5) 97 pg/mL     0-100                     

 

             MAGGI (test code = MAGGI)  ID - PIAYA                          

 



                          L                                      

 

             Lab Interpretation (test Normal                                 



             code = 39958-8)                                        



[facility]





             Test Item    Value        Reference Range Interpretation Comments

 

             B-TYPE NATRIURETIC PEPTIDE (BEAKER) 97 pg/mL     0-100             

        



             (test code = 700)                                        



 ID - PIAYA LRAD, CHEST, 1 VIEW, NON [id number] 14:01:00Reason for 
exam:-&gt;dyspneaShould this be performed at the bedside?-&gt;Yes
************************************************************[facility] : 1940 Sex: 
F************************************************************FINAL REPORT 
PATIENT ID: [id number] CLINICAL HISTORY: dyspnea TECHNIQUE: 1 view of the chest. 
COMPARISON: 2021 IMPRESSION: Pulmonary vascular congestion is again seen 
with slightly increased prominence of the interstitial lung markings 
bilaterally. There is no focal lobar consolidation or significantpleural fluid. 
The cardiomediastinal silhouette is magnified by technique. Cervical fusion 
hardware right shoulder hardware is again seen. Signed: Juan Franciscoeport 
Verified Date/Time: 2021 14:01:32 Reading Location: [facility] 
Radiology Reading Room Electronically signed by: JUAN FRANCISCO M.D. on 
2021 02:01 PMBASI METABOLIC [id number] 12:22:00





             Test Item    Value        Reference Range Interpretation Comments

 

             SODIUM (BEAKER) 134 meq/L    136-145      L            



             (test code = 381)                                        

 

             POTASSIUM (BEAKER) 3.7 meq/L    3.5-5.1                   



             (test code = 379)                                        

 

             CHLORIDE (BEAKER) 103 meq/L                        



             (test code = 382)                                        

 

             CO2 (BEAKER) (test 25 meq/L     22-29                     



             code = 355)                                         

 

             BLOOD UREA NITROGEN 15 mg/dL     7-21                      



             (BEAKER) (test code                                        



             = 354)                                              

 

             CREATININE (BEAKER) 1.24 mg/dL   0.57-1.25                 



             (test code = 358)                                        

 

             GLUCOSE RANDOM 168 mg/dL           H            



             (BEAKER) (test code                                        



             = 652)                                              

 

             CALCIUM (BEAKER) 8.3 mg/dL    8.4-10.2     L            



             (test code = 697)                                        

 

             EGFR (BEAKER) (test 42 mL/min/1.73                           ESTIMA

ARIADNE GFR IS



             code = 1092) sq m                                   NOT AS ACCURATE

 AS



                                                                 CREATININE



                                                                 CLEARANCE IN



                                                                 PREDICTING



                                                                 GLOMERULAR



                                                                 FILTRATION RATE

.



                                                                 ESTIMATED GFR I

S



                                                                 NOT APPLICABLE 

FOR



                                                                 DIALYSIS PATIEN

TS.



 ID - [id number] 12:22:00





             Test Item    Value        Reference Range Interpretation Comments

 

             MAGNESIUM (BEAKER) (test code = 1.7 mg/dL    1.6-2.6               

    



             627)                                                



 ID - EMERSONB-TYPE NATRIURETIC FACTOR (BNP)2021 12:13:00





             Test Item    Value        Reference Range Interpretation Comments

 

             B-TYPE NATRIURETIC PEPTIDE (BEAKER) 88 pg/mL     0-100             

        



             (test code = 700)                                        



 ID - EMERSONTransesophageal [id number] 21:25:57Ejection 
FractionSLEH ECHO HEARTLAB MKCKESSON CPAJohn Douglas French CenterHepatic 
function [id number] 06:35:00





             Test Item    Value        Reference Range Interpretation Comments

 

             Protein, Total (test 5.4          See_Comment  L             [Autom

ated



             code = 2885-2)                                        message] The



                                                                 system which



                                                                 generated this



                                                                 result transmit

ariadne



                                                                 reference range

:



                                                                 6.0 - 8.3 gm/dL

.



                                                                 The reference



                                                                 range was not u

sed



                                                                 to interpret th

is



                                                                 result as



                                                                 normal/abnormal

.

 

             Albumin (test code = 2.7 g/dL     3.5-5.0      L            



             76807-7)                                            

 

             Total Bilirubin (test 0.1 mg/dL    0.2-1.2      L            



             code = 1975-2)                                        

 

             Bilirubin, Direct 0.1 mg/dL    0.1-0.5                   



             (test code = 1968-7)                                        

 

             Alkaline Phosphatase 80 U/L                           



             (test code = 6768-6)                                        

 

             AST (test code = 15 U/L       5-34                      



             1920-8)                                             

 

             ALT (test code = 8 U/L        6-55                      



             1742-6)                                             

 

             MAGGI (test code = MAGGI)  ID -                           



                          ROBYN L                                

 

             Lab Interpretation Abnormal                               



             (test code = 33440-7)                                        



[facility] HospitalBAS[facility] [id number] 06:35:00





             Test Item    Value        Reference Range Interpretation Comments

 

             SODIUM (BEAKER) 138 meq/L    136-145                   



             (test code = 381)                                        

 

             POTASSIUM (BEAKER) 3.6 meq/L    3.5-5.1                   



             (test code = 379)                                        

 

             CHLORIDE (BEAKER) 106 meq/L                        



             (test code = 382)                                        

 

             CO2 (BEAKER) (test 22 meq/L     22-29                     



             code = 355)                                         

 

             BLOOD UREA NITROGEN 15 mg/dL     7-21                      



             (BEAKER) (test code                                        



             = 354)                                              

 

             CREATININE (BEAKER) 1.12 mg/dL   0.57-1.25                 



             (test code = 358)                                        

 

             GLUCOSE RANDOM 69 mg/dL            L            



             (BEAKER) (test code                                        



             = 652)                                              

 

             CALCIUM (BEAKER) 8.4 mg/dL    8.4-10.2                  



             (test code = 697)                                        

 

             EGFR (BEAKER) (test 47 mL/min/1.73                           ESTIMA

ARIADNE GFR IS



             code = 1092) sq m                                   NOT AS ACCURATE

 AS



                                                                 CREATININE



                                                                 CLEARANCE IN



                                                                 PREDICTING



                                                                 GLOMERULAR



                                                                 FILTRATION RATE

.



                                                                 ESTIMATED GFR I

S



                                                                 NOT APPLICABLE 

FOR



                                                                 DIALYSIS PATIEN

TS.



 ID - PIAYA LHEPATIC FUNCTION [id number] 06:35:00





             Test Item    Value        Reference Range Interpretation Comments

 

             TOTAL PROTEIN (BEAKER) (test code = 5.4 gm/dL    6.0-8.3      L    

        



             770)                                                

 

             ALBUMIN (BEAKER) (test code = 1145) 2.7 g/dL     3.5-5.0      L    

        

 

             BILIRUBIN TOTAL (BEAKER) (test code 0.1 mg/dL    0.2-1.2      L    

        



             = 377)                                              

 

             BILIRUBIN DIRECT (BEAKER) (test 0.1 mg/dL    0.1-0.5               

    



             code = 706)                                         

 

             ALKALINE PHOSPHATASE (BEAKER) (test 80 U/L                   

        



             code = 346)                                         

 

             AST (SGOT) (BEAKER) (test code = 15 U/L       5-34                 

     



             353)                                                

 

             ALT (SGPT) (BEAKER) (test code = 8 U/L        6-55                 

     



             347)                                                



 ID - PIAYA LCBC W/PLT COUNT &amp; AUTO [id number] 05:44:00





             Test Item    Value        Reference Range Interpretation Comments

 

             WHITE BLOOD CELL COUNT (BEAKER) 5.8 K/ L     3.5-10.5              

    



             (test code = 775)                                        

 

             RED BLOOD CELL COUNT (BEAKER) 3.95 M/ L    3.93-5.22               

  



             (test code = 761)                                        

 

             HEMOGLOBIN (BEAKER) (test code = 12.7 GM/DL   11.2-15.7            

     



             410)                                                

 

             HEMATOCRIT (BEAKER) (test code = 40.1 %       34.1-44.9            

     



             411)                                                

 

             MEAN CORPUSCULAR VOLUME (BEAKER) 101.5 fL     79.4-94.8    H       

     



             (test code = 753)                                        

 

             MEAN CORPUSCULAR HEMOGLOBIN 32.2 pg      25.6-32.2                 



             (BEAKER) (test code = 751)                                        

 

             MEAN CORPUSCULAR HEMOGLOBIN CONC 31.7 GM/DL   32.2-35.5    L       

     



             (BEAKER) (test code = 752)                                        

 

             RED CELL DISTRIBUTION WIDTH 13.4 %       11.7-14.4                 



             (BEAKER) (test code = 412)                                        

 

             PLATELET COUNT (BEAKER) (test 213 K/CU MM  150-450                 

  



             code = 756)                                         

 

             MEAN PLATELET VOLUME (BEAKER) 9.8 fL       9.4-12.3                

  



             (test code = 754)                                        

 

             NUCLEATED RED BLOOD CELLS 0 /100 WBC   0-0                       



             (BEAKER) (test code = 413)                                        

 

             NEUTROPHILS RELATIVE PERCENT 41 %                                  

 



             (BEAKER) (test code = 429)                                        

 

             LYMPHOCYTES RELATIVE PERCENT 41 %                                  

 



             (BEAKER) (test code = 430)                                        

 

             MONOCYTES RELATIVE PERCENT 10 %                                   



             (BEAKER) (test code = 431)                                        

 

             EOSINOPHILS RELATIVE PERCENT 6 %                                   

 



             (BEAKER) (test code = 432)                                        

 

             BASOPHILS RELATIVE PERCENT 1 %                                    



             (BEAKER) (test code = 437)                                        

 

             NEUTROPHILS ABSOLUTE COUNT 2.34 K/ L    1.56-6.13                 



             (BEAKER) (test code = 670)                                        

 

             LYMPHOCYTES ABSOLUTE COUNT 2.38 K/ L    1.18-3.74                 



             (BEAKER) (test code = 414)                                        

 

             MONOCYTES ABSOLUTE COUNT (BEAKER) 0.57 K/ L    0.24-0.36    H      

      



             (test code = 415)                                        

 

             EOSINOPHILS ABSOLUTE COUNT 0.34 K/ L    0.04-0.36                 



             (BEAKER) (test code = 416)                                        

 

             BASOPHILS ABSOLUTE COUNT (BEAKER) 0.07 K/ L    0.01-0.08           

      



             (test code = 417)                                        

 

             IMMATURE GRANULOCYTES-RELATIVE 1 %          0-1                    

   



             PERCENT (BEAKER) (test code =                                      

  



             2801)                                               



SARS-CoV2/RT-PCR (Asymptomatic ONLY)2021 23:22:00





             Test Item    Value        Reference Range Interpretation Comments

 

             SARS-COV2/RT-PCR (test Negative     Negative                  



             code = 36811-2)                                        

 

             MAGGI (test code = MAGGI) Negative result for                          

 



                          this test determines                           



                          that SARS-CoV-2 RNA was                           



                          not present in the                           



                          specimen above the                           



                          Limit of Detection                           



                          (LOD). However,                           



                          Negative results do not                           



                          preclude SARS-CoV-2                           



                          infection and should                           



                          not be used as the sole                           



                          basis for treatment or                           



                          patient management                           



                          decisions. Negative                           



                          results must be                           



                          combined with clinical                           



                          observations, patient                           



                          history, and                           



                          epidemiological                           



                          information. A false                           



                          negative result may                           



                          occur if a specimen is                           



                          improperly collected,                           



                          transported, or                           



                          handled. A false                           



                          negative result should                           



                          be considered if                           



                          patient's recent                           



                          exposures or clinical                           



                          presentation indicate                           



                          that COVID-19                           



                          (SARS-CoV-2) is likely                           



                          and diagnostic tests                           



                          for other causes of                           



                          illness are negative.                           



                          Re-testing should be                           



                          considered in cases of                           



                          suspected false                           



                          negatives. The limit of                           



                          detection for this                           



                          assay is 100 copies/mL.                           



                          This SARS-CoV-2 test is                           



                          a real-time RT_PCR test                           



                          intended for the                           



                          qualitative detection                           



                          of nucleic acid from                           



                          SARS-CoV-2 in a                           



                          nasopharyngeal swab                           



                          specimen collected from                           



                          individuals suspected                           



                          of COVID-19 by their                           



                          healthcare provider.                           



                          This test has not been                           



                          Food and Drug                           



                          Administration (FDA)                           



                          cleared or approved.                           



                          This is a modified                           



                          version of an approved                           



                          Emergency Use                           



                          Authorization (EUA) and                           



                          is in the process of                           



                          review by the FDA. Once                           



                          authorized by the FDA,                           



                          the issued EUA will be                           



                          effective until the                           



                          declaration that                           



                          circumstances exist                           



                          justifying the                           



                          authorization of the                           



                          emergency use of in                           



                          vitro diagnostic tests                           



                          for detection and/or                           



                          diagnosis of COVID-19                           



                          is terminated under                           



                          Section 564(b)(2) of                           



                          the Act or the EUA is                           



                          revoked under Section                           



                          564(g) of the Act.                           



                          Testing was performed                           



                          using the Abbott                           



                          SARS-CoV-2 assay. Fact                           



                          Sheet for Healthcare                           



                          Providers:https://www.natty davey/sal/RT                           



                          SARS-CoV-2 HCP Fact                           



                          Sheet 51-435797.pdf                           



                          Fact Sheet for                           



                          Healthcare                             



                          Patients:[email or web address]://www.maryanne [email or web address]/sal/RT                           



                          SARS-CoV-2 Patient Fact                           



                          Sheet EN                               



                          51-141974O2.pdf                           

 

             Lab Interpretation Normal                                 



             (test code = 84757-3)                                        



[facility] 
23:22:00





             Test Item    Value        Reference Range Interpretation Comments

 

             SARS-COV2/RT-PCR (test code = Negative     Negative                

  



             [id number])                                            



Negative result for this test determines that SARS-CoV-2 RNA was not present in 
the specimen above the Limit of Detection (LOD). However, Negative results do 
not preclude SARS-CoV-2 infection and should not be used as the sole basis for 
treatment or patient management decisions. Negative results must be combined 
with clinical observations, patient history, and epidemiological information. A 
false negative result may occur if a specimen is improperly collected, 
transported, or handled. A false negative result should be considered if 
patient's recent exposures or clinical presentation indicate that COVID-19 
(SARS-CoV-2) is likely and diagnostic tests for other causes of illness are 
negative. Re-testing should be considered in cases of suspected false 
negatives.The limit of detection for this assay is 100 copies/mL.This SARS-CoV-2
test is a real-time RT_PCR test intended for the qualitative detection of 
nucleic acid from SARS-CoV-2 in a nasopharyngeal swab specimen collected from 
individuals suspected of COVID-19 by their healthcare provider.This test has not
been Food and Drug Administration (FDA) cleared or approved. This is a modified 
version of an approved Emergency Use Authorization (EUA) and is in the process 
of review by the FDA. Once authorized by the FDA, the issued EUA will be 
effective until the declaration that circumstances exist justifying the 
authorization of the emergency use of in vitro diagnostic tests for detection 
and/or diagnosis of COVID-19 is terminated under Section 564(b)(2) of the Act or
the EUA is revoked under Section 564(g) of the Act.Testing was performed using t
he Abbott SARS-CoV-2 assay.Fact Sheet for Healthcare 
Providers:https://[email or web address].com.abbott/sal/RT SARS-CoV-2 HCP Fact Sheet 51-
773998.pdfFact Sheet for Healthcare Patients:https://[email or web address].com.abbott/sal/RT
SARS-CoV-2 Patient Fact Sheet EN 51-137637G2.pdfHIGH SENSITIVITY TROPONIN I
2021 19:21:00





             Test Item    Value        Reference Range Interpretation Comments

 

             HIGH SENSITIVITY 8 pg/ml      See_Comment                [Automated

 message]



             TROPONIN I (test code =                                        The 

system which



             0528050)                                            generated this 

result



                                                                 transmitted ref

erence



                                                                 range: <=17. Th

e



                                                                 reference range

 was not



                                                                 used to interpr

et this



                                                                 result as



                                                                 normal/abnormal

.



 ID - dwayne Gomez  STAT High Sensitivity Troponin-I results 
should be used in conjunction with other diagnostic information such as ECG, 
clinical observations and information, and patient symptoms to aid in the 
diagnosis of MI.2D Echo W/Doppler(CW/PW/Color)2021 17:56:11Ejection 
FractionSLEH ECHO HEARTLAB MKCKESSON CPACSCHI [facility]CT[facility] 
WITH IV CONTRAST- PE TEST [id number] 06:05:00Unlisted Reason for Exam - 
Click Yes and Enter Reason Below-&gt;No
************************************************************[facility] : 1940 Sex: 
F************************************************************FINAL REPORT 
PATIENT ID: [id number] EXAM/TECHNIQUE: CT angiography of the chest pulmonary 
embolus protocol INDICATION: Concern for pulmonary embolism. COMPARISON: None. 
FINDINGS: Lower Neck: Unremarkable. Parenchyma/Pleura: Bilateral patchy 
groundglass opacities are present in the lungs. 13 mm left lower lobe pulmonary 
nodule. Small bilateral pleural effusions. Septal thickening and peribronchial 
thickening is present. Airways: Unremarkable. Cardiac: Cardiomegaly. 
Mediastinum/lymph nodes: No lymphadenopathy. Vessels: No filling defects in the 
main, lobar, or segmental pulmonary arteries. Osseous: No acute osseous process.
No suspicious osseous lesion. Upper abdomen: Unremarkable. Impression: 1. No 
acute pulmonary embolism.2. Bilateral groundglass opacities, bilateral pleural 
effusions, and interstitial thickening may represent pulmonary edema and/or 
infection.3. Left lower lobe pulmonary nodule measuring 13 mm. Consider follow-
up CT in three months, PET/CT, or tissue sampling. Signed: Will Matthews 
[facility] ([place], [place]) Verified Date/Time: 2021 06:05:30 Electronically signed by: WILL MATTHEWS MD on 2021 06:05 [facility] [id number] 06:00:00





             Test Item    Value        Reference Range Interpretation Comments

 

             TOTAL PROTEIN (BEAKER) (test code = 5.2 gm/dL    6.0-8.3      L    

        



             770)                                                

 

             ALBUMIN (BEAKER) (test code = 1145) 2.7 g/dL     3.5-5.0      L    

        

 

             BILIRUBIN TOTAL (BEAKER) (test code 0.6 mg/dL    0.2-1.2           

        



             = 377)                                              

 

             BILIRUBIN DIRECT (BEAKER) (test 0.3 mg/dL    0.1-0.5               

    



             code = 706)                                         

 

             ALKALINE PHOSPHATASE (BEAKER) (test 92 U/L                   

        



             code = 346)                                         

 

             AST (SGOT) (BEAKER) (test code = 14 U/L       5-34                 

     



             353)                                                

 

             ALT (SGPT) (BEAKER) (test code = 7 U/L        6-55                 

     



             347)                                                



 ID - NICOLAS MOperator ID - PIAYA LBASIC METABOLIC [id number] 
05:13:00





             Test Item    Value        Reference Range Interpretation Comments

 

             SODIUM (BEAKER) 139 meq/L    136-145                   



             (test code = 381)                                        

 

             POTASSIUM (BEAKER) 4.0 meq/L    3.5-5.1                   



             (test code = 379)                                        

 

             CHLORIDE (BEAKER) 110 meq/L           H            



             (test code = 382)                                        

 

             CO2 (BEAKER) (test 24 meq/L     22-29                     



             code = 355)                                         

 

             BLOOD UREA NITROGEN 14 mg/dL     7-21                      



             (BEAKER) (test code                                        



             = 354)                                              

 

             CREATININE (BEAKER) 1.05 mg/dL   0.57-1.25                 



             (test code = 358)                                        

 

             GLUCOSE RANDOM 81 mg/dL                         



             (BEAKER) (test code                                        



             = 652)                                              

 

             CALCIUM (BEAKER) 8.4 mg/dL    8.4-10.2                  



             (test code = 697)                                        

 

             EGFR (BEAKER) (test 51 mL/min/1.73                           ESTIMA

ARIADNE GFR IS



             code = 1092) sq m                                   NOT AS ACCURATE

 AS



                                                                 CREATININE



                                                                 CLEARANCE IN



                                                                 PREDICTING



                                                                 GLOMERULAR



                                                                 FILTRATION RATE

.



                                                                 ESTIMATED GFR I

S



                                                                 NOT APPLICABLE 

FOR



                                                                 DIALYSIS PATIEN

TS.



 ID - NICOLAS MVitamin B12 and [id number] 04:44:00





             Test Item    Value        Reference Range Interpretation Comments

 

             Vitamin B12 (test 272 pg/mL    213-816                   



             code = 2132-9)                                        

 

             Folate (test code = 7.70 ng/mL   See_Comment                [Automa

ariadne



             2284-8)                                             message] The



                                                                 system which



                                                                 generated this



                                                                 result transmit

ariadne



                                                                 reference range

:



                                                                 >=7.00. The



                                                                 reference range



                                                                 was not used to



                                                                 interpret this



                                                                 result as



                                                                 normal/abnormal

.

 

             MAGGI (test code = MAGGI)  ID -                           



                          NICOLAS MEDELLIN                                 

 

             Lab Interpretation Normal                                 



             (test code = 75238-6)                                        



[id number] 04:44:00





             Test Item    Value        Reference Range Interpretation Comments

 

             VITAMIN B12 (BEAKER) 272 pg/mL    213-816                   



             (test code = 774)                                        

 

             FOLATE (BEAKER) 7.70 ng/mL   See_Comment                [Automated 

message]



             (test code = 362)                                        The system

 which



                                                                 generated this 

result



                                                                 transmitted ref

erence



                                                                 range: >=7.00. 

The



                                                                 reference range

 was not



                                                                 used to interpr

et this



                                                                 result as



                                                                 normal/abnormal

.



 ID - NICOLAS MRAD, CHEST, 1 VIEW, NON [id number] 04:41:00Reason for 
exam:-&gt;afibShould this be performed at the bedside?-&gt;Yes
************************************************************[facility] : 1940 Sex: 
F************************************************************FINAL REPORT 
PATIENT ID: [id number] EXAM/TECHNIQUE: Single view frontal radiograph of the 
chest. INDICATION: Atrial fibrillation. COMPARISON: None. FINDINGS: 
Devices/Objects: None. Lungs: No focal consolidation. No pleural effusion. No 
pneumothorax. Heart/Mediastinum: No cardiomegaly. Mild interstitial thickening. 
Osseous: No acute osseous process. No suspicious osseous lesion. Upper abdomen: 
Unremarkable. Impression: Mild interstitial thickening may represent 
interstitial pulmonary edema. Signed: [facility] Verified 
Date/Time: 2021 04:41:03 Electronically signed by: WILL MATTHEWS MD on 
2021 04:41 AMHIGH SENSITIVITY TROPONIN -11-81 04:36:00





             Test Item    Value        Reference Range Interpretation Comments

 

             HIGH SENSITIVITY 8 pg/ml      See_Comment                [Automated

 message]



             TROPONIN I (test code =                                        The 

system which



             8674358)                                            generated this 

result



                                                                 transmitted ref

erence



                                                                 range: <=17. Th

e



                                                                 reference range

 was not



                                                                 used to interpr

et this



                                                                 result as



                                                                 normal/abnormal

.



 ID - NICOLAS MThe  STAT High Sensitivity Troponin-I results 
should be used in conjunction with other diagnostic information such as ECG, 
clinical observations and information, and patient symptoms to aid in the 
diagnosis of MI.B-TYPE NATRIURETIC FACTOR (BNP)2021 04:27:00





             Test Item    Value        Reference Range Interpretation Comments

 

             B-TYPE NATRIURETIC PEPTIDE (BEAKER) 144 pg/mL    0-100        H    

        



             (test code = 700)                                        



 ID - NICOLAS MCBC W/PLT COUNT &amp; AUTO [id number] 04:01:00





             Test Item    Value        Reference Range Interpretation Comments

 

             WHITE BLOOD CELL COUNT (BEAKER) 6.7 K/ L     3.5-10.5              

    



             (test code = 775)                                        

 

             RED BLOOD CELL COUNT (BEAKER) 3.83 M/ L    3.93-5.22    L          

  



             (test code = 761)                                        

 

             HEMOGLOBIN (BEAKER) (test code = 12.3 GM/DL   11.2-15.7            

     



             410)                                                

 

             HEMATOCRIT (BEAKER) (test code = 38.7 %       34.1-44.9            

     



             411)                                                

 

             MEAN CORPUSCULAR VOLUME (BEAKER) 101.0 fL     79.4-94.8    H       

     



             (test code = 753)                                        

 

             MEAN CORPUSCULAR HEMOGLOBIN 32.1 pg      25.6-32.2                 



             (BEAKER) (test code = 751)                                        

 

             MEAN CORPUSCULAR HEMOGLOBIN CONC 31.8 GM/DL   32.2-35.5    L       

     



             (BEAKER) (test code = 752)                                        

 

             RED CELL DISTRIBUTION WIDTH 13.5 %       11.7-14.4                 



             (BEAKER) (test code = 412)                                        

 

             PLATELET COUNT (BEAKER) (test 205 K/CU MM  150-450                 

  



             code = 756)                                         

 

             MEAN PLATELET VOLUME (BEAKER) 9.7 fL       9.4-12.3                

  



             (test code = 754)                                        

 

             NUCLEATED RED BLOOD CELLS 0 /100 WBC   0-0                       



             (BEAKER) (test code = 413)                                        

 

             NEUTROPHILS RELATIVE PERCENT 47 %                                  

 



             (BEAKER) (test code = 429)                                        

 

             LYMPHOCYTES RELATIVE PERCENT 38 %                                  

 



             (BEAKER) (test code = 430)                                        

 

             MONOCYTES RELATIVE PERCENT 10 %                                   



             (BEAKER) (test code = 431)                                        

 

             EOSINOPHILS RELATIVE PERCENT 4 %                                   

 



             (BEAKER) (test code = 432)                                        

 

             BASOPHILS RELATIVE PERCENT 1 %                                    



             (BEAKER) (test code = 437)                                        

 

             NEUTROPHILS ABSOLUTE COUNT 3.14 K/ L    1.56-6.13                 



             (BEAKER) (test code = 670)                                        

 

             LYMPHOCYTES ABSOLUTE COUNT 2.55 K/ L    1.18-3.74                 



             (BEAKER) (test code = 414)                                        

 

             MONOCYTES ABSOLUTE COUNT (BEAKER) 0.66 K/ L    0.24-0.36    H      

      



             (test code = 415)                                        

 

             EOSINOPHILS ABSOLUTE COUNT 0.23 K/ L    0.04-0.36                 



             (BEAKER) (test code = 416)                                        

 

             BASOPHILS ABSOLUTE COUNT (BEAKER) 0.05 K/ L    0.01-0.08           

      



             (test code = 417)                                        

 

             IMMATURE GRANULOCYTES-RELATIVE 1 %          0-1                    

   



             PERCENT (BEAKER) (test code =                                      

  



             2801)                                               



TSH/Free T4 If [id number] 00:19:00





             Test Item    Value        Reference Range Interpretation Comments

 

             TSH (test code = 1.723        See_Comment                [Automated



             08206-4)                                            message] The



                                                                 system which



                                                                 generated this



                                                                 result transmit

ariadne



                                                                 reference range

:



                                                                 0.350 - 4.940



                                                                 uIU/mL. The



                                                                 reference range



                                                                 was not used to



                                                                 interpret this



                                                                 result as



                                                                 normal/abnormal

.

 

             MAGGI (test code = MAGGI)  ID -                           



                          ROBYN HODGES                                

 

             Lab Interpretation Normal                                 



             (test code = 54840-4)                                        



[facility] [id number] 00:19:00





             Test Item    Value        Reference Range Interpretation Comments

 

             THYROID STIMULATING HORMONE 1.723 uIU/mL 0.350-4.940               



             (BEAKER) (test code = 772)                                        



 ID - ROBYN ARZATEIGH SENSITIVITY TROPONIN -63-15 00:02:00





             Test Item    Value        Reference Range Interpretation Comments

 

             HIGH SENSITIVITY 7 pg/ml      See_Comment                [Automated

 message]



             TROPONIN I (test code =                                        The 

system which



             3510620)                                            generated this 

result



                                                                 transmitted ref

erence



                                                                 range: <=17. Th

e



                                                                 reference range

 was not



                                                                 used to interpr

et this



                                                                 result as



                                                                 normal/abnormal

.



 ID - DBThe  STAT High Sensitivity Troponin-I results should be
used in conjunctionwith other diagnostic information such as ECG, clinical 
observations and information, and patient symptoms to aid in the diagnosis of 
MI.[id number] 23:58:00





             Test Item    Value        Reference Range Interpretation Comments

 

             Phosphorus (test code = 3.3 mg/dL    2.3-4.7                   



             2777-1)                                             

 

             MAGGI (test code = MAGGI)  ID - DB                           

 

             Lab Interpretation (test Normal                                 



             code = 39120-7)                                        



[facility] METABOLIC [id number] 23:58:00





             Test Item    Value        Reference Range Interpretation Comments

 

             SODIUM (BEAKER) 138 meq/L    136-145                   



             (test code = 381)                                        

 

             POTASSIUM (BEAKER) 4.0 meq/L    3.5-5.1                   



             (test code = 379)                                        

 

             CHLORIDE (BEAKER) 109 meq/L           H            



             (test code = 382)                                        

 

             CO2 (BEAKER) (test 20 meq/L     22-29        L            



             code = 355)                                         

 

             BLOOD UREA NITROGEN 14 mg/dL     7-21                      



             (BEAKER) (test code                                        



             = 354)                                              

 

             CREATININE (BEAKER) 1.09 mg/dL   0.57-1.25                 



             (test code = 358)                                        

 

             GLUCOSE RANDOM 83 mg/dL                         



             (BEAKER) (test code                                        



             = 652)                                              

 

             CALCIUM (BEAKER) 8.1 mg/dL    8.4-10.2     L            



             (test code = 697)                                        

 

             EGFR (BEAKER) (test 48 mL/min/1.73                           ESTIMA

ARIADNE GFR IS



             code = 1092) sq m                                   NOT AS ACCURATE

 AS



                                                                 CREATININE



                                                                 CLEARANCE IN



                                                                 PREDICTING



                                                                 GLOMERULAR



                                                                 FILTRATION RATE

.



                                                                 ESTIMATED GFR I

S



                                                                 NOT APPLICABLE 

FOR



                                                                 DIALYSIS PATIEN

TS.



 ID - [id number] 23:58:00





             Test Item    Value        Reference Range Interpretation Comments

 

             MAGNESIUM (BEAKER) (test code = 1.7 mg/dL    1.6-2.6               

    



             627)                                                



 ID - [id number] 23:58:00





             Test Item    Value        Reference Range Interpretation Comments

 

             PHOSPHORUS (BEAKER) (test code = 3.3 mg/dL    2.3-4.7              

     



             604)                                                



 ID - DBHEPATIC FUNCTION [id number] 23:58:00





             Test Item    Value        Reference Range Interpretation Comments

 

             TOTAL PROTEIN (BEAKER) (test code = 5.4 gm/dL    6.0-8.3      L    

        



             770)                                                

 

             ALBUMIN (BEAKER) (test code = 1145) 2.8 g/dL     3.5-5.0      L    

        

 

             BILIRUBIN TOTAL (BEAKER) (test code 0.7 mg/dL    0.2-1.2           

        



             = 377)                                              

 

             BILIRUBIN DIRECT (BEAKER) (test 0.3 mg/dL    0.1-0.5               

    



             code = 706)                                         

 

             ALKALINE PHOSPHATASE (BEAKER) (test 92 U/L                   

        



             code = 346)                                         

 

             AST (SGOT) (BEAKER) (test code = 14 U/L       5-34                 

     



             353)                                                

 

             ALT (SGPT) (BEAKER) (test code = 9 U/L        6-55                 

     



             347)                                                



 ID - [id number] 23:42:00





             Test Item    Value        Reference Range Interpretation Comments

 

             D-Dimer, Quant (test 1.26         See_Comment  H             [Autom

ated



             code = 31513-6)                                        message] The



                                                                 system which



                                                                 generated this



                                                                 result



                                                                 transmitted



                                                                 reference range

:



                                                                 <0.50 MG/L FEU.



                                                                 The reference



                                                                 range was not



                                                                 used to interpr

et



                                                                 this result as



                                                                 normal/abnormal

.

 

             MAGGI (test code = MAGGI) Intended Use: The                           



                          D-Dimer Assay can                           



                          be used to aid in                           



                          the diagnosis of                           



                          Deep Vein                              



                          Thrombosis (DVT)                           



                          and Pulmonary                           



                          Embolism Disease                           



                          (PED).In patients                           



                          with low pre-test                           



                          probability,                           



                          various studies                           



                          concerning STA                           



                          Liatest D-dimer                           



                          test have                              



                          reported that                           



                          with a cutoff                           



                          value of 0.50                           



                          MG/L FEU, the                           



                          Negative                               



                          Predictive Value                           



                          (NPV) regarding                           



                          the exclusion of                           



                          thrombosis is                           



                          within %                           



                          range.                                 

 

             Lab Interpretation Abnormal                               



             (test code = 83626-1)                                        



[id number]:42:00





             Test Item    Value        Reference Range Interpretation Comments

 

             D-DIMER QUANTITATIVE (BEAKER) 1.26 MG/L FEU <0.50        H         

   



             (test code = 671)                                        



Intended Use: The D-Dimer Assay can be used to aid in the diagnosis of Deep Vein
Thrombosis (DVT) and Pulmonary Embolism Disease (PED).In patients with low pre-
test probability, various studies concerning STA Liatest D-dimer test have 
reported that with a cutoff value of 0.50 MG/L FEU, the Negative Predictive 
Value (NPV) regarding the exclusion of thrombosis is within % range.
Prothrombin time/[id number] 23:39:00





             Test Item    Value        Reference    Interpretation Comments



                                       Range                     

 

             Protime (test code = 15.4         See_Comment  H             [Autom

ated



             5902-2)                                             message] The



                                                                 system which



                                                                 generated this



                                                                 result



                                                                 transmitted



                                                                 reference range

:



                                                                 11.9 - 14.2



                                                                 seconds. The



                                                                 reference range



                                                                 was not used to



                                                                 interpret this



                                                                 result as



                                                                 normal/abnormal

.

 

             INR (test code = 1.24         See_Comment                [Automated



             6301-6)                                             message] The



                                                                 system which



                                                                 generated this



                                                                 result



                                                                 transmitted



                                                                 reference range

:



                                                                 <=5.90. The



                                                                 reference range



                                                                 was not used to



                                                                 interpret this



                                                                 result as



                                                                 normal/abnormal

.

 

             MAGGI (test code = RECOMMENDED                            



             MAGGI)         COUMADIN/WARFARIN                           



                          INR THERAPY                            



                          RANGESSTANDARD DOSE:                           



                          2.0 - 3.0 Includes:                           



                          PROPHYLAXIS for                           



                          venous thrombosis,                           



                          systemic                               



                          embolization;                           



                          TREATMENT for venous                           



                          thrombosis and/or                           



                          pulmonary                              



                          embolus.HIGH RISK:                           



                          Target INR is                           



                          2.5-3.5 for patients                           



                          with mechanical                           



                          heart valves.                           

 

             Lab Interpretation Abnormal                               



             (test code =                                        



             10134-4)                                            



[facility]PROT[facility]/[id number] 23:39:00





             Test Item    Value        Reference Range Interpretation Comments

 

             PROTIME (BEAKER) 15.4 seconds 11.9-14.2    H            



             (test code = 759)                                        

 

             INR (BEAKER) (test 1.24         See_Comment                [Automat

ed message]



             code = 370)                                         The system [employer]

[employer]



                                                                 generated this 

result



                                                                 transmitted ref

erence



                                                                 range: <=5.90. 

The



                                                                 reference range

 was



                                                                 not used to int

erpret



                                                                 this result as



                                                                 normal/abnormal

.



RECOMMENDED COUMADIN/WARFARIN INR THERAPY RANGESSTANDARD DOSE: 2.0 - 3.0 
Includes: PROPHYLAXIS for venous thrombosis, systemic embolization; TREATMENT 
for venous thrombosis and/or pulmonary embolus.HIGH RISK: Target INR is 2.5-3.5 
for patients with mechanical heart valves.CBC W/PLT COUNT &amp; AUTO 
[id number] 23:32:00





             Test Item    Value        Reference Range Interpretation Comments

 

             WHITE BLOOD CELL COUNT (BEAKER) 7.9 K/ L     3.5-10.5              

    



             (test code = 775)                                        

 

             RED BLOOD CELL COUNT (BEAKER) 3.96 M/ L    3.93-5.22               

  



             (test code = 761)                                        

 

             HEMOGLOBIN (BEAKER) (test code = 12.7 GM/DL   11.2-15.7            

     



             410)                                                

 

             HEMATOCRIT (BEAKER) (test code = 40.3 %       34.1-44.9            

     



             411)                                                

 

             MEAN CORPUSCULAR VOLUME (BEAKER) 101.8 fL     79.4-94.8    H       

     



             (test code = 753)                                        

 

             MEAN CORPUSCULAR HEMOGLOBIN 32.1 pg      25.6-32.2                 



             (BEAKER) (test code = 751)                                        

 

             MEAN CORPUSCULAR HEMOGLOBIN CONC 31.5 GM/DL   32.2-35.5    L       

     



             (BEAKER) (test code = 752)                                        

 

             RED CELL DISTRIBUTION WIDTH 13.3 %       11.7-14.4                 



             (BEAKER) (test code = 412)                                        

 

             PLATELET COUNT (BEAKER) (test 210 K/CU MM  150-450                 

  



             code = 756)                                         

 

             MEAN PLATELET VOLUME (BEAKER) 9.3 fL       9.4-12.3     L          

  



             (test code = 754)                                        

 

             NUCLEATED RED BLOOD CELLS 0 /100 WBC   0-0                       



             (BEAKER) (test code = 413)                                        

 

             NEUTROPHILS RELATIVE PERCENT 58 %                                  

 



             (BEAKER) (test code = 429)                                        

 

             LYMPHOCYTES RELATIVE PERCENT 31 %                                  

 



             (BEAKER) (test code = 430)                                        

 

             MONOCYTES RELATIVE PERCENT 8 %                                    



             (BEAKER) (test code = 431)                                        

 

             EOSINOPHILS RELATIVE PERCENT 2 %                                   

 



             (BEAKER) (test code = 432)                                        

 

             BASOPHILS RELATIVE PERCENT 1 %                                    



             (BEAKER) (test code = 437)                                        

 

             NEUTROPHILS ABSOLUTE COUNT 4.59 K/ L    1.56-6.13                 



             (BEAKER) (test code = 670)                                        

 

             LYMPHOCYTES ABSOLUTE COUNT 2.42 K/ L    1.18-3.74                 



             (BEAKER) (test code = 414)                                        

 

             MONOCYTES ABSOLUTE COUNT (BEAKER) 0.63 K/ L    0.24-0.36    H      

      



             (test code = 415)                                        

 

             EOSINOPHILS ABSOLUTE COUNT 0.17 K/ L    0.04-0.36                 



             (BEAKER) (test code = 416)                                        

 

             BASOPHILS ABSOLUTE COUNT (BEAKER) 0.06 K/ L    0.01-0.08           

      



             (test code = 417)                                        

 

             IMMATURE GRANULOCYTES-RELATIVE 1 %          0-1                    

   



             PERCENT (BEAKER) (test code =                                      

  



             2801)

## 2022-09-13 LAB
ALBUMIN SERPL BCP-MCNC: 2.2 G/DL (ref 3.4–5)
ALP SERPL-CCNC: 89 U/L (ref 45–117)
ALT SERPL W P-5'-P-CCNC: 13 U/L (ref 12–78)
AST SERPL W P-5'-P-CCNC: 8 U/L (ref 15–37)
BUN BLD-MCNC: 12 MG/DL (ref 7–18)
GLUCOSE SERPLBLD-MCNC: 108 MG/DL (ref 74–106)
HCT VFR BLD CALC: 40.8 % (ref 36–45)
LYMPHOCYTES # SPEC AUTO: 1.8 K/UL (ref 0.7–4.9)
MAGNESIUM SERPL-MCNC: 1.7 MG/DL (ref 1.8–2.4)
MCV RBC: 96.7 FL (ref 80–100)
PMV BLD: 8.3 FL (ref 7.6–11.3)
POTASSIUM SERPL-SCNC: 3.8 MMOL/L (ref 3.5–5.1)
RBC # BLD: 4.22 M/UL (ref 3.86–4.86)
TSH SERPL DL<=0.05 MIU/L-ACNC: 0.8 UIU/ML (ref 0.36–3.74)

## 2022-09-13 RX ADMIN — APIXABAN SCH MG: 5 TABLET, FILM COATED ORAL at 21:04

## 2022-09-13 RX ADMIN — APIXABAN SCH MG: 5 TABLET, FILM COATED ORAL at 08:16

## 2022-09-13 RX ADMIN — SODIUM CHLORIDE, SODIUM LACTATE, POTASSIUM CHLORIDE, AND CALCIUM CHLORIDE SCH MLS: .6; .31; .03; .02 INJECTION, SOLUTION INTRAVENOUS at 17:06

## 2022-09-13 RX ADMIN — Medication SCH ML: at 21:04

## 2022-09-13 RX ADMIN — Medication SCH ML: at 08:16

## 2022-09-13 NOTE — P.PN
Subjective


Date of Service: 09/13/22


Chief Complaint: Weakness, multiple falls


Subjective: No new changes


No acute events since admission. She reports that she feels well overall, but 

has been experiencing generalized weakness for some time now.





Review of Systems


10-point ROS is otherwise unremarkable


General: Weakness (generalized)





Physical Examination





- Vital Signs


Temperature: 97.2 F


Blood Pressure: 142/62


Pulse: 62


Respirations: 16


Pulse Ox (%): 95





- Physical Exam


General: Alert, In no apparent distress, Oriented x3


HEENT: Atraumatic, PERRLA, Mucous membr. moist/pink, EOMI, Sclerae nonicteric


Neck: Supple, JVD not distended


Respiratory: Clear to auscultation bilaterally, Normal air movement


Cardiovascular: No edema, Regular rate/rhythm, Normal S1 S2, No gallops, No 

rubs, No murmurs


Gastrointestinal: Normal bowel sounds, Soft and benign, Non-distended, No 

tenderness, No rebound, No guarding


Musculoskeletal: No clubbing


Integumentary: No rashes


Neurological: Normal speech, Cranial nerves 3-12 intact, Normal affect





Assessment And Plan





- Plan


# Significant Deconditioning complicated by Recurrent Falls


# SIRS Criteria (Slight Tachypnea, Leukocytosis [suspect reactive]) - no current

source of infection


# Minimal Lactic Acidosis - possibly secondary to Mild Dehydration (resolved)


- Specifically denies syncope or head trauma


- Reports requiring PT services in the past


- Currently lives in [place] and utilizes walker


- PT consulted - recs appreciated





# Chronic Obstructive Pulmonary Disease on Intermittent Home Oxygen


- No evidence of exacerbation


- Continue home medications





# Paroxysmal Atrial Fibrillation


- Not on medication for rate-control


- Continue home apixaban





# Hypertension


# Hypothyroidism


# Gastroesophageal Reflux Disease


- Continue home medications once verified








Chao Ramos M.D.

## 2022-09-13 NOTE — EKG
Test Date:    2022-09-12               Test Time:    15:12:32

Technician:   NUHA                                   

                                                     

MEASUREMENT RESULTS:                                       

Intervals:                                           

Rate:         60                                     

NH:           202                                    

QRSD:         94                                     

QT:           412                                    

QTc:          412                                    

Axis:                                                

P:            74                                     

NH:           202                                    

QRS:          93                                     

T:            91                                     

                                                     

INTERPRETIVE STATEMENTS:                                       

                                                     

Normal sinus rhythm

Rightward axis

Nonspecific ST abnormality

Abnormal ECG

Compared to ECG 02/21/2022 15:16:02

First degree AV block no longer present

ST (T wave) deviation still present



Electronically Signed On 09-13-22 15:38:38 CDT by Michele Pina

## 2022-09-14 LAB
ALBUMIN SERPL BCP-MCNC: 2.1 G/DL (ref 3.4–5)
ALP SERPL-CCNC: 86 U/L (ref 45–117)
ALT SERPL W P-5'-P-CCNC: 11 U/L (ref 12–78)
AST SERPL W P-5'-P-CCNC: 8 U/L (ref 15–37)
BUN BLD-MCNC: 8 MG/DL (ref 7–18)
GLUCOSE SERPLBLD-MCNC: 102 MG/DL (ref 74–106)
HCT VFR BLD CALC: 40 % (ref 36–45)
LYMPHOCYTES # SPEC AUTO: 1.9 K/UL (ref 0.7–4.9)
MAGNESIUM SERPL-MCNC: 2 MG/DL (ref 1.8–2.4)
MCV RBC: 94.3 FL (ref 80–100)
PMV BLD: 7.8 FL (ref 7.6–11.3)
POTASSIUM SERPL-SCNC: 4.1 MMOL/L (ref 3.5–5.1)
RBC # BLD: 4.24 M/UL (ref 3.86–4.86)

## 2022-09-14 RX ADMIN — Medication SCH ML: at 20:47

## 2022-09-14 RX ADMIN — METOPROLOL SUCCINATE SCH MG: 25 TABLET, EXTENDED RELEASE ORAL at 20:47

## 2022-09-14 RX ADMIN — DULOXETINE HYDROCHLORIDE SCH MG: 30 CAPSULE, DELAYED RELEASE ORAL at 20:45

## 2022-09-14 RX ADMIN — ATORVASTATIN CALCIUM SCH MG: 20 TABLET, FILM COATED ORAL at 20:46

## 2022-09-14 RX ADMIN — FLECAINIDE ACETATE SCH MG: 100 TABLET ORAL at 20:45

## 2022-09-14 RX ADMIN — DOXEPIN HYDROCHLORIDE SCH MG: 25 CAPSULE ORAL at 20:45

## 2022-09-14 RX ADMIN — GABAPENTIN SCH MG: 300 CAPSULE ORAL at 20:46

## 2022-09-14 RX ADMIN — APIXABAN SCH MG: 5 TABLET, FILM COATED ORAL at 08:15

## 2022-09-14 RX ADMIN — APIXABAN SCH MG: 5 TABLET, FILM COATED ORAL at 20:45

## 2022-09-14 RX ADMIN — ACETAMINOPHEN PRN MG: 325 TABLET ORAL at 20:46

## 2022-09-14 RX ADMIN — SODIUM CHLORIDE, SODIUM LACTATE, POTASSIUM CHLORIDE, AND CALCIUM CHLORIDE SCH: .6; .31; .03; .02 INJECTION, SOLUTION INTRAVENOUS at 00:06

## 2022-09-14 RX ADMIN — MEMANTINE HYDROCHLORIDE SCH MG: 10 TABLET ORAL at 20:46

## 2022-09-14 RX ADMIN — SODIUM CHLORIDE, SODIUM LACTATE, POTASSIUM CHLORIDE, AND CALCIUM CHLORIDE SCH MLS: .6; .31; .03; .02 INJECTION, SOLUTION INTRAVENOUS at 20:47

## 2022-09-14 RX ADMIN — SODIUM CHLORIDE, SODIUM LACTATE, POTASSIUM CHLORIDE, AND CALCIUM CHLORIDE SCH MLS: .6; .31; .03; .02 INJECTION, SOLUTION INTRAVENOUS at 08:15

## 2022-09-14 RX ADMIN — QUETIAPINE SCH MG: 25 TABLET ORAL at 20:47

## 2022-09-14 RX ADMIN — Medication SCH ML: at 08:15

## 2022-09-14 RX ADMIN — TOPIRAMATE SCH MG: 100 TABLET, FILM COATED ORAL at 20:46

## 2022-09-14 NOTE — RAD REPORT
EXAM DESCRIPTION:  RAD - Chest Single View - 9/14/2022 6:27 pm

 

CLINICAL HISTORY:  shortness of breath..

 

COMPARISON:  Chest Single View dated 9/12/2022; Chest Pa And Lat (2 Views) dated 7/7/2022; Chest Sing
le View dated 2/23/2022; Chest Single View dated 2/21/2022

 

FINDINGS:  Lines: None.

Lungs: No evidence of edema or pneumonia.

Pleural: No significant pleural effusions or pneumothorax.

Cardiac: Cardiomegaly

Mediastinum: Within normal limits.

Bones: No acute fractures. Right shoulder arthroplasty.

Other: None

 

IMPRESSION:  No acute cardiopulmonary disease.

## 2022-09-14 NOTE — P.PN
Subjective


Date of Service: 09/14/22


Chief Complaint: Weakness, multiple falls


No acute events overnight. She reports working well with PT yesterday. She 

denies any current pain or discomfort.





Review of Systems


10-point ROS is otherwise unremarkable


General: Weakness (generalized)





Physical Examination





- Vital Signs


Temperature: 97 F


Blood Pressure: 130/60


Pulse: 65


Respirations: 18


Pulse Ox (%): 97





- Studies


Microbiology Data (last 24 hrs): 








09/12/22 17:00   Clean Catch Urine   Hornick Count - Final


                            <10,000 CFU/ML.


09/12/22 17:00   Clean Catch Urine    - Final


                            MIXED PATRICK.








Assessment And Plan





- Plan


- Physical Exam


General: Alert, In no apparent distress, Oriented x3


HEENT: Atraumatic, PERRLA, Mucous membr. moist/pink, EOMI, Sclerae nonicteric


Neck: Supple, JVD not distended


Respiratory: Clear to auscultation bilaterally, Normal air movement


Cardiovascular: No edema, Regular rate/rhythm, Normal S1 S2, No gallops, No 

rubs, No murmurs


Gastrointestinal: Normal bowel sounds, Soft and benign, Non-distended, No 

tenderness, No rebound, No guarding


Musculoskeletal: No clubbing


Integumentary: No rashes


Neurological: Normal speech, Cranial nerves 3-12 intact, Normal affect








# Significant Deconditioning complicated by Recurrent Falls


# SIRS Criteria (Slight Tachypnea, Leukocytosis [suspect reactive]) - no current

source of infection


# Minimal Lactic Acidosis - possibly secondary to Mild Dehydration (resolved)


- Specifically denies syncope or head trauma


- Reports requiring PT services in the past


- Currently lives in [place] and utilizes walker


- PT consulted - recs appreciated


   - CM assisting with placement into acute rehab


- Spoke with her neurologist (Dr. Cannon), who recommended obtaining an MRI ce

rvical spine due to her prior surgery to exclude a neurologic cause of her 

weakness





# Chronic Obstructive Pulmonary Disease on Intermittent Home Oxygen


- No evidence of exacerbation


- Continue home medications





# Paroxysmal Atrial Fibrillation


- Not on medication for rate-control


- Continue home apixaban





# Hypertension


# Hypothyroidism


# Gastroesophageal Reflux Disease


- Continue home medications once verified








Chao Ramos M.D.


Physician Review: Patient Assessed, Agree with Above Assessment and Plan

## 2022-09-14 NOTE — RAD REPORT
EXAM DESCRIPTION:  MRI - C Spine Wo Cont - 9/14/2022 4:46 pm

 

CLINICAL HISTORY:  NECK PAIN

 

COMPARISON:  Head Brain Wo Cont dated 8/23/2022No comparisons

 

TECHNIQUE:  Sagittal T1-weighted, T2-weighted and T2-STIR sequences were obtained as well as axial T2
 medic sequence obtained.

 

FINDINGS:  Status post C4 through C7 ACDF. Note that the axials are limited due to hardware artifact 
which simulates the presence of central spinal stenosis.

 

Cerebellar tonsils and mid-line skull base show no suspicious finding. No significant finding at the 
C1 and C2 levels.

 

C2-3 level: No significant findings.

C3-4 level: Uncovertebral joint hypertrophy and posterior disc osteophyte complex results in mild yao
ateral neural foraminal narrowing. Moderate central spinal stenosis is present.

C4-5 level: Fused level with posterior disc osteophyte complex results in mild central spinal stenosi
s. The neural foramen are adequate.

C5-6 level: Fused level.  No central spinal stenosis identified. The neural foramina are adequate.

C6-7 level: Fused level. No central spinal stenosis identified. The neural foramina are adequate.

C7-T1 level: No significant findings.

 

IMPRESSION:  Limited evaluation at the fused levels due to metallic artifact and limitations of the T
2* GRE technique. Above the fusion, moderate central spinal stenosis is noted at C3-4 . Mild central 
spinal stenosis is present at C4-5 though this may be accentuated by metallic artifact. No definite f
inding to explain a right-sided radiculopathy.

## 2022-09-15 RX ADMIN — GABAPENTIN SCH MG: 300 CAPSULE ORAL at 20:24

## 2022-09-15 RX ADMIN — Medication SCH: at 09:00

## 2022-09-15 RX ADMIN — TOPIRAMATE SCH MG: 100 TABLET, FILM COATED ORAL at 20:24

## 2022-09-15 RX ADMIN — MEMANTINE HYDROCHLORIDE SCH MG: 10 TABLET ORAL at 20:24

## 2022-09-15 RX ADMIN — TIZANIDINE SCH MG: 4 TABLET ORAL at 09:20

## 2022-09-15 RX ADMIN — PANTOPRAZOLE SODIUM SCH MG: 40 TABLET, DELAYED RELEASE ORAL at 09:19

## 2022-09-15 RX ADMIN — ATORVASTATIN CALCIUM SCH MG: 20 TABLET, FILM COATED ORAL at 20:27

## 2022-09-15 RX ADMIN — MONTELUKAST SODIUM SCH MG: 10 TABLET, FILM COATED ORAL at 09:19

## 2022-09-15 RX ADMIN — SODIUM CHLORIDE, SODIUM LACTATE, POTASSIUM CHLORIDE, AND CALCIUM CHLORIDE SCH MLS: .6; .31; .03; .02 INJECTION, SOLUTION INTRAVENOUS at 13:32

## 2022-09-15 RX ADMIN — APIXABAN SCH MG: 5 TABLET, FILM COATED ORAL at 20:24

## 2022-09-15 RX ADMIN — DONEPEZIL HYDROCHLORIDE SCH MG: 5 TABLET ORAL at 09:19

## 2022-09-15 RX ADMIN — MEMANTINE HYDROCHLORIDE SCH MG: 10 TABLET ORAL at 09:20

## 2022-09-15 RX ADMIN — FLECAINIDE ACETATE SCH MG: 100 TABLET ORAL at 20:23

## 2022-09-15 RX ADMIN — METOPROLOL SUCCINATE SCH MG: 25 TABLET, EXTENDED RELEASE ORAL at 20:29

## 2022-09-15 RX ADMIN — BUPROPION HYDROCHLORIDE SCH MG: 150 TABLET, EXTENDED RELEASE ORAL at 10:11

## 2022-09-15 RX ADMIN — FLECAINIDE ACETATE SCH MG: 100 TABLET ORAL at 09:20

## 2022-09-15 RX ADMIN — QUETIAPINE SCH MG: 25 TABLET ORAL at 20:24

## 2022-09-15 RX ADMIN — FUROSEMIDE SCH MG: 20 TABLET ORAL at 09:20

## 2022-09-15 RX ADMIN — ACETAMINOPHEN PRN MG: 325 TABLET ORAL at 14:25

## 2022-09-15 RX ADMIN — DULOXETINE HYDROCHLORIDE SCH MG: 30 CAPSULE, DELAYED RELEASE ORAL at 20:22

## 2022-09-15 RX ADMIN — APIXABAN SCH MG: 5 TABLET, FILM COATED ORAL at 09:20

## 2022-09-15 RX ADMIN — TOPIRAMATE SCH MG: 100 TABLET, FILM COATED ORAL at 09:19

## 2022-09-15 RX ADMIN — DULOXETINE HYDROCHLORIDE SCH MG: 30 CAPSULE, DELAYED RELEASE ORAL at 09:19

## 2022-09-15 RX ADMIN — Medication SCH ML: at 20:25

## 2022-09-15 RX ADMIN — DOXEPIN HYDROCHLORIDE SCH MG: 25 CAPSULE ORAL at 20:25

## 2022-09-15 RX ADMIN — LEVOTHYROXINE SODIUM SCH MG: 75 TABLET ORAL at 05:46

## 2022-09-15 NOTE — P.PN
Subjective


Date of Service: 09/15/22


Chief Complaint: Weakness, multiple falls


No acute events overnight. She denies any symptoms. Awaiting placement into 

acute rehab.





Review of Systems


10-point ROS is otherwise unremarkable


General: Weakness (generalized)





Physical Examination





- Vital Signs


Temperature: 97.0 F


Blood Pressure: 120/49


Pulse: 52


Respirations: 18


Pulse Ox (%): 97





Assessment And Plan





- Plan


- Physical Exam


General: Alert, In no apparent distress, Oriented x3


HEENT: Atraumatic, PERRLA, Mucous membr. moist/pink, EOMI, Sclerae nonicteric


Neck: Supple, JVD not distended


Respiratory: Clear to auscultation bilaterally, Normal air movement


Cardiovascular: No edema, Regular rate/rhythm, Normal S1 S2, No gallops, No 

rubs, No murmurs


Gastrointestinal: Normal bowel sounds, Soft and benign, Non-distended, No 

tenderness, No rebound, No guarding


Musculoskeletal: No clubbing


Integumentary: No rashes


Neurological: Normal speech, Cranial nerves 3-12 intact, Normal affect








# Significant Deconditioning complicated by Recurrent Falls


# SIRS Criteria (Slight Tachypnea, Leukocytosis [suspect reactive]) - no current

source of infection


# Minimal Lactic Acidosis - possibly secondary to Mild Dehydration (resolved)


- Specifically denies syncope or head trauma


- Reports requiring PT services in the past


- [place]


- PT consulted - recs appreciated


   - CM assisting with placement into acute rehab


- Spoke with her neurologist (Dr. Cannon), who recommended obtaining an MRI 

cervical spine due to her prior surgery to exclude a neurologic cause of her 

weakness


   - Results: "Above the fusion, moderate central spinal stenosis is noted at 

C3-4 . Mild central spinal stenosis is present at C4-5 though this may be 

accentuated by metallic artifact. No definite finding to explain a right-sided 

radiculopathy."


      - No acute intervention required per Dr. Cannon





# Chronic Obstructive Pulmonary Disease on Intermittent Home Oxygen


- No evidence of exacerbation


- Continue home medications





# Paroxysmal Atrial Fibrillation


- Not on medication for rate-control


- Continue home apixaban





# Hypertension


# Hypothyroidism


# Gastroesophageal Reflux Disease


- Continue home medications once verified








Chao Ramos M.D.

## 2022-09-16 VITALS — DIASTOLIC BLOOD PRESSURE: 55 MMHG | SYSTOLIC BLOOD PRESSURE: 116 MMHG | TEMPERATURE: 97.4 F

## 2022-09-16 VITALS — OXYGEN SATURATION: 94 %

## 2022-09-16 RX ADMIN — TIZANIDINE SCH MG: 4 TABLET ORAL at 10:28

## 2022-09-16 RX ADMIN — MONTELUKAST SODIUM SCH MG: 10 TABLET, FILM COATED ORAL at 10:27

## 2022-09-16 RX ADMIN — PANTOPRAZOLE SODIUM SCH MG: 40 TABLET, DELAYED RELEASE ORAL at 10:33

## 2022-09-16 RX ADMIN — SODIUM CHLORIDE, SODIUM LACTATE, POTASSIUM CHLORIDE, AND CALCIUM CHLORIDE SCH MLS: .6; .31; .03; .02 INJECTION, SOLUTION INTRAVENOUS at 04:32

## 2022-09-16 RX ADMIN — DULOXETINE HYDROCHLORIDE SCH MG: 30 CAPSULE, DELAYED RELEASE ORAL at 10:29

## 2022-09-16 RX ADMIN — MEMANTINE HYDROCHLORIDE SCH MG: 10 TABLET ORAL at 10:29

## 2022-09-16 RX ADMIN — FLECAINIDE ACETATE SCH MG: 100 TABLET ORAL at 10:28

## 2022-09-16 RX ADMIN — DONEPEZIL HYDROCHLORIDE SCH MG: 5 TABLET ORAL at 10:29

## 2022-09-16 RX ADMIN — APIXABAN SCH MG: 5 TABLET, FILM COATED ORAL at 10:29

## 2022-09-16 RX ADMIN — BUPROPION HYDROCHLORIDE SCH MG: 150 TABLET, EXTENDED RELEASE ORAL at 10:33

## 2022-09-16 RX ADMIN — TOPIRAMATE SCH MG: 100 TABLET, FILM COATED ORAL at 10:28

## 2022-09-16 RX ADMIN — LEVOTHYROXINE SODIUM SCH MG: 75 TABLET ORAL at 06:04

## 2022-09-16 RX ADMIN — Medication SCH: at 08:00

## 2022-09-16 RX ADMIN — FUROSEMIDE SCH MG: 20 TABLET ORAL at 10:27

## 2022-09-16 NOTE — P.DS
Admission Date: 09/12/22


Discharge Date: 09/16/22


Disposition: DC HOME/HOME HEALTH CARE


Discharge Condition: GOOD


Reason for Admission: Weakness, multiple falls


Consultations: 


1. Neurology


2. Physical Therapy


3. Case Management 


Hospital Course: 





DIAGNOSES:


# Significant Deconditioning complicated by Recurrent Falls


# SIRS Criteria (Slight Tachypnea, Leukocytosis [suspect reactive]) - (now 

resolved) - no source of infection


# Minimal Lactic Acidosis - possibly secondary to Mild Dehydration (resolved)


# Chronic Obstructive Pulmonary Disease on Intermittent Home Oxygen


# Paroxysmal Atrial Fibrillation


# Hypertension


# Hypothyroidism


# Gastroesophageal Reflux Disease





HOSPITAL COURSE:


Ms. Leona Peres is a pleasant 81 year old female with a past medical history 

significant for paroxysmal atrial fibrillation, chronic obstructive pulmonary 

disease on intermittent home oxygen, hypertension, hypothyroidism, and GERD who 

was admitted to the [facility] on 09/12/2022 for 

recurrent falls.





She was admitted to the Medicine service. She and her family members reported 

that she has been experiencing recurrent falls and generalized weakness. 

Physical Therapy was consulted and it was recommended that she have continued 

outpatient therapy services. Neurology was consulted and I spoke to her 

neurologist, Dr. Cannon, he recommended obtaining an MRI cervical spine due to 

her prior surgery. This MRI was obtained, which revealed, "above the fusion, 

moderate central spinal stenosis is noted at C3-4. Mild central spinal stenosis 

is present at C4-5 though this may be accentuated by metallic artifact. No 

definite finding to explain a right-sided radiculopathy." Imaging reports were 

reviewed with Dr. Cannon, who felt that these changes were chronic and unlikely 

to have contributed to her symptoms. With the assistance of case management, we 

attempted to get her accepted to acute rehab, but were unsuccessful. Earlier 

today, her and her family elected to have her discharged back home with Home 

Health. With the assistance of case management, this was arranged.





On 09/16/2022, she was seen on rounds and deemed medically stable for discharge.

She was discharged with instructions to schedule follow-up appointments with her

PCP (Dr. Mcwilliams) in 3-5 days and her Neurologist (Dr. Cannon) in 5-7 days. She and 

her family members were given the opportunity to ask questions and reported no 

further questions. Furthermore, all questions were answered to the best of my 

ability.





Today, I personally spent 25 minutes on her case, of which greater than 50% of 

the time was spent in patient education, counseling, and coordination of care as

described above.








- Physical Exam


General: Alert, In no apparent distress, Oriented x3


HEENT: Atraumatic, PERRLA, Mucous membr. moist/pink, EOMI, Sclerae nonicteric


Neck: Supple, JVD not distended


Respiratory: Clear to auscultation bilaterally, Normal air movement


Cardiovascular: No edema, Regular rate/rhythm, Normal S1 S2, No gallops, No 

rubs, No murmurs


Gastrointestinal: Normal bowel sounds, Soft and benign, Non-distended, No 

tenderness, No rebound, No guarding


Musculoskeletal: No clubbing


Integumentary: No rashes


Neurological: Normal speech, Cranial nerves 3-12 intact, Normal affect


Vital Signs/Physical Exam: 














Temp Pulse Resp BP Pulse Ox


 


 97.4 F   53   23 H  116/55 L  96 


 


 09/16/22 16:00  09/16/22 16:00  09/16/22 16:00  09/16/22 16:00  09/16/22 16:00








Laboratory Data at Discharge: 














WBC  6.70 K/uL (4.3-10.9)  D 09/14/22  03:53    


 


Hgb  13.3 g/dL (12.0-15.0)   09/14/22  03:53    


 


Hct  40.0 % (36.0-45.0)   09/14/22  03:53    


 


Plt Count  182 K/uL (152-406)   09/14/22  03:53    


 


PT  15.7 SECONDS (9.5-12.5)  H  09/12/22  16:15    


 


INR  1.42   09/12/22  16:15    


 


Sodium  141 mmol/L (136-145)   09/14/22  03:53    


 


Potassium  4.1 mmol/L (3.5-5.1)   09/14/22  03:53    


 


BUN  8 mg/dL (7-18)   09/14/22  03:53    


 


Creatinine  0.91 mg/dL (0.55-1.3)   09/14/22  03:53    


 


Glucose  102 mg/dL ()   09/14/22  03:53    


 


Magnesium  2.0 mg/dL (1.8-2.4)   09/14/22  03:53    


 


Total Bilirubin  0.3 mg/dL (0.2-1.0)   09/14/22  03:53    


 


AST  8 U/L (15-37)  L  09/14/22  03:53    


 


ALT  11 U/L (12-78)  L  09/14/22  03:53    


 


Alkaline Phosphatase  86 U/L ()   09/14/22  03:53    


 


Lipase  40 U/L ()  L  09/12/22  16:15    








Home Medications: 








Albuterol Sulfate [Proair Respiclick] 2 puff IH Q6HP PRN 02/22/22 


Apixaban [Eliquis] 5 mg PO BID 02/22/22 


Cranberry Fruit Extract [Ellura] 200 mg PO DAILY 02/22/22 


Cyanocobalamin (Vitamin B-12) [Vitamin B12] 1,000 mcg PO DAILY 02/22/22 


Cyclosporine [Restasis] 1 drop EACH EYE TID 02/22/22 


Donepezil HCl 10 mg PO DAILY 02/22/22 


Doxepin HCl 100 mg PO BEDTIME 02/22/22 


Duloxetine HCl 60 mg PO BID 02/22/22 


Estradiol 0.01% Cream 1 appl VAG SEECOM 02/22/22 


Flecainide Acetate 50 mg PO BID 02/22/22 


Fluticasone/Umeclidin/Vilanter [Trelegy Ellipta 100-62.5-25] 1 puff IH DAILY 

02/22/22 


Furosemide 20 mg PO DAILY 02/22/22 


Gabapentin 300 mg PO BEDTIME 02/22/22 


Levothyroxine [Synthroid*] 75 mcg PO [id number] 02/22/22 


Memantine HCl 10 mg PO BID 02/22/22 


Metoprolol Succinate 25 mg PO BEDTIME 02/22/22 


Montelukast [Singulair*] 10 mg PO DAILY 02/22/22 


Pantoprazole [Protonix Tab*] 40 mg PO DAILY 02/22/22 


Potassium Chloride 20 meq PO BID 02/22/22 


Quetiapine Fumarate [Seroquel] 25 mg PO BEDTIME 02/22/22 


Simvastatin 40 mg PO BEDTIME 02/22/22 


Tizanidine HCl 4 mg PO DAILY 02/22/22 


Topiramate 100 mg PO BID 02/22/22 


buPROPion HCL [Bupropion Xl] 300 mg PO DAILY 02/22/22 


Acetaminophen 650 mg PO Q6H PRN 09/13/22 


Benzonatate [Tessalon Perle*] 200 mg PO Q8H PRN 09/13/22 


Pataday 0.7% Drops  1 drop OPTH DAILY 09/13/22 


Propylene Glycol/Peg 400 [Lubricating Eye Drop] 1 drop OPTH BID 09/13/22 





Physician Discharge Instructions: 


1. Please schedule a follow-up with your PCP (Dr. Mcwilliams) in 3-5 days


2. Please schedule a follow-up with Neurology (Dr. Cannon) in 5-7 days


Diet: AHA


Activity: Fall precautions


Followup: 


Javier Mcwilliams MD [Primary Care Provider] - 


Steven Cannon MD [ACTIVE - CAN ADMIT] - 


Time spent managing pt's care (in minutes): 25

## 2022-10-24 ENCOUNTER — HOSPITAL ENCOUNTER (INPATIENT)
Dept: HOSPITAL 97 - ER | Age: 82
LOS: 3 days | Discharge: HOME HEALTH | DRG: 312 | End: 2022-10-27
Attending: STUDENT IN AN ORGANIZED HEALTH CARE EDUCATION/TRAINING PROGRAM | Admitting: STUDENT IN AN ORGANIZED HEALTH CARE EDUCATION/TRAINING PROGRAM
Payer: COMMERCIAL

## 2022-10-24 VITALS — BODY MASS INDEX: 39.8 KG/M2

## 2022-10-24 DIAGNOSIS — I95.2: Primary | ICD-10-CM

## 2022-10-24 DIAGNOSIS — Z91.018: ICD-10-CM

## 2022-10-24 DIAGNOSIS — Z90.12: ICD-10-CM

## 2022-10-24 DIAGNOSIS — Z85.3: ICD-10-CM

## 2022-10-24 DIAGNOSIS — E03.9: ICD-10-CM

## 2022-10-24 DIAGNOSIS — R00.1: ICD-10-CM

## 2022-10-24 DIAGNOSIS — N39.0: ICD-10-CM

## 2022-10-24 DIAGNOSIS — K21.9: ICD-10-CM

## 2022-10-24 DIAGNOSIS — Z88.0: ICD-10-CM

## 2022-10-24 DIAGNOSIS — Z87.891: ICD-10-CM

## 2022-10-24 DIAGNOSIS — Z79.890: ICD-10-CM

## 2022-10-24 DIAGNOSIS — Z79.01: ICD-10-CM

## 2022-10-24 DIAGNOSIS — E86.0: ICD-10-CM

## 2022-10-24 DIAGNOSIS — Z20.822: ICD-10-CM

## 2022-10-24 DIAGNOSIS — E78.5: ICD-10-CM

## 2022-10-24 DIAGNOSIS — Z90.49: ICD-10-CM

## 2022-10-24 DIAGNOSIS — Z86.711: ICD-10-CM

## 2022-10-24 DIAGNOSIS — I10: ICD-10-CM

## 2022-10-24 DIAGNOSIS — M79.7: ICD-10-CM

## 2022-10-24 DIAGNOSIS — Z79.899: ICD-10-CM

## 2022-10-24 DIAGNOSIS — Z88.8: ICD-10-CM

## 2022-10-24 DIAGNOSIS — Z88.1: ICD-10-CM

## 2022-10-24 DIAGNOSIS — J44.9: ICD-10-CM

## 2022-10-24 DIAGNOSIS — T50.905A: ICD-10-CM

## 2022-10-24 DIAGNOSIS — Z90.710: ICD-10-CM

## 2022-10-24 DIAGNOSIS — I48.0: ICD-10-CM

## 2022-10-24 LAB
BUN BLD-MCNC: 11 MG/DL (ref 7–18)
GLUCOSE SERPLBLD-MCNC: 170 MG/DL (ref 74–106)
HCT VFR BLD CALC: 42.2 % (ref 36–45)
INR BLD: 1.02
LYMPHOCYTES # SPEC AUTO: 2.3 K/UL (ref 0.7–4.9)
MCV RBC: 95.7 FL (ref 80–100)
NT-PROBNP SERPL-MCNC: 335 PG/ML (ref ?–450)
PMV BLD: 7.9 FL (ref 7.6–11.3)
POTASSIUM SERPL-SCNC: 4.8 MMOL/L (ref 3.5–5.1)
RBC # BLD: 4.41 M/UL (ref 3.86–4.86)
TROPONIN I SERPL HS-MCNC: 7.3 PG/ML (ref ?–58.9)

## 2022-10-24 PROCEDURE — 71045 X-RAY EXAM CHEST 1 VIEW: CPT

## 2022-10-24 PROCEDURE — 87040 BLOOD CULTURE FOR BACTERIA: CPT

## 2022-10-24 PROCEDURE — 81015 MICROSCOPIC EXAM OF URINE: CPT

## 2022-10-24 PROCEDURE — 83605 ASSAY OF LACTIC ACID: CPT

## 2022-10-24 PROCEDURE — 81003 URINALYSIS AUTO W/O SCOPE: CPT

## 2022-10-24 PROCEDURE — 87077 CULTURE AEROBIC IDENTIFY: CPT

## 2022-10-24 PROCEDURE — 97530 THERAPEUTIC ACTIVITIES: CPT

## 2022-10-24 PROCEDURE — 99285 EMERGENCY DEPT VISIT HI MDM: CPT

## 2022-10-24 PROCEDURE — 87186 SC STD MICRODIL/AGAR DIL: CPT

## 2022-10-24 PROCEDURE — 80048 BASIC METABOLIC PNL TOTAL CA: CPT

## 2022-10-24 PROCEDURE — 87088 URINE BACTERIA CULTURE: CPT

## 2022-10-24 PROCEDURE — 84439 ASSAY OF FREE THYROXINE: CPT

## 2022-10-24 PROCEDURE — 87804 INFLUENZA ASSAY W/OPTIC: CPT

## 2022-10-24 PROCEDURE — 36415 COLL VENOUS BLD VENIPUNCTURE: CPT

## 2022-10-24 PROCEDURE — 87086 URINE CULTURE/COLONY COUNT: CPT

## 2022-10-24 PROCEDURE — 97116 GAIT TRAINING THERAPY: CPT

## 2022-10-24 PROCEDURE — 85610 PROTHROMBIN TIME: CPT

## 2022-10-24 PROCEDURE — 93005 ELECTROCARDIOGRAM TRACING: CPT

## 2022-10-24 PROCEDURE — 85025 COMPLETE CBC W/AUTO DIFF WBC: CPT

## 2022-10-24 PROCEDURE — 83735 ASSAY OF MAGNESIUM: CPT

## 2022-10-24 PROCEDURE — 83880 ASSAY OF NATRIURETIC PEPTIDE: CPT

## 2022-10-24 PROCEDURE — 93306 TTE W/DOPPLER COMPLETE: CPT

## 2022-10-24 PROCEDURE — 97161 PT EVAL LOW COMPLEX 20 MIN: CPT

## 2022-10-24 PROCEDURE — 87205 SMEAR GRAM STAIN: CPT

## 2022-10-24 PROCEDURE — 84484 ASSAY OF TROPONIN QUANT: CPT

## 2022-10-24 PROCEDURE — 84443 ASSAY THYROID STIM HORMONE: CPT

## 2022-10-24 RX ADMIN — SODIUM CHLORIDE, SODIUM LACTATE, POTASSIUM CHLORIDE, AND CALCIUM CHLORIDE SCH MLS: .6; .31; .03; .02 INJECTION, SOLUTION INTRAVENOUS at 21:50

## 2022-10-24 RX ADMIN — Medication SCH ML: at 21:50

## 2022-10-24 NOTE — EDPHYS
Physician Documentation                                                                           

 [facility]                                                                 

Name: Leona Peres                                                                              

Age: 82 yrs                                                                                       

Sex: Female                                                                                       

: 1940                                                                                   

MRN: [id number]                                                                                   

Arrival Date: 10/24/2022                                                                          

Time: 14:46                                                                                       

Account#: [id number]                                                                            

Bed 5                                                                                             

Private MD: Javier Mcwilliams T                                                                        

ED Physician Jey Cassidy                                                                         

HPI:                                                                                              

10/24                                                                                             

16:18 This 82 yrs old Female presents to ER via Wheelchair with complaints of Low BP, Low     rn  

      Pulse.                                                                                      

16:18 Sent from doctor's office for low BP and low HR, was in 50s HR and 90s systolic BP.     rn  

      Reports hasn't felt well "for a while", but worsening fatigue and lightheaded for last      

      couple of days. No blood in stool. No change in medication. Takes flecainide for afib.      

      . Onset: The symptoms/episode began/occurred at an unknown time. Severity of symptoms:      

      At their worst the symptoms were moderate in the emergency department the symptoms have     

      improved. The patient has not experienced similar symptoms in the past. The patient has     

      been recently seen by a physician:.                                                         

                                                                                                  

Historical:                                                                                       

- Allergies:                                                                                      

15:12 clindamycin HCl (bulk);                                                                 vg1 

15:12 Darvocet-N 100;                                                                         vg1 

15:12 PENICILLINS;                                                                            vg1 

15:12 Strawberries;                                                                           vg1 

15:12 Sulfa (Sulfonamide Antibiotics);                                                        vg1 

- Home Meds:                                                                                      

20:02 alprazolam 0.25 mg Oral tab 1 tab twice a day for Anxiety [Active]; benzonatate 200 mg  kl  

      Oral cap 1 cap 3 times per day [Active]; bupropion HCl 150 mg Oral Tb24 1 tab once          

      daily [Active]; bupropion HCl 300 mg Oral Tb24 1 tab once daily [Active]; clopidogrel       

      75 mg Oral tab 1 tab once daily [Active]; donepezil 10 mg Oral tab 1 tab once daily         

      [Active]; doxepin 50 mg Oral cap 1 cap once daily [Active]; duloxetine 60 mg Oral cpDR      

      2 caps once daily [Active]; Eliquis 5 mg Oral tab 2 times per day [Active]; Ellura 200      

      mg Oral cap daily [Active]; gabapentin 300 mg Oral cap 1 cap 3 times per day [Active];      

      hydrocodone-acetaminophen 7.5-325 mg Oral tab 1 tab every 8 hours [Active]; indapamide      

      2.5 mg Oral tab 1 tab once daily [Active]; levothyroxine 75 mcg tab 1 tab once daily        

      [Active]; magnesium oxide 500 mg Oral tab 500 mg after meals and before bedtime             

      [Active]; memantine 10 mg Oral tab 1 tab 2 times per day [Active]; metoprolol succinate     

      25 mg Oral Tb24 1 tab once daily [Active]; pantoprazole 40 mg Oral grps 1 packet once       

      daily [Active]; potassium chloride 20 mEq Oral TbER 1 tab 2 times per day [Active];         

      quetiapine 200 mg Oral Tb24 1 tab once daily [Active]; simvastatin 40 mg Oral tab 1 tab     

      once daily [Active]; tizanidine 4 mg Oral tab 1 tab daily [Active]; topiramate 200 mg       

      Oral cp24 1 cap twice a day [Active]; Trelegy Ellipta 100-62.5-25 mcg inhalation dsdv 1     

      puff once daily [Active];                                                                   

- PMHx:                                                                                           

15:12 AFIB; Alzheimer's disease; Cancer; COPD; Depression; Fibromyalgia; GERD;                vg1 

      Hyperlipidemia; Hypertension; Hypothyroidism; insomnia; left breast cancer; lymphedema;     

      skipped heart beats; Angina pectoris;                                                       

- PSHx:                                                                                           

15:12 Total abdominal hysterectomy;                                                           vg1 

                                                                                                  

- Immunization history:: Adult Immunizations up to date.                                          

- Family history:: not pertinent.                                                                 

- Social history:: Smoking status: Patient denies any tobacco usage or history of.                

- Hospitalizations: : No recent hospitalization is reported.                                      

                                                                                                  

                                                                                                  

ROS:                                                                                              

16:18 Constitutional: Negative for fever, chills, and weight loss, Eyes: Negative for injury, rn  

      pain, redness, and discharge, Neck: Negative for injury, pain, and swelling,                

      Cardiovascular: Negative for chest pain, palpitations Respiratory: Negative for cough,      

      wheezing, and pleuritic chest pain, Abdomen/GI: Negative for abdominal pain, vomiting       

      and constipation, Back: Negative for injury and pain, MS/Extremity: Negative for injury     

      and deformity, Skin: Negative for injury, rash, and discoloration, Neuro: Negative for      

      headache, numbness, tingling, and seizure.                                                  

                                                                                                  

Exam:                                                                                             

16:10 ECG was reviewed by the Attending Physician.                                            rn  

16:18 Constitutional:  This is a well developed, well nourished patient who is awake, alert,  rn  

      pale, and appears fatigued.  Head/Face:  Normocephalic, atraumatic. Eyes:  Periorbital      

      areas with no swelling, redness, or edema. ENT:  dry MM Cardiovascular:  Bradycardic,       

      regular.  No pulse deficits. Respiratory:  No increased work of breathing, no               

      retractions or nasal flaring. Abdomen/GI:  soft, non-tender Skin:  Warm, dry MS/            

      Extremity:  Pulses equal, no cyanosis. Neuro:  Somnolent but able to get into bed from      

      wheelchair with some assistance. Moves all 4 ext with equal 4/5 strength. Sensation         

      intact. No facial droop/weakness. No slurred speech.                                        

                                                                                                  

Vital Signs:                                                                                      

15:10  / 76; Pulse 62; Resp 17; Temp 97.8; Pulse Ox 97% on R/A; Weight 102.06 kg;       vg1 

      Height 5 ft. 3 in. (160.02 cm);                                                             

15:20  / 76; Pulse 55; Resp 18; Pulse Ox 92% on R/A; Pain 0/10;                         mb9 

16:07  / 72; Pulse 52; Resp 18; Pulse Ox 100% on R/A; Pain 0/10;                        mb9 

16:45  / 83; Pulse 50; Resp 13; Pulse Ox 96% on R/A; Pain 0/10;                         mb9 

17:45  / 65; Pulse 52; Resp 13; Pulse Ox 95% on R/A; Pain 0/10;                         mb9 

18:48  / 73; Pulse 51; Resp 20; Pulse Ox 96% on R/A;                                    mb9 

19:20  / 75 Supine; Pulse 53; Resp 17 S; Pulse Ox 95% on R/A;                           ha1 

19:23  / 78 Sitting; Pulse 67; Resp 17 S; Pulse Ox 96% on R/A;                          ha1 

19:26  / 65 Standing; Pulse 97; Resp 16 S; Pulse Ox 98% on R/A;                         ha1 

20:00 BP 90 / 53; Pulse 59; Resp 20; Pulse Ox 96% on R/A;                                     kl  

20:06  / 70; Pulse 60;                                                                  kl  

15:10 Body Mass Index 39.86 (102.06 kg, 160.02 cm)                                            vg1 

                                                                                                  

MDM:                                                                                              

14:55 Patient medically screened.                                                             rn  

17:58 Differential Diagnosis weakness, medication side effect, dehydration, cardiac event.    rn  

      Data reviewed: vital signs, nurses notes, lab test result(s), EKG, radiologic studies,      

      plain films.                                                                                

18:01 Counseling: I had a detailed discussion with the patient and/or guardian regarding: the rn  

      historical points, exam findings, and any diagnostic results supporting the                 

      discharge/admit diagnosis, lab results, radiology results, the need for further work-up     

      and treatment in the hospital. Response to treatment: the patient's symptoms have           

      mildly improved after treatment, and as a result, I will admit patient. Admission           

      orders: after a detailed discussion of the patient's condition and case, the admit          

      orders are written by me.                                                                   

                                                                                                  

10/24                                                                                             

15:16 Order name: Basic Metabolic Panel; Complete Time: 16:40                                 rn  

10/24                                                                                             

15:16 Order name: CBC with Diff; Complete Time: 16:40                                         rn  

10/24                                                                                             

15:16 Order name: NT PRO-BNP; Complete Time: 16:40                                            rn  

10/24                                                                                             

15:16 Order name: PT-INR; Complete Time: 16:40                                                rn  

10/24                                                                                             

15:16 Order name: Troponin HS; Complete Time: 16:40                                           rn  

10/24                                                                                             

15:16 Order name: SARS-COV-2 RT PCR (Document "Date of Onset" if Symptomatic); Complete Time: rn  

      16:40                                                                                       

10/24                                                                                             

15:16 Order name: XRAY Chest (1 view); Complete Time: 17:09                                   rn  

10/24                                                                                             

15:16 Order name: Flu; Complete Time: 16:40                                                   rn  

10/24                                                                                             

15:16 Order name: Urine Culture                                                               rn  

10/24                                                                                             

15:16 Order name: Urine Microscopic Only                                                      rn  

10/24                                                                                             

15:16 Order name: Blood Culture Adult (2)                                                     rn  

10/24                                                                                             

15:16 Order name: Lactate; Complete Time: 16:40                                               rn  

10/24                                                                                             

17:54 Order name: Urine Dipstick-Ancillary; Complete Time: 17:56                              EDMS

10/24                                                                                             

15:16 Order name: EKG; Complete Time: 15:17                                                   rn  

10/24                                                                                             

15:16 Order name: Cardiac monitoring; Complete Time: 15:17                                    rn  

10/24                                                                                             

15:16 Order name: EKG - Nurse/Tech; Complete Time: 15:17                                      rn  

10/24                                                                                             

15:16 Order name: IV Saline Lock; Complete Time: 15:17                                        rn  

10/24                                                                                             

15:16 Order name: Labs collected and sent; Complete Time: 15:51                               rn  

10/24                                                                                             

15:16 Order name: O2 Per Protocol; Complete Time: 15:17                                       rn  

10/24                                                                                             

15:16 Order name: O2 Sat Monitoring; Complete Time: 15:17                                     rn  

10/24                                                                                             

15:16 Order name: Urine Dipstick-Ancillary (obtain specimen); Complete Time: 17:58            rn  

10/24                                                                                             

18:06 Order name: Orthostatics; Complete Time: 19:30                                          ss  

                                                                                                  

EC:10 Rate is 52 beats/min. Rhythm is regular. QRS Axis is Normal. NE interval is prolonged   rn  

      at 214 msec. QRS interval is normal. QT interval is normal. No Q waves. T waves are         

      Inverted in leads V1, V2, V3, V4. No ST changes noted. Clinical impression: NSR w/          

      Non-specific ST/T Changes and 1st degree heart block. Interpreted by me. Reviewed by me.    

                                                                                                  

Administered Medications:                                                                         

15:40 Drug: NS 0.9% 500 ml Route: IV; Rate: bolus; Site: right upper arm;                     mb9 

17:58 Follow up: Response: No adverse reaction                                                mb9 

                                                                                                  

                                                                                                  

Disposition Summary:                                                                              

10/24/22 18:02                                                                                    

Hospitalization Ordered                                                                           

      Hospitalization Status: Observation                                                     rn  

      Provider: Sammy Cassidy                                                                rn  

      Location: Telemetry/MedSurg (observation)                                               rn  

      Condition: Stable                                                                       rn  

      Problem: new                                                                            rn  

      Symptoms: have improved                                                                 rn  

      Bed/Room Type: Standard                                                                 rn  

      Room Assignment: 206(10/24/22 19:17)                                                      

      Diagnosis                                                                                   

        - Bradycardia, unspecified                                                            rn  

        - Hypotension, unspecified                                                            rn  

        - Muscle weakness (generalized)                                                       rn  

      Forms:                                                                                      

        - Medication Reconciliation Form                                                      rn  

        - SBAR form                                                                           rn  

Signatures:                                                                                       

Dispatcher [employer]                           EDMS                                                 

Lupe Murcia RN                     Betsy Galvin RN                        RN                                                      

Jey Cassidy MD MD rn Smirch, Shelby, RN RN ss Garcia, Victoria RN                    ZHAO [id number]                                                  

Ana Bean, RN                 RN   mb9                                                  

                                                                                                  

Corrections: (The following items were deleted from the chart)                                    

15:18 15:17 Urine Culture+BA.LAB.BRZ ordered. EDMS                                            EDMS

15:18 15:17 UA MICROSCOPIC+U.LAB.BRZ ordered. EDMS                                            EDMS

17:00 16:10 Rate is 71 beats/min. Rhythm is regular. QRS Axis is Normal. NE interval is       rn  

      normal. QRS interval is normal. QT interval is normal. No Q waves. T waves are Normal.      

      No ST changes noted. Clinical impression: NSR w/ Non-specific ST/T Changes. Interpreted     

      by me. Reviewed by me. rn                                                                   

: 17:58 Differential Diagnosis weakness, CVA, medication side effect, dehydration,        rn  

      cardiac event. rn                                                                           

: 17:58 Data reviewed: vital signs, nurses notes, lab test result(s), EKG, radiologic     rn  

      studies, CT scan, plain films, rn                                                           

: 18:02 rn                                                                                mw  

                                                                                                  

**************************************************************************************************

## 2022-10-24 NOTE — P.HP
Certification for Inpatient


Patient admitted to: Observation


With expected LOS: <2 Midnights


Patient will require the following post-hospital care: None


Practitioner: I am a practitioner with admitting privileges, knowledge of 

patient current condition, hospital course, and medical plan of care.


Services: Services provided to patient in accordance with Admission requirements

found in Title 42 Section 412.3 of the Code of Federal Regulations





Patient History


Date of Service: 10/24/22


Reason for admission: Near syncope, bradycardia


History of Present Illness: 





82-year-old female with history of atrial fibrillation, COPD, GERD, 

hypertension, hyperlipidemia, hypothyroidism presents the emergency department 

for hypotension, bradycardia, near syncope.  She reports feeling generally weak 

for the course of the past few days noticed that her blood pressure and heart 

rate have been running low she is seen by her primary care doctor who was 

concerned as her heart rate was in the low 50s systolic blood pressure was in 

the 90s and she was diaphoretic.  Patient reports that she gets sweaty, weak 

when changing positions especially.  She is evaluated in the emergency 

department labs were significant for glucose 170.  She is given 500 cc normal 

saline bolus which improved her blood pressure, she states she feels "a little 

better" she still mildly bradycardic with a rate in the 50s.  ED read wishes to 

admit under observation for bradycardia, hypotension, near syncope.


Allergies





clindamycin HCl [From Cleocin] Allergy (Severe, Verified 02/21/22 22:02)


   Hives/Rash


clindamycin palmitate HCl [From Cleocin] Allergy (Severe, Verified 02/21/22 

22:02)


   Hives/Rash


clindamycin phosphate [From Cleocin] Allergy (Severe, Verified 02/21/22 22:02)


   Hives/Rash


Penicillins Allergy (Severe, Verified 02/21/22 22:02)


   Hives/Rash


Sulfa (Sulfonamide Antibiotics) [Sulfa(Sulfonamide Antibiotics)] Allergy 

(Severe, Verified 02/21/22 22:02)


   Hives/Rash


propoxyphene [From Darvocet-N 100] Allergy (Verified 02/21/22 22:02)


   Unknown


strawberries Allergy (Severe, Uncoded 03/24/21 05:19)


   Hives/Rash





Home Medications: 








Albuterol Sulfate [Proair Respiclick] 2 puff IH Q6HP PRN 02/22/22 


Apixaban [Eliquis] 5 mg PO BID 02/22/22 


Cranberry Fruit Extract [Ellura] 200 mg PO DAILY 02/22/22 


Cyanocobalamin (Vitamin B-12) [Vitamin B12] 1,000 mcg PO DAILY 02/22/22 


Cyclosporine [Restasis] 1 drop EACH EYE TID 02/22/22 


Donepezil HCl 10 mg PO DAILY 02/22/22 


Doxepin HCl 100 mg PO BEDTIME 02/22/22 


Duloxetine HCl 60 mg PO BID 02/22/22 


Estradiol 0.01% Cream 1 appl VAG SEECOM 02/22/22 


Flecainide Acetate 50 mg PO BID 02/22/22 


Fluticasone/Umeclidin/Vilanter [Trelegy Ellipta 100-62.5-25] 1 puff IH DAILY 

02/22/22 


Furosemide 20 mg PO DAILY 02/22/22 


Gabapentin 300 mg PO BEDTIME 02/22/22 


Levothyroxine [Synthroid*] 75 mcg PO [id number] 02/22/22 


Memantine HCl 10 mg PO BID 02/22/22 


Metoprolol Succinate 25 mg PO BEDTIME 02/22/22 


Montelukast [Singulair*] 10 mg PO DAILY 02/22/22 


Pantoprazole [Protonix Tab*] 40 mg PO DAILY 02/22/22 


Potassium Chloride 20 meq PO BID 02/22/22 


Quetiapine Fumarate [Seroquel] 25 mg PO BEDTIME 02/22/22 


Simvastatin 40 mg PO BEDTIME 02/22/22 


Tizanidine HCl 4 mg PO DAILY 02/22/22 


Topiramate 100 mg PO BID 02/22/22 


buPROPion HCL [Bupropion Xl] 300 mg PO DAILY 02/22/22 


Acetaminophen 650 mg PO Q6H PRN 09/13/22 


Benzonatate [Tessalon Perle*] 200 mg PO Q8H PRN 09/13/22 


Pataday 0.7% Drops  1 drop OPTH DAILY 09/13/22 


Propylene Glycol/Peg 400 [Lubricating Eye Drop] 1 drop OPTH BID 09/13/22 








- Past Medical/Surgical History


Diabetic: No


-: Lymphedema


-: Afib


-: L breast cancer


-: PE


-: Meniere's Disease


-: Former smoker


-: Recurrent UTIs


-: Fibromyalgia


-: GERD


-: Insomnia


-: Depression


-: Neuropathy


-: s/p Left Mastectomy


-: SANTOSH TKA


-: Cervical fusion


-: Lumbar fusion


-: Appendectomy


-: Hysterectomy


Psychosocial/ Personal History: Patient [facility] in an assisted living facility[facility]





- Family History


  ** Father


-: Diabetes


Notes: heart attack.  alcoholic





- Social History


Smoking Status: Former smoker


Alcohol use: No


CD- Drugs: No


Caffeine use: No


Place of Residence: Home





Review of Systems


10-point ROS is otherwise unremarkable


General: Weakness, Malaise


Cardiovascular: Light Headedness (with change in position along with 

diaphoresis)





Physical Examination





- Physical Exam


General: Alert, In no apparent distress, Oriented x3, Obese


HEENT: Atraumatic, PERRLA, Mucous membr. moist/pink, EOMI, Sclerae nonicteric


Neck: Supple, 2+ carotid pulse no bruit, No LAD, Without JVD or thyroid 

abnormality


Respiratory: Clear to auscultation bilaterally, Normal air movement


Cardiovascular: Regular rate/rhythm, Normal S1 S2


Capillary refill: <2 Seconds


Gastrointestinal: Normal bowel sounds, No tenderness


Musculoskeletal: No tenderness


Integumentary: No rashes


Neurological: Normal gait, Normal speech, Normal strength at 5/5 x4 extr, Normal

tone, Normal affect


Lymphatics: No axilla or inguinal lymphadenopathy





- Studies


Laboratory Data (last 24 hrs)





10/24/22 15:24: PT 12.1, INR 1.02


10/24/22 15:24: WBC 7.90, Hgb 13.9, Hct 42.2, Plt Count 334


10/24/22 15:24: Sodium 138, Potassium 4.8, BUN 11, Creatinine 1.10, Glucose 170 

H





Microbiology Data (last 24 hrs): 








10/24/22 15:28   Nasopharnyx   Influenza Type A Antigen Screen - Final


10/24/22 15:28   Nasopharnyx   Influenza Type B Antigen Screen - Final








Assessment and Plan





- Plan


Assessment:


Near syncope, hypotension/bradycardia


Atrial fibrillation


COPD


GERD


HTN


Hypothyroidism








Plan:


Near syncope, hypotension/bradycardia:


BP and symptoms improved with IVF in ED, orthostatic VS ordered and pending. 

Review home meds, patient unsure aside from flecanide what she takes for BP. 

Monitor on tele, repeat trops. Echo ordered. 


Atrial fibrillation: 


Continue as above, restart anticoagulation if she is taking at home, pt unsure. 

Hold beta blocker if present given low BP/HR


COPD: 


Continue home meds. 


GERD:


Continue home meds.


HTN:


Review home meds, may need adjusted. 


Hypothyroidism:


Thyroid panel in AM, restart home meds. 





DVT PPX: Lovenox-start anticoagulation if on at home. 


Code status:Full


Discharge Plan: Home


Plan to discharge in: 24 Hours





- Advance Directives


Does patient have a Living Will: No


Does patient have a Durable POA for Healthcare: Yes





- Code Status/Comfort Care


Code Status Assessed: Yes (Full code)


Critical Care: No


Time Spent Managing Pts Care (In Minutes): 55

## 2022-10-24 NOTE — RAD REPORT
EXAM DESCRIPTION:  Vanna Single View10/24/2022 4:35 pm

 

CLINICAL HISTORY:  sob

 

COMPARISON:  September 2022

 

FINDINGS:   The lungs appear clear of acute infiltrate. The heart is normal size

 

IMPRESSION:   No acute abnormalities displayed

## 2022-10-24 NOTE — ER
Nurse's Notes                                                                                     

 [facility]                                                                 

Name: Leona Peres                                                                              

Age: 82 yrs                                                                                       

Sex: Female                                                                                       

: 1940                                                                                   

MRN: [id number]                                                                                   

Arrival Date: 10/24/2022                                                                          

Time: 14:46                                                                                       

Account#: [id number]                                                                            

Bed 5                                                                                             

Private MD: Javier Mcwilliams T                                                                        

Diagnosis: Bradycardia, unspecified;Hypotension, unspecified;Muscle weakness (generalized)        

                                                                                                  

Presentation:                                                                                     

10/24                                                                                             

15:10 Chief complaint: Patient's son or daughter states: sent from DR MCWILLIAMS office due to low  vg1 

      BP of 50s and BP 99 systolic. Pt appears to be pale, states 'feels weak' denies CP. Pt      

      denies blood in stool or NV. Coronavirus screen:. Ebola Screen: Patient negative for        

      fever greater than or equal to 101.5 degrees Fahrenheit, and additional compatible          

      Ebola Virus Disease symptoms. Initial Sepsis Screen: Does the patient meet any 2            

      criteria? No. Patient's initial sepsis screen is negative. Does the patient have a          

      suspected source of infection? No. Patient's initial sepsis screen is negative. Risk        

      Assessment: Do you want to hurt yourself or someone else? Patient reports no desire to      

      harm self or others. Onset of symptoms was 2022.                                

15:10 Method Of Arrival: Wheelchair                                                           vg1 

15:10 Acuity: ANUSHKA 2                                                                           vg1 

                                                                                                  

Triage Assessment:                                                                                

15:12 General: Appears uncomfortable, obese, Behavior is cooperative, drowsy. Pain: Denies    vg1 

      pain. Derm: Skin is clammy, Skin is pale.                                                   

15:12 Neuro: Level of Consciousness is awake, alert, obeys commands, Oriented to person,      vg1 

      place, time, situation.                                                                     

                                                                                                  

Historical:                                                                                       

- Allergies:                                                                                      

15:12 clindamycin HCl (bulk);                                                                 vg1 

15:12 Darvocet-N 100;                                                                         vg1 

15:12 PENICILLINS;                                                                            vg1 

15:12 Strawberries;                                                                           vg1 

15:12 Sulfa (Sulfonamide Antibiotics);                                                        vg1 

- Home Meds:                                                                                      

20:02 alprazolam 0.25 mg Oral tab 1 tab twice a day for Anxiety [Active]; benzonatate 200 mg  kl  

      Oral cap 1 cap 3 times per day [Active]; bupropion HCl 150 mg Oral Tb24 1 tab once          

      daily [Active]; bupropion HCl 300 mg Oral Tb24 1 tab once daily [Active]; clopidogrel       

      75 mg Oral tab 1 tab once daily [Active]; donepezil 10 mg Oral tab 1 tab once daily         

      [Active]; doxepin 50 mg Oral cap 1 cap once daily [Active]; duloxetine 60 mg Oral cpDR      

      2 caps once daily [Active]; Eliquis 5 mg Oral tab 2 times per day [Active]; Ellura 200      

      mg Oral cap daily [Active]; gabapentin 300 mg Oral cap 1 cap 3 times per day [Active];      

      hydrocodone-acetaminophen 7.5-325 mg Oral tab 1 tab every 8 hours [Active]; indapamide      

      2.5 mg Oral tab 1 tab once daily [Active]; levothyroxine 75 mcg tab 1 tab once daily        

      [Active]; magnesium oxide 500 mg Oral tab 500 mg after meals and before bedtime             

      [Active]; memantine 10 mg Oral tab 1 tab 2 times per day [Active]; metoprolol succinate     

      25 mg Oral Tb24 1 tab once daily [Active]; pantoprazole 40 mg Oral grps 1 packet once       

      daily [Active]; potassium chloride 20 mEq Oral TbER 1 tab 2 times per day [Active];         

      quetiapine 200 mg Oral Tb24 1 tab once daily [Active]; simvastatin 40 mg Oral tab 1 tab     

      once daily [Active]; tizanidine 4 mg Oral tab 1 tab daily [Active]; topiramate 200 mg       

      Oral cp24 1 cap twice a day [Active]; Trelegy Ellipta 100-62.5-25 mcg inhalation dsdv 1     

      puff once daily [Active];                                                                   

- PMHx:                                                                                           

15:12 AFIB; Alzheimer's disease; Cancer; COPD; Depression; Fibromyalgia; GERD;                vg1 

      Hyperlipidemia; Hypertension; Hypothyroidism; insomnia; left breast cancer; lymphedema;     

      skipped heart beats; Angina pectoris;                                                       

- PSHx:                                                                                           

15:12 Total abdominal hysterectomy;                                                           vg1 

                                                                                                  

- Immunization history:: Adult Immunizations up to date.                                          

- Family history:: not pertinent.                                                                 

- Social history:: Smoking status: Patient denies any tobacco usage or history of.                

- Hospitalizations: : No recent hospitalization is reported.                                      

                                                                                                  

                                                                                                  

Screenin:01 Abuse screen: Denies threats or abuse. Nutritional screening: No deficits noted.        kl  

      Tuberculosis screening: No symptoms or risk factors identified. Fall Risk No fall in        

      past 12 months (0 pts). No secondary diagnosis (0 pts). IV access (20 points).              

      Ambulatory Aid- None/Bed Rest/Nurse Assist (0 pts). Gait- Weak (10 pts.). Mental            

      Status- Oriented to own ability (0 pts). Total Martinez Fall Scale indicates Low Risk          

      Score (25-44 pts). Fall prevention measures have been instituted. Side Rails Up X 2         

      Placed close to Nursing Station Frequent Obs/Assesments occuring As available Patient       

      and Family Educated on Fall Prevention Program and strategies.                              

                                                                                                  

Assessment:                                                                                       

15:20 General: Appears uncomfortable, Behavior is cooperative, drowsy. Pain: Denies pain.     mb9 

      Neuro: Level of Consciousness is awake, alert, obeys commands.                              

15:20 Neuro: Oriented to person, place, time, situation,  are weak bilaterally Gait is   mb9 

      unsteady. Cardiovascular: Denies chest pain, Heart tones S1 S2 present Rhythm is sinus      

      bradycardia. Respiratory: Airway is patent Respiratory effort is even, unlabored,           

      Respiratory pattern is regular, symmetrical, Breath sounds are clear bilaterally. GI:       

      Abdomen is round. : No signs and/or symptoms were reported regarding the                  

      genitourinary system. EENT: No signs and/or symptoms were reported regarding the EENT       

      system. Derm: Skin is intact, Skin is clammy, Skin is pale, Skin temperature is cool.       

      Musculoskeletal: Range of motion: limited in all extremities.                               

16:45 General: Appears in no apparent distress. comfortable, Behavior is calm, cooperative,   mb9 

      appropriate for age.                                                                        

16:45 Pain: Denies pain. Neuro: Level of Consciousness is awake, alert, obeys commands,       mb9 

      Oriented to person, place, time, situation. Cardiovascular: Heart tones S1 S2 present       

      Rhythm is sinus bradycardia. Respiratory: Airway is patent Respiratory effort is even,      

      unlabored, Respiratory pattern is regular, symmetrical. Derm: Skin is clammy, Skin is       

      pale, Skin temperature is cool.                                                             

17:30 General: Appears in no apparent distress. comfortable. Pain: Denies pain. Neuro: Level  mb9 

      of Consciousness is awake, alert, obeys commands, Oriented to person, place, time,          

      situation. Cardiovascular: Rhythm is sinus bradycardia. Respiratory: Airway is patent       

      Respiratory effort is even, unlabored, Respiratory pattern is regular, symmetrical.         

      Derm: Skin is intact, Skin is dry, Skin is pale, Skin temperature is cool.                  

18:47 General: Appears in no apparent distress. comfortable. Pain: Denies pain. Neuro: Level  mb9 

      of Consciousness is awake, alert, obeys commands, Oriented to person, place, time,          

      situation. Cardiovascular: Rhythm is sinus bradycardia. Cardiovascular: Rhythm is sinus     

      bradycardia. Respiratory: Airway is patent Respiratory effort is even, unlabored. Derm:     

      Skin is intact, Skin is dry, Skin is pale, Skin temperature is cool.                        

19:06 Reassessment: report given to ZHAO Somers.                                                mb9 

                                                                                                  

Vital Signs:                                                                                      

15:10  / 76; Pulse 62; Resp 17; Temp 97.8; Pulse Ox 97% on R/A; Weight 102.06 kg;       vg1 

      Height 5 ft. 3 in. (160.02 cm);                                                             

15:20  / 76; Pulse 55; Resp 18; Pulse Ox 92% on R/A; Pain 0/10;                         mb9 

16:07  / 72; Pulse 52; Resp 18; Pulse Ox 100% on R/A; Pain 0/10;                        mb9 

16:45  / 83; Pulse 50; Resp 13; Pulse Ox 96% on R/A; Pain 0/10;                         mb9 

17:45  / 65; Pulse 52; Resp 13; Pulse Ox 95% on R/A; Pain 0/10;                         mb9 

18:48  / 73; Pulse 51; Resp 20; Pulse Ox 96% on R/A;                                    mb9 

19:20  / 75 Supine; Pulse 53; Resp 17 S; Pulse Ox 95% on R/A;                           ha1 

19:23  / 78 Sitting; Pulse 67; Resp 17 S; Pulse Ox 96% on R/A;                          ha1 

19:26  / 65 Standing; Pulse 97; Resp 16 S; Pulse Ox 98% on R/A;                         ha1 

20:00 BP 90 / 53; Pulse 59; Resp 20; Pulse Ox 96% on R/A;                                     kl  

20:06  / 70; Pulse 60;                                                                  kl  

15:10 Body Mass Index 39.86 (102.06 kg, 160.02 cm)                                            vg1 

                                                                                                  

ED Course:                                                                                        

14:46 Patient arrived in ED.                                                                  rg4 

14:46 Javier Mcwilliams MD is Private Physician.                                                   rg4 

14:55 Jey Cassidy MD is Attending Physician.                                                rn  

15:12 Triage completed.                                                                       vg1 

15:12 Arm band placed on.                                                                     vg1 

15:17 Ana Bean RN is Primary Nurse.                                               mb9 

15:18 Placed in gown. Bed in low position. Call light in reach. Side rails up X 1. Client     mb9 

      placed on continuous cardiac and pulse oximetry monitoring. NIBP monitoring applied.        

      Cardiac monitor on.                                                                         

15:18 EKG done, by ED staff, reviewed by Jey Cassidy MD. Inserted saline lock: 22 gauge in    mb9 

      right upper arm, using aseptic technique. Blood collected. done by Taryn CHURCHILL.                

15:51 Lactate Sent.                                                                           mb9 

15:51 Blood Culture Adult (2) Sent.                                                           mb9 

15:51 Flu Sent.                                                                               mb9 

15:51 SARS-COV-2 RT PCR (Document "Date of Onset" if Symptomatic) Sent.                       mb9 

15:51 Troponin HS Sent.                                                                       mb9 

15:51 PT-INR Sent.                                                                            mb9 

15:51 NT PRO-BNP Sent.                                                                        mb9 

15:52 Basic Metabolic Panel Sent.                                                             mb9 

15:52 Inserted saline lock: 24 gauge in right hand, using aseptic technique. ,using aseptic   mb9 

      technique. done by Taryn CHURCHILL.                                                                

16:35 XRAY Chest (1 view) In Process Unspecified.                                             EDMS

17:58 Urine Culture Sent.                                                                     mb9 

17:58 Urine Microscopic Only Sent.                                                            mb9 

17:58 Blood Culture Adult (2) Sent.                                                           mb9 

18:02 Sammy Cassidy MD is Hospitalizing Provider.                                           rn  

20:01 No provider procedures requiring assistance completed. Patient admitted, IV remains in  kl  

      place.                                                                                      

                                                                                                  

Administered Medications:                                                                         

15:40 Drug: NS 0.9% 500 ml Route: IV; Rate: bolus; Site: right upper arm;                     mb9 

17:58 Follow up: Response: No adverse reaction                                                mb9 

                                                                                                  

                                                                                                  

Medication:                                                                                       

20:04 VIS not applicable for this client.                                                       

                                                                                                  

Outcome:                                                                                          

18:02 Decision to Hospitalize by Provider.                                                    rn  

20:02 Admitted to Med/surg via stretcher.                                                     kl  

20:02 Condition: improved                                                                         

20:02 Instructed on the need for admit.                                                           

21:28 Patient left the ED.                                                                    ha1 

                                                                                                  

Signatures:                                                                                       

Dispatcher [employer]                           EDMS                                                 

Lupe Murcia RN RN kl Nieto, Roman, MD MD rn Garcia, Rubi rg4                                                  

Serenity Braswell RN                    RN   sera1                                                  

Lizbet Asif RN                        RN   ha1                                                  

Ana Bean, RN                 RN   mb9                                                  

                                                                                                  

Corrections: (The following items were deleted from the chart)                                    

15:14 15:10 Chief complaint: Patient's son or daughter states: sent from DR MCWILLIAMS office due   vg1 

      to low BP of 50s and BP 99 systolic. Pt appears to be pale, states 'feels weak' denies      

      CP vg1                                                                                      

19:28 19:20  / 75; Pulse 53bpm; Resp 17bpm; Spontaneous; Pulse Ox 95% RA; ha1           ha1 

                                                                                                  

**************************************************************************************************

## 2022-10-25 LAB
BUN BLD-MCNC: 9 MG/DL (ref 7–18)
GLUCOSE SERPLBLD-MCNC: 105 MG/DL (ref 74–106)
HCT VFR BLD CALC: 42.3 % (ref 36–45)
LYMPHOCYTES # SPEC AUTO: 2 K/UL (ref 0.7–4.9)
MAGNESIUM SERPL-MCNC: 1.8 MG/DL (ref 1.8–2.4)
MCV RBC: 96.4 FL (ref 80–100)
PMV BLD: 7.9 FL (ref 7.6–11.3)
POTASSIUM SERPL-SCNC: 4 MMOL/L (ref 3.5–5.1)
RBC # BLD: 4.39 M/UL (ref 3.86–4.86)
TROPONIN I SERPL HS-MCNC: 9.3 PG/ML (ref ?–58.9)
TSH SERPL DL<=0.05 MIU/L-ACNC: 3.29 UIU/ML (ref 0.36–3.74)

## 2022-10-25 RX ADMIN — METOPROLOL SUCCINATE SCH MG: 25 TABLET, EXTENDED RELEASE ORAL at 22:05

## 2022-10-25 RX ADMIN — Medication SCH ML: at 22:06

## 2022-10-25 RX ADMIN — ENOXAPARIN SODIUM SCH MG: 40 INJECTION SUBCUTANEOUS at 09:28

## 2022-10-25 RX ADMIN — DULOXETINE HYDROCHLORIDE SCH MG: 30 CAPSULE, DELAYED RELEASE ORAL at 22:04

## 2022-10-25 RX ADMIN — GABAPENTIN SCH MG: 300 CAPSULE ORAL at 22:04

## 2022-10-25 RX ADMIN — TRAMADOL HYDROCHLORIDE PRN MG: 50 TABLET, COATED ORAL at 22:13

## 2022-10-25 RX ADMIN — MEMANTINE HYDROCHLORIDE SCH MG: 10 TABLET ORAL at 22:05

## 2022-10-25 RX ADMIN — SODIUM CHLORIDE, SODIUM LACTATE, POTASSIUM CHLORIDE, AND CALCIUM CHLORIDE SCH: .6; .31; .03; .02 INJECTION, SOLUTION INTRAVENOUS at 10:47

## 2022-10-25 RX ADMIN — Medication SCH ML: at 09:00

## 2022-10-25 RX ADMIN — SODIUM CHLORIDE, SODIUM LACTATE, POTASSIUM CHLORIDE, AND CALCIUM CHLORIDE SCH MLS: .6; .31; .03; .02 INJECTION, SOLUTION INTRAVENOUS at 22:23

## 2022-10-25 RX ADMIN — QUETIAPINE SCH MG: 25 TABLET ORAL at 22:04

## 2022-10-25 RX ADMIN — TRAMADOL HYDROCHLORIDE PRN MG: 50 TABLET, COATED ORAL at 00:53

## 2022-10-25 RX ADMIN — SODIUM CHLORIDE, SODIUM LACTATE, POTASSIUM CHLORIDE, AND CALCIUM CHLORIDE SCH MLS: .6; .31; .03; .02 INJECTION, SOLUTION INTRAVENOUS at 09:27

## 2022-10-25 RX ADMIN — TOPIRAMATE SCH MG: 100 TABLET, FILM COATED ORAL at 22:04

## 2022-10-25 NOTE — P.PN
Date of Service: 10/25/22





Subjective:


received IVF, feels slightly better


still diaphoretic and symptomatic when changing position


HR and BP slightly improved





ROS: 


10 point ROS as noted above, otherwise negative








Physical Exam:


Gen: NAD, AOx3


HEENT: normal conjunctiva, sclera anicteric


CV: regular rate & rhythm, no edema


Pulm: non-labored respirations, clear bilaterally


Abd: soft, non-tender, non-distended


Skin: no rashes, no lesions


Neuro: normal speech, normal affect, moves all extremities, generalized weakness





vitals reviewed








Problem List


Near syncope, hypotension/bradycardia


Atrial fibrillation, paroxysmal


COPD, chronic


GERD


HTN


Hypothyroidism





near syncope, hypotension/bradycardia


   HR increased with change of position, with diaphoresis


   ?orthostatic vs autonomic dysfunction


   likely exacerbated by dehydration and medications


   beta blocker on hold - previously dc'd and lead to afib/palpitations; will 

discuss further with cardiology 


   confirm home meds


   patient states she is on eliquis and "some other blood thinner"


   also high risk of falls


   cardiology consulted


confirm home meds, restart


weakness/debility, PT consulted








VTE: lovenox


Code: full


Dispo: back to assisted living, ~1-2 days








Time Spent Managing Pts Care (In Minutes): 35

## 2022-10-25 NOTE — EKG
Test Date:    2022-10-24               Test Time:    15:04:42

Technician:                                          

                                                     

MEASUREMENT RESULTS:                                       

Intervals:                                           

Rate:         62                                     

IL:           214                                    

QRSD:         100                                    

QT:           426                                    

QTc:          432                                    

Axis:                                                

P:            32                                     

IL:           214                                    

QRS:          39                                     

T:            41                                     

                                                     

INTERPRETIVE STATEMENTS:                                       

                                                     

Sinus rhythm with 1st degree AV block

ST & T wave abnormality, consider anterior ischemia

Abnormal ECG

Compared to ECG 09/12/2022 15:12:32

First degree AV block now present

Possible ischemia now present

Right-axis deviation no longer present

ST (T wave) deviation still present



Electronically Signed On 10-25-22 13:58:15 CDT by Michele Pina

## 2022-10-25 NOTE — CON
Date of Consultation:  10/25/2022



Reason For Consultation:  AFib management.



History Of Present Illness:  An 82-year-old female, history of atrial fibrillation which is managed v
beto well on flecainide and metoprolol.  Has COPD, acid reflux, dyslipidemia, hypothyroidism.  Present
ed to emergency room for hypotension, bradycardia, and near syncopal episode.  Heart rate was in the 
low 50s, blood pressure was in the low 90s.  After IV fluids, the patient felt better and she is doin
g better already.  No active symptoms at the present time.



Past Medical History:  As outlined above in HPI.



Medications:  Refer to reconciliation sheet for detailed list.



Allergies:  CLINDAMYCIN.



Family History:  No premature coronary artery disease or cancer.



Social History:  Does not smoke or drink.  Does not use any drugs.



Review of Systems:

All systems reviewed and they were negative except what mentioned in HPI.



Physical Examination:

Vital Signs:  Reviewed. 

Head and Neck:  Pupils are equal, reactive to light.  Intact eye movements.  No JVD.  No cervical lym
phadenopathy.  Neck is supple.  Thyroid is not enlarged. 

Lungs:  Clear to auscultation bilaterally.  No rhonchi, wheezing, or crackles.  No accessory muscle u
se. 

Heart:  Regular rate and rhythm.  No extra sounds. 

Abdomen:  Soft, nontender.  Bowel sounds positive.  No organomegaly.  No masses or hernia.  No rigidi
ty or rebound. 

Extremities:  No clubbing or cyanosis.  Intact pulses. 

Skin:  No rash. 

Neurologic:  Alert, awake, oriented x3.  No acute focal deficits appreciated.



Investigations:  BUN 9, creatinine 1.1.  Troponin x3 are negative.  Hemoglobin is 13.8.



Assessment And Recommendations:  

1.Atrial fibrillation.  She is in sinus now and she is requiring metoprolol and flecainide to keep t
he atrial fibrillation controlled.  Continue flecainide and hold metoprolol for now and once her bloo
d pressure is better, to restart metoprolol at half dose with what she was taking, which means she wi
ll take 12.5 mg daily instead of 25 mg.  This patient had the risks for falling with her passing out.
  Recommend left atrial appendage closure in the future.  Meanwhile, aspirin 81 mg daily.

2.Syncope due to hypotension.  This is resolved.  Holding the metoprolol for now, plan to restart it
 within next 24 hours at half dose what she was taking and monitor clinically.





SR/MODL

DD:  10/25/2022 14:10:35Voice ID:  [id number]

DT:  10/25/2022 14:52:14Report ID:  [id number]

## 2022-10-25 NOTE — ECHO
HEIGHT: 5 ft 3 in   WEIGHT: 225 lb 0 oz   DATE OF STUDY: 10/25/22   REFER DR: Vipul Luna NP

2-DIMENSIONAL: YES

     M.MODE: YES

 DOPPLER: YES

COLOR FLOW: YES



                    TDS:  APICALS

PORTABLE: YES

 DEFINITY:  NO

BUBBLE STUDY: NO





DIAGNOSIS:  NEAR SYNCOPE



CARDIAC HISTORY:  

CATHERIZATION: 

SURGERY: 

PROSTHETIC VALVE: 

PACEMAKER: 





MEASUREMENTS (cm)

    DIASTOLIC (NORMALS)                 SYSTOLIC (NORMALS)

IVSd                 0.9 (0.6-1.2)                    LA Diam 3.7 (1.9-4.0)     LVEF       
  66%  

LVIDd               4.6 (3.5-5.7)                        LVIDs      2.9 (2.0-3.5)     %FS  
        36%

LVPWd             0.8 (0.6-1.2)

Ao Diam           2.7 (2.0-3.7)



2 DIMENSIONAL ASSESSMENT:

RIGHT ATRIUM:                   NORMAL

LEFT ATRIUM:       NORMAL



RIGHT VENTRICLE:            NORMAL

LEFT VENTRICLE: NORMAL



TRICUSPID VALVE:             MILD TRICUSPID REGURGITATION

MITRAL VALVE:     NORMAL



PULMONIC VALVE:             NORMAL

AORTIC VALVE:     NORMAL



PERICARDIAL EFFUSION: SMALL

AORTIC ROOT:      NORMAL





LEFT VENTRICULAR WALL MOTION:     NORMAL.



DOPPLER/COLOR FLOW:     MIDL TRICUSPID REGURGITATION.



COMMENTS:      NORMAL LEFT VENTRICULAR EJECTION FRACTION 60-65%. NORMAL WALL MOTION. MILD 
TRICUSPID REGURGITATION. SMALL PERICARDIAL EFFUSION.



TECHNOLOGIST:   MIC CARRASCO

## 2022-10-26 VITALS — OXYGEN SATURATION: 94 %

## 2022-10-26 LAB
BLD SMEAR INTERP: (no result)
BUN BLD-MCNC: 8 MG/DL (ref 7–18)
GLUCOSE SERPLBLD-MCNC: 99 MG/DL (ref 74–106)
HCT VFR BLD CALC: 41.3 % (ref 36–45)
LYMPHOCYTES # SPEC AUTO: 2.5 K/UL (ref 0.7–4.9)
MAGNESIUM SERPL-MCNC: 1.9 MG/DL (ref 1.8–2.4)
MCV RBC: 94.8 FL (ref 80–100)
MORPHOLOGY BLD-IMP: (no result)
PMV BLD: 8.4 FL (ref 7.6–11.3)
POTASSIUM SERPL-SCNC: 4 MMOL/L (ref 3.5–5.1)
RBC # BLD: 4.36 M/UL (ref 3.86–4.86)

## 2022-10-26 RX ADMIN — ENOXAPARIN SODIUM SCH MG: 40 INJECTION SUBCUTANEOUS at 09:51

## 2022-10-26 RX ADMIN — CEFTRIAXONE SCH MLS: 2 INJECTION, POWDER, FOR SOLUTION INTRAMUSCULAR; INTRAVENOUS at 17:04

## 2022-10-26 RX ADMIN — MONTELUKAST SODIUM SCH MG: 10 TABLET, FILM COATED ORAL at 09:52

## 2022-10-26 RX ADMIN — APIXABAN SCH MG: 5 TABLET, FILM COATED ORAL at 20:53

## 2022-10-26 RX ADMIN — MEMANTINE HYDROCHLORIDE SCH MG: 10 TABLET ORAL at 09:52

## 2022-10-26 RX ADMIN — DULOXETINE HYDROCHLORIDE SCH MG: 30 CAPSULE, DELAYED RELEASE ORAL at 09:52

## 2022-10-26 RX ADMIN — METOPROLOL SUCCINATE SCH MG: 25 TABLET, EXTENDED RELEASE ORAL at 20:54

## 2022-10-26 RX ADMIN — TRAMADOL HYDROCHLORIDE PRN MG: 50 TABLET, COATED ORAL at 21:00

## 2022-10-26 RX ADMIN — TOPIRAMATE SCH MG: 100 TABLET, FILM COATED ORAL at 20:53

## 2022-10-26 RX ADMIN — DULOXETINE HYDROCHLORIDE SCH MG: 30 CAPSULE, DELAYED RELEASE ORAL at 20:54

## 2022-10-26 RX ADMIN — LEVOTHYROXINE SODIUM SCH MG: 75 TABLET ORAL at 05:38

## 2022-10-26 RX ADMIN — QUETIAPINE SCH MG: 25 TABLET ORAL at 20:53

## 2022-10-26 RX ADMIN — TOPIRAMATE SCH MG: 100 TABLET, FILM COATED ORAL at 09:52

## 2022-10-26 RX ADMIN — DONEPEZIL HYDROCHLORIDE SCH MG: 5 TABLET ORAL at 09:52

## 2022-10-26 RX ADMIN — BUPROPION HYDROCHLORIDE SCH MG: 150 TABLET, EXTENDED RELEASE ORAL at 09:51

## 2022-10-26 RX ADMIN — Medication SCH ML: at 21:07

## 2022-10-26 RX ADMIN — Medication SCH ML: at 09:00

## 2022-10-26 RX ADMIN — GABAPENTIN SCH MG: 300 CAPSULE ORAL at 20:53

## 2022-10-26 RX ADMIN — MEMANTINE HYDROCHLORIDE SCH MG: 10 TABLET ORAL at 20:53

## 2022-10-26 RX ADMIN — Medication SCH MG: at 09:52

## 2022-10-26 NOTE — P.PN
Date of Service: 10/26/22





Subjective:


no acute events overnight


feels slightly better, less diaphoretic with change of position





ROS: 


10 point ROS as noted above, otherwise negative








Physical Exam:


Gen: NAD, AOx3


HEENT: normal conjunctiva, sclera anicteric


CV: regular rate & rhythm, trace pedal edema


Pulm: non-labored respirations, clear bilaterally


Abd: soft, non-tender, non-distended


Neuro: normal speech, normal affect, moves all extremities, generalized weakness





vitals reviewed








Problem List


Near syncope, hypotension/bradycardia


Atrial fibrillation, paroxysmal


COPD, chronic


GERD


HTN


Hypothyroidism





near syncope, hypotension/bradycardia


   HR increased with change of position, with diaphoresis


   ?orthostatic vs autonomic dysfunction


   likely exacerbated by dehydration and medications


   beta blocker initially heldd - previously dc'd and lead to afib/palpitations


   restarted at half dose 12.5mg at bedtime 10/25, blood pressure on low-normal 

range


   dc IVF, monitor blood pressure /symptoms


   confirm home meds


   patient states she is on eliquis and "some other blood thinner"


   also high risk of falls


   cardiology consulted


confirm home meds, restart


weakness/debility, PT consulted


1/4 blood cultures: GPC+


   start rocephin empirically, ID consulted


   pt denies UTI symptoms, no fever


   possible contamination





VTE: lovenox


Code: full


Dispo: back to assisted living, ~1  days








Time Spent Managing Pts Care (In Minutes): 35

## 2022-10-26 NOTE — CON
History Of Present Illness:  This is an 82-year-old female, I was consulted for bacteremia secondary 
to gram-positive cocci.  Patient was brought in after being evaluated at primary care clinic, she was
 found to be hypotensive and episode of syncope and patient was transferred to hospital for evaluatio
n and eventually admitted to the hospital for further evaluation of syncope and hypotension.  Blood c
ultures are growing gram-positive cocci.  Patient has significant allergies to clindamycin, sulfa melissa
gs, and quinolones.  Denies any chest pain, abdominal pain, constipation, or diarrhea.  Does have pro
blem with reflux.



Past Medical History:  Includes lymphedema, atrial fibrillation, left breast cancer with mastectomy, 
pulmonary embolism, Meniere's disease, former smoker, recurrent urinary tract infection, fibromyalgia
, gastroesophageal reflux disease, insomnia, depression, anxiety, neuropathy, bilateral total knee ar
throplasty, cervical fusion, lumbar fusion, appendectomy, hysterectomy.



Social History:  Tobacco positive.  Alcohol social.



Family History:  Noncontributory.



Medications:  See [facility] for other medications.



Allergies:  SULFA DRUGS, CLINDAMYCIN, AND QUINOLONES.



Review of Systems:

A 10-point review was performed.



Physical Examination:

General:  This is an 82-year-old female, lying in bed, not in any acute cardiopulmonary distress. 

Vital Signs:  Temperature 97, pulse 88, respirations 16, blood pressure 117/54. 

HEENT:  Unremarkable. 

Neck:  Supple. 

Lungs:  Basal crackles. 

Heart:  S1, S2.  Regular. 

Abdomen:  Soft, nontender.  Bowel sounds are present. 

Extremity:  Trace edema.



Laboratory Data:  Shows sodium 138, potassium 4, chloride 106, bicarb 25, BUN 8, creatinine 0.8.  Glu
cose is 99.  WBC 8.8, hemoglobin 13.8, platelets 176.  Micro data shows gram-positive cocci in cluste
r and gram-positive cocci in blood cultures done on October 24, gram-positive cocci in clusters.  Tracy funes's chest x-ray done on 10/24/2022 shows left-sided infiltrate.



Assessment And Plan:  82-year-old female with multiple medical problems, coming in with evaluation of
 syncope and hypotension.  Blood cultures positive for gram-positive cocci with possible staph or str
ep, pending sensitivity and specificity.  We will recommend to start patient on Rocephin 2 g daily an
d monitor repeat blood cultures.  Continue current treatment and pulmonary hygiene.  Patient requirin
g 2 L of nasal cannula at this time.  Hypoxia possibly secondary to chronic obstructive pulmonary dis
ease versus pneumonitis.  No other recommendation at this time. 



Thank you, Dr. Cassidy, for consult.





NF/MODL

DD:  10/26/2022 12:40:32Voice ID:  [id number]

DT:  10/26/2022 16:48:46Report ID:  [id number]

## 2022-10-26 NOTE — PN
Date of Progress Note:  10/26/2022



Subjective:  Seen by bedside.  Doing very well.  Remained stable overnight.



Review of Systems:

No chest pain, shortness of breath, orthopnea, or cough.  No nausea, vomiting, diarrhea.  No abdomina
l pain.  No dysuria, polyuria, or urinary urgency.  All other systems reviewed and they were negative
.



Physical Examination:

Vital Signs:  Reviewed. 

Head and Neck:  Pupils are equal, reactive to light.  Intact eye movements.  No JVD.  No cervical lym
phadenopathy.  Neck is supple.  Thyroid is not enlarged. 

Lungs:  Clear to auscultation bilaterally.  No rhonchi, wheezing, or crackles.  No accessory muscle u
se. 

Heart:  Regular rate and rhythm.  No extra sounds. 

Abdomen:  Soft, nontender.  Bowel sounds positive.  No organomegaly.  No masses or hernia.  No rigidi
ty or rebound. 

Extremities:  No edema, clubbing, or cyanosis.  Intact pulses. 

Skin:  No rash. 

Neurologic:  Alert, awake, oriented x3.  No acute focal deficits appreciated.



Investigations:  BUN 8, creatinine 0.81, and hemoglobin is 13.8.



Assessment And Recommendations:  

1.Atrial fibrillation, now in sinus and rate is controlled.  Continue metoprolol at 12.5 twice a day
 and flecainide.

2.Syncope due to hypotension and bradycardia.  Decrease the metoprolol and blood pressure is holding
 very well and the patient from Cardiology standpoint is stable and Cardiology will sign off 

on the case, and asked the patient to follow up on an outpatient basis.





/CATRACHITA

DD:  10/26/2022 14:03:51Voice ID:  [id number]

DT:  10/26/2022 15:56:44Report ID:  [id number]

## 2022-10-27 VITALS — SYSTOLIC BLOOD PRESSURE: 128 MMHG | TEMPERATURE: 97.2 F | DIASTOLIC BLOOD PRESSURE: 63 MMHG

## 2022-10-27 RX ADMIN — TRAMADOL HYDROCHLORIDE PRN MG: 50 TABLET, COATED ORAL at 10:20

## 2022-10-27 RX ADMIN — BUPROPION HYDROCHLORIDE SCH MG: 150 TABLET, EXTENDED RELEASE ORAL at 10:19

## 2022-10-27 RX ADMIN — LEVOTHYROXINE SODIUM SCH MG: 75 TABLET ORAL at 05:36

## 2022-10-27 RX ADMIN — DONEPEZIL HYDROCHLORIDE SCH MG: 5 TABLET ORAL at 10:19

## 2022-10-27 RX ADMIN — CEFTRIAXONE SCH MLS: 2 INJECTION, POWDER, FOR SOLUTION INTRAMUSCULAR; INTRAVENOUS at 10:19

## 2022-10-27 RX ADMIN — MONTELUKAST SODIUM SCH MG: 10 TABLET, FILM COATED ORAL at 10:19

## 2022-10-27 RX ADMIN — TOPIRAMATE SCH MG: 100 TABLET, FILM COATED ORAL at 10:20

## 2022-10-27 RX ADMIN — DULOXETINE HYDROCHLORIDE SCH MG: 30 CAPSULE, DELAYED RELEASE ORAL at 10:20

## 2022-10-27 RX ADMIN — MEMANTINE HYDROCHLORIDE SCH MG: 10 TABLET ORAL at 10:19

## 2022-10-27 RX ADMIN — APIXABAN SCH MG: 5 TABLET, FILM COATED ORAL at 10:20

## 2022-10-27 RX ADMIN — Medication SCH MG: at 10:20

## 2022-10-27 RX ADMIN — Medication SCH ML: at 09:00

## 2022-10-27 NOTE — P.DS
Admission Date: 10/24/22


Discharge Date: 10/27/22


Disposition: DC HOME/HOME HEALTH CARE


Discharge Condition: GOOD


Reason for Admission: Near syncope, bradycardia


Consultations: 





Cardiology - Dr. Pina


ID - Dr. Gan





Brief History of Present Illness: 





82-year-old female with history of atrial fibrillation, COPD, GERD, 

hypertension, hyperlipidemia, hypothyroidism presents the emergency department 

for hypotension, bradycardia, near syncope.  She reports feeling generally weak 

for the course of the past few days noticed that her blood pressure and heart 

rate have been running low she is seen by her primary care doctor who was 

concerned as her heart rate was in the low 50s systolic blood pressure was in 

the 90s and she was diaphoretic.  Patient reports that she gets sweaty, weak 

when changing positions especially.  She is evaluated in the emergency 

department labs were significant for glucose 170.  She is given 500 cc normal 

saline bolus which improved her blood pressure, she states she feels "a little 

better" she still mildly bradycardic with a rate in the 50s





Hospital Course: 





Problem List


Near syncope, hypotension/bradycardia


Atrial fibrillation, paroxysmal


COPD, chronic


GERD


HTN


Hypothyroidism








Patient presented with low blood pressure, and diaphoresis with change in 

position. She responded well and symptoms improved with IV fluids and decreasing

her metoprolol dose to 12.5mg from 25mg.


Cardiology was consulted, recommended continue felcainide, and metoprolol at 

12.5mg, echocardiogram was unchanged compared to most recent..


She was noted to have increased heart rate with orthostatics / change in 

position, which improved with hydration.


Suspected autonomic dysfunction exacerbated by medications and dehydration.


PT was consulted, patient ambulated in the hallways, but would get short of 

breath and fatigued.


Recommend continued PT at her assisted living facility.


Cardiology discussed with patient to follow up in office, to have further 

discussions of possible atrial appendage closer.





Patient initially had 1/4 blood cultures growing staph hominis, which later 

resulted with 2 of 4 blood cultures. Her urine was partially concerning for UTI,

however patient denied any fever, no UTI symptoms.


She was monitored off antibiotics, and repeat blood culture obtained on 10/26. 

She remained afebrile. 2/4 blood cultures on presentation and repeat blood 

cultures from 10/26 were without growth - off antibiotics.


ID was consulted, empirically started on rocephin for possible UTI, urine 

culture grew 2+ gram negative rods and mixed carolynn.


Patient is discharged with 1 week total of antibiotics, Keflex per ID 

recommendations.  Discussed unlikely to be having endocarditis, afebrile and 

negative blood cultures off antibiotics, and only 2/4 initial cultures positive.










Follow up:


Cardiology in ~2 weeks





Vital Signs/Physical Exam: 














Temp Pulse Resp BP Pulse Ox


 


 97.2 F   63   16   128/63   97 


 


 10/27/22 12:00  10/27/22 12:00  10/27/22 12:00  10/27/22 12:00  10/27/22 12:00





Physical Exam:


Gen: NAD, AOx3


HEENT: normal conjunctiva, sclera anicteric


CV: regular rate & rhythm, trace pedal edema


Pulm: non-labored respirations, clear bilaterally


Abd: soft, non-tender, non-distended


Neuro: normal speech, normal affect, moves all extremities, generalized weakness





Laboratory Data at Discharge: 














WBC  8.80 K/uL (4.3-10.9)   10/26/22  05:20    


 


Hgb  13.8 g/dL (12.0-15.0)   10/26/22  05:20    


 


Hct  41.3 % (36.0-45.0)   10/26/22  05:20    


 


Plt Count  176 K/uL (152-406)  D 10/26/22  05:20    


 


PT  12.1 SECONDS (9.2-12.8)   10/24/22  15:24    


 


INR  1.02   10/24/22  15:24    


 


Sodium  138 mmol/L (136-145)   10/26/22  05:20    


 


Potassium  4.0 mmol/L (3.5-5.1)   10/26/22  05:20    


 


BUN  8 mg/dL (7-18)   10/26/22  05:20    


 


Creatinine  0.81 mg/dL (0.55-1.3)   10/26/22  05:20    


 


Glucose  99 mg/dL ()   10/26/22  05:20    


 


Magnesium  1.9 mg/dL (1.8-2.4)   10/26/22  05:20    








Home Medications: 








Albuterol Sulfate [Proair Respiclick] 2 puff IH Q6HP PRN 02/22/22 


Apixaban [Eliquis] 5 mg PO BID 02/22/22 


Cyanocobalamin (Vitamin B-12) [Vitamin B12] 1,000 mcg PO DAILY 02/22/22 


Cyclosporine [Restasis] 1 drop EACH EYE TID 02/22/22 


Donepezil HCl 10 mg PO DAILY 02/22/22 


Duloxetine HCl 60 mg PO BID 02/22/22 


Estradiol 0.01% Cream 1 appl VAG SEECOM 02/22/22 


Flecainide Acetate 50 mg PO BID 02/22/22 


Fluticasone/Umeclidin/Vilanter [Trelegy Ellipta 100-62.5-25] 1 puff IH DAILY 0

2/22/22 


Gabapentin 300 mg PO BEDTIME 02/22/22 


Levothyroxine [Synthroid*] 75 mcg PO [id number] 02/22/22 


Memantine HCl 10 mg PO BID 02/22/22 


Montelukast [Singulair*] 10 mg PO DAILY 02/22/22 


Pantoprazole [Protonix Tab*] 40 mg PO DAILY 02/22/22 


Potassium Chloride 20 meq PO BID 02/22/22 


Quetiapine Fumarate [Seroquel] 25 mg PO BEDTIME 02/22/22 


Simvastatin 40 mg PO BEDTIME 02/22/22 


Tizanidine HCl 4 mg PO DAILY 02/22/22 


Topiramate 100 mg PO BID 02/22/22 


buPROPion HCL [Bupropion Xl] 300 mg PO DAILY 02/22/22 


Acetaminophen 650 mg PO Q6H PRN 09/13/22 


Benzonatate [Tessalon Perle*] 200 mg PO Q8H PRN 09/13/22 


Pataday 0.7% Drops  1 drop OPTH DAILY 09/13/22 


Propylene Glycol/Peg 400 [Lubricating Eye Drop] 1 drop OPTH BID 09/13/22 


Cranberry Fruit Extract [Ellura] 200 mg PO DAILY 10/25/22 


Cephalexin [Keflex] 500 mg PO Q6HR 7 Days #28 cap 10/27/22 


Metoprolol Succinate [Toprol Xl*] 12.5 mg PO BEDTIME 30 Days #15 tab 10/27/22 





New Medications: 


Cephalexin [Keflex] 500 mg PO Q6HR 7 Days #28 cap


Metoprolol Succinate [Toprol Xl*] 12.5 mg PO BEDTIME 30 Days #15 tab


Physician Discharge Instructions: 





PROBLEM: (list out Acute Problems for the Current visit)


UTI / weakness / Near syncope 





GOAL: Clear understanding of disease process





INSTRUCTIONS:


Patient presented with low blood pressure, and diaphoresis with change in 

position. She responded well and symptoms improved with IV fluids and decreasing

her metoprolol dose to 12.5mg from 25mg.


Cardiology was consulted, recommended continue felcainide, and metoprolol at 

12.5mg, echocardiogram was unchanged compared to most recent..


She was noted to have increased heart rate with orthostatics / change in p

osition, which improved with hydration.


Suspected autonomic dysfunction exacerbated by medications and dehydration.


PT was consulted, patient ambulated in the hallways, but would get short of 

breath and fatigued.


Recommend continued PT at her assisted living facility.


Cardiology discussed with patient to follow up in office, to have further 

discussions of possible atrial appendage closer.





Patient initially had 1/4 blood cultures growing staph hominis, which later 

resulted with 2 of 4 blood cultures. Her urine was partially concerning for UTI,

however patient denied any fever, no UTI symptoms.


She was monitored off antibiotics, and repeat blood culture obtained on 10/26. 

She remained afebrile. 2/4 blood cultures on presentation and repeat blood 

cultures from 10/26 were without growth - off antibiotics.


ID was consulted, empirically started on rocephin for possible UTI, urine 

culture grew 2+ gram negative rods and mixed carolynn.


Patient is discharged with 1 week total of antibiotics, Keflex per ID 

recommendations.  Discussed unlikely to be having endocarditis, afebrile and 

negative blood cultures off antibiotics, and only 2/4 initial cultures positive.










Follow up:


Cardiology in ~2 weeks








Diet: HH





Activity: as tolerated 





Follow up with a Cardiologist of your choice:





FRANCISCA RIZVI, MELODIE FREEMAN


[address] [place] [id number] [phone]   


Fax [phone]








JORGE RIZVI, SEBASTIÁN


[address] [place] [id number] [phone]


Fax [phone]


Followup: 


Javier Mcwilliams MD [Primary Care Provider] - 


Time spent managing pt's care (in minutes): 45

## 2022-12-14 ENCOUNTER — HOSPITAL ENCOUNTER (EMERGENCY)
Dept: HOSPITAL 97 - ER | Age: 82
Discharge: HOME | End: 2022-12-14
Payer: COMMERCIAL

## 2022-12-14 VITALS — TEMPERATURE: 97.1 F

## 2022-12-14 VITALS — OXYGEN SATURATION: 95 %

## 2022-12-14 VITALS — SYSTOLIC BLOOD PRESSURE: 147 MMHG | DIASTOLIC BLOOD PRESSURE: 79 MMHG

## 2022-12-14 DIAGNOSIS — Z91.018: ICD-10-CM

## 2022-12-14 DIAGNOSIS — J44.9: ICD-10-CM

## 2022-12-14 DIAGNOSIS — R53.1: ICD-10-CM

## 2022-12-14 DIAGNOSIS — E66.9: ICD-10-CM

## 2022-12-14 DIAGNOSIS — I10: ICD-10-CM

## 2022-12-14 DIAGNOSIS — Z88.5: ICD-10-CM

## 2022-12-14 DIAGNOSIS — F02.80: ICD-10-CM

## 2022-12-14 DIAGNOSIS — M25.562: Primary | ICD-10-CM
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
## 2022-12-14 DIAGNOSIS — Z88.0: ICD-10-CM

## 2022-12-14 DIAGNOSIS — I48.91: ICD-10-CM

## 2022-12-14 DIAGNOSIS — Z88.2: ICD-10-CM

## 2022-12-14 DIAGNOSIS — Z88.3: ICD-10-CM

## 2022-12-14 LAB
BUN BLD-MCNC: 17 MG/DL (ref 7–18)
GLUCOSE SERPLBLD-MCNC: 91 MG/DL (ref 74–106)
HCT VFR BLD CALC: 41.7 % (ref 36–45)
INR BLD: 1.3
LYMPHOCYTES # SPEC AUTO: 1.3 K/UL (ref 0.7–4.9)
MCV RBC: 96.8 FL (ref 80–100)
NT-PROBNP SERPL-MCNC: 363 PG/ML (ref ?–450)
PMV BLD: 8.1 FL (ref 7.6–11.3)
POTASSIUM SERPL-SCNC: 4.1 MMOL/L (ref 3.5–5.1)
RBC # BLD: 4.31 M/UL (ref 3.86–4.86)
TROPONIN I SERPL HS-MCNC: 6.9 PG/ML (ref ?–58.9)

## 2022-12-14 PROCEDURE — 86850 RBC ANTIBODY SCREEN: CPT

## 2022-12-14 PROCEDURE — 84484 ASSAY OF TROPONIN QUANT: CPT

## 2022-12-14 PROCEDURE — 73562 X-RAY EXAM OF KNEE 3: CPT

## 2022-12-14 PROCEDURE — 36415 COLL VENOUS BLD VENIPUNCTURE: CPT

## 2022-12-14 PROCEDURE — 85025 COMPLETE CBC W/AUTO DIFF WBC: CPT

## 2022-12-14 PROCEDURE — 80048 BASIC METABOLIC PNL TOTAL CA: CPT

## 2022-12-14 PROCEDURE — 93005 ELECTROCARDIOGRAM TRACING: CPT

## 2022-12-14 PROCEDURE — 85610 PROTHROMBIN TIME: CPT

## 2022-12-14 PROCEDURE — 86901 BLOOD TYPING SEROLOGIC RH(D): CPT

## 2022-12-14 PROCEDURE — 86900 BLOOD TYPING SEROLOGIC ABO: CPT

## 2022-12-14 PROCEDURE — 73552 X-RAY EXAM OF FEMUR 2/>: CPT

## 2022-12-14 PROCEDURE — 83880 ASSAY OF NATRIURETIC PEPTIDE: CPT

## 2022-12-14 NOTE — ER
Nurse's Notes                                                                                     

 [facility]                                                                 

Name: Leona Peres                                                                              

Age: 82 yrs                                                                                       

Sex: Female                                                                                       

: 1940                                                                                   

MRN: [id number]                                                                                   

Arrival Date: 2022                                                                          

Time: 17:34                                                                                       

Account#: [id number]                                                                            

Bed 6                                                                                             

Private MD:                                                                                       

Diagnosis: Fall on same level, unspecified;Pain in left knee;Obesity, unspecified;Weakness        

                                                                                                  

Presentation:                                                                                     

                                                                                             

17:42 Chief complaint: EMS states: pt stood up from the toilet and lost her balance and fell  mb9 

      over hitting her left knee. Pt denies LOC and states she did not hit her head. Pt           

      states she can't put weight on her left leg. Coronavirus screen: Vaccine status:            

      Patient reports receiving the 2nd dose of the covid vaccine. Ebola Screen: No symptoms      

      or risks identified at this time. Initial Sepsis Screen: Does the patient meet any 2        

      criteria? No. Patient's initial sepsis screen is negative. Does the patient have a          

      suspected source of infection? No. Patient's initial sepsis screen is negative. Risk        

      Assessment: Do you want to hurt yourself or someone else? Patient reports no desire to      

      harm self or others. Onset of symptoms was 2022.                               

17:42 Method Of Arrival: EMS: [facility]                                                mb9 

17:42 Acuity: ANUSHKA 3                                                                           mb9 

                                                                                                  

Historical:                                                                                       

- Allergies:                                                                                      

17:45 clindamycin HCl (bulk);                                                                 mb9 

17:45 Darvocet-N 100;                                                                         mb9 

17:45 PENICILLINS;                                                                            mb9 

17:45 Strawberries;                                                                           mb9 

17:45 Sulfa (Sulfonamide Antibiotics);                                                        mb9 

- Home Meds:                                                                                      

17:45 duloxetine 60 mg Oral cpDR 2 caps once daily [Active];                                  mb9 

- PMHx:                                                                                           

17:45 AFIB; lymphedema; COPD; Depression; Fibromyalgia; GERD; Hyperlipidemia; Hypothyroidism; mb9 

      Alzheimer's disease; left breast cancer; Hypertension;                                      

- PSHx:                                                                                           

17:45 Total abdominal hysterectomy; Spinal Fusion; Bilateral Knee Replacement; Mastectomy;    mb9 

      Appendectomy;                                                                               

                                                                                                  

- Immunization history:: Adult Immunizations up to date.                                          

- Social history:: Smoking status: Patient reports the use of cigarette tobacco                   

  products, Patient/guardian denies using tobacco, but has a distant history of tobacco           

  abuse.                                                                                          

- Family history:: not pertinent.                                                                 

                                                                                                  

                                                                                                  

Screenin:20 Abuse screen: Denies threats or abuse. Denies injuries from another. Nutritional        ha1 

      screening: No deficits noted. Tuberculosis screening: No symptoms or risk factors           

      identified. Fall Risk IV access (20 points). Gait- Weak (10 pts.). Total Martinez Fall         

      Scale indicates Low Risk Score (25-44 pts). Fall prevention measures have been              

      instituted. Side Rails Up X 2 Placed close to Nursing Station Frequent Obs/Assesments       

      occuring Family Present and informed to notify staff if they need to leave bedside As       

      available Patient and Family Educated on Fall Prevention Program and strategies.            

                                                                                                  

Assessment:                                                                                       

18:04 General: Appears in no apparent distress. comfortable, Behavior is calm, cooperative,   mb9 

      appropriate for age. Pain: Complains of pain in left knee Pain does not radiate. Pain       

      currently is 8 out of 10 on a pain scale. Quality of pain is described as aching, Pain      

      began suddenly. Neuro: Gross Agitation-Sedation Scale (RASS): 0 - Alert and Calm         

      Level of Consciousness is awake, alert, obeys commands, Oriented to person, place,          

      time, situation, Appropriate for age. Cardiovascular: Heart tones S1 S2 present Rhythm      

      is regular. Respiratory: Airway is patent Respiratory effort is even, unlabored,            

      Respiratory pattern is regular, symmetrical, Breath sounds are clear bilaterally. GI:       

      Abdomen is round Bowel sounds present X 4 quads. : No signs and/or symptoms were          

      reported regarding the genitourinary system. EENT: No signs and/or symptoms were            

      reported regarding the EENT system. Derm: Skin is intact, Skin is dry, Skin is pale,        

      Skin temperature is cool. Musculoskeletal: Range of motion: limited in left knee.           

19:20 General: Appears comfortable, Behavior is calm, cooperative. Pain: Complains of pain in ha1 

      left knee Pain does not radiate. Pain currently is 3 out of 10 on a pain scale. Neuro:      

      Level of Consciousness is awake, alert, Oriented to person, place, time, situation.         

      Cardiovascular: Heart tones S1 S2 present Patient's skin is warm and dry. Respiratory:      

      Airway is patent Respiratory effort is even, unlabored, Respiratory pattern is regular,     

      symmetrical. GI: Abdomen is round non-distended. GI: No signs and/or symptoms were          

      reported involving the gastrointestinal system. EENT: No deficits noted. No signs           

      and/or symptoms were reported regarding the EENT system. Derm: Skin is pink, warm \T\       

      dry. Musculoskeletal: Range of motion: limited in left leg.                                 

                                                                                                  

Vital Signs:                                                                                      

17:42  / 61; Pulse 68; Resp 18; Temp 97.1; Pulse Ox 100% ; Weight 107.95 kg; Height 5   mb9 

      ft. 3 in. (160.02 cm); Pain 10/10;                                                          

18:50  / 72; Pulse 68; Resp 18; Pulse Ox 95% on R/A; Pain 5/10;                         mb9 

19:20  / 79; Pulse 69; Resp 19 S; Pulse Ox 95% on R/A;                                  ha1 

17:42 Body Mass Index 42.16 (107.95 kg, 160.02 cm)                                            mb9 

                                                                                                  

NIH Stroke Scale Scores:                                                                          

18:24 NIHSS Score: 0                                                                          [facility] 

                                                                                                  

ED Course:                                                                                        

17:34 Patient arrived in ED.                                                                  ragini 

17:34 Og Cotter MD is Attending Physician.                                             ragini 

17:34 Arm band placed on.                                                                     mb9 

17:42 Ana Bean RN is Primary Nurse.                                               mb9 

17:45 Triage completed.                                                                       mb9 

18:02 Knee Left 3 View XRAY In Process Unspecified.                                           EDMS

18:02 Femur Right XRAY In Process Unspecified.                                                EDMS

18:10 Inserted saline lock: 20 gauge in left EJ, using aseptic technique. ,using aseptic      mb9 

      technique. done by Dr. Cotter Blood collected.                                            

18:21 PT-INR Sent.                                                                            mb9 

18:21 Basic Metabolic Panel Sent.                                                             mb9 

18:21 CBC with Diff Sent.                                                                     mb9 

18:22 Type And Screen Sent.                                                                   mb9 

18:22 BNP Sent.                                                                               mb9 

18:22 Troponin High Sensitivity Sent.                                                         mb9 

19:20 Patient has correct armband on for positive identification. Placed in gown. Bed in low  ha1 

      position. Call light in reach. Side rails up X 1. Adult w/ patient.                         

19:21 Fredis Horton MD is Referral Physician.                                                ragini 

19:21 Darrian Packer MD is Referral Physician.                                               ragini 

20:11 No provider procedures requiring assistance completed. IV discontinued, intact,         ha1 

      bleeding controlled, No redness/swelling at site. Pressure dressing applied.                

                                                                                                  

Administered Medications:                                                                         

18:21 Drug: NS 0.9% 500 ml Route: IV; Rate: bolus; Site: left jugular;                        mb9 

18:21 Drug: morphine 2 mg Route: IVP; Infused Over: 4 mins; Site: left jugular;               mb9 

18:21 Drug: Zofran (Ondansetron) 4 mg Route: IVP; Site: left jugular;                         mb9 

                                                                                                  

                                                                                                  

Medication:                                                                                       

20:13 VIS not applicable for this client.                                                     ha1 

                                                                                                  

Outcome:                                                                                          

19:22 Discharge ordered by MD.                                                                ragini 

20:11 Discharged to nursing home. leaving with family member                                  ha1 

20:11 Condition: stable                                                                           

20:11 Discharge instructions given to patient, family, Instructed on discharge instructions,      

      follow up and referral plans. medication usage, Demonstrated understanding of               

      instructions, follow-up care, medications, Prescriptions given X 1.                         

20:13 Patient left the ED.                                                                    ha1 

                                                                                                  

                                                                                                  

NIH Stroke Scale - NIH Stroke Score                                                               

Date: 2022                                                                                  

Time: 18:24                                                                                       

Total Score = 0                                                                                   

  1a. Level of Consciousness (LOC) - 0(Alert)                                                     

  1b. Level of Consciousness (LOC) (Month \T\ Age) - 0(Both)                                      

  1c. LOC Commands (Open \T\ Closes Eyes/) - 0(Both)                                          

   2. Best Gaze (Lateral Gaze Paresis) - 0(Normal)                                                

   3. Visual Field Loss - 0(No visual loss)                                                       

   4. Facial Palsy - 0(Normal)                                                                    

  5a. Left Arm: Motor (10-second hold) - 0(No drift)                                              

  5b. Right Arm: Motor (10-second hold) - 0(No drift)                                             

  6a. Left Leg: Motor (5-second hold - always test supine) - 0(No drift)                          

  6b. Right Leg: Motor (5-second hold - always test supine) - 0(No drift)                         

   7. Limb Ataxia (finger/nose \T\ heel/shin - test with eyes open) - 0(Absent)                   

   8. Sensory Loss (pinprick arms/legs/face) - 0(Normal)                                          

   9. Best Language: Aphasia (description/naming/reading) - 0(No aphasia)                         

  10. Dysarthria (speech clarity - read or repeat words) - 0(Normal)                              

  11. Extinction and Inattention (visual/tactile/auditory/spatial/personal) - 0(No                

      abnormality)                                                                                

Initials: ragini                                                                                     

                                                                                                  

Signatures:                                                                                       

Dispatcher [employer]                           Og Castellon MD MD cha Ayala, Heidy, RN                        RN   ha1                                                  

Ana Bean RN                 RN   mb9                                                  

                                                                                                  

**************************************************************************************************

## 2022-12-14 NOTE — EDPHYS
Physician Documentation                                                                           

 [facility]                                                                 

Name: Leona Peres                                                                              

Age: 82 yrs                                                                                       

Sex: Female                                                                                       

: 1940                                                                                   

MRN: [id number]                                                                                   

Arrival Date: 2022                                                                          

Time: 17:34                                                                                       

Account#: [id number]                                                                            

Bed 6                                                                                             

Private MD:                                                                                       

CASSANDRA Physician Og Cotter                                                                      

HPI:                                                                                              

                                                                                             

18:24 This 82 yrs old  Female presents to ER via EMS with complaints of fall left    ragini 

      knee pain.                                                                                  

18:24 The patient presents with a deformity, pain, that is acute. The complaints affect the   ragini 

      left knee. Context: The problem was sustained at home, resulted from the patient            

      falling. Onset: The symptoms/episode began/occurred just prior to arrival. Modifying        

      factors: The symptoms are alleviated by nothing. the symptoms are aggravated by             

      nothing. Associated signs and symptoms: Pertinent positives: weakness. weak in general,     

      lost balance. Onset: The symptoms/episode began/occurred today. Severity of symptoms:       

      At their worst the symptoms were mild in the emergency department the symptoms are          

      unchanged.                                                                                  

                                                                                                  

Historical:                                                                                       

- Allergies:                                                                                      

17:45 clindamycin HCl (bulk);                                                                 mb9 

17:45 Darvocet-N 100;                                                                         mb9 

17:45 PENICILLINS;                                                                            mb9 

17:45 Strawberries;                                                                           mb9 

17:45 Sulfa (Sulfonamide Antibiotics);                                                        mb9 

- Home Meds:                                                                                      

17:45 duloxetine 60 mg Oral cpDR 2 caps once daily [Active];                                  mb9 

- PMHx:                                                                                           

17:45 AFIB; lymphedema; COPD; Depression; Fibromyalgia; GERD; Hyperlipidemia; Hypothyroidism; mb9 

      Alzheimer's disease; left breast cancer; Hypertension;                                      

- PSHx:                                                                                           

17:45 Total abdominal hysterectomy; Spinal Fusion; Bilateral Knee Replacement; Mastectomy;    mb9 

      Appendectomy;                                                                               

                                                                                                  

- Immunization history:: Adult Immunizations up to date.                                          

- Social history:: Smoking status: Patient reports the use of cigarette tobacco                   

  products, Patient/guardian denies using tobacco, but has a distant history of tobacco           

  abuse.                                                                                          

- Family history:: not pertinent.                                                                 

                                                                                                  

                                                                                                  

ROS:                                                                                              

18:24 Constitutional: Negative for fever, chills, and weight loss, Eyes: Negative for injury, ragini 

      pain, redness, and discharge, ENT: Negative for injury, pain, and discharge, Neck:          

      Negative for injury, pain, and swelling, Cardiovascular: Negative for chest pain,           

      palpitations, and edema, Respiratory: Negative for shortness of breath, cough,              

      wheezing, and pleuritic chest pain, Abdomen/GI: Negative for abdominal pain, nausea,        

      vomiting, diarrhea, and constipation, Back: Negative for injury and pain, : Negative      

      for injury, bleeding, discharge, and swelling, Skin: Negative for injury, rash, and         

      discoloration, Neuro: Negative for headache, weakness, numbness, tingling, and seizure.     

18:24 MS/extremity: Positive for decreased range of motion, pain, tenderness, of the left         

      knee.                                                                                       

                                                                                                  

Exam:                                                                                             

18:24 Constitutional:  This is a well developed, well nourished patient who is awake, alert,  ragini 

      and in no acute distress. Head/Face:  Normocephalic, atraumatic. Eyes:  Pupils equal        

      round and reactive to light, extra-ocular motions intact.  Lids and lashes normal.          

      Conjunctiva and sclera are non-icteric and not injected.  Cornea within normal limits.      

      Periorbital areas with no swelling, redness, or edema. ENT:  Nares patent. No nasal         

      discharge, no septal abnormalities noted.  Tympanic membranes are normal and external       

      auditory canals are clear.  Oropharynx with no redness, swelling, or masses, exudates,      

      or evidence of obstruction, uvula midline.  Mucous membranes moist. Neck:  Trachea          

      midline, no thyromegaly or masses palpated, and no cervical lymphadenopathy.  Supple,       

      full range of motion without nuchal rigidity, or vertebral point tenderness.  No            

      Meningismus. Chest/axilla:  Normal chest wall appearance and motion.  Nontender with no     

      deformity.  No lesions are appreciated. Cardiovascular:  Regular rate and rhythm with a     

      normal S1 and S2.  No gallops, murmurs, or rubs.  Normal PMI, no JVD.  No pulse             

      deficits. Respiratory:  Lungs have equal breath sounds bilaterally, clear to                

      auscultation and percussion.  No rales, rhonchi or wheezes noted.  No increased work of     

      breathing, no retractions or nasal flaring. Abdomen/GI:  Soft, non-tender, with normal      

      bowel sounds.  No distension or tympany.  No guarding or rebound.  No evidence of           

      tenderness throughout. Back:  No spinal tenderness.  No costovertebral tenderness.          

      Full range of motion. Skin:  Warm, dry with normal turgor.  Normal color with no            

      rashes, no lesions, and no evidence of cellulitis. Neuro:  Awake and alert, GCS 15,         

      oriented to person, place, time, and situation.  Cranial nerves II-XII grossly intact.      

      Motor strength 5/5 in all extremities.  Sensory grossly intact.  Cerebellar exam            

      normal.  Normal gait. Psych:  Awake, alert, with orientation to person, place and time.     

       Behavior, mood, and affect are within normal limits.                                       

18:24 Musculoskeletal/extremity: ROM: limited active range of motion due to pain, limited         

      passive range of motion due to pain, in the left knee, Circulation is intact in all         

      extremities. Sensation intact. Compartment Syndrome exam of affected extremity: is          

      normal. DVT Exam: no swelling, negative Homans' sign noted on exam, no appreciated          

      bluish discoloration, no erythema, no increased warmth, pain, tenderness.                   

18:34 ECG was reviewed by the Attending Physician.                                            ragini 

18:35 ECG was reviewed by the Attending Physician.                                            ragini 

19:16 Musculoskeletal/extremity:  no laceration, negative lachman, stable.                    [facility] 

                                                                                                  

Vital Signs:                                                                                      

17:42  / 61; Pulse 68; Resp 18; Temp 97.1; Pulse Ox 100% ; Weight 107.95 kg; Height 5   mb9 

      ft. 3 in. (160.02 cm); Pain 10/10;                                                          

18:50  / 72; Pulse 68; Resp 18; Pulse Ox 95% on R/A; Pain 5/10;                         mb9 

19:20  / 79; Pulse 69; Resp 19 S; Pulse Ox 95% on R/A;                                  ha1 

17:42 Body Mass Index 42.16 (107.95 kg, 160.02 cm)                                            mb9 

                                                                                                  

NIH Stroke Scale Scores:                                                                          

18:24 NIHSS Score: 0                                                                          ragini 

                                                                                                  

Procedures:                                                                                       

18:28 Peripheral line: by aseptic technique a peripheral line was placed in the left foot     ragini 

      vein.                                                                                       

                                                                                                  

MDM:                                                                                              

17:34 Patient medically screened.                                                             ragini 

18:28 Differential diagnosis: dislocation, closed fracture, contusion, tendonitis. Data       ragini 

      reviewed: vital signs, nurses notes, lab test result(s), EKG, radiologic studies, plain     

      films. Data reviewed: EMS record. Data interpreted: Cardiac monitor: rate is 68             

      beats/min, rhythm is regular, Pulse oximetry: on room air is 100 %. Test                    

      interpretation: by ED physician or midlevel provider: ECG, plain radiologic studies.        

      Counseling: I had a detailed discussion with the patient and/or guardian regarding: the     

      historical points, exam findings, and any diagnostic results supporting the                 

      discharge/admit diagnosis, lab results, radiology results, the need for outpatient          

      follow up, for definitive care, a cardiologist, a family practitioner.                      

                                                                                                  

                                                                                             

17:36 Order name: Basic Metabolic Panel; Complete Time: 18:58                                 [facility] 

                                                                                             

17:36 Order name: CBC with Diff; Complete Time: 18:58                                         [facility] 

                                                                                             

17:36 Order name: Type And Screen                                                                                                                                                          

17:36 Order name: PT-INR; Complete Time: 18:58                                                [facility] 

                                                                                             

18:15 Order name: Troponin High Sensitivity; Complete Time: 19:21                             [facility] 

                                                                                             

18:15 Order name: BNP; Complete Time: 19:21                                                   [facility] 

                                                                                             

17:36 Order name: Labs collected and sent; Complete Time: 18:21                               [facility] 

                                                                                             

17:36 Order name: Knee Left 3 View XRAY; Complete Time: 18:58                                 [facility] 

                                                                                             

17:36 Order name: Femur Right XRAY; Complete Time: 18:58                                      [facility] 

                                                                                             

18:15 Order name: EKG; Complete Time: 18:16                                                   ragini 

                                                                                             

18:15 Order name: EKG - Nurse/Tech; Complete Time: 18:35                                      [facility] 

                                                                                             

18:15 Order name: Knee Immobilizer; Complete Time: 19:51                                      [facility] 

                                                                                             

18:35 Order name: EKG - Nurse/Tech: repeat; Complete Time: 18:35                              aa5 

                                                                                                  

EC:34 Rate is 62 beats/min. Rhythm is regular. QRS Axis is Normal. SD interval is normal. QRS ragini 

      interval is normal. QT interval is normal. No Q waves. T waves are Normal. Clinical         

      impression: Abnormal EKG without significant change and No evidence of ischemia.            

      Interpreted by me. Reviewed by me.                                                          

18:35 Rate is 62 beats/min. Rhythm is regular. QRS Axis is Normal. SD interval is prolonged   ragini 

      at 238 msec. QRS interval is normal. QT interval is normal. No Q waves. T waves are         

      Normal. No ST changes noted. Clinical impression: Abnormal EKG without significant          

      change and No evidence of ischemia. Interpreted by me. Reviewed by me.                      

                                                                                                  

Administered Medications:                                                                         

18:21 Drug: NS 0.9% 500 ml Route: IV; Rate: bolus; Site: left jugular;                        mb9 

18:21 Drug: morphine 2 mg Route: IVP; Infused Over: 4 mins; Site: left jugular;               mb9 

18:21 Drug: Zofran (Ondansetron) 4 mg Route: IVP; Site: left jugular;                         mb9 

                                                                                                  

                                                                                                  

Disposition Summary:                                                                              

22 19:22                                                                                    

Discharge Ordered                                                                                 

      Location: Home                                                                          ragini 

      Problem: new                                                                            ragini 

      Symptoms: have improved                                                                 ragini 

      Condition: Stable                                                                       ragini 

      Diagnosis                                                                                   

        - Fall on same level, unspecified                                                     ragini 

        - Pain in left knee                                                                   ragini 

        - Obesity, unspecified                                                                ragini 

        - Weakness                                                                            ragini 

      Followup:                                                                               ragini 

        - With: Private Physician                                                                  

        - When: 2 - 3 days                                                                         

        - Reason: Recheck today's complaints, Continuance of care, Re-evaluation by your           

      physician                                                                                   

      Followup:                                                                               ragini 

        - With:                                                                                    

        - When: 2 - 3 days                                                                         

        - Reason: Recheck today's complaints, Re-evaluation by your physician                      

      Followup:                                                                               ragini 

        - With:                                                                                    

        - When: 2 - 3 days                                                                         

        - Reason: Recheck today's complaints, Re-evaluation by your physician                      

      Discharge Instructions:                                                                     

        - Discharge Summary Sheet                                                             ragini 

        - Joint Pain                                                                          ragini 

        - How to Use a Knee Brace                                                             ragini 

        - Musculoskeletal Pain                                                                ragini 

        - Obesity, Adult                                                                      ragini 

        - Acute Knee Pain, Adult                                                              ragini 

        - How to Use Cold Therapy, Easy-to-Read                                               ragini 

        - How to Use Cold Therapy                                                             ragini 

        - Joint Pain, Easy-to-Read                                                            ragini 

        - Obesity, Adult, Easy-to-Read                                                        ragini 

      Forms:                                                                                      

        - Medication Reconciliation Form                                                      ragini 

        - Thank You Letter                                                                    ragini 

        - Antibiotic Education                                                                ragini 

        - Prescription Opioid Use                                                             ragini 

        - SBAR form                                                                           ha1 

      Prescriptions:                                                                              

        - Tylenol-Codeine #3 300 mg-30 mg Oral                                                     

            - take 2 tablet by ORAL route every 6 hours; 20 tablet; Refills: 0, Product       ragini 

      Selection Permitted                                                                         

                                                                                                  

NIH Stroke Scale - NIH Stroke Score                                                               

Date: 2022                                                                                  

Time: 18:24                                                                                       

Total Score = 0                                                                                   

  1a. Level of Consciousness (LOC) - 0(Alert)                                                     

  1b. Level of Consciousness (LOC) (Month \T\ Age) - 0(Both)                                      

  1c. LOC Commands (Open \T\ Closes Eyes/) - 0(Both)                                          

   2. Best Gaze (Lateral Gaze Paresis) - 0(Normal)                                                

   3. Visual Field Loss - 0(No visual loss)                                                       

   4. Facial Palsy - 0(Normal)                                                                    

  5a. Left Arm: Motor (10-second hold) - 0(No drift)                                              

  5b. Right Arm: Motor (10-second hold) - 0(No drift)                                             

  6a. Left Leg: Motor (5-second hold - always test supine) - 0(No drift)                          

  6b. Right Leg: Motor (5-second hold - always test supine) - 0(No drift)                         

   7. Limb Ataxia (finger/nose \T\ heel/shin - test with eyes open) - 0(Absent)                   

   8. Sensory Loss (pinprick arms/legs/face) - 0(Normal)                                          

   9. Best Language: Aphasia (description/naming/reading) - 0(No aphasia)                         

  10. Dysarthria (speech clarity - read or repeat words) - 0(Normal)                              

  11. Extinction and Inattention (visual/tactile/auditory/spatial/personal) - 0(No                

      abnormality)                                                                                

Initials: ragini                                                                                     

                                                                                                  

Signatures:                                                                                       

Dispatcher [employer]                           EDOg Morris MD MD cha Calderon, Audri, RN                     RN   aa5                                                  

Ana Bean, RN                 RN   mb9                                                  

                                                                                                  

**************************************************************************************************

## 2022-12-14 NOTE — RAD REPORT
EXAM DESCRIPTION:  RAD - Femur Right - 12/14/2022 6:00 pm

 

CLINICAL HISTORY:  Leg pain

 

FINDINGS:  No fracture is seen.

 

No bony abnormality is displayed

## 2022-12-14 NOTE — RAD REPORT
EXAM DESCRIPTION:  RAD - Knee Left 3 View - 12/14/2022 6:00 pm

 

CLINICAL HISTORY:   Left knee pain

 

FINDINGS:  A left knee arthroplasty without evidence of loosening.

 

No fracture or dislocation

 

Appears to be defect within the anterior subcutaneous tissues. This may be a real finding or secondar
y to positioning

## 2022-12-14 NOTE — XMS REPORT
Continuity of Care Document

                          Created on:2022



Patient:LEONA PERES

Sex:Female

:1940

External Reference #:[id number]





Demographics







                          Address                   [address] 



                                                    [place] [address]

 

                          Home Phone                [phone]

 

                          Work Phone                [phone]

 

                          Mobile Phone              [phone]

 

                          Email Address             [email or web address]

 

                          Preferred Language        English

 

                          Marital Status            Unknown

 

                          Restoration Affiliation     Unknown

 

                          Race                      Unknown

 

                          Additional Race(s)        Unavailable



                                                    White

 

                          Ethnic Group              Unknown









Author







                          Organization              [facility]

t

 

                          Address                   [address]



                                                    [place] [address]

 

                          Phone                     [phone]









Support







                Name            Relationship    Address         Phone

 

                BLAINE PERES UN              [id number] [facility]    [phone]



                                                [place] [address] 

 

                GIVEN, NONE     Unavailable     [phone]



                                                [place] [address] 

 

                Lydia Valentin   Child           Unavailable     [phone]

 

                ValentinMaida Other           Unavailable     [phone]

 

                Leona Peres Unavailable     [address] [facility] [phone]



                                                [place] [address] 

 

                Blaine Peres Spouse          [address]   [phone]



                                                [place] [address] 

 

                Kody Peres Child           Unavailable     [phone]









Care Team Providers







                    Name                Role                Phone

 

                    Kendall Barger MD    Primary Care Physician [phone]

 

                    Javier Mcwilliams       Attending Clinician Unavailable

 

                    Rebecca Cerrato       Attending Clinician Unavailable

 

                    [id number]              Attending Clinician Unavailable

 

                    Steven Cannon Attending Clinician [phone]

 

                    Doctor Unassigned, No Name Attending Clinician Unavailable

 

                    Aftab Urrutia  Attending Clinician [phone]

 

                    OLEKSANDR EVANS   Attending Clinician Unavailable

 

                    Oleksandr Evans MD Attending Clinician [phone]

 

                    Ellis Mcadams MD     Attending Clinician [phone]

 

                    Kendall Barger     Admitting Clinician Unavailable

 

                    [id number]              Admitting Clinician Unavailable









Payers







           Payer Name Policy Type Policy Number Effective Date Expiration Date S

ource







Problems







       Condition Condition Condition Status Onset  Resolution Last   Treating Co

mments 

Source



       Name   Details Category        Date   Date   Treatment Clinician        



                                                 Date                 

 

       At risk  At risk Problem Active 2022               Me

moria



       for falls for falls               -          13:42:50               l



       (finding) (finding)               00:00:                             Herm

carla



              Active               00                                 



              2017                                                  



              Problem                                                  



              2022                                                  



              [facility]                                                  



              Neuro                                                   

 

       Hallucinat  Hallucina Problem Active 2022            

   [facility]



       ions   tions                5-          13:42:50               l



       (finding) (finding)               00:00:                             Herm

carla



              Active               00                                 



              2017                                                  



              Problem                                                  



              2022                                                  



              [facility]                                                  



              Neuro                                                   

 

       Morbid Morbid Disease Active 2016                             [facility]



       obesity obesity               2-20                               ity of



       with body with body               00:00:                             [place]

s



       mass index mass index               00                                 Me

dical



       of 50 or of 50 or                                                  Branch



       higher higher                                                  

 

       Morbid Morbid Disease Active 2016                             [facility]



       obesity obesity               2-20                               ity of



       with body with body               00:00:                             [place]

s



       mass index mass index               00                                 Me

dical



       of     of                                                      Branch



       40.0-49.9 40.0-49.9                                                  

 

       Paresthesi  Paresthes Problem Active 2022            

   [facility]



       a      ia                   4-20          13:42:50               l



       (finding) (finding)               00:00:                             Herm

carla



              Active               00                                 



              2016                                                  



              Problem                                                  



              2022                                                  



               [facility]                                                  



              Neuro                                                   

 

       Essential        Problem Active 2022               Me

ozzie



       hypertensi Essential               2-16          13:42:50               l



       on     hypertensi               00:00:                             [place] padilla



       (disorder) on                   00                                 



              (disorder)                                                  



              Active                                                  



              2015                                                  



              Problem                                                  



              2022                                                  



               [facility]                                                  



              Neuro                                                   

 

       Essential  Essential Problem Active 2022             

  [facility]



       tremor tremor               2-16          13:42:50               l



       (disorder) (disorder)               00:00:                             Tom vela



              Active               00                                 



              2015                                                  



              Problem                                                  



              2022                                                  



              [facility]                                                  



              Neuro                                                   

 

       [id number] Status Problem                                           Common



              post total                                                  [facility]



              replacemen                                                  - [facility]



              t of right                                                  [facility]

 

       [id number] Pain,  Problem                                           Commo

n



       [id number] joint,                                                  [facility]



              shoulder,                                                  - [facility]



              right                                                   [facility]

 

       [id number] Pain,  Problem                                           Commo

n



       [id number] joint,                                                  [facility]



              ankle,                                                  - [facility]



              right                                                   [facility]

 

       [id number] Closed Problem                                           Common



              Colles'                                                  [facility]



              fracture                                                  - [facility]



              of left                                                  St



              radius,                                                  [facility]



              initial                                                  Medical



              encounter                                                  Center

 

       [id number] Status Problem                                           Commo

n



       105    post left                                                  [facility]



              knee                                                    - [facility]



              replacemen                                                  [facility]

 

       [id number] Closed Problem                                           Common



              displaced                                                  [facility]



              fracture                                                  - [facility]



              of lateral                                                  [facility]



              of left                                                  Medical



              fibula,                                                  Center



              initial                                                  



              encounter                                                  

 

       [id number] Acute pain Problem                                           Com

mon



              of right                                                  [facility]



              shoulder                                                  - [facility]



                                                                      [facility]

 

       Carpal  Carpal Problem Active               2022               Vinicius

remi



       tunnel tunnel                             13:42:50               l



       syndrome syndrome                                                  [place]

n



       (disorder) (disorder)                                                  



              Active                                                  



              Problem                                                  



              2022                                                  



              [facility]                                                  



              Neuro                                                   

 

       Dementia   Dementia Problem Active               2022              

 [facility]



       (disorder) (disorder)                             13:42:50               

l



              Active                                                  Brien



              Problem                                                  



              2022                                                  



               [facility]                                                  



              Neuro                                                   

 

       Gout    Gout  Problem Active               2022               Memor

ia



       (disorder) (disorder)                             13:42:50               

l



              Active                                                  [place]



              Problem                                                  



              2022                                                  



              [facility]                                                  



              Neuro                                                   

 

       Hyperlipid  Hyperlipi Problem Active               2022            

   [facility]



       emia   demia                              13:42:50               l



       (disorder) (disorder)                                                  He

rmann



              Active                                                  



              Problem                                                  



              2022                                                  



              [facility]                                                  



              Neuro                                                   

 

       Depressive        Problem Active               2022               M

emoria



       disorder Depressive                             13:42:50               l



       (disorder) disorder                                                  Herm

carla



              (disorder)                                                  



              Active                                                  



              Problem                                                  



              2022                                                  



               [facility]                                                  



              Neuro                                                   

 

       Fibromyalg  Fibromyal Problem Active               2022            

   [facility]



       ia     marlen                                13:42:50               l



       (disorder) (disorder)                                                  He

rmann



              Active                                                  



              Problem                                                  



              2022                                                  



              [facility]                                                  



              Neuro                                                   

 

       Concussion  Concussio Problem Active               2022            

   [facility]



       injury of n injury                             13:42:50               l



       body   of body                                                  [place]



       structure structure                                                  



       (disorder) (disorder)                                                  



              Active                                                  



              Problem                                                  



              2022                                                  



              [facility]                                                  



              Neuro                                                   

 

       Polymyalgi  Polymyalg Problem Active               2022            

   [facility]



       a      ia                                 13:42:50               l



       rheumatica rheumatica                                                  He

rmann



       (disorder) (disorder)                                                  



              Active                                                  



              Problem                                                  



              2022                                                  



              [facility]                                                  



              Neuro                                                   

 

       Recurrent        Problem Active               2022               Me

moria



       falls  Recurrent                             13:42:50               l



       (finding) falls                                                   [place]



              (finding)                                                  



              Active                                                  



              Problem                                                  



              2022                                                  



               [facility]                                                  



              Neuro                                                   

 

       Transient  Transient Problem Active               2022             

  [facility]



       ischemic ischemic                             13:42:50               l



       attack attack                                                  [place]



       (disorder) (disorder)                                                  



              Active                                                  



              Problem                                                  



              2022                                                  



              [facility]                                                  



              Neuro                                                   

 

       Syncope  Syncope Problem Active               2022               Me

moria



       (disorder) (disorder)                             13:42:50               

l



              Active                                                  [place]



              Problem                                                  



              2022                                                  



              [facility]                                                  



              Neuro                                                   

 

       Headache  Headache Problem Active               2022               

[facility]



       (finding) (finding)                             13:42:50               l



              Active                                                  [place]



              Problem                                                  



              2022                                                  



               [facility]                                                  



              Neuro                                                   

 

       Tremor  Tremor Problem Resolve 2017-0 2022-06-10 2022-06-10              

 [facility]



       (finding) (finding)        d      -   04:45:19 04:45:19               

l



              Resolved               00:00:                             [place]



              2017               00                                 



              Problem                                                  



              06/10/2022                                                  



              [facility]                                                  



              Neuro                                                   

 

       Amnesia  Amnesia Problem Resolve 2015-1 2022-06-10 2022-06-10            

   [facility]



       (finding) (finding)        d      -   04:45:19 04:45:19               

l



              Resolved               00:00:                             [place]



              2015               00                                 



              Problem                                                  



              06/10/2022                                                  



              [facility]                                                  



              Neuro                                                   







Allergies, Adverse Reactions, Alerts







       Allergy Allergy Status Severity Reaction(s) Onset  Inactive Treating Comm

ents 

Source



       Name   Type                        Date   Date   Clinician        

 

       Penicill Propensi Active        Unknown 2020-0                      [facility]o

di



       ins    ty to                Reaction 6-30                        st



              adverse                      00:00:                      [facility]



              reaction                      00                          l



              s to                                                    



              drug                                                    

 

       Acetamin Propensi Active        Unknown - 2020-0                      Uni

vers



       ophen  ty to                See comments 6-30                        ity 

of



              adverse                      00:00:                      [place]



              reaction                      00                          Medical



              s                                                       Branch

 

       Propoxyp Propensi Active        Unknown - 2020-0                      Uni

vers



       hene   ty to                See comments 6-30                        ity 

of



       N-Acetam adverse                      00:00:                      [place]



       inophen reaction                      00                          Medical



              s                                                       Branch

 

       Clindamy Propensi Active        Unknown 2020-0                      M[facility]o

di



       jayna    ty to                Reaction 6-30                        st



       Phosphat adverse                      00:00:                      [facility]



       e      reaction                      00                          l



              s to                                                    



              drug                                                    

 

       Propoxyp Propensi Active        Unknown 2020-0                      Metho

di



       hene   ty to                Reaction 6-30                        st



       N-Acetam adverse                      00:00:                      [facility]



       inophen reaction                      00                          l



              s to                                                    



              drug                                                    

 

       Penicill Propensi Active        Unknown 2020-0                      M[facility]o

di



       ins    ty to                Reaction 6-30                        st



              adverse                      00:00:                      [facility]



              reaction                      00                          l



              s to                                                    



              drug                                                    

 

       Sulfa  Propensi Active        Unknown 2020-0                      Methodi



       (Sulfona ty to                Reaction 6-30                        st



       mide   adverse                      00:00:                      [facility]



       Antibiot reaction                      00                          l



       ics)   s to                                                    



              drug                                                    

 

       Acetamin Propensi Active        Unknown 2020-0                      M[facility]o

di



       ophen  ty to                Reaction 6-30                        st



              adverse                      00:00:                      [facility]osp[facility]



              reaction                      00                          l



              s to                                                    



              drug                                                    

 

       Clindamy Propensi Active        Rash   2016                      Un[facility]

s



       jayna Hcl ty to                       2-19                        ity of



              adverse                      00:00:                      [place]



              reaction                      00                          Medical



              s                                                       Branch

 

       Penicill Propensi Active        Anaphylaxis 2016                      U

nivers



       in     ty to                       2-19                        ity of



              adverse                      00:00:                      [place]



              reaction                      00                          Medical



              s                                                       Branch

 

       Sulfa  Propensi Active        Rash   2016                      [facility]



       (Sulfona ty to                       2-19                        ity of



       mide   adverse                      00:00:                      [place]



       Antibiot reaction                      00                          Medica

l



       ics)   s                                                       Branch

 

       Sulfa  Propensi Active        Rash   2016                      [facility]



       (Sulfona ty to                       2-19                        ity of



       mide   adverse                      00:00:                      [place]



       Antibiot reaction                      00                          Medica

l



       ics)   s                                                       Branch

 

       sulfa  sulfa  Active                                           [facility]



       drugs  drugs                                                   l



                                                                      [facility]

 

       penicill penicill Active                                           [facility]

a



       ins    ins                                                     l



                                                                      [facility]

 

       6296   Drug   Active        Unknown                             [facility]



              allergy                                                  [facility]

 

       clindamy clindamy Active        Unknown                             Commo

n



       jayna    jayna                                                     [facility]

 

       penicill penicill Active        Unknown                             Commo

n



       in G   in G                                                    [facility]







Family History







           Family Member Diagnosis  Comments   Start Date Stop Date  Source

 

           Natural mother Asthma                                      [facility]

 

           Natural father Diabetes                                    [facility]

 

           [facility]                                  [facility]

[facility]

 

           Natural father Hypertension                                  [facility]







Social History







           Social Habit Start Date Stop Date  Quantity   Comments   Source

 

           History of Tobacco                                             [facility]

 [facility]



           Use                                                    [facility]

 

           History SDOH                                             [facility]



           Alcohol Std Drinks                                             Hospit

al

 

           History SDOH                                             [facility]



           Alcohol Binge                                             [facility]

 

           Social History 2022                       [facility]

abel



                      22:13:19   22:13:19                         

 

           Exposure to 2022 Not sure              [facility] 



           SARS-CoV-2 (event) 00:00:00   14:26:00                         [facility]

 

           Cigarettes smoked 2020                       [facility]

st



           current (pack per 00:00:00   00:00:00                         Hospita

l



           day) - Reported                                             

 

           Cigarette  2020                       [facility]



           pack-years 00:00:00   00:00:00                         Hospital

 

           Alcohol intake 2020 Lifetime              [facility]



                      00:00:00   00:00:00   non-drinker            Hospital



                                            (finding)             

 

           History SDOH 2020 1                     [facility]



           Alcohol Frequency 00:00:00   00:00:00                         Hospita

l

 

           Tobacco use and 2020 Smokeless             [facility]



           exposure   00:00:00   00:00:00   tobacco non-user            Hospital









                Smoking Status  Start Date      Stop Date       Source

 

                Tobacco smoking status 2022 21:14:10 2022 21:14:10 M

guy Tesfaye

 

                Never Smoker                                    [facility] 

[facility]

nter







Medications







       Ordered Filled Start  Stop   Current Ordering Indication Dosage Frequency

 Signature

                    Comments            Components          Source



     Medication Medication Date Date Medication? Clinician                (SIG) 

          



     Name Name                                                   

 

     Bupivicaine Bupivicaine 0      No             2.5mg                   

  Common



     Hydro Hydro 8-10                                              [facility]



               00:00:                                              - [facility]



                                                               [facility]

 

     Kenalog Kenalog 0      No             40mg                     [facility]



     (Triamcinol (Triamcinol 8-10                                              S

pirit



     one) one) 00:00:                                              - [facility]



                                                               [facility]

 

     Bupivicaine Bupivicaine -0      No             2.5mg                   

  Common



     Hydro Hydro 8-10                                              [facility]



               00:00:                                              - [facility]



                                                               [facility]

 

     Kenalog Kenalog -0      No             40mg                     [facility]



     (Triamcinol (Triamcinol 8-10                                              S

pirit



     one) one) 00:00:                                              - [facility]



                                                               [facility]

 

     Bupivicaine Bupivicaine -0      No             2.5mg                   

  Common



     Hydro Hydro 8-10                                              [facility]



               00:00:                                              - [facility]



                                                               [facility]

 

     Kenalog Kenalog -0      No             40mg                     [facility]



     (Triamcinol (Triamcinol 8-10                                              S

pirit



     one) one) 00:00:                                              - [facility]



                                                               [facility]

 

     topiramate      -0      Yes                      100 mg = 1           M

[facility]



     100 mg oral      5-19                               tab, PO,           l



     tablet      23:43:                               BID, # 180           [facility]

nn



               00                                 tab, 2           



                                                  Refill(s),           



                                                  Pharmacy:           



                                                  [facility],           



                                                  154.94,           



                                                  cm,            



                                                  08/10/21           



                                                  15:33:00           



                                                  CDT,           



                                                  Height,           



                                                  93.75, kg,           



                                                  08/10/21           



                                                  15:33:00           



                                                  CDT,           



                                                  Weight           

 

     topiramate      -0      Yes                      100 mg = 1           M

[facility]



     100 mg oral      5-19                               tab, PO,           l



     tablet      23:43:                               BID, # 180           [facility]

nn



               00                                 tab, 2           



                                                  Refill(s),           



                                                  Pharmacy:           



                                                  [facility],           



                                                  154.94,           



                                                  cm,            



                                                  08/10/21           



                                                  15:33:00           



                                                  CDT,           



                                                  Height,           



                                                  93.75, kg,           



                                                  08/10/21           



                                                  15:33:00           



                                                  CDT,           



                                                  Weight           

 

     memantine            Yes                      = 1 tab,           [facility]

remi



     10 mg oral      5-19                               PO, BID, #           l



     tablet      23:42:                               180 tab, 2           [facility]

nn



               00                                 Refill(s),           



                                                  Pharmacy:           



                                                  [facility],           



                                                  154.94,           



                                                  cm,            



                                                  08/10/21           



                                                  15:33:00           



                                                  CDT,           



                                                  Height,           



                                                  93.75, kg,           



                                                  08/10/21           



                                                  15:33:00           



                                                  CDT,           



                                                  Weight           

 

     memantine            Yes                      = 1 tab,           [facility]

remi



     10 mg oral      5-19                               PO, BID, #           l



     tablet      23:42:                               180 tab, 2           [facility]

nn



               00                                 Refill(s),           



                                                  Pharmacy:           



                                                  [facility],           



                                                  154.94,           



                                                  cm,            



                                                  08/10/21           



                                                  15:33:00           



                                                  CDT,           



                                                  Height,           



                                                  93.75, kg,           



                                                  08/10/21           



                                                  15:33:00           



                                                  CDT,           



                                                  Weight           

 

     Flecainide            No                       50 mg = 1           Me

[facility]



     Acetate 50      1-04                               tab, PO,           l



     MG Oral      22:34:                               Q12H, #           [facility]



     Tablet      00                                 180 tab, 0           



     [Tambocor]                                         Refill(s)           

 

     Flecainide            No                       50 mg = 1           Me

[facility]



     Acetate 50      1-04                               tab, PO,           l



     MG Oral      22:34:                               Q12H, #           [facility]



     Tablet      00                                 180 tab, 0           



     [Tambocor]                                         Refill(s)           

 

     apixaban 5            Yes                      5 mg, PO,           Me

moria



     MG Oral      1-04                               Q12H, tab,           l



     Tablet      22:33:                               0              [facility]



     [Eliquis]      00                                 Refill(s)           

 

     Furosemide            Yes                      20 mg = 1           Me

moria



     20 MG Oral      1-04                               tab, PO,           l



     Tablet      22:33:                               Daily, #           [facility]



     [Lasix]      00                                 30 tab, 0           



                                                  Refill(s)           

 

     quetiapine            Yes                      100 mg = 1           M

[facility]



     100 MG Oral      1-04                               tab, PO,           l



     Tablet      22:33:                               Daily, 0           [facility]



     [Seroquel]      00                                 Refill(s)           

 

     apixaban 5            Yes                      5 mg, PO,           Me

moria



     MG Oral      1-04                               Q12H, tab,           l



     Tablet      22:33:                               0              [facility]



     [Eliquis]      00                                 Refill(s)           

 

     Furosemide            Yes                      20 mg = 1           Me

moria



     20 MG Oral      1-04                               tab, PO,           l



     Tablet      22:33:                               Daily, #           [facility]



     [Lasix]      00                                 30 tab, 0           



                                                  Refill(s)           

 

     quetiapine            Yes                      100 mg = 1           M

[facility]



     100 MG Oral      1-04                               tab, PO,           l



     Tablet      22:33:                               Daily, 0           [facility]



     [Seroquel]      00                                 Refill(s)           

 

     Eliquis 5            Yes                      5 mg, PO,           Mem

oria



     mg oral      1-04                               Q12H, tab,           l



     tablet      22:33:                               0              [facility]



               00                                 Refill(s)           

 

     SEROquel            Yes                      See            [facility]



     100 mg oral      04                               Instructio           l



     tablet      22:33:                               ns,            [facility]



               00                                 tab PO           



                                                  Daily, 0           



                                                  Refill(s)           

 

     Levofloxaci            Yes                      See            [facility]

a



     n 750 MG      1-04                               In[facility]           l



     Oral Tablet      22:32:                               ns, 2 tab           H

ermann



     [Levaquin]      00                                 PO Q24H, 0           



                                                  Refill(s)           

 

     Levofloxaci            Yes                      See            [facility]

a



     n 750 MG      -04                               [facility]           l



     Oral Tablet      22:32:                               ns, 2 tab           H

ermann



     [Levaquin]      00                                 PO Q24H, 0           



                                                  Refill(s)           

 

     topiramate      2021      Yes                      100 mg = 1           M

[facility]



     100 mg oral      2-10                               tab, PO,           l



     tablet      17:37:                               BID, # 180           [facility]

nn



               00                                 tab, 2           



                                                  Refill(s),           



                                                  154.94,           



                                                  cm,            



                                                  08/10/21           



                                                  15:33:00           



                                                  CDT,           



                                                  Height,           



                                                  93.75, kg,           



                                                  08/10/21           



                                                  15:33:00           



                                                  CDT,           



                                                  Weight           

 

     topiramate      2021      Yes                      100 mg = 1           M

[facility]



     100 mg oral      2-10                               tab, PO,           l



     tablet      17:37:                               BID, # 180           [facility]

nn



               00                                 tab, 2           



                                                  Refill(s),           



                                                  154.94,           



                                                  cm,            



                                                  08/10/21           



                                                  15:33:00           



                                                  CDT,           



                                                  Height,           



                                                  93.75, kg,           



                                                  08/10/21           



                                                  15:33:00           



                                                  CDT,           



                                                  Weight           

 

     montelukast      2021      Yes                      10 mg = 1           M

[facility]



     10 mg oral      2-10                               tab, PO,           l



     tablet      17:27:                               Bedtime, #           [facility]

nn



               00                                 90 tab, 1           



                                                  Refill(s)           

 

     montelukast      2021      Yes                      10 mg = 1           M

[facility]



     10 mg oral      2-10                               tab, PO,           l



     tablet      17:27:                               Bedtime, #           [facility]

nn



               00                                 90 tab, 1           



                                                  Refill(s)           

 

     Trelegy      2021      Yes                      = 1            [facility]



     Ellipta 100      2-10                               inhalation           l



     mcg-62.5      17:26:                               , PO,           [facility]



     mcg-25      00                                 Daily, # 1           



     mcg/inh                                         ea, 0           



     inhalation                                         Refill(s)           



     powder                                                        

 

     levothyroxi      2021      Yes                      75             [facility]

a



     ne 75 mcg      2-10                               microgram           l



     (0.075 mg)      17:26:                               = 1 tab,           Her

hess



     oral tablet      00                                 PO, Daily,           



                                                  # 90 tab,           



                                                  1              



                                                  Refill(s)           

 

     Trelegy      2021      Yes                      = 1            [facility]



     Ellipta 100      2-10                               inhalation           l



     mcg-62.5      17:26:                               , PO,           [facility]



     mcg-25      00                                 Daily, # 1           



     mcg/inh                                         ea, 0           



     inhalation                                         Refill(s)           



     powder                                                        

 

     levothyroxi      2021      Yes                      75             [facility]

a



     ne 75 mcg      2-10                               microgram           l



     (0.075 mg)      17:26:                               = 1 tab,           Her

hess



     oral tablet      00                                 PO, Daily,           



                                                  # 90 tab,           



                                                  1              



                                                  Refill(s)           

 

     flecainide      2021      Yes                      50 mg = 1           Me

[facility]



     50 mg oral      2-10                               tab, PO,           l



     tablet      17:25:                               Q12H, #           [facility]



               00                                 180 tab, 3           



                                                  Refill(s)           

 

     flecainide      2021      Yes                      50 mg = 1           Me

moria



     50 mg oral      2-10                               tab, PO,           l



     tablet      17:25:                               Q12H, #           [facility]



               00                                 180 tab, 3           



                                                  Refill(s)           

 

     buPROPion            Yes                                     [facility]



      mg      8-26                                              ity of



     24 hr      00:00:                                              [place]



     tablet      00                                                Medical



                                                                 [facility]

 

     buPROPion            Yes                                     [facility]



      mg      8-26                                              ity of



     24 hr      00:00:                                              [place]



     tablet      00                                                [facility]



                                                                 [facility]

 

     buPROPion            Yes                                     [facility]



      mg      8-26                                              [facility]



     24 hr      00:00:                                              [place]



     tablet      00                                                [facility]



                                                                 [facility]anch

 

     buPROPion      0      Yes                                     [facility]



      mg      8-26                                              ity of



     24 hr      00:00:                                              [place]



     tablet      00                                                [facility]



                                                                 [facility]anch

 

     buPROPion      0      Yes                                     [facility]



      mg      8-26                                              [facility] of



     24 hr      00:00:                                              [place]



     tablet      00                                                [facility]



                                                                 Branch

 

     ELIQUIS 5            Yes            5mg       Take 5 mg           [facility]

[facility]



     mg tablet      8-22                               by mouth 2           [facility] 

of



               00:00:                               (two)           [place]



               00                                 times           Medical



                                                  daily.           Branch

 

     benzonatate            Yes                      TAKE 1           [facility]

ers



     100 mg      8-22                               CAPSULE           ity of



     capsule      00:00:                               (100 MG           [place]



               00                                 TOTAL) BY           Medical



                                                  MOUTH 3           Branch



                                                  (THREE)           



                                                  TIMES           



                                                  DAILY AS           



                                                  NEEDED FOR           



                                                  COUGH FOR           



                                                  UP TO 7           



                                                  DAYS.           

 

     ELIQUIS 5            Yes            5mg       Take 5 mg           [facility]

[facility]



     mg tablet      8-22                               by mouth 2           ity 

of



               00:00:                               (two)           [place]



               00                                 times           Medical



                                                  daily.           Branch

 

     benzonatate            Yes                      TAKE 1           [facility]

ers



     100 mg      8-22                               CAPSULE           ity of



     capsule      00:00:                               (100 MG           [place]



               00                                 TOTAL) BY           Medical



                                                  MOUTH 3           Branch



                                                  (THREE)           



                                                  TIMES           



                                                  DAILY AS           



                                                  NEEDED FOR           



                                                  COUGH FOR           



                                                  UP TO 7           



                                                  DAYS.           

 

     ELIQUIS 5            Yes            5mg       Take 5 mg           [facility]

[facility]



     mg tablet      8-22                               by mouth 2           ity 

of



               00:00:                               (two)           [place]



               00                                 times           Medical



                                                  daily.           Branch

 

     benzonatate            Yes                      TAKE 1           [facility]

ers



     100 mg      8-22                               CAPSULE           ity of



     capsule      00:00:                               (100 MG           [place]



               00                                 TOTAL) BY           Medical



                                                  MOUTH 3           Branch



                                                  (THREE)           



                                                  TIMES           



                                                  DAILY AS           



                                                  NEEDED FOR           



                                                  COUGH FOR           



                                                  UP TO 7           



                                                  DAYS.           

 

     ELIQUIS 5            Yes            5mg       Take 5 mg           [facility]

[facility]



     mg tablet      8-22                               by mouth 2           ity 

of



               00:00:                               (two)           [place]



               00                                 times           Medical



                                                  daily.           Branch

 

     benzonatate            Yes                      TAKE 1           [facility]

ers



     100 mg      8-22                               CAPSULE           ity of



     capsule      00:00:                               (100 MG           [place]



               00                                 TOTAL) BY           Medical



                                                  MOUTH 3           Branch



                                                  (THREE)           



                                                  TIMES           



                                                  DAILY AS           



                                                  NEEDED FOR           



                                                  COUGH FOR           



                                                  UP TO 7           



                                                  DAYS.           

 

     ELIQUIS 5      -      Yes            5mg       Take 5 mg           [facility]

vers



     mg tablet      8-22                               by mouth 2           ity 

of



               00:00:                               (two)           [place]



               00                                 times           Medical



                                                  daily.           Branch

 

     benzonatate            Yes                      TAKE 1           [facility]

ers



     100 mg      8-22                               CAPSULE           ity of



     capsule      00:00:                               (100 MG           [place]



               00                                 TOTAL) BY           Medical



                                                  MOUTH 3           Branch



                                                  (THREE)           



                                                  TIMES           



                                                  DAILY AS           



                                                  NEEDED FOR           



                                                  COUGH FOR           



                                                  UP TO 7           



                                                  DAYS.           

 

     ALPRAZolam      0      Yes                                     [facility]



     0.25 mg      8-02                                              ity of



     tablet      00:00:                                              [place]



               00                                                [facility]



                                                                 [facility]anch

 

     ALPRAZolam      0      Yes                                     [facility]



     0.25 mg      8-02                                              ity of



     tablet      00:00:                                              [place]



               00                                                Medical



                                                                 Branch

 

     ALPRAZolam      0      Yes                                     [facility]



     0.25 mg      8-02                                              ity of



     tablet      00:00:                                              [place]



               00                                                [facility]



                                                                 Branch

 

     ALPRAZolam      0      Yes                                     [facility]



     0.25 mg      8-02                                              ity of



     tablet      00:00:                                              [place]



               00                                                [facility]



                                                                 Branch

 

     ALPRAZolam      0      Yes                                     [facility]



     0.25 mg      8-02                                              ity of



     tablet      00:00:                                              [place]



               00                                                [facility]



                                                                 Branch

 

     clopidogrel      0      Yes                      75 mg = 1           [facility]

[facility]



     75 MG Oral      5-10                               tab, PO,           l



     Tablet      19:06:                               Daily, [facility]



     [Plavix]      00                                 30 tab, 3           



                                                  Refill(s),           



                                                  Pharmacy:           



                                                  [facility],           



                                                  [id number],           



                                                  cm,            



                                                  05/10/21           



                                                  13:30:00           



                                                  CDT,           



                                                  Height,           



                                                  93.636,           



                                                  kg,            



                                                  05/10/21           



                                                  13:30:00           



                                                  CDT,           



                                                  Weight           

 

     clopidogrel            Yes                      75 mg = 1           M

[facility]



     75 MG Oral      5-10                               tab, PO,           l



     Tablet      19:06:                               Daily, [facility]



     [Plavix]      00                                 30 tab, 3           



                                                  Refill(s),           



                                                  Pharmacy:           



                                                  [facility],           



                                                  [id number],           



                                                  cm,            



                                                  05/10/21           



                                                  13:30:00           



                                                  CDT,           



                                                  Height,           



                                                  93.636,           



                                                  kg,            



                                                  05/10/21           



                                                  13:30:00           



                                                  CDT,           



                                                  Weight           

 

     donepezil      0      Yes                      10 mg = 1           [facility]

[facility]



     10 mg oral      4-26                               tab, PO,           l



     tablet      17:32:                               Daily, [facility]



               00                                 90 tab, 1           



                                                  Refill(s),           



                                                  Pharmacy:           



                                                  [facility] [facility]           



                                                  cy #[id number],           



                                                  157.48,           



                                                  cm,            



                                                  21           



                                                  14:30:00           



                                                  CST,           



                                                  Height,           



                                                  92.727,           



                                                  kg,            



                                                  21           



                                                  13:26:00           



                                                  CDT,           



                                                  Weight           

 

     donepezil      -0      Yes                      10 mg = 1           [facility]

[facility]



     10 mg oral      3-09                               tab, PO,           l



     tablet      20:52:                               Bedtime, #           [facility]

[facility]



               00                                 90 tab, 2           



                                                  Refill(s),           



                                                  157.48,           



                                                  cm,            



                                                  21           



                                                  14:30:00           



                                                  CST,           



                                                  Height,           



                                                  91.818,           



                                                  kg,            



                                                  21           



                                                  14:30:00           



                                                  CST,           



                                                  Weight           

 

     memantine      -0      Yes                      = 1 tab,           [facility]

[facility]



     10 mg oral      3-09                               PO, BID, #           l



     tablet      20:52:                               180 tab, 2           [facility]

[facility]



               00                                 Refill(s),           



                                                  157.48,           



                                                  cm,            



                                                  21           



                                                  14:30:00           



                                                  CST,           



                                                  Height,           



                                                  91.818,           



                                                  kg,            



                                                  21           



                                                  14:30:00           



                                                  CST,           



                                                  Weight           

 

     topiramate      -0      Yes                      200 mg = 1           [facility]

[facility]



     200 mg oral      3-09                               tab, PO,           l



     tablet      20:52:                               BID, # 180           [facility]

[facility]



               00                                 tab, 1           



                                                  Refill(s),           



                                                  157.48,           



                                                  cm,            



                                                  21           



                                                  14:30:00           



                                                  CST,           



                                                  Height,           



                                                  91.818,           



                                                  kg,            



                                                  21           



                                                  14:30:00           



                                                  CST,           



                                                  Weight           

 

     donepezil      -0      Yes                      10 mg = 1           [facility]

[facility]



     10 mg oral      3-09                               tab, PO,           l



     tablet      20:52:                               Bedtime, #           [facility]

[facility]



               00                                 90 tab, 2           



                                                  Refill(s),           



                                                  157.48,           



                                                  cm,            



                                                  21           



                                                  14:30:00           



                                                  CST,           



                                                  Height,           



                                                  91.818,           



                                                  kg,            



                                                  21           



                                                  14:30:00           



                                                  CST,           



                                                  Weight           

 

     memantine      -0      Yes                      = 1 tab,           [facility]

[facility]



     10 mg oral      3-09                               PO, BID, #           l



     tablet      20:52:                               180 tab, 2           [facility]

[facility]



               00                                 Refill(s),           



                                                  157.48,           



                                                  cm,            



                                                  21           



                                                  14:30:00           



                                                  CST,           



                                                  Height,           



                                                  91.818,           



                                                  kg,            



                                                  21           



                                                  14:30:00           



                                                  CST,           



                                                  Weight           

 

     topiramate      -0      Yes                      200 mg = 1           [facility]

[facility]



     200 mg oral      3-09                               tab, PO,           l



     tablet      20:52:                               BID, # 180           [facility]

[facility]



               00                                 tab, 1           



                                                  Refill(s),           



                                                  157.48,           



                                                  cm,            



                                                  21           



                                                  14:30:00           



                                                  CST,           



                                                  Height,           



                                                  91.818,           



                                                  kg,            



                                                  21           



                                                  14:30:00           



                                                  CST,           



                                                  Weight           

 

     Zolpidem      0      Yes                      10 mg = 1           [facility]

[facility]



     tartrate 10      3-09                               tab, PO,           l



     MG Oral      20:41:                               Bedtime,           [place]

n



     Tablet      00                                 PRN for           



     [Ambien]                                         sleep, 0           



                                                  Refill(s)           

 

     Zolpidem      0      Yes                      10 mg = 1           [facility]

[facility]



     tartrate 10      3-09                               tab, PO,           l



     MG Oral      20:41:                               Bedtime,           [place]

n



     Tablet      00                                 PRN for           



     [Ambien]                                         sleep, 0           



                                                  Refill(s)           

 

     topiramate      2020      Yes                      200 mg = 1           [facility]

[facility]



     200 mg oral      2-09                               tab, PO,           l



     tablet      20:49:                               BID, # 180           [facility]

[facility]



               00                                 tab, 1           



                                                  Refill(s),           



                                                  Pharmacy:           



                                                  [facility]           



                                                  cy #[id number],           



                                                  160.02,           



                                                  cm,            



                                                  20           



                                                  14:19:00           



                                                  CST,           



                                                  Height,           



                                                  93.636,           



                                                  kg,            



                                                  20           



                                                  14:19:00           



                                                  CST,           



                                                  Weight           

 

     topiramate      2020      Yes                      200 mg = 1           [facility]

[facility]



     200 mg oral      2-09                               tab, PO,           l



     tablet      20:49:                               BID, # 180           [facility]

nn



               00                                 tab, 1           



                                                  Refill(s),           



                                                  Pharmacy:           



                                                  [facility]           



                                                  [facility] #[id number],           



                                                  160.02,           



                                                  cm,            



                                                  20           



                                                  14:19:00           



                                                  CST,           



                                                  Height,           



                                                  93.636,           



                                                  kg,            



                                                  20           



                                                  14:19:00           



                                                  CST,           



                                                  Weight           

 

     donepezil      2020      Yes                      10 mg = 1           [facility]

[facility]



     10 mg oral      1-09                               tab, PO,           l



     tablet      21:19:                               Bedtime, #           [facility]

nn



               00                                 90 tab, 2           



                                                  Refill(s),           



                                                  Pharmacy:           



                                                  [facility]           



                                                  cy #[id number],           



                                                  160.02,           



                                                  cm,            



                                                  20           



                                                  14:33:00           



                                                  CST,           



                                                  Height,           



                                                  93.636,           



                                                  kg,            



                                                  20           



                                                  14:33:00           



                                                  CST,           



                                                  Weight           

 

     donepezil      2020      Yes                      10 mg = 1           [facility]

[facility]



     10 mg oral      1-09                               tab, PO,           l



     tablet      21:19:                               Bedtime, #           [facility]

nn



               00                                 90 tab, 2           



                                                  Refill(s),           



                                                  Pharmacy:           



                                                  [facility]           



                                                  cy #[id number],           



                                                  160.02,           



                                                  cm,            



                                                  20           



                                                  14:33:00           



                                                  CST,           



                                                  Height,           



                                                  93.636,           



                                                  kg,            



                                                  20           



                                                  14:33:00           



                                                  CST,           



                                                  Weight           

 

     tizanidine      2020-0      Yes                      4 mg = 1           [facility]

[facility]



     4 mg oral      8-07                               tab, PO,           l



     tablet      18:57:                               Daily, 0           [facility]



               00                                 Refill(s)           

 

     Ellura      2020-0      Yes                      0              [facility]



               8-07                               Refill(s)           l



               18:57:                                              [facility]



               00                                                

 

     Ellura      2020-0      Yes                      0              [facility]



               8-07                               Refill(s)           l



               18:57:                                              [facility]



               00                                                

 

     tizanidine      2020-0      Yes                      4 mg = 1           [facility]

[facility]



     4 mg oral      8-07                               tab, PO,           l



     tablet      18:57:                               Daily, 0           [facility]



               00                                 Refill(s)           

 

     predniSONE      2020-0      Yes                      5 mg = 1           [facility]

[facility]



     5 mg oral      8-06                               tab, PO,           l



     tablet      20:23:                               Daily, #           [facility]



               00                                 30 tab, 2           



                                                  Refill(s),           



                                                  Pharmacy:           



                                                  [facility]           



                                                  cy #[id number],           



                                                  160.02,           



                                                  cm,            



                                                  20           



                                                  14:46:00           



                                                  CDT,           



                                                  Height,           



                                                  95.455,           



                                                  kg,            



                                                  20           



                                                  14:46:00           



                                                  CDT,           



                                                  Weight           

 

     predniSONE      2020-0      Yes                      5 mg = 1           [facility]

[facility]



     5 mg oral      8-06                               tab, PO,           l



     tablet      20:23:                               Daily, #           [facility]



               00                                 30 tab, 2           



                                                  Refill(s),           



                                                  Pharmacy:           



                                                  [facility]           



                                                  cy #[id number],           



                                                  160.02,           



                                                  cm,            



                                                  20           



                                                  14:46:00           



                                                  CDT,           



                                                  Height,           



                                                  95.455,           



                                                  kg,            



                                                  20           



                                                  14:46:00           



                                                  CDT,           



                                                  Weight           

 

     predniSONE      2020-0      Yes                      5 mg = 1           [facility]

[facility]



     5 mg oral      6-03                               tab, PO,           l



     tablet      19:17:                               Daily, #           [facility]



               00                                 30 tab, 2           



                                                  Refill(s),           



                                                  Pharmacy:           



                                                  [facility]           



                                                  cy #[id number]           

 

     predniSONE      2020-0      Yes                      5 mg = 1           [facility]

[facility]



     5 mg oral      6-03                               tab, PO,           l



     tablet      19:17:                               Daily, #           [facility]



               00                                 30 tab, 2           



                                                  Refill(s),           



                                                  Pharmacy:           



                                                  [facility]           



                                                  cy #[id number]           

 

     Acetaminoph      2020-0      Yes                      1 tab, PO,           

[facility]



     en 325 MG /      6-03                               Q8H, PRN           l



     Hydrocodone      18:41:                               Pain, # 90           

[facility]



     Bitartrate      00                                 tab, 0           



     7.5 MG Oral                                         Refill(s)           



     Tablet                                                        



     [Norco                                                        



     7.5/325]                                                        

 

     methocarbam      2020-0      Yes                      750 mg = 1           

[facility]



     ol 750 mg      6-03                               tab, PO,           l



     oral tablet      18:41:                               Q8H, PRN           [facility]

[facility]



               00                                 Spasms, #           



                                                  60 tab, 0           



                                                  Refill(s)           

 

     predniSONE      -0      No                       10 mg = 1           [facility]

[facility]



     10 mg oral      6-03                               tab, PO,           l



     tablet      18:41:                               Daily, 0           [facility]



               00                                 Refill(s)           

 

     Acetaminoph      2020-0      Yes                      1 tab, PO,           

[facility]



     en 325 MG /      6-03                               Q8H, PRN           l



     Hydrocodone      18:41:                               Pain, # 90           

[facility]



     Bitartrate      00                                 tab, 0           



     7.5 MG Oral                                         Refill(s)           



     Tablet                                                        



     [Norco                                                        



     7.5/325]                                                        

 

     methocarbam      2020-0      Yes                      750 mg = 1           

[facility]



     ol 750 mg      6-03                               tab, PO,           l



     oral tablet      18:41:                               Q8H, PRN           [facility]

[facility]



               00                                 Spasms, #           



                                                  60 tab, 0           



                                                  Refill(s)           

 

     predniSONE      -0      No                       10 mg = 1           [facility]

[facility]



     10 mg oral      6-03                               tab, PO,           l



     tablet      18:41:                               Daily, 0           [facility]



               00                                 Refill(s)           

 

     24 HR      -0      Yes                      150 mg = 1           [facility]

a



     Bupropion      3-16                               tab, PO,           l



     Hydrochlori      15:38:                               Daily, #           [facility]

[facility]



     de 150 MG      00                                 30 tab, 2           



     Extended                                         Refill(s),           



     Release                                         Pharmacy:           



     Tablet                                         [facility]           



     [Wellbutrin                                         [facility] #[id number]           



     ]                                                           

 

     24 HR      -0      Yes                      150 mg = 1           [facility]

a



     Bupropion      3-16                               tab, PO,           l



     Hydrochlori      15:38:                               Daily, #           [facility]

[facility]



     de 150 MG      00                                 30 tab, 2           



     Extended                                         Refill(s),           



     Release                                         Pharmacy:           



     Tablet                                         [facility]           



     [Wellbutrin                                         cy #[id number]           



     ]                                                           

 

     Wellbutrin      -0      Yes                      300 mg = 2           [facility]

[facility]



           3-16                               tab, PO,           l



     mg/24 hours      15:38:                               Daily, #           [facility]

[facility]



     oral      00                                 60 tab, 2           



     tablet,                                         Refill(s),           



     extended                                         Pharmacy:           



     release                                         [facility]           



                                                  cy #[id number]           

 

     predniSONE      -0      Yes                      10 mg = 1           [facility]

[facility]



     10 mg oral      3-03                               tab, PO,           l



     tablet      21:30:                               Daily, X           [facility]



               00                                 30 day, #           



                                                  30 tab, 1           



                                                  Refill(s),           



                                                  Pharmacy:           



                                                  [facility]           



                                                  PHARMACY           



                                                  #[id number]           

 

     predniSONE      2020-0      Yes                      10 mg = 1           [facility]

[facility]



     10 mg oral      3-03                               tab, PO,           l



     tablet      21:30:                               Daily, X           [facility]



               00                                 30 day, #           



                                                  30 tab, 1           



                                                  Refill(s),           



                                                  Pharmacy:           



                                                  [facility]           



                                                  PHARMACY           



                                                  #[id number]           

 

     predniSONE      2020-0      No                       20 mg = 1           [facility]

[facility]



     20 mg oral      3-03                               tab, PO,           l



     tablet      20:38:                               BID,           [facility]                                 Quantity           



                                                  sufficient           



                                                  , X 30           



                                                  day, # 60           



                                                  tab, 1           



                                                  Refill(s),           



                                                  Pharmacy:           



                                                  [facility]           



                                                  PHARMACY           



                                                  #[id number]           

 

     predniSONE      2020-0      No                       20 mg = 1           [facility]

[facility]



     20 mg oral      3-03                               tab, PO,           l



     tablet      20:38:                               BID,           [facility]                                 Quantity           



                                                  sufficient           



                                                  , X 30           



                                                  day, # 60           



                                                  tab, 1           



                                                  Refill(s),           



                                                  Pharmacy:           



                                                  [facility]           



                                                  PHARMACY           



                                                  #[id number]           

 

     predniSONE      2020-0      No                       20 mg = 1           [facility]

[facility]



     20 mg oral      3-03                               tab, PO,           l



     tablet      20:31:                               Daily,           [facility]                                 Quantity           



                                                  sufficient           



                                                  , X 30           



                                                  day, # 30           



                                                  tab, 1           



                                                  Refill(s),           



                                                  Pharmacy:           



                                                  [facility] #[id number]           

 

     predniSONE      2020-0      No                       20 mg = 1           [facility]

[facility]



     20 mg oral      3-03                               tab, PO,           l



     tablet      20:31:                               Daily,           [facility]                                 Quantity           



                                                  sufficient           



                                                  , X 30           



                                                  day, # 30           



                                                  tab, 1           



                                                  Refill(s),           



                                                  Pharmacy:           



                                                  [facility] #[id number]           

 

     Prednisone      2020-0      No                       50 mg, PO,           [facility]

[facility]



               3-03                               Daily,           l



               20:08:                               Quantity           [facility]



               00                                 sufficient           



                                                  , 0            



                                                  Refill(s)           

 

     Prednisone      2020-0      No                       50 mg, PO,           [facility]

[facility]



               3-03                               Daily,           l



               20:08:                               Quantity           [facility]



               00                                 sufficient           



                                                  , 0            



                                                  Refill(s)           

 

     donepezil 5      2020-0      Yes                      = 1 tab,           [facility]

[facility]



     mg oral      2-12                               PO,            l



     tablet      17:10:                               Bedtime, #           [facility]

nn



               00                                 90 tab, 3           



                                                  Refill(s),           



                                                  Pharmacy:           



                                                  [facility][id number]           

 

     donepezil 5      2020-0      Yes                      = 1 tab,           [facility]

[facility]



     mg oral      2-12                               PO,            l



     tablet      17:10:                               Bedtime, #           [facility]

nn



               00                                 90 tab, 3           



                                                  Refill(s),           



                                                  Pharmacy:           



                                                  [facility]           



                                                  cy #[id number]           

 

     spironolact      2019      Yes                      25 mg = 1           [facility]

[facility]



     one 25 mg      2-03                               tab, PO,           l



     oral tablet      20:03:                               BID, 0           [facility]

[facility]



               00                                 Refill(s)           

 

     sucralfate      2019      Yes                      1 gm = 1           [facility]

[facility]



     1 g oral      2-03                               tab, PO,           l



     tablet      20:03:                               Daily, 0           [facility]



               00                                 Refill(s)           

 

     24 HR      2019      Yes                      150 mg = 1           [facility]

a



     Bupropion      2-03                               tab, PO,           l



     Hydrochlori      20:03:                               Q24H, 0           [facility]

[facility]



     de 150 MG      00                                 Refill(s)           



     Extended                                                        



     Release                                                        



     Tablet                                                        

 

     spironolact      2019      Yes                      25 mg = 1           [facility]

[facility]



     one 25 mg      2-03                               tab, PO,           l



     oral tablet      20:03:                               BID, 0           [facility]

[facility]



               00                                 Refill(s)           

 

     sucralfate      2019      Yes                      1 gm = 1           [facility]

[facility]



     1 g oral      2-03                               tab, PO,           l



     tablet      20:03:                               Daily, 0           [facility]



               00                                 Refill(s)           

 

     24 HR      2019      Yes                      150 mg = 1           [facility]

a



     Bupropion      2-03                               tab, PO,           l



     Hydrochlori      20:03:                               Q24H, 0           [facility]

[facility]



     de 150 MG      00                                 Refill(s)           



     Extended                                                        



     Release                                                        



     Tablet                                                        

 

     24 HR      2019      Yes                      150 mg = 1           [facility]

a



     Bupropion      2-03                               tab, PO,           l



     Hydrochlori      19:59:                               Daily, #           [facility]

[facility]



     de 150 MG      00                                 30 tab, 1           



     Extended                                         Refill(s),           



     Release                                         Pharmacy:           



     Tablet                                         [facility]           



     [Wellbutrin                                         cy #[id number]           



     ]                                                           

 

     24 HR      2019      Yes                      150 mg = 1           [facility]

a



     Bupropion      2-03                               tab, PO,           l



     Hydrochlori      19:59:                               Daily, #           He

rmann



     de 150 MG      00                                 30 tab, 1           



     Extended                                         Refill(s),           



     Release                                         Pharmacy:           



     Tablet                                         [facility]           



     [Wellbutrin                                         cy #[id number]           



     ]                                                           

 

     Nitrofurant      2019      Yes                      50 mg, PO,           

[facility]



     oin       2-03                               Daily, #           l



               19:52:                               14 cap, 0           [facility]



               00                                 Refill(s)           

 

     Nitrofurant      2019      Yes                      50 mg, PO,           

[facility]



     oin       2-03                               Daily, #           l



               19:52:                               14 cap, 0           [facility]



               00                                 Refill(s)           

 

     Donepezil            Yes                      5 mg = 1           [facility]

remi



     hydrochlori      9-03                               tab, PO,           l



     de 5 MG      20:02:                               Bedtime, #           [facility]

carla



     Oral Tablet      00                                 30 tab, 3           



     [Aricept]                                         Refill(s),           



                                                  [facility]           



                                                  [facility]           



                                                   #[id number]           

 

     Donepezil            Yes                      5 mg = 1           [facility]

remi



     hydrochlori      9-03                               tab, PO,           l



     de 5 MG      20:02:                               Bedtime, #           [facility]

carla



     Oral Tablet      00                                 30 tab, 3           



     [Aricept]                                         Refill(s),           



                                                  [facility]arm[facility]           



                                                  [facility]           



                                                   #[id number]           

 

     potassium            Yes                      20 mEq = 1           Me

moria



     chloride 20      9-03                               tab, PO,           l



     mEq oral      19:33:                               BID, # 10           [facility]

carla



     tablet,      00                                 tab, 0           



     extended                                         Refill(s)           



     release                                                        

 

     potassium            Yes                      20 mEq = 1           Me

moria



     chloride 20      9-03                               tab, PO,           l



     mEq oral      19:33:                               BID, # 10           [facility]

carla



     tablet,      00                                 tab, 0           



     extended                                         Refill(s)           



     release                                                        

 

     gabapentin            Yes                      300 mg = 1           M

emoria



     300 mg oral      4-27                               cap, PO,           l



     capsule      19:12:                               TID, # 90           [facility]

nn



               00                                 cap, 1           



                                                  Refill(s)           

 

     metoprolol            Yes                      50 mg = 1           Me

moria



     tartrate 50      4-27                               tab, PO,           l



     mg oral      19:12:                               Daily, #           [place]

n



     tablet      00                                 180 tab, 0           



                                                  Refill(s)           

 

     Cymbalta 60            Yes                      60 mg = 1           M

emoria



     mg oral      2-26                               cap, PO,           l



     delayed      20:23:                               BID, 0           [facility]



     release      00                                 Refill(s)           



     capsule                                                        

 

     Zocor 40 mg            Yes                      40 mg = 1           M

emoria



     oral tablet      2-26                               tab, PO,           l



               20:23:                               Bedtime, 0           [facility]



               00                                 Refill(s)           

 

     calcium      2016      Yes            2{tbl}      Take 2           Univer

s



     carb and      2-28                               tablets by           ity o

f



     citrate-vit      11:30:                               mouth           [place]



     D3        04                                 daily.           Medical



     (CITRACAL +                                                        Branch



     D SLOW                                                        



     RELEASE)                                                        



     600 mg                                                        



     calcium-                                                        



     500 unit                                                        



     TbSR                                                        

 

     DULoxetine      2016      Yes            60mg      Take 60 mg           U

nivers



     (CYMBALTA)      2-28                               by mouth           ity o

f



     60 mg      11:30:                               daily.           [place]



     capsule      04                                                Medical



                                                                 Branch

 

     indapamide      2016      Yes            2.5mg      Take 2.5           Un

bob



     (LOZOL) 2.5      2-28                               mg by           ity of



     mg tablet      11:30:                               mouth           [place]



               04                                 daily.           Medical



                                                                 Branch

 

     Vit C-Vit      2016      Yes            1{capsu      Take 1           Uni

vers



     E-Lutein-Mi      2-28                     le}       capsule by           it

y of



     n-OM-3      11:30:                               mouth           [place]



     (OCUVITE)      04                                 daily.           Medical



     150-30-5-15                                                        Branch



     0                                                           



     mg-unit-mg-                                                        



     mg Cap                                                        

 

     multivitami      2016      Yes            1{tbl}      Take 1           Un

bob



     n         2-28                               tablet by           ity of



     (ONE-A-DAY      11:30:                               mouth           [place]



     ESSENTIAL)      04                                 daily.           Medical



     tablet                                                        Branch

 

     FERROUS      2016      Yes            1{tbl}      Take 1           Univer

s



     FUMARATE/VI      2-28                               tablet by           ity

 of



     T BCOMP,C      11:30:                               mouth           [place]



     (SUPER B      04                                 daily.           Medical



     COMPLEX                                                        Branch



     ORAL)                                                        

 

     ascorbic      2016      Yes            500mg      Take 500           Univ

ers



     acid,      2-28                               mg by           ity of



     vitamin C,      11:30:                               mouth           [place]



     (VITAMIN C)      04                                 daily.           Medica

l



     500 mg                                                        Branch



     tablet                                                        

 

     Cholecalcif      2016      Yes            1{tbl}      Take 1           Un

bob



     edith,      2-28                               tablet by           ity of



     Vitamin D3,      11:30:                               mouth           [place]



     (VITAMIN      04                                 daily.           Medical



     D3) 5,000                                                        Branch



     unit tablet                                                        

 

     QUEtiapine      2016      Yes            50mg      Take 50 mg           U

nivers



     (SEROQUEL)      2-28                               by mouth           ity o

f



     50 mg      11:30:                               daily.           [place]



     tablet      04                                                Medical



                                                                 Branch

 

     simvastatin      2016      Yes            40mg      Take 40 mg           

[facility]



     (ZOCOR) 40      2-28                               by mouth           ity o

f



     mg tablet      11:30:                               at             [place]



               04                                 bedtime.           Medical



                                                                 Branch

 

     topiramate      2016      Yes            200mg      Take 200           Un

bob



     (TOPAMAX)      2-28                               mg by           ity of



     100 mg      11:30:                               mouth           [place]



     tablet      04                                 every           Medical



                                                  evening.           Branch

 

     KCL       2016      Yes            10meq      Take 10           [facility]



     (KLOR-CON      2-28                               mEq by           ity of



     M10) 10 mEq      11:30:                               mouth 2           [facility]

as



     tablet      04                                 (two)           Medical



                                                  times           Branch



                                                  daily.           

 

     pregabalin      2016      Yes            225mg      Take 225           Un

bob



     (LYRICA)      2-28                               mg by           ity of



     225 mg      11:30:                               mouth 2           [place]



     capsule      04                                 (two)           Medical



                                                  times           Branch



                                                  daily.           

 

     memantine      2016      Yes            5mg       Take 5 mg           Uni

vers



     (NAMENDA) 5      2-28                               by mouth 2           it

y of



     mg tablet      11:30:                               (two)           [place]



               04                                 times           Medical



                                                  daily.           Branch

 

     metoprolol      2016      Yes            50mg      Take 50 mg           U

nivers



     tartrate      2-28                               by mouth 2           ity o

f



     (LOPRESSOR)      11:30:                               (two)           [place]



     50 mg      04                                 times           Medical



     tablet                                         daily.           Branch

 

     esomeprazol      2016      Yes            20mg      Take 20 mg           

[facility]



     e (NEXIUM)      2-28                               by mouth 2           ity

 of



     20 mg      11:30:                               (two)           [place]



     capsule      04                                 times           Medical



                                                  daily.           Branch

 

     LACTOBAC/BI      2016      Yes            1{tbl}      Take 1           Un

bob



     FIDOBAC/TYLER      2-28                               tablet by           ity

 of



     B MT CON      11:30:                               mouth 2           [place]



     (ULTRA      04                                 (two)           Medical



     PATRICK PLUS                                         times           Branch



     ORAL)                                         daily.           



                                                  Indication           



                                                  s:             



                                                  Ultimate           



                                                  [facility]           

 

     calcium      2016      Yes            2{tbl}      Take 2           Univer

s



     carb and      2-28                               tablets by           ity o

f



     citrate-vit      11:30:                               mouth           [place]



     D3        04                                 daily.           Medical



     (CITRACAL +                                                        Branch



     D SLOW                                                        



     RELEASE)                                                        



     600 mg                                                        



     calcium-                                                        



     500 unit                                                        



     TbSR                                                        

 

     DULoxetine      2016      Yes            60mg      Take 60 mg           U

nivers



     (CYMBALTA)      2-28                               by mouth           ity o

f



     60 mg      11:30:                               daily.           [place]



     capsule      04                                                Medical



                                                                 Branch

 

     indapamide      2016      Yes            2.5mg      Take 2.5           Un

bob



     (LOZOL) 2.5      2-28                               mg by           ity of



     mg tablet      11:30:                               mouth           [place]



               04                                 daily.           Medical



                                                                 Branch

 

     Vit C-Vit      2016      Yes            1{capsu      Take 1           Uni

vers



     E-Lutein-Mi      2-28                     le}       capsule by           it

y of



     n-OM-3      11:30:                               mouth           [place]



     (OCUVITE)      04                                 daily.           Medical



     150-30-5-15                                                        Branch



     0                                                           



     mg-unit-mg-                                                        



     mg Cap                                                        

 

     multivitami      2016      Yes            1{tbl}      Take 1           Un

bob



     n         2-28                               tablet by           ity of



     (ONE-A-DAY      11:30:                               mouth           [place]



     ESSENTIAL)      04                                 daily.           Medical



     tablet                                                        Branch

 

     FERROUS      2016      Yes            1{tbl}      Take 1           Univer

s



     FUMARATE/VI      2-28                               tablet by           ity

 of



     T BCOMP,C      11:30:                               mouth           [place]



     (SUPER B      04                                 daily.           Medical



     COMPLEX                                                        Branch



     ORAL)                                                        

 

     ascorbic      2016      Yes            500mg      Take 500           Univ

ers



     acid,      2-28                               mg by           ity of



     vitamin C,      11:30:                               mouth           [place]



     (VITAMIN C)      04                                 daily.           Medica

l



     500 mg                                                        Branch



     tablet                                                        

 

     Cholecalcif      2016      Yes            1{tbl}      Take 1           Un

bob



     edith,      2-28                               tablet by           ity of



     Vitamin D3,      11:30:                               mouth           [place]



     (VITAMIN      04                                 daily.           Medical



     D3) 5,000                                                        Branch



     unit tablet                                                        

 

     QUEtiapine      2016      Yes            50mg      Take 50 mg           U

nivers



     (SEROQUEL)      2-28                               by mouth           ity o

f



     50 mg      11:30:                               daily.           [place]



     tablet      04                                                Medical



                                                                 Branch

 

     simvastatin      2016      Yes            40mg      Take 40 mg           

[facility]



     (ZOCOR) 40      2-28                               by mouth           ity o

f



     mg tablet      11:30:                               at             [place]



               04                                 bedtime.           Medical



                                                                 Branch

 

     topiramate      2016      Yes            200mg      Take 200           Un

bob



     (TOPAMAX)      2-28                               mg by           ity of



     100 mg      11:30:                               mouth           [place]



     tablet      04                                 every           Medical



                                                  evening.           Branch

 

     KCL       2016      Yes            10meq      Take 10           [facility]



     (KLOR-CON      2-28                               mEq by           ity of



     M10) 10 mEq      11:30:                               mouth 2           [facility]

as



     tablet      04                                 (two)           Medical



                                                  times           Branch



                                                  daily.           

 

     pregabalin      2016      Yes            225mg      Take 225           Un

bob



     (LYRICA)      2-28                               mg by           ity of



     225 mg      11:30:                               mouth 2           [place]



     capsule      04                                 (two)           Medical



                                                  times           Branch



                                                  daily.           

 

     memantine      2016      Yes            5mg       Take 5 mg           Uni

vers



     (NAMENDA) 5      2-28                               by mouth 2           it

y of



     mg tablet      11:30:                               (two)           [place]



               04                                 times           Medical



                                                  daily.           Branch

 

     metoprolol      2016      Yes            50mg      Take 50 mg           U

nivers



     tartrate      2-28                               by mouth 2           ity o

f



     (LOPRESSOR)      11:30:                               (two)           [place]



     50 mg      04                                 times           Medical



     tablet                                         daily.           Branch

 

     esomeprazol      2016      Yes            20mg      Take 20 mg           

[facility]



     e (NEXIUM)      2-28                               by mouth 2           ity

 of



     20 mg      11:30:                               (two)           [place]



     capsule      04                                 times           Medical



                                                  daily.           Branch

 

     LACTOBAC/BI      2016      Yes            1{tbl}      Take 1           Un

bob



     FIDOBAC/TYLER      2-28                               tablet by           ity

 of



     B MT CON      11:30:                               mouth 2           [place]



     (ULTRA      04                                 (two)           Medical



     PATRICK PLUS                                         times           Branch



     ORAL)                                         daily.           



                                                  Indication           



                                                  s:             



                                                  Ultimate           



                                                  [facility]           

 

     calcium      2016      Yes            2{tbl}      Take 2           Univer

s



     carb and      2-28                               tablets by           ity o

f



     citrate-vit      11:30:                               mouth           [place]



     D3        04                                 daily.           Medical



     (CITRACAL +                                                        Branch



     D SLOW                                                        



     RELEASE)                                                        



     600 mg                                                        



     calcium-                                                        



     500 unit                                                        



     TbSR                                                        

 

     DULoxetine      2016      Yes            60mg      Take 60 mg           U

nivers



     (CYMBALTA)      2-28                               by mouth           ity o

f



     60 mg      11:30:                               daily.           [place]



     capsule      04                                                Medical



                                                                 Branch

 

     indapamide      2016      Yes            2.5mg      Take 2.5           Un

bob



     (LOZOL) 2.5      2-28                               mg by           ity of



     mg tablet      11:30:                               mouth           [place]



               04                                 daily.           Medical



                                                                 Branch

 

     Vit C-Vit      2016      Yes            1{capsu      Take 1           Uni

vers



     E-Lutein-Mi      2-28                     le}       capsule by           it

y of



     n-OM-3      11:30:                               mouth           [place]



     (OCUVITE)      04                                 daily.           Medical



     150-30-5-15                                                        Branch



     0                                                           



     mg-unit-mg-                                                        



     mg Cap                                                        

 

     multivitami      2016      Yes            1{tbl}      Take 1           Un

bob



     n         2-28                               tablet by           ity of



     (ONE-A-DAY      11:30:                               mouth           [place]



     ESSENTIAL)      04                                 daily.           Medical



     tablet                                                        Branch

 

     FERROUS      2016      Yes            1{tbl}      Take 1           Univer

s



     FUMARATE/VI      2-28                               tablet by           ity

 of



     T BCOMP,C      11:30:                               mouth           [place]



     (SUPER B      04                                 daily.           Medical



     COMPLEX                                                        Branch



     ORAL)                                                        

 

     ascorbic      2016      Yes            500mg      Take 500           Univ

ers



     acid,      2-28                               mg by           ity of



     vitamin C,      11:30:                               mouth           [place]



     (VITAMIN C)      04                                 daily.           Medica

l



     500 mg                                                        Branch



     tablet                                                        

 

     Cholecalcif      2016      Yes            1{tbl}      Take 1           Un

bob



     edith,      2-28                               tablet by           ity of



     Vitamin D3,      11:30:                               mouth           [place]



     (VITAMIN      04                                 daily.           Medical



     D3) 5,000                                                        Branch



     unit tablet                                                        

 

     QUEtiapine      2016      Yes            50mg      Take 50 mg           U

nivers



     (SEROQUEL)      2-28                               by mouth           ity o

f



     50 mg      11:30:                               daily.           [place]



     tablet      04                                                Medical



                                                                 Branch

 

     simvastatin      2016      Yes            40mg      Take 40 mg           

[facility]



     (ZOCOR) 40      2-28                               by mouth           ity o

f



     mg tablet      11:30:                               at             [place]



               04                                 bedtime.           Medical



                                                                 Branch

 

     topiramate      2016      Yes            200mg      Take 200           Un

bob



     (TOPAMAX)      2-28                               mg by           ity of



     100 mg      11:30:                               mouth           [place]



     tablet      04                                 every           Medical



                                                  evening.           Branch

 

     KCL       2016      Yes            10meq      Take 10           [facility]



     (KLOR-CON      2-28                               mEq by           ity of



     M10) 10 mEq      11:30:                               mouth 2           [facility]

[facility]



     tablet      04                                 (two)           Medical



                                                  times           Branch



                                                  daily.           

 

     pregabalin      2016      Yes            225mg      Take 225           Un

bob



     (LYRICA)      2-28                               mg by           ity of



     225 mg      11:30:                               mouth 2           [place]



     capsule      04                                 (two)           Medical



                                                  times           Branch



                                                  daily.           

 

     memantine      2016      Yes            5mg       Take 5 mg           Uni

vers



     (NAMENDA) 5      2-28                               by mouth 2           it

y of



     mg tablet      11:30:                               (two)           [place]



               04                                 times           Medical



                                                  daily.           Branch

 

     metoprolol      2016      Yes            50mg      Take 50 mg           U

nivers



     tartrate      2-28                               by mouth 2           ity o

f



     (LOPRESSOR)      11:30:                               (two)           [place]



     50 mg      04                                 times           Medical



     tablet                                         daily.           Branch

 

     esomeprazol      2016      Yes            20mg      Take 20 mg           

[facility]



     e (NEXIUM)      2-28                               by mouth 2           ity

 of



     20 mg      11:30:                               (two)           [place]



     capsule      04                                 times           Medical



                                                  daily.           Branch

 

     LACTOBAC/BI      2016      Yes            1{tbl}      Take 1           Un

bob



     FIDOBAC/TYLER      2-28                               tablet by           ity

 of



     B MT CON      11:30:                               mouth 2           [place]



     (ULTRA      04                                 (two)           Medical



     PATRICK PLUS                                         times           Branch



     ORAL)                                         daily.           



                                                  Indication           



                                                  s:             



                                                  Ultimate           



                                                  [facility]           

 

     calcium      2016      Yes            2{tbl}      Take 2           Univer

s



     carb and      2-28                               tablets by           ity o

f



     citrate-vit      11:30:                               mouth           [place]



     D3        04                                 daily.           Medical



     (CITRACAL +                                                        Branch



     D SLOW                                                        



     RELEASE)                                                        



     600 mg                                                        



     calcium-                                                        



     500 unit                                                        



     TbSR                                                        

 

     DULoxetine      2016      Yes            60mg      Take 60 mg           U

nivers



     (CYMBALTA)      2-28                               by mouth           ity o

f



     60 mg      11:30:                               daily.           [place]



     capsule      04                                                Medical



                                                                 Branch

 

     indapamide      2016      Yes            2.5mg      Take 2.5           Un

bob



     (LOZOL) 2.5      2-28                               mg by           ity of



     mg tablet      11:30:                               mouth           [place]



               04                                 daily.           Medical



                                                                 Branch

 

     Vit C-Vit      2016      Yes            1{capsu      Take 1           Uni

vers



     E-Lutein-Mi      2-28                     le}       capsule by           it

y of



     n-OM-3      11:30:                               mouth           [place]



     (OCUVITE)      04                                 daily.           Medical



     150-30-5-15                                                        Branch



     0                                                           



     mg-unit-mg-                                                        



     mg Cap                                                        

 

     multivitami      2016      Yes            1{tbl}      Take 1           Un

bob



     n         2-28                               tablet by           ity of



     (ONE-A-DAY      11:30:                               mouth           [place]



     ESSENTIAL)      04                                 daily.           Medical



     tablet                                                        Branch

 

     FERROUS      2016      Yes            1{tbl}      Take 1           Univer

s



     FUMARATE/VI      2-28                               tablet by           ity

 of



     T BCOMP,C      11:30:                               mouth           [place]



     (SUPER B      04                                 daily.           Medical



     COMPLEX                                                        Branch



     ORAL)                                                        

 

     ascorbic      2016      Yes            500mg      Take 500           Univ

ers



     acid,      2-28                               mg by           ity of



     vitamin C,      11:30:                               mouth           [place]



     (VITAMIN C)      04                                 daily.           Medica

l



     500 mg                                                        Br[facility]



     tablet                                                        

 

     Cholecalcif      2016      Yes            1{tbl}      Take 1           Un

bob



     edith,      2-28                               tablet by           ity of



     Vitamin D3,      11:30:                               mouth           [place]



     (VITAMIN      04                                 daily.           Medical



     D3) 5,000                                                        Branch



     unit tablet                                                        

 

     QUEtiapine      2016      Yes            50mg      Take 50 mg           U

nivers



     (SEROQUEL)      2-28                               by mouth           ity o

f



     50 mg      11:30:                               daily.           [place]



     tablet      04                                                Medical



                                                                 Branch

 

     simvastatin      2016      Yes            40mg      Take 40 mg           

[facility]



     (ZOCOR) 40      2-28                               by mouth           ity o

f



     mg tablet      11:30:                               at             [place]



               04                                 bedtime.           Medical



                                                                 Branch

 

     topiramate      2016      Yes            200mg      Take 200           Un

bob



     (TOPAMAX)      2-28                               mg by           ity of



     100 mg      11:30:                               mouth           [place]



     tablet      04                                 every           Medical



                                                  evening.           Branch

 

     KCL       2016      Yes            10meq      Take 10           [facility]



     (KLOR-CON      2-28                               mEq by           ity of



     M10) 10 mEq      11:30:                               mouth 2           [facility]

[facility]



     tablet      04                                 (two)           Medical



                                                  times           Branch



                                                  daily.           

 

     pregabalin      2016      Yes            225mg      Take 225           Un

bob



     (LYRICA)      2-28                               mg by           ity of



     225 mg      11:30:                               mouth 2           [place]



     capsule      04                                 (two)           Medical



                                                  times           Branch



                                                  daily.           

 

     memantine      2016      Yes            5mg       Take 5 mg           Uni

vers



     (NAMENDA) 5      2-28                               by mouth 2           it

y of



     mg tablet      11:30:                               (two)           [place]



               04                                 times           Medical



                                                  daily.           Branch

 

     metoprolol      2016      Yes            50mg      Take 50 mg           U

nivers



     tartrate      2-28                               by mouth 2           ity o

f



     (LOPRESSOR)      11:30:                               (two)           [place]



     50 mg      04                                 times           Medical



     tablet                                         daily.           Branch

 

     esomeprazol      2016      Yes            20mg      Take 20 mg           

[facility]



     e (NEXIUM)      2-28                               by mouth 2           ity

 of



     20 mg      11:30:                               (two)           [place]



     capsule      04                                 times           Medical



                                                  daily.           Branch

 

     LACTOBAC/BI      2016      Yes            1{tbl}      Take 1           Un

bob



     FIDOBAC/TYLER      2-28                               tablet by           ity

 of



     B MT CON      11:30:                               mouth 2           [place]



     (ULTRA      04                                 (two)           Medical



     PATRICK PLUS                                         times           Branch



     ORAL)                                         daily.           



                                                  Indication           



                                                  s:             



                                                  Ultimate           



                                                  [facility]           

 

     calcium      2016      Yes            2{tbl}      Take 2           Univer

s



     carb and      2-28                               tablets by           ity o

f



     citrate-vit      11:30:                               mouth           [place]



     D3        04                                 daily.           Medical



     (CITRACAL +                                                        Branch



     D SLOW                                                        



     RELEASE)                                                        



     600 mg                                                        



     calcium-                                                        



     500 unit                                                        



     TbSR                                                        

 

     DULoxetine      2016      Yes            60mg      Take 60 mg           U

nivers



     (CYMBALTA)      2-28                               by mouth           ity o

f



     60 mg      11:30:                               daily.           [place]



     capsule      04                                                Medical



                                                                 Branch

 

     indapamide      2016      Yes            2.5mg      Take 2.5           Un

bob



     (LOZOL) 2.5      2-28                               mg by           ity of



     mg tablet      11:30:                               mouth           [place]



               04                                 daily.           Medical



                                                                 Branch

 

     Vit C-Vit      2016      Yes            1{capsu      Take 1           Uni

vers



     E-Lutein-Mi      2-28                     le}       capsule by           it

y of



     n-OM-3      11:30:                               mouth           [place]



     (OCUVITE)      04                                 daily.           Medical



     150-30-5-15                                                        Branch



     0                                                           



     mg-unit-mg-                                                        



     mg Cap                                                        

 

     multivitami      2016      Yes            1{tbl}      Take 1           Un

bob



     n         2-28                               tablet by           ity of



     (ONE-A-DAY      11:30:                               mouth           [place]



     ESSENTIAL)      04                                 daily.           Medical



     tablet                                                        Branch

 

     FERROUS      2016      Yes            1{tbl}      Take 1           Univer

s



     FUMARATE/VI      2-28                               tablet by           ity

 of



     T BCOMP,C      11:30:                               mouth           [place]



     (SUPER B      04                                 daily.           Medical



     COMPLEX                                                        Branch



     ORAL)                                                        

 

     ascorbic      2016      Yes            500mg      Take 500           Univ

ers



     acid,      2-28                               mg by           ity of



     vitamin C,      11:30:                               mouth           [place]



     (VITAMIN C)      04                                 daily.           Medica

l



     500 mg                                                        [facility]



     tablet                                                        

 

     Cholecalcif      2016      Yes            1{tbl}      Take 1           Un

bob



     edith,      2-28                               tablet by           ity of



     Vitamin D3,      11:30:                               mouth           [place]



     (VITAMIN      04                                 daily.           Medical



     D3) 5,000                                                        Branch



     unit tablet                                                        

 

     QUEtiapine      2016      Yes            50mg      Take 50 mg           U

nivers



     (SEROQUEL)      2-28                               by mouth           ity o

f



     50 mg      11:30:                               daily.           [place]



     tablet      04                                                Medical



                                                                 Branch

 

     simvastatin      2016      Yes            40mg      Take 40 mg           

[facility]



     (ZOCOR) 40      2-28                               by mouth           ity o

f



     mg tablet      11:30:                               at             [place]



               04                                 bedtime.           Medical



                                                                 Branch

 

     topiramate      2016      Yes            200mg      Take 200           Un

bob



     (TOPAMAX)      2-28                               mg by           ity of



     100 mg      11:30:                               mouth           [place]



     tablet      04                                 every           Medical



                                                  evening.           Branch

 

     KCL       2016      Yes            10meq      Take 10           [facility]



     (KLOR-CON      2-28                               mEq by           ity of



     M10) 10 mEq      11:30:                               mouth 2           [facility]

as



     tablet      04                                 (two)           Medical



                                                  times           Branch



                                                  daily.           

 

     pregabalin      2016      Yes            225mg      Take 225           Un

bob



     (LYRICA)      2-28                               mg by           ity of



     225 mg      11:30:                               mouth 2           [place]



     capsule      04                                 (two)           Medical



                                                  times           Branch



                                                  daily.           

 

     memantine      2016      Yes            5mg       Take 5 mg           Uni

vers



     (NAMENDA) 5      2-28                               by mouth 2           it

y of



     mg tablet      11:30:                               (two)           [place]



               04                                 times           Medical



                                                  daily.           Branch

 

     metoprolol      2016      Yes            50mg      Take 50 mg           U

nivers



     tartrate      2-28                               by mouth 2           ity o

f



     (LOPRESSOR)      11:30:                               (two)           [place]



     50 mg      04                                 times           Medical



     tablet                                         daily.           Branch

 

     esomeprazol      2016      Yes            20mg      Take 20 mg           

[facility]



     e (NEXIUM)      2-28                               by mouth 2           ity

 of



     20 mg      11:30:                               (two)           [place]



     capsule      04                                 times           Medical



                                                  daily.           Branch

 

     LACTOBAC/BI      2016      Yes            1{tbl}      Take 1           Un

bob



     FIDOBAC/TYLER      2-28                               tablet by           ity

 of



     B MT CON      11:30:                               mouth 2           [place]



     (ULTRA      04                                 (two)           Medical



     PATRICK PLUS                                         times           Branch



     ORAL)                                         daily.           



                                                  Indication           



                                                  s:             



                                                  Ultimate           



                                                  [facility]           

 

     Nitrofurant Nitrofurant           No                       Nitrofuran      

     



     oin Monohyd oin Monohyd                                    toin           



     Macro Macro                                    Monohyd           



                                                  Macro           

 

     Simvastatin Simvastatin           No                       Simvastati      

     



                                                  n              

 

     Mupirocin Mupirocin           No                       Mupirocin           

 

     Zanaflex Zanaflex           No                       Zanaflex           

 

     buPROPion buPROPion           No                       buPROPion           



     HCl ER (XL) HCl ER (XL)                                    HCl ER          

 



                                                  (XL)           

 

     Potassium Potassium           No                       Potassium           



     Chloride Chloride                                    Chloride           



     Carol ER Carol ER                                    Carol ER           

 

     Gabapentin Gabapentin           No                       Gabapentin        

   

 

     Pantoprazol Pantoprazol           No                       Pantoprazo      

     



     e Sodium e Sodium                                    le Sodium           

 

     ALPRAZolam ALPRAZolam           No                       ALPRAZolam        

   

 

     Indapamide Indapamide           No                       Indapamide        

   

 

     QUEtiapine QUEtiapine           No                       QUEtiapine        

   



     Fumarate Fumarate                                    Fumarate           

 

     DULoxetine DULoxetine           No                       DULoxetine        

   



     HCl  HCl                                     HCl            

 

     Ciprofloxac Ciprofloxac           No                       Ciprofloxa      

     



     in HCl in HCl                                    jayna HCl           

 

     Memantine Memantine           No                       Memantine           



     HCl  HCl                                     HCl            

 

     Estradiol Estradiol           No                       Estradiol           

 

     NP Thyroid NP Thyroid           No                       NP Thyroid        

   

 

     Topiramate Topiramate           No                       Topiramate        

   

 

     Mupirocin Mupirocin           No                       Mupirocin           

 

     Pantoprazol Pantoprazol           No                       Pantoprazo      

     



     e Sodium e Sodium                                    le Sodium           

 

     QUEtiapine QUEtiapine           No                       QUEtiapine        

   



     Fumarate Fumarate                                    Fumarate           

 

     ALPRAZolam ALPRAZolam           No                       ALPRAZolam        

   

 

     Ciprofloxac Ciprofloxac           No                       Ciprofloxa      

     



     in HCl in HCl                                    jayna HCl           

 

     Meloxicam Meloxicam           No                       Meloxicam           

 

     Albuterol Albuterol           No                       Albuterol           

 

     traMADol traMADol           No                       traMADol           



     HCl  HCl                                     HCl            

 

     buPROPion buPROPion           No                       buPROPion           



     HCl ER (XL) HCl ER (XL)                                    HCl ER          

 



                                                  (XL)           

 

     tiZANidine tiZANidine           No                       tiZANidine        

   



     HCl  HCl                                     HCl            

 

     Simvastatin Simvastatin           No                       Simvastati      

     



                                                  n              

 

     Zanaflex Zanaflex           No                       Zanaflex           

 

     Nitrofurant Nitrofurant           No                       Nitrofuran      

     



     oin Monohyd oin Monohyd                                    toin           



     Macro Macro                                    Monohyd           



                                                  Macro           

 

     Zolpidem Zolpidem           No                       Zolpidem           



     Tartrate Tartrate                                    Tartrate           

 

     Memantine Memantine           No                       Memantine           



     HCl  HCl                                     HCl            

 

     Metoprolol Metoprolol           No                       Metoprolol        

   



     Succinate Succinate                                    Succinate           



     ER   ER                                      ER             

 

     Estradiol Estradiol           No                       Estradiol           

 

     NP Thyroid NP Thyroid           No                       NP Thyroid        

   

 

     Gabapentin Gabapentin           No                       Gabapentin        

   

 

     Donepezil Donepezil           No                       Donepezil           



     HCl  HCl                                     HCl            

 

     Topiramate Topiramate           No                       Topiramate        

   

 

     Indapamide Indapamide           No                       Indapamide        

   

 

     DULoxetine DULoxetine           No                       DULoxetine        

   



     HCl  HCl                                     HCl            

 

     Potassium Potassium           No                       Potassium           



     Chloride Chloride                                    Chloride           



     Carol ER Carol ER                                    Carol ER           

 

     HYDROcodone HYDROcodone           No                       HYDROcodon      

     



     -Acetaminop -Acetaminop                                    e-Acetamin      

     



     hen  hen                                     ophen           

 

     traMADol traMADol           No                       traMADol           



     HCl  HCl                                     HCl            

 

     Mupirocin Mupirocin           No                       Mupirocin           

 

     Pantoprazol Pantoprazol           No                       Pantoprazo      

     



     e Sodium e Sodium                                    le Sodium           

 

     QUEtiapine QUEtiapine           No                       QUEtiapine        

   



     Fumarate Fumarate                                    Fumarate           

 

     ALPRAZolam ALPRAZolam           No                       ALPRAZolam        

   

 

     Ciprofloxac Ciprofloxac           No                       Ciprofloxa      

     



     in HCl in HCl                                    jayna HCl           

 

     Meloxicam Meloxicam           No                       Meloxicam           

 

     Albuterol Albuterol           No                       Albuterol           

 

     traMADol traMADol           No                       traMADol           



     HCl  HCl                                     HCl            

 

     buPROPion buPROPion           No                       buPROPion           



     HCl ER (XL) HCl ER (XL)                                    HCl ER          

 



                                                  (XL)           

 

     tiZANidine tiZANidine           No                       tiZANidine        

   



     HCl  HCl                                     HCl            

 

     Simvastatin Simvastatin           No                       Simvastati      

     



                                                  n              

 

     Zanaflex Zanaflex           No                       Zanaflex           

 

     Nitrofurant Nitrofurant           No                       Nitrofuran      

     



     oin Monohyd oin Monohyd                                    toin           



     Macro Macro                                    Monohyd           



                                                  Macro           

 

     Zolpidem Zolpidem           No                       Zolpidem           



     Tartrate Tartrate                                    Tartrate           

 

     Memantine Memantine           No                       Memantine           



     HCl  HCl                                     HCl            

 

     Metoprolol Metoprolol           No                       Metoprolol        

   



     Succinate Succinate                                    Succinate           



     ER   ER                                      ER             

 

     Estradiol Estradiol           No                       Estradiol           

 

     NP Thyroid NP Thyroid           No                       NP Thyroid        

   

 

     Gabapentin Gabapentin           No                       Gabapentin        

   

 

     Donepezil Donepezil           No                       Donepezil           



     HCl  HCl                                     HCl            

 

     Topiramate Topiramate           No                       Topiramate        

   

 

     Indapamide Indapamide           No                       Indapamide        

   

 

     DULoxetine DULoxetine           No                       DULoxetine        

   



     HCl  HCl                                     HCl            

 

     Potassium Potassium           No                       Potassium           



     Chloride Chloride                                    Chloride           



     Carol ER Carol ER                                    Carol ER           

 

     HYDROcodone HYDROcodone           No                       HYDROcodon      

     



     -Acetaminop -Acetaminop                                    e-Acetamin      

     



     hen  hen                                     ophen           

 

     traMADol traMADol           No                       traMADol           



     HCl  HCl                                     HCl            

 

     traMADol traMADol           No                       traMADol           



     HCl  HCl                                     HCl            

 

     Potassium Potassium           No                       Potassium           



     Chloride Chloride                                    Chloride           



     Carol ER Carol ER                                    Carol ER           

 

     traMADol traMADol           No                       traMADol           



     HCl  HCl                                     HCl            

 

     DULoxetine DULoxetine           No                       DULoxetine        

   



     HCl  HCl                                     HCl            

 

     Indapamide Indapamide           No                       Indapamide        

   

 

     Topiramate Topiramate           No                       Topiramate        

   

 

     Meloxicam Meloxicam           No                       Meloxicam           

 

     Memantine Memantine           No                       Memantine           



     HCl  HCl                                     HCl            

 

     Zanaflex Zanaflex           No                       Zanaflex           

 

     Zolpidem Zolpidem           No                       Zolpidem           



     Tartrate Tartrate                                    Tartrate           

 

     Ciprofloxac Ciprofloxac           No                       Ciprofloxa      

     



     in HCl in HCl                                    jayna HCl           

 

     Estradiol Estradiol           No                       Estradiol           

 

     ALPRAZolam ALPRAZolam           No                       ALPRAZolam        

   

 

     tiZANidine tiZANidine           No                       tiZANidine        

   



     HCl  HCl                                     HCl            

 

     Mupirocin Mupirocin           No                       Mupirocin           

 

     Metoprolol Metoprolol           No                       Metoprolol        

   



     Succinate Succinate                                    Succinate           



     ER   ER                                      ER             

 

     HYDROcodone HYDROcodone           No                       HYDROcodon      

     



     -Acetaminop -Acetaminop                                    e-Acetamin      

     



     hen  hen                                     ophen           

 

     Simvastatin Simvastatin           No                       Simvastati      

     



                                                  n              

 

     Albuterol Albuterol           No                       Albuterol           

 

     Pantoprazol Pantoprazol           No                       Pantoprazo      

     



     e Sodium e Sodium                                    le Sodium           

 

     buPROPion buPROPion           No                       buPROPion           



     HCl ER (XL) HCl ER (XL)                                    HCl ER          

 



                                                  (XL)           

 

     Gabapentin Gabapentin           No                       Gabapentin        

   

 

     QUEtiapine QUEtiapine           No                       QUEtiapine        

   



     Fumarate Fumarate                                    Fumarate           

 

     Nitrofurant Nitrofurant           No                       Nitrofuran      

     



     oin Monohyd oin Monohyd                                    toin           



     Macro Macro                                    Monohyd           



                                                  Macro           

 

     Donepezil Donepezil           No                       Donepezil           



     HCl  HCl                                     HCl            

 

     NP Thyroid NP Thyroid           No                       NP Thyroid        

   

 

     Simvastatin Simvastatin           No                       Simvastati      

     



                                                  n              

 

     Indapamide Indapamide           No                       Indapamide        

   

 

     DULoxetine DULoxetine           No                       DULoxetine        

   



     HCl  HCl                                     HCl            

 

     Nitrofurant Nitrofurant           No                       Nitrofuran      

     



     oin Monohyd oin Monohyd                                    toin           



     Macro Macro                                    Monohyd           



                                                  Macro           

 

     Memantine Memantine           No                       Memantine           



     HCl  HCl                                     HCl            

 

     traMADol traMADol           No                       traMADol           



     HCl  HCl                                     HCl            

 

     Topiramate Topiramate           No                       Topiramate        

   

 

     Potassium Potassium           No                       Potassium           



     Chloride Chloride                                    Chloride           



     Carol ER Carol ER                                    Carol ER           

 

     Gabapentin Gabapentin           No                       Gabapentin        

   

 

     HYDROcodone HYDROcodone           No                       HYDROcodon      

     



     -Acetaminop -Acetaminop                                    e-Acetamin      

     



     hen  hen                                     ophen           

 

     Metoprolol Metoprolol           No                       Metoprolol        

   



     Succinate Succinate                                    Succinate           



     ER   ER                                      ER             

 

     traMADol traMADol           No                       traMADol           



     HCl  HCl                                     HCl            

 

     buPROPion buPROPion           No                       buPROPion           



     HCl ER (XL) HCl ER (XL)                                    HCl ER          

 



                                                  (XL)           

 

     Donepezil Donepezil           No                       Donepezil           



     HCl  HCl                                     HCl            

 

     ALPRAZolam ALPRAZolam           No                       ALPRAZolam        

   

 

     Zolpidem Zolpidem           No                       Zolpidem           



     Tartrate Tartrate                                    Tartrate           

 

     QUEtiapine QUEtiapine           No                       QUEtiapine        

   



     Fumarate Fumarate                                    Fumarate           

 

     Zanaflex Zanaflex           No                       Zanaflex           

 

     Pantoprazol Pantoprazol           No                       Pantoprazo      

     



     e Sodium e Sodium                                    le Sodium           

 

     Albuterol Albuterol           No                       Albuterol           

 

     tiZANidine tiZANidine           No                       tiZANidine        

   



     HCl  HCl                                     HCl            

 

     Meloxicam Meloxicam           No                       Meloxicam           

 

     Ciprofloxac Ciprofloxac           No                       Ciprofloxa      

     



     in HCl in HCl                                    jayna HCl           

 

     Mupirocin Mupirocin           No                       Mupirocin           

 

     Estradiol Estradiol           No                       Estradiol           

 

     NP Thyroid NP Thyroid           No                       NP Thyroid        

   

 

     Meloxicam Meloxicam           No                       Meloxicam           

 

     tiZANidine tiZANidine           No                       tiZANidine        

   



     HCl  HCl                                     HCl            

 

     Donepezil Donepezil           No                       Donepezil           



     HCl  HCl                                     HCl            

 

     Albuterol Albuterol           No                       Albuterol           

 

     traMADol traMADol           No                       traMADol           



     HCl  HCl                                     HCl            

 

     traMADol traMADol           No                       traMADol           



     HCl  HCl                                     HCl            

 

     Metoprolol Metoprolol           No                       Metoprolol        

   



     Succinate Succinate                                    Succinate           



     ER   ER                                      ER             

 

     HYDROcodone HYDROcodone           No                       HYDROcodon      

     



     -Acetaminop -Acetaminop                                    e-Acetamin      

     



     hen  hen                                     ophen           

 

     Zolpidem Zolpidem           No                       Zolpidem           



     Tartrate Tartrate                                    Tartrate           







Immunizations







           Ordered    Filled Immunization Date       Status     Comments   Sour

e



           Immunization Name Name                                        

 

           SARS-COV-2 COVID-19            2021 Completed             [facility]

[facility]



           PFIZER VACCINE            00:00:00                         [facility]

 

           SARS-COV-2 COVID-19            2021 Completed             [facility]

[facility] of



           PFIZER VACCINE            00:00:00                         [facility]

 

           SARS-COV-2 COVID-19            2021 Completed             [facility]

[facility] of



           PFIZER VACCINE            00:00:00                         [facility]

 

           SARS-COV-2 COVID-19            2021 Completed             [facility]

[facility] of



           PFIZER VACCINE            00:00:00                         [facility]

 

           SARS-COV-2 COVID-19            2021 Completed             [facility]

[facility] of



           PFIZER VACCINE            00:00:00                         [facility]

 

           SARS-COV-2 COVID-19            2021 Completed             [facility]

[facility] of



           PFIZER VACCINE            00:00:00                         [facility]

 

           SARS-COV-2 COVID-19            2021 Completed             [facility]

[facility] of



           PFIZER VACCINE            00:00:00                         [facility]

 

           SARS-COV-2 COVID-19            2021 Completed             [facility]

[facility] of



           PFIZER VACCINE            00:00:00                         [facility]

 

           SARS-COV-2 COVID-19            2021 Completed             [facility]

rs[facility] of



           PFIZER VACCINE            00:00:00                         [facility]

 

           SARS-COV-2 COVID-19            2021 Completed             [facility]

[facility] of



           PFIZER VACCINE            00:00:00                         [facility]

[facility]



                                                                  [facility]







Vital Signs







             Vital Name   Observation Time Observation Value Comments     Source

 

             height       2022 11:00:00 63 [in_i]                 [facility]

 

             weight       2022 11:00:00 209 [lb_av]               [facility]

 

             temperature  2022 11:00:00 96.9 [degF]               [facility]

 

             bmi          2022 11:00:00 37.02 kg/m2               [facility]

 

             blood pressure 2022 11:00:00 122 mm[Hg]                [facility]



             systolic                                            [facility]

 

             blood pressure 2022 11:00:00 78 mm[Hg]                 [facility]



             diastolic                                           [facility]

 

             height       2022-08-10 13:00:00 63 [in_i]                 [facility]

 

             weight       2022-08-10 13:00:00 208 [lb_av]               [facility]

 

             bmi          2022-08-10 13:00:00 36.84 kg/m2               [facility]

 

             blood pressure 2022-08-10 13:00:00 120 mm[Hg]                [facility]



             systolic                                            [facility]

 

             blood pressure 2022-08-10 13:00:00 70 mm[Hg]                 [facility]



             diastolic                                           [facility]

 

             height       2022 13:00:00 63 [in_i]                 [facility]

 

             weight       2022 13:00:00 208 [lb_av]               [facility]

 

             temperature  2022 13:00:00 97.9 [degF]               [facility]

 

             bmi          2022 13:00:00 36.84 kg/m2               [facility]

[facility]



                                                                 [facility]

 

             blood pressure 2022 13:00:00 112 mm[Hg]                [facility]



             systolic                                            [facility]

 

             blood pressure 2022 13:00:00 72 mm[Hg]                 [facility]



             diastolic                                           [facility]

 

             height       2022 11:00:00 63 [in_i]                 [facility]

[facility]



                                                                 [facility]

 

             weight       2022 11:00:00 208 [lb_av]               [facility]

[facility]



                                                                 [facility]

 

             bmi          2022 11:00:00 36.84 kg/m2               [facility]

[facility]



                                                                 [facility]

 

             blood pressure 2022 11:00:00 112 mm[Hg]                [facility]



             systolic                                            [facility]

 

             blood pressure 2022 11:00:00 72 mm[Hg]                 [facility]



             diastolic                                           [facility]

 

             Systolic blood 2022 19:34:00 115 mm[Hg]                [facility]

[facility] of



             [facility]

 

             Diastolic blood 2022 19:34:00 59 mm[Hg]                 [facility]

rs[facility] of



             pressure                                            [facility]

 

             Heart rate   2022 19:34:00 56 /min                   [facility]

 

             Body height  2022 19:34:00 160 cm                    [facility]

 

             Body weight  2022 19:34:00 102.513 kg                [facility]

 

             BMI          2022 19:34:00 40.03 kg/m2               [facility]

 

             Systolic (mm Hg) 2022 21:08:00                           [facility]

[facility]

 

             Diastolic (mm Hg) 2022 21:08:00                           [facility]

 

             Heart Rate   2022 21:08:00                           [facility]

 

             Systolic (mm Hg) 2022 21:20:00                           [facility]

[facility]

 

             Diastolic (mm Hg) 2022 21:20:00                           [facility]

[facility]

 

             Heart Rate   2022 21:20:00                           [facility]

 

             Respitory Rate 2022 21:20:00                           [facility]

[facility] [place]

 

             Systolic (mm Hg) 2022 22:09:00                           [facility]

[facility]

 

             Diastolic (mm Hg) 2022 22:09:00                           [facility]

[facility]

 

             Heart Rate   2022 22:09:00                           [facility]

 [facility]

 

             Respitory Rate 2022 22:09:00                           [facility]

[facility]

 

             Systolic (mm Hg) 2021-12-10 16:49:00                           [facility]

[facility]

 

             Diastolic (mm Hg) 2021-12-10 16:49:00                           [facility]

[facility]

 

             Heart Rate   2021-12-10 16:49:00                           [facility]

 [facility]

 

             Respitory Rate 2021-12-10 16:49:00                           [facility]

[facility] [place]

 

             Systolic (mm Hg) 2021-08-10 20:24:00                           [facility]

[facility]

 

             Diastolic (mm Hg) 2021-08-10 20:24:00                           [facility]

[facility]

 

             Heart Rate   2021-08-10 20:24:00                           [facility]

 [facility]

 

             Respitory Rate 2021-08-10 20:24:00                           [facility]

[facility]

 

             Height       2021-08-10 20:24:00 154.94 cm                 [facility]

 [facility]

 

             Weight       2021-08-10 20:24:00                           [facility]

 [facility]

 

             BMI Calculated 2021-08-10 20:24:00                           [facility]

[facility]

 

             Systolic (mm Hg) 2021-05-10 18:30:00                           [facility]

[facility]

 

             Diastolic (mm Hg) 2021-05-10 18:30:00                           [facility]

[facility]

 

             Heart Rate   2021-05-10 18:30:00                           [facility]

 [place]

 

             Respitory Rate 2021-05-10 18:30:00                           [facility]

[facility] [place]

 

             Height       2021-05-10 18:30:00 160.02 cm                 [facility]

 [facility]

 

             Weight       2021-05-10 18:30:00                           [facility]

 [facility]

 

             BMI Calculated 2021-05-10 18:30:00                           [facility]

[facility] [place]

 

             Systolic (mm Hg) 2021 18:26:00                           [facility]

[facility]

 

             Heart Rate   2021 18:26:00                           [facility]

 [facility]

 

             Respitory Rate 2021 18:26:00                           [facility]

[facility]

 

             Weight       2021 18:26:00                           [facility]

 [facility]

 

             Systolic (mm Hg) 2021 20:30:00                           [facility]

[facility]

 

             Diastolic (mm Hg) 2021 20:30:00                           [facility]

[facility]

 

             Heart Rate   2021 20:30:00                           [facility]

 

             Respitory Rate 2021 20:30:00                           [facility]

[facility]

 

             Height       2021 20:30:00 157.48 cm                 [facility]

 [facility]

 

             Weight       2021 20:30:00                           [facility]

 [facility]

 

             BMI Calculated 2021 20:30:00                           [facility]

[facility]

 

             Systolic (mm Hg) 2020 20:19:00                           [facility]

[facility]

 

             Diastolic (mm Hg) 2020 20:19:00                           [facility]

[facility]

 

             Heart Rate   2020 20:19:00                           [facility]

 [facility]

 

             Respitory Rate 2020 20:19:00                           [facility]

[facility]

 

             Height       2020 20:19:00 160.02 cm                 [facility]

 [facility]

 

             Weight       2020 20:19:00                           [facility]

 [facility]

 

             BMI Calculated 2020 20:19:00                           [facility]

[facility]

 

             Systolic (mm Hg) 2020 20:33:00                           [facility]

[facility]

 

             Diastolic (mm Hg) 2020 20:33:00                           [facility]

[facility]

 

             Heart Rate   2020 20:33:00                           [facility]

 

             Respitory Rate 2020 20:33:00                           [facility]

[facility]

 

             Height       2020 20:33:00 160.02 cm                 [facility]

 [facility]

 

             Weight       2020 20:33:00                           [facility]

 [facility]

 

             BMI Calculated 2020 20:33:00                           [facility]

[facility]

 

             Systolic (mm Hg) 2020 20:09:00                           [facility]

[facility]

 

             Diastolic (mm Hg) 2020 20:09:00                           [facility]

[facility]

 

             Heart Rate   2020 20:09:00                           [facility]

 [facility]

 

             Respitory Rate 2020 20:09:00                           [facility]

[facility]

 

             Height       2020 20:09:00 160.02 cm                 [facility]

 [facility]

 

             Weight       2020 20:09:00                           [facility]

 [place]

 

             BMI Calculated 2020 20:09:00                           [facility]

[facility]

 

             Systolic (mm Hg) 2020 19:46:00                           [facility]

[facility]

 

             Diastolic (mm Hg) 2020 19:46:00                           [facility]

[facility]

 

             Heart Rate   2020 19:46:00                           [facility]

 [facility]

 

             Respitory Rate 2020 19:46:00                           [facility]

[facility] [place]

 

             Height       2020 19:46:00 160.02 cm                 [facility]

 [facility]

 

             Weight       2020 19:46:00                           [facility]

 [place]

 

             BMI Calculated 2020 19:46:00                           [facility]

[facility] [place]

 

             Systolic (mm Hg) 2020 18:37:00                           [facility]

[facility]

 

             Diastolic (mm Hg) 2020 18:37:00                           [facility]

[facility]

 

             Heart Rate   2020 18:37:00                           [facility]

 [facility]

 

             Systolic (mm Hg) 2020 20:05:00                           [facility]

[facility]

 

             Diastolic (mm Hg) 2020 20:05:00                           [facility]

[facility]

 

             Heart Rate   2020 20:05:00                           [facility]

 [facility]

 

             Respitory Rate 2020 20:05:00                           [facility]

[facility]

 

             Height       2020 20:05:00 160.02 cm                 [facility]

 [facility]

 

             Weight       2020 20:05:00                           [facility]

 [place]

 

             BMI Calculated 2020 20:05:00                           [facility]

[facility] [place]

 

             Systolic (mm Hg) 2019 19:34:00                           [facility]

[facility]

 

             Diastolic (mm Hg) 2019 19:34:00                           [facility]

[facility]

 

             Heart Rate   2019 19:34:00                           [facility]

 [facility]

 

             Respitory Rate 2019 19:34:00                           [facility]

[facility]

 

             Height       2019 19:34:00 160.02 cm                 [facility]

 [facility]

 

             Weight       2019 19:34:00                           [facility]

 [facility]

 

             BMI Calculated 2019 19:34:00                           [facility]

[facility]

 

             Systolic (mm Hg) 2019 19:08:00                           [facility]

[facility]

 

             Diastolic (mm Hg) 2019 19:08:00                           [facility]

[facility]

 

             Heart Rate   2019 19:08:00                           [facility]

 [facility]

 

             Respitory Rate 2019 19:08:00                           [facility]

[facility]

 

             Height       2019 19:08:00 160.02 cm                 [facility]

 [facility]

 

             [facility]       2019 19:08:00                           [facility]

 

             BMI Calculated 2019 19:08:00                           David [facility]







Procedures







                Procedure       Date / Time Performed Performing Clinician [facility]

e

 

                EXTERNAL PROVIDER 2022 05:01:00 Doctor Unassigned, No [facility]

[facility] [place]



                RECORDS                         Name            [facility]

 

                HOME HEALTH - OTHER 2022 06:01:00 Doctor Unassigned[facility]



                                                Name            [facility]







Plan of Care







             Planned Activity Planned Date Details      Comments     Source

 

             Future Scheduled 2022   SHINGLES VACCINES (1              Met

[facility]



             Test         02:09:49     of 2) [code = SHINGLES              



                                       VACCINES (1 of 2)]              

 

             Future Scheduled 2022   65+ PNEUMOCOCCAL              Methodi

[facility]



             Test         02:09:49     VACCINE (1 - PCV)              



                                       [code = 65+               



                                       PNEUMOCOCCAL VACCINE              



                                       (1 - PCV)]                

 

             Future Scheduled 2022   COVID-19 VACCINE (3 -              [facility]



             Test         02:09:49     Booster for Pfizer              



                                       series) [code =              



                                       COVID-19 VACCINE (3 -              



                                       Booster for Pfizer              



                                       series)]                  

 

             Future Scheduled 2022   INFLUENZA VACCINE              Method

is Hospital



             Test         02:09:49     [code = INFLUENZA              



                                       VACCINE]                  

 

             Future Scheduled 2022-10-24   HEPATITIS B VACCINES              Met

[facility]



             Test         14:48:18     (1 of 3 - 3-dose              



                                       series) [code =              



                                       HEPATITIS B VACCINES              



                                       (1 of 3 - 3-dose              



                                       series)]                  

 

             Future Scheduled 2022-10-24   SHINGLES VACCINES (1              Met

[facility]



             Test         14:48:18     of 2) [code = SHINGLES              



                                       VACCINES (1 of 2)]              

 

             Future Scheduled 2022-10-24   65+ PNEUMOCOCCAL              Methodi

 Hospital



             Test         14:48:18     VACCINE (1 - PCV)              



                                       [code = 65+               



                                       PNEUMOCOCCAL VACCINE              



                                       (1 - PCV)]                

 

             Future Scheduled 2022-10-24   COVID-19 VACCINE (3 -              Me

[facility]



             Test         14:48:18     Booster for Pfizer              



                                       series) [code =              



                                       COVID-19 VACCINE (3 -              



                                       Booster for Pfizer              



                                       series)]                  

 

             Future Scheduled 2022-10-24   INFLUENZA VACCINE              Method

is Hospital



             Test         14:48:18     [code = INFLUENZA              



                                       VACCINE]                  

 

             Future Scheduled 2022-10-24   HEPATITIS B VACCINES              Met

[facility]



             Test         14:48:18     (1 of 3 - 3-dose              



                                       series) [code =              



                                       HEPATITIS B VACCINES              



                                       (1 of 3 - 3-dose              



                                       series)]                  

 

             Future Scheduled 2022-10-24   SHINGLES VACCINES (1              Met

[facility]



             Test         14:48:18     of 2) [code = SHINGLES              



                                       VACCINES (1 of 2)]              

 

             Future Scheduled 2022-10-24   65+ PNEUMOCOCCAL              Methodi

 Hospital



             Test         14:48:18     VACCINE (1 - PCV)              



                                       [code = 65+               



                                       PNEUMOCOCCAL VACCINE              



                                       (1 - PCV)]                

 

             Future Scheduled 2022-10-24   COVID-19 VACCINE (3 -              [facility]

[facility]



             Test         14:48:18     Booster for Pfizer              



                                       series) [code =              



                                       COVID-19 VACCINE (3 -              



                                       Booster for Pfizer              



                                       series)]                  

 

             Future Scheduled 2022-10-24   INFLUENZA VACCINE              Method

is Hospital



             Test         14:48:18     [code = INFLUENZA              



                                       VACCINE]                  

 

             Future Scheduled 2022-10-24   HEPATITIS B VACCINES              Met

[facility]



             Test         14:48:18     (1 of 3 - 3-dose              



                                       series) [code =              



                                       HEPATITIS B VACCINES              



                                       (1 of 3 - 3-dose              



                                       series)]                  

 

             Future Scheduled 2022-10-24   SHINGLES VACCINES (1              Met

[facility]



             Test         14:48:18     of 2) [code = SHINGLES              



                                       VACCINES (1 of 2)]              

 

             Future Scheduled 2022-10-24   65+ PNEUMOCOCCAL              Methodi

 Hospital



             Test         14:48:18     VACCINE (1 - PCV)              



                                       [code = 65+               



                                       PNEUMOCOCCAL VACCINE              



                                       (1 - PCV)]                

 

             Future Scheduled 2022-10-24   COVID-19 VACCINE (3 -              [facility]

[facility]



             Test         14:48:18     Booster for Pfizer              



                                       series) [code =              



                                       COVID-19 VACCINE (3 -              



                                       Booster for Pfizer              



                                       series)]                  

 

             Future Scheduled 2022-10-24   INFLUENZA VACCINE              Method

is Hospital



             Test         14:48:18     [code = INFLUENZA              



                                       VACCINE]                  

 

             Future Scheduled 2022-10-24   HEPATITIS B VACCINES              Met

[facility]



             Test         14:48:18     (1 of 3 - 3-dose              



                                       series) [code =              



                                       HEPATITIS B VACCINES              



                                       (1 of 3 - 3-dose              



                                       series)]                  

 

             Future Scheduled 2022-10-24   SHINGLES VACCINES (1              Met

[facility]



             Test         14:48:18     of 2) [code = SHINGLES              



                                       VACCINES (1 of 2)]              

 

             Future Scheduled 2022-10-24   65+ PNEUMOCOCCAL              Methodi

 Hospital



             Test         14:48:18     VACCINE (1 - PCV)              



                                       [code = 65+               



                                       PNEUMOCOCCAL VACCINE              



                                       (1 - PCV)]                

 

             Future Scheduled 2022-10-24   COVID-19 VACCINE (3 -              Me

[facility]



             Test         14:48:18     Booster for Pfizer              



                                       series) [code =              



                                       COVID-19 VACCINE (3 -              



                                       Booster for Pfizer              



                                       series)]                  

 

             Future Scheduled 2022-10-24   INFLUENZA VACCINE              Method

[facility]



             Test         14:48:18     [code = INFLUENZA              



                                       VACCINE]                  

 

             Future Scheduled 2022-10-24   HEPATITIS B VACCINES              Met

[facility]



             Test         14:48:18     (1 of 3 - 3-dose              



                                       series) [code =              



                                       HEPATITIS B VACCINES              



                                       (1 of 3 - 3-dose              



                                       series)]                  

 

             Future Scheduled 2022-10-24   SHINGLES VACCINES (1              Met

[facility]



             Test         14:48:18     of 2) [code = SHINGLES              



                                       VACCINES (1 of 2)]              

 

             Future Scheduled 2022-10-24   65+ [facility]OCOCCAL              [facility]



             Test         14:48:18     VACCINE (1 - PCV)              



                                       [code = 65+               



                                       PNEUMOCOCCAL VACCINE              



                                       (1 - PCV)]                

 

             Future Scheduled 2022-10-24   COVID-19 VACCINE (3 -              [facility]



             Test         14:48:18     Booster for Pfizer              



                                       series) [code =              



                                       COVID-19 VACCINE (3 -              



                                       Booster for Pfizer              



                                       series)]                  

 

             Future Scheduled 2022-10-24   INFLUENZA VACCINE              Method

[facility]



             Test         14:48:18     [code = INFLUENZA              



                                       VACCINE]                  

 

             Future Scheduled 2022   HEPATITIS B VACCINES              Met

[facility]



             Test         02:08:28     (1 of 3 - 3-dose              



                                       series) [code =              



                                       HEPATITIS B VACCINES              



                                       (1 of 3 - 3-dose              



                                       series)]                  

 

             Future Scheduled 2022   SHINGLES VACCINES (1              Met

[facility]



             Test         02:08:28     of 2) [code = SHINGLES              



                                       VACCINES (1 of 2)]              

 

             Future Scheduled 2022   65+ PNEUMOCOCCAL              Methodi

 Hospital



             Test         02:08:28     VACCINE (1 - PCV)              



                                       [code = 65+               



                                       PNEUMOCOCCAL VACCINE              



                                       (1 - PCV)]                

 

             Future Scheduled 2022   COVID-19 VACCINE (3 -              [facility]



             Test         02:08:28     Booster for Pfizer              



                                       series) [code =              



                                       COVID-19 VACCINE (3 -              



                                       Booster for Pfizer              



                                       series)]                  

 

             Future Scheduled 2022   INFLUENZA VACCINE              Method

[facility]



             Test         02:08:28     [code = INFLUENZA              



                                       VACCINE]                  

 

             Future Scheduled              [facility] C [facility]              [facility]



             Test                      (procedure) [code =              



                                       676707640]                

 

             Future Scheduled              SHINGLES VACCINES (#1)              [facility]



             Test                      [code = SHINGLES              



                                       VACCINES (#1)]              

 

             Future Scheduled              65+ PNEUMOCOCCAL              [facility]

[facility]



             Test                      VACCINE (1 of 1 -              



                                       PPSV23) [code = 65+              



                                       PNEUMOCOCCAL VACCINE              



                                       (1 of 1 - PPSV23)]              

 

             Future Scheduled              INFLUENZA VACCINE              Method

[facility] Hospital



             Test                      [code = INFLUENZA              



                                       VACCINE]                  







Encounters







        Start   End     Encounter Admission Attending Care    Care    Encounter 

Source



        Date/Time Date/Time Type    Type    Clinicians Facility Department ID   

   

 

        2022         Outpatient         Javier Mcwilliams [facility]  [id number]



        09:25:03                                                    [facility]

 

        2022         Outpatient         Javier Mcwilliams [facility]  [id number]



        13:53:02                                                    [facility]

 

        2022         Inpatient [facility]   ENDO    [id number]

[address] [facility]



        10:00:00                                                 [address] [place] [address]

 

        2022         Outpatient         Javier Mcwilliams [facility]  [id number]



        10:58:02                                                    [facility]

 

        2022         Outpatient         Javier Mcwilliams [facility]  [id number] Common



        08:43:01                                                    [facility]

 

        2022         Outpatient                 [facility]  [id number]

 Common



        16:25:02                                                    [facility]

 

        2022         Outpatient                 [facility]  [id number]

 Common



        14:23:02                                                    [facility]

 

        2022         Outpatient 3       [id number]  ENCPL   OT     

 Encompa



        13:02:17                                                 1006    



                                                                        Health



                                                                        Rehabil



                                                                        itation



                                                                        [facility]

 

        2022         Outpatient 3       [id number]  ENCPL   REF     [id number]

 Encompa



        12:59:17                                                 0929    



                                                                        Health



                                                                        Rehabil



                                                                        itation



                                                                        [facility]

 

        20[facility]         [facility]                 [facility]  [id number]

 Common



        13:11:24                                                 [id number]   [facility]

 

        2023 Outpatient                 SANGEETA RUTHERFORD    [id number]

[id number] [facility]



        13:30:00 13:30:00                                         24      anupam Tesfaye

 

        2022 Outpatient                 [facility]    MNA     [id number]

[id number]



        21:00:00 05:59:59                                 Neurology 23      anupam Tesfaye

 

        2022 Outpatient         [facility]

[id number] 



        15:00:00 23:59:59                 Steven                   23      



                                        Georgi                         

 

        2022 Outpatient                 MH[facility]    MHIE    [id number]

[address] [facility]



        15:00:00 15:00:00                                         23      anupam Tesfaye

 

        2022 Outpatient                 MH[facility]    MHIE    [id number]

[facility]



        15:00:00 15:00:00                                         23      anupam Tesfaye

 

        2022 (TEL)                   STLMLC  STLMLC  [id number] Co

mmon



        00:00:00 00:00:00                                                 Spirit



                                                                        - [facility]

 

        2022 OFFICE                  STLMLC  STLMLC  [id number] Co

mmon



        00:00:00 00:00:00 VISIT EST                                         Spir

it



                        PT LEVEL 3                                         [facility]

 

        2022-08-10 2022-08-10 OFFICE                  STLMLC  STLMLC  [id number] Co

mmon



        00:00:00 00:00:00 VISIT EST                                         Spir

it



                        PT LEVEL 3                                         [facility]

 

        2022 OFFICE                  STLMLC  STLMLC  [id number] Co

mmon



        00:00:00 00:00:00 VISIT NEW                                         Spir

it



                        PT LEVEL 3                                         - [facility]

 

        2022 OFFICE                  STLMLC  STLMLC  [id number] Co

mmon



        00:00:00 00:00:00 VISIT EST                                         Spir

it



                        PT LEVEL 3                                         [facility]

 

        2022 Outpatient                 nullFlavo MNA     [id number]

[id number] Memoria



        21:15:00 04:59:59                         r       Neurology 19      l



                                                        Tali         Brien

 

        2022 Outpatient                 nullFlavo MNA     [id number]

[id number] Memoria



        21:15:00 04:59:59                         r       Neurology 19      l



                                                        [place]         Brien

 

        [id number] Outpatient         Kassandra  MISCHER MISCHER 407

[id number] 



        16:15:00 23:59:59                 Steven Laureano                         

 

        2022 Outpatient                 MHIE    IE    [id number]

[facility]



        16:15:00 16:15:00                                         Nisha Tesfaye

 

        2022 Orders          Doctor  ABHIJEET    1.2.840.114 [id number]

30 Univers



        00:00:00 00:00:00 Only            Unassigned, CHARIS   350.1.13.10       

  ity of



                                        [facility] 4.2.7.2.686         Amari

as



                                                        [phone]         [address]

 

        2022 Telephone         [facility]    [id number]

5224 Univers



        00:00:00 00:00:00                 [employer] [facility] [employer]  350.1.13.10         it

y of



                                                [facility] 4.2.7.2.686         Amari

as



                                                TOI?BLEA [phone]         Me

[address]                 



                                                OFFICE                  



                                                [facility]                 

 

        2022 [facility] R       [facility]    [id number]

[id number] Univers



        14:30:00 15:15:52                 OLEKSANDR [facility]

 

        2022 Office          [facility]    1.2.893.373 [id number]

7298 Univers



        14:30:00 15:15:52 Visit           [facility]  350.1.13.10         it

y of



                                                [place] 4.2.7.2.686         Amari

as



                                                TOI?BLEA [phone]         [address]                 



                                                OFFICE                  



                                                [facility]                 

 

        2022 [facility]patient R       [facility]    [id number]

[id number] Univers



        14:45:00 14:45:00                 OLEKSANDR [facility]

 

        2022 Office          [facility]    [id number]

4454 Univers



        10:15:00 10:30:00 Visit           [facility]  350.1.13.10         it

y of



                                                [facility] 4.2.7.2.686         Amari

as



                                                TOI?BLEA [phone]         Me

dical



                                                KNEY    [address]             [address]



                                                MEDICAL                 



                                                OFFICE                  



                                                [facility]                 

 

        2022 Outpatient R       NATHAN [facility]    [id number]

[id number] Univers



        10:15:00 10:15:00                 OLEKSANDR [facility]

 

        2022 Outpatient R       NATHAN [facility]    [id number]

[id number] Univers



        10:15:00 10:15:00                 OLEKSANDR [facility]

 

        2022-02-15 2022-02-15 Ambulatory                 nullFlavo MNA     [id number]

[id number] Memoria



        20:15:00 20:15:00 Pre-Reg                 r       Neurology 22      l



                                                        Tali Tesfaye

 

        2022-02-15 2022-02-15 Ambulatory                 nullFlavo MNA     [id number]

[id number] Memoria



        20:15:00 20:15:00 Pre-Reg                 r       Neurology 22      l



                                                        Tali Tesfaye

 

        2022-02-15 2022-02-15 Outpatient                 [facility]    CHERRY    [id number]

[id number] [facility]



        14:15:00 14:15:00                                         22      l



                                                                        Brien

 

        2022-02-15 2022-02-15 [facility]         [facility]

[id number] 



        14:15:00 14:15:00                 Steven                   Vince      



                                        Georgi                         

 

        2022 Orders          Doctor  ABHIJEET    1.2.840.114 [id number]

33 Univers



        00:00:00 00:00:00 Only            Unassigned, CHARIS   350.1.13.10       

  ity of



                                        [facility] 4.2.7.2.686         Amari

as



                                                        [phone]         [address]

 

        2022 Orders          Doctor JHA    1.2.840.114 [id number]

18 Univers



        00:00:00 00:00:00 Only            Unassigned, CHARIS   350.1.13.10       

  ity of



                                        [facility] 4.2.7.2.686         Amari

as



                                                        [phone]         [address]

 

        2022 Telephone         Nathan [facility]    1.2.840.114 [id number]

[id number] Univers



        00:00:00 00:00:00                 Oleksandr [facility]  350.1.13.10         it

y of



                                                [place] 4.2.7.2.686         Amari

as



                                                TOI?BLEA [phone]         Me

wendy HOLT    198             [facility]                 

 

        2022 Telephone         STACEY Evans    1.2.840.114 90

[id number] Univers



        00:00:00 00:00:00                 Oleksandr DUTTA  350.1.13.10         it

y of



                                                [place] 4.2.7.2.686         Amari

as



                                                TOI?BLEA [phone]         Me

wendy HOLT    198             [facility]                 

 

        2022 Outpatient                 nullFlavo MNA     [id number]

[id number] Memoria



        19:15:00 05:59:59                         r       Neurology 21      l



                                                        Tali Tesfaye

 

        2022 Outpatient                 nullFlavo MNA     [id number]

[id number] Memoria



        19:15:00 05:59:59                         r       Neurology 21      l



                                                        Tali Tesfaye

 

        2022 Outpatient         BIA CannonMISCHER MHMISCHER 407

[id number] 



        13:15:00 23:59:59                 Steven                   21      



                                        Georgi                         

 

        2022 Outpatient                 [facility]    MHIE    [id number]

665 [facility]



        13:15:00 13:15:00                                         21      anupam Tesfaye

 

        2022 Outpatient                 nullFlavo MNA     [id number]

[id number] Memoria



        22:00:00 05:59:59                         r       Neurology 20      l



                                                        Tali Tesfaye

 

        2022 Outpatient                 nullFlavo MNA     [id number]

[id number] Memoria



        22:00:00 05:59:59                         r       Neurology 20      l



                                                        Tali Tesfaye

 

        2022 Outpatient         [facility]

[id number] 



        16:00:00 23:59:59                 Steven                   20      



                                        Georgi                         

 

        2022 Outpatient                 MH[facility]    MHIE    [id number]

665 [facility]



        16:00:00 16:00:00                                         20      anupam Tesfaye

 

        2021 Telephone         STACEY Evans    1.2.840.114 [id number]

[id number] Univers



        00:00:00 00:00:00                 Oleksandr DUTTA  350.1.13.10         it

y of



                                                [place] 4.2.7.2.686         Amari

as



                                                TOI?BLEA [phone]         Me

wendy HOLT    198             [facility]                 



                                                OFFICE                  



                                                [facility]                 

 

        2021 [facility]patient R       [facility]    [id number]

[id number] Univers



        15:45:00 23:59:00                 OLEKSANDR [facility]

 

        2021 Hospital         [facility]    1.2.840.114 [id number]

[id number] Univers



        15:45:00 23:59:00 Encounter         Oleksandr DUTTA  350.1.13.10         

ity of



                                                [facility] 4.2.7.2.686         Amari

as



                                                TOI?BLEA [phone]         Me

wendy HOLT    809             [facility]                 

 

        2021 Office          [facility]    1.2.844.770 [id number]

4912 Univers



        15:00:00 16:28:08 Visit           Oleksandr DUTTA  350.1.13.10         it

y of



                                                [facility] 4.2.7.2.686         Amari

as



                                                TOI?BLEA [phone]         Me

wendy HOLT    198             [facility]                 

 

        2021 Outpatient R       [facility]    [id number]

[id number] Univers



        15:00:00 16:28:08                 [facility]

 

        2021 Telephone         [facility]    1.2.840.114 [id number]

[id number] Univers



        00:00:00 00:00:00                 Oleksandr DUTTA  350.1.13.10         it

y of



                                                [place] 4.2.7.2.686         Amari

as



                                                TOI?BLEA [phone]         Me

wendy HOLT    198             [facility]                 

 

        2021-12-10 2021 Outpatient                 nullFlavo MNA     [id number]

[id number] Memoria



        17:00:00 05:59:59                         r       Neurology 18      l



                                                        Tali Tesfaye

 

        2021-12-10 2021 Outpatient                 nullFlavo MNA     [id number]

[id number] Memoria



        17:00:00 05:59:59                         r       Neurology 18      l



                                                        Tali Tesfaye

 

        2021-12-10 2021-12-10 Outpatient         ROMA Cannon [facility] 407

[id number] 



        11:00:00 23:59:59                 Steven Laureano                         

 

        2021-12-10 2021-12-10 Outpatient                 IE    IE    [id number]

665 [facility]



        11:00:00 11:00:00                                         18      anupam Tsefaye

 

        2021 Orders          Doctor  ABHIJEET    1.2.840.114 [id number]

31 Univers



        00:00:00 00:00:00 Only            Unassigned, CHARIS   350.1.13.10       

  ity of



                                        [facility] 4.2.7.2.686         Amari

as



                                                        [phone]         [address]

 

        2021 Telephone         [facility]    1.2.840.114 89

[id number] Univers



        00:00:00 00:00:00                 Oleksandr [facility]  350.1.13.10         it

y of



                                                [place] 4.2.7.2.686         Amari

as



                                                TOI?BLEA [phone]         [facility]    220             [facility]                 



                                                OFFICE                  



                                                [facility]                 

 

        2021 Orders          Doctor  ABHIJEET    1.2.840.114 [id number]

61 Univers



        00:00:00 00:00:00 Only            Unassigned, CHARIS   350.1.13.10       

  ity of



                                        [facility] 4.2.7.2.686         Amari

as



                                                        [phone]         [address]

 

        2021 Telephone         [facility]    1.2.840.114 89

[id number] Univers



        00:00:00 00:00:00                 Oleksandr L [facility]  350.1.13.10         it

y of



                                                [place] 4.2.7.2.686         Amari

as



                                                TOI?BLEA [phone]         [facility]    198             [facility]                 

 

        2021 Orders          Doctor JHA    1.2.840.114 [id number]

81 Univers



        00:00:00 00:00:00 Only            Unassigned, CHARIS   350.1.13.10       

  ity of



                                        [facility] 4.2.7.2.686         Amari

as



                                                        [phone]         [address]

 

        2021 Telephone         [facility]    1.2.178.809 [id number]

2540 Univers



        00:00:00 00:00:00                 Aftab S HEALTH  350.1.13.10         it

y of



                                                [place] 4.2.7.2.686         Amari

as



                                                TOI?BLEA [phone]         Me

wendy HOLT    198             [facility]                 



                                                OFFICE                  



                                                [facility]                 

 

        2021-11-15 2021-11-15 Office          [facility]    1.2.881.475 3315

1667 Univers



        13:34:19 14:01:40 Visit           Oleksandr DUTTA  350.1.13.10         it

y of



                                                [place] 4.2.7.2.686         Amari

as



                                                TIO?BLEA [phone]         Me

wendy HOLT    198             [facility]                 



                                                OFFICE                  



                                                [facility]                 

 

        2021-11-15 2021-11-15 [facility]       [facility]    [id number]

[id number] Univers



        13:30:00 14:01:40                 OLEKSANDR                           [facility]

 

        2021-11-15 2021-11-15 [facility]patient R       [facility]    [id number]

[id number] Univers



        13:30:00 13:30:00                 [facility]

 

        2021-10-13 2021-10-13 [facility]    1.2.840.114 [id number]

[id number] Univers



        13:00:00 23:59:00 Encounter         Oleksandr Dutta  350.1.13.10         

ity of



                                                [place] 4.2.7.2.686         Amari

as



                                                Toi?Blea [phone]         Me

wendy holt    809             [facility]                 



                                                Office                  



                                                [facility]                 

 

        2021-10-13 2021-10-13 Office          [facility]    1.2.170.684 [id number]

3131 Univers



        12:47:10 14:01:14 Visit           Oleksandr Dutta  350.1.13.10         it

y of



                                                [place] 4.2.7.2.686         Amari

as



                                                Toi?Blea [phone]         Me

wendy holt    198             [facility]                 



                                                Office                  



                                                [facility]                 

 

        2021-10-13 2021-10-13 [facility]patient R       [facility]    [id number]

[id number] Univers



        13:00:00 13:00:00                 OLEKSANDR                           [facility]

 

        2021 Telephone         [facility]    1.2.840.114 [id number]

[id number] Univers



        00:00:00 00:00:00                 Oleksandr [facility]  350.1.13.10         it

y of



                                                [place] 4.2.7.2.686         Amari

as



                                                Toi?Blea [phone]         Me

wendy gray    198             [facility]                 



                                                Office                  



                                                [facility]                 

 

        2021-09-15 2021-09-15 Office          [facility]    1.2.873.305 4265

9030 Univers



        14:31:57 15:14:33 Visit           Oleksandr HODGES [facility]  350.1.13.10         it

y of



                                                [place] 4.2.7.2.686         Amari

as



                                                Toi?Blea [phone]         Me

wendy gray    198             [facility]                 



                                                Office                  



                                                [facility]                 

 

        2021-09-15 2021-09-15 [facility]patient R       [facility]    [id number]

[id number] [facility]



        14:45:00 14:45:00                 OLEKSANDR                           ryannedeni of



                                                                        [facility]

 

        2021-09-15 2021-09-15 Orders          Doctor  ABHIJEET    1.2.840.114 [id number]

47 Univers



        00:00:00 00:00:00 Only            Unassigned, CHARIS   350.1.13.10       

  ity of



                                        [facility] 4.2.7.2.686         Amari

as



                                                        [phone]         [address]

 

        2021-09-15 2021-09-15 Orders          Doctor  ABHIJEET    1.2.840.114 [id number]

47 [facility]



        00:00:00 00:00:00 Only            Unassigned, CHARIS   350.1.13.10       

  ity of



                                        [facility] 4.2.7.2.686         Amari

as



                                                        [phone]         [address]

 

        2021 Outpatient                 [facility]     [id number]

9 [facility]



        00:00:00 23:59:00                                                 [place]



                                                                        [facility]



                                                                        antonio

 

        2021-08-10 2021 Outpatient                 nullFlavo MNA     [id number]

[id number] Memoria



        20:15:00 04:59:59                         r       Neurology 17      l



                                                        Tali Tesfaye

 

        2021-08-10 2021 Outpatient                 nullFlavo MNA     [id number]

[id number] Memoria



        20:15:00 04:59:59                         r       Neurology 17      l



                                                        Tali Tesfaye

 

        2021-08-10 2021-08-10 Outpatient         ROMA Cannon 407

[id number] 



        15:15:00 23:59:59                 Steven Laureano                         

 

        2021-08-10 2021-08-10 Outpatient                 [facility]    IE    [id number]

[facility]



        15:15:00 15:15:00                                         17      l



                                                                        Brien

 

        2021 Ambulatory                 nullFlavo MNA     [id number]

[id number] [facility]



        19:30:00 19:30:00 Pre-Reg                 r       Neurology 14      l



                                                        Tali Tesfaye

 

        2021 Ambulatory                 nullFlavo MNA     [id number]

[id number] [facility]



        19:30:00 19:30:00 Pre-Reg                 r       Neurology 14      l



                                                        Tali Allenann

 

        2021 Outpatient                 MHIE    IE    [id number]

[id number]



        14:30:00 14:30:00                                         14      anupam



                                                                        [place]

 

        2021 [facility]         [facility]

[id number] 



        14:30:00 14:30:00                 Steven                   14      



                                        Georgi                         

 

        2021-05-10 2021 Outpatient                 nullFlavo MNA     [id number]

[id number] [facility]



        18:15:00 04:59:59                         r       Neurology 16      l



                                                        Tali         [place]

 

        2021-05-10 2021 Outpatient                 nullFlavo MNA     [id number]

[id number] [facility]



        18:15:00 04:59:59                         r       Neurology 16      l



                                                        Tali         [place]

 

        2021-05-10 2021-05-10 [facility]         [facility]

[id number] 



        13:15:00 23:59:59                 Steven                   16      



                                        Georgi                         

 

        2021-05-10 2021-05-10 [place] [id number]

[id number] Methodi



        08:36:31 23:59:00 Encounter         [facility]    [id number]         [address]



                                                3.430.2.7                 Hospit

a



                                                .3.239156                 l



                                                .8                      

 

        2021-05-10 2021-05-10 Outpatient                 MHIE    IE    [id number]

[id number] [facility]



        13:15:00 13:15:00                                         16      anupam Tesfaye

 

        2021 Outpatient                 nullFlavo MNA     [id number]

[id number] [facility]



        18:15:00 04:59:59                         r       Neurology 15      l



                                                        Tali Allenann

 

        2021 Outpatient                 nullFlavo MNA     [id number]

[id number] [facility]



        18:15:00 04:59:59                         r       Neurology 15      l



                                                        Tali Tesfaye

 

        2021 Outpatient         [facility]

[id number] 



        13:15:00 23:59:59                 Steven                   15      



                                        Georgi                         

 

        2021 Outpatient                 MHIE    MHIE    [id number]

665 [facility]



        13:15:00 13:15:00                                         15      



                                                                        Brien

 

        2021 Office          [place], 1.2.840.1 [id number]

[id number] Methodi



        13:03:28 13:29:49 Visit           [place]    [id number]         [address]     [address]



                                                3.430.2.7                 Hospit

a



                                                .3.715075                 l



                                                .8                      

 

        2021 Travel                  1.2.840.1 [id number]

[id number] Methodi



        00:00:00 00:00:00                         84982.1.1 350.1.13.43 383     

st



                                                3.430.2.7 0.2.7.3.698         Ho

spita



                                                .[id number] 084.8           l



                                                .8                      

 

        2021 2021-03-10 Outpatient                 nullFlavo MNA     [id number]

[id number] [facility]



        20:30:00 05:59:59                         r       Neurology 13      l



                                                        Tali Tesfaye

 

        2021 2021-03-10 Outpatient                 nullFlavo MNA     [id number]

[id number] [facility]



        20:30:00 05:59:59                         r       Neurology 13      l



                                                        Tali Tesfaye

 

        2021 Outpatient         [facility]

[id number] 



        14:30:00 23:59:59                 Steven                   13      



                                        [place]i                         

 

        2021 Outpatient                 MHIE    MHIE    [id number]

[id number] [facility]



        14:30:00 14:30:00                                         13      anupam Tesfaye

 

        2021 Outpatient                 MHIE    MHIE    [id number]

[id number] [facility]



        14:00:00 14:00:00                                         10      anupam



                                                                        [place]

 

        2021 Outpatient                 MHIE    MHIE    [id number]

665 [facility]



        14:00:00 14:00:00                                         10      l



                                                                        [place]

 

        2020 2020-12-10 Outpatient                 nullFlavo MNA     [id number]

[id number] [facility]



        20:15:00 05:59:59                         r       Neurology 12      l



                                                        Tali Allenann

 

        2020 2020-12-10 Outpatient                 nullFlavo MNA     [id number]

[id number] [facility]



        20:15:00 05:59:59                         r       Neurology 12      l



                                                        Tali         [place]

 

        2020 Outpatient         [facility]

[id number] 



        14:15:00 23:59:59                 Steven                   12      



                                        [place]                         

 

        2020 Outpatient                 MHIE    MHIE    [id number]

665 [facility]



        14:15:00 14:15:00                                         12      l



                                                                        [place]

 

        2020 2020-11-10 Outpatient                 nullFlavo MNA     [id number]

[id number] [facility]



        20:15:00 05:59:59                         r       Neurology 11      l



                                                        Tali         [place]

 

        2020 2020-11-10 Outpatient                 nullFlavo MNA     [id number]

[id number] [facility]



        20:15:00 05:59:59                         r       Neurology 11      l



                                                        Tali         [place]

 

        2020 Outpatient         [facility]

[id number] 



        14:15:00 23:59:59                 Steven                   11      



                                        [facility]                         

 

        2020 Outpatient                 MHIE    MHIE    [id number]

665 [facility]



        14:15:00 14:15:00                                         11      l



                                                                        [place]

 

        2020 Outpatient                 nullFlavo MNA     [id number]

[id number] [facility]



        20:00:00 04:59:59                         r       Neurology 09      l



                                                        Tali         [place]

 

        2020 Outpatient                 nullFlavo MNA     [id number]

[id number] [facility]



        20:00:00 04:59:59                         r       Neurology 09      l



                                                        Tali         [place]

 

        2020 Outpatient         [facility]

[id number] 



        15:00:00 23:59:59                 Steven                   09      



                                        [facility]                         

 

        2020 Outpatient                 MHIE    MHIE    [id number]

665 [facility]



        15:00:00 15:00:00                                         09      l



                                                                        [place]

 

        2020 Outpatient                 nullFlavo MNA     [id number]

[id number] [facility]



        19:30:00 04:59:59                         r       Neurology 08      l



                                                        Tali Tesfaye

 

        2020 Outpatient                 nullFlavo MNA     [id number]

[id number] [facility]



        19:30:00 04:59:59                         r       Neurology 08      l



                                                        Tali Tesfaye

 

        2020 Outpatient         [facility]

[id number] 



        14:30:00 23:59:59                 Steven                   08      



                                        [place]i                         

 

        2020 Ambulatory                 nullFlavo MNA     [id number]

[id number] [facility]



        19:30:00 19:30:00 Pre-Reg                 r       Neurology 07      l



                                                        Tali Tesfaye

 

        2020 Ambulatory                 nullFlavo MNA     [id number]

[id number] [facility]



        19:30:00 19:30:00 Pre-Reg                 r       Neurology 07      l



                                                        Tali Tesfaye

 

        2020 Outpatient                 MHIE    MHIE    [id number]

[id number] [facility]



        14:30:00 14:30:00                                         08      l



                                                                        [place]

 

        2020 Outpatient                 MHIE    MHIE    [id number]

665 [facility]



        14:30:00 14:30:00                                         07      l



                                                                        [place]

 

        2020 Outpatient         Kassandra  [facility]

[id number] 



        14:30:00 14:30:00                 Steven                   07      



                                        [place]i                         

 

        2020 Outpatient                 nullFlavo MNA     [id number]

[id number] [facility]



        18:30:00 04:59:59                         r       Neurology 06      l



                                                        Tali Allenann

 

        2020 Outpatient                 nullFlavo MNA     [id number]

[id number] [facility]



        18:30:00 04:59:59                         r       Neurology 06      l



                                                        Tali Allenann

 

        2020 Outpatient         [facility]

[id number] 



        13:30:00 23:59:59                 Steven                   06      



                                        [place]i                         

 

        2020 Outpatient                 MHIE    MHIE    [id number]

665 [facility]



        13:30:00 13:30:00                                         06      anupam Allenann

 

        2020 Outpatient                 nullFlavo MNA     [id number]

[id number] [facility]



        19:45:00 05:59:59                         r       Neurology 05      l



                                                        [place]         Brien

 

        2020 Outpatient                 nullFlavo MNA     [id number]

[id number] [facility]



        19:45:00 05:59:59                         r       Neurology 05      anupam Tesfaye

 

        2020 Outpatient         [facility]

[id number] 



        13:45:00 23:59:59                 Stevencamron Laureano                         

 

        2020 Outpatient                 MHIE    MHIE    [id number]

[id number] [facility]



        13:45:00 13:45:00                                         05      anupam



                                                                        [place]

 

        2019 Outpatient                 nullFlavo MNA     [id number]

[id number] [facility]



        19:30:00 05:59:59                         r       Neurology 04      anupam Tesfaye

 

        2019 Outpatient                 nullFlavo MNA     [id number]

[id number] [facility]



        19:30:00 05:59:59                         r       Neurology 04      anupam Cesar         [place]

 

        2019 Outpatient         [facility]  [facility]

[id number] 



        13:30:00 23:59:59                 Steven                   Alicia Laureano                         

 

        2019 Outpatient                 MHIE    MHIE    [id number]

665 [facility]



        13:30:00 13:30:00                                         04      l



                                                                        [place]

 

        2019 Outpatient                 nullFlavo MNA     [id number]

[id number] [facility]



        19:15:00 04:59:59                         r       Neurology 03      anupam Cesar         [place]

 

        2019 Outpatient                 nullFlavo MNA     [id number]

[id number] [facility]



        19:15:00 04:59:59                         r       Neurology 03      anupam Cesar         [place]

 

        2019 Outpatient         [facility]

[id number] 



        14:15:00 23:59:59                 Stevencamron Montelongo      



                                        [place]                         

 

        2019 Outpatient                 MHIE    MHIE    [id number]

665 [facility]



        14:15:00 14:15:00                                         03      anupam



                                                                        [place]

 

        2019 Outpatient                 MHIE    MHIE    [id number]

665 [facility]



        13:45:00 13:45:00                                         02      anupam



                                                                        [place]

 

        2019 Outpatient                 MHIE    MHIE    [id number]

665 [facility]



        13:45:00 13:45:00                                         02      anupam Allenann

 

        2018 Outpatient                 SANGEETA AMEZQUITAIE    [id number]

665 [facility]



        13:30:00 13:30:00                                         01      l



                                                                        [place]

 

        2018 Outpatient                 BIAIE    BIAIE    [id number]

665 [facility]



        13:30:00 13:30:00                                         01      l



                                                                        [place]

 

        2018 Outpatient                 IE    BIAIE    [id number]

665 [facility]



        14:30:00 14:30:00                                         00      l



                                                                        [place]

 

        2018 Outpatient                 BIAIE    BIAIE    [id number]

665 [facility]



        14:30:00 14:30:00                                         00      [facility]







Results







           Test Description Test Time  Test Comments Results    Result Comments 

Source









                    CHEM PANEL          2022 15:57:00 









                      Test Item  Value      Reference Range Interpretation Comme

nts









             BUN (test code = BUN) 20                                 



[facility] [employer] [id number] 15:57:00





             Test Item    Value        Reference Range Interpretation Comments

 

             Creatinine Lvl (test code = Creatinine 1.21         0.60-0.88      

           



             Lvl)                                                



[facility] [employer] [id number] 15:57:00





             Test Item    Value        Reference Range Interpretation Comments

 

             eGFR NON-AFR. AMERICAN (test code = 42                             

        



             eGFR NON-AFR. AMERICAN)                                        



[facility] [employer] [id number] 15:57:00





             Test Item    Value        Reference Range Interpretation Comments

 

             eGFR  (test code = eGFR 49                         

            



             )                                        



[facility] [employer] [id number] 15:57:00





             Test Item    Value        Reference Range Interpretation Comments

 

             B/C Ratio (test code = B/C Ratio) 17                           

      



[id number] 15:57:00





             Test Item    Value        Reference Range Interpretation Comments

 

             Sodium Lvl (test code = Sodium Lvl) 138          135-146           

        



[id number] 15:57:00





             Test Item    Value        Reference Range Interpretation Comments

 

             Potassium Lvl (test code = Potassium 4.5          3.5-5.3          

         



             Lvl)                                                



[facility] C[id number] 15:57:00





             Test Item    Value        Reference Range Interpretation Comments

 

             Chloride Lvl (test code = Chloride Lvl) 106                  

            



[facility] CenterUpfhyqeKMGGTBYCJOIU2336-84-03 15:57:00





             Test Item    Value        Reference Range Interpretation Comments

 

             CO2 (test code = CO2) 21           20-32                     



[facility] Camp[id number] 15:57:00





             Test Item    Value        Reference Range Interpretation Comments

 

             WBC X 10x3 (test code = WBC X 10x3) 7.0          3.8-10.8          

        



[place] [id number] 15:57:00





             Test Item    Value        Reference Range Interpretation Comments

 

             RBC X 10x6 (test code = RBC X 10x6) 4.65         3.80-5.10         

        



[facility] CampusTphncfcKENJZQICYL8395-90-78 15:57:00





             Test Item    Value        Reference Range Interpretation Comments

 

             Hgb (test code = Hgb) 14.7         11.7-15.5                 



[facility] CampusWydoelvDEWALYLUGH0563-10-33 15:57:00





             Test Item    Value        Reference Range Interpretation Comments

 

             Hct (test code = Hct) 45.1         35.0-45.0                 



[facility] [id number] 15:57:00





             Test Item    Value        Reference Range Interpretation Comments

 

             MCV (test code = MCV) 97.0         80.0-100.0                



[facility] CampusThpuajzAWHHLYIPCF0057-68-30 15:57:00





             Test Item    Value        Reference Range Interpretation Comments

 

             MCH (test code = MCH) 31.6 pg      27.0-33.0                 



[facility] CampusIfqrulyRYTMXKTTPD2713-53-98 15:57:00





             Test Item    Value        Reference Range Interpretation Comments

 

             MCHC (test code = MCHC) 32.6         32.0-36.0                 



[facility] CampusQcqfvbcQHBRSKLDYM6131-26-02 15:57:00





             Test Item    Value        Reference Range Interpretation Comments

 

             RDW (test code = RDW) 12.9         11.0-15.0                 



[facility] [id number] 15:57:00





             Test Item    Value        Reference Range Interpretation Comments

 

             Platelet (test code = Platelet) 241          140-400               

    



[facility] CampusVtaqiysQOACROSPML1066-36-84 15:57:00





             Test Item    Value        Reference Range Interpretation Comments

 

             MPV (test code = MPV) 10.9         7.5-12.5                  



[facility] [id number] 15:57:00





             Test Item    Value        Reference Range Interpretation Comments

 

             Neutrophils # (test code = Neutrophils 4060         8612-3428      

           



             #)                                                  



[place] [id number] 15:57:00





             Test Item    Value        Reference Range Interpretation Comments

 

             Lymphocytes # (test code = Lymphocytes 2191         850-3900       

           



             #)                                                  



[facility]KcdygeuQAQLRGQKEK3328-79-58 15:57:00





             Test Item    Value        Reference Range Interpretation Comments

 

             Monocytes # (test code = Monocytes #) 511          200-950         

          



[facility] HospitalGfielhcTEGUKWRMWN0630-15-09 15:57:00





             Test Item    Value        Reference Range Interpretation Comments

 

             Eosinophils # (test code = Eosinophils 161                   

           



             #)                                                  



[facility] [id number] 15:57:00





             Test Item    Value        Reference Range Interpretation Comments

 

             Basophils # (test code 77           See_Comment                [Aut

omated message] The



             = Basophils #)                                        system which 

generated



                                                                 this result tra

nsmitted



                                                                 reference range

: <=200.



                                                                 The reference r

nurys was



                                                                 not used to int

erpret



                                                                 this result as



                                                                 normal/abnormal

.



[facility] [id number] 15:57:00





             Test Item    Value        Reference Range Interpretation Comments

 

             Segs (test code = Segs) 58                                     



[facility] [id number] 15:57:00





             Test Item    Value        Reference Range Interpretation Comments

 

             Lymphocytes (test code = Lymphocytes) 31.3                         

          



[facility] [id number] 15:57:00





             Test Item    Value        Reference Range Interpretation Comments

 

             Monocytes (test code = Monocytes) 7.3                              

      



[facility] [id number] 15:57:00





             Test Item    Value        Reference Range Interpretation Comments

 

             Eosinophils (test code = Eosinophils) 2.3                          

          



[facility] [id number] 15:57:00





             Test Item    Value        Reference Range Interpretation Comments

 

             Basophils (test code = Basophils) 1.1                              

      



[facility] [facility] [employer] [id number] 15:57:00





             Test Item    Value        Reference Range Interpretation Comments

 

             BUN (test code = BUN) 20           7-25                      



[facility] [employer] [id number] 15:57:00





             Test Item    Value        Reference Range Interpretation Comments

 

             Creatinine Lvl (test code = Creatinine 1.21         0.60-0.88      

           



             Lvl)                                                



[facility] [id number] 15:57:00





             Test Item    Value        Reference Range Interpretation Comments

 

             eGFR NON-AFR. AMERICAN (test code = 42                             

        



             eGFR NON-AFR. AMERICAN)                                        



[facility] [employer] [id number] 15:57:00





             Test Item    Value        Reference Range Interpretation Comments

 

             eGFR  (test code = eGFR 49                         

            



             )                                        



[facility] [employer] [id number] 15:57:00





             Test Item    Value        Reference Range Interpretation Comments

 

             B/C Ratio (test code = B/C Ratio) 17           6-22                

      



[id number] 15[facility]57:00





             Test Item    Value        Reference Range Interpretation Comments

 

             Sodium Lvl (test code = Sodium Lvl) 138          135-146           

        



[facility]ixukelQXROWIEPTWHP7049-30-30 15:57:00





             Test Item    Value        Reference Range Interpretation Comments

 

             Potassium Lvl (test code = Potassium 4.5          3.5-5.3          

         



             Lvl)                                                



[facility]bb[id number] 15:57:00





             Test Item    Value        Reference Range Interpretation Comments

 

             Chloride Lvl (test code = Chloride Lvl) 106                  

            



[place] [id number] 15:57:00





             Test Item    Value        Reference Range Interpretation Comments

 

             CO2 (test code = CO2) 21           20-32                     



[facility] CampusMtcgoggMZNHNOHAOU3600-52-13 15:57:00





             Test Item    Value        Reference Range Interpretation Comments

 

             WBC X 10x3 (test code = WBC X 10x3) 7.0          3.8-10.8          

        



[facility] [id number] 15:57:00





             Test Item    Value        Reference Range Interpretation Comments

 

             RBC X 10x6 (test code = RBC X 10x6) 4.65         3.80-5.10         

        



[place] 266872-06-08 15:57:00





             Test Item    Value        Reference Range Interpretation Comments

 

             Hgb (test code = Hgb) 14.7         11.7-15.5                 



[place] 266872-06-08 15:57:00





             Test Item    Value        Reference Range Interpretation Comments

 

             Hct (test code = Hct) 45.1         35.0-45.0                 



[place] [address]-06-08 15:57:00





             Test Item    Value        Reference Range Interpretation Comments

 

             MCV (test code = MCV) 97.0         80.0-100.0                



[place] [address]-06-08 15:57:00





             Test Item    Value        Reference Range Interpretation Comments

 

             MCH (test code = MCH) 31.6 pg      27.0-33.0                 



[place] 266872-06-08 15:57:00





             Test Item    Value        Reference Range Interpretation Comments

 

             MCHC (test code = MCHC) 32.6         32.0-36.0                 



[place] 266872-06-08 15:57:00





             Test Item    Value        Reference Range Interpretation Comments

 

             RDW (test code = RDW) 12.9         11.0-15.0                 



[facility] [id number] 15:57:00





             Test Item    Value        Reference Range Interpretation Comments

 

             Platelet (test code = Platelet) 241          140-400               

    



[facility] CampusZhkkvkrASSNFDEEZZ8313-03-36 15:57:00





             Test Item    Value        Reference Range Interpretation Comments

 

             MPV (test code = MPV) 10.9         7.5-12.5                  



[facility] CampusYnvftumLTBDWTWQKX5839-12-37 15:57:00





             Test Item    Value        Reference Range Interpretation Comments

 

             Neutrophils # (test code = Neutrophils 4060         9773-0066      

           



             #)                                                  



[facility] [id number] 15:57:00





             Test Item    Value        Reference Range Interpretation Comments

 

             Lymphocytes # (test code = Lymphocytes 2191         850-3900       

           



             #)                                                  



[facility] CampusNdefbviXJSCEJTYGR5581-78-53 15:57:00





             Test Item    Value        Reference Range Interpretation Comments

 

             Monocytes # (test code = Monocytes #) 511          200-950         

          



[place] 266872-06-08 15:57:00





             Test Item    Value        Reference Range Interpretation Comments

 

             Eosinophils # (test code = Eosinophils 161                   

           



             #)                                                  



[facility] CampusAxmocdtCRMZRITMJI1531-77-78 15:57:00





             Test Item    Value        Reference Range Interpretation Comments

 

             Basophils # (test code 77           See_Comment                [Aut

omated message] The



             = Basophils #)                                        system which 

generated



                                                                 this result tra

nsmitted



                                                                 reference range

: <=200.



                                                                 The reference r

nurys was



                                                                 not used to int

erpret



                                                                 this result as



                                                                 normal/abnormal

.



[facility] [id number] 15:57:00





             Test Item    Value        Reference Range Interpretation Comments

 

             Segs (test code = Segs) 58                                     



[facility] [id number] 15:57:00





             Test Item    Value        Reference Range Interpretation Comments

 

             Lymphocytes (test code = Lymphocytes) 31.3                         

          



[place] [address]-06-08 15:57:00





             Test Item    Value        Reference Range Interpretation Comments

 

             Monocytes (test code = Monocytes) 7.3                              

      



[place] 266872-06-08 15:57:00





             Test Item    Value        Reference Range Interpretation Comments

 

             Eosinophils (test code = Eosinophils) 2.3                          

          



[facility] [id number] 15:57:00





             Test Item    Value        Reference Range Interpretation Comments

 

             Basophils (test code = Basophils) 1.1                              

      



[facility]

## 2022-12-15 NOTE — EKG
Test Date:    2022-12-14               Test Time:    18:31:55

Technician:   DEMIAN                                    

                                                     

MEASUREMENT RESULTS:                                       

Intervals:                                           

Rate:         62                                     

WI:           238                                    

QRSD:         106                                    

QT:           462                                    

QTc:          468                                    

Axis:                                                

P:            74                                     

WI:           238                                    

QRS:          105                                    

T:            74                                     

                                                     

INTERPRETIVE STATEMENTS:                                       

                                                     

Sinus rhythm with 1st degree AV block

Rightward axis

Nonspecific ST and T wave abnormality

Abnormal ECG

Compared to ECG 10/24/2022 15:04:42

Right-axis deviation now present

Possible ischemia no longer present

ST (T wave) deviation still present



Electronically Signed On 12-15-22 08:01:41 CST by Darrian Packer

## 2023-08-03 ENCOUNTER — HOSPITAL ENCOUNTER (INPATIENT)
Dept: HOSPITAL 97 - ER | Age: 83
LOS: 6 days | Discharge: HOME HEALTH | DRG: 689 | End: 2023-08-09
Attending: INTERNAL MEDICINE | Admitting: INTERNAL MEDICINE
Payer: COMMERCIAL

## 2023-08-03 VITALS — BODY MASS INDEX: 40.7 KG/M2

## 2023-08-03 DIAGNOSIS — K21.9: ICD-10-CM

## 2023-08-03 DIAGNOSIS — Z88.1: ICD-10-CM

## 2023-08-03 DIAGNOSIS — Z88.0: ICD-10-CM

## 2023-08-03 DIAGNOSIS — Z90.710: ICD-10-CM

## 2023-08-03 DIAGNOSIS — M79.7: ICD-10-CM

## 2023-08-03 DIAGNOSIS — E78.5: ICD-10-CM

## 2023-08-03 DIAGNOSIS — I48.20: ICD-10-CM

## 2023-08-03 DIAGNOSIS — R29.6: ICD-10-CM

## 2023-08-03 DIAGNOSIS — Z79.899: ICD-10-CM

## 2023-08-03 DIAGNOSIS — Z79.890: ICD-10-CM

## 2023-08-03 DIAGNOSIS — Z86.711: ICD-10-CM

## 2023-08-03 DIAGNOSIS — N30.00: Primary | ICD-10-CM

## 2023-08-03 DIAGNOSIS — Z85.3: ICD-10-CM

## 2023-08-03 DIAGNOSIS — G30.9: ICD-10-CM

## 2023-08-03 DIAGNOSIS — Z87.891: ICD-10-CM

## 2023-08-03 DIAGNOSIS — Z88.2: ICD-10-CM

## 2023-08-03 DIAGNOSIS — Z96.653: ICD-10-CM

## 2023-08-03 DIAGNOSIS — E03.9: ICD-10-CM

## 2023-08-03 DIAGNOSIS — E43: ICD-10-CM

## 2023-08-03 DIAGNOSIS — Z91.81: ICD-10-CM

## 2023-08-03 DIAGNOSIS — Z90.12: ICD-10-CM

## 2023-08-03 DIAGNOSIS — Z90.49: ICD-10-CM

## 2023-08-03 DIAGNOSIS — J44.9: ICD-10-CM

## 2023-08-03 DIAGNOSIS — Z88.8: ICD-10-CM

## 2023-08-03 DIAGNOSIS — Z79.01: ICD-10-CM

## 2023-08-03 DIAGNOSIS — I10: ICD-10-CM

## 2023-08-03 DIAGNOSIS — F02.80: ICD-10-CM

## 2023-08-03 LAB
ALBUMIN SERPL BCP-MCNC: 2.6 G/DL (ref 3.4–5)
ALP SERPL-CCNC: 64 U/L (ref 45–117)
ALT SERPL W P-5'-P-CCNC: 17 U/L (ref 13–56)
AST SERPL W P-5'-P-CCNC: 18 U/L (ref 15–37)
BUN BLD-MCNC: 16 MG/DL (ref 7–18)
GLUCOSE SERPLBLD-MCNC: 159 MG/DL (ref 74–106)
HCT VFR BLD CALC: 43 % (ref 36–45)
INR BLD: 1.44
LYMPHOCYTES # SPEC AUTO: 1.8 K/UL (ref 0.7–4.9)
MCV RBC: 99.1 FL (ref 80–100)
PMV BLD: 8 FL (ref 7.6–11.3)
POTASSIUM SERPL-SCNC: 4 MEQ/L (ref 3.5–5.1)
RBC # BLD: 4.34 M/UL (ref 3.86–4.86)
WBC # BLD AUTO: 6.6 THOU/UL (ref 4.3–10.9)

## 2023-08-03 PROCEDURE — 85025 COMPLETE CBC W/AUTO DIFF WBC: CPT

## 2023-08-03 PROCEDURE — 85610 PROTHROMBIN TIME: CPT

## 2023-08-03 PROCEDURE — 96361 HYDRATE IV INFUSION ADD-ON: CPT

## 2023-08-03 PROCEDURE — 87040 BLOOD CULTURE FOR BACTERIA: CPT

## 2023-08-03 PROCEDURE — 96360 HYDRATION IV INFUSION INIT: CPT

## 2023-08-03 PROCEDURE — 72125 CT NECK SPINE W/O DYE: CPT

## 2023-08-03 PROCEDURE — 85730 THROMBOPLASTIN TIME PARTIAL: CPT

## 2023-08-03 PROCEDURE — 97530 THERAPEUTIC ACTIVITIES: CPT

## 2023-08-03 PROCEDURE — 36415 COLL VENOUS BLD VENIPUNCTURE: CPT

## 2023-08-03 PROCEDURE — 80053 COMPREHEN METABOLIC PANEL: CPT

## 2023-08-03 PROCEDURE — 83605 ASSAY OF LACTIC ACID: CPT

## 2023-08-03 PROCEDURE — 81001 URINALYSIS AUTO W/SCOPE: CPT

## 2023-08-03 PROCEDURE — 99285 EMERGENCY DEPT VISIT HI MDM: CPT

## 2023-08-03 PROCEDURE — 93005 ELECTROCARDIOGRAM TRACING: CPT

## 2023-08-03 PROCEDURE — 97116 GAIT TRAINING THERAPY: CPT

## 2023-08-03 PROCEDURE — 70450 CT HEAD/BRAIN W/O DYE: CPT

## 2023-08-03 PROCEDURE — 97161 PT EVAL LOW COMPLEX 20 MIN: CPT

## 2023-08-03 RX ADMIN — DULOXETINE HYDROCHLORIDE SCH MG: 30 CAPSULE, DELAYED RELEASE ORAL at 22:30

## 2023-08-03 RX ADMIN — TOPIRAMATE SCH MG: 100 TABLET, FILM COATED ORAL at 22:30

## 2023-08-03 RX ADMIN — APIXABAN SCH MG: 5 TABLET, FILM COATED ORAL at 22:30

## 2023-08-03 RX ADMIN — NITROFURANTOIN MONOHYDRATE/MACROCRYSTALLINE SCH MG: 25; 75 CAPSULE ORAL at 22:30

## 2023-08-03 NOTE — P.HP
Certification for Inpatient


Patient admitted to: Inpatient


With expected LOS: >2 Midnights


Patient will require the following post-hospital care: Skilled Nursing


Practitioner: I am a practitioner with admitting privileges, knowledge of 

patient current condition, hospital course, and medical plan of care.


Services: Services provided to patient in accordance with Admission requirements

found in Title 42 Section 412.3 of the Code of Federal Regulations





Patient History


Date of Service: 08/03/23


Primary Care Provider: Oj


Reason for admission: uti protein energy malnutrition 


History of Present Illness: 





patient of Dr. Mcwilliams.  She has a history of copd, afib hypothyroidism.  The 

patient fell 10 days ago and hit her head.  Since then she has become more bed 

bound.  Needs assistance to get to and from the [facility].  She had a uti.  Was 

seen by Dr. Mcwilliams and was started on levaquin and then added nitrofurantin. She 

was getting weaker and was told to come to the ER.  She has no confusion.  

However the patient has no home health or PT.  She has one son at home.  She is 

a fall risk 


Allergies





clindamycin HCl [From Cleocin] Allergy (Severe, Verified 02/21/22 22:02)


   Hives/Rash


clindamycin palmitate HCl [From Cleocin] Allergy (Severe, Verified 02/21/22 

22:02)


   Hives/Rash


clindamycin phosphate [From Cleocin] Allergy (Severe, Verified 02/21/22 22:02)


   Hives/Rash


Penicillins Allergy (Severe, Verified 02/21/22 22:02)


   Hives/Rash


Sulfa (Sulfonamide Antibiotics) [Sulfa(Sulfonamide Antibiotics)] Allergy 

(Severe, Verified 02/21/22 22:02)


   Hives/Rash


propoxyphene [From Darvocet-N 100] Allergy (Verified 02/21/22 22:02)


   Unknown


strawberries Allergy (Severe, Uncoded 03/24/21 05:19)


   Hives/Rash





Home Medications: 








Albuterol Sulfate [Proair Respiclick] 2 puff IH Q6HP PRN 02/22/22 


Apixaban [Eliquis] 5 mg PO BID 02/22/22 


Cyanocobalamin (Vitamin B-12) [Vitamin B12] 1,000 mcg PO DAILY 02/22/22 


Cyclosporine [Restasis] 1 drop EACH EYE TID 02/22/22 


Donepezil HCl 10 mg PO DAILY 02/22/22 


Duloxetine HCl 60 mg PO BID 02/22/22 


Estradiol 0.01% Cream 1 appl VAG SEECOM 02/22/22 


Flecainide Acetate 50 mg PO BID 02/22/22 


Fluticasone/Umeclidin/Vilanter [Trelegy Ellipta 100-62.5-25] 1 puff IH DAILY 

02/22/22 


Gabapentin 300 mg PO BEDTIME 02/22/22 


Levothyroxine [Synthroid*] 75 mcg PO [id number] 02/22/22 


Memantine HCl 10 mg PO BID 02/22/22 


Montelukast [Singulair*] 10 mg PO DAILY 02/22/22 


Pantoprazole [Protonix Tab*] 40 mg PO DAILY 02/22/22 


Potassium Chloride 20 meq PO BID 02/22/22 


Quetiapine Fumarate [Seroquel] 25 mg PO BEDTIME 02/22/22 


Simvastatin 40 mg PO BEDTIME 02/22/22 


Tizanidine HCl 4 mg PO DAILY 02/22/22 


Topiramate 100 mg PO BID 02/22/22 


buPROPion HCL [Bupropion Xl] 300 mg PO DAILY 02/22/22 


Acetaminophen 650 mg PO Q6H PRN 09/13/22 


Benzonatate [Tessalon Perle*] 200 mg PO Q8H PRN 09/13/22 


Pataday 0.7% Drops  1 drop OPTH DAILY 09/13/22 


Propylene Glycol/Peg 400 [Lubricating Eye Drop] 1 drop OPTH BID 09/13/22 


Cranberry Fruit Extract [Ellura] 200 mg PO DAILY 10/25/22 


Cephalexin [Keflex] 500 mg PO Q6HR 7 Days #28 cap 10/27/22 


Metoprolol Succinate [Toprol Xl*] 12.5 mg PO BEDTIME 30 Days #15 tab 10/27/22 








- Past Medical/Surgical History


Diabetic: No


-: Lymphedema


-: Afib


-: L breast cancer


-: PE


-: Meniere's Disease


-: Former smoker


-: Recurrent UTIs


-: Fibromyalgia


-: GERD


-: Insomnia


-: Depression


-: Neuropathy


-: s/p Left Mastectomy


-: SANTOSH TKA


-: Cervical fusion


-: Lumbar fusion


-: Appendectomy


-: Hysterectomy


Psychosocial/ Personal History: Patient [facility] in an assisted living facility[facility]





- Family History


  ** Father


-: Diabetes


Notes: heart attack.  alcoholic





- Social History


Alcohol use: No


CD- Drugs: No


Caffeine use: No





Review of Systems


10-point ROS is otherwise unremarkable


General: Weakness


Respiratory: SOB with Excertion





Physical Examination





- Physical Exam


General: Alert, In no apparent distress


HEENT: Atraumatic, PERRLA, Mucous membr. moist/pink, EOMI, Sclerae nonicteric


Neck: Supple, 2+ carotid pulse no bruit, No LAD, Without JVD or thyroid 

abnormality


Respiratory: Clear to auscultation bilaterally, Normal air movement


Cardiovascular: Regular rate/rhythm, Normal S1 S2


Gastrointestinal: Normal bowel sounds, No tenderness


Musculoskeletal: No tenderness


Integumentary: No rashes


Neurological: Normal gait, Normal speech, Normal strength at 5/5 x4 extr, Normal

tone, Normal affect


Lymphatics: No axilla or inguinal lymphadenopathy





- Studies


Laboratory Data (last 24 hrs)











  08/03/23 08/03/23 08/03/23





  14:11 14:11 14:11


 


WBC    6.60


 


Hgb    13.7


 


Hct    43.0


 


Plt Count    238


 


PT  15.8 H  


 


INR  1.44  


 


APTT  37.8 H  


 


Sodium   136 


 


Potassium   4.0 


 


BUN   16 


 


Creatinine   1.04 H 


 


Glucose   159 H 


 


Total Bilirubin   0.3 


 


AST   18 


 


ALT   17 


 


Alkaline Phosphatase   64 











Assessment and Plan





- Problems (Diagnosis)


(1) UTI (urinary tract infection)


Onset Date: 09/25/18   Current Visit: No   Status: Acute   


Plan: 


will restart the levaquin and the nitrofurantin 


Qualifiers: 


   Urinary tract infection type: acute cystitis   Hematuria presence: without 

hematuria   Qualified Code(s): N30.00 - Acute cystitis without hematuria   





(2) Severe protein-energy malnutrition


Current Visit: Yes   Status: Chronic   


Plan: 


will order pt and see if we can move the patient to a PT center or snf.  She is 

getting weaker.  Can not transfer without assistance.   








(3) GERD (gastroesophageal reflux disease)


Current Visit: No   Status: Acute   


Plan: 


restart home protonix.  


Qualifiers: 


   Esophagitis presence: esophagitis presence not specified   Qualified Code(s):

K21.9 - Gastro-esophageal reflux disease without esophagitis   





(4) Afib


Current Visit: No   Status: Chronic   


Plan: 


may need to wean down her beta blocker


Qualifiers: 


   Atrial fibrillation type: unspecified chronic   Qualified Code(s): I48.20 - 

Chronic atrial fibrillation, unspecified; I48.2 - Chronic atrial fibrillation   





(5) Dementia


Current Visit: No   Status: Chronic   


Plan: 


restart namenda


Qualifiers: 


   Dementia type: unspecified type   Qualified Code(s): F03.90 - Unspecified 

dementia, unspecified severity, without behavioral disturbance, psychotic 

disturbance, mood disturbance, and anxiety   


Discharge Plan: LTAC


Plan to discharge in: Greater than 2 days





- Advance Directives


Does patient have a Living Will: No


Does patient have a Durable POA for Healthcare: Yes





- Code Status/Comfort Care


Code Status Assessed: Yes


Code Status: Full Code


Physician Review: Patient Assessed, Agree with Above Assessment and Plan


Critical Care: No


Time Spent Managing Pts Care (In Minutes): 50

## 2023-08-03 NOTE — ER
Nurse's Notes                                                                                     

 [facility]                                                                 

Name: Leona Peres                                                                              

Age: 82 yrs                                                                                       

Sex: Female                                                                                       

: 1940                                                                                   

MRN: [id number]                                                                                   

Arrival Date: 2023                                                                          

Time: 12:38                                                                                       

Account#: [id number]                                                                            

Bed 15                                                                                            

Private MD: Javier Mcwilliams T                                                                        

Diagnosis: Altered mental status, unspecified;Muscle weakness (generalized);Contusion of          

  unspecified part of head;Moderate protein-calorie malnutrition                                  

                                                                                                  

Presentation:                                                                                     

                                                                                             

13:01 Chief complaint: Patient states: generalized weakness and SOB upon exertion that began  ss  

      approximately 1 week ago. Denies fever. Pt was recently diagnosed with a UTI and            

      prescribed Levaquin and was told by Dr. Mcwilliams that he was going to also call in              

      Macrodantin. Pt also reports she fell from standing and hit her head on . Saw Dr. Cannon, neurologist, who ordered an outpatient CT for tomorrow. Coronavirus screen:          

      Client denies travel out of the [place] in the last 14 days. Ebola Screen: Patient denies      

      exposure to infectious person. Patient denies travel to an Ebola-affected area in the       

      21 days before illness onset. No acute neurological deficit is noted. Pre-hospital          

      glucose is not applicable to this patient. Initial Sepsis Screen: Does the patient meet     

      any 2 criteria? No. Patient's initial sepsis screen is negative. Does the patient have      

      a suspected source of infection? No. Patient's initial sepsis screen is negative. Risk      

      Assessment: Do you want to hurt yourself or someone else? Patient reports no desire to      

      harm self or others. Onset of symptoms is unknown.                                          

13:01 Method Of Arrival: Ambulatory                                                           ss  

13:01 Acuity: ANUSHKA 3                                                                           ss  

                                                                                                  

Stroke Activation: Symptom onset > 6 hours                                                        

 Physician: Stroke Attending; Name: ; Notified At: ; Arrived At:                                  

 Physician: Chief Stroke Resident; Name: ; Notified At: ; Arrived At:                             

 Physician: Stroke Resident; Name: ; Notified At: ; Arrived At:                                   

 Physician: ED Attending; Name: ; Notified At: ; Arrived At:                                      

 Physician: ED Resident; Name: ; Notified At: ; Arrived At:                                       

                                                                                                  

Historical:                                                                                       

- Allergies:                                                                                      

13:05 clindamycin HCl (bulk);                                                                 ss  

13:05 Darvocet-N 100;                                                                         ss  

13:05 PENICILLINS;                                                                            ss  

13:05 Strawberries;                                                                           ss  

13:05 Sulfa (Sulfonamide Antibiotics);                                                        ss  

- PMHx:                                                                                           

13:05 AFIB; Alzheimer's disease; Cancer; lymphedema; skipped heart beats; insomnia;           ss  

      Hypothyroidism; angina pectoris; Hypertension; left breast cancer; COPD;                    

      Hyperlipidemia; Fibromyalgia; Depression; GERD;                                             

- PSHx:                                                                                           

13:05 bilateral knee replacement; Appendectomy; mastectomy; Spinal Fusion; Total abdominal    ss  

      hysterectomy;                                                                               

                                                                                                  

- Immunization history:: Adult Immunizations unknown.                                             

- Social history:: Smoking status: unknown.                                                       

                                                                                                  

                                                                                                  

Screenin:00 [facility] ED Fall Risk Assessment (Adult) History of falling in the last 3 months,       nj1 

      including since admission Score/Fall Risk Level 3 or more points = High Risk Oriented       

      to surroundings, Maintained a safe environment, Educated pt \T\ family on fall              

      prevention, incl call for assistance when getting out of bed, Assessed \T\ reinforced       

      patient's understanding of fall precautions, Provided non-skid footwear, Hourly             

      rounding (assess needs \T\ fall precautionary measures) done, Used ambulatory aids as       

      needed (educated on \T\ assisted with), Remained with patient while ambulating, Utilized    

      family, sitter, or virtual  as indicated. Abuse screen: Denies threats or          

      abuse. Denies injuries from another. Nutritional screening: No deficits noted.              

      Tuberculosis screening: No symptoms or risk factors identified.                             

                                                                                                  

Assessment:                                                                                       

14:00 General: Appears in no apparent distress. comfortable, Behavior is calm, cooperative,   nj1 

      appropriate for age. Pain: Denies pain. Neuro: Level of Consciousness is awake, alert,      

      obeys commands, Oriented to person, place, situation, Reports weakness. Cardiovascular:     

      Patient's skin is warm and dry. Respiratory: Airway is patent Respiratory effort is         

      even, unlabored.                                                                            

15:24 Reassessment: Patient appears in no apparent distress at this time. Patient and/or      nj1 

      family updated on plan of care and expected duration. Pain level reassessed. Patient is     

      alert, oriented x 3, equal unlabored respirations, skin warm/dry/pink.                      

16:30 Reassessment: Patient appears in no apparent distress at this time. Patient and/or      nj1 

      family updated on plan of care and expected duration. Pain level reassessed. Patient is     

      alert, oriented x 3, equal unlabored respirations, skin warm/dry/pink.                      

17:30 Reassessment: Patient appears in no apparent distress at this time. Patient and/or      nj1 

      family updated on plan of care and expected duration. Pain level reassessed. Patient is     

      alert, oriented x 3, equal unlabored respirations, skin warm/dry/pink.                      

18:30 Reassessment: Patient appears in no apparent distress at this time. Patient and/or      nj1 

      family updated on plan of care and expected duration. Pain level reassessed. Patient is     

      alert, oriented x 3, equal unlabored respirations, skin warm/dry/pink.                      

19:30 Reassessment: Patient appears in no apparent distress at this time. Patient and/or      nj1 

      family updated on plan of care and expected duration. Pain level reassessed. Patient is     

      alert, oriented x 3, equal unlabored respirations, skin warm/dry/pink.                      

20:34 Reassessment: Patient appears in no apparent distress at this time. Patient and/or      nj1 

      family updated on plan of care and expected duration. Pain level reassessed. Patient is     

      alert, oriented x 3, equal unlabored respirations, skin warm/dry/pink.                      

21:35 Reassessment: Patient appears in no apparent distress at this time. Patient and/or      jw7 

      family updated on plan of care and expected duration. Pain level reassessed. Patient is     

      alert, oriented x 3, equal unlabored respirations, skin warm/dry/pink.                      

21:47 General: Report given to ZHAO Mckeon.                                                   jw7 

                                                                                                  

Vital Signs:                                                                                      

13:01  / 79; Pulse 68; Resp 20; Temp 98.1(O); Pulse Ox 97% on R/A; Weight 103.42 kg;    ss  

      Height 5 ft. 3 in. ; Pain 2/10;                                                             

14:15  / 78; Pulse 59; Resp 18; Pulse Ox 96% ;                                          nj1 

15:23  / 65; Pulse 59; Resp 18; Pulse Ox 95% on R/A;                                    nj1 

16:30  / 78; Pulse 61; Resp 15; Pulse Ox 98% on R/A;                                    nj1 

17:30  / 69; Pulse 59; Resp 18; Pulse Ox 95% on R/A;                                    nj1 

19:02  / 68; Pulse 59; Resp 19; Pulse Ox 97% on R/A;                                    nj1 

20:33  / 74; Pulse 59; Resp 18; Pulse Ox 95% on R/A;                                    nj1 

13:01 Body Mass Index 40.39 (103.42 kg, 160.02 cm)                                            ss  

13:01 Pain Scale: Adult                                                                       ss  

                                                                                                  

ED Course:                                                                                        

12:39 Patient arrived in ED.                                                                  rg4 

12:39 Javier Mcwilliams MD is Private Physician.                                                   rg4 

12:43 Felicita Ricci MD is Attending Physician.                                            cp3 

12:47 Wanda Kaufman RN is Primary Nurse.                                                       nj1 

13:05 Triage completed.                                                                       ss  

13:05 Arm band placed on left wrist.                                                          ss  

13:23 Head C Spine Mpr Wo Con In Process Unspecified.                                         EDMS

14:00 Patient has correct armband on for positive identification. Bed in low position. Call   nj1 

      light in reach. Side rails up X 1. Adult w/ patient.                                        

14:00 Provided Education on: fall precautions, call light.                                    nj1 

14:11 Inserted saline lock: 20 gauge in right antecubital area, using aseptic technique.      nj1 

      ,using aseptic technique. Ultrasound guided. Catheter tip well visualized within            

      vasculature during placement. Blood collected.                                              

17:44 Felicita Ricci MD is Hospitalizing Provider.                                         cp3 

17:48 Hospitalizing Provider role handed off by Felicita Ricci MD                          cp3 

17:48 Pollo Sweeney MD is Hospitalizing Provider.                                            cp3 

20:11 No provider procedures requiring assistance completed. Patient admitted, IV remains in  nj1 

      place.                                                                                      

                                                                                                  

Administered Medications:                                                                         

14:12 Drug: Lactated Ringers Solution IV 1000 ml Route: IV; Rate: 126 ml/hr; Site: right      nj1 

      antecubital;                                                                                

21:49 Follow up: Response: No adverse reaction; IV Status: Completed infusion; IV Intake:     jw7 

      1000ml                                                                                      

                                                                                                  

                                                                                                  

Medication:                                                                                       

20:12 VIS not applicable for this client.                                                     nj1 

                                                                                                  

Intake:                                                                                           

21:49 IV: 1000ml; Total: 1000ml.                                                              jw7 

                                                                                                  

Outcome:                                                                                          

17:46 Decision to Hospitalize by Provider.                                                    cp3 

21:47 Admitted to Tele accompanied by nurse, family with patient, via wheelchair, room 402,   jw7 

      with chart, Report called to  ZHAO Mckeon                                                   

21:47 Condition: stable                                                                           

21:47 Instructed on the need for admit.                                                           

21:52 Patient left the ED.                                                                    jw7 

                                                                                                  

Signatures:                                                                                       

Dispatcher [employer]                           EDMS                                                 

Felicita Ricci MD MD   cp3                                                  

Kimberly Mendosa RN                   RN                                                      

Dennise Braswell                                 rg4                                                  

Xiomara Summers RN RN   jw7                                                  

Wanda Kaufman RN                         RN   nj1                                                  

                                                                                                  

Corrections: (The following items were deleted from the chart)                                    

21:04 14:00 Neuro: Level of Consciousness is awake, alert, obeys commands, Oriented to        nj1 

      person, place, time, situation, Reports weakness nj1                                        

                                                                                                  

**************************************************************************************************

## 2023-08-03 NOTE — XMS REPORT
Continuity of Care Document

                            Created on:August 3, 2023



Patient:Leona Peres

Sex:Female

:1940

External Reference #:[id number]





Demographics







                          Address                   [address]



                                                    [place] [address]

 

                          Home Phone                [phone]

 

                          Work Phone                [phone]

 

                          Mobile Phone              [phone]

 

                          Email Address             [email or web address]

 

                          Preferred Language        en

 

                          Marital Status            

 

                          Jain Affiliation     Unknown

 

                          Race                      Unknown

 

                          Additional Race(s)        Unavailable



                                                    White

 

                          Ethnic Group              Not  or 









Author







                          Organization              [facility]

t

 

                          Address                   [address]



                                                    [place] [address]

 

                          Phone                     [phone]









Support







                Name            Relationship    Address         Phone

 

                BLAINE PERES UN              [id number]    [phone]



                                                [place] [address] 

 

                GIVEN, NONE     Unavailable     [id number]     [phone]



                                                [place] [address] 

 

                MARC PERES CH              [address]   [phone]



                                                [place] [address] 

 

                Lydia Valentin   Child           Unavailable     [phone]

 

                HomeroMaida Other           Unavailable     [phone]

 

                Leona Peres Unavailable     [address] [phone]



                                                [place] [address] 

 

                Desiree Pelletier Daughter        Unavailable     [phone]

 

                Blaine Peres Spouse          [address]   [phone]



                                                [place] [address] 









Care Team Providers







                    Name                Role                Phone

 

                    Kendall Barger MD    Primary Care Physician [phone]

 

                    Rebecca Cerrato       Attending Clinician Unavailable

 

                    Nikolas Mcwilliams       Attending Clinician Unavailable

 

                    [id number]              Attending Clinician Unavailable

 

                    SELIN CHRISTOPHER Attending Clinician Unavailable

 

                    Steven Cannon Attending Clinician [phone]

 

                    Doctor Unassigned, No Name Attending Clinician Unavailable

 

                    Aftab Urrutia  Attending Clinician [phone]

 

                    OLEKSANDR EVANS   Attending Clinician Unavailable

 

                    Oleksandr Evans MD Attending Clinician [phone]

 

                    Rahul RIZVI, Tara Benson Attending Clinician [phone]

[phone]

 

                    Selin Christopher MD Attending Clinician [phone]

 

                    Rachel RIZVI, Say Gonzalez Attending Clinician [phone]

 

                    Arias Christopher MD Attending Clinician [phone]

 

                    Ellis Mcadams MD     Attending Clinician [phone]

 

                    Nikolas Mcwilliams       Admitting Clinician Unavailable

 

                    Kendall Barger     Admitting Clinician Unavailable

 

                    [id number]              Admitting Clinician Unavailable

 

                    SELIN CHRISTOPHER Admitting Clinician Unavailable

 

                    SAY RAMOS Admitting Clinician Unavailable









Payers







           Payer Name Policy Type Policy Number Effective Date Expiration Date S

ource







Problems







       Condition Condition Condition Status Onset  Resolution Last   Treating Co

mments 

Source



       Name   Details Category        Date   Date   Treatment Clinician        



                                                 Date                 

 

       Atrial Atrial Disease Recurre                              [facility]



       fibrillati fibrillati        nce    8-16                               [facility]

kes



       on with on with               00:00:                             Medical



       [facility]    [facility]                  00                                 Center

 

       At risk  At risk Problem Active 2023               Me

moria



       for falls for falls               5-          14:06:17               l



       (finding) (finding)               00:00:                             [facility]

carla



              Active               00                                 



              2017                                                  



              Problem                                                  



              2023                                                  



              [facility]                                                  

 

       Hallucinat  Hallucina Problem Active 2023            

   [facility]



       ions   tions                5-02          14:06:17               l



       (finding) (finding)               00:00:                             [facility]

carla



              Active               00                                 



              2017                                                  



              Problem                                                  



              2023                                                  



              [facility]                                                  

 

       Morbid Morbid Disease Active 2016                             [facility]



       obesity obesity               2-20                               ity of



       with body with body               00:00:                             [place]

s



       mass index mass index               00                                 Me

dical



       of 50 or of 50 or                                                  Br[facility]



       higher higher                                                  

 

       Morbid Morbid Disease Active 2016                             [facility]



       obesity obesity               2-20                               ity of



       with body with body               00:00:                             [place]

s



       mass index mass index               00                                 Me

dical



       of     of                                                      Branch



       40.0-49.9 40.0-49.9                                                  

 

       Paresthesi  Paresthes Problem Active 2023            

   [facility]



       a      ia                   4-20          14:06:17               l



       (finding) (finding)               00:00:                             [facility]

carla



              Active               00                                 



              2016                                                  



              Problem                                                  



              2023                                                  



              [facility]                                                  

 

       Essential  Essential Problem Active 2023             

  [facility]



       hypertensi hypertensi               2-16          14:06:17               

l



       on     on                   00:00:                             [place]



       (disorder) (disorder)               00                                 



               Active                                                  



              2015                                                  



              Problem                                                  



              2023                                                  



              [facility]                                                  

 

       Essential  Essential Problem Active 2023             

  [facility]



       tremor tremor               2-16          14:06:17               l



       (disorder) (disorder)               00:00:                             [facility]

rmann



               Active               00                                 



              2015                                                  



              Problem                                                  



              2023                                                  



              [facility]                                                  

 

       Carpal  Carpal Problem Active               2023               [facility]



       tunnel tunnel                             14:06:17               l



       syndrome syndrome                                                  [place]

n



       (disorder) (disorder)                                                  



              Active                                                  



              Problem                                                  



              2023                                                  



              [facility]                                                  

 

       Concussion  Concussio Problem Active               2023            

   [facility]



       injury of n injury                             14:06:17               l



       body   of body                                                  [place]



       structure structure                                                  



       (disorder) (disorder)                                                  



              Active                                                  



              Problem                                                  



              2023                                                  



              [facility]                                                  

 

       Dementia  Dementia Problem Active               2023               

[facility]



       (disorder) (disorder)                             14:06:17               

l



              Active                                                  [place]



              Problem                                                  



              2023                                                  



              [facility]                                                  

 

       Depressive  Depressiv Problem Active               2023            

   [facility]



       disorder e disorder                             14:06:17               l



       (disorder) (disorder)                                                  [facility]

rmann



              Active                                                  



              Problem                                                  



              2023                                                  



              [facility]                                                  

 

       Fibromyalg  Fibromyal Problem Active               2023            

   [facility]



       ia     marlen                                14:06:17               l



       (disorder) (disorder)                                                  Tom

rmann



              Active                                                  



              Problem                                                  



              2023                                                  



              [facility]                                                  

 

       Gout     Gout Problem Active               2023               [facility]

ia



       (disorder) (disorder)                             14:06:17               

l



              Active                                                  [facility]



              Problem                                                  



              2023                                                  



               [facility]                                                  

 

       Headache  Headache Problem Active               2023               

[facility]



       (finding) (finding)                             14:06:17               l



              Active                                                  [place]



              Problem                                                  



              2023                                                  



              [facility]                                                     



              Neurology                                                  



              [place]                                                  

 

       Hyperlipid  Hyperlipi Problem Active               2023            

   [facility]



       emia   demia                              14:06:17               l



       (disorder) (disorder)                                                  He

rmann



              Active                                                  



              Problem                                                  



              2023                                                  



              [facility]                                                  

 

       Polymyalgi  Polymyalg Problem Active               2023            

   [facility]



       a      ia                                 14:06:17               l



       rheumatica rheumatica                                                  He

rmann



       (disorder) (disorder)                                                  



              Active                                                  



              Problem                                                  



              2023                                                  



              [facility]                                                  

 

       Recurrent  Recurrent Problem Active               2023             

  [facility]



       falls  falls                              14:06:17               l



       (finding) (finding)                                                  [facility]

carla



              Active                                                  



              Problem                                                  



              2023                                                  



              [facility]                                                  

 

       Syncope   Syncope Problem Active               2023               M

[facility]



       (disorder) (disorder)                             14:06:17               

l



              Active                                                  [place]



              Problem                                                  



              2023                                                  



              [facility]                                                  

 

       Transient  Transient Problem Active               2023             

  [place]



       ischemic ischemic                             14:06:17               l



       attack attack                                                  [place]



       (disorder) (disorder)                                                  



              Active                                                  



              Problem                                                  



              2023                                                  



              [facility]                                                  

 

       Spinal  Spinal Problem Active               2023               [facility]



       stenosis stenosis                             14:06:17               l



       in     in                                                      [place]



       cervical cervical                                                  



       region region                                                  



       (disorder) (disorder)                                                  



              Active                                                  



              Problem                                                  



              2023                                                  



              [facility]                                                     



              Neurology                                                  



              [place]                                                  

 

       CORNELIA    CORNELIA    Disease Active                                    [facility]



       (obstructi (obstructi                                                  [facility]

kes



       ve sleep ve sleep                                                  [facility]

l



       apnea) apnea)                                                  Center

 

       [id number] Status Problem                                           Common



              post total                                                  [facility]



              replacemen                                                  - [facility]



              t of right                                                  [facility]

 

       [id number] Pain,  Problem                                           Commo

n



       [id number] joint,                                                  [facility]



              shoulder,                                                  - [facility]



              [facility]

 

       [id number] Pain,  Problem                                           Commo

n



       [id number] joint,                                                  [facility]



              ankle,                                                  - [facility]



              [facility]

 

       [id number] Injury of Problem                                           Com

mon



              left knee,                                                  [facility]



              initial                                                  - [facility]



              encounter                                                  [facility]

 

       [id number] Closed Problem                                           Common



              Colles'                                                  [facility]



              fracture                                                  - [facility]



              [facility]



              initial                                                  [facility]



              encounter                                                  Center

 

       [id number] Status Problem                                           Commo

n



       105    post total                                                  [facility]



              left knee                                                  - [facility]



              replacemen                                                  [facility]

 

       [id number] Closed Problem                                           Common



              displaced                                                  [facility]



              fracture                                                  - [facility]



              of lateral                                                  



              malleolus                                                  [facility]



              of left                                                  Medical



              fibula,                                                  Center



              initial                                                  



              encounter                                                  

 

       [id number] Acute pain Problem                                           Com

mon



              of right                                                  [facility]



              shoulder                                                  - [facility]



                                                                      [facility]

 

       Tremor  Tremor Problem Resolve 2017-0 2022-06-10 2022-06-10              

 [facility]



       (finding) (finding)        d      5-   04:45:19 04:45:19               

l



              Resolved               00:00:                             [facility]



              2017               00                                 



              Problem                                                  



              06/10/2022                                                  



              [facility]                                                  



              Neuro                                                   

 

       Amnesia  Amnesia Problem Resolve 2015-1 2022-06-10 2022-06-10            

   [facility]



       (finding) (finding)        d      2-16   04:45:19 04:45:19               

l



              Resolved               00:00:                             [facility]



              2015               00                                 



              Problem                                                  



              06/10/2022                                                  



               [facility]                                                  



              Neuro                                                   







Allergies, Adverse Reactions, Alerts







       Allergy Allergy Status Severity Reaction(s) Onset  Inactive Treating Comm

ents 

Source



       Name   Type                        Date   Date   Clinician        

 

       Penicill DA     Active SV     RASH-RAISED -0                      [facility]



       ins                                3-15                        [place]



                                          00:00:                      d



                                          00                          [facility]

 

       Sulfa  DA     Active SV     RASH-RAISED -0                      [facility]



       (Sulfona                             3-15                        [place]



       mide                               00:00:                      d



       Antibiot                             00                          Medical



       ics)                                                           [place]

 

       propoxyp DA     Active MO     HALLUCINATIO -0                      HC

A



       hene                        NS     3-15                        [place]



                                          00:00:                      d



                                          00                          [facility]

 

       acetamin DA     Active MO     HALLUCINATIO -0                      HC

A



       ophen                       NS     3-15                        [place]



                                          00:00:                      d



                                          00                          [facility]

 

       clindamy DA     Active MO     RASH-RAISED 3-0                      [facility]



       jayna                                3-15                        [place]



                                          00:00:                      d



                                          00                          [facility]

 

       Strawber Propensi Active        Rash   1-0                      [facility]



       ry     ty to                       8-17                        [facility]



              adverse                      00:00:                      Medical



              reaction                      00                          Center



              s                                                       

 

       STRAWBER Allergy Active Low    Rash   1-0                      [facility]



       RY                                 8-17                        [facility]



                                          [facility]                      [facility]



                                          00                          Center

 

       Clindamy Propensi Active        Rash   1-0                      [facility]



       jayna Hcl ty to                       8-16                        [facility]



              adverse                      00:00:                      Medical



              reaction                      00                          Center



              s                                                       

 

       Penicill Propensi Active        Rash   1-0                      [facility]



       in     ty to                       8-16                        [facility]



              adverse                      00:00:                      Medical



              reaction                      00                          Center



              s                                                       

 

       Sulfa  Propensi Active        Rash   1-0                      [facility]



       (Sulfona ty to                       8-16                        [facility]



       mide   adverse                      00:00:                      Medical



       Antibiot reaction                      00                          Center



       ics)   s                                                       

 

       CLINDAMY Allergy Active High   Rash   1-0                      [facility]



       JAYNA HCL                             8-16                        [facility]



                                          00:00:                      [facility]



                                          00                          Center

 

       PENICILL Allergy Active High   Rash   1-0                      [facility]



       IN                                 8-16                        [facility]



                                          00:00:                      [facility]



                                          00                          Center

 

       SULFA  Allergy Active High   Rash   -0                      [facility]



       (SULFONA                             8-16                        [facility]



       MIDE                               00:00:                      Medical



       ANTIBIOT                             00                          Center



       ICS)                                                           

 

       Sulfa  Propensi Active        Rash   -0                      [facility]



       (Sulfona ty to                       8-16                        [facility]



       mide   adverse                      00:00:                      Medical



       Antibiot reaction                      00                          Center



       ics)   s                                                       

 

       Clindamy Propensi Active        Unknown 2020-0                      [facility]

di



       jayna    ty to                Reaction [address]                        st



       Phosphat adverse                      00:00:                      [facility]osp[facility]



       e      reaction                      00                          l



              s to                                                    



              drug                                                    

 

       Propoxyp Propensi Active        Unknown 2020-0                      [facility]

di



       hene   ty to                Reaction [address]                        st



       N-Acetam adverse                      00:00:                      [facility]osp[facility]



       inophen reaction                      00                          l



              s to                                                    



              drug                                                    

 

       Penicill Propensi Active        Unknown 2020-0                      [facility]

di



       ins    ty to                Reaction [address]                        st



              adverse                      00:00:                      [facility]osp[facility]



              reaction                      00                          l



              s to                                                    



              drug                                                    

 

       Sulfa  Propensi Active        Unknown 2020-0                      [facility]



       (Sulfona ty to                Reaction [address]                        st



       mide   adverse                      00:00:                      Hospita



       Antibiot reaction                      00                          l



       ics)   s to                                                    



              drug                                                    

 

       Acetamin Propensi Active        Unknown 2020-0                      [facility]

di



       ophen  ty to                Reaction [address]                        st



              adverse                      00:00:                      [facility]ospita



              reaction                      00                          l



              s to                                                    



              drug                                                    

 

       Acetamin Propensi Active        Unknown - 2020-0                      [facility]i

vers



       ophen  ty to                See comments 6-30                        ity 

of



              adverse                      00:00:                      [place]



              reaction                      00                          Medical



              s                                                       Branch

 

       Propoxyp Propensi Active        Unknown - 2020-0                      Uni

vers



       hene   ty to                See comments 6-30                        ity 

of



       N-Acetam adverse                      00:00:                      [place]



       inophen reaction                      00                          Medical



              s                                                       Branch

 

       Penicill Propensi Active        Unknown 2020-0                      [facility]

di



       ins    ty to                Reaction [address]                        st



              adverse                      00:00:                      [facility]ospita



              reaction                      00                          l



              s to                                                    



              drug                                                    

 

       Clindamy Propensi Active        Rash   2016                      [facility]

s



       jayna Hcl ty to                       2-19                        ity of



              adverse                      00:00:                      [place]



              reaction                      00                          Medical



              s                                                       Branch

 

       Penicill Propensi Active        Anaphylaxis -                      U

nivers



       in     ty to                       2-19                        ity of



              adverse                      00:00:                      [place]



              reaction                      00                          Medical



              s                                                       Branch

 

       Sulfa  Propensi Active        Rash   2016                      [facility]



       (Sulfona ty to                       2-19                        ity of



       mide   adverse                      00:00:                      [place]



       Antibiot reaction                      00                          Medica

l



       ics)   s                                                       Branch

 

       Sulfa  Propensi Active        Rash   2016                      [facility]



       (Sulfona ty to                       2-19                        ity of



       mide   adverse                      00:00:                      [place]



       Antibiot reaction                      00                          Medica

l



       ics)   s                                                       Branch

 

       penicill penicill Active                                           [facility]

a



       ins    ins                                                     l



                                                                      [facility]

 

       sulfa  sulfa  Active                                           [facility]



       drugs  drugs                                                   l



                                                                      [place]

 

       6296   Drug   Active        Unknown                             Common



              allergy                                                  [facility]

 

       clindamy clindamy Active        Unknown                             Commo

n



       jayna    jayna                                                     [facility]

 

       penicill penicill Active        Unknown                             Commo

n



       in G   in G                                                    [facility]







Family History







           Family Member Diagnosis  Comments   Start Date Stop Date  Source

 

           Natural mother Asthma                                      [facility]

 

           Natural father Diabetes                                    [facility]

 

           Natural father Heart disease                                  [facility]

 

           Natural father Hypertension                                  [facility]







Social History







           Social Habit Start Date Stop Date  Quantity   Comments   Source

 

           History SDOH                                             [facility]



           Alcohol Comment                                             [facility]

enter

 

           Gender identity                                             [facility]

 

           Sexual orientation                                             [facility]

ist



                                                                  Hospital

 

           History SDOH                                             [facility]



           Alcohol Std Drinks                                             [facility]

l Center

 

           History SDOH                                             [facility]



           Alcohol Binge                                             [facility]

ter

 

           History of Tobacco                                             [facility]

 [facility] -



           Use                                                    [facility]

 

           Exposure to 2022 Not sure              [facility]



           SARS-CoV-2 (event) 00:00:00   14:26:00                         [place] 

Medical



                                                                  Branch

 

           History SDOH 2021 1                     [facility]



           Alcohol Frequency 00:00:00   00:00:00                         [facility]

 Center

 

           Alcohol intake 2021 Lifetime              [facility]

es



                      00:00:00   00:00:00   non-drinker            Medical [facility]ell[facility]



                                            (finding)             

 

           Cigarettes smoked 2020                       [facility]

st



           current (pack per 00:00:00   00:00:00                         Hospita

l



           day) - Reported                                             

 

           Cigarette  2020                       [facility]



           pack-years 00:00:00   00:00:00                         Hospital

 

           History of Social 2020                       [facility]

st



           function   00:00:00   00:00:00                         Hospital

 

           Tobacco use and 2020 Smokeless             [facility]



           exposure   00:00:00   00:00:00   tobacco non-user            Hospital

 

           Sex Assigned At [birth year]                       [facility]

kes



           Birth      00:00:00   00:00:00                         [facility]









                Smoking Status  Start Date      Stop Date       Source

 

                Tobacco smoking status 2023 18:09:07 2023 18:09:07 [facility]

[facility]

 

                Never Smoker                                    [facility]

nter







Medications







       Ordered Filled Start  Stop   Current Ordering Indication Dosage Frequency

 Signature

                    Comments            Components          Source



     Medication Medication Date Date Medication? Clinician                (SIG) 

          



     Name Name                                                   

 

     donepezil 5            Yes                      5 mg = 1           Me

moria



     mg oral      7-18                               tab, PO,           l



     tablet      18:49:                               Bedtime, #           Rebeca

nn



               00                                 90 tab, 1           



                                                  Refill(s),           



                                                  Pharmacy:           



                                                  [facility],           



                                                  154.94,           



                                                  cm,            



                                                  08/10/21           



                                                  15:33:00           



                                                  CDT,           



                                                  Height,           



                                                  93.75, kg,           



                                                  08/10/21           



                                                  15:33:00           



                                                  CDT,           



                                                  Weight           

 

     temazepam            Yes                      TAKE ONE           [facility]

remi



     15 mg oral      7-18                               (1)            l



     capsule      18:33:                               CAPSULE(S)           [facility]

carla



               00                                 BY MOUTH           



                                                  AT             



                                                  BEDTIME.           

 

     pantoprazol            Yes                      TAKE ONE           Me

moria



     e 40 mg      7-18                               (1)            l



     oral      18:33:                               TABLET(S)           [facility]



     enteric      00                                 BY MOUTH           



     coated                                         EVERY           



     tablet                                         MORNING.           

 

     Vitamin B12            Yes                      1,000           [facility]

ia



     1000 mcg      7-18                               microgram           l



     oral tablet      18:33:                               = 1 tab,           He

rmann



               00                                 PO, Daily,           



                                                  # 30 tab,           



                                                  0              



                                                  Refill(s)           

 

     Vitamin D3            Yes                      0              [facility]



               7-18                               Refill(s)           l



               18:33:                                              [facility]



               00                                                

 

     Pataday      0      Yes                      Daily, 0           [facility]

a



               7-18                               Refill(s)           l



               18:32:                                              [facility]



               00                                                

 

     topiramate            Yes                      100 mg = 1           M

emoria



     100 mg oral      2-24                               tab, PO,           l



     tablet      21:01:                               BID, # 180           Rebeca

nn



               00                                 tab, 2           



                                                  Refill(s),           



                                                  Pharmacy:           



                                                  [facility]           



                                                   #[id number],           



                                                  154.94,           



                                                  cm,            



                                                  08/10/21           



                                                  15:33:00           



                                                  CDT,           



                                                  Height,           



                                                  93.75, kg,           



                                                  08/10/21           



                                                  15:33:00           



                                                  CDT,           



                                                  Weight           

 

     memantine      0      Yes                      = 1 tab,           [facility]

remi



     10 mg oral      2-24                               PO, BID, #           l



     tablet      21:01:                               180 tab, 2           Rebeca

nn



               00                                 Refill(s),           



                                                  Pharmacy:           



                                                  [facility]           



                                                  cy #[id number],           



                                                  154.94,           



                                                  cm,            



                                                  08/10/21           



                                                  15:33:00           



                                                  CDT,           



                                                  Height,           



                                                  93.75, kg,           



                                                  08/10/21           



                                                  15:33:00           



                                                  CDT,           



                                                  Weight           

 

     Bupivicaine Bupivicaine 2-0      No             2.5mg                   

  Common



     Hydro Hydro 8-10                                              [facility]



               00:00:                                              - [facility]



               00                                                [facility]

 

     Kenalog Kenalog 2-0      No             40mg                     Common



     (Triamcinol (Triamcinol 8-10                                              S

pirit



     one) one) 00:00:                                              - [facility]



               00                                                [facility]

 

     Bupivicaine Bupivicaine 2-0      No             2.5mg                   

  Common



     Hydro Hydro 8-10                                              [facility]



               00:00:                                              - [facility]



               00                                                [facility]

 

     Kenalog Kenalog 2-0      No             40mg                     [facility]



     (Triamcinol (Triamcinol 8-10                                              S

pirit



     one) one) 00:00:                                              - [facility]



               00                                                [facility]

 

     Bupivicaine Bupivicaine 2-0      No             2.5mg                   

  Common



     Hydro Hydro 8-10                                              [facility]



               00:00:                                              - [facility]



               00                                                [facility]

 

     Kenalog Kenalog 2-0      No             40mg                     Common



     (Triamcinol (Triamcinol 8-10                                              S

pirit



     one) one) 00:00:                                              - [facility]



               00                                                [facility]

 

     Bupivicaine Bupivicaine 2-0      No             2.5mg                   

  Common



     Hydro Hydro 8-10                                              [facility]



               00:00:                                              - [facility]



               00                                                [facility]

 

     Kenalog Kenalog 2-0      No             40mg                     [facility]



     (Triamcinol (Triamcinol 8-10                                              S

pirit



     one) one) 00:00:                                              - [facility]



               00                                                [facility]

 

     Bupivicaine Bupivicaine 2-0      No             2.5mg                   

  Common



     Hydro Hydro 8-10                                              [facility]



               00:00:                                              - [facility]



               00                                                [facility]

 

     Kenalog Kenalog 2-0      No             40mg                     [facility]



     (Triamcinol (Triamcinol 8-10                                              S

pirit



     one) one) 00:00:                                              - [facility]



               00                                                [facility]

 

     Kenalog Kenalog 2-0      No             40mg                     [facility]



     (Triamcinol (Triamcinol 8-10                                              S

pirit



     one) one) 00:00:                                              - [facility]



               00                                                [facility]

 

     Bupivicaine Bupivicaine -0      No             2.5mg                   

  Common



     Hydro Hydro 8-10                                              [facility]



               00:00:                                              - [facility]



               00                                                [facility]

 

     topiramate      -0      Yes                      100 mg = 1           M

emoria



     100 mg oral      5-19                               tab, PO,           l



     tablet      23:43:                               BID, # 180           Rebeca

nn



               00                                 tab, 2           



                                                  Refill(s),           



                                                  Pharmacy:           



                                                  [facility],           



                                                  154.94,           



                                                  cm,            



                                                  08/10/21           



                                                  15:33:00           



                                                  CDT,           



                                                  Height,           



                                                  93.75, kg,           



                                                  08/10/21           



                                                  15:33:00           



                                                  CDT,           



                                                  Weight           

 

     topiramate      -0      Yes                      100 mg = 1           M

[facility]



     100 mg oral      5-19                               tab, PO,           l



     tablet      23:43:                               BID, # 180           [facility]

nn



               00                                 tab, 2           



                                                  Refill(s),           



                                                  Pharmacy:           



                                                  [facility],           



                                                  154.94,           



                                                  cm,            



                                                  08/10/21           



                                                  15:33:00           



                                                  CDT,           



                                                  Height,           



                                                  93.75, kg,           



                                                  08/10/21           



                                                  15:33:00           



                                                  CDT,           



                                                  Weight           

 

     topiramate      -0      Yes                      100 mg = 1           M

[facility]



     100 mg oral      5-19                               tab, PO,           l



     tablet      23:43:                               BID, # 180           [facility]

nn



               00                                 tab, 2           



                                                  Refill(s),           



                                                  Pharmacy:           



                                                  [facility],           



                                                  154.94,           



                                                  cm,            



                                                  08/10/21           



                                                  15:33:00           



                                                  CDT,           



                                                  Height,           



                                                  93.75, kg,           



                                                  08/10/21           



                                                  15:33:00           



                                                  CDT,           



                                                  Weight           

 

     topiramate      -0      Yes                      100 mg = 1           M

[facility]



     100 mg oral      5-19                               tab, PO,           l



     tablet      23:43:                               BID, # 180           [facility]

nn



               00                                 tab, 2           



                                                  Refill(s),           



                                                  Pharmacy:           



                                                  [facility],           



                                                  154.94,           



                                                  cm,            



                                                  08/10/21           



                                                  15:33:00           



                                                  CDT,           



                                                  Height,           



                                                  93.75, kg,           



                                                  08/10/21           



                                                  15:33:00           



                                                  CDT,           



                                                  Weight           

 

     memantine      -0      Yes                      = 1 tab,           [facility]

remi



     10 mg oral      5-19                               PO, BID, #           l



     tablet      23:42:                               180 tab, 2           [facility]

nn



               00                                 Refill(s),           



                                                  Pharmacy:           



                                                  [facility],           



                                                  154.94,           



                                                  cm,            



                                                  08/10/21           



                                                  15:33:00           



                                                  CDT,           



                                                  Height,           



                                                  93.75, kg,           



                                                  08/10/21           



                                                  15:33:00           



                                                  CDT,           



                                                  Weight           

 

     memantine            Yes                      = 1 tab,           [facility]

remi



     10 mg oral      5-19                               PO, BID, #           l



     tablet      23:42:                               180 tab, 2           [facility]

nn



               00                                 Refill(s),           



                                                  Pharmacy:           



                                                  [facility],           



                                                  154.94,           



                                                  cm,            



                                                  08/10/21           



                                                  15:33:00           



                                                  CDT,           



                                                  Height,           



                                                  93.75, kg,           



                                                  08/10/21           



                                                  15:33:00           



                                                  CDT,           



                                                  Weight           

 

     memantine            Yes                      = 1 tab,           [facility]

remi



     10 mg oral      5-19                               PO, BID, #           l



     tablet      23:42:                               180 tab, 2           [facility]

nn



               00                                 Refill(s),           



                                                  Pharmacy:           



                                                  [facility],           



                                                  154.94,           



                                                  cm,            



                                                  08/10/21           



                                                  15:33:00           



                                                  CDT,           



                                                  Height,           



                                                  93.75, kg,           



                                                  08/10/21           



                                                  15:33:00           



                                                  CDT,           



                                                  Weight           

 

     memantine            Yes                      = 1 tab,           [facility]

remi



     10 mg oral      5-19                               PO, BID, #           l



     tablet      23:42:                               180 tab, 2           [facility]

nn



               00                                 Refill(s),           



                                                  Pharmacy:           



                                                  [facility],           



                                                  154.94,           



                                                  cm,            



                                                  08/10/21           



                                                  15:33:00           



                                                  CDT,           



                                                  Height,           



                                                  93.75, kg,           



                                                  08/10/21           



                                                  15:33:00           



                                                  CDT,           



                                                  Weight           

 

     Flecainide            No                       50 mg = 1           Me

moria



     Acetate 50      1-04                               tab, PO,           l



     MG Oral      22:34:                               Q12H, #           [facility]



     Tablet      00                                 180 tab, 0           



     [Tambocor]                                         Refill(s)           

 

     Flecainide            No                       50 mg = 1           Me

moria



     Acetate 50      1-04                               tab, PO,           l



     MG Oral      22:34:                               Q12H, #           [facility]



     Tablet      00                                 180 tab, 0           



     [Tambocor]                                         Refill(s)           

 

     Flecainide            No                       50 mg = 1           Me

moria



     Acetate 50      1-04                               tab, PO,           l



     MG Oral      22:34:                               Q12H, #           [facility]



     Tablet      00                                 180 tab, 0           



     [Tambocor]                                         Refill(s)           

 

     Flecainide            No                       50 mg = 1           Me

[facility]



     Acetate 50      1-04                               tab, PO,           l



     MG Oral      22:34:                               Q12H, #           [facility]



     Tablet      00                                 180 tab, 0           



     [Tambocor]                                         Refill(s)           

 

     Eliquis 5            Yes                      5 mg, PO,           [facility]

[facility]



     mg oral                                     Q12H, tab,           l



     tablet      22:33:                               0              [facility]



               00                                 Refill(s)           

 

     SEROquel            Yes                      See            [facility]



     100 mg oral                                     Instructio           l



     tablet      22:33:                               ns,            [facility]



               00                                 tab PO           



                                                  Daily, 0           



                                                  Refill(s)           

 

     apixaban 5            Yes                      5 mg, PO,           Me

[facility]



     MG Oral                                     Q12H, tab,           l



     Tablet      22:33:                               0              [facility]



     [Eliquis]      00                                 Refill(s)           

 

     Furosemide            Yes                      20 mg = 1           Me

[facility]



     20 MG Oral      04                               tab, PO,           l



     Tablet      22:33:                               Daily, #           [facility]



     [Lasix]      00                                 30 tab, 0           



                                                  Refill(s)           

 

     quetiapine            Yes                      100 mg = 1           M

[facility]



     100 MG Oral                                     tab, PO,           l



     Tablet      22:33:                               Daily, 0           [facility]



     [Seroquel]      00                                 Refill(s)           

 

     apixaban 5      0      Yes                      5 mg, PO,           Me

[facility]



     MG Oral                                     Q12H, tab,           l



     Tablet      22:33:                               0              [facility]



     [Eliquis]      00                                 Refill(s)           

 

     Furosemide            Yes                      20 mg = 1           Me

[facility]



     20 MG Oral      04                               tab, PO,           l



     Tablet      22:33:                               Daily, #           [facility]



     [Lasix]      00                                 30 tab, 0           



                                                  Refill(s)           

 

     quetiapine            Yes                      100 mg = 1           M

[facility]



     100 MG Oral                                     tab, PO,           l



     Tablet      22:33:                               Daily, 0           [facility]



     [Seroquel]      00                                 Refill(s)           

 

     apixaban 5      0      Yes                      5 mg, PO,           Me

[facility]



     MG Oral                                     Q12H, tab,           l



     Tablet      22:33:                               0              [facility]



     [Eliquis]      00                                 Refill(s)           

 

     Furosemide            Yes                      20 mg = 1           Me

[facility]



     20 MG Oral      -04                               tab, PO,           l



     Tablet      22:33:                               Daily, #           [facility]



     [Lasix]      00                                 30 tab, 0           



                                                  Refill(s)           

 

     quetiapine            Yes                      100 mg = 1           M

[facility]



     100 MG Oral      1-04                               tab, PO,           l



     Tablet      22:33:                               Daily, 0           [facility]



     [Seroquel]      00                                 Refill(s)           

 

     Eliquis 5            Yes                      5 mg, PO,           [facility]

[facility]



     mg oral      1-04                               Q12H, tab,           l



     tablet      22:33:                               0              [facility]



               00                                 Refill(s)           

 

     SEROquel            Yes                      See            [facility]



     100 mg oral                                     Instructio           l



     tablet      22:33:                               ns,            [facility]



               00                                 tab PO           



                                                  Daily, 0           



                                                  Refill(s)           

 

     Eliquis 5            Yes                      5 mg, PO,           [facility]

[facility]



     mg oral      1-04                               Q12H, tab,           l



     tablet      22:33:                               0              [facility]



               00                                 Refill(s)           

 

     SEROquel            Yes                      See            [facility]



     100 mg oral                                     Instructio           l



     tablet      22:33:                               ns,            [facility]



               00                                 tab PO           



                                                  Daily, 0           



                                                  Refill(s)           

 

     apixaban 5            Yes                      5 mg, PO,           Me

[facility]



     MG Oral      -                               Q12H, tab,           l



     Tablet      22:33:                               0              [facility]



     [Eliquis]      00                                 Refill(s)           

 

     Furosemide            Yes                      20 mg = 1           Me

[facility]



     20 MG Oral      -04                               tab, PO,           l



     Tablet      22:33:                               Daily, #           [facility]



     [Lasix]      00                                 30 tab, 0           



                                                  Refill(s)           

 

     quetiapine            Yes                      100 mg = 1           M

[facility]



     100 MG Oral      -04                               tab, PO,           l



     Tablet      22:33:                               Daily, 0           [facility]



     [Seroquel]      00                                 Refill(s)           

 

     Levofloxaci      0      Yes                      See            [facility]

a



     n 750 MG      -04                               Instructio           l



     Oral Tablet      22:32:                               ns, 2 tab           [facility]

[facility]



     [Levaquin]      00                                 PO Q24H, 0           



                                                  Refill(s)           

 

     Levofloxaci      0      Yes                      See            [facility]

a



     n 750 MG      1-04                               Instructio           l



     Oral Tablet      22:32:                               ns, 2 tab           [facility]

[facility]



     [Levaquin]      00                                 PO Q24H, 0           



                                                  Refill(s)           

 

     Levofloxaci      0      Yes                      See            [facility]

a



     n 750 MG      -04                               Instructio           l



     Oral Tablet      22:32:                               ns, 2 tab           [facility]

[facility]



     [Levaquin]      00                                 PO Q24H, 0           



                                                  Refill(s)           

 

     Levofloxaci            Yes                      See            [facility]

a



     n 750 MG      1-04                               Instructio           l



     Oral Tablet      22:32:                               ns, 2 tab           [facility]

[facility]



     [Levaquin]      00                                 PO Q24H, 0           



                                                  Refill(s)           

 

     topiramate      2021      Yes                      100 mg = 1           M

[facility]



     100 mg oral      2-10                               tab, PO,           l



     tablet      17:37:                               BID, # 180           [facility]

nn



               00                                 tab, 2           



                                                  Refill(s),           



                                                  154.94,           



                                                  cm,            



                                                  08/10/21           



                                                  15:33:00           



                                                  CDT,           



                                                  Height,           



                                                  93.75, kg,           



                                                  08/10/21           



                                                  15:33:00           



                                                  CDT,           



                                                  Weight           

 

     topiramate      2021      Yes                      100 mg = 1           M

[facility]



     100 mg oral      2-10                               tab, PO,           l



     tablet      17:37:                               BID, # 180           [facility]

nn



               00                                 tab, 2           



                                                  Refill(s),           



                                                  154.94,           



                                                  cm,            



                                                  08/10/21           



                                                  15:33:00           



                                                  CDT,           



                                                  Height,           



                                                  93.75, kg,           



                                                  08/10/21           



                                                  15:33:00           



                                                  CDT,           



                                                  Weight           

 

     topiramate      2021      Yes                      100 mg = 1           M

[facility]



     100 mg oral      2-10                               tab, PO,           l



     tablet      17:37:                               BID, # 180           [facility]

nn



               00                                 tab, 2           



                                                  Refill(s),           



                                                  154.94,           



                                                  cm,            



                                                  08/10/21           



                                                  15:33:00           



                                                  CDT,           



                                                  Height,           



                                                  93.75, kg,           



                                                  08/10/21           



                                                  15:33:00           



                                                  CDT,           



                                                  Weight           

 

     topiramate      2021      Yes                      100 mg = 1           M

[facility]



     100 mg oral      2-10                               tab, PO,           l



     tablet      17:37:                               BID, # 180           [facility]

nn



               00                                 tab, 2           



                                                  Refill(s),           



                                                  154.94,           



                                                  cm,            



                                                  08/10/21           



                                                  15:33:00           



                                                  CDT,           



                                                  Height,           



                                                  93.75, kg,           



                                                  08/10/21           



                                                  15:33:00           



                                                  CDT,           



                                                  Weight           

 

     montelukast      2021      Yes                      10 mg = 1           [facility]

[facility]



     10 mg oral      2-10                               tab, PO,           l



     tablet      17:27:                               Bedtime, #           [facility]

nn



               00                                 90 tab, 1           



                                                  Refill(s)           

 

     montelukast      2021      Yes                      10 mg = 1           [facility]

[facility]



     10 mg oral      2-10                               tab, PO,           l



     tablet      17:27:                               Bedtime, #           [facility]

nn



               00                                 90 tab, 1           



                                                  Refill(s)           

 

     montelukast      2021      Yes                      10 mg = 1           [facility]

[facility]



     10 mg oral      2-10                               tab, PO,           l



     tablet      17:27:                               Bedtime, #           [facility]

nn



               00                                 90 tab, 1           



                                                  Refill(s)           

 

     montelukast      2021      Yes                      10 mg = 1           [facility]

[facility]



     10 mg oral      2-10                               tab, PO,           l



     tablet      17:27:                               Bedtime, #           [facility]

nn



               00                                 90 tab, 1           



                                                  Refill(s)           

 

     Trelegy      2021      Yes                      = 1            [facility]



     Ellipta 100      2-10                               inhalation           l



     mcg-62.5      17:26:                               , PO,           [facility]



     mcg-25      00                                 Daily, # 1           



     mcg/inh                                         ea, 0           



     inhalation                                         Refill(s)           



     powder                                                        

 

     levothyroxi      2021      Yes                      75             [facility]

a



     ne 75 mcg      2-10                               microgram           l



     (0.075 mg)      17:26:                               = 1 tab,           [facility]

[facility]



     oral tablet      00                                 PO, Daily,           



                                                  # 90 tab,           



                                                  1              



                                                  Refill(s)           

 

     Trelegy      2021      Yes                      = 1            [facility]



     Ellipta 100      2-10                               inhalation           l



     mcg-62.5      17:26:                               , PO,           [facility]



     mcg-25      00                                 Daily, # 1           



     mcg/inh                                         ea, 0           



     inhalation                                         Refill(s)           



     powder                                                        

 

     levothyroxi      2021      Yes                      75             [facility]

[facility]



     ne 75 mcg      2-10                               microgram           l



     (0.075 mg)      17:26:                               = 1 tab,           [facility]

[facility]



     oral tablet      00                                 PO, Daily,           



                                                  # 90 tab,           



                                                  1              



                                                  Refill(s)           

 

     Trelegy      2021      Yes                      = 1            [facility]



     Ellipta 100      2-10                               inhalation           l



     mcg-62.5      17:26:                               , PO,           [facility]



     mcg-25      00                                 Daily, # 1           



     mcg/inh                                         ea, 0           



     inhalation                                         Refill(s)           



     powder                                                        

 

     levothyroxi      2021      Yes                      75             [facility]

a



     ne 75 mcg      2-10                               microgram           l



     (0.075 mg)      17:26:                               = 1 tab,           [facility]

[facility]



     oral tablet      00                                 PO, Daily,           



                                                  # 90 tab,           



                                                  1              



                                                  Refill(s)           

 

     Trelegy      2021      Yes                      = 1            [facility]



     Ellipta 100      2-10                               inhalation           l



     mcg-62.5      17:26:                               , PO,           [facility]



     mcg-25      00                                 Daily, # 1           



     mcg/inh                                         ea, 0           



     inhalation                                         Refill(s)           



     powder                                                        

 

     levothyroxi      2021      Yes                      75             [facility]

a



     ne 75 mcg      2-10                               microgram           l



     (0.075 mg)      17:26:                               = 1 tab,           [facility]

[facility]



     oral tablet      00                                 PO, Daily,           



                                                  # 90 tab,           



                                                  1              



                                                  Refill(s)           

 

     flecainide      2021      Yes                      50 mg = 1           [facility]

[facility]



     50 mg oral      2-10                               tab, PO,           l



     tablet      17:25:                               Q12H, #           [facility]



               00                                 180 tab, 3           



                                                  Refill(s)           

 

     flecainide      2021      Yes                      50 mg = 1           [facility]

[facility]



     50 mg oral      2-10                               tab, PO,           l



     tablet      17:25:                               Q12H, #           [facility]



               00                                 180 tab, 3           



                                                  Refill(s)           

 

     flecainide      2021      Yes                      50 mg = 1           [facility]

[facility]



     50 mg oral      2-10                               tab, PO,           l



     tablet      17:25:                               Q12H, #           [facility]



               00                                 180 tab, 3           



                                                  Refill(s)           

 

     flecainide      2021      Yes                      50 mg = 1           [facility]

[facility]



     50 mg oral      2-10                               tab, PO,           l



     tablet      17:25:                               Q12H, #           [facility]



               00                                 180 tab, 3           



                                                  Refill(s)           

 

     buPROPion            Yes                                     [facility]



      mg      8-26                                              [facility]



     24 hr      00:00:                                              [place]



     tablet                                                      [facility]

 

     buPROPion            Yes                                     [facility]



      mg      8-26                                              [facility]



     24 hr      00:00:                                              [place]



     tablet                                                      [facility]

 

     buPROPion            Yes                                     [facility]



      mg      8-26                                              [facility]



     24 hr      00:00:                                              [place]



     tablet                                                      [facility]

 

     buPROPion            Yes                                     [facility]



      mg      8-26                                              [facility]



     24 hr      00:00:                                              [place]



     tablet                                                      [facility]

 

     buPROPion            Yes                                     [facility]



      mg      8-26                                              [facility]



     24 hr      00:00:                                              [place]



     tablet                                                      [facility]

 

     furosemide      2022- No             20mg QD   Take 1           [facility]



     (LASIX) 20      -                          tablet (20           [facility]

[facility]



     MG tablet      00:00: 23:59                          mg total)           [facility]

d[facility]



               00   :00                           by mouth           Center



                                                  daily.           

 

     furosemide      2022- No             20mg QD   Take 1           [facility]



     (LASIX) 20      --23                          tablet (20           [facility]

[facility]



     MG tablet      00:00: 23:59                          mg total)           [facility]

[facility]



               00   :00                           by mouth           [facility]



                                                  daily.           

 

     furosemide      -2022- No             20mg QD   Take 1           [facility]



     (LASIX) 20      8-23 08-23                          tablet (20           [facility]

[facility]



     MG tablet      00:00: 23:59                          mg total)           [facility]

[facility]



               00   :00                           by mouth           [facility]



                                                  daily.           

 

     furosemide      -2- No             20mg QD   Take 1           [facility]



     (LASIX) 20      8-23 08-23                          tablet (20           [facility]

[facility]



     MG tablet      00:00: 23:59                          mg total)           [facility]

d[facility]



               00   :00                           by mouth           [facility]



                                                  daily.           

 

     furosemide      -2022- No             20mg QD   Take 1           [facility]



     (LASIX) 20      8-23 08-23                          tablet (20           [facility]

[facility]



     MG tablet      00:00: 23:59                          mg total)           [facility]

d[facility]



               00   :00                           by mouth           [facility]



                                                  daily.           

 

     furosemide      -2022- No             20mg QD   Take 1           [facility]



     (LASIX) 20      8-23 08-23                          tablet (20           [facility]

[facility]



     MG tablet      00:00: 23:59                          mg total)           [facility]

d[facility]



               00   :00                           by mouth           [facility]



                                                  daily.           

 

     furosemide      2022- No             20mg QD   Take 1           [facility]



     (LASIX) 20      8-23 08-23                          tablet (20           [facility]

[facility]



     MG tablet      00:00: 23:59                          mg total)           [facility]

dical



               00   :00                           by mouth           [facility]



                                                  daily.           

 

     furosemide      -2- No             20mg QD   Take 1           [facility]



     (LASIX) 20      8-23 08-23                          tablet (20           [facility]

[facility]



     MG tablet      00:00: 23:59                          mg total)           [facility]

d[facility]



               00   :00                           by mouth           [facility]



                                                  daily.           

 

     furosemide      2022- No             20mg QD   Take 1           [facility]



     (LASIX) 20      8-23 08-23                          tablet (20           [facility]

[facility]



     MG tablet      00:00: 23:59                          mg total)           [facility]

dical



               00   :00                           by mouth           Center



                                                  daily.           

 

     HYDROcodone            Yes            1{tbl}      Take 1           [facility]



     -acetaminop      8-22                               tablet by           [facility]

es



     hen (NORCO      12:43:                               mouth 3           [facility]i

[facility]



     7.5-325)      24                                 (three)           Center



     7.5-325 mg                                         times           



     per tablet                                         daily as           



                                                  needed for           



                                                  Pain.           

 

     potassium            Yes            20meq Q.5D Take 20           [facility]



     chloride      8-22                               mEq by           [facility]



     (KLOR-CON)      12:43:                               mouth 2           [facility]

[facility]



     20 mEq      24                                 (two)           Center



     packet                                         times           



                                                  daily.           

 

     QUEtiapine            Yes            200mg QD   Take 200           [facility]



     (SEROquel)      8-22                               mg by           [facility]



     200 MG      12:43:                               mouth           [facility]



     tablet      24                                 nightly.           Center

 

     simvastatin            Yes            40mg QD   Take 40 mg           

[facility]



     (ZOCOR) 40      8-22                               by mouth           [facility]



     [facility] tablet      12:43:                               nightly.           [facility]

[facility]



               24                                                [facility]

 

     topiramate            Yes            200mg Q.5D Take 200           [facility]



     (TOPAMAX)      8-22                               mg by           [facility]



     200 MG      12:43:                               mouth 2           [facility]



     tablet      24                                 (two)           Center



                                                  times           



                                                  daily.           

 

     buPROPion            Yes            150mg QD   Take 150           [facility]



     (WELLBUTRIN      8-22                               mg by           [facility]



     XL) 150 MG      12:43:                               mouth           [facility]

l



     24 hr      24                                 daily.           Center



     tablet                                                        

 

     ALPRAZolam            Yes            .25mg      Take 0.25           [facility]



     (XANAX)      8-22                               mg by           [facility]



     0.25 MG      12:43:                               mouth           [facility]



     tablet      24                                 every           Center



                                                  night as           



                                                  needed for           



                                                  Anxiety.           

 

     tiZANidine            Yes            4mg  QD   Take 4 mg           [facility]



     (ZANAFLEX)      8-22                               by mouth           [facility]



     4 MG tablet      12:43:                               daily.           [facility]

[facility]



               24                                                [place]

 

     fluticasone            Yes                 QD   Inhale 1           [facility]



     -umeclidin-      8-22                               Inhalation           [facility]

[facility]



     vilanter      12:43:                               by mouth           [facility]

al



     (Trelegy      24                                 via            Center



     Ellipta)                                         inhaler           



     100-62.5-25                                         daily .           



     mcg DsDv                                                        

 

     zolpidem            Yes            10mg      Take 10 mg           [facility]



     (AMBIEN) 10      8-22                               by mouth           [facility]

[facility]



     mg tablet      12:43:                               every           [facility]



               24                                 night as           Center



                                                  needed for           



                                                  Insomnia.           

 

     donepeziL            Yes            10mg QD   Take 10 mg           [facility]



     (ARICEPT)      8-22                               by mouth           [facility]



     10 MG      12:43:                               nightly.           [facility]



     tablet      24                                                [facility]

 

     DULoxetine            Yes            60mg Q.5D Take 60 mg           [facility]



     (CYMBALTA)      8-22                               by mouth 2           [facility]

es



     60 MG      12:43:                               (two)           [facility]



     capsule      24                                 times           Center



                                                  daily.           

 

     gabapentin            Yes            300mg [id number] Take 300       

    [facility]



     (NEURONTIN)      8-22                          [id number] mg by           [facility]

[facility]



     300 MG      12:43:                          3D   mouth 3           [facility]



     capsule      24                                 (three)           Center



                                                  times           



                                                  daily.           

 

     indapamide            Yes            2.5mg QD   Take 2.5           [facility]



     (LOZOL) 2.5      8-22                               mg by           [facility]



     MG tablet      12:43:                               mouth           [facility]



               24                                 every           Center



                                                  morning.           

 

     memantine            Yes            10mg Q.5D Take 10 mg           [facility]



     (NAMENDA)      8-22                               by mouth 2           [facility]

[facility]



     10 MG      12:43:                               (two)           [facility]



     tablet      24                                 times           Center



                                                  daily.           

 

     metoprolol            Yes            25mg QD   Take 25 mg           [facility]



     succinate      8-22                               by mouth           [facility]



     (TOPROL-XL)      12:43:                               nightly.           Me

dical



     25 MG 24 hr      24                                                Center



     tablet                                                        

 

     HYDROcodone            Yes            1{tbl}      Take 1           [facility]



     -acetaminop      8-22                               tablet by           [facility]

es



     hen (NORCO      12:43:                               mouth 3           [facility]

[facility]



     7.5-325)      24                                 (three)           Center



     7.5-325 mg                                         times           



     per tablet                                         daily as           



                                                  needed for           



                                                  Pain.           

 

     potassium            Yes            20meq Q.5D Take 20           [facility]



     chloride      8-22                               mEq by           [facility]



     (KLOR-CON)      12:43:                               mouth 2           [facility]

[facility]



     20 mEq      24                                 (two)           Center



     packet                                         times           



                                                  daily.           

 

     QUEtiapine            Yes            200mg QD   Take 200           [facility]



     (SEROquel)      8-22                               mg by           [facility]



     200 MG      12:43:                               mouth           [facility]



     tablet      24                                 nightly.           Center

 

     simvastatin            Yes            40mg QD   Take 40 mg           

[facility]



     (ZOCOR) 40      8-22                               by mouth           [facility]



     MG tablet      12:43:                               nightly.           [facility]

[facility]



               24                                                Center

 

     topiramate            Yes            200mg Q.5D Take 200           [facility]



     (TOPAMAX)      8-22                               mg by           [facility]



     200 MG      12:43:                               mouth 2           [facility]



     tablet      24                                 (two)           Center



                                                  times           



                                                  daily.           

 

     buPROPion            Yes            150mg QD   Take 150           [facility]



     (WELLBUTRIN      8-22                               mg by           [facility]



     XL) 150 MG      12:43:                               mouth           [facility]

l



     24 hr      24                                 daily.           Center



     tablet                                                        

 

     ALPRAZolam            Yes            .25mg      Take 0.25           [facility]



     (XANAX)      8-22                               mg by           [facility]



     0.25 MG      12:43:                               mouth           [facility]



     tablet      24                                 every           Center



                                                  night as           



                                                  needed for           



                                                  Anxiety.           

 

     tiZANidine            Yes            4mg  QD   Take 4 mg           [facility]



     (ZANAFLEX)      8-22                               by mouth           [facility]



     4 MG tablet      12:43:                               daily.           [facility]

[facility]



               24                                                Center

 

     fluticasone            Yes                 QD   Inhale 1           [facility]



     -umeclidin-      8-22                               Inhalation           [facility]

[facility]



     vilanter      12:43:                               by mouth           [facility]

al



     (Trelegy      24                                 via            Center



     Ellipta)                                         inhaler           



     100-62.5-25                                         daily .           



     mcg DsDv                                                        

 

     zolpidem            Yes            10mg      Take 10 mg           [facility]



     (AMBIEN) 10      8-22                               by mouth           [facility]

[facility]



     mg tablet      12:43:                               every           [facility]



               24                                 night as           Center



                                                  needed for           



                                                  Insomnia.           

 

     donepeziL            Yes            10mg QD   Take 10 mg           [facility]



     (ARICEPT)      8-22                               by mouth           [facility]



     10 MG      12:43:                               nightly.           [facility]



     tablet      24                                                Center

 

     DULoxetine            Yes            60mg Q.5D Take 60 mg           [facility]



     (CYMBALTA)      8-22                               by mouth 2           [facility]

[facility]



     60 MG      12:43:                               (two)           Medical



     capsule      24                                 times           Center



                                                  daily.           

 

     gabapentin            Yes            300mg Q.[id number] Take 300       

    [facility]



     (NEURONTIN)      8-22                          [id number] mg by           [facility]



     300 MG      12:43:                          3D   mouth 3           Medical



     capsule      24                                 (three)           Center



                                                  times           



                                                  daily.           

 

     indapamide            Yes            2.5mg QD   Take 2.5           [facility]



     (LOZOL) 2.5      8-22                               mg by           [facility]



     MG tablet      12:43:                               mouth           [facility]



               24                                 every           Center



                                                  morning.           

 

     memantine            Yes            10mg Q.5D Take 10 mg           [facility]



     (NAMENDA)      8-22                               by mouth 2           [facility]

[facility]



     10 MG      12:43:                               (two)           Medical



     tablet      24                                 times           Center



                                                  daily.           

 

     metoprolol            Yes            25mg QD   Take 25 mg           [facility]

[facility]



     succinate      8-22                               by mouth           [facility]



     (TOPROL-XL)      12:43:                               nightly.           Me

dical



     25 MG 24 hr      24                                                Center



     tablet                                                        

 

     HYDROcodone            Yes            1{tbl}      Take 1           [facility]



     -acetaminop      8-22                               tablet by           [facility]

[facility]



     hen (NORCO      12:43:                               mouth 3           Medi

[facility]



     7.5-325)      24                                 (three)           Center



     7.5-325 mg                                         times           



     per tablet                                         daily as           



                                                  needed for           



                                                  Pain.           

 

     potassium            Yes            20meq Q.5D Take 20           [facility]



     chloride      8-22                               mEq by           [facility]



     (KLOR-CON)      12:43:                               mouth 2           [facility]i

[facility]



     20 mEq      24                                 (two)           Center



     packet                                         times           



                                                  daily.           

 

     QUEtiapine            Yes            200mg QD   Take 200           [facility]



     (SEROquel)      8-22                               mg by           [facility]



     200 MG      12:43:                               mouth           Medical



     tablet      24                                 nightly.           Center

 

     simvastatin            Yes            40mg QD   Take 40 mg           

[facility]



     (ZOCOR) 40      8-22                               by mouth           [facility]



     MG tablet      12:43:                               nightly.           [facility]i

[facility]



               24                                                Center

 

     topiramate            Yes            200mg Q.5D Take 200           [facility]



     (TOPAMAX)      8-22                               mg by           [facility]



     200 MG      12:43:                               mouth 2           Medical



     tablet      24                                 (two)           Center



                                                  times           



                                                  daily.           

 

     buPROPion            Yes            150mg QD   Take 150           [facility]



     (WELLBUTRIN      8-22                               mg by           [facility]



     XL) 150 MG      12:43:                               mouth           [facility]

l



     24 hr      24                                 daily.           Center



     tablet                                                        

 

     ALPRAZolam            Yes            .25mg      Take 0.25           [facility]

[facility]



     (XANAX)      8-22                               mg by           [facility]



     0.25 MG      12:43:                               mouth           Medical



     tablet      24                                 every           Center



                                                  night as           



                                                  needed for           



                                                  Anxiety.           

 

     tiZANidine            Yes            4mg  QD   Take 4 mg           [facility]



     (ZANAFLEX)      8-22                               by mouth           [facility]



     4 MG tablet      12:43:                               daily.           [facility]i

[facility]



               24                                                Center

 

     fluticasone      0      Yes                 QD   Inhale 1           [facility]



     -umeclidin-      8-22                               Inhalation           [facility]

[facility]



     vilanter      12:43:                               by mouth           [facility]

al



     (Trelegy      24                                 via            Center



     Ellipta)                                         inhaler           



     100-62.5-25                                         daily .           



     mcg DsDv                                                        

 

     zolpidem            Yes            10mg      Take 10 mg           [facility]



     (AMBIEN) 10      8-22                               by mouth           [facility]

[facility]



     mg tablet      12:43:                               every           Medical



               24                                 night as           Center



                                                  needed for           



                                                  Insomnia.           

 

     donepeziL            Yes            10mg QD   Take 10 mg           [facility]



     (ARICEPT)      8-22                               by mouth           [facility]



     10 MG      12:43:                               nightly.           Medical



     tablet      24                                                Center

 

     DULoxetine      0      Yes            60mg Q.5D Take 60 mg           [facility]

[facility]



     (CYMBALTA)      8-22                               by mouth 2           [facility]

[facility]



     60 MG      12:43:                               (two)           Medical



     capsule      24                                 times           Center



                                                  daily.           

 

     gabapentin      -0      Yes            300mg Q.[id number] Take 300       

    [facility]



     (NEURONTIN)      8-22                          [id number] mg by           [facility]

[facility]



     300 MG      12:43:                          3D   mouth 3           Medical



     capsule      24                                 (three)           Center



                                                  times           



                                                  daily.           

 

     indapamide            Yes            2.5mg QD   Take 2.5           [facility]



     (LOZOL) 2.5      8-22                               mg by           [facility]



     MG tablet      12:43:                               mouth           Medical



               24                                 every           Center



                                                  morning.           

 

     memantine      0      Yes            10mg Q.5D Take 10 mg           [facility]



     (NAMENDA)      8-22                               by mouth 2           [facility]

[facility]



     10 MG      12:43:                               (two)           Medical



     tablet      24                                 times           Center



                                                  daily.           

 

     metoprolol            Yes            25mg QD   Take 25 mg           [facility]

[facility]



     succinate      8-22                               by mouth           [facility]



     (TOPROL-XL)      12:43:                               nightly.           Me

dical



     25 MG 24 hr      24                                                Center



     tablet                                                        

 

     HYDROcodone            Yes            1{tbl}      Take 1           [facility]



     -acetaminop      8-22                               tablet by           [facility]

[facility]



     hen (NORCO      12:43:                               mouth 3           [facility]

[facility]



     7.5-325)      24                                 (three)           Center



     7.5-325 mg                                         times           



     per tablet                                         daily as           



                                                  needed for           



                                                  Pain.           

 

     potassium            Yes            20meq Q.5D Take 20           [facility]



     chloride      8-22                               mEq by           [facility]



     (KLOR-CON)      12:43:                               mouth 2           [facility]

[facility]



     20 mEq      24                                 (two)           Center



     packet                                         times           



                                                  daily.           

 

     QUEtiapine      0      Yes            200mg QD   Take 200           [facility]



     (SEROquel)      8-22                               mg by           [facility]



     200 MG      12:43:                               mouth           Medical



     tablet      24                                 nightly.           Center

 

     simvastatin      -0      Yes            40mg QD   Take 40 mg           

[facility]



     (ZOCOR) 40      8-22                               by mouth           [facility]



     MG tablet      12:43:                               nightly.           [facility]

[facility]



               24                                                Center

 

     topiramate      0      Yes            200mg Q.5D Take 200           [facility]



     (TOPAMAX)      8-22                               mg by           [facility]



     200 MG      12:43:                               mouth 2           Medical



     tablet      24                                 (two)           Center



                                                  times           



                                                  daily.           

 

     buPROPion            Yes            150mg QD   Take 150           [facility]



     (WELLBUTRIN      8-22                               mg by           [facility]



     XL) 150 MG      12:43:                               mouth           [facility]

l



     24 hr      24                                 daily.           Center



     tablet                                                        

 

     ALPRAZolam            Yes            .25mg      Take 0.25           [facility]

[facility]



     (XANAX)      8-22                               mg by           [facility]



     0.25 MG      12:43:                               mouth           Medical



     tablet      24                                 every           Center



                                                  night as           



                                                  needed for           



                                                  Anxiety.           

 

     tiZANidine            Yes            4mg  QD   Take 4 mg           [facility]



     (ZANAFLEX)      8-22                               by mouth           [facility]



     4 MG tablet      12:43:                               daily.           [facility]

[facility]



               24                                                Center

 

     fluticasone            Yes                 QD   Inhale 1           [facility]



     -umeclidin-      8-22                               Inhalation           [facility]

[facility]



     vilanter      12:43:                               by mouth           [facility]

al



     (Trelegy      24                                 via            Center



     Ellipta)                                         inhaler           



     100-62.5-25                                         daily .           



     mcg DsDv                                                        

 

     zolpidem            Yes            10mg      Take 10 mg           [facility]



     (AMBIEN) 10      8-22                               by mouth           [facility]

[facility]



     mg tablet      12:43:                               every           [facility]



               24                                 night as           Center



                                                  needed for           



                                                  Insomnia.           

 

     donepeziL            Yes            10mg QD   Take 10 mg           [facility]



     (ARICEPT)      8-22                               by mouth           [facility]



     10 MG      12:43:                               nightly.           Medical



     tablet      24                                                Center

 

     DULoxetine            Yes            60mg Q.5D Take 60 mg           [facility]

[facility]



     (CYMBALTA)      8-22                               by mouth 2           [facility]

[facility]



     60 MG      12:43:                               (two)           Medical



     capsule      24                                 times           Center



                                                  daily.           

 

     gabapentin            Yes            300mg Q.[id number] Take 300       

    [facility]



     (NEURONTIN)      8-22                          [id number] mg by           [facility]

[facility]



     300 MG      12:43:                          3D   mouth 3           Medical



     capsule      24                                 (three)           Center



                                                  times           



                                                  daily.           

 

     indapamide            Yes            2.5mg QD   Take 2.5           [facility]



     (LOZOL) 2.5      8-22                               mg by           [facility]



     MG tablet      12:43:                               mouth           Medical



               24                                 every           Center



                                                  morning.           

 

     memantine            Yes            10mg Q.5D Take 10 mg           [facility]



     (NAMENDA)      8-22                               by mouth 2           [facility]

[facility]



     10 MG      12:43:                               (two)           Medical



     tablet      24                                 times           Center



                                                  daily.           

 

     metoprolol            Yes            25mg QD   Take 25 mg           [facility]

[facility]



     succinate      8-22                               by mouth           [facility]



     (TOPROL-XL)      12:43:                               nightly.           Me

dical



     25 MG 24 hr      24                                                Center



     tablet                                                        

 

     HYDROcodone            Yes            1{tbl}      Take 1           [facility]



     -acetaminop      8-22                               tablet by           [facility]

[facility]



     hen (NORCO      12:43:                               mouth 3           [facility]i

[facility]



     7.5-325)      24                                 (three)           Center



     7.5-325 mg                                         times           



     per tablet                                         daily as           



                                                  needed for           



                                                  Pain.           

 

     potassium            Yes            20meq Q.5D Take 20           [facility]



     chloride      8-22                               mEq by           [facility]



     (KLOR-CON)      12:43:                               mouth 2           [facility]i

[facility]



     20 mEq      24                                 (two)           Center



     packet                                         times           



                                                  daily.           

 

     QUEtiapine            Yes            200mg QD   Take 200           [facility]



     (SEROquel)      8-22                               mg by           [facility]



     200 MG      12:43:                               mouth           Medical



     tablet      24                                 nightly.           Center

 

     simvastatin            Yes            40mg QD   Take 40 mg           

[facility]



     (ZOCOR) 40      8-22                               by mouth           [facility]



     MG tablet      12:43:                               nightly.           [facility]

[facility]



               24                                                Center

 

     topiramate            Yes            200mg Q.5D Take 200           [facility]



     (TOPAMAX)      8-22                               mg by           [facility]



     200 MG      12:43:                               mouth 2           Medical



     tablet      24                                 (two)           Center



                                                  times           



                                                  daily.           

 

     buPROPion            Yes            150mg QD   Take 150           [facility]



     (WELLBUTRIN      8-22                               mg by           [facility]



     XL) 150 MG      12:43:                               mouth           [facility]

l



     24 hr      24                                 daily.           Center



     tablet                                                        

 

     ALPRAZolam            Yes            .25mg      Take 0.25           [facility]

[facility]



     (XANAX)      8-22                               mg by           [facility]



     0.25 MG      12:43:                               mouth           Medical



     tablet      24                                 every           Center



                                                  night as           



                                                  needed for           



                                                  Anxiety.           

 

     tiZANidine            Yes            4mg  QD   Take 4 mg           [facility]



     (ZANAFLEX)      8-22                               by mouth           [facility]



     4 MG tablet      12:43:                               daily.           [facility]

[facility]



               24                                                Center

 

     fluticasone            Yes                 QD   Inhale 1           [facility]



     -umeclidin-      8-22                               Inhalation           [facility]

[facility]



     vilanter      12:43:                               by mouth           [facility]

al



     (Trelegy      24                                 via            Center



     Ellipta)                                         inhaler           



     100-62.5-25                                         daily .           



     mcg DsDv                                                        

 

     zolpidem            Yes            10mg      Take 10 mg           [facility]



     (AMBIEN) 10      8-22                               by mouth           [facility]

[facility]



     mg tablet      12:43:                               every           Medical



               24                                 night as           Center



                                                  needed for           



                                                  Insomnia.           

 

     donepeziL      0      Yes            10mg QD   Take 10 mg           [facility]



     (ARICEPT)      8-22                               by mouth           [facility]



     10 MG      12:43:                               nightly.           Medical



     tablet      24                                                Center

 

     DULoxetine      0      Yes            60mg Q.5D Take 60 mg           [facility]

[facility]



     (CYMBALTA)      8-22                               by mouth 2           [facility]

[facility]



     60 MG      12:43:                               (two)           Medical



     capsule      24                                 times           Center



                                                  daily.           

 

     gabapentin            Yes            300mg Q.[id number] Take 300       

    [facility]



     (NEURONTIN)      8-22                          [id number] mg by           [facility]



     300 MG      12:43:                          3D   mouth 3           Medical



     capsule      24                                 (three)           Center



                                                  times           



                                                  daily.           

 

     indapamide            Yes            2.5mg QD   Take 2.5           [facility]



     (LOZOL) 2.5      8-22                               mg by           [facility]



     MG tablet      12:43:                               mouth           Medical



               24                                 every           Center



                                                  morning.           

 

     memantine      0      Yes            10mg Q.5D Take 10 mg           [facility]



     (NAMENDA)      8-22                               by mouth 2           [facility]

[facility]



     10 MG      12:43:                               (two)           Medical



     tablet      24                                 times           Center



                                                  daily.           

 

     metoprolol            Yes            25mg QD   Take 25 mg           [facility]

[facility]



     succinate      8-22                               by mouth           [facility]



     (TOPROL-XL)      12:43:                               nightly.           Me

dical



     25 MG 24 hr      24                                                Center



     tablet                                                        

 

     HYDROcodone            Yes            1{tbl}      Take 1           [facility]



     -acetaminop      8-22                               tablet by           [facility]

[facility]



     hen (NORCO      12:43:                               mouth 3           [facility]

[facility]



     7.5-325)      24                                 (three)           Center



     7.5-325 mg                                         times           



     per tablet                                         daily as           



                                                  needed for           



                                                  Pain.           

 

     potassium            Yes            20meq Q.5D Take 20           [facility]



     chloride      8-22                               mEq by           [facility]



     (KLOR-CON)      12:43:                               mouth 2           Medi

[facility]



     20 mEq      24                                 (two)           Center



     packet                                         times           



                                                  daily.           

 

     QUEtiapine      0      Yes            200mg QD   Take 200           [facility]



     (SEROquel)      8-22                               mg by           [facility]



     200 MG      12:43:                               mouth           [facility]



     tablet      24                                 nightly.           Center

 

     simvastatin      0      Yes            40mg QD   Take 40 mg           

[facility]



     (ZOCOR) 40      8-22                               by mouth           [facility]



     MG tablet      12:43:                               nightly.           Medi

[facility]



               24                                                Center

 

     topiramate            Yes            200mg Q.5D Take 200           [facility]



     (TOPAMAX)      8-22                               mg by           [facility]



     200 MG      12:43:                               mouth 2           Medical



     tablet      24                                 (two)           Center



                                                  times           



                                                  daily.           

 

     buPROPion            Yes            150mg QD   Take 150           [facility]



     (WELLBUTRIN      8-22                               mg by           [facility]



     XL) 150 MG      12:43:                               mouth           [facility]

l



     24 hr      24                                 daily.           Center



     tablet                                                        

 

     ALPRAZolam            Yes            .25mg      Take 0.25           [facility]



     (XANAX)      8-22                               mg by           [facility]



     0.25 MG      12:43:                               mouth           Medical



     tablet      24                                 every           Center



                                                  night as           



                                                  needed for           



                                                  Anxiety.           

 

     tiZANidine            Yes            4mg  QD   Take 4 mg           [facility]



     (ZANAFLEX)      8-22                               by mouth           [facility]



     4 MG tablet      12:43:                               daily.           [facility]i

[facility]



               24                                                Center

 

     fluticasone            Yes                 QD   Inhale 1           [facility]



     -umeclidin-      8-22                               Inhalation           [facility]

[facility]



     vilanter      12:43:                               by mouth           Medic

al



     (Trelegy      24                                 via            Center



     Ellipta)                                         inhaler           



     100-62.5-25                                         daily .           



     mcg DsDv                                                        

 

     zolpidem            Yes            10mg      Take 10 mg           [facility]



     (AMBIEN) 10      8-22                               by mouth           [facility]

[facility]



     mg tablet      12:43:                               every           Medical



               24                                 night as           Center



                                                  needed for           



                                                  Insomnia.           

 

     donepeziL            Yes            10mg QD   Take 10 mg           [facility]



     (ARICEPT)      8-22                               by mouth           [facility]



     10 MG      12:43:                               nightly.           Medical



     tablet      24                                                Center

 

     DULoxetine            Yes            60mg Q.5D Take 60 mg           [facility]

[facility]



     (CYMBALTA)      8-22                               by mouth 2           [facility]

[facility]



     60 MG      12:43:                               (two)           Medical



     capsule      24                                 times           Center



                                                  daily.           

 

     gabapentin            Yes            300mg Q.[id number] Take 300       

    [facility]



     (NEURONTIN)      8-22                          [id number] mg by           [facility]

[facility]



     300 MG      12:43:                          3D   mouth 3           Medical



     capsule      24                                 (three)           Center



                                                  times           



                                                  daily.           

 

     indapamide            Yes            2.5mg QD   Take 2.5           [facility]



     (LOZOL) 2.5      8-22                               mg by           [facility]



     MG tablet      12:43:                               mouth           Medical



               24                                 every           Center



                                                  morning.           

 

     memantine            Yes            10mg Q.5D Take 10 mg           [facility]



     (NAMENDA)      8-22                               by mouth 2           [facility]

[facility]



     10 MG      12:43:                               (two)           Medical



     tablet      24                                 times           Center



                                                  daily.           

 

     metoprolol            Yes            25mg QD   Take 25 mg           [facility]



     succinate      8-22                               by mouth           [facility]



     (TOPROL-XL)      12:43:                               nightly.           Me

dical



     25 MG 24 hr      24                                                Center



     tablet                                                        

 

     HYDROcodone            Yes            1{tbl}      Take 1           [facility]



     -acetaminop      8-22                               tablet by           [facility]

es



     hen (NORCO      12:43:                               mouth 3           Medi

[facility]



     7.5-325)      24                                 (three)           Center



     7.5-325 mg                                         times           



     per tablet                                         daily as           



                                                  needed for           



                                                  Pain.           

 

     potassium            Yes            20meq Q.5D Take 20           [facility]



     chloride      8-22                               mEq by           [facility]



     (KLOR-CON)      12:43:                               mouth 2           Medi

[facility]



     20 mEq      24                                 (two)           Center



     packet                                         times           



                                                  daily.           

 

     QUEtiapine            Yes            200mg QD   Take 200           [facility]



     (SEROquel)      8-22                               mg by           [facility]



     200 MG      12:43:                               mouth           Medical



     tablet      24                                 nightly.           Center

 

     simvastatin            Yes            40mg QD   Take 40 mg           

[facility]



     (ZOCOR) 40      8-22                               by mouth           [facility]



     MG tablet      12:43:                               nightly.           [facility]i

[facility]



               24                                                Center

 

     topiramate            Yes            200mg Q.5D Take 200           [facility]



     (TOPAMAX)      8-22                               mg by           [facility]



     200 MG      12:43:                               mouth 2           Medical



     tablet      24                                 (two)           Center



                                                  times           



                                                  daily.           

 

     buPROPion            Yes            150mg QD   Take 150           [facility]



     (WELLBUTRIN      8-22                               mg by           [facility]



     XL) 150 MG      12:43:                               mouth           [facility]

l



     24 hr      24                                 daily.           Center



     tablet                                                        

 

     ALPRAZolam            Yes            .25mg      Take 0.25           [facility]

[facility]



     (XANAX)      8-22                               mg by           [facility]



     0.25 MG      12:43:                               mouth           Medical



     tablet      24                                 every           Center



                                                  night as           



                                                  needed for           



                                                  Anxiety.           

 

     tiZANidine            Yes            4mg  QD   Take 4 mg           [facility]



     (ZANAFLEX)      8-22                               by mouth           [facility]



     4 MG tablet      12:43:                               daily.           [facility]i

[facility]



               24                                                Center

 

     fluticasone            Yes                 QD   Inhale 1           [facility]



     -umeclidin-      8-22                               Inhalation           [facility]

[facility]



     vilanter      12:43:                               by mouth           Medic

al



     (Trelegy      24                                 via            Center



     Ellipta)                                         inhaler           



     100-62.5-25                                         daily .           



     mcg DsDv                                                        

 

     zolpidem            Yes            10mg      Take 10 mg           [facility]



     (AMBIEN) 10      8-22                               by mouth           [facility]

[facility]



     mg tablet      12:43:                               every           Medical



               24                                 night as           Center



                                                  needed for           



                                                  Insomnia.           

 

     donepeziL            Yes            10mg QD   Take 10 mg           [facility]



     (ARICEPT)      8-22                               by mouth           [facility]



     10 MG      12:43:                               nightly.           Medical



     tablet      24                                                Center

 

     DULoxetine            Yes            60mg Q.5D Take 60 mg           [facility]

[facility]



     (CYMBALTA)      8-22                               by mouth 2           [facility]

[facility]



     60 MG      12:43:                               (two)           Medical



     capsule      24                                 times           Center



                                                  daily.           

 

     gabapentin            Yes            300mg Q.[id number] Take 300       

    [facility]



     (NEURONTIN)      8-22                          [id number] mg by           [facility]

[facility]



     300 MG      12:43:                          3D   mouth 3           Medical



     capsule      24                                 (three)           Center



                                                  times           



                                                  daily.           

 

     indapamide            Yes            2.5mg QD   Take 2.5           [facility]



     (LOZOL) 2.5      8-22                               mg by           [facility]



     MG tablet      12:43:                               mouth           Medical



               24                                 every           Center



                                                  morning.           

 

     memantine            Yes            10mg Q.5D Take 10 mg           [facility]



     (NAMENDA)      8-22                               by mouth 2           [facility]

[facility]



     10 MG      12:43:                               (two)           Medical



     tablet      24                                 times           Center



                                                  daily.           

 

     metoprolol            Yes            25mg QD   Take 25 mg           [facility]

[facility]



     succinate      8-22                               by mouth           [facility]



     (TOPROL-XL)      12:43:                               nightly.           Me

dical



     25 MG 24 hr      24                                                Center



     tablet                                                        

 

     HYDROcodone            Yes            1{tbl}      Take 1           [facility]



     -acetaminop      8-22                               tablet by           [facility]

[facility]



     hen (NORCO      12:43:                               mouth 3           Medi

[facility]



     7.5-325)      24                                 (three)           Center



     7.5-325 mg                                         times           



     per tablet                                         daily as           



                                                  needed for           



                                                  Pain.           

 

     potassium            Yes            20meq Q.5D Take 20           [facility]



     chloride      8-22                               mEq by           [facility]



     (KLOR-CON)      12:43:                               mouth 2           [facility]i

[facility]



     20 mEq      24                                 (two)           Center



     packet                                         times           



                                                  daily.           

 

     QUEtiapine            Yes            200mg QD   Take 200           [facility]



     (SEROquel)      8-22                               mg by           [facility]



     200 MG      12:43:                               mouth           Medical



     tablet      24                                 nightly.           Center

 

     simvastatin            Yes            40mg QD   Take 40 mg           

[facility]



     (ZOCOR) 40      8-22                               by mouth           [facility]



     MG tablet      12:43:                               nightly.           [facility]

[facility]



               24                                                Center

 

     topiramate            Yes            200mg Q.5D Take 200           [facility]



     (TOPAMAX)      8-22                               mg by           [facility]



     200 MG      12:43:                               mouth 2           Medical



     tablet      24                                 (two)           Center



                                                  times           



                                                  daily.           

 

     buPROPion            Yes            150mg QD   Take 150           [facility]



     (WELLBUTRIN      8-22                               mg by           [facility]



     XL) 150 MG      12:43:                               mouth           [facility]

l



     24 hr      24                                 daily.           Center



     tablet                                                        

 

     ALPRAZolam            Yes            .25mg      Take 0.25           [facility]

[facility]



     (XANAX)      8-22                               mg by           [facility]



     0.25 MG      12:43:                               mouth           Medical



     tablet      24                                 every           Center



                                                  night as           



                                                  needed for           



                                                  Anxiety.           

 

     tiZANidine            Yes            4mg  QD   Take 4 mg           [facility]



     (ZANAFLEX)      8-22                               by mouth           [facility]



     4 MG tablet      12:43:                               daily.           [facility]i

[facility]



               24                                                Center

 

     fluticasone            Yes                 QD   Inhale 1           [facility]



     -umeclidin-      822                               Inhalation           [facility]

[facility]



     vilanter      12:43:                               by mouth           Medic

al



     (Trelegy      24                                 via            Center



     Ellipta)                                         inhaler           



     100-62.5-25                                         daily .           



     mcg DsDv                                                        

 

     zolpidem            Yes            10mg      Take 10 mg           [facility]



     (AMBIEN) 10      8-22                               by mouth           [facility]

[facility]



     mg tablet      12:43:                               every           Medical



               24                                 night as           Center



                                                  needed for           



                                                  Insomnia.           

 

     donepeziL            Yes            10mg QD   Take 10 mg           [facility]



     (ARICEPT)      8-22                               by mouth           [facility]



     10 MG      12:43:                               nightly.           Medical



     tablet      24                                                Center

 

     DULoxetine            Yes            60mg Q.5D Take 60 mg           [facility]

[facility]



     (CYMBALTA)      8-22                               by mouth 2           [facility]

[facility]



     60 MG      12:43:                               (two)           Medical



     capsule      24                                 times           Center



                                                  daily.           

 

     gabapentin            Yes            300mg Q.[id number] Take 300       

    [facility]



     (NEURONTIN)      8-22                          [id number] mg by           [facility]

[facility]



     300 MG      12:43:                          3D   mouth 3           Medical



     capsule      24                                 (three)           Center



                                                  times           



                                                  daily.           

 

     indapamide            Yes            2.5mg QD   Take 2.5           [facility]



     (LOZOL) 2.5      8-22                               mg by           [facility]



     MG tablet      12:43:                               mouth           Medical



               24                                 every           Center



                                                  morning.           

 

     memantine            Yes            10mg Q.5D Take 10 mg           [facility]



     (NAMENDA)      8-22                               by mouth 2           [facility]

[facility]



     10 MG      12:43:                               (two)           Medical



     tablet      24                                 times           Center



                                                  daily.           

 

     metoprolol            Yes            25mg QD   Take 25 mg           [facility]



     succinate      8-22                               by mouth           [facility]



     (TOPROL-XL)      12:43:                               nightly.           Me

dical



     25 MG 24 hr      24                                                Center



     tablet                                                        

 

     HYDROcodone            Yes            1{tbl}      Take 1           [facility]



     -acetaminop      8-22                               tablet by           [facility]

[facility]



     hen (NORCO      12:43:                               mouth 3           Medi

[facility]



     7.5-325)      24                                 (three)           Center



     7.5-325 mg                                         times           



     per tablet                                         daily as           



                                                  needed for           



                                                  Pain.           

 

     potassium            Yes            20meq Q.5D Take 20           [facility]



     chloride      8-22                               mEq by           [facility]



     (KLOR-CON)      12:43:                               mouth 2           [facility]i

[facility]



     20 mEq      24                                 (two)           Center



     packet                                         times           



                                                  daily.           

 

     QUEtiapine            Yes            200mg QD   Take 200           [facility]



     (SEROquel)      8-22                               mg by           [facility]



     200 MG      12:43:                               mouth           Medical



     tablet      24                                 nightly.           Center

 

     simvastatin            Yes            40mg QD   Take 40 mg           

[facility]



     (ZOCOR) 40      8-22                               by mouth           [facility]



     MG tablet      12:43:                               nightly.           [facility]i

[facility]



               24                                                Center

 

     topiramate            Yes            200mg Q.5D Take 200           [facility]



     (TOPAMAX)      8-22                               mg by           [facility]



     200 MG      12:43:                               mouth 2           Medical



     tablet      24                                 (two)           Center



                                                  times           



                                                  daily.           

 

     buPROPion            Yes            150mg QD   Take 150           [facility]



     (WELLBUTRIN      8-22                               mg by           [facility]



     XL) 150 MG      12:43:                               mouth           [facility]

l



     24 hr      24                                 daily.           Center



     tablet                                                        

 

     ALPRAZolam            Yes            .25mg      Take 0.25           [facility]

[facility]



     (XANAX)      8-22                               mg by           [facility]



     0.25 MG      12:43:                               mouth           Medical



     tablet      24                                 every           Center



                                                  night as           



                                                  needed for           



                                                  Anxiety.           

 

     tiZANidine            Yes            4mg  QD   Take 4 mg           [facility]



     (ZANAFLEX)      8-22                               by mouth           [facility]



     4 MG tablet      12:43:                               daily.           [facility]i

[facility]



               24                                                Center

 

     fluticasone            Yes                 QD   Inhale 1           [facility]



     -umeclidin-                                     Inhalation           [facility]

[facility]



     vilanter      12:43:                               by mouth           Medic

al



     (Trelegy      24                                 via            Center



     Ellipta)                                         inhaler           



     100-62.5-25                                         daily .           



     mcg DsDv                                                        

 

     zolpidem            Yes            10mg      Take 10 mg           [facility]



     (AMBIEN) 10      8-22                               by mouth           [facility]

s



     mg tablet      12:43:                               every           Medical



               24                                 night as           Center



                                                  needed for           



                                                  Insomnia.           

 

     donepeziL            Yes            10mg QD   Take 10 mg           [facility]



     (ARICEPT)      8-22                               by mouth           [facility]



     10 MG      12:43:                               nightly.           Medical



     tablet      24                                                Center

 

     DULoxetine            Yes            60mg Q.5D Take 60 mg           [facility]

[facility]



     (CYMBALTA)      822                               by mouth 2           [facility]

[facility]



     60 MG      12:43:                               (two)           Medical



     capsule      24                                 times           Center



                                                  daily.           

 

     gabapentin            Yes            300mg [id number] Take 300       

    [facility]



     (NEURONTIN)      8-                          [id number] mg by           [facility]

[facility]



     300 MG      12:43:                          3D   mouth 3           Medical



     capsule      24                                 (three)           Center



                                                  times           



                                                  daily.           

 

     indapamide            Yes            2.5mg QD   Take 2.5           [facility]



     (LOZOL) 2.5      8-22                               mg by           [facility]



     MG tablet      12:43:                               mouth           Medical



               24                                 every           Center



                                                  morning.           

 

     memantine            Yes            10mg Q.5D Take 10 mg           [facility]



     (NAMENDA)      8-22                               by mouth 2           [facility]

[facility]



     10 MG      12:43:                               (two)           Medical



     tablet      24                                 times           Center



                                                  daily.           

 

     metoprolol            Yes            25mg QD   Take 25 mg           [facility]

[facility]



     succinate      8-22                               by mouth           [facility]



     (TOPROL-XL)      12:43:                               nightly.           Me

dical



     25 MG 24 hr      24                                                Center



     tablet                                                        

 

     clopidogreL      -2021- No             75mg QD   Take 75 mg          

 [facility]



     (PLAVIX) 75      22                          by mouth           [facility]

es



     mg tablet      10:48: 00:00                          daily.           Medic

al



               43   :00                                          Center

 

     ELIQUIS 5      0      Yes            5mg       Take 5 mg           [facility]

[facility]



     mg tablet      8-22                               by mouth 2           [facility] 

of



               00:00:                               (two)           [place]



               00                                 times           Medical



                                                  daily.           [facility]

 

     benzonatate            Yes                      TAKE 1           [facility]

ers



     100 mg      8-22                               CAPSULE           ity of



     capsule      00:00:                               (100 MG           [place]



               00                                 TOTAL) BY           Medical



                                                  MOUTH 3           Branch



                                                  (THREE)           



                                                  TIMES           



                                                  DAILY AS           



                                                  NEEDED FOR           



                                                  COUGH FOR           



                                                  UP TO 7           



                                                  DAYS.           

 

     ELIQUIS 5      0      Yes            5mg       Take 5 mg           [facility]

[facility]



     mg tablet      8-22                               by mouth 2           ity 

of



               00:00:                               (two)           [place]



               00                                 times           Medical



                                                  daily.           Branch

 

     benzonatate            Yes                      TAKE 1           [facility]

ers



     100 mg      8-22                               CAPSULE           ity of



     capsule      00:00:                               (100 MG           [place]



               00                                 TOTAL) BY           Medical



                                                  MOUTH 3           Branch



                                                  (THREE)           



                                                  TIMES           



                                                  DAILY AS           



                                                  NEEDED FOR           



                                                  COUGH FOR           



                                                  UP TO 7           



                                                  DAYS.           

 

     ELIQUIS 5            Yes            5mg       Take 5 mg           [facility]

[facility]



     mg tablet      8-22                               by mouth 2           ity 

of



               00:00:                               (two)           [place]



               00                                 times           Medical



                                                  daily.           Branch

 

     benzonatate            Yes                      TAKE 1           [facility]

ers



     100 mg      8-22                               CAPSULE           ity of



     capsule      00:00:                               (100 MG           [place]



               00                                 TOTAL) BY           Medical



                                                  MOUTH 3           Branch



                                                  (THREE)           



                                                  TIMES           



                                                  DAILY AS           



                                                  NEEDED FOR           



                                                  COUGH FOR           



                                                  UP TO 7           



                                                  DAYS.           

 

     ELIQUIS 5      0      Yes            5mg       Take 5 mg           [facility]

[facility]



     mg tablet      8-22                               by mouth 2           ity 

of



               00:00:                               (two)           [place]



               00                                 times           Medical



                                                  daily.           Branch

 

     benzonatate            Yes                      TAKE 1           [facility]

ers



     100 mg      8-22                               CAPSULE           ity of



     capsule      00:00:                               (100 MG           [place]



               00                                 TOTAL) BY           Medical



                                                  MOUTH 3           Branch



                                                  (THREE)           



                                                  TIMES           



                                                  DAILY AS           



                                                  NEEDED FOR           



                                                  COUGH FOR           



                                                  UP TO 7           



                                                  DAYS.           

 

     ELIQUIS 5      0      Yes            5mg       Take 5 mg           [facility]

[facility]



     mg tablet      8-22                               by mouth 2           ity 

of



               00:00:                               (two)           [place]



               00                                 times           Medical



                                                  daily.           Branch

 

     benzonatate            Yes                      TAKE 1           [facility]

ers



     100 mg      8-22                               CAPSULE           ity of



     capsule      00:00:                               (100 MG           [place]



               00                                 TOTAL) BY           Medical



                                                  MOUTH 3           Branch



                                                  (THREE)           



                                                  TIMES           



                                                  DAILY AS           



                                                  NEEDED FOR           



                                                  COUGH FOR           



                                                  UP TO 7           



                                                  DAYS.           

 

     apixaban      2021-0 2022- No             5mg  Q.5D Take 1           [facility]



     (ELIQUIS) 5      8- 08-22                          tablet (5           [facility]

[facility]



     mg Tab      00:00: 23:59                          mg total)           Medic

al



     tablet      00   :00                           by mouth 2           Center



                                                  (two)           



                                                  times           



                                                  daily.           

 

     cyanocobala      -0 - No             1000ug QD   Take 1           [facility]

[facility]



     min,      8- 08-22                          tablet           [facility]



     vitamin      00:00: 23:59                          (1,000 mcg           Med

ical



     B-12, 1000      00   :00                           total) by           Cent

er



     MCG tablet                                         mouth           



                                                  daily.           

 

     flecainide      -2022- No             50mg      Take 1           [facility]



     (TAMBOCOR)      8- 08-22                          tablet (50           [facility]

[facility]



     50 MG      00:00: 23:59                          mg total)           [facility]

l



     tablet      00   :00                           by mouth           Center



                                                  every 12           



                                                  (twelve)           



                                                  hours.           

 

     apixaban      -2022- No             5mg  Q.5D Take 1           [facility]



     (ELIQUIS) 5      8- 08-22                          tablet (5           [facility]

[facility]



     mg Tab      00:00: 23:59                          mg total)           Medic

al



     tablet      00   :00                           by mouth 2           Center



                                                  (two)           



                                                  times           



                                                  daily.           

 

     cyanocobala      -0 - No             1000ug QD   Take 1           [facility]

[facility]



     min,      8--                          tablet           [facility]



     vitamin      00:00: 23:59                          (1,000 mcg           Med

ical



     B-12, 1000      00   :00                           total) by           Cent

er



     MCG tablet                                         mouth           



                                                  daily.           

 

     flecainide      -2022- No             50mg      Take 1           [facility]



     (TAMBOCOR)      8- 08-22                          tablet (50           [facility]

[facility]



     50 MG      00:00: 23:59                          mg total)           [facility]

l



     tablet      00   :00                           by mouth           Center



                                                  every 12           



                                                  (twelve)           



                                                  hours.           

 

     apixaban      -2022- No             5mg  Q.5D Take 1           [facility]



     (ELIQUIS) 5      8- 08-22                          tablet (5           [facility]

[facility]



     mg Tab      00:00: 23:59                          mg total)           Medic

al



     tablet      00   :00                           by mouth 2           Center



                                                  (two)           



                                                  times           



                                                  daily.           

 

     cyanocobala      -0 - No             1000ug QD   Take 1           [facility]

[facility]



     min,      8- 08-22                          tablet           [facility]



     vitamin      00:00: 23:59                          (1,000 mcg           Med

ical



     B-12, 1000      00   :00                           total) by           Cent

er



     MCG tablet                                         mouth           



                                                  daily.           

 

     flecainide      -2022- No             50mg      Take 1           [facility]



     (TAMBOCOR)      8- 08-22                          tablet (50           [facility]

[facility]



     50 MG      00:00: 23:59                          mg total)           [facility]

l



     tablet      00   :00                           by mouth           Center



                                                  every 12           



                                                  (twelve)           



                                                  hours.           

 

     apixaban      -2022- No             5mg  Q.5D Take 1           [facility]



     (ELIQUIS) 5      8- 08-22                          tablet (5           [facility]

[facility]



     mg Tab      00:00: 23:59                          mg total)           Medic

al



     tablet      00   :00                           by mouth 2           Center



                                                  (two)           



                                                  times           



                                                  daily.           

 

     cyanocobala      -2022- No             1000ug QD   Take 1           [facility]

[facility]



     min,      8--                          tablet           [facility]



     vitamin      00:00: 23:59                          (1,000 mcg           Med

ical



     B-12, 1000      00   :00                           total) by           Cent

er



     MCG tablet                                         mouth           



                                                  daily.           

 

     flecainide      -2022- No             50mg      Take 1           [facility]



     (TAMBOCOR)      8-                          tablet (50           [facility]

[facility]



     50 MG      00:00: 23:59                          mg total)           [facility]

l



     tablet      00   :00                           by mouth           Center



                                                  every 12           



                                                  (twelve)           



                                                  hours.           

 

     apixaban      -0 - No             5mg  Q.5D Take 1           [facility]



     (ELIQUIS) 5      --                          tablet (5           [facility]

[facility]



     mg Tab      00:00: 23:59                          mg total)           Medic

al



     tablet      00   :00                           by mouth 2           Center



                                                  (two)           



                                                  times           



                                                  daily.           

 

     cyanocobala      -0 - No             1000ug QD   Take 1           [facility]

[facility]



     min,      -22                          tablet           [facility]



     vitamin      00:00: 23:59                          (1,000 mcg           Med

ical



     B-12, 1000      00   :00                           total) by           Cent

er



     MCG tablet                                         mouth           



                                                  daily.           

 

     flecainide      -0 - No             50mg      Take 1           [facility]



     (TAMBOCOR)      8- 08-22                          tablet (50           [facility]

[facility]



     50 MG      00:00: 23:59                          mg total)           [facility]

l



     tablet      00   :00                           by mouth           Center



                                                  every 12           



                                                  (twelve)           



                                                  hours.           

 

     apixaban      -0 2- No             5mg  Q.5D Take 1           [facility]



     (ELIQUIS) 5      8-22 08-22                          tablet (5           [facility]

[facility]



     mg Tab      00:00: 23:59                          mg total)           Medic

al



     tablet      00   :00                           by mouth 2           Center



                                                  (two)           



                                                  times           



                                                  daily.           

 

     cyanocobala      -2022- No             1000ug QD   Take 1           [facility]

[facility]



     min,      -                          tablet           [facility]



     vitamin      00:00: 23:59                          (1,000 mcg           Med

ical



     B-12, 1000      00   :00                           total) by           Cent

er



     MCG tablet                                         mouth           



                                                  daily.           

 

     flecainide      -2022- No             50mg      Take 1           [facility]



     (TAMBOCOR)      -                          tablet (50           [facility]

[facility]



     50 MG      00:00: 23:59                          mg total)           [facility]

l



     tablet      00   :00                           by mouth           Center



                                                  every 12           



                                                  (twelve)           



                                                  hours.           

 

     apixaban      -2022- No             5mg  Q.5D Take 1           [facility]



     (ELIQUIS) 5      -                          tablet (5           [facility]

[facility]



     mg Tab      00:00: 23:59                          mg total)           Medic

al



     tablet      00   :00                           by mouth 2           Center



                                                  (two)           



                                                  times           



                                                  daily.           

 

     cyanocobala      -2022- No             1000ug QD   Take 1           [facility]

[facility]



     min,      -                          tablet           [facility]



     vitamin      00:00: 23:59                          (1,000 mcg           Med

ical



     B-12, 1000      00   :00                           total) by           Cent

er



     MCG tablet                                         mouth           



                                                  daily.           

 

     flecainide      -2022- No             50mg      Take 1           [facility]



     (TAMBOCOR)                                tablet (50           [facility]

[facility]



     50 MG      00:00: 23:59                          mg total)           [facility]

l



     tablet      00   :00                           by mouth           Center



                                                  every 12           



                                                  (twelve)           



                                                  hours.           

 

     apixaban      -0 - No             5mg  Q.5D Take 1           [facility]



     (ELIQUIS) 5      -                          tablet (5           [facility]

[facility]



     mg Tab      00:00: 23:59                          mg total)           Medic

al



     tablet      00   :00                           by mouth 2           Center



                                                  (two)           



                                                  times           



                                                  daily.           

 

     cyanocobala      -0 - No             1000ug QD   Take 1           [facility]

[facility]



     min,      -                          tablet           [facility]



     vitamin      00:00: 23:59                          (1,000 mcg           Med

ical



     B-12, 1000      00   :00                           total) by           Cent

er



     MCG tablet                                         mouth           



                                                  daily.           

 

     flecainide      2022022- No             50mg      Take 1           [facility]



     (TAMBOCOR)                                tablet (50           [facility]

[facility]



     50 MG      00:00: 23:59                          mg total)           [facility]

l



     tablet      00   :00                           by mouth           Center



                                                  every 12           



                                                  (twelve)           



                                                  hours.           

 

     apixaban      2022- No             5mg  Q.5D Take 1           [facility]



     (ELIQUIS) 5                                tablet (5           [facility]

[facility]



     mg Tab      00:00: 23:59                          mg total)           Medic

al



     tablet      00   :00                           by mouth 2           Center



                                                  (two)           



                                                  times           



                                                  daily.           

 

     cyanocobala      2022- No             1000ug QD   Take 1           [facility]

[facility]



     min,                                tablet           [facility]



     vitamin      00:00: 23:59                          (1,000 mcg           Med

ical



     B-12, 1000      00   :00                           total) by           Cent

er



     MCG tablet                                         mouth           



                                                  daily.           

 

     flecainide      2022- No             50mg      Take 1           [facility]



     (TAMBOCOR)                                tablet (50           [facility]

[facility]



     50 MG      00:00: 23:59                          mg total)           [facility]

l



     tablet      00   :00                           by mouth           Center



                                                  every 12           



                                                  (twelve)           



                                                  hours.           

 

     benzonatate      2021- No             100mg      Take 1           [facility]



     (TESSALON)                                capsule           [facility]



     100 MG      00:00: 23:59                          (100 mg           Medical



     capsule      00   :00                           total) by           Center



                                                  mouth 3           



                                                  (three)           



                                                  times           



                                                  daily as           



                                                  needed for           



                                                  Cough for           



                                                  up to 7           



                                                  days.           

 

     ALPRAZolam            Yes                                     [facility]



     0.25 mg      8-02                                              ity of



     tablet      00:00:                                              [place]



               00                                                [facility]

 

     ALPRAZolam            Yes                                     [facility]



     0.25 mg      8-02                                              ity of



     tablet      00:00:                                              [place]



               00                                                [facility]

 

     ALPRAZolam            Yes                                     [facility]



     0.25 mg      8-02                                              ity of



     tablet      00:00:                                              [place]



               00                                                [facility]

 

     ALPRAZolam            Yes                                     [facility]



     0.25 mg      8-02                                              ity of



     tablet      00:00:                                              [place]



               00                                                [facility]

 

     ALPRAZolam            Yes                                     [facility]



     0.25 mg      8-02                                              ity of



     tablet      00:00:                                              [place]



                                                               [facility]

 

     clopidogrel            Yes                      75 mg = 1           [facility]

[facility]



     75 MG Oral      5-10                               tab, PO,           l



     Tablet      19:06:                               Daily, #           [facility]



     [Plavix]      00                                 30 tab, 3           



                                                  Refill(s),           



                                                  Pharmacy:           



                                                  [facility],           



                                                  160.02,           



                                                  cm,            



                                                  05/10/21           



                                                  13:30:00           



                                                  CDT,           



                                                  Height,           



                                                  93.636,           



                                                  kg,            



                                                  05/10/21           



                                                  13:30:00           



                                                  CDT,           



                                                  Weight           

 

     clopidogrel      2021-0      Yes                      75 mg = 1           [facility]

[facility]



     75 MG Oral      5-10                               tab, PO,           l



     Tablet      19:06:                               Daily, #           [facility]



     [Plavix]      00                                 30 tab, 3           



                                                  Refill(s),           



                                                  Pharmacy:           



                                                  [facility],           



                                                  160.02,           



                                                  cm,            



                                                  05/10/21           



                                                  13:30:00           



                                                  CDT,           



                                                  Height,           



                                                  93.636,           



                                                  kg,            



                                                  05/10/21           



                                                  13:30:00           



                                                  CDT,           



                                                  Weight           

 

     clopidogrel      2021-0      Yes                      75 mg = 1           [facility]

[facility]



     75 MG Oral      5-10                               tab, PO,           l



     Tablet      19:06:                               Daily, #           [facility]



     [Plavix]      00                                 30 tab, 3           



                                                  Refill(s),           



                                                  Pharmacy:           



                                                  [facility],           



                                                  160.02,           



                                                  cm,            



                                                  05/10/21           



                                                  13:30:00           



                                                  CDT,           



                                                  Height,           



                                                  93.636,           



                                                  kg,            



                                                  05/10/21           



                                                  13:30:00           



                                                  CDT,           



                                                  Weight           

 

     clopidogrel      2021-0      Yes                      75 mg = 1           [facility]

[facility]



     75 MG Oral      5-10                               tab, PO,           l



     Tablet      19:06:                               Daily, #           [facility]



     [Plavix]      00                                 30 tab, 3           



                                                  Refill(s),           



                                                  Pharmacy:           



                                                  [facility],           



                                                  160.02,           



                                                  cm,            



                                                  05/10/21           



                                                  13:30:00           



                                                  CDT,           



                                                  Height,           



                                                  93.636,           



                                                  kg,            



                                                  05/10/21           



                                                  13:30:00           



                                                  CDT,           



                                                  Weight           

 

     donepezil      2021-0      Yes                      10 mg = 1           [facility]

[facility]



     10 mg oral      4-26                               tab, PO,           l



     tablet      17:32:                               Daily, #           [facility]



               00                                 90 tab, 1           



                                                  Refill(s),           



                                                  Pharmacy:           



                                                  [facility]           



                                                  [facility] #[id number],           



                                                  157.48,           



                                                  cm,            



                                                  21           



                                                  14:30:00           



                                                  CST,           



                                                  Height,           



                                                  92.727,           



                                                  kg,            



                                                  21           



                                                  13:26:00           



                                                  CDT,           



                                                  Weight           

 

     donepezil      2021-0      Yes                      10 mg = 1           [facility]

[facility]



     10 mg oral      4-26                               tab, PO,           l



     tablet      17:32:                               Daily, #           [facility]



               00                                 90 tab, 1           



                                                  Refill(s),           



                                                  Pharmacy:           



                                                  [facility] #[id number],           



                                                  157.48,           



                                                  cm,            



                                                  21           



                                                  14:30:00           



                                                  CST,           



                                                  Height,           



                                                  92.727,           



                                                  kg,            



                                                  21           



                                                  13:26:00           



                                                  CDT,           



                                                  Weight           

 

     donepezil      -0      Yes                      10 mg = 1           [facility]

[facility]



     10 mg oral      4-26                               tab, PO,           l



     tablet      17:32:                               Daily, #           [facility]



               00                                 90 tab, 1           



                                                  Refill(s),           



                                                  Pharmacy:           



                                                  [facility] #[id number],           



                                                  157.48,           



                                                  cm,            



                                                  21           



                                                  14:30:00           



                                                  CST,           



                                                  Height,           



                                                  92.727,           



                                                  kg,            



                                                  21           



                                                  13:26:00           



                                                  CDT,           



                                                  Weight           

 

     donepezil      -0      Yes                      10 mg = 1           [facility]

[facility]



     10 mg oral      3-09                               tab, PO,           l



     tablet      20:52:                               Bedtime, #           [facility]

nn



               00                                 90 tab, 2           



                                                  Refill(s),           



                                                  157.48,           



                                                  cm,            



                                                  21           



                                                  14:30:00           



                                                  CST,           



                                                  Height,           



                                                  91.818,           



                                                  kg,            



                                                  21           



                                                  14:30:00           



                                                  CST,           



                                                  Weight           

 

     memantine      -0      Yes                      = 1 tab,           [facility]

[facility]



     10 mg oral      3-09                               PO, BID, #           l



     tablet      20:52:                               180 tab, 2           [facility]

nn



               00                                 Refill(s),           



                                                  157.48,           



                                                  cm,            



                                                  21           



                                                  14:30:00           



                                                  CST,           



                                                  Height,           



                                                  91.818,           



                                                  kg,            



                                                  21           



                                                  14:30:00           



                                                  CST,           



                                                  Weight           

 

     topiramate      -0      Yes                      200 mg = 1           [facility]

[facility]



     200 mg oral      3-09                               tab, PO,           l



     tablet      20:52:                               BID, # 180           [facility]

nn



               00                                 tab, 1           



                                                  Refill(s),           



                                                  157.48,           



                                                  cm,            



                                                  21           



                                                  14:30:00           



                                                  CST,           



                                                  Height,           



                                                  91.818,           



                                                  kg,            



                                                  21           



                                                  14:30:00           



                                                  CST,           



                                                  Weight           

 

     donepezil      1-0      Yes                      10 mg = 1           [facility]

[facility]



     10 mg oral      3-09                               tab, PO,           l



     tablet      20:52:                               Bedtime, #           [facility]

nn



               00                                 90 tab, 2           



                                                  Refill(s),           



                                                  157.48,           



                                                  cm,            



                                                  21           



                                                  14:30:00           



                                                  CST,           



                                                  Height,           



                                                  91.818,           



                                                  kg,            



                                                  21           



                                                  14:30:00           



                                                  CST,           



                                                  Weight           

 

     memantine      0      Yes                      = 1 tab,           [facility]

[facility]



     10 mg oral      3-09                               PO, BID, #           l



     tablet      20:52:                               180 tab, 2           [facility]

nn



               00                                 Refill(s),           



                                                  157.48,           



                                                  cm,            



                                                  21           



                                                  14:30:00           



                                                  CST,           



                                                  Height,           



                                                  91.818,           



                                                  kg,            



                                                  21           



                                                  14:30:00           



                                                  CST,           



                                                  Weight           

 

     topiramate      -0      Yes                      200 mg = 1           [facility]

[facility]



     200 mg oral      3-09                               tab, PO,           l



     tablet      20:52:                               BID, # 180           [facility]

nn



               00                                 tab, 1           



                                                  Refill(s),           



                                                  157.48,           



                                                  cm,            



                                                  21           



                                                  14:30:00           



                                                  CST,           



                                                  Height,           



                                                  91.818,           



                                                  kg,            



                                                  21           



                                                  14:30:00           



                                                  CST,           



                                                  Weight           

 

     donepezil      0      Yes                      10 mg = 1           [facility]

[facility]



     10 mg oral      3-09                               tab, PO,           l



     tablet      20:52:                               Bedtime, #           [facility]

nn



               00                                 90 tab, 2           



                                                  Refill(s),           



                                                  157.48,           



                                                  cm,            



                                                  21           



                                                  14:30:00           



                                                  CST,           



                                                  Height,           



                                                  91.818,           



                                                  kg,            



                                                  21           



                                                  14:30:00           



                                                  CST,           



                                                  Weight           

 

     memantine      0      Yes                      = 1 tab,           [facility]

[facility]



     10 mg oral      3-09                               PO, BID, #           l



     tablet      20:52:                               180 tab, 2           [facility]

nn



               00                                 Refill(s),           



                                                  157.48,           



                                                  cm,            



                                                  21           



                                                  14:30:00           



                                                  CST,           



                                                  Height,           



                                                  91.818,           



                                                  kg,            



                                                  21           



                                                  14:30:00           



                                                  CST,           



                                                  Weight           

 

     topiramate      -0      Yes                      200 mg = 1           [facility]

[facility]



     200 mg oral      3-09                               tab, PO,           l



     tablet      20:52:                               BID, # 180           [facility]

nn



               00                                 tab, 1           



                                                  Refill(s),           



                                                  157.48,           



                                                  cm,            



                                                  21           



                                                  14:30:00           



                                                  CST,           



                                                  Height,           



                                                  91.818,           



                                                  kg,            



                                                  21           



                                                  14:30:00           



                                                  CST,           



                                                  Weight           

 

     donepezil      -0      Yes                      10 mg = 1           [facility]

[facility]



     10 mg oral      3-09                               tab, PO,           l



     tablet      20:52:                               Bedtime, #           [facility]

nn



               00                                 90 tab, 2           



                                                  Refill(s),           



                                                  157.48,           



                                                  cm,            



                                                  21           



                                                  14:30:00           



                                                  CST,           



                                                  Height,           



                                                  91.818,           



                                                  kg,            



                                                  21           



                                                  14:30:00           



                                                  CST,           



                                                  Weight           

 

     memantine            Yes                      = 1 tab,           [facility]

[facility]



     10 mg oral      3-09                               PO, BID, #           l



     tablet      20:52:                               180 tab, 2           [facility]

nn



               00                                 Refill(s),           



                                                  157.48,           



                                                  cm,            



                                                  21           



                                                  14:30:00           



                                                  CST,           



                                                  Height,           



                                                  91.818,           



                                                  kg,            



                                                  21           



                                                  14:30:00           



                                                  CST,           



                                                  Weight           

 

     topiramate            Yes                      200 mg = 1           [facility]

[facility]



     200 mg oral      3-09                               tab, PO,           l



     tablet      20:52:                               BID, # 180           [facility]

nn



               00                                 tab, 1           



                                                  Refill(s),           



                                                  157.48,           



                                                  cm,            



                                                  21           



                                                  14:30:00           



                                                  CST,           



                                                  Height,           



                                                  91.818,           



                                                  kg,            



                                                  21           



                                                  14:30:00           



                                                  CST,           



                                                  Weight           

 

     Zolpidem            Yes                      10 mg = 1           [facility]

[facility]



     tartrate 10      3-09                               tab, PO,           l



     MG Oral      20:41:                               Bedtime,           [facility]

n



     Tablet      00                                 PRN for           



     [Ambien]                                         sleep, 0           



                                                  Refill(s)           

 

     Zolpidem      0      Yes                      10 mg = 1           [facility]

remi



     tartrate 10      3-09                               tab, PO,           l



     MG Oral      20:41:                               Bedtime,           [facility]

n



     Tablet      00                                 PRN for           



     [Ambien]                                         sleep, 0           



                                                  Refill(s)           

 

     Zolpidem      0      Yes                      10 mg = 1           [facility]

rem[facility]



     tartrate 10      3-09                               tab, PO,           l



     MG Oral      20:41:                               Bedtime,           [facility]

n



     Tablet      00                                 PRN for           



     [Ambien]                                         sleep, 0           



                                                  Refill(s)           

 

     Zolpidem            Yes                      10 mg = 1           [facility]

[facility]



     tartrate 10      3-09                               tab, PO,           l



     MG Oral      20:41:                               Bedtime,           [facility]

n



     Tablet      00                                 PRN for           



     [Ambien]                                         sleep, 0           



                                                  Refill(s)           

 

     topiramate      2020      Yes                      200 mg = 1           [facility]

[facility]



     200 mg oral      2-09                               tab, PO,           l



     tablet      20:49:                               BID, # 180           [facility]

nn



               00                                 tab, 1           



                                                  Refill(s),           



                                                  Pharmacy:           



                                                  [facility]           



                                                  [facility] #[id number],           



                                                  160.02,           



                                                  cm,            



                                                  20           



                                                  14:19:00           



                                                  CST,           



                                                  Height,           



                                                  93.636,           



                                                  kg,            



                                                  20           



                                                  14:19:00           



                                                  CST,           



                                                  Weight           

 

     topiramate      -      Yes                      200 mg = 1           [facility]

[facility]



     200 mg oral      2-09                               tab, PO,           l



     tablet      20:49:                               BID, # 180           [facility]

nn



               00                                 tab, 1           



                                                  Refill(s),           



                                                  Pharmacy:           



                                                  [facility]           



                                                  cy #[id number],           



                                                  160.02,           



                                                  cm,            



                                                  20           



                                                  14:19:00           



                                                  CST,           



                                                  Height,           



                                                  93.636,           



                                                  kg,            



                                                  20           



                                                  14:19:00           



                                                  CST,           



                                                  Weight           

 

     topiramate      2020      Yes                      200 mg = 1           [facility]

[facility]



     200 mg oral      2-09                               tab, PO,           l



     tablet      20:49:                               BID, # 180           [facility]

nn



               00                                 tab, 1           



                                                  Refill(s),           



                                                  Pharmacy:           



                                                  [facility]           



                                                  cy #[id number],           



                                                  160.02,           



                                                  cm,            



                                                  20           



                                                  14:19:00           



                                                  CST,           



                                                  Height,           



                                                  93.636,           



                                                  kg,            



                                                  20           



                                                  14:19:00           



                                                  CST,           



                                                  Weight           

 

     topiramate      2020      Yes                      200 mg = 1           [facility]

[facility]



     200 mg oral      2-09                               tab, PO,           l



     tablet      20:49:                               BID, # 180           [facility]

nn



               00                                 tab, 1           



                                                  Refill(s),           



                                                  Pharmacy:           



                                                  [facility]           



                                                  cy #[id number],           



                                                  160.02,           



                                                  cm,            



                                                  20           



                                                  14:19:00           



                                                  CST,           



                                                  Height,           



                                                  93.636,           



                                                  kg,            



                                                  20           



                                                  14:19:00           



                                                  CST,           



                                                  Weight           

 

     donepezil      -      Yes                      10 mg = 1           [facility]

[facility]



     10 mg oral      1-09                               tab, PO,           l



     tablet      21:19:                               Bedtime, #           [facility]

nn



               00                                 90 tab, 2           



                                                  Refill(s),           



                                                  Pharmacy:           



                                                  [facility]           



                                                  cy #[id number],           



                                                  160.02,           



                                                  cm,            



                                                  20           



                                                  14:33:00           



                                                  CST,           



                                                  Height,           



                                                  93.636,           



                                                  kg,            



                                                  20           



                                                  14:33:00           



                                                  CST,           



                                                  Weight           

 

     donepezil      -      Yes                      10 mg = 1           [facility]

[facility]



     10 mg oral      1-09                               tab, PO,           l



     tablet      21:19:                               Bedtime, #           [facility]

nn



               00                                 90 tab, 2           



                                                  Refill(s),           



                                                  Pharmacy:           



                                                  [facility]           



                                                  cy #[id number],           



                                                  160.02,           



                                                  cm,            



                                                  20           



                                                  14:33:00           



                                                  CST,           



                                                  Height,           



                                                  93.636,           



                                                  kg,            



                                                  20           



                                                  14:33:00           



                                                  CST,           



                                                  Weight           

 

     donepezil      -      Yes                      10 mg = 1           [facility]

[facility]



     10 mg oral      1-09                               tab, PO,           l



     tablet      21:19:                               Bedtime, #           [facility]

nn



               00                                 90 tab, 2           



                                                  Refill(s),           



                                                  Pharmacy:           



                                                  [facility] #[id number],           



                                                  160.02,           



                                                  cm,            



                                                  20           



                                                  14:33:00           



                                                  CST,           



                                                  Height,           



                                                  93.636,           



                                                  kg,            



                                                  20           



                                                  14:33:00           



                                                  CST,           



                                                  Weight           

 

     donepezil      2020      Yes                      10 mg = 1           [facility]

[facility]



     10 mg oral      1-09                               tab, PO,           l



     tablet      21:19:                               Bedtime, #           [facility]

nn



               00                                 90 tab, 2           



                                                  Refill(s),           



                                                  Pharmacy:           



                                                  [facility] #[id number],           



                                                  160.02,           



                                                  cm,            



                                                  20           



                                                  14:33:00           



                                                  CST,           



                                                  Height,           



                                                  93.636,           



                                                  kg,            



                                                  20           



                                                  14:33:00           



                                                  CST,           



                                                  Weight           

 

     Ellura      2020-0      Yes                      0              [facility]



               8-07                               Refill(s)           l



               18:57:                                              [facility]



               00                                                

 

     tizanidine      2020-0      Yes                      4 mg = 1           [facility]

[facility]



     4 mg oral      8-07                               tab, PO,           l



     tablet      18:57:                               Daily, 0           [facility]



               00                                 Refill(s)           

 

     tizanidine      2020-0      Yes                      4 mg = 1           [facility]

[facility]



     4 mg oral      8-07                               tab, PO,           l



     tablet      18:57:                               Daily, 0           [facility]



               00                                 Refill(s)           

 

     Ellura      2020-0      Yes                      0              [facility]



               8-07                               Refill(s)           l



               18:57:                                              [facility]



               00                                                

 

     Ellura      2020-0      Yes                      0              [facility]



               8-07                               Refill(s)           l



               18:57:                                              [facility]



               00                                                

 

     tizanidine      2020-0      Yes                      4 mg = 1           [facility]

[facility]



     4 mg oral      8-07                               tab, PO,           l



     tablet      18:57:                               Daily, 0           [facility]



               00                                 Refill(s)           

 

     Ellura      2020-0      Yes                      0              [facility]



               8-07                               Refill(s)           l



               18:57:                                              [facility]



               00                                                

 

     tizanidine      2020-0      Yes                      4 mg = 1           [facility]

[facility]



     4 mg oral      8-07                               tab, PO,           l



     tablet      18:57:                               Daily, 0           [facility]



               00                                 Refill(s)           

 

     predniSONE      2020-0      Yes                      5 mg = 1           [facility]

[facility]



     5 mg oral      8-06                               tab, PO,           l



     tablet      20:23:                               Daily, #           [facility]



               00                                 30 tab, 2           



                                                  Refill(s),           



                                                  Pharmacy:           



                                                  [facility]           



                                                  [facility] #[id number],           



                                                  160.02,           



                                                  cm,            



                                                  20           



                                                  14:46:00           



                                                  CDT,           



                                                  Height,           



                                                  95.455,           



                                                  kg,            



                                                  20           



                                                  14:46:00           



                                                  CDT,           



                                                  Weight           

 

     predniSONE      2020-0      Yes                      5 mg = 1           [facility]

[facility]



     5 mg oral      8-06                               tab, PO,           l



     tablet      20:23:                               Daily, #           [facility]



               00                                 30 tab, 2           



                                                  Refill(s),           



                                                  [facility]           



                                                  [facility]           



                                                  [id number],           



                                                  160.02,           



                                                  cm,            



                                                  20           



                                                  14:46:00           



                                                  CDT,           



                                                  Height,           



                                                  95.455,           



                                                  kg,            



                                                  20           



                                                  14:46:00           



                                                  CDT,           



                                                  Weight           

 

     predniSONE      2020-0      Yes                      5 mg = 1           [facility]

[facility]



     5 mg oral      8-06                               tab, PO,           l



     tablet      20:23:                               Daily, #           [facility]



               00                                 30 tab, 2           



                                                  Refill(s),           



                                                  Pharm[facility]           



                                                  [facility]           



                                                  [id number],           



                                                  160.02,           



                                                  cm,            



                                                  20           



                                                  14:46:00           



                                                  CDT,           



                                                  Height,           



                                                  95.455,           



                                                  kg,            



                                                  20           



                                                  14:46:00           



                                                  CDT,           



                                                  Weight           

 

     predniSONE      2020-0      Yes                      5 mg = 1           [facility]

[facility]



     5 mg oral      8-06                               tab, PO,           l



     tablet      20:23:                               Daily, #           [facility]



               00                                 30 tab, 2           



                                                  Refill(s),           



                                                  Pharm[facility]           



                                                  [facility]           



                                                  [id number],           



                                                  160.02,           



                                                  cm,            



                                                  20           



                                                  14:46:00           



                                                  CDT,           



                                                  Height,           



                                                  95.455,           



                                                  kg,            



                                                  20           



                                                  14:46:00           



                                                  CDT,           



                                                  Weight           

 

     predniSONE      2020-0      Yes                      5 mg = 1           [facility]

[facility]



     5 mg oral      6-03                               tab, PO,           l



     tablet      19:17:                               Daily, #           [facility]



               00                                 30 tab, 2           



                                                  Refill(s),           



                                                  Pharmacy:           



                                                  [facility]           



                                                  cy #[id number]           

 

     predniSONE      2020-0      Yes                      5 mg = 1           [facility]

[facility]



     5 mg oral      6-03                               tab, PO,           l



     tablet      19:17:                               Daily, #           [facility]



               00                                 30 tab, 2           



                                                  Refill(s),           



                                                  Pharmacy:           



                                                  [facility]           



                                                  cy #[id number]           

 

     predniSONE      2020-0      Yes                      5 mg = 1           [facility]

[facility]



     5 mg oral      6-03                               tab, PO,           l



     tablet      19:17:                               Daily, #           [facility]



               00                                 30 tab, 2           



                                                  Refill(s),           



                                                  Pharmacy:           



                                                  [facility]           



                                                   #[id number]           

 

     predniSONE      2020-0      Yes                      5 mg = 1           [facility]

[facility]



     5 mg oral      6-03                               tab, PO,           l



     tablet      19:17:                               Daily, #           [facility]



               00                                 30 tab, 2           



                                                  Refill(s),           



                                                  [facility]           



                                                  [facility]           



                                                   [id number]           

 

     Acetaminoph      2020-0      Yes                      1 tab, PO,           

[facility]



     en 325 MG /      6-03                               Q8H, PRN           l



     Hydrocodone      18:41:                               Pain, # 90           

[facility]



     Bitartrate      00                                 tab, 0           



     7.5 MG Oral                                         Refill(s)           



     Tablet                                                        



     [Norco                                                        



     7.5/325]                                                        

 

     methocarbam      2020-0      Yes                      750 mg = 1           

[facility]



     ol 750 mg      6-03                               tab, PO,           l



     oral tablet      18:41:                               Q8H, PRN           [facility]

[facility]



               00                                 Spasms, #           



                                                  60 tab, 0           



                                                  Refill(s)           

 

     predniSONE      2020-0      No                       10 mg = 1           [facility]

[facility]



     10 mg oral      6-03                               tab, PO,           l



     tablet      18:41:                               Daily, 0           [facility]



               00                                 Refill(s)           

 

     Acetaminoph      2020-0      Yes                      1 tab, PO,           

[facility]



     en 325 MG /      6-03                               Q8H, PRN           l



     Hydrocodone      18:41:                               Pain, # 90           

[facility]



     Bitartrate      00                                 tab, 0           



     7.5 MG Oral                                         Refill(s)           



     Tablet                                                        



     [Norco                                                        



     7.5/325]                                                        

 

     methocarbam      2020-0      Yes                      750 mg = 1           

[facility]



     ol 750 mg      6-03                               tab, PO,           l



     oral tablet      18:41:                               Q8H, PRN           [facility]

[facility]



               00                                 Spasms, #           



                                                  60 tab, 0           



                                                  Refill(s)           

 

     predniSONE      2020-0      No                       10 mg = 1           [facility]

[facility]



     10 mg oral      6-03                               tab, PO,           l



     tablet      18:41:                               Daily, 0           [facility]



               00                                 Refill(s)           

 

     Acetaminoph      2020-0      Yes                      1 tab, PO,           

[facility]



     en 325 MG /      6-03                               Q8H, PRN           l



     Hydrocodone      18:41:                               Pain, # 90           

[facility]



     Bitartrate      00                                 tab, 0           



     7.5 MG Oral                                         Refill(s)           



     Tablet                                                        



     [Norco                                                        



     7.5/325]                                                        

 

     methocarbam      2020-0      Yes                      750 mg = 1           

[facility]



     ol 750 mg      6-03                               tab, PO,           l



     oral tablet      18:41:                               Q8H, PRN           [facility]

[facility]



               00                                 Spasms, #           



                                                  60 tab, 0           



                                                  Refill(s)           

 

     predniSONE      -0      No                       10 mg = 1           [facility]

[facility]



     10 mg oral      6-03                               tab, PO,           l



     tablet      18:41:                               Daily, 0           [facility]



               00                                 Refill(s)           

 

     Acetaminoph      -0      Yes                      1 tab, PO,           

[facility]



     en 325 MG /      6-03                               Q8H, PRN           l



     Hydrocodone      18:41:                               Pain, # 90           

[facility]



     Bitartrate      00                                 tab, 0           



     7.5 MG Oral                                         Refill(s)           



     Tablet                                                        



     [Norco                                                        



     7.5/325]                                                        

 

     methocarbam      0      Yes                      750 mg = 1           

[facility]



     ol 750 mg      6-03                               tab, PO,           l



     oral tablet      18:41:                               Q8H, PRN           [facility]

[facility]



               00                                 Spasms, #           



                                                  60 tab, 0           



                                                  Refill(s)           

 

     predniSONE            No                       10 mg = 1           [facility]

[facility]



     10 mg oral      6-03                               tab, PO,           l



     tablet      18:41:                               Daily, 0           [facility]



               00                                 Refill(s)           

 

     24 HR      -0      Yes                      150 mg = 1           [facility]

a



     Bupropion      3-16                               tab, PO,           l



     Hydrochlori      15:38:                               Daily, #           [facility]

[facility]



     de 150 MG      00                                 30 tab, 2           



     Extended                                         Refill(s),           



     Release                                         Pharmacy:           



     Tablet                                         [facility]           



     [Wellbutrin                                         cy #[id number]           



     ]                                                           

 

     Wellbutrin      -0      Yes                      300 mg = 2           [facility]

[facility]



           3-16                               tab, PO,           l



     mg/24 hours      15:38:                               Daily, #           [facility]

[facility]



     oral      00                                 60 tab, 2           



     tablet,                                         Refill(s),           



     extended                                         Pharmacy:           



     release                                         [facility]           



                                                  cy #[id number]           

 

     24 HR      -0      Yes                      150 mg = 1           [facility]

a



     Bupropion      3-16                               tab, PO,           l



     Hydrochlori      15:38:                               Daily, #           [facility]

[facility]



     de 150 MG      00                                 30 tab, 2           



     Extended                                         Refill(s),           



     Release                                         Pharmacy:           



     Tablet                                         [facility]           



     [Wellbutrin                                         cy #[id number]           



     ]                                                           

 

     24 HR      2020-0      Yes                      150 mg = 1           [facility]

a



     Bupropion      3-16                               tab, PO,           l



     Hydrochlori      15:38:                               Daily, #           [facility]

[facility]



     de 150 MG      00                                 30 tab, 2           



     Extended                                         Refill(s),           



     Release                                         Pharmacy:           



     Tablet                                         [facility]           



     [Wellbutrin                                         cy #[id number]           



     ]                                                           

 

     Wellbutrin      2020-0      Yes                      300 mg = 2           [facility]

[facility]



           3-16                               tab, PO,           l



     mg/24 hours      15:38:                               Daily, #           [facility]

[facility]



     oral      00                                 60 tab, 2           



     tablet,                                         Refill(s),           



     extended                                         Pharmacy:           



     release                                         [facility]           



                                                  cy #[id number]           

 

     24 HR      2020-0      Yes                      150 mg = 1           [facility]

a



     Bupropion      3-16                               tab, PO,           l



     Hydrochlori      15:38:                               Daily, #           [facility]

[facility]



     de 150 MG      00                                 30 tab, 2           



     Extended                                         Refill(s),           



     Release                                         Pharmacy:           



     Tablet                                         [facility]           



     [Wellbutrin                                         cy #[id number]           



     ]                                                           

 

     Wellbutrin      2020-0      Yes                      300 mg = 2           M

[facility]



           3-16                               tab, PO,           l



     mg/24 hours      15:38:                               Daily, #           [facility]

[facility]



     oral      00                                 60 tab, 2           



     tablet,                                         Refill(s),           



     extended                                         Pharmacy:           



     release                                         [facility]           



                                                  cy #[id number]           

 

     predniSONE      2020-0      Yes                      10 mg = 1           [facility]

[facility]



     10 mg oral      3-03                               tab, PO,           l



     tablet      21:30:                               Daily, X           [facility]



               00                                 30 day, #           



                                                  30 tab, 1           



                                                  Refill(s),           



                                                  Pharmacy:           



                                                  [facility]           



                                                  PHARMACY           



                                                  #[id number]           

 

     predniSONE      2020-0      Yes                      10 mg = 1           [facility]

[facility]



     10 mg oral      3-03                               tab, PO,           l



     tablet      21:30:                               Daily, X           [facility]



               00                                 30 day, #           



                                                  30 tab, 1           



                                                  Refill(s),           



                                                  Pharmacy:           



                                                  [facility]           



                                                  PHARMACY           



                                                  #[id number]           

 

     predniSONE      2020-0      Yes                      10 mg = 1           [facility]

[facility]



     10 mg oral      3-03                               tab, PO,           l



     tablet      21:30:                               Daily, X           [facility]



               00                                 30 day, #           



                                                  30 tab, 1           



                                                  Refill(s),           



                                                  Pharmacy:           



                                                  [facility]           



                                                  PHARMACY           



                                                  #[id number]           

 

     predniSONE      2020-0      Yes                      10 mg = 1           [facility]

[facility]



     10 mg oral      3-03                               tab, PO,           l



     tablet      21:30:                               Daily, X           [facility]



               00                                 30 day, #           



                                                  30 tab, 1           



                                                  Refill(s),           



                                                  Pharmacy:           



                                                  [facility]           



                                                  PHARMACY           



                                                  #[id number]           

 

     predniSONE      2020-0      No                       20 mg = 1           [facility]

[facility]



     20 mg oral      3-03                               tab, PO,           l



     tablet      20:38:                               BID,           [facility]



               00                                 Quantity           



                                                  sufficient           



                                                  , X 30           



                                                  day, # 60           



                                                  tab, 1           



                                                  Refill(s),           



                                                  Pharmacy:           



                                                  [facility]           



                                                  PHARMACY           



                                                  #[id number]           

 

     predniSONE      2020-0      No                       20 mg = 1           [facility]

[facility]



     20 mg oral      3-03                               tab, PO,           l



     tablet      20:38:                               BID,           [facility]



               00                                 Quantity           



                                                  sufficient           



                                                  , X 30           



                                                  day, # 60           



                                                  tab, 1           



                                                  Refill(s),           



                                                  Pharmacy:           



                                                  [facility]           



                                                  PHARMACY           



                                                  #[id number]           

 

     predniSONE      2020-0      No                       20 mg = 1           [facility]

[facility]



     20 mg oral      3-03                               tab, PO,           l



     tablet      20:38:                               BID,           [facility]



               00                                 Quantity           



                                                  sufficient           



                                                  , X 30           



                                                  day, # 60           



                                                  tab, 1           



                                                  Refill(s),           



                                                  Pharmacy:           



                                                  [facility]           



                                                  PHARMACY           



                                                  #[id number]           

 

     predniSONE      2020-0      No                       20 mg = 1           [facility]

[facility]



     20 mg oral      3-03                               tab, PO,           l



     tablet      20:38:                               BID,           [facility]



               00                                 Quantity           



                                                  sufficient           



                                                  , X 30           



                                                  day, # 60           



                                                  tab, 1           



                                                  Refill(s),           



                                                  Pharmacy:           



                                                  [facility]           



                                                  PHARMACY           



                                                  #[id number]           

 

     predniSONE      2020-0      No                       20 mg = 1           [facility]

[facility]



     20 mg oral      3-03                               tab, PO,           l



     tablet      20:31:                               Daily,           [facility]



               00                                 Quantity           



                                                  sufficient           



                                                  , X 30           



                                                  day, # 30           



                                                  tab, 1           



                                                  Refill(s),           



                                                  Pharmacy:           



                                                  [facility] #[id number]           

 

     predniSONE      2020-0      No                       20 mg = 1           [facility]

[facility]



     20 mg oral      3-03                               tab, PO,           l



     tablet      20:31:                               Daily,           [facility]



               00                                 Quantity           



                                                  sufficient           



                                                  , X 30           



                                                  day, # 30           



                                                  tab, 1           



                                                  Refill(s),           



                                                  Pharmacy:           



                                                  [facility] #[id number]           

 

     predniSONE      2020-0      No                       20 mg = 1           [facility]

[facility]



     20 mg oral      3-03                               tab, PO,           l



     tablet      20:31:                               Daily,           [facility]



               00                                 Quantity           



                                                  sufficient           



                                                  , X 30           



                                                  day, # 30           



                                                  tab, 1           



                                                  Refill(s),           



                                                  Pharmacy:           



                                                  [facility] #[id number]           

 

     predniSONE      2020-0      No                       20 mg = 1           [facility]

[facility]



     20 mg oral      3-03                               tab, PO,           l



     tablet      20:31:                               Daily,           [facility]



               00                                 Quantity           



                                                  sufficient           



                                                  , X 30           



                                                  day, # 30           



                                                  tab, 1           



                                                  Refill(s),           



                                                  Pharmacy:           



                                                  [facility] #[id number]           

 

     Prednisone      2020-0      No                       50 mg, PO,           [facility]

[facility]



               3-03                               Daily,           l



               20:08:                               Quantity           [facility]



               00                                 sufficient           



                                                  , 0            



                                                  Refill(s)           

 

     Prednisone      2020-0      No                       50 mg, PO,           [facility]

[facility]



               3-03                               Daily,           l



               20:08:                               Quantity           [facility]



               00                                 sufficient           



                                                  , 0            



                                                  Refill(s)           

 

     Prednisone      2020-0      No                       50 mg, PO,           M

[facility]



               3-03                               Daily,           l



               20:08:                               Quantity           [facility]



               00                                 sufficient           



                                                  , 0            



                                                  Refill(s)           

 

     Prednisone      2020-0      No                       50 mg, PO,           M

[facility]



               3-03                               Daily,           l



               20:08:                               Quantity           [facility]



               00                                 sufficient           



                                                  , 0            



                                                  Refill(s)           

 

     donepezil 5      -0      Yes                      = 1 tab,           Me

moria



     mg oral      2-12                               PO,            l



     tablet      17:10:                               Bedtime, #           [facility]

nn



               00                                 90 tab, 3           



                                                  Refill(s),           



                                                  Pharmacy:           



                                                  [id number]           

 

     donepezil 5      -0      Yes                      = 1 tab,           Me

moria



     mg oral      2-12                               PO,            l



     tablet      17:10:                               Bedtime, #           [facility]

nn



               00                                 90 tab, 3           



                                                  Refill(s),           



                                                  Pharmacy:           



                                                  [facility] #[id number]           

 

     donepezil 5      -0      Yes                      = 1 tab,           Me

moria



     mg oral      2-12                               PO,            l



     tablet      17:10:                               Bedtime, #           [facility]

nn



               00                                 90 tab, 3           



                                                  Refill(s),           



                                                  Pharmacy:           



                                                  [facility] #[id number]           

 

     donepezil 5      -      Yes                      = 1 tab,           Me

moria



     mg oral      2-12                               PO,            l



     tablet      17:10:                               Bedtime, #           [facility]

nn



               00                                 90 tab, 3           



                                                  Refill(s),           



                                                  Pharmacy:           



                                                  [facility] #[id number]           

 

     spironolact      2019      Yes                      25 mg = 1           M

emoria



     one 25 mg      2-03                               tab, PO,           l



     oral tablet      20:03:                               BID, 0           [facility]

carla



               00                                 Refill(s)           

 

     sucralfate      2019      Yes                      1 gm = 1           [facility]

oria



     1 g oral      2-03                               tab, PO,           l



     tablet      20:03:                               Daily, 0           [facility]



               00                                 Refill(s)           

 

     24 HR      2019      Yes                      150 mg = 1           [facility]

a



     Bupropion      2-03                               tab, PO,           l



     Hydrochlori      20:03:                               Q24H, 0           Her

hess



     de 150 MG      00                                 Refill(s)           



     Extended                                                        



     Release                                                        



     Tablet                                                        

 

     spironolact      2019      Yes                      25 mg = 1           M

emoria



     one 25 mg      2-03                               tab, PO,           l



     oral tablet      20:03:                               BID, 0           [facility]

carla



               00                                 Refill(s)           

 

     sucralfate      2019      Yes                      1 gm = 1           Mem

oria



     1 g oral      2-03                               tab, PO,           l



     tablet      20:03:                               Daily, 0           [facility]



               00                                 Refill(s)           

 

     24 HR      2019      Yes                      150 mg = 1           [facility]

a



     Bupropion      2-03                               tab, PO,           l



     Hydrochlori      20:03:                               Q24H, 0           Her

hess



     de 150 MG      00                                 Refill(s)           



     Extended                                                        



     Release                                                        



     Tablet                                                        

 

     spironolact      2019      Yes                      25 mg = 1           M

[facility]



     one 25 mg      2-03                               tab, PO,           l



     oral tablet      20:03:                               BID, 0           [facility]

carla



               00                                 Refill(s)           

 

     sucralfate      2019      Yes                      1 gm = 1           Mem

oria



     1 g oral      2-03                               tab, PO,           l



     tablet      20:03:                               Daily, 0           [facility]



               00                                 Refill(s)           

 

     24 HR      2019      Yes                      150 mg = 1           [facility]

a



     Bupropion      2-03                               tab, PO,           l



     Hydrochlori      20:03:                               Q24H, 0           Her

hess



     de 150 MG      00                                 Refill(s)           



     Extended                                                        



     Release                                                        



     Tablet                                                        

 

     spironolact      2019      Yes                      25 mg = 1           M

[facility]



     one 25 mg      2-03                               tab, PO,           l



     oral tablet      20:03:                               BID, 0           [facility]

carla



               00                                 Refill(s)           

 

     sucralfate      2019      Yes                      1 gm = 1           Mem

oria



     1 g oral      2-03                               tab, PO,           l



     tablet      20:03:                               Daily, 0           [facility]



               00                                 Refill(s)           

 

     24 HR      2019      Yes                      150 mg = 1           [facility]

a



     Bupropion      2-03                               tab, PO,           l



     Hydrochlori      20:03:                               Q24H, 0           Her

hess



     de 150 MG      00                                 Refill(s)           



     Extended                                                        



     Release                                                        



     Tablet                                                        

 

     24 HR      2019      Yes                      150 mg = 1           [facility]

a



     Bupropion      2-03                               tab, PO,           l



     Hydrochlori      19:59:                               Daily, #           [facility]

rmann



     de 150 MG      00                                 30 tab, 1           



     Extended                                         Refill(s),           



     Release                                         Pharmacy:           



     Tablet                                         [facility]           



     [Wellbutrin                                         [facility] #[id number]           



     ]                                                           

 

     24 HR      2019      Yes                      150 mg = 1           [facility]

a



     Bupropion      2-03                               tab, PO,           l



     Hydrochlori      19:59:                               Daily, #           He

rmann



     de 150 MG      00                                 30 tab, 1           



     Extended                                         Refill(s),           



     Release                                         Pharmacy:           



     Tablet                                         [facility]           



     [Wellbutrin                                         [facility] #[id number]           



     ]                                                           

 

     24 HR      2019      Yes                      150 mg = 1           [facility]

a



     Bupropion      2-03                               tab, PO,           l



     Hydrochlori      19:59:                               Daily, #           He

rmann



     de 150 MG      00                                 30 tab, 1           



     Extended                                         Refill(s),           



     Release                                         Pharmacy:           



     Tablet                                         [facility]           



     [Wellbutrin                                         [facility] #[id number]           



     ]                                                           

 

     24 HR      2019      Yes                      150 mg = 1           [facility]

a



     Bupropion      2-03                               tab, PO,           l



     Hydrochlori      19:59:                               Daily, #           He

rmann



     de 150 MG      00                                 30 tab, 1           



     Extended                                         Refill(s),           



     Release                                         Pharmacy:           



     Tablet                                         [facility]           



     [Wellbutrin                                         [facility] #[id number]           



     ]                                                           

 

     Nitrofurant      2019      Yes                      50 mg, PO,           

[facility]



     oin       2-03                               Daily, #           l



               19:52:                               14 cap, 0           [facility]



               00                                 Refill(s)           

 

     Nitrofurant      2019      Yes                      50 mg, PO,           

[facility]



     oin       2-03                               Daily, #           l



               19:52:                               14 cap, 0           [facility]



               00                                 Refill(s)           

 

     Nitrofurant      2019      Yes                      50 mg, PO,           

[facility]



     oin       2-03                               Daily, #           l



               19:52:                               14 cap, 0           [facility]



               00                                 Refill(s)           

 

     Nitrofurant      2019      Yes                      50 mg, PO,           

[facility]



     oin       2-03                               Daily, #           l



               19:52:                               14 cap, 0           [facility]



               00                                 Refill(s)           

 

     Donepezil      2019-0      Yes                      5 mg = 1           [facility]

remi



     hydrochlori      9-03                               tab, PO,           l



     de 5 MG      20:02:                               Bedtime, #           [facility]

carla



     Oral Tablet      00                                 30 tab, 3           



     [Aricept]                                         Refill(s),           



                                                  Pharmacy:           



                                                  [facility]           



                                                  [facility] #[id number]           

 

     Donepezil      2019-0      Yes                      5 mg = 1           [facility]

remi



     hydrochlori      9-03                               tab, PO,           l



     de 5 MG      20:02:                               Bedtime, #           [facility]

carla



     Oral Tablet      00                                 30 tab, 3           



     [Aricept]                                         Refill(s),           



                                                  Pharmacy:           



                                                  [facility]           



                                                  cy #[id number]           

 

     Donepezil      2019-0      Yes                      5 mg = 1           [facility]

remi



     hydrochlori      9-03                               tab, PO,           l



     de 5 MG      20:02:                               Bedtime, #           [facility]

carla



     Oral Tablet      00                                 30 tab, 3           



     [Aricept]                                         Refill(s),           



                                                  Pharmacy:           



                                                  [facility]           



                                                  cy #[id number]           

 

     Donepezil      2019-0      Yes                      5 mg = 1           [facility]

remi



     hydrochlori      9-03                               tab, PO,           l



     de 5 MG      20:02:                               Bedtime, #           [facility]

carla



     Oral Tablet      00                                 30 tab, 3           



     [Aricept]                                         Refill(s),           



                                                  Pharmacy:           



                                                  [facility]           



                                                  cy #[id number]           

 

     potassium      2019-0      Yes                      20 mEq = 1           Me

moria



     chloride 20      9-03                               tab, PO,           l



     mEq oral      19:33:                               BID, # 10           [facility]

carla



     tablet,      00                                 tab, 0           



     extended                                         Refill(s)           



     release                                                        

 

     potassium      2019-0      Yes                      20 mEq = 1           Me

moria



     chloride 20      9-03                               tab, PO,           l



     mEq oral      19:33:                               BID, # 10           [facility]

carla



     tablet,      00                                 tab, 0           



     extended                                         Refill(s)           



     release                                                        

 

     potassium      2019-0      Yes                      20 mEq = 1           Me

moria



     chloride 20      9-03                               tab, PO,           l



     mEq oral      19:33:                               BID, # 10           [facility]

carla



     tablet,      00                                 tab, 0           



     extended                                         Refill(s)           



     release                                                        

 

     potassium      2019-0      Yes                      20 mEq = 1           Me

moria



     chloride 20      9-03                               tab, PO,           l



     mEq oral      19:33:                               BID, # 10           [facility]

carla



     tablet,      00                                 tab, 0           



     extended                                         Refill(s)           



     release                                                        

 

     gabapentin      -0      Yes                      300 mg = 1           M

em[facility]



     300 mg oral      4-27                               cap, PO,           l



     capsule      19:12:                               TID, # 90           [facility]

nn



               00                                 cap, 1           



                                                  Refill(s)           

 

     metoprolol      2018-0      Yes                      50 mg = 1           Me

moria



     tartrate 50      4-27                               tab, PO,           l



     mg oral      19:12:                               Daily, #           [facility]

n



     tablet      00                                 180 tab, 0           



                                                  Refill(s)           

 

     gabapentin      2018-0      Yes                      300 mg = 1           M

emoria



     300 mg oral      4-27                               cap, PO,           l



     capsule      19:12:                               TID, # 90           [facility]

nn



               00                                 cap, 1           



                                                  Refill(s)           

 

     metoprolol      2018-0      Yes                      50 mg = 1           Me

moria



     tartrate 50      4-27                               tab, PO,           l



     mg oral      19:12:                               Daily, #           [facility]

n



     tablet      00                                 180 tab, 0           



                                                  Refill(s)           

 

     gabapentin      2018-0      Yes                      300 mg = 1           M

[facility]oria



     300 mg oral      4-27                               cap, PO,           l



     capsule      19:12:                               TID, # 90           [facility]

nn



               00                                 cap, 1           



                                                  Refill(s)           

 

     metoprolol      2018-0      Yes                      50 mg = 1           Me

moria



     tartrate 50      4-27                               tab, PO,           l



     mg oral      19:12:                               Daily, #           [facility]

n



     tablet      00                                 180 tab, 0           



                                                  Refill(s)           

 

     Cymbalta 60      2018-0      Yes                      60 mg = 1           M

[facility]oria



     mg oral      2-26                               cap, PO,           l



     delayed      20:23:                               BID, 0           [facility]



     release      00                                 Refill(s)           



     capsule                                                        

 

     Zocor 40 mg      2018-0      Yes                      40 mg = 1           M

emoria



     oral tablet      2-26                               tab, PO,           l



               20:23:                               Bedtime, 0           [facility]



               00                                 Refill(s)           

 

     Cymbalta 60      0      Yes                      60 mg = 1           M

[facility]



     mg oral      2-26                               cap, PO,           l



     delayed      20:23:                               BID, 0           [facility]



     release      00                                 Refill(s)           



     capsule                                                        

 

     Zocor 40 mg      0      Yes                      40 mg = 1           M

[facility]oria



     oral tablet      2-26                               tab, PO,           l



               20:23:                               Bedtime, 0           [facility]



               00                                 Refill(s)           

 

     Cymbalta 60      0      Yes                      60 mg = 1           M

[facility]



     mg oral      2-26                               cap, PO,           l



     delayed      20:23:                               BID, 0           [facility]



     release      00                                 Refill(s)           



     capsule                                                        

 

     Zocor 40 mg      20180      Yes                      40 mg = 1           M

[facility]oria



     oral tablet      2-26                               tab, PO,           l



               20:23:                               Bedtime, 0           [facility]



               00                                 Refill(s)           

 

     calcium      2016      Yes            2{tbl}      Take 2           Un[facility]

s



     carb and      2-28                               tablets by           ity o

f



     citrate-vit      11:30:                               mouth           [place]



     D3        04                                 daily.           Medical



     (CITRACAL +                                                        [facility]



     D SLOW                                                        



     RELEASE)                                                        



     600 mg                                                        



     calcium-                                                        



     500 unit                                                        



     TbSR                                                        

 

     DULoxetine      2016      Yes            60mg      Take 60 mg           U

nivers



     (CYMBALTA)      2-28                               by mouth           ity o

f



     60 mg      11:30:                               daily.           [place]



     capsule      04                                                Medical



                                                                 Branch

 

     indapamide      2016      Yes            2.5mg      Take 2.5           Un

bob



     (LOZOL) 2.5      2-28                               mg by           ity of



     mg tablet      11:30:                               mouth           [place]



               04                                 daily.           Medical



                                                                 [facility]

 

     Vit C-Vit      2016      Yes            1{capsu      Take 1           Uni

vers



     E-Lutein-Mi      2-28                     le}       capsule by           it

y of



     n-OM-3      11:30:                               mouth           [place]



     (OCUVITE)      04                                 daily.           Medical



     150-30-5-15                                                        [facility]



     0                                                           



     mg-unit-mg-                                                        



     mg Cap                                                        

 

     multivitami      2016      Yes            1{tbl}      Take 1           Un

bob



     n         2-28                               tablet by           ity of



     (ONE-A-DAY      11:30:                               mouth           [place]



     ESSENTIAL)      04                                 daily.           Medical



     tablet                                                        [facility]

 

     FERROUS      2016      Yes            1{tbl}      Take 1           Univer

s



     FUMARATE/VI      2-28                               tablet by           ity

 of



     T BCOMP,C      11:30:                               mouth           [place]



     (SUPER B      04                                 daily.           Medical



     COMPLEX                                                        Br[facility]



     ORAL)                                                        

 

     ascorbic      2016      Yes            500mg      Take 500           [facility]

ers



     acid,      2-28                               mg by           [facility] of



     vitamin C,      11:30:                               mouth           [place]



     (VITAMIN C)      04                                 daily.           [facility]

l



     500 mg                                                        [facility]



     tablet                                                        

 

     Cholecalcif      2016      Yes            1{tbl}      Take 1           Un

bob



     edith,      2-28                               tablet by           [facility] of



     Vitamin D3,      11:30:                               mouth           [place]



     (VITAMIN      04                                 daily.           Medical



     D3) 5,000                                                        [facility]



     unit tablet                                                        

 

     QUEtiapine      2016      Yes            50mg      Take 50 mg           U

nivers



     (SEROQUEL)      2-28                               by mouth           ity o

f



     50 mg      11:30:                               daily.           [place]



     tablet      04                                                Medical



                                                                 Branch

 

     simvastatin      2016      Yes            40mg      Take 40 mg           

[facility]



     (ZOCOR) 40      2-28                               by mouth           ity o

f



     mg tablet      11:30:                               at             [place]



               04                                 bedtime.           Medical



                                                                 Branch

 

     topiramate      2016      Yes            200mg      Take 200           Un

bob



     (TOPAMAX)      2-28                               mg by           ity of



     100 mg      11:30:                               mouth           [place]



     tablet      04                                 every           Medical



                                                  evening.           Branch

 

     KCL       2016      Yes            10meq      Take 10           [facility]



     (KLOR-CON      2-28                               mEq by           [facility] of



     M10) 10 mEq      11:30:                               mouth 2           [facility]

[facility]



     tablet      04                                 (two)           Medical



                                                  times           [facility]



                                                  daily.           

 

     pregabalin      2016      Yes            225mg      Take 225           Un

bob



     (LYRICA)      2-28                               mg by           ity of



     225 mg      11:30:                               mouth 2           [place]



     capsule      04                                 (two)           Medical



                                                  times           [facility]



                                                  daily.           

 

     memantine      2016      Yes            5mg       Take 5 mg           Uni

vers



     (NAMENDA) 5      2-28                               by mouth 2           it

y of



     mg tablet      11:30:                               (two)           [place]



               04                                 times           Medical



                                                  daily.           Branch

 

     metoprolol      2016      Yes            50mg      Take 50 mg           U

nivers



     tartrate      2-28                               by mouth 2           ity o

f



     (LOPRESSOR)      11:30:                               (two)           [place]



     50 mg      04                                 times           Medical



     tablet                                         daily.           [facility]

 

     esomeprazol      2016      Yes            20mg      Take 20 mg           

[facility]



     e (NEXIUM)      2-28                               by mouth 2           ity

 of



     20 mg      11:30:                               (two)           [place]



     capsule      04                                 times           Medical



                                                  daily.           Branch

 

     LACTOBAC/BI      2016      Yes            1{tbl}      Take 1           Un

bob



     FIDOBAC/TYLER      2-28                               tablet by           ity

 of



     B DE CON      11:30:                               mouth 2           [place]



     (ULTRA      04                                 (two)           Medical



     PATRICK PLUS                                         times           [facility]



     ORAL)                                         daily.           



                                                  Indication           



                                                  s:             



                                                  Ultimate           



                                                  [facility]           

 

     calcium      2016      Yes            2{tbl}      Take 2           [facility]

s



     carb and      2-28                               tablets by           ity o

f



     citrate-vit      11:30:                               mouth           [place]



     D3        04                                 daily.           Medical



     (CITRACAL +                                                        [facility]



     D SLOW                                                        



     RELEASE)                                                        



     600 mg                                                        



     calcium-                                                        



     500 unit                                                        



     TbSR                                                        

 

     DULoxetine      2016      Yes            60mg      Take 60 mg           U

nivers



     (CYMBALTA)      2-28                               by mouth           ity o

f



     60 mg      11:30:                               daily.           [place]



     capsule      04                                                Medical



                                                                 Branch

 

     indapamide      2016      Yes            2.5mg      Take 2.5           Un

bob



     (LOZOL) 2.5      2-28                               mg by           ity of



     mg tablet      11:30:                               mouth           [place]



               04                                 daily.           Medical



                                                                 Branch

 

     Vit C-Vit      2016      Yes            1{capsu      Take 1           [facility]

vers



     E-Lutein-Mi      2-28                     le}       capsule by           it

y of



     n-OM-3      11:30:                               mouth           [place]



     (OCUVITE)      04                                 daily.           Medical



     150-30-5-15                                                        Br[facility]



     0                                                           



     mg-unit-mg-                                                        



     mg Cap                                                        

 

     multivitami      2016      Yes            1{tbl}      Take 1           Un

bob



     n         2-28                               tablet by           ity of



     (ONE-A-DAY      11:30:                               mouth           [place]



     ESSENTIAL)      04                                 daily.           Medical



     tablet                                                        Br[facility]

 

     FERROUS      2016      Yes            1{tbl}      Take 1           [facility]

s



     FUMARATE/VI      2-28                               tablet by           ity

 of



     T BCOMP,C      11:30:                               mouth           [place]



     (SUPER B      04                                 daily.           Medical



     COMPLEX                                                        [facility]



     ORAL)                                                        

 

     ascorbic      2016      Yes            500mg      Take 500           [facility]

ers



     acid,      2-28                               mg by           [facility]



     vitamin C,      11:30:                               mouth           [place]



     (VITAMIN C)      04                                 daily.           [facility]

l



     500 mg                                                        [facility]



     tablet                                                        

 

     Cholecalcif      2016      Yes            1{tbl}      Take 1           Un

bob



     edith,      2-28                               tablet by           [facility] of



     Vitamin D3,      11:30:                               mouth           [place]



     (VITAMIN      04                                 daily.           Medical



     D3) 5,000                                                        [facility]



     unit tablet                                                        

 

     QUEtiapine      2016      Yes            50mg      Take 50 mg           U

nivers



     (SEROQUEL)      2-28                               by mouth           ity o

f



     50 mg      11:30:                               daily.           [place]



     tablet      04                                                Medical



                                                                 Branch

 

     simvastatin      2016      Yes            40mg      Take 40 mg           

[facility]



     (ZOCOR) 40      2-28                               by mouth           ity o

f



     mg tablet      11:30:                               at             [place]



               04                                 bedtime.           Medical



                                                                 Branch

 

     topiramate      2016      Yes            200mg      Take 200           Un

bob



     (TOPAMAX)      2-28                               mg by           ity of



     100 mg      11:30:                               mouth           [place]



     tablet      04                                 every           Medical



                                                  evening.           [facility]anch

 

     KCL       2016      Yes            10meq      Take 10           [facility]



     (KLOR-CON      2-28                               mEq by           [facility] of



     M10) 10 mEq      11:30:                               mouth 2           [facility]

[facility]



     tablet      04                                 (two)           Medical



                                                  times           [facility]



                                                  daily.           

 

     pregabalin      2016      Yes            225mg      Take 225           Un

bob



     (LYRICA)      2-28                               mg by           ity of



     225 mg      11:30:                               mouth 2           [place]



     capsule      04                                 (two)           Medical



                                                  times           [facility]



                                                  daily.           

 

     memantine      2016      Yes            5mg       Take 5 mg           [facility]

vers



     (NAMENDA) 5      2-28                               by mouth 2           it

y of



     mg tablet      11:30:                               (two)           [place]



               04                                 times           Medical



                                                  daily.           [facility]

 

     metoprolol      2016      Yes            50mg      Take 50 mg           U

nivers



     tartrate      2-28                               by mouth 2           ity o

f



     (LOPRESSOR)      11:30:                               (two)           [place]



     50 mg      04                                 times           Medical



     tablet                                         daily.           [facility]

 

     esomeprazol      2016      Yes            20mg      Take 20 mg           

[facility]



     e (NEXIUM)      2-28                               by mouth 2           ity

 of



     20 mg      11:30:                               (two)           [place]



     capsule      04                                 times           Medical



                                                  daily.           [facility]

 

     LACTOBAC/BI      2016      Yes            1{tbl}      Take 1           Un

bob



     FIDOBAC/TYLER      2-28                               tablet by           ity

 of



     B DE CON      11:30:                               mouth 2           [place]



     (ULTRA      04                                 (two)           Medical



     PATRICK PLUS                                         times           [facility]



     ORAL)                                         daily.           



                                                  Indication           



                                                  s:             



                                                  Ultimate           



                                                  [facility]           

 

     calcium      2016      Yes            2{tbl}      Take 2           [facility]

s



     carb and      2-28                               tablets by           ity o

f



     citrate-vit      11:30:                               mouth           [place]



     D3        04                                 daily.           Medical



     (CITRACAL +                                                        [facility]



     D SLOW                                                        



     RELEASE)                                                        



     600 mg                                                        



     calcium-                                                        



     500 unit                                                        



     TbSR                                                        

 

     DULoxetine      2016      Yes            60mg      Take 60 mg           U

nivers



     (CYMBALTA)      2-28                               by mouth           ity o

f



     60 mg      11:30:                               daily.           [place]



     capsule      04                                                Medical



                                                                 Branch

 

     indapamide      2016      Yes            2.5mg      Take 2.5           Un

bob



     (LOZOL) 2.5      2-28                               mg by           ity of



     mg tablet      11:30:                               mouth           [place]



               04                                 daily.           Medical



                                                                 Br[facility]

 

     Vit C-Vit      2016      Yes            1{capsu      Take 1           Uni

vers



     E-Lutein-Mi      2-28                     le}       capsule by           it

y of



     n-OM-3      11:30:                               mouth           [place]



     (OCUVITE)      04                                 daily.           Medical



     150-30-5-15                                                        [facility]



     0                                                           



     mg-unit-mg-                                                        



     mg Cap                                                        

 

     multivitami      2016      Yes            1{tbl}      Take 1           Un

bob



     n         2-28                               tablet by           ity of



     (ONE-A-DAY      11:30:                               mouth           [place]



     ESSENTIAL)      04                                 daily.           Medical



     tablet                                                        [facility]

 

     FERROUS      2016      Yes            1{tbl}      Take 1           Univer

s



     FUMARATE/VI      2-28                               tablet by           ity

 of



     T BCOMP,C      11:30:                               mouth           [place]



     (SUPER B      04                                 daily.           Medical



     COMPLEX                                                        [facility]



     ORAL)                                                        

 

     ascorbic      2016      Yes            500mg      Take 500           [facility]

ers



     acid,      2-28                               mg by           [facility] of



     vitamin C,      11:30:                               mouth           [place]



     (VITAMIN C)      04                                 daily.           [facility]

l



     500 mg                                                        [facility]



     tablet                                                        

 

     Cholecalcif      2016      Yes            1{tbl}      Take 1           Un

bob



     edith,      2-28                               tablet by           ity of



     Vitamin D3,      11:30:                               mouth           [place]



     (VITAMIN      04                                 daily.           Medical



     D3) 5,000                                                        [facility]



     unit tablet                                                        

 

     QUEtiapine      2016      Yes            50mg      Take 50 mg           U

nivers



     (SEROQUEL)      2-28                               by mouth           ity o

f



     50 mg      11:30:                               daily.           [place]



     tablet      04                                                Medical



                                                                 Br[facility]

 

     simvastatin      2016      Yes            40mg      Take 40 mg           

[facility]



     (ZOCOR) 40      2-28                               by mouth           ity o

f



     mg tablet      11:30:                               at             [place]



               04                                 bedtime.           Medical



                                                                 Branch

 

     topiramate      2016      Yes            200mg      Take 200           Un

bob



     (TOPAMAX)      2-28                               mg by           ity of



     100 mg      11:30:                               mouth           [place]



     tablet      04                                 every           Medical



                                                  evening.           Branch

 

     KCL       2016      Yes            10meq      Take 10           [facility]



     (KLOR-CON      2-28                               mEq by           ity of



     M10) 10 mEq      11:30:                               mouth 2           [facility]

[facility]



     tablet      04                                 (two)           Medical



                                                  times           [facility]



                                                  daily.           

 

     pregabalin      2016      Yes            225mg      Take 225           Un

bob



     (LYRICA)      2-28                               mg by           ity of



     225 mg      11:30:                               mouth 2           [place]



     capsule      04                                 (two)           Medical



                                                  times           [facility]



                                                  daily.           

 

     memantine      2016      Yes            5mg       Take 5 mg           [facility]i

vers



     (NAMENDA) 5      2-28                               by mouth 2           it

y of



     mg tablet      11:30:                               (two)           [place]



               04                                 times           Medical



                                                  daily.           [facility]

 

     metoprolol      2016      Yes            50mg      Take 50 mg           U

nivers



     tartrate      2-28                               by mouth 2           ity o

f



     (LOPRESSOR)      11:30:                               (two)           [place]



     50 mg      04                                 times           Medical



     tablet                                         daily.           [facility]

 

     esomeprazol      2016      Yes            20mg      Take 20 mg           

[facility]



     e (NEXIUM)      2-28                               by mouth 2           ity

 of



     20 mg      11:30:                               (two)           [place]



     capsule      04                                 times           Medical



                                                  daily.           [facility]

 

     LACTOBAC/BI      2016      Yes            1{tbl}      Take 1           Un

bob



     FIDOBAC/TYLER      2-28                               tablet by           ity

 of



     B DE CON      11:30:                               mouth 2           [place]



     (ULTRA      04                                 (two)           Medical



     PATRICK PLUS                                         times           [facility]



     ORAL)                                         daily.           



                                                  Indication           



                                                  s:             



                                                  Ultimate           



                                                  [facility]           

 

     calcium      2016      Yes            2{tbl}      Take 2           Univer

s



     carb and      2-28                               tablets by           ity o

f



     citrate-vit      11:30:                               mouth           [place]



     D3        04                                 daily.           Medical



     (CITRACAL +                                                        [facility]



     D SLOW                                                        



     RELEASE)                                                        



     600 mg                                                        



     calcium-                                                        



     500 unit                                                        



     TbSR                                                        

 

     DULoxetine      2016      Yes            60mg      Take 60 mg           U

nivers



     (CYMBALTA)      2-28                               by mouth           ity o

f



     60 mg      11:30:                               daily.           [place]



     capsule      04                                                Medical



                                                                 Branch

 

     indapamide      2016      Yes            2.5mg      Take 2.5           Un

bob



     (LOZOL) 2.5      2-28                               mg by           ity of



     mg tablet      11:30:                               mouth           [place]



               04                                 daily.           Medical



                                                                 Br[facility]

 

     Vit C-Vit      2016      Yes            1{capsu      Take 1           Uni

vers



     E-Lutein-Mi      2-28                     le}       capsule by           it

y of



     n-OM-3      11:30:                               mouth           [place]



     (OCUVITE)      04                                 daily.           Medical



     150-30-5-15                                                        [facility]



     0                                                           



     mg-unit-mg-                                                        



     mg Cap                                                        

 

     multivitami      2016      Yes            1{tbl}      Take 1           Un

bob



     n         2-28                               tablet by           ity of



     (ONE-A-DAY      11:30:                               mouth           [place]



     ESSENTIAL)      04                                 daily.           Medical



     tablet                                                        Branch

 

     FERROUS      2016      Yes            1{tbl}      Take 1           Univer

s



     FUMARATE/VI      2-28                               tablet by           ity

 of



     T BCOMP,C      11:30:                               mouth           [place]



     (SUPER B      04                                 daily.           Medical



     COMPLEX                                                        Branch



     ORAL)                                                        

 

     ascorbic      2016      Yes            500mg      Take 500           Univ

ers



     acid,      2-28                               mg by           ity of



     vitamin C,      11:30:                               mouth           [place]



     (VITAMIN C)      04                                 daily.           Medica

l



     500 mg                                                        Branch



     tablet                                                        

 

     Cholecalcif      2016      Yes            1{tbl}      Take 1           Un

bob



     edith,      2-28                               tablet by           ity of



     Vitamin D3,      11:30:                               mouth           [place]



     (VITAMIN      04                                 daily.           Medical



     D3) 5,000                                                        Branch



     unit tablet                                                        

 

     QUEtiapine      2016      Yes            50mg      Take 50 mg           U

nivers



     (SEROQUEL)      2-28                               by mouth           ity o

f



     50 mg      11:30:                               daily.           [place]



     tablet      04                                                Medical



                                                                 Branch

 

     simvastatin      2016      Yes            40mg      Take 40 mg           

[facility]



     (ZOCOR) 40      2-28                               by mouth           ity o

f



     mg tablet      11:30:                               at             [place]



               04                                 bedtime.           Medical



                                                                 Branch

 

     topiramate      2016      Yes            200mg      Take 200           Un

bob



     (TOPAMAX)      2-28                               mg by           ity of



     100 mg      11:30:                               mouth           [place]



     tablet      04                                 every           Medical



                                                  evening.           Branch

 

     KCL       2016      Yes            10meq      Take 10           [facility]



     (KLOR-CON      2-28                               mEq by           ity of



     M10) 10 mEq      11:30:                               mouth 2           [facility]

as



     tablet      04                                 (two)           Medical



                                                  times           Branch



                                                  daily.           

 

     pregabalin      2016      Yes            225mg      Take 225           Un

bob



     (LYRICA)      2-28                               mg by           ity of



     225 mg      11:30:                               mouth 2           [place]



     capsule      04                                 (two)           Medical



                                                  times           Branch



                                                  daily.           

 

     memantine      2016      Yes            5mg       Take 5 mg           Uni

vers



     (NAMENDA) 5      2-28                               by mouth 2           it

y of



     mg tablet      11:30:                               (two)           [place]



               04                                 times           Medical



                                                  daily.           Branch

 

     metoprolol      2016      Yes            50mg      Take 50 mg           U

nivers



     tartrate      2-28                               by mouth 2           ity o

f



     (LOPRESSOR)      11:30:                               (two)           [place]



     50 mg      04                                 times           Medical



     tablet                                         daily.           Branch

 

     esomeprazol      2016      Yes            20mg      Take 20 mg           

[facility]



     e (NEXIUM)      2-28                               by mouth 2           ity

 of



     20 mg      11:30:                               (two)           [place]



     capsule      04                                 times           Medical



                                                  daily.           Branch

 

     LACTOBAC/BI      2016      Yes            1{tbl}      Take 1           Un

bob



     FIDOBAC/TYLER      2-28                               tablet by           ity

 of



     B DE CON      11:30:                               mouth 2           [place]



     (ULTRA      04                                 (two)           Medical



     PATRICK PLUS                                         times           Branch



     ORAL)                                         daily.           



                                                  Indication           



                                                  s:             



                                                  Ultimate           



                                                  [facility]           

 

     calcium      2016      Yes            2{tbl}      Take 2           Univer

s



     carb and      2-28                               tablets by           ity o

f



     citrate-vit      11:30:                               mouth           [place]



     D3        04                                 daily.           Medical



     (CITRACAL +                                                        Branch



     D SLOW                                                        



     RELEASE)                                                        



     600 mg                                                        



     calcium-                                                        



     500 unit                                                        



     TbSR                                                        

 

     DULoxetine      2016      Yes            60mg      Take 60 mg           U

nivers



     (CYMBALTA)      2-28                               by mouth           ity o

f



     60 mg      11:30:                               daily.           [place]



     capsule      04                                                Medical



                                                                 Branch

 

     indapamide      2016      Yes            2.5mg      Take 2.5           Un

bob



     (LOZOL) 2.5      2-28                               mg by           ity of



     mg tablet      11:30:                               mouth           [place]



               04                                 daily.           Medical



                                                                 Branch

 

     Vit C-Vit      2016      Yes            1{capsu      Take 1           Uni

vers



     E-Lutein-Mi      2-28                     le}       capsule by           it

y of



     n-OM-3      11:30:                               mouth           [place]



     (OCUVITE)      04                                 daily.           Medical



     150-30-5-15                                                        Branch



     0                                                           



     mg-unit-mg-                                                        



     mg Cap                                                        

 

     multivitami      2016      Yes            1{tbl}      Take 1           Un

bob



     n         2-28                               tablet by           ity of



     (ONE-A-DAY      11:30:                               mouth           [place]



     ESSENTIAL)      04                                 daily.           Medical



     tablet                                                        Branch

 

     FERROUS      2016      Yes            1{tbl}      Take 1           Univer

s



     FUMARATE/VI      2-28                               tablet by           ity

 of



     T BCOMP,C      11:30:                               mouth           [place]



     (SUPER B      04                                 daily.           Medical



     COMPLEX                                                        Branch



     ORAL)                                                        

 

     ascorbic      2016      Yes            500mg      Take 500           Univ

ers



     acid,      2-28                               mg by           ity of



     vitamin C,      11:30:                               mouth           [place]



     (VITAMIN C)      04                                 daily.           Medica

l



     500 mg                                                        Branch



     tablet                                                        

 

     Cholecalcif      2016      Yes            1{tbl}      Take 1           Un

bob



     edith,      2-28                               tablet by           ity of



     Vitamin D3,      11:30:                               mouth           [place]



     (VITAMIN      04                                 daily.           Medical



     D3) 5,000                                                        Branch



     unit tablet                                                        

 

     QUEtiapine      2016      Yes            50mg      Take 50 mg           U

nivers



     (SEROQUEL)      2-28                               by mouth           ity o

f



     50 mg      11:30:                               daily.           [place]



     tablet      04                                                Medical



                                                                 Branch

 

     simvastatin      2016      Yes            40mg      Take 40 mg           

[facility]



     (ZOCOR) 40      2-28                               by mouth           ity o

f



     mg tablet      11:30:                               at             [place]



               04                                 bedtime.           Medical



                                                                 Branch

 

     topiramate      2016      Yes            200mg      Take 200           Un

bob



     (TOPAMAX)      2-28                               mg by           ity of



     100 mg      11:30:                               mouth           [place]



     tablet      04                                 every           Medical



                                                  evening.           Branch

 

     KCL       2016      Yes            10meq      Take 10           [facility]



     (KLOR-CON      2-28                               mEq by           ity of



     M10) 10 mEq      11:30:                               mouth 2           [facility]

as



     tablet      04                                 (two)           Medical



                                                  times           Branch



                                                  daily.           

 

     pregabalin      2016      Yes            225mg      Take 225           Un

bob



     (LYRICA)      2-28                               mg by           ity of



     225 mg      11:30:                               mouth 2           [place]



     capsule      04                                 (two)           Medical



                                                  times           Branch



                                                  daily.           

 

     memantine      2016      Yes            5mg       Take 5 mg           Uni

vers



     (NAMENDA) 5      2-28                               by mouth 2           it

y of



     mg tablet      11:30:                               (two)           [place]



               04                                 times           Medical



                                                  daily.           Branch

 

     metoprolol      2016      Yes            50mg      Take 50 mg           U

nivers



     tartrate      2-28                               by mouth 2           ity o

f



     (LOPRESSOR)      11:30:                               (two)           [place]



     50 mg      04                                 times           Medical



     tablet                                         daily.           Branch

 

     esomeprazol      2016      Yes            20mg      Take 20 mg           

[facility]



     e (NEXIUM)      2-28                               by mouth 2           ity

 of



     20 mg      11:30:                               (two)           [place]



     capsule      04                                 times           Medical



                                                  daily.           Branch

 

     LACTOBAC/BI      2016      Yes            1{tbl}      Take 1           Un

bob



     FIDOBAC/TYLER      2-28                               tablet by           ity

 of



     B DE CON      11:30:                               mouth 2           [place]



     (ULTRA      04                                 (two)           Medical



     PATRICK PLUS                                         times           Branch



     ORAL)                                         daily.           



                                                  Indication           



                                                  s:             



                                                  Ultimate           



                                                  [facility]           

 

     Nitrofurant Nitrofurant           No                       Nitrofuran      

     



     oin Monohyd oin Monohyd                                    toin           



     Macro Macro                                    Monohyd           



                                                  Macro           

 

     Simvastatin Simvastatin           No                       Simvastati      

     



                                                  n              

 

     Mupirocin Mupirocin           No                       Mupirocin           

 

     Zanaflex Zanaflex           No                       Zanaflex           

 

     buPROPion buPROPion           No                       buPROPion           



     HCl ER (XL) HCl ER (XL)                                    HCl ER          

 



                                                  (XL)           

 

     Potassium Potassium           No                       Potassium           



     Chloride Chloride                                    Chloride           



     Carol ER Carol ER                                    Carol ER           

 

     Gabapentin Gabapentin           No                       Gabapentin        

   

 

     Pantoprazol Pantoprazol           No                       Pantoprazo      

     



     e Sodium e Sodium                                    le Sodium           

 

     ALPRAZolam ALPRAZolam           No                       ALPRAZolam        

   

 

     Indapamide Indapamide           No                       Indapamide        

   

 

     QUEtiapine QUEtiapine           No                       QUEtiapine        

   



     Fumarate Fumarate                                    Fumarate           

 

     DULoxetine DULoxetine           No                       DULoxetine        

   



     HCl  HCl                                     HCl            

 

     Ciprofloxac Ciprofloxac           No                       Ciprofloxa      

     



     in HCl in HCl                                    jayna HCl           

 

     Memantine Memantine           No                       Memantine           



     HCl  HCl                                     HCl            

 

     Estradiol Estradiol           No                       Estradiol           

 

     NP Thyroid NP Thyroid           No                       NP Thyroid        

   

 

     Topiramate Topiramate           No                       Topiramate        

   

 

     Mupirocin Mupirocin           No                       Mupirocin           

 

     Pantoprazol Pantoprazol           No                       Pantoprazo      

     



     e Sodium e Sodium                                    le Sodium           

 

     QUEtiapine QUEtiapine           No                       QUEtiapine        

   



     Fumarate Fumarate                                    Fumarate           

 

     ALPRAZolam ALPRAZolam           No                       ALPRAZolam        

   

 

     Ciprofloxac Ciprofloxac           No                       Ciprofloxa      

     



     in HCl in HCl                                    jayna HCl           

 

     Meloxicam Meloxicam           No                       Meloxicam           

 

     Albuterol Albuterol           No                       Albuterol           

 

     traMADol traMADol           No                       traMADol           



     HCl  HCl                                     HCl            

 

     buPROPion buPROPion           No                       buPROPion           



     HCl ER (XL) HCl ER (XL)                                    HCl ER          

 



                                                  (XL)           

 

     tiZANidine tiZANidine           No                       tiZANidine        

   



     HCl  HCl                                     HCl            

 

     Simvastatin Simvastatin           No                       Simvastati      

     



                                                  n              

 

     Zanaflex Zanaflex           No                       Zanaflex           

 

     Nitrofurant Nitrofurant           No                       Nitrofuran      

     



     oin Monohyd oin Monohyd                                    toin           



     Macro Macro                                    Monohyd           



                                                  Macro           

 

     Zolpidem Zolpidem           No                       Zolpidem           



     Tartrate Tartrate                                    Tartrate           

 

     Memantine Memantine           No                       Memantine           



     HCl  HCl                                     HCl            

 

     Metoprolol Metoprolol           No                       Metoprolol        

   



     Succinate Succinate                                    Succinate           



     ER   ER                                      ER             

 

     Estradiol Estradiol           No                       Estradiol           

 

     NP Thyroid NP Thyroid           No                       NP Thyroid        

   

 

     Gabapentin Gabapentin           No                       Gabapentin        

   

 

     Donepezil Donepezil           No                       Donepezil           



     HCl  HCl                                     HCl            

 

     Topiramate Topiramate           No                       Topiramate        

   

 

     Indapamide Indapamide           No                       Indapamide        

   

 

     DULoxetine DULoxetine           No                       DULoxetine        

   



     HCl  HCl                                     HCl            

 

     Potassium Potassium           No                       Potassium           



     Chloride Chloride                                    Chloride           



     Carol ER Carol ER                                    Carol ER           

 

     HYDROcodone HYDROcodone           No                       HYDROcodon      

     



     -Acetaminop -Acetaminop                                    e-Acetamin      

     



     hen  hen                                     ophen           

 

     traMADol traMADol           No                       traMADol           



     HCl  HCl                                     HCl            

 

     Mupirocin Mupirocin           No                       Mupirocin           

 

     Pantoprazol Pantoprazol           No                       Pantoprazo      

     



     e Sodium e Sodium                                    le Sodium           

 

     QUEtiapine QUEtiapine           No                       QUEtiapine        

   



     Fumarate Fumarate                                    Fumarate           

 

     ALPRAZolam ALPRAZolam           No                       ALPRAZolam        

   

 

     Ciprofloxac Ciprofloxac           No                       Ciprofloxa      

     



     in HCl in HCl                                    jayna HCl           

 

     Meloxicam Meloxicam           No                       Meloxicam           

 

     Albuterol Albuterol           No                       Albuterol           

 

     traMADol traMADol           No                       traMADol           



     HCl  HCl                                     HCl            

 

     buPROPion buPROPion           No                       buPROPion           



     HCl ER (XL) HCl ER (XL)                                    HCl ER          

 



                                                  (XL)           

 

     tiZANidine tiZANidine           No                       tiZANidine        

   



     HCl  HCl                                     HCl            

 

     Simvastatin Simvastatin           No                       Simvastati      

     



                                                  n              

 

     Zanaflex Zanaflex           No                       Zanaflex           

 

     Nitrofurant Nitrofurant           No                       Nitrofuran      

     



     oin Monohyd oin Monohyd                                    toin           



     Macro Macro                                    Monohyd           



                                                  Macro           

 

     Zolpidem Zolpidem           No                       Zolpidem           



     Tartrate Tartrate                                    Tartrate           

 

     Memantine Memantine           No                       Memantine           



     HCl  HCl                                     HCl            

 

     Metoprolol Metoprolol           No                       Metoprolol        

   



     Succinate Succinate                                    Succinate           



     ER   ER                                      ER             

 

     Estradiol Estradiol           No                       Estradiol           

 

     NP Thyroid NP Thyroid           No                       NP Thyroid        

   

 

     Gabapentin Gabapentin           No                       Gabapentin        

   

 

     Donepezil Donepezil           No                       Donepezil           



     HCl  HCl                                     HCl            

 

     Topiramate Topiramate           No                       Topiramate        

   

 

     Indapamide Indapamide           No                       Indapamide        

   

 

     DULoxetine DULoxetine           No                       DULoxetine        

   



     HCl  HCl                                     HCl            

 

     Potassium Potassium           No                       Potassium           



     Chloride Chloride                                    Chloride           



     Carol ER Carol ER                                    Carol ER           

 

     HYDROcodone HYDROcodone           No                       HYDROcodon      

     



     -Acetaminop -Acetaminop                                    e-Acetamin      

     



     hen  hen                                     ophen           

 

     traMADol traMADol           No                       traMADol           



     HCl  HCl                                     HCl            

 

     Meloxicam Meloxicam           No                       Meloxicam           

 

     tiZANidine tiZANidine           No                       tiZANidine        

   



     HCl  HCl                                     HCl            

 

     traMADol traMADol           No                       traMADol           



     HCl  HCl                                     HCl            

 

     HYDROcodone HYDROcodone           No                       HYDROcodon      

     



     -Acetaminop -Acetaminop                                    e-Acetamin      

     



     hen  hen                                     ophen           

 

     Albuterol Albuterol           No                       Albuterol           

 

     Pantoprazol Pantoprazol           No                       Pantoprazo      

     



     e Sodium e Sodium                                    le Sodium           

 

     Mupirocin Mupirocin           No                       Mupirocin           

 

     ALPRAZolam ALPRAZolam           No                       ALPRAZolam        

   

 

     buPROPion buPROPion           No                       buPROPion           



     HCl ER (XL) HCl ER (XL)                                    HCl ER          

 



                                                  (XL)           

 

     Donepezil Donepezil           No                       Donepezil           



     HCl  HCl                                     HCl            

 

     Nitrofurant Nitrofurant           No                       Nitrofuran      

     



     oin Monohyd oin Monohyd                                    toin           



     Macro Macro                                    Monohyd           



                                                  Macro           

 

     Estradiol Estradiol           No                       Estradiol           

 

     Gabapentin Gabapentin           No                       Gabapentin        

   

 

     Zanaflex Zanaflex           No                       Zanaflex           

 

     NP Thyroid NP Thyroid           No                       NP Thyroid        

   

 

     QUEtiapine QUEtiapine           No                       QUEtiapine        

   



     Fumarate Fumarate                                    Fumarate           

 

     Indapamide Indapamide           No                       Indapamide        

   

 

     traMADol traMADol           No                       traMADol           



     HCl  HCl                                     HCl            

 

     Zolpidem Zolpidem           No                       Zolpidem           



     Tartrate Tartrate                                    Tartrate           

 

     Memantine Memantine           No                       Memantine           



     HCl  HCl                                     HCl            

 

     Metoprolol Metoprolol           No                       Metoprolol        

   



     Succinate Succinate                                    Succinate           



     ER   ER                                      ER             

 

     Ciprofloxac Ciprofloxac           No                       Ciprofloxa      

     



     in HCl in HCl                                    jayna HCl           

 

     DULoxetine DULoxetine           No                       DULoxetine        

   



     HCl  HCl                                     HCl            

 

     Potassium Potassium           No                       Potassium           



     Chloride Chloride                                    Chloride           



     Carol ER Carol ER                                    Carol ER           

 

     Simvastatin Simvastatin           No                       Simvastati      

     



                                                  n              

 

     Topiramate Topiramate           No                       Topiramate        

   

 

     Meloxicam Meloxicam           No                       Meloxicam           

 

     tiZANidine tiZANidine           No                       tiZANidine        

   



     HCl  HCl                                     HCl            

 

     traMADol traMADol           No                       traMADol           



     HCl  HCl                                     HCl            

 

     HYDROcodone HYDROcodone           No                       HYDROcodon      

     



     -Acetaminop -Acetaminop                                    e-Acetamin      

     



     hen  hen                                     ophen           

 

     Albuterol Albuterol           No                       Albuterol           

 

     traMADol traMADol           No                       traMADol           



     HCl  HCl                                     HCl            

 

     Pantoprazol Pantoprazol           No                       Pantoprazo      

     



     e Sodium e Sodium                                    le Sodium           

 

     Mupirocin Mupirocin           No                       Mupirocin           

 

     ALPRAZolam ALPRAZolam           No                       ALPRAZolam        

   

 

     buPROPion buPROPion           No                       buPROPion           



     HCl ER (XL) HCl ER (XL)                                    HCl ER          

 



                                                  (XL)           

 

     Donepezil Donepezil           No                       Donepezil           



     HCl  HCl                                     HCl            

 

     Nitrofurant Nitrofurant           No                       Nitrofuran      

     



     oin Monohyd oin Monohyd                                    toin           



     Macro Macro                                    Monohyd           



                                                  Macro           

 

     Estradiol Estradiol           No                       Estradiol           

 

     Gabapentin Gabapentin           No                       Gabapentin        

   

 

     Zanaflex Zanaflex           No                       Zanaflex           

 

     NP Thyroid NP Thyroid           No                       NP Thyroid        

   

 

     Potassium Potassium           No                       Potassium           



     Chloride Chloride                                    Chloride           



     Carol ER Carol ER                                    Carol ER           

 

     QUEtiapine QUEtiapine           No                       QUEtiapine        

   



     Fumarate Fumarate                                    Fumarate           

 

     Indapamide Indapamide           No                       Indapamide        

   

 

     traMADol traMADol           No                       traMADol           



     HCl  HCl                                     HCl            

 

     Zolpidem Zolpidem           No                       Zolpidem           



     Tartrate Tartrate                                    Tartrate           

 

     Memantine Memantine           No                       Memantine           



     HCl  HCl                                     HCl            

 

     Metoprolol Metoprolol           No                       Metoprolol        

   



     Succinate Succinate                                    Succinate           



     ER   ER                                      ER             

 

     Ciprofloxac Ciprofloxac           No                       Ciprofloxa      

     



     in HCl in HCl                                    jayna HCl           

 

     DULoxetine DULoxetine           No                       DULoxetine        

   



     HCl  HCl                                     HCl            

 

     Potassium Potassium           No                       Potassium           



     Chloride Chloride                                    Chloride           



     Carol ER Carol ER                                    Carol ER           

 

     Simvastatin Simvastatin           No                       Simvastati      

     



                                                  n              

 

     traMADol traMADol           No                       traMADol           



     HCl  HCl                                     HCl            

 

     Topiramate Topiramate           No                       Topiramate        

   

 

     Meloxicam Meloxicam           No                       Meloxicam           

 

     tiZANidine tiZANidine           No                       tiZANidine        

   



     HCl  HCl                                     HCl            

 

     traMADol traMADol           No                       traMADol           



     HCl  HCl                                     HCl            

 

     DULoxetine DULoxetine           No                       DULoxetine        

   



     HCl  HCl                                     HCl            

 

     HYDROcodone HYDROcodone           No                       HYDROcodon      

     



     -Acetaminop -Acetaminop                                    e-Acetamin      

     



     hen  hen                                     ophen           

 

     Albuterol Albuterol           No                       Albuterol           

 

     Pantoprazol Pantoprazol           No                       Pantoprazo      

     



     e Sodium e Sodium                                    le Sodium           

 

     Mupirocin Mupirocin           No                       Mupirocin           

 

     ALPRAZolam ALPRAZolam           No                       ALPRAZolam        

   

 

     buPROPion buPROPion           No                       buPROPion           



     HCl ER (XL) HCl ER (XL)                                    HCl ER          

 



                                                  (XL)           

 

     Donepezil Donepezil           No                       Donepezil           



     HCl  HCl                                     HCl            

 

     Nitrofurant Nitrofurant           No                       Nitrofuran      

     



     oin Monohyd oin Monohyd                                    toin           



     Macro Macro                                    Monohyd           



                                                  Macro           

 

     Estradiol Estradiol           No                       Estradiol           

 

     Gabapentin Gabapentin           No                       Gabapentin        

   

 

     Indapamide Indapamide           No                       Indapamide        

   

 

     Zanaflex Zanaflex           No                       Zanaflex           

 

     NP Thyroid NP Thyroid           No                       NP Thyroid        

   

 

     QUEtiapine QUEtiapine           No                       QUEtiapine        

   



     Fumarate Fumarate                                    Fumarate           

 

     Indapamide Indapamide           No                       Indapamide        

   

 

     traMADol traMADol           No                       traMADol           



     HCl  HCl                                     HCl            

 

     Zolpidem Zolpidem           No                       Zolpidem           



     Tartrate Tartrate                                    Tartrate           

 

     Memantine Memantine           No                       Memantine           



     HCl  HCl                                     HCl            

 

     Metoprolol Metoprolol           No                       Metoprolol        

   



     Succinate Succinate                                    Succinate           



     ER   ER                                      ER             

 

     Ciprofloxac Ciprofloxac           No                       Ciprofloxa      

     



     in HCl in HCl                                    jayna HCl           

 

     DULoxetine DULoxetine           No                       DULoxetine        

   



     HCl  HCl                                     HCl            

 

     Topiramate Topiramate           No                       Topiramate        

   

 

     Potassium Potassium           No                       Potassium           



     Chloride Chloride                                    Chloride           



     Carol ER Carol ER                                    Carol ER           

 

     Simvastatin Simvastatin           No                       Simvastati      

     



                                                  n              

 

     Topiramate Topiramate           No                       Topiramate        

   

 

     Meloxicam Meloxicam           No                       Meloxicam           

 

     Memantine Memantine           No                       Memantine           



     HCl  HCl                                     HCl            

 

     Zanaflex Zanaflex           No                       Zanaflex           

 

     Zolpidem Zolpidem           No                       Zolpidem           



     Tartrate Tartrate                                    Tartrate           

 

     Ciprofloxac Ciprofloxac           No                       Ciprofloxa      

     



     in HCl in HCl                                    jayna HCl           

 

     Estradiol Estradiol           No                       Estradiol           

 

     ALPRAZolam ALPRAZolam           No                       ALPRAZolam        

   

 

     tiZANidine tiZANidine           No                       tiZANidine        

   



     HCl  HCl                                     HCl            

 

     Mupirocin Mupirocin           No                       Mupirocin           

 

     Metoprolol Metoprolol           No                       Metoprolol        

   



     Succinate Succinate                                    Succinate           



     ER   ER                                      ER             

 

     HYDROcodone HYDROcodone           No                       HYDROcodon      

     



     -Acetaminop -Acetaminop                                    e-Acetamin      

     



     hen  hen                                     ophen           

 

     Simvastatin Simvastatin           No                       Simvastati      

     



                                                  n              

 

     Albuterol Albuterol           No                       Albuterol           

 

     Pantoprazol Pantoprazol           No                       Pantoprazo      

     



     e Sodium e Sodium                                    le Sodium           

 

     buPROPion buPROPion           No                       buPROPion           



     HCl ER (XL) HCl ER (XL)                                    HCl ER          

 



                                                  (XL)           

 

     Gabapentin Gabapentin           No                       Gabapentin        

   

 

     QUEtiapine QUEtiapine           No                       QUEtiapine        

   



     Fumarate Fumarate                                    Fumarate           

 

     Nitrofurant Nitrofurant           No                       Nitrofuran      

     



     oin Monohyd oin Monohyd                                    toin           



     Macro Macro                                    Monohyd           



                                                  Macro           

 

     Donepezil Donepezil           No                       Donepezil           



     HCl  HCl                                     HCl            

 

     NP Thyroid NP Thyroid           No                       NP Thyroid        

   

 

     Simvastatin Simvastatin           No                       Simvastati      

     



                                                  n              

 

     Indapamide Indapamide           No                       Indapamide        

   

 

     DULoxetine DULoxetine           No                       DULoxetine        

   



     HCl  HCl                                     HCl            

 

     Nitrofurant Nitrofurant           No                       Nitrofuran      

     



     oin Monohyd oin Monohyd                                    toin           



     Macro Macro                                    Monohyd           



                                                  Macro           

 

     Memantine Memantine           No                       Memantine           



     HCl  HCl                                     HCl            

 

     traMADol traMADol           No                       traMADol           



     HCl  HCl                                     HCl            

 

     Topiramate Topiramate           No                       Topiramate        

   

 

     Potassium Potassium           No                       Potassium           



     Chloride Chloride                                    Chloride           



     Carol ER Carol ER                                    Carol ER           

 

     Gabapentin Gabapentin           No                       Gabapentin        

   

 

     HYDROcodone HYDROcodone           No                       HYDROcodon      

     



     -Acetaminop -Acetaminop                                    e-Acetamin      

     



     hen  hen                                     ophen           

 

     Metoprolol Metoprolol           No                       Metoprolol        

   



     Succinate Succinate                                    Succinate           



     ER   ER                                      ER             

 

     traMADol traMADol           No                       traMADol           



     HCl  HCl                                     HCl            

 

     buPROPion buPROPion           No                       buPROPion           



     HCl ER (XL) HCl ER (XL)                                    HCl ER          

 



                                                  (XL)           

 

     Donepezil Donepezil           No                       Donepezil           



     HCl  HCl                                     HCl            

 

     ALPRAZolam ALPRAZolam           No                       ALPRAZolam        

   

 

     Zolpidem Zolpidem           No                       Zolpidem           



     Tartrate Tartrate                                    Tartrate           

 

     QUEtiapine QUEtiapine           No                       QUEtiapine        

   



     Fumarate Fumarate                                    Fumarate           

 

     Zanaflex Zanaflex           No                       Zanaflex           

 

     Pantoprazol Pantoprazol           No                       Pantoprazo      

     



     e Sodium e Sodium                                    le Sodium           

 

     Albuterol Albuterol           No                       Albuterol           

 

     tiZANidine tiZANidine           No                       tiZANidine        

   



     HCl  HCl                                     HCl            

 

     Meloxicam Meloxicam           No                       Meloxicam           

 

     Ciprofloxac Ciprofloxac           No                       Ciprofloxa      

     



     in HCl in HCl                                    jayna HCl           

 

     Mupirocin Mupirocin           No                       Mupirocin           

 

     Estradiol Estradiol           No                       Estradiol           

 

     NP Thyroid NP Thyroid           No                       NP Thyroid        

   

 

     Meloxicam Meloxicam           No                       Meloxicam           

 

     tiZANidine tiZANidine           No                       tiZANidine        

   



     HCl  HCl                                     HCl            

 

     Donepezil Donepezil           No                       Donepezil           



     HCl  HCl                                     HCl            

 

     Albuterol Albuterol           No                       Albuterol           

 

     traMADol traMADol           No                       traMADol           



     HCl  HCl                                     HCl            

 

     traMADol traMADol           No                       traMADol           



     HCl  HCl                                     HCl            

 

     Metoprolol Metoprolol           No                       Metoprolol        

   



     Succinate Succinate                                    Succinate           



     ER   ER                                      ER             

 

     HYDROcodone HYDROcodone           No                       HYDROcodon      

     



     -Acetaminop -Acetaminop                                    e-Acetamin      

     



     hen  hen                                     ophen           

 

     Zolpidem Zolpidem           No                       Zolpidem           



     Tartrate Tartrate                                    Tartrate           







Immunizations







           Ordered    Filled Immunization Date       Status     Comments   Sour

e



           Immunization Name Name                                        

 

           SARS-COV-2 COVID-19            2021 Completed             [facility]

[facility]



           PFIZER VACCINE            00:00:00                         [facility]

 

           SARS-COV-2 COVID-19            2021 Completed             [facility]

[facility] of



           PFIZER VACCINE            00:00:00                         [facility]

 

           SARS-COV-2 COVID-19            2021 Completed             [facility]

[facility] of



           PFIZER VACCINE            00:00:00                         [facility]

 

           SARS-COV-2 COVID-19            2021 Completed             [facility]

[facility] of



           PFIZER VACCINE            00:00:00                         [facility]

 

           SARS-COV-2 COVID-19            2021 Completed             [facility]

[facility] of



           PFIZER VACCINE            00:00:00                         [facility]

 

           SARS-COV-2 COVID-19            2021 Completed             [facility]

[facility] of



           PFIZER VACCINE            00:00:00                         [facility]

 

           SARS-COV-2 COVID-19            2021 Completed             [facility]

[facility] of



           PFIZER VACCINE            00:00:00                         [facility]

 

           SARS-COV-2 COVID-19            2021 Completed             [facility]

[facility] of



           PFIZER VACCINE            00:00:00                         [facility]

 

           SARS-COV-2 COVID-19            2021 Completed             [facility]

rs[facility] of



           PFIZER VACCINE            00:00:00                         [facility]

 

           SARS-COV-2 COVID-19            2021 Completed             [facility]

[facility]



           PFIZER VACCINE            00:00:00                         [facility]

[facility]



                                                                  [facility]







Vital Signs







             Vital Name   Observation Time Observation Value Comments     Source

 

             WEIGHT       2021 06:47:00 97.478 kg                 

 

             WEIGHT       2021 23:00:00 98.839 kg                 

 

             HEIGHT       2021 22:25:00 160 cm                    

 

             WEIGHT       2021 22:25:00 100.562 kg                

 

             height       2022 13:30:00 63 [in_i]                 [facility]

[facility]

 

             weight       2022 13:30:00 209 [lb_av]               Common S

[facility]

 

             temperature  2022 13:30:00 97.9 [degF]               Common S

[facility]

 

             bmi          2022 13:30:00 37.02 kg/m2               Common S

pirit -



                                                                 [facility]

 

             blood pressure 2022 13:30:00 124 mm[Hg]                Common

 Spirit -



             systolic                                            [facility]

 

             blood pressure 2022 13:30:00 80 mm[Hg]                 Common

 Spirit -



             diastolic                                           [facility]

 

             height       2022 11:00:00 63 [in_i]                 Common S

[facility]

 

             weight       2022 11:00:00 209 [lb_av]               Common [facility]

[facility]

 

             temperature  2022 11:00:00 96.9 [degF]               Common S

[facility]

 

             bmi          2022 11:00:00 37.02 kg/m2               Common S

[facility]

 

             blood pressure 2022 11:00:00 122 mm[Hg]                Common

 Spirit -



             systolic                                            [facility]

 

             blood pressure 2022 11:00:00 78 mm[Hg]                 Common

 Spirit -



             diastolic                                           [facility]

 

             height       2022-08-10 13:00:00 63 [in_i]                 Common S

pirit -



                                                                 [facility]

 

             weight       2022-08-10 13:00:00 208 [lb_av]               Common S

[facility]

 

             bmi          2022-08-10 13:00:00 36.84 kg/m2               Common S

pirit -



                                                                 [facility]

 

             blood pressure 2022-08-10 13:00:00 120 mm[Hg]                Common

 Spirit -



             systolic                                            [facility]

 

             blood pressure 2022-08-10 13:00:00 70 mm[Hg]                 Common

 Spirit -



             diastolic                                           [facility]

 

             height       2022 13:00:00 63 [in_i]                 Common S

[facility]

 

             weight       2022 13:00:00 208 [lb_av]               [facility]

 

             temperature  2022 13:00:00 97.9 [degF]               Common S

[facility]

 

             bmi          2022 13:00:00 36.84 kg/m2               Common S

[facility]

 

             blood pressure 2022 13:00:00 112 mm[Hg]                Common

 Spirit -



             systolic                                            [facility]

 

             blood pressure 2022 13:00:00 72 mm[Hg]                 Common

 Spirit -



             diastolic                                           [facility]

 

             height       2022 11:00:00 63 [in_i]                 Common [facility]

 

             weight       2022 11:00:00 208 [lb_av]               Common S

[facility]

 

             bmi          2022 11:00:00 36.84 kg/m2               Common S

[facility]

 

             blood pressure 2022 11:00:00 112 mm[Hg]                Common

 Spirit -



             systolic                                            [facility]

 

             blood pressure 2022 11:00:00 72 mm[Hg]                 Common

 Spirit -



             diastolic                                           [facility]

 

             height       2022 13:15:00 63 [in_i]                 Common [facility]

 

             weight       2022 13:15:00 208 [lb_av]               Common S

[facility]

 

             bmi          2022 13:15:00 36.84 kg/m2               Common S

[facility]

 

             blood pressure 2022 13:15:00 122 mm[Hg]                Common

 Spirit -



             systolic                                            [facility]

 

             blood pressure 2022 13:15:00 74 mm[Hg]                 Common

 Spirit -



             diastolic                                           [facility]

 

             Systolic blood 2022 19:34:00 115 mm[Hg]                Univer

sity of



             pressure                                            [facility]

 

             Diastolic blood 2022 19:34:00 59 mm[Hg]                 Unive

rsity of



             pressure                                            [facility]

 

             Heart rate   2022 19:34:00 56 /min                   [facility]

 

             Body height  2022 19:34:00 160 cm                    [facility]

 

             Body weight  2022 19:34:00 102.513 kg                [facility]

 

             BMI          2022 19:34:00 40.03 kg/m2               [facility]

 

             WEIGHT       2021 06:47:00 97.478 kg                 

 

             WEIGHT       2021 23:00:00 98.839 kg                 

 

             HEIGHT       2021 22:25:00 160 cm                    

 

             WEIGHT       2021 22:25:00 100.562 kg                

 

             Systolic (mm Hg) 2023 18:08:00                           [facility]

[facility] [place]

 

             Diastolic (mm Hg) 2023 18:08:00                           [facility]

[facility] [place]

 

             Heart Rate   2023 18:08:00                           [facility]

 [place]

 

             Systolic (mm Hg) 2023 16:32:00                           [facility]

[facility] [place]

 

             Diastolic (mm Hg) 2023 16:32:00                           [facility]

[facility] [place]

 

             Heart Rate   2023 16:32:00                           [facility]

 [place]

 

             Systolic (mm Hg) 2022 21:08:00                           [facility]

[facility] [place]

 

             Diastolic (mm Hg) 2022 21:08:00                           [facility]

[facility] [place]

 

             Heart Rate   2022 21:08:00                           [facility]

 [place]

 

             Systolic (mm Hg) 2022 21:20:00                           [facility]

rial [place]

 

             Diastolic (mm Hg) 2022 21:20:00                           Mem

[facility] [place]

 

             Heart Rate   2022 21:20:00                           [facility]

 [place]

 

             Respitory Rate 2022 21:20:00                           [facility]

al [place]

 

             Systolic (mm Hg) 2022 22:09:00                           [facility]

[facility] [place]

 

             Diastolic (mm Hg) 2022 22:09:00                           [facility]

[facility] [place]

 

             Heart Rate   2022 22:09:00                           [facility]

 [place]

 

             Respitory Rate 2022 22:09:00                           Memori

al [place]

 

             Systolic (mm Hg) 2021-12-10 16:49:00                           [facility]

[facility] [place]

 

             Diastolic (mm Hg) 2021-12-10 16:49:00                           Mem

[facility] [place]

 

             Heart Rate   2021-12-10 16:49:00                           [facility]

 [place]

 

             Respitory Rate 2021-12-10 16:49:00                           [facility]

[facility] [place]

 

             Heart rate   2021 08:01:00 56 /min                   [facility]

 

             Respiratory rate 2021 08:01:00 16 /min                   [facility]

 

             Oxygen saturation in 2021 08:01:00 93 /min                   

[facility]



             Arterial blood by                                        Medical Ce

nter



             Pulse oximetry                                        

 

             Systolic blood 2021 07:00:00 126 mm[Hg]                [facility]

 

             Diastolic blood 2021 07:00:00 60 mm[Hg]                 [facility]

 

             Body temperature 2021 07:00:00 36.61 Brittany                 [facility]

 

             Body weight  2021 06:47:00 97.478 kg                 [facility]

 

             BMI          2021 06:47:00 38.07 kg/m2               [facility]

 

             Body height  2021 22:25:00 160 cm                    [facility]

 

             Systolic (mm Hg) 2021-08-10 20:24:00                           [facility]

[facility] [place]

 

             Diastolic (mm Hg) 2021-08-10 20:24:00                           Mem

[facility] [place]

 

             Heart Rate   2021-08-10 20:24:00                           [facility]

 [place]

 

             Respitory Rate 2021-08-10 20:24:00                           [facility]

[facility] [place]

 

             Height       2021-08-10 20:24:00 154.94 cm                 [facility]

 [place]

 

             Weight       2021-08-10 20:24:00                           [facility]

 [place]

 

             BMI Calculated 2021-08-10 20:24:00                           [facility]

al [place]

 

             Systolic (mm Hg) 2021-05-10 18:30:00                           [facility]

[facility] [place]

 

             Diastolic (mm Hg) 2021-05-10 18:30:00                           Mem

[facility] [place]

 

             Heart Rate   2021-05-10 18:30:00                           [facility]

 [place]

 

             Respitory Rate 2021-05-10 18:30:00                           [facility]

[facility] [place]

 

             Height       2021-05-10 18:30:00 160.02 cm                 [facility]

 [place]

 

             Weight       2021-05-10 18:30:00                           [facility]

 [place]

 

             BMI Calculated 2021-05-10 18:30:00                           [facility]

[facility] [place]

 

             Systolic (mm Hg) 2021 18:26:00                           [facility]

[facility] [place]

 

             Heart Rate   2021 18:26:00                           [facility]

 [place]

 

             Respitory Rate 2021 18:26:00                           [facility]

[facility] [place]

 

             Weight       2021 18:26:00                           [facility]

 [place]

 

             Systolic (mm Hg) 2021 20:30:00                           [facility]

[facility] [place]

 

             Diastolic (mm Hg) 2021 20:30:00                           Mem

[facility] [place]

 

             Heart Rate   2021 20:30:00                           [facility]

 [place]

 

             Respitory Rate 2021 20:30:00                           [facility]

al [place]

 

             Height       2021 20:30:00 157.48 cm                 [facility]

 [place]

 

             Weight       2021 20:30:00                           [facility]

 [place]

 

             BMI Calculated 2021 20:30:00                           [facility]

al [place]

 

             Systolic (mm Hg) 2020 20:19:00                           [facility]

[facility] [place]

 

             Diastolic (mm Hg) 2020 20:19:00                           Mem

[facility] [place]

 

             Heart Rate   2020 20:19:00                           [facility]

 [place]

 

             Respitory Rate 2020 20:19:00                           [facility]

al [place]

 

             Height       2020 20:19:00 160.02 cm                 [facility]

 [place]

 

             Weight       2020 20:19:00                           [facility]

 [place]

 

             BMI Calculated 2020 20:19:00                           [facility]

[facility] [place]

 

             Systolic (mm Hg) 2020 20:33:00                           [facility]

[facility] [place]

 

             Diastolic (mm Hg) 2020 20:33:00                           Mem

[facility] [place]

 

             Heart Rate   2020 20:33:00                           [facility]

 [place]

 

             Respitory Rate 2020 20:33:00                           [facility]

[facility] [place]

 

             Height       2020 20:33:00 160.02 cm                 [facility]

 [place]

 

             Weight       2020 20:33:00                           [facility]

 [place]

 

             BMI Calculated 2020 20:33:00                           [facility]

[facility] [place]

 

             Systolic (mm Hg) 2020 20:09:00                           [facility]

[facility] [place]

 

             Diastolic (mm Hg) 2020 20:09:00                           Mem

[facility] [place]

 

             Heart Rate   2020 20:09:00                           [facility]

 [place]

 

             Respitory Rate 2020 20:09:00                           Mem[facility]

al [place]

 

             Height       2020 20:09:00 160.02 cm                 [facility]

 [place]

 

             Weight       2020 20:09:00                           [facility]

 [place]

 

             BMI Calculated 2020 20:09:00                           [facility]

[facility] [place]

 

             Systolic (mm Hg) 2020 19:46:00                           [facility]

[facility] [place]

 

             Diastolic (mm Hg) 2020 19:46:00                           Mem

[facility] [place]

 

             Heart Rate   2020 19:46:00                           [facility]

 [place]

 

             Respitory Rate 2020 19:46:00                           [facility]

[facility] [place]

 

             Height       2020 19:46:00 160.02 cm                 [facility]

 [place]

 

             Weight       2020 19:46:00                           [facility]

 [place]

 

             BMI Calculated 2020 19:46:00                           [facility]

[facility] [place]

 

             Systolic (mm Hg) 2020 18:37:00                           [facility]

[facility] [place]

 

             Diastolic (mm Hg) 2020 18:37:00                           Mem

[facility] [place]

 

             Heart Rate   2020 18:37:00                           [facility]

 [place]

 

             Systolic (mm Hg) 2020 20:05:00                           [facility]

[facility] [place]

 

             Diastolic (mm Hg) 2020 20:05:00                           Mem

[facility] [place]

 

             Heart Rate   2020 20:05:00                           [facility]

 [place]

 

             Respitory Rate 2020 20:05:00                           [facility]

[facility] [place]

 

             Height       2020 20:05:00 160.02 cm                 [facility]

 [place]

 

             Weight       2020 20:05:00                           [facility]

 [place]

 

             BMI Calculated 2020 20:05:00                           Mem[facility]

al [place]

 

             Systolic (mm Hg) 2019 19:34:00                           [facility]

[facility] [place]

 

             Diastolic (mm Hg) 2019 19:34:00                           Mem

[facility] [place]

 

             Heart Rate   2019 19:34:00                           [facility]

 [place]

 

             Respitory Rate 2019 19:34:00                           Mem[facility]

al [place]

 

             Height       2019 19:34:00 160.02 cm                 [facility]

 [place]

 

             Weight       2019 19:34:00                           Rigo Allenann

 

             BMI Calculated 2019 19:34:00                           David lalwer [place]

 

             Systolic (mm Hg) 2019 19:08:00                           [facility] Tesfaye

 

             Diastolic (mm Hg) 2019 19:08:00                           Pinky Allenann

 

             Heart Rate   2019 19:08:00                           Rigo

 [place]

 

             Respitory Rate 2019 19:08:00                           David Nguyenann

 

             Height       2019 19:08:00 160.02 cm                 [facility]

 [place]

 

             Weight       2019 19:08:00                           Rigo Allenann

 

             BMI Calculated 2019 19:08:00                           David Banks







Procedures







                Procedure       Date / Time     Performing Clinician Source



                                Performed                       

 

                EXTERNAL PROVIDER RECORDS 2022 05:01:00 Doctor Unassigned,

 [facility]



                                                [facility]

 

                HOME HEALTH - OTHER 2022 06:01:00 Doctor Unassigned, [facility]

[facility] [place]



                                                [facility]

 

                HIGH SENSITIVITY TROPONIN 2021 16:40:00 Tara Kay 

[facility]          [place]

 

                ECG 12-LEAD     2021 15:59:02 Unknown, Hl7 Doctor [facility]

 

                ECG 12-LEAD     2021 15:59:02 Unknown, Hl7 Doctor [facility]

 

                XR CHEST 1 VIEW PORTABLE / 2021 07:54:00 REA Abarca [facility]



                BEDSIDE                         D.              Center

 

                CBC W/PLT COUNT & AUTO 2021 04:39:00 Mullinsyessi Landa [facility]



                DIFFERENTIAL                    [facility]          Center

 

                BASIC METABOLIC PANEL (7) 2021 04:39:00 Susanna Landa [facility]

I [facility]



                                                [place]          Center

 

                CBC W/PLT COUNT & AUTO 2021 04:39:00 Mullinsyessi Landa [facility]



                DIFFERENTIAL                    [facility]          Center

 

                MAGNESIUM       2021 04:39:00 Susanna Landa [facility]



                                                [place]          Center

 

                B-TYPE NATRIURETIC FACTOR 2021 03:34:00 Ivanna Abarca [facility]



                [facility]                           [facility]

 

                XR CHEST 1 VIEW PORTABLE / 2021 13:03:00 Tara Kay

[facility]

 

                BASIC METABOLIC PANEL (7) 2021 11:41:00 RahulTara [facility]

 

                MAGNESIUM       2021 11:41:00 [facility]

 

                B-TYPE NATRIURETIC FACTOR 2021 11:41:00 [facility]



                (BNP)                           [facility]

 

                ECG 12-LEAD     2021 10:53:46 Cornelius Garzon   [facility]

 

                TRANSESOPHAGEAL ECHO 2021 09:39:19 Hannah [facility]

 

                CBC W[facility]PLT [facility] & [facility]

 

                BASIC METABOLIC PANEL (7) 2021 [facility]:17[facility]00 [facility]

 

                [facility]ATIC FUN[facility] PANEL [facility]

 

                [facility] W[facility]PLT COUNT & AUTO [facility]:17[facility]

 

                CARDIOVERSION   2021 00:31:19 Hannah [facility]

 

                HIGH SENSITIVITY TROPONIN 2021 18:35:00 [facility] [place]

 

                2D ECHO W/ DOPPLER 2021 14:07:13 Sammy Armstrong [facility]



                (CW/PW/COLOR)                   [facility]

 

                [facility]ULMONARY [facility]



                EMBOLUS                         [facility]

 

                XR CHEST 1 VIEW PORTABLE / 2021 04:08:00 Rachel, Say   [facility]

 

                SARS-COV2/RT-PCR ([facility] & 2021 03:48:00 [facility]



                REF LABS)                       [facility]

 

                CBC W/PLT COUNT & AUTO 2021 03:41:00 [facility]

 

                BASIC METABOLIC PANEL (7) 2021 03:41:00 Rachel, [facility]

 

                HEPATIC FUNCTION PANEL 2021 03:41:00 [facility]

 

                CBC W/PLT COUNT & AUTO 2021 03:41:00 [facility]

 

                HIGH SENSITIVITY TROPONIN 2021 03:41:00 [facility] [place]

 

                B-TYPE NATRIURETIC FACTOR 2021 03:41:00 [facility]



                (BNP)                           [facility]

 

                CBC W/PLT COUNT & AUTO 2021 23:21:00 [facility]

 

                BASIC METABOLIC PANEL (7) 2021 23:21:00 [facility]

 

                HEPATIC FUNCTION PANEL 2021 23:21:00 [facility]

 

                MAGNESIUM       2021 23:21:00 Rachel [facility]

 

                PHOSPHORUS      2021 23:21:00 [facility]

 

                PROTHROMBIN TIME/INR 2021 23:21:00 [facility]

 

                CBC W/PLT COUNT & AUTO 2021 23:21:00 [facility]

 

                TSH/FREE T4 IF INDICATED 2021 [facility]:21:00 [facility]

 

                HIGH SENSITIVITY TROPONIN 2021 23:21:00 Rachel, Say   CH

I [facility]

 

                D-DIMER         2021 23:21:00 Rachel, [facility]

 

                VITAMIN B12 AND FOLATE 2021 23:21:00 Say Ramos   [facility]

t [facility]

 

                ECG 12-LEAD     2021 23:12:15 Rachel, [facility]

 

                ECG 12-LEAD     2021 23:07:40 Unknown, Hl7 Doctor [facility]

 

                REPORT OF PROCEDURE - 2021 00:00:00 Provider, Luc [facility]



                ENDOSCOPY SCAN                  Scanning        Center







Plan of Care







             Planned Activity Planned Date Details      Comments     Source

 

             Future Scheduled 2023   Influenza Vaccine (#1)              C

[facility]



             Test         00:00:00     [code = Influenza              Medical Ce

nter



                                       Vaccine (#1)]              

 

             Future Scheduled 2023   SHINGLES VACCINES (1              Met

[facility]



             Test         02:09:39     of 2) [code = SHINGLES              



                                       VACCINES (1 of 2)]              

 

             Future Scheduled 2023   65+ PNEUMOCOCCAL              Methodi

 Hospital



             Test         02:09:39     VACCINE (1 - PCV)              



                                       [code = 65+               



                                       PNEUMOCOCCAL VACCINE              



                                       (1 - PCV)]                

 

             Future Scheduled 2023   COVID-19 VACCINE (3 -              Me

[facility]



             Test         02:09:39     Pfizer series) [code =              



                                       COVID-19 VACCINE (3 -              



                                       Pfizer series)]              

 

             Future Scheduled 2023   INFLUENZA VACCINE              Method

[facility]



             Test         02:09:39     [code = INFLUENZA              



                                       VACCINE]                  

 

             Future Scheduled 2023-02-15   SHINGLES VACCINES (1              Met

[facility]



             Test         16:08:27     of 2) [code = SHINGLES              



                                       VACCINES (1 of 2)]              

 

             Future Scheduled 2023-02-15   65+ PNEUMOCOCCAL              Methodi

 Hospital



             Test         16:08:27     VACCINE (1 - PCV)              



                                       [code = 65+               



                                       PNEUMOCOCCAL VACCINE              



                                       (1 - PCV)]                

 

             Future Scheduled 2023-02-15   COVID-19 VACCINE (3 -              [facility]

[facility]



             Test         16:08:27     Booster for Pfizer              



                                       series) [code =              



                                       COVID-19 VACCINE (3 -              



                                       Booster for Pfizer              



                                       series)]                  

 

             Future Scheduled 2023-02-15   INFLUENZA VACCINE              Method

[facility]



             Test         16:08:27     [code = INFLUENZA              



                                       VACCINE]                  

 

             Future Scheduled 2023   DEPRESSION SCREENING              [facility]



             Test         00:00:00     (12+) [code =              [facility] Center



                                       DEPRESSION SCREENING              



                                       (12+)]                    

 

             Future Scheduled 2023   FALLS RISK SCREENING              [facility]



             Test         00:00:00     [code = FALLS RISK              Medical C

enter



                                       SCREENING]                

 

             Future Scheduled 2023   DEPRESSION SCREENING              [facility]



             Test         00:00:00     (12+) [code =              Medical Center



                                       DEPRESSION SCREENING              



                                       (12+)]                    

 

             Future Scheduled 2023   FALLS RISK SCREENING              [facility]



             Test         00:00:00     [code = FALLS RISK              Medical C

enter



                                       SCREENING]                

 

             Future Scheduled 2022   SHINGLES VACCINES (1              Met

[facility]



             Test         02:09:49     of 2) [code = SHINGLES              



                                       VACCINES (1 of 2)]              

 

             Future Scheduled 2022   65+ PNEUMOCOCCAL              Methodi

 Hospital



             Test         02:09:49     VACCINE (1 - PCV)              



                                       [code = 65+               



                                       PNEUMOCOCCAL VACCINE              



                                       (1 - PCV)]                

 

             Future Scheduled 2022   COVID-19 VACCINE (3 -              Me

[facility]



             Test         02:09:49     Booster for Pfizer              



                                       series) [code =              



                                       COVID-19 VACCINE (3 -              



                                       Booster for Pfizer              



                                       series)]                  

 

             Future Scheduled 2022   INFLUENZA VACCINE              [facility]

[facility]



             Test         02:09:49     [code = INFLUENZA              



                                       VACCINE]                  

 

             Future Scheduled 2022-10-24   SHINGLES VACCINES (1              Met

[facility]



             Test         14:48:18     of 2) [code = SHINGLES              



                                       VACCINES (1 of 2)]              

 

             Future Scheduled 2022-10-24   65+ PNEUMOCOCCAL              Methodi

 Hospital



             Test         14:48:18     VACCINE (1 - PCV)              



                                       [code = 65+               



                                       PNEUMOCOCCAL VACCINE              



                                       (1 - PCV)]                

 

             Future Scheduled 2022-10-24   COVID-19 VACCINE (3 -              Me

[facility]



             Test         14:48:18     Booster for Pfizer              



                                       series) [code =              



                                       COVID-19 VACCINE (3 -              



                                       Booster for Pfizer              



                                       series)]                  

 

             Future Scheduled 2022-10-24   INFLUENZA VACCINE              [facility]

[facility]



             Test         14:48:18     [code = INFLUENZA              



                                       VACCINE]                  

 

             Future Scheduled 2022-10-24   HEPATITIS B VACCINES              Met

[facility]



             Test         14:48:18     (1 of 3 - 3-dose              



                                       series) [code =              



                                       HEPATITIS B VACCINES              



                                       (1 of 3 - 3-dose              



                                       series)]                  

 

             Future Scheduled 2022-10-24   SHINGLES VACCINES (1              Met

[facility]



             Test         14:48:18     of 2) [code = SHINGLES              



                                       VACCINES (1 of 2)]              

 

             Future Scheduled 2022-10-24   65+ PNEUMOCOCCAL              Methodi

 Hospital



             Test         14:48:18     VACCINE (1 - PCV)              



                                       [code = 65+               



                                       PNEUMOCOCCAL VACCINE              



                                       (1 - PCV)]                

 

             Future Scheduled 2022-10-24   COVID-19 VACCINE (3 -              Me

[facility]



             Test         14:48:18     Booster for Pfizer              



                                       series) [code =              



                                       COVID-19 VACCINE (3 -              



                                       Booster for Pfizer              



                                       series)]                  

 

             Future Scheduled 2022-10-24   INFLUENZA VACCINE              Method

[facility]



             Test         14:48:18     [code = INFLUENZA              



                                       VACCINE]                  

 

             Future Scheduled 2022-10-24   HEPATITIS B VACCINES              Met

[facility]



             Test         14:48:18     (1 of 3 - 3-dose              



                                       series) [code =              



                                       HEPATITIS B VACCINES              



                                       (1 of 3 - 3-dose              



                                       series)]                  

 

             Future Scheduled 2022-10-24   SHINGLES VACCINES (1              Met

[facility]



             Test         14:48:18     of 2) [code = SHINGLES              



                                       VACCINES (1 of 2)]              

 

             Future Scheduled 2022-10-24   65+ PNEUMOCOCCAL              Methodi

 Hospital



             Test         14:48:18     VACCINE (1 - PCV)              



                                       [code = 65+               



                                       PNEUMOCOCCAL VACCINE              



                                       (1 - PCV)]                

 

             Future Scheduled 2022-10-24   COVID-19 VACCINE (3 -              Me

[facility]



             Test         14:48:18     Booster for Pfizer              



                                       series) [code =              



                                       COVID-19 VACCINE (3 -              



                                       Booster for Pfizer              



                                       series)]                  

 

             Future Scheduled 2022-10-24   INFLUENZA VACCINE              Method

[facility]



             Test         14:48:18     [code = INFLUENZA              



                                       VACCINE]                  

 

             Future Scheduled 2022-10-24   HEPATITIS B VACCINES              Met

[facility]



             Test         14:48:18     (1 of 3 - 3-dose              



                                       series) [code =              



                                       HEPATITIS B VACCINES              



                                       (1 of 3 - 3-dose              



                                       series)]                  

 

             Future Scheduled 2022-10-24   SHINGLES VACCINES (1              Met

[facility]



             Test         14:48:18     of 2) [code = SHINGLES              



                                       VACCINES (1 of 2)]              

 

             Future Scheduled 2022-10-24   65+ PNEUMOCOCCAL              Methodi

 Hospital



             Test         14:48:18     VACCINE (1 - PCV)              



                                       [code = 65+               



                                       PNEUMOCOCCAL VACCINE              



                                       (1 - PCV)]                

 

             Future Scheduled 2022-10-24   COVID-19 VACCINE (3 -              Me

[facility]



             Test         14:48:18     Booster for Pfizer              



                                       series) [code =              



                                       COVID-19 VACCINE (3 -              



                                       Booster for Pfizer              



                                       series)]                  

 

             Future Scheduled 2022-10-24   INFLUENZA VACCINE              Method

[facility]



             Test         14:48:18     [code = INFLUENZA              



                                       VACCINE]                  

 

             Future Scheduled 2022-10-24   HEPATITIS B VACCINES              Met

[facility]



             Test         14:48:18     (1 of 3 - 3-dose              



                                       series) [code =              



                                       HEPATITIS B VACCINES              



                                       (1 of 3 - 3-dose              



                                       series)]                  

 

             Future Scheduled 2022-10-24   SHINGLES VACCINES (1              Met

[facility]



             Test         14:48:18     of 2) [code = SHINGLES              



                                       VACCINES (1 of 2)]              

 

             Future Scheduled 2022-10-24   65+ [facility]NEUMOCOCCAL              [facility]



             Test         14:48:18     VACCINE (1 - PCV)              



                                       [code = 65+               



                                       PNEUMOCOCCAL VACCINE              



                                       (1 - PCV)]                

 

             Future Scheduled 2022-10-24   COVID-19 VACCINE (3 -              Me

[facility]



             Test         14:48:18     Booster for Pfizer              



                                       series) [code =              



                                       COVID-19 VACCINE (3 -              



                                       Booster for Pfizer              



                                       series)]                  

 

             Future Scheduled 2022-10-24   INFLUENZA VACCINE              Method

[facility]



             Test         14:48:18     [code = INFLUENZA              



                                       VACCINE]                  

 

             Future Scheduled 2022-10-24   HEPATITIS B VACCINES              Met

[facility]



             Test         14:48:18     (1 of 3 - 3-dose              



                                       series) [code =              



                                       HEPATITIS B VACCINES              



                                       (1 of 3 - 3-dose              



                                       series)]                  

 

             Future Scheduled 2022   INFLUENZA VACCINE (#1)              C

[facility]



             Test         00:00:00     [code = INFLUENZA              Medical Ce

nter



                                       VACCINE (#1)]              

 

             Future Scheduled 2022   INFLUENZA VACCINE (#1)              C

[facility]



             Test         00:00:00     [code = INFLUENZA              Medical Ce

nter



                                       VACCINE (#1)]              

 

             Future Scheduled 2022   INFLUENZA VACCINE (#1)              C

[facility]



             Test         00:00:00     [code = INFLUENZA              Medical Ce

nter



                                       VACCINE (#1)]              

 

             Future Scheduled 2022   INFLUENZA VACCINE (#1)              C

[facility]



             Test         00:00:00     [code = INFLUENZA              Medical Ce

nter



                                       VACCINE (#1)]              

 

             Future Scheduled 2022   INFLUENZA VACCINE (#1)              C

[facility]



             Test         00:00:00     [code = INFLUENZA              Medical Ce

nter



                                       VACCINE (#1)]              

 

             Future Scheduled 2022   INFLUENZA VACCINE (#1)              C

[facility]



             Test         00:00:00     [code = INFLUENZA              Medical Ce

nter



                                       VACCINE (#1)]              

 

             Future Scheduled 2022   INFLUENZA VACCINE (#1)              C

[facility]



             Test         00:00:00     [code = INFLUENZA              Medical Ce

nter



                                       VACCINE (#1)]              

 

             Future Scheduled 2022   HEPATITIS B VACCINES              Met

[facility]



             Test         02:08:28     (1 of 3 - 3-dose              



                                       series) [code =              



                                       HEPATITIS B VACCINES              



                                       (1 of 3 - 3-dose              



                                       series)]                  

 

             Future Scheduled 2022   SHINGLES VACCINES (1              Met

[facility]



             Test         02:08:28     of 2) [code = SHINGLES              



                                       VACCINES (1 of 2)]              

 

             Future Scheduled 2022   65+ PNEUMOCOCCAL              Methodi

 Hospital



             Test         02:08:28     VACCINE (1 - PCV)              



                                       [code = 65+               



                                       PNEUMOCOCCAL VACCINE              



                                       (1 - PCV)]                

 

             Future Scheduled 2022   COVID-19 VACCINE (3 -              Me

[facility]



             Test         02:08:28     Booster for Pfizer              



                                       series) [code =              



                                       COVID-19 VACCINE (3 -              



                                       Booster for Pfizer              



                                       series)]                  

 

             Future Scheduled 2022   INFLUENZA VACCINE              Method

[facility] Hospital



             Test         02:08:28     [code = INFLUENZA              



                                       VACCINE]                  

 

             Future Scheduled 2022   Tobacco Cessation              [facility]



             Test         00:00:00     [facility]sel[facility]              [facility]

r



                                       Screening (12+) [code              



                                       = Tobacco Cessation              



                                       Counseling and              



                                       Screening (12+)]              

 

             Future Scheduled 2022   Tobacco Cessation              [facility]



             Test         00[facility]00:00     Counseling and              [facility] Cent[facility]

r



                                       Screening (12+) [code              



                                       = Tobacco Cessation              



                                       Counseling and              



                                       Screening (12+)]              

 

             Future Scheduled 2022   MEDICARE ANNUAL              [facility]



             Test         00:00:00     WELLNESS (YEAR 2 or              Medical 

Center



                                       FIRST YEAR if no IPPE)              



                                       [code = MEDICARE              



                                       ANNUAL WELLNESS (YEAR              



                                       2 or FIRST YEAR if no              



                                       IPPE)]                    

 

             Future Scheduled 2022   MEDICARE ANNUAL              [facility]



             Test         00:00:00     WELLNESS (YEAR 2 or              Medical 

Center



                                       FIRST YEAR if no IPPE)              



                                       [code = MEDICARE              



                                       ANNUAL WELLNESS (YEAR              



                                       2 or FIRST YEAR if no              



                                       IPPE)]                    

 

             Future Scheduled 2022   MEDICARE ANNUAL              [facility]



             Test         00:00:00     WELLNESS (YEAR 2 or              Medical 

Center



                                       FIRST YEAR if no IPPE)              



                                       [code = MEDICARE              



                                       ANNUAL WELLNESS (YEAR              



                                       2 or FIRST YEAR if no              



                                       IPPE)]                    

 

             Future Scheduled 2022   MEDICARE ANNUAL              [facility]



             Test         00:00:00     WELLNESS (YEAR 2 or              Medical 

Center



                                       FIRST YEAR if no IPPE)              



                                       [code = MEDICARE              



                                       ANNUAL WELLNESS (YEAR              



                                       2 or FIRST YEAR if no              



                                       IPPE)]                    

 

             Future Scheduled 2022   MEDICARE ANNUAL              [facility]



             Test         00:00:00     WELLNESS (YEAR 2 or              Medical 

Center



                                       FIRST YEAR if no IPPE)              



                                       [code = MEDICARE              



                                       ANNUAL WELLNESS (YEAR              



                                       2 or FIRST YEAR if no              



                                       IPPE)]                    

 

             Future Scheduled 2022   MEDICARE ANNUAL              [facility]



             Test         00:00:00     WELLNESS (YEAR 2 or              Medical 

Center



                                       FIRST YEAR if no IPPE)              



                                       [code = MEDICARE              



                                       ANNUAL WELLNESS (YEAR              



                                       2 or FIRST YEAR if no              



                                       IPPE)]                    

 

             Future Scheduled 2022   MEDICARE ANNUAL              [facility]



             Test         00:00:00     WELLNESS (YEAR 2 or              Medical 

Center



                                       FIRST YEAR if no IPPE)              



                                       [code = MEDICARE              



                                       ANNUAL WELLNESS (YEAR              



                                       2 or FIRST YEAR if no              



                                       IPPE)]                    

 

             Future Scheduled 2022   MEDICARE ANNUAL              [facility]



             Test         00:00:00     WELLNESS (YEAR 2 or              Medical 

Center



                                       FIRST YEAR if no IPPE)              



                                       [code = MEDICARE              



                                       ANNUAL WELLNESS (YEAR              



                                       2 or FIRST YEAR if no              



                                       IPPE)]                    

 

             Future Scheduled 2022   DEPRESSION SCREENING              [facility]



             Test         00:00:00     (12+) [code =              Medical Center



                                       DEPRESSION SCREENING              



                                       (12+)]                    

 

             Future Scheduled 2022   FALLS RISK SCREENING              [facility]



             Test         00:00:00     [code = FALLS RISK              Medical C

enter



                                       SCREENING]                

 

             Future Scheduled 2022   DEPRESSION SCREENING              [facility]



             Test         00:00:00     (12+) [code =              Medical Center



                                       DEPRESSION SCREENING              



                                       (12+)]                    

 

             Future Scheduled 2022   FALLS RISK SCREENING              [facility]



             Test         00:00:00     [code = FALLS RISK              Medical C

enter



                                       SCREENING]                

 

             Future Scheduled 2022   DEPRESSION SCREENING              [facility]



             Test         00:00:00     (12+) [code =              Medical Center



                                       DEPRESSION SCREENING              



                                       (12+)]                    

 

             Future Scheduled 2022   FALLS RISK SCREENING              [facility]



             Test         00:00:00     [code = FALLS RISK              Medical C

enter



                                       SCREENING]                

 

             Future Scheduled 2022   DEPRESSION SCREENING              [facility]



             Test         00:00:00     (12+) [code =              Medical Center



                                       DEPRESSION SCREENING              



                                       (12+)]                    

 

             Future Scheduled 2022   FALLS RISK SCREENING              [facility]



             Test         00:00:00     [code = FALLS RISK              Medical C

enter



                                       SCREENING]                

 

             Future Scheduled 2022   DEPRESSION SCREENING              [facility]



             Test         00:00:00     (12+) [code =              Medical Center



                                       DEPRESSION SCREENING              



                                       (12+)]                    

 

             Future Scheduled 2022   FALLS RISK SCREENING              [facility]



             Test         00:00:00     [code = FALLS RISK              Medical C

enter



                                       SCREENING]                

 

             Future Scheduled 2022   DEPRESSION SCREENING              [facility]



             Test         00:00:00     (12+) [code =              Medical Center



                                       DEPRESSION SCREENING              



                                       (12+)]                    

 

             Future Scheduled 2022   FALLS RISK SCREENING              [facility]



             Test         00:00:00     [code = FALLS RISK              Medical C

enter



                                       SCREENING]                

 

             Future Scheduled 2021   INFLUENZA VACCINE (#1)              [facility]

[facility]



             Test         00:00:00     [code = INFLUENZA              Medical Ce

nter



                                       VACCINE (#1)]              

 

             Future Scheduled 2021   COVID-19 VACCINE (3 -              [facility]



             Test         00:00:00     Booster for Pfizer              Medical C

enter



                                       series) [code =              



                                       COVID-19 VACCINE (3 -              



                                       Booster for Pfizer              



                                       series)]                  

 

             Future Scheduled 2021   COVID-19 VACCINE (3 -              [facility]



             Test         00:00:00     Booster for Pfizer              Medical C

enter



                                       series) [code =              



                                       COVID-19 VACCINE (3 -              



                                       Booster for Pfizer              



                                       series)]                  

 

             Future Scheduled 2021   COVID-19 VACCINE (3 -              [facility]



             Test         00:00:00     Booster for Pfizer              Medical C

enter



                                       series) [code =              



                                       COVID-19 VACCINE (3 -              



                                       Booster for Pfizer              



                                       series)]                  

 

             Future Scheduled 2021   COVID-19 VACCINE (3 -              [facility]



             Test         00:00:00     Booster for Pfizer              Medical C

enter



                                       series) [code =              



                                       COVID-19 VACCINE (3 -              



                                       Booster for Pfizer              



                                       series)]                  

 

             Future Scheduled 2021   COVID-19 VACCINE (3 -              [facility]



             Test         00:00:00     Booster for Pfizer              Medical C

enter



                                       series) [code =              



                                       COVID-19 VACCINE (3 -              



                                       Booster for Pfizer              



                                       series)]                  

 

             Future Scheduled 2021   COVID-19 VACCINE (3 -              [facility]



             Test         00:00:00     Booster for Pfizer              Medical C

enter



                                       series) [code =              



                                       COVID-19 VACCINE (3 -              



                                       Booster for Pfizer              



                                       series)]                  

 

             Future Scheduled 2021-05-15   COVID-19 VACCINE (3 -              [facility]



             Test         00:00:00     Booster for Pfizer              Medical C

enter



                                       series) [code =              



                                       COVID-19 VACCINE (3 -              



                                       Booster for Pfizer              



                                       series)]                  

 

             Future Scheduled 2021-05-15   COVID-19 VACCINE (3 -              [facility]



             Test         00:00:00     Booster for Pfizer              Medical C

enter



                                       series) [code =              



                                       COVID-19 VACCINE (3 -              



                                       Booster for Pfizer              



                                       series)]                  

 

             Future Scheduled 2021   DEPRESSION SCREENING              [facility]



             Test         00:00:00     (12+) [code =              Medical Center



                                       DEPRESSION SCREENING              



                                       (12+)]                    

 

             Future Scheduled 2021   FALLS RISK SCREENING              [facility]



             Test         00:00:00     [code = FALLS RISK              Medical C

enter



                                       SCREENING]                

 

             Future Scheduled 2021   Medicare IPPE (WELCOME              C

[facility]



             Test         00:00:00     TO MEDICARE) [code =              Medical

 Center



                                       Medicare IPPE (WELCOME              



                                       TO MEDICARE)]              

 

             Future Scheduled 2006-10-07   PNEUMOCOCCAL 65+ YRS              [facility]



             Test         00:00:00     (1 of 1 -                 Medical Center



                                       [id number])              



                                       [code = PNEUMOCOCCAL              



                                       65+ YRS (1 of 1 -              



                                       [id number] PCV13)]              

 

             Future Scheduled 2006-10-07   PNEUMOCOCCAL 65+ YRS              [facility]



             Test         00:00:00     (1 - PCV) [code =              Medical Ce

nter



                                       PNEUMOCOCCAL 65+ YRS              



                                       (1 - PCV)]                

 

             Future Scheduled 2006-10-07   PNEUMOCOCCAL 65+ YRS              [facility]



             Test         00:00:00     (1 - PCV) [code =              Medical Ce

nter



                                       PNEUMOCOCCAL 65+ YRS              



                                       (1 - PCV)]                

 

             Future Scheduled 2006-10-07   PNEUMOCOCCAL 65+ YRS              [facility]



             Test         00:00:00     (1 - PCV) [code =              Medical Ce

nter



                                       PNEUMOCOCCAL 65+ YRS              



                                       (1 - PCV)]                

 

             Future Scheduled 2006-10-07   PNEUMOCOCCAL 65+ YRS              [facility]



             Test         00:00:00     (1 - PCV) [code =              Medical Ce

nter



                                       PNEUMOCOCCAL 65+ YRS              



                                       (1 - PCV)]                

 

             Future Scheduled 2006-10-07   PNEUMOCOCCAL 65+ YRS              [facility]



             Test         00:00:00     (1 - PCV) [code =              Medical Ce

nter



                                       PNEUMOCOCCAL 65+ YRS              



                                       (1 - PCV)]                

 

             Future Scheduled 2006-10-07   PNEUMOCOCCAL 65+ YRS              [facility]



             Test         00:00:00     (1 - PCV) [code =              Medical Ce

nter



                                       PNEUMOCOCCAL 65+ YRS              



                                       (1 - PCV)]                

 

             Future Scheduled 2006-10-07   PNEUMOCOCCAL 65+ YRS              [facility]



             Test         00:00:00     (1 - PCV) [code =              Medical Ce

nter



                                       PNEUMOCOCCAL 65+ YRS              



                                       (1 - PCV)]                

 

             Future Scheduled 2006-10-07   PNEUMOCOCCAL 65+ YRS              [facility]



             Test         00:00:00     (1 - PCV) [code =              Medical Ce

nter



                                       PNEUMOCOCCAL 65+ YRS              



                                       (1 - PCV)]                

 

             Future Scheduled 1991-10-07   SHINGLES VACCINES (1              [facility]



             Test         00:00:00     of 2) [code = SHINGLES              Medic

al Center



                                       VACCINES (1 of 2)]              

 

             Future Scheduled 1991-10-07   SHINGLES VACCINES (1              [facility]



             Test         00:00:00     of 2) [code = SHINGLES              Medic

al Center



                                       VACCINES (1 of 2)]              

 

             Future Scheduled 1991-10-07   SHINGLES VACCINES (1              [facility]



             Test         00:00:00     of 2) [code = SHINGLES              Medic

al Center



                                       VACCINES (1 of 2)]              

 

             Future Scheduled 1991-10-07   SHINGLES VACCINES (1              [facility]



             Test         00:00:00     of 2) [code = SHINGLES              Medic

al Center



                                       VACCINES (1 of 2)]              

 

             Future Scheduled 1991-10-07   SHINGLES VACCINES (1              [facility]



             Test         00:00:00     of 2) [code = SHINGLES              Medic

al Center



                                       VACCINES (1 of 2)]              

 

             Future Scheduled 1991-10-07   SHINGLES VACCINES (1              [facility]



             Test         00:00:00     of 2) [code = SHINGLES              Medic

al Center



                                       VACCINES (1 of 2)]              

 

             Future Scheduled 1991-10-07   SHINGLES VACCINES (1              [facility]



             Test         00:00:00     of 2) [code = SHINGLES              Medic

al Center



                                       VACCINES (1 of 2)]              

 

             Future Scheduled 1991-10-07   SHINGLES VACCINES (1              [facility]



             Test         00:00:00     of 2) [code = SHINGLES              Medic

al Center



                                       VACCINES (1 of 2)]              

 

             Future Scheduled 1991-10-07   SHINGLES VACCINES (1              [facility]



             Test         00:00:00     of 2) [code = SHINGLES              Medic

al Center



                                       VACCINES (1 of 2)]              

 

             Future Scheduled 1960-10-07   DTAP/TDAP/TD VACCINES              [facility]



             Test         00:00:00     (1 - Tdap) [code =              Medical C

enter



                                       DTAP/TDAP/TD VACCINES              



                                       (1 - Tdap)]               

 

             Future Scheduled 1960-10-07   DTAP/TDAP/TD VACCINES              [facility]



             Test         00:00:00     (1 - Tdap) [code =              Medical C

enter



                                       DTAP/TDAP/TD VACCINES              



                                       (1 - Tdap)]               

 

             Future Scheduled 1960-10-07   DTAP/TDAP/TD VACCINES              [facility]



             Test         00:00:00     (1 - Tdap) [code =              Medical C

enter



                                       DTAP/TDAP/TD VACCINES              



                                       (1 - Tdap)]               

 

             Future Scheduled 1960-10-07   DTAP/TDAP/TD VACCINES              [facility]



             Test         00:00:00     (1 - Tdap) [code =              Medical C

enter



                                       DTAP/TDAP/TD VACCINES              



                                       (1 - Tdap)]               

 

             Future Scheduled 1960-10-07   DTAP/TDAP/TD VACCINES              [facility]



             Test         00:00:00     (1 - Tdap) [code =              Medical C

enter



                                       DTAP/TDAP/TD VACCINES              



                                       (1 - Tdap)]               

 

             Future Scheduled 1960-10-07   DTAP/TDAP/TD VACCINES              [facility]



             Test         00:00:00     (1 - Tdap) [code =              Medical C

enter



                                       DTAP/TDAP/TD VACCINES              



                                       (1 - Tdap)]               

 

             Future Scheduled 1960-10-07   DTAP/TDAP/TD VACCINES              [facility]



             Test         00:00:00     (1 - Tdap) [code =              Medical C

enter



                                       DTAP/TDAP/TD VACCINES              



                                       (1 - Tdap)]               

 

             Future Scheduled 1960-10-07   DTAP/TDAP/TD VACCINES              [facility]



             Test         00:00:00     (1 - Tdap) [code =              Medical C

enter



                                       DTAP/TDAP/TD VACCINES              



                                       (1 - Tdap)]               

 

             Future Scheduled 1960-10-07   DTAP/TDAP/TD VACCINES              [facility]



             Test         00:00:00     (1 - Tdap) [code =              Medical C

enter



                                       DTAP/TDAP/TD VACCINES              



                                       (1 - Tdap)]               

 

             Future Scheduled 1959-10-07   HEPATITIS C SCREENING              [facility]



             Test         00:00:00     [code = HEPATITIS C              Medical 

Center



                                       SCREENING]                

 

             Future Scheduled 1941   DXA SCAN [code = DXA              [facility]



             Test         00:00:00     SCAN]                     [facility]

 

             Future Scheduled 1941   DXA SCAN [code = DXA              [facility]



             Test         00:00:00     SCAN]                     [facility]

 

             Future Scheduled 1941   DXA SCAN [code = DXA              [facility]



             Test         00:00:00     SCAN]                     [facility]

 

             Future Scheduled 1941   DXA SCAN [code = DXA              [facility]



             Test         00:00:00     SCAN]                     [facility]

 

             Future Scheduled 1941   DXA SCAN [code = DXA              [facility]



             Test         00:00:00     SCAN]                     [facility]

 

             Future Scheduled 1941   DXA SCAN [code = DXA              [facility]



             Test         00:00:00     SCAN]                     [facility]

 

             Future Scheduled 1941   DXA SCAN [code = DXA              [facility]



             Test         00:00:00     SCAN]                     [facility]

 

             Future Scheduled 1941   DXA SCAN [code = DXA              [facility]



             Test         00:00:00     SCAN]                     [facility]

 

             Future Scheduled              [facility] C [facility]              [facility]



             Test                      (procedure) [code =              



                                       [id number]]                

 

             Future Scheduled              SHINGLES VACCINES (#1)              [facility]



             Test                      [code = SHINGLES              



                                       VACCINES (#1)]              

 

             Future Scheduled              65+ PNEUMOCOCCAL              [facility]



             Test                      VACCINE (1 of 1 -              



                                       PPSV23) [code = 65+              



                                       PNEUMOCOCCAL VACCINE              



                                       (1 of 1 - PPSV23)]              

 

             Future Scheduled              INFLUENZA VACCINE              [facility]



             Test                      [code = INFLUENZA              



                                       VACCINE]                  







Encounters







        Start   End     Encounter Admission Attending Care    Care    Encounter 

Source



        Date/Time Date/Time Type    Type    Clinicians Facility Department ID   

   

 

        2023         Outpatient         Rebecca Cerrato [facility]   [id number]

[id number] [facility]



        19:23:18                                                 44      [facility]

 

        2022         Outpatient         Nikolas Mcwilliams [facility]  [id number]

[address]



        09:25:03                                                    [facility]

 

        2022         Outpatient         Nikolas Mcwilliams [facility]  [id number]

[address]



        13:53:02                                                    [facility]

 

        2022         Inpatient Rebecca Rodgers [facility]   ENDO    [id number]

[id number] [facility]



        10:00:00                                                 81      [facility]

 

        2022         Outpatient         [facility]  [id number]



        10:58:02                                                    [facility]

 

        2022         Outpatient         Nikolas Mcwilliams [facility]  [facility]  [id number] [address]



        08:43:01                                                    [facility]

 

        2022         Outpatient                 [facility]  [facility]  [id number]

 [address]



        16:25:02                                                    [facility]

 

        2022         Outpatient                 [facility]  [facility]  [id number]

 [address]



        14:23:02                                                    [facility]

 

        2022         Outpatient 3       [id number]  [facility]   OTH     

 [facility]



        13:02:17                                                 1006    



                                                                        Health



                                                                        Rehabil



                                                                        itation



                                                                        [facility]

 

        2022         Outpatient 3       [id number]  [facility]   REF     

 [facility]



        12:59:17                                                 [id number]    



                                                                        Health



                                                                        Rehabil



                                                                        itation



                                                                        [facility]

 

        2022         Outpatient                 [facility]  [facility]  [id number]

 [address]



        13:11:24                                                 [id number]   [facility]

 

        2021         Inpatient ER      JALEEL CHRISTOPHER    Cardiology [id number]

[address] [facility]



        22:07:00                         SELIN                         

 

        2023-10-24 2023-10-24 Outpatient                 [facility]    [id number]

[address]



        13:00:00 13:00:00                                         26      anupam Tesfaye

 

        2023 Outpatient                 [facility]    [facility]    [id number]

[address]



        11:45:00 11:45:00                                         28      l



                                                                        Brien

 

        2023 Outpatient                 [facility]    [facility]    [id number]

[address]



        09:00:00 09:00:00                                         27      anupam Tesfaye

 

        2023 Outpatient                 [facility]    [facility]     [id number]

[address]



        18:00:00 04:59:59                                 Neurology 25      l



                                                        Tali Tesfaye

 

        2023 Outpatient         ROMA Cannon [address]

[id number] 



        13:00:00 23:59:59                 Steven                   25      



                                        Georgi                         

 

        2023 Outpatient                 [facility]    [facility]    [id number]

[address]



        13:00:00 13:00:00                                         25      anupam Tesfaye

 

        2023 Outpatient                 [facility]    [facility]     [id number]

[address]



        16:15:00 04:59:59                                 Neurology 24      l



                                                        Tali Tesfaye

 

        2023 Outpatient         KARINE CannonSCHER [address]

[id number] 



        11:15:00 23:59:59                 Steven                   24      



                                        Georgi                         

 

        2023 Outpatient                 [facility]    [facility]    [id number]

[address]



        11:15:00 11:15:00                                         24      anupam Tesfaye

 

        2023 Outpatient Rebecca Rodgers [facility]   ENDO    LA0

[id number] [facility]



        06:47:00 06:47:00                                         44      [facility]

 

        2023 Outpatient                 [facility]    [facility]    [id number]

[id number]



        13:30:00 13:30:00                                         24      anupam Tesfaye

 

        2022 OFFICE                  [facility]  [facility]  [id number] Co

mmon



        00:00:00 00:00:00 VISIT EST                                         Spir

it



                        PT LEVEL 3                                         - [facility]



                                                                        [facility]

 

        2022 Outpatient                 [facility]    [facility]     [id number]

[id number]



        21:00:00 05:59:59                                 Neurology 23      l



                                                        aTli Tesfaye

 

        2022 Outpatient                 [facility]    [facility]     [id number]

[id number]



        21:00:00 05:59:59                                 Neurology 23      l



                                                        Tali Tesfaye

 

        2022 Outpatient         KARINE CannonSCHER [address]

[id number] 



        15:00:00 23:59:59                 Steven                   23      



                                        Georgi                         

 

        2022 Outpatient                 [facility]    [facility]    [id number]

[address]



        15:00:00 15:00:00                                         23      anupam Tesfaye

 

        2022 (TEL)                   [facility]  [facility]  [id number] Co

mmon



        00:00:00 00:00:00                                                 [facility] [facility]

 

        2022 OFFICE                  STLMLC  STLMLC  [id number] Co

mmon



        00:00:00 00:00:00 VISIT EST                                         Spir

it



                        PT LEVEL 3                                         [facility]

 

        2022-08-10 2022-08-10 OFFICE                  STLMLC  STLMLC  [id number] Co

mmon



        00:00:00 00:00:00 VISIT EST                                         Spir

it



                        PT LEVEL 3                                         [facility]

 

        2022 OFFICE                  STLMLC  STLMLC  [id number] Co

mmon



        00:00:00 00:00:00 VISIT NEW                                         Spir

it



                        PT LEVEL 3                                         [facility]

 

        2022 OFFICE                  STLMLC  STLMLC  [id number] Co

mmon



        00:00:00 00:00:00 VISIT EST                                         Spir

it



                        PT LEVEL 3                                         [facility]

 

        2022 Outpatient                 nullFlavo MNA     [id number]

[id number] [place]



        21:15:00 04:59:59                         r       Neurology 19      l



                                                        Tali Tesfaye

 

        2022 Outpatient                 nullFlavo MNA     [id number]

[id number] [facility]



        21:15:00 04:59:59                         r       Neurology 19      l



                                                        Tali Tesfaye

 

        2022 Outpatient         BIA CannonMISCHER MHMISCHER 407

[id number] 



        16:15:00 23:59:59                 Steven                   19      



                                        Georgi                         

 

        2022 Outpatient                 MHIE    MHIE    [id number]

665 [facility]



        16:15:00 16:15:00                                         19      l



                                                                        Brien

 

        2022 Orders          Doctor  ABHIJEET    1.2.840.114 [id number]

30 Univers



        00:00:00 00:00:00 Only            Unassigned, CHARIS   350.1.13.10       

  ity of



                                        [facility] 4.2.7.2.686         Amari

as



                                                        [phone]         Medi

bella



                                                        009             Branch

 

        2022 (TEL)                   STLMLC  STLMLC  [id number] Co

mmon



        00:00:00 00:00:00                                                 [facility]

 

        2022 [facility]                  [facility]  [id number] Co

mmon



        00:00:00 00:00:00 VISIT NEW                                         Spir

it



                        PT LEVEL 3                                         - [facility]

 

        2022 Telephone         Curtis  [facility]    [id number] [id number]

5224 Univers



        00:00:00 00:00:00                 [facility]  350.1.13.10         it

y of



                                                [place] 4.2.7.2.686         Amari

as



                                                TOI?BLEA [phone]         Me

dical



                                                [address]                 



                                                OFFICE                  



                                                [facility]                 

 

        2022 Outpatient R       NATHAN [facility]    [id number]

[id number] Univers



        14:30:00 15:15:52                 [facility]

 

        2022 [facility]          [facility]    1.2.250.562 [id number]

7298 Univers



        14:30:00 15:15:52 Visit           [facility]  350.1.13.10         it

y of



                                                [facility] 4.2.7.2.686         Amari

as



                                                TOI?BLEA [phone]         Me

[address]                 



                                                OFFICE                  



                                                [facility]                 

 

        2022 [facility] R       [facility]    [id number]

[id number] Univers



        14:45:00 14:45:00                 [facility]

 

        2022 Office          [facility]    1.2.505.947 [id number]

4454 Univers



        10:15:00 10:30:00 Visit           [facility]  350.1.13.10         it

y of



                                                [facility] 4.2.7.2.686         Amari

as



                                                TOI?BLEA [phone]         Me

dic[address]                 



                                                OFFICE                  



                                                [facility]                 

 

        2022 Outpatient R       NATHAN [facility]    [id number]

78714 Univers



        10:15:00 10:15:00                 [facility]

 

        2022 Outpatient R       NATHAN [facility]    [id number]

[id number] Univers



        10:15:00 10:15:00                 [facility]

 

        2022-02-15 2022-02-15 Ambulatory                 nullFlavo MNA     [id number]

[id number] [facility]



        20:15:00 20:15:00 Pre-Reg                 r       Neurology 22      l



                                                        Tali Tesfaye

 

        2022-02-15 2022-02-15 Ambulatory                 nullFlavo MNA     [id number]

[id number] [facility]



        20:15:00 20:15:00 Pre-Reg                 r       Neurology 22      l



                                                        Tali Tesfaye

 

        2022-02-15 2022-02-15 Outpatient                 MHIE    CHERRY    [id number]

665 [facility]



        14:15:00 14:15:00                                         22      l



                                                                        Brien

 

        2022-02-15 2022-02-15 [facility]patient         [facility]

[id number] 



        14:15:00 14:15:00                 Steven                   Vince      



                                        Georgi                         

 

        2022 Orders          Doctor  ABHIJEET    1.2.840.114 [id number]

33 Univers



        00:00:00 00:00:00 Only            Unassigned, CHARIS   350.1.13.10       

  ity of



                                        [facility] 4.2.7.2.686         Amari

as



                                                        [phone]         [address]

 

        2022 Orders          Doctor  ABHIJEET    1.2.840.114 [id number]

18 Univers



        00:00:00 00:00:00 Only            Unassigned, CHARIS   350.1.13.10       

  ity of



                                        [facility] 4.2.7.2.686         Amari

as



                                                        [phone]         [address]

 

        2022 Telephone         [facility]    1.2.840.114 90

[id number] Univers



        00:00:00 00:00:00                 [facility]  350.1.13.10         it

y of



                                                [place] 4.2.7.2.686         Amari

as



                                                TOI?BLEA [phone]         Me

dical



                                                [address]                 



                                                OFFICE                  



                                                [facility]                 

 

        2022 Telephone         [facility]    1.2.840.114 90

[id number] Univers



        00:00:00 00:00:00                 Oleksandr L HEALTH  350.1.13.10         it

y of



                                                [place] 4.2.7.2.686         Amari

as



                                                TOI?BLEA [phone]         Me

dical



                                                KNEY    [address]             [address]anch



                                                MEDICAL                 



                                                OFFICE                  



                                                BUILDING                 

 

        2022 Outpatient                 nullFlavo MNA     [id number]

[id number] [facility]



        19:15:00 05:59:59                         r       Neurology 21      l



                                                        Tali Tesfaye

 

        2022 Outpatient                 nullFlavo MNA     [id number]

[id number] [facility]



        19:15:00 05:59:59                         r       Neurology 21      l



                                                        Tali Tesfaye

 

        2022 Outpatient         Kassandra,  MISCHER MHMISCHER 407

[id number] 



        13:15:00 23:59:59                 Steven                   21      



                                        Georgi                         

 

        2022 Outpatient                 MHIE    MHIE    [id number]

665 [facility]



        13:15:00 13:15:00                                         21      anupam Tesfaye

 

        2022 Outpatient                 nullFlavo MNA     [id number]

[id number] [facility]



        22:00:00 05:59:59                         r       Neurology 20      l



                                                        Tali Tesfaye

 

        2022 Outpatient                 nullFlavo MNA     [id number]

[id number] [facility]



        22:00:00 05:59:59                         r       Neurology 20      l



                                                        Tali Tesfaye

 

        2022 Outpatient         Kassandra  MISCHER MHMISCHER 407

[id number] 



        16:00:00 23:59:59                 Steven                   20      



                                        Georgi                         

 

        2022 Outpatient                 MHIE    MHIE    [id number]

665 [facility]



        16:00:00 16:00:00                                         20      anupam Tesfaye

 

        2021 Telephone         Nathan [facility]    1.2.840.114 [id number]

[id number] Univers



        00:00:00 00:00:00                 [facility]  350.1.13.10         it

[facility] 4.2.7.2.686         Amari

as



                                                TOI?BLEA [phone]         Me

wendy



                                                [address]



                                                MEDICAL                 



                                                OFFICE                  



                                                BUILDING                 

 

        2021 Outpatient R       NATHAN [facility]    [id number]

[id number] Univers



        15:45:00 23:59:00                 OLEKSANDR myaer [facility]

 

        2021 [facility]         Nathan [facility]    1.2.840.114 [id number]

[id number] Univers



        15:45:00 23:59:00 Encounter         Oleksandr CELAYA  350.1.13.10         

ity of



                                                [place] 4.2.7.2.686         Amari

as



                                                TOI?BLEA [phone]         Me

wendy HOLT    809             [facility]                 



                                                OFFICE                  



                                                [facility]                 

 

        2021 Office          Nathan [facility]    1.2.923.637 [id number]

4912 Univers



        15:00:00 16:28:08 Visit           Oleksandr CELAYA  350.1.13.10         it

y of



                                                [place] 4.2.7.2.686         Amari

as



                                                TOI?BLEA [phone]         Me

wendy HOLT    198             [facility]                 



                                                OFFICE                  



                                                [facility]                 

 

        2021 [facility] R       [facility]    [id number]

[id number] Univers



        15:00:00 16:28:08                 OLEKSANDR                           ity of



                                                                        [facility]

 

        2021 Telephone         [facility]    1.2.840.114 [id number]

[id number] Univers



        00:00:00 00:00:00                 Oleksandr HODGES [facility]  350.1.13.10         it

y of



                                                [place] 4.2.7.2.686         Amari

as



                                                TOI?BLEA [phone]         Me

wendy HOLT    198             [facility]                 



                                                OFFICE                  



                                                [facility]                 

 

        2021-12-10 2021 Outpatient                 nullFlavo MNA     [id number]

[id number] [facility]



        17:00:00 05:59:59                         r       Neurology 18      l



                                                        DeSotoraciel Allenann

 

        2021-12-10 2021 Outpatient                 nullFlavo MNA     [id number]

[id number] [facility]



        17:00:00 05:59:59                         r       Neurology 18      l



                                                        Tali Tesfaye

 

        2021-12-10 2021-12-10 Outpatient         BIA CannonMISCHER MHMISCHER 407

[id number] 



        11:00:00 23:59:59                 Steven Laureano                         

 

        2021-12-10 2021-12-10 Outpatient                 SANGEETA RUTHERFORD    [id number]

[id number] [facility]



        11:00:00 11:00:00                                         18      anupam



                                                                        Brien

 

        2021 Orders          Doctor JHA    1.2.840.114 [id number]

31 Univers



        00:00:00 00:00:00 Only            Unassigned, CHARIS   350.1.13.10       

  ity of



                                        [facility] 4.2.7.2.686         Amari

as



                                                        [phone]         [address]

 

        2021 Telephone         Nathan [facility]    1.2.840.114 89

[id number] Univers



        00:00:00 00:00:00                 Oleksandr L HEALTH  350.1.13.10         it

y of



                                                [place] 4.2.7.2.686         Amari

as



                                                TOI?BLEA [phone]         Me

wendy HOLT    [address]             [place]



                                                MEDICAL                 



                                                OFFICE                  



                                                [facility]                 

 

        2021 Orders          Doctor  ABHIJEET    1.2.840.114 [id number]

61 Univers



        00:00:00 00:00:00 Only            Unassigned, CHARIS   350.1.13.10       

  ity of



                                        [facility] 4.2.7.2.686         Amari

as



                                                        [phone]         [address]

 

        2021 Telephone         [facility]    1.2.840.114 89

[id number] Univers



        00:00:00 00:00:00                 Oleksandr L HEALTH  350.1.13.10         it

y of



                                                [place] 4.2.7.2.686         Amari

as



                                                TOI?BLEA [phone]         Me

wendy HOLT    198             [facility]                 



                                                OFFICE                  



                                                [facility]                 

 

        2021 Orders          Doctor  ABHIJEET    1.2.840.114 [id number]

81 Univers



        00:00:00 00:00:00 Only            Unassigned, CHARIS   350.1.13.10       

  ity of



                                        [facility] 4.2.7.2.686         Amari

as



                                                        [phone]         [address]

 

        2021 Telephone         Curtis  [facility]    1.2.041.715 [id number]

2540 Univers



        00:00:00 00:00:00                 Aftab S HEALTH  350.1.13.10         it

y of



                                                [place] 4.2.7.2.686         Amari

as



                                                TOI?BLEA [phone]         Me

wendy HOLT    198             [facility]                 



                                                OFFICE                  



                                                [facility]                 

 

        2021-11-15 2021-11-15 Office          Nathan [facility]    1.2.990.108 [id number]

1667 Univers



        13:34:19 14:01:40 Visit           Oleksandr L HEALTH  350.1.13.10         it

y of



                                                [place] 4.2.7.2.686         Amari

as



                                                TOI?BLEA [phone]         Me

wendy HOLT    198             [facility]                 



                                                OFFICE                  



                                                [facility]                 

 

        2021-11-15 2021-11-15 Outpatient R       NATHAN [facility]    [id number]

[id number] Univers



        13:30:00 14:01:40                 OLEKSANDR mayer [facility]

 

        2021-11-15 2021-11-15 [facility]patient R       [facility]    [id number]

[id number] Univers



        13:30:00 13:30:00                 OLEKSANDRMIKHAIL mayer [facility]

 

        2021-10-13 2021-10-13 [facility]    1.2.840.114 [id number]

[id number] Univers



        13:00:00 23:59:00 Encounter         Oleksandr HODGES Siriona  350.1.13.10         

ity of



                                                [place] 4.2.7.2.686         Amari

as



                                                Toi?Blea [phone]         Me

wendy holt    809             [facility]                 



                                                Office                  



                                                [facility]                 

 

        2021-10-13 2021-10-13 Office          [facility]    1.2.443.939 [id number]

3131 Univers



        12:47:10 14:01:14 Visit           Oleksandr HODGES Siriona  350.1.13.10         it

y of



                                                [place] 4.2.7.2.686         Amari

as



                                                Toi?Blea [phone]         Me

wendy holt    198             [facility]                 



                                                Office                  



                                                [facility]                 

 

        2021-10-13 2021-10-13 Outpatient R       [facility]    [id number]

[id number] Univers



        13:00:00 13:00:00                 OLEKSANDR mayer [facility]

 

        2021 Telephone         [facility]    1.2.840.114 [id number]

[id number] Univers



        00:00:00 00:00:00                 Oleksandr HODGES Siriona  350.1.13.10         it

y of



                                                [place] 4.2.7.2.686         Amari

as



                                                Toi?Blea [phone]         Me

wendy holt    198             [facility]                 



                                                Office                  



                                                [facility]                 

 

        2021-09-15 2021-09-15 Office          [facility]    1.2.128.514 7509

9030 Univers



        14:31:57 15:14:33 Visit           Oleksandr HODGES Siriona  350.1.13.10         it

y of



                                                [place] 4.2.7.2.686         Amari

as



                                                Toi?Blea [phone]         Me

wendy



                                                deniz    [address] [place] [address]



                                                Medical                 



                                                Office                  



                                                Building                 

 

        2021-09-15 2021-09-15 Outpatient R       NATHAN, [facility]    [id number]

[id number] [facility]



        14:45:00 14:45:00                 OLEKSANDR                           ity [facility]

 

        2021-09-15 2021-09-15 Orders          Doctor  ABHIJEET    1.2.840.114 [id number]

47 Univers



        00:00:00 00:00:00 Only            Unassigned, CHARIS   350.1.13.10       

  ity of



                                        [facility] 4.2.7.2.686         Amari

as



                                                        [phone]         [address]

 

        2021-09-15 2021-09-15 Orders          Doctor  ABHIJEET    1.2.840.114 [id number]

47 Univers



        00:00:00 00:00:00 Only            Unassigned, CHARIS   350.1.13.10       

  ity of



                                        [facility] 4.2.7.2.686         Amari

as



                                                        [phone]         [address]

 

        2021 Mayda Kay,  [facility]   [id number]

[address] [facility] [address]



        00:00:00 00:00:00                 [facility]

 

        2021 [facility] Selin Feldman [facility] 

  [id number] 

[id number]                              [facility]



        22:07:00 12:43:00 Encounter         Say Ramosak, Yashash D                       

  [facility]

 

        2021 Orders                  [facility]   [id number]

[address] [facility] [address]



        00:00:00 00:00:00 Only                                            [facility]

 

        2021 Travel                  [facility]   [id number]

 [facility]



        00:00:00 00:00:00                                                 [facility]

 

        2021-08-10 2021 Outpatient                 nullFlavo MNA     [id number]

[id number] [facility]



        20:15:00 04:59:59                         r       Neurology 17      l



                                                        Tali Tesfaye

 

        2021-08-10 2021 Outpatient                 nullFlavo MNA     [id number]

[id number] [facility]



        20:15:00 04:59:59                         r       Neurology 17      l



                                                        Tali Tesfaye

 

        2021-08-10 2021-08-10 Outpatient         [facility] 407

[id number] 



        15:15:00 23:59:59                 Steven                   17      



                                        Georgi                         

 

        2021-08-10 2021-08-10 Outpatient                 MHIE    MHIE    [id number]

665 [facility]



        15:15:00 15:15:00                                         17      anupam Tesfaye

 

        2021 Ambulatory                 nullFlavo MNA     [id number]

[id number] [facility]



        19:30:00 19:30:00 Pre-Reg                 r       Neurology 14      l



                                                        Tali Tesfaye

 

        2021 Ambulatory                 nullFlavo MNA     [id number]

[id number] [facility]



        19:30:00 19:30:00 Pre-Reg                 r       Neurology 14      l



                                                        Tali Tesfaye

 

        2021 Outpatient                 MHIE    MHIE    [id number]

[id number] [facility]



        14:30:00 14:30:00                                         14      anupam Tesfaye

 

        2021 Out[facility]         [facility]

[id number] 



        14:30:00 14:30:00                 Steven                   14      



                                        Georgi                         

 

        2021-05-10 2021 Outpatient                 nullFlavo MNA     [id number]

[id number] [facility]oria



        18:15:00 04:59:59                         r       Neurology 16      anupam Tesfaye

 

        2021-05-10 2021 Outpatient                 nullFlavo MNA     [id number]

[id number] [facility]



        18:15:00 04:59:59                         r       Neurology 16      l



                                                        Tali Allenann

 

        2021-05-10 2021-05-10 Out[facility]         [facility]

[id number] 



        13:15:00 23:59:59                 Steven                   16      



                                        Georgi                         

 

        2021-05-10 2021-05-10 [place] [id number]

[id number] Methodi



        08:36:31 23:59:00 Encounter         [facility]    08786.1.1         [address]     [address]



                                                3.430.2.7                 Hospit

a



                                                .[id number]                 l



                                                .8                      

 

        2021-05-10 2021-05-10 Outpatient                 MHIE    MHIE    [id number]

[id number] [facility]



        13:15:00 13:15:00                                         16      anupam Tesfaye

 

        2021 Outpatient                 nullFlavo MNA     [id number]

[id number] [facility]



        18:15:00 04:59:59                         r       Neurology 15      l



                                                        Tali Tesfaye

 

        2021 Outpatient                 nullFlavo MNA     [id number]

[id number] [facility]



        18:15:00 04:59:59                         r       Neurology 15      l



                                                        Tali Tesfaye

 

        2021 [facility]

[id number] 



        13:15:00 23:59:59                 Steven                   15      



                                        Georgi                         

 

        2021 Outpatient                 MHIE    MHIE    [id number]

665 [facility]



        13:15:00 13:15:00                                         15      [facility]

 

        2021 Office          [place], 1.2.840.1 [id number]

5389 Methodi



        13:03:28 13:29:49 Visit           [facility]    41853.1.1         [address]     [address]



                                                3.430.2.7                 Hospit

a



                                                .3.186018                 l



                                                .8                      

 

        2021 Travel                  1.2.840.1 [id number] [id number]

[id number] Methodi



        00:00:00 00:00:00                         40868.1.1 350.1.13.43 383     

st



                                                3.430.2.7 0.2.7.3.698         Ho

spita



                                                .3.315663 084.8           l



                                                .8                      

 

        2021 2021-03-10 Outpatient                 nullFlavo MNA     [id number]

[id number] [facility]



        20:30:00 05:59:59                         r       Neurology 13      l



                                                        Tali Tesfaye

 

        2021 2021-03-10 Outpatient                 nullFlavo MNA     [id number]

[id number] [facility]



        20:30:00 05:59:59                         r       Neurology 13      anupam Tesfaye

 

        2021 Outpatient         RMOA [facility] 407

[id number] 



        14:30:00 23:59:59                 Steven                   13      



                                        Georgi                         

 

        2021 Outpatient                 MHIE    MHIE    [id number]

665 [facility]



        14:30:00 14:30:00                                         13      anupam Tesfaye

 

        2021 Outpatient                 MHIE    MHIE    [id number]

665 [facility]



        14:00:00 14:00:00                                         10      l



                                                                        Brien

 

        2021 Outpatient                 MHIE    MHIE    [id number]

665 [facility]



        14:00:00 14:00:00                                         10      l



                                                                        Brien

 

        2020 2020-12-10 Outpatient                 nullFlavo MNA     [id number]

[id number] Mem[facility]



        20:15:00 05:59:59                         r       Neurology 12      l



                                                        Tali Tesfaye

 

        2020 2020-12-10 Outpatient                 nullFlavo MNA     [id number]

[id number] [facility]



        20:15:00 05:59:59                         r       Neurology 12      l



                                                        Tali Tesfaye

 

        2020 [facility]         [facility]

[id number] 



        14:15:00 23:59:59                 Steven                   12      



                                        Georgi                         

 

        2020 Outpatient                 MHIE    MHIE    [id number]

665 [facility]



        14:15:00 14:15:00                                         12      anupam



                                                                        [place]

 

        2020 2020-11-10 Outpatient                 nullFlavo MNA     [id number]

[id number] [facility]



        20:15:00 05:59:59                         r       Neurology 11      l



                                                        Tali Allenann

 

        2020 2020-11-10 Outpatient                 nullFlavo MNA     [id number]

[id number] [facility]



        20:15:00 05:59:59                         r       Neurology 11      l



                                                        Tali         [place]

 

        2020 [facility]         [facility]

[id number] 



        14:15:00 23:59:59                 Steven                   11      



                                        Georgi                         

 

        2020 Outpatient                 MHIE    MHIE    [id number]

665 [facility]oria



        14:15:00 14:15:00                                         11      anupam



                                                                        Brien

 

        2020 Outpatient                 nullFlavo MNA     [id number]

[id number] Memoria



        20:00:00 04:59:59                         r       Neurology 09      l



                                                        Tali         [place]

 

        2020 Outpatient                 nullFlavo MNA     [id number]

[id number] Memoria



        20:00:00 04:59:59                         r       Neurology 09      l



                                                        Tali         [place]

 

        2020 [facility]         [facility]

[id number] 



        15:00:00 23:59:59                 Steven                   09      



                                        Georgi                         

 

        2020 Outpatient                 MHIE    MHIE    [id number]

665 [facility]



        15:00:00 15:00:00                                         09      l



                                                                        Brien

 

        2020 Outpatient                 nullFlavo MNA     [id number]

[id number] Memoria



        19:30:00 04:59:59                         r       Neurology 08      l



                                                        Tali Tesfaye

 

        2020 Outpatient                 nullFlavo MNA     [id number]

[id number] Memoria



        19:30:00 04:59:59                         r       Neurology 08      l



                                                        Tali Tesfaye

 

        2020 Outpatient         [facility]

[id number] 



        14:30:00 23:59:59                 Steven                   08      



                                        Georgi                         

 

        2020 Ambulatory                 nullFlavo MNA     [id number]

[id number] Memoria



        19:30:00 19:30:00 Pre-Reg                 r       Neurology 07      l



                                                        [place]         [place]

 

        2020 Ambulatory                 nullFlavo MNA     [id number]

[id number] Memoria



        19:30:00 19:30:00 Pre-Reg                 r       Neurology 07      l



                                                        [place]         [place]

 

        2020 Outpatient                 MHIE    MHIE    [id number]

665 [facility]



        14:30:00 14:30:00                                         08      l



                                                                        Brien

 

        2020 Outpatient                 MHIE    MHIE    [id number]

665 [facility]oria



        14:30:00 14:30:00                                         07      l



                                                                        [place]

 

        2020 Outpatient         [facility]

[id number] 



        14:30:00 14:30:00                 Steven                   07      



                                        Georgi                         

 

        2020 Outpatient                 nullFlavo MNA     [id number]

[id number] Memoria



        18:30:00 04:59:59                         r       Neurology 06      l



                                                        [place]iel Allenann

 

        2020 Outpatient                 nullFlavo MNA     [id number]

[id number] Memoria



        18:30:00 04:59:59                         r       Neurology 06      l



                                                        [place]iel Allenann

 

        2020 Outpatient         ROMA [facility] 407

[id number] 



        13:30:00 23:59:59                 Steven                   06      



                                        Georgi                         

 

        2020 Outpatient                 MHIE    MHIE    [id number]

665 [facility]



        13:30:00 13:30:00                                         06      anupam



                                                                        [place]

 

        2020 Outpatient                 nullFlavo MNA     [id number]

[id number] Mem[facility]



        19:45:00 05:59:59                         r       Neurology 05      l



                                                        Tali Tesfaye

 

        2020 Outpatient                 nullFlavo MNA     [id number]

[id number] Memoria



        19:45:00 05:59:59                         r       Neurology 05      l



                                                        Tali         [place]

 

        2020 Outpatient         [facility]

[id number] 



        13:45:00 23:59:59                 Stevencamron Laureano                         

 

        2020 Outpatient                 MHIE    MHIE    [id number]

665 [facility]oria



        13:45:00 13:45:00                                         05      anupam



                                                                        [place]

 

        2019 Outpatient                 nullFlavo MNA     [id number]

[id number] Memoria



        19:30:00 05:59:59                         r       Neurology 04      anupam Cesar         [place]

 

        2019 Outpatient                 nullFlavo MNA     [id number]

[id number] Memoria



        19:30:00 05:59:59                         r       Neurology 04      anupam Cesar         Brien

 

        2019 Outpatient         [facility]

[id number] 



        13:30:00 23:59:59                 Stevencamron Laureano                         

 

        2019 Outpatient                 MHIE    MHIE    [id number]

665 [facility]oria



        13:30:00 13:30:00                                         04      l



                                                                        Brien

 

        2019 Outpatient                 nullFlavo MNA     [id number]

[id number] Memoria



        19:15:00 04:59:59                         r       Neurology 03      anupam Tesfaye

 

        2019 Outpatient                 nullFlavo MNA     [id number]

[id number] Memoria



        19:15:00 04:59:59                         r       Neurology 03      l



                                                        [place]         Brien

 

        2019 Outpatient         [facility]

[id number] 



        14:15:00 23:59:59                 Steven                   Jayda Laureano                         

 

        2019 Outpatient                 MHIE    MHIE    [id number]

665 [facility]



        14:15:00 14:15:00                                         03      anupam Tesfaye

 

        2019 Outpatient                 MHIE    MHIE    [id number]

665 [facility]oria



        13:45:00 13:45:00                                         02      anupam Tesfaye

 

        2019 Outpatient                 MHIE    MHIE    [id number]

665 [facility]



        13:45:00 13:45:00                                         02      anupam Tesfaye

 

        2018 Outpatient                 MHIE    MHIE    [id number]

665 [facility]



        13:30:00 13:30:00                                         01      anupam Tesfaye

 

        2018 Outpatient                 MHIE    MHIE    [id number]

665 [facility]



        13:30:00 13:30:00                                         01      anupam Tesfaye

 

        2018 Outpatient                 MHIE    MHIE    [id number]

665 [facility]



        14:30:00 14:30:00                                         00      anupam Tesfaye

 

        2018 Outpatient                 MHIE    MHIE    [id number]

665 [facility]



        14:30:00 14:30:00                                         00      anupam Tesfaye







Results







           Test Description Test Time  Test Comments Results    Result Comments 

Source









                    SURGICAL            2023 15:13:00 









                      Test Item  Value      Reference Range Interpretation Comme

nts









                          SURGICAL                  

--------------------------------------------------------------------------------

------------RUN DATE:                                         



                          (test                     23 [facility] - LAB PAGE 1 RUN TIME: 1514 Specimen Inquiry

RUN USER: INTERFACE                                         



                          code =                    

--------------------------------------------------------------------------------

------------PATIENT:                                         



                          LEONA ORANTES #: [id number]

[id number] LOC: L.END U #: [id number] AGE/SX: 82/F

ROOM: RE23REG DR:                                         



                          Rebecca Cerrato MD : [birth year] BED: DIS: STATUS: JENNIE FALCON TLOC:                           



                                                    

--------------------------------------------------------------------------------

------------ SPEC #:                                         



                                                    23:PMC: RECD: [id number] STATUS: JAMEEL [facility] #: [id number] BOB: [address] [facility] DR: Rebecca Cerrato MD ENTERED:  SP

 TYPE: SURGICAL OTHR DR: Nikolas Mcwilliams MD ORDERED: 

[id number], [id number], 47762,                                         



                                                    ANATOMIC SPEC, SPECIMEN TRAC

K COPIES TO: [place] [id number] [phone] Rebecca Cerrato MD

 [address] [place] [address] 

[phone] PROCEDURES: [id number]                                         



                                                    ()  [id number] () [id number] () SPECIMEN TRACK 

() TISSUES: A.                                         



                                                    GASTRIC MUCOSA - ANTRUM BIOP

SY B. GASTRIC MUCOSA - BODY AND FUNDUS BIOPSY C. 

ESOPHAGUS BIOPSY FINAL                                         



                                                    DIAGNOSIS A. Stomach, antrum

, endoscopic biopsy:- Healing/reparative/chemical 

gastropathy changes, mild;                                         



                                                    associated mild chronicinfla

mmation - Negative for intestinal metaplasia - 

Negative for Helicobacter                                         



                                                    pylori (immunohistochemical 

stain) B.Stomach, fundus, endoscopic biopsy:- Mild 

superficial, chronic                                         



                                                    inflammation, non specific- 

Negative for intestinal metaplasia - Negative for 

Helicobacter pylori C.                                         



                                                    Esophagus, endoscopic biopsy

:- Squamous epithelium with focal desquamative 

changes and mild vascular                                         



                                                    congestion- Negative for gla

ndular epithelium/intestinal metaplasia/dysplasia- 

Negative for fungal                                         



                                                    organisms (PAS stain) Commen

t: Suggest clinical correlation. ** CONTINUED ON 

NEXT PAGE **                                                



                                                    

--------------------------------------------------------------------------------

------------RUN DATE:                                         



                                                    23 [facility] PAGE 2  RUN TIME: 1514 Specimen Inquiry RUN

USER: INTERFACE                                             



                                                    

--------------------------------------------------------------------------------

------------SPEC #:                                         



                          23:PMC: PATIENT: LEONA PERES #[id number]    

                       



                                                    

(Continued)---------------------------------------------------------------------

-----------------------                                         



                                                    GROSS DESCRIPTION A. Gastric

 antrum biopsy. It consists of single tissue 

fragment measuring 4 mm. All as                                         



                                                    A1. B. Gastric body and fund

us biopsy. It consists of 2 tissue fragments 

measuring 2 and 3 mm.All as B1.                                         



                                                    C. Esophagus biopsy. It cons

ists of a single tissue fragment measuring 2 mm. 

All as C1. Technical tissue                                         



                                                    processing and slide prepara

tion performed at [id number] , 

[place] [address] Unless                                         



                                                    gross only, the diagnosis is

 based upon microscopic 

examination.Immunohistochemistry: This test was                                 

        



                                                    developed and its performanc

e characteristicsdetermined by this laboratory. It 

has not been approved nor                                         



                                                    does it need approval by the

 USFDA. Appropriate positive and negative controls 

are reviewed and judged                                         



                                                    to be acceptable.This labora

tory is certified under the Clinical Laboratory 

Improvement Amendments                                         



                                                    (CLIA-88)as qualified to per

form high complexity clinical laboratory testing. 

MICROSCOPIC DESCRIPTION                                         



                                                    Microscopic examination is p

erformed on all specimens and the findings 

areincorporated into the final                                         



                          diagnosis. Please see diagnosis for                   

        



                                                    

findings.-----------------------------------------------------------------------

---------------------                                         



                          Signed SIGNATURE ON FILE Jay Ibrahim 23 1513

                           



                                                    

--------------------------------------------------------------------------------

------------ ** END OF                                         



                          REPORT **                              



GLUCOSE BEDSIDE [id number] 10:03:00





             Test Item    Value        Reference Range Interpretation Comments

 

             GLUCOSE BEDSIDE TESTING (test code 106 mg/dL           N     

       



             = GLUBED)                                           



BASIC METABOLIC PANEL2023-03-15 16:08:00





             Test Item    Value        Reference Range Interpretation Comments

 

             SODIUM (test code = 139 mmol/L   134-147      N            



             NA)                                                 

 

             POTASSIUM (test 4.6 mmol/L   3.4-5.0      N            



             code = K)                                           

 

             CHLORIDE (test code 111 mmol/L   100-108      H            



             = CL)                                               

 

             CARBON DIOXIDE 26 mmol/L    21-32        N            



             (test code = CO2)                                        

 

             ANION GAP (test 2.0 GAP calc 4.0-15.0     L            



             code = GAP)                                         

 

             GLUCOSE (test code 83 MG/DL            N            



             = GLU)                                              

 

             BLOOD UREA NITROGEN 13 MG/DL     7-18         N            



             (test code = BUN)                                        

 

             GLOMERULAR   50 estGFR    >60          L            The Glomerular



             FILTRATION RATE                                        Filtration R

ate is a



             (test code = GFR)                                        calculated



                                                                 parameterbased 

on



                                                                 serum Creatinin

e,



                                                                 patient age and

 sex.



                                                                 GFR valuesless 

than 60



                                                                 mL/min/1.73 squ

are



                                                                 meters are orestes

cative



                                                                 ofChronic Kidne

y



                                                                 Disease. Values

 less



                                                                 than 15



                                                                 mL/min/1.73squa

re



                                                                 meters indicate

 Kidney



                                                                 failure. The



                                                                 calculation for

GFR is



                                                                 based on the CK

D-EPI



                                                                 () calculat

ion.



                                                                 This formulais 

race



                                                                 indifferent and

 is the



                                                                 recommended for

marie



                                                                 for GFRby the N

[facility]



                                                                 [facility]

on for



                                                                 Adults.The GFR 

will



                                                                 not calculate i

f the



                                                                 sex is unknown 

or if



                                                                 thepatient's ag

e is



                                                                 <18 years.

 

             CREATININE (test 1.1 MG/DL    0.6-1.0      H            



             code = CREAT)                                        

 

             CALCIUM (test code 9.3 MG/DL    8.5-10.1     N            



             = CA)                                               



CBC W/AUTO DIFF2023-03-15 16:07:00





             Test Item    Value        Reference Range Interpretation Comments

 

             WHITE BLOOD CELL (test code = 6.4 K/mm3    3.5-11.0     N          

  



             WBC)                                                

 

             RED BLOOD CELL (test code = 4.95 M/mm3   4.70-6.10    N            



             RBC)                                                

 

             HEMOGLOBIN (test code = HGB) 15.5 G/DL    10.4-14.9    H           

 

 

             HEMATOCRIT (test code = HCT) 49.2 %       31.5-44.1    H           

 

 

             MEAN CELL VOLUME (test code = 99.4 Fl      84.5-98.6    H          

  



             MCV)                                                

 

             MEAN CELL HGB (test code = MCH) 31.3 pg      27.0-34.2    N        

    

 

             MEAN CELL HGB CONCETRATION 31.5 G/DL    31.5-34.0    N            



             (test code = MCHC)                                        

 

             RED CELL DISTRIBUTION WIDTH 14.2 SD      11.5-14.5    N            



             (test code = RDW)                                        

 

             PLATELET COUNT (test code = 235 K/mm3    150-450      N            



             PLT)                                                

 

             MEAN PLATELET VOLUME (test code 9.70 fL      7.0-10.5     N        

    



             = MPV)                                              

 

             NEUTROPHIL % (test code = NT%) 60.6 %       40-76        N         

   

 

             IMMATURE GRANULOCYTE % (test 0.5 %        0.0-5.0      N           

 



             code = IG%)                                         

 

             LYMPHOCYTE % (test code = LY%) 27.1 %       20.5-51.1    N         

   

 

             MONOCYTE % (test code = MO%) 7.7 %        1.7-9.3      N           

 

 

             EOSINOPHIL % (test code = EO%) 3.0 %        0.0-6.0      N         

   

 

             BASOPHIL % (test code = BA%) 1.1 %        0.0-2.0      N           

 

 

             NUCLEATED RBC % (test code = 0.0 /100WBC% 0.0-1.0      N           

 



             NRBC%)                                              

 

             NEUTROPHIL # (test code = NT#) 3.9 K/mm3    1.8-7.6      N         

   

 

             IMMATURE GRANULOCYTE # (test 0.03 x10 3/uL 0.00-0.03    N          

  



             code = IG#)                                         

 

             LYMPHOCYTE # (test code = LY#) 1.7 K/mm3    0.6-3.2      N         

   

 

             MONOCYTE # (test code = MO#) 0.5 K/mm3    0.3-1.1      N           

 

 

             EOSINOPHIL # (test code = EO#) 0.2 K/mm3    0.0-0.4      N         

   

 

             BASOPHIL # (test code = BA#) 0.1 K/mm3    0.0-0.1      N           

 

 

             NUCLEATED RBC # (test code = 0.0 K/mm3    0.0-0.1      N           

 



             NRBC#)                                              

 

             MANUAL DIFF REQUIRED (test code NO DIFF/SCN  CRITERIA              

    



             = MDIFF)                                            



- XR CHEST 1 -03-15 15:28:00
[facility] 1940 Sex: 
F************************************************************ Name: 
LEONA PERES [facility] : 1940 Age/S: 82 / F [id number] 
Unit #: [id number] Loc: [place] [address] Phys: Sandro Raymond MD  Acct: [id number]
Dis Date: Status: DEP [facility] PHONE #: [phone] Exam Date: 03/15/2023 0337 FAX 
#: Reason: PRE OP EXAMS: CPT: [id number] XR CHEST 1 V 56711 Fluoro Time: DAP (Gy 
m2): Air Kerma (mGy): Location Code: S17 EXAMINATION: - XR CHEST 1 V CLINICAL 
INDICATION: Female, 82 years year old with PRE OP COMPARISON: None. FINDINGS: 
Single view(s) of the chest submitted. Support Devices: None. Heart: Cardiac 
silhouette is enlarged. Mediastinum: Mediastinal contours are normal. Lungs: 
Prominence of the central pulmonary vasculature. Lungs are well aerated and cl
ear. Elevation of the right lung base. Pleura: No pleural effusion is 
identified. No pneumothorax ispresent. Bones: Postoperative changes involving 
the cervical spine. IMPRESSION: No evidence of focalconsolidation. ** 
Electronically Signed by Khoi Malone M.D. on 03/15/2023 at 1528 **  Reported and 
signed by: Khoi Malone M.D. CC: Nikolas Mcwilliams MD; Rebecca Cerrato MD; Sandro Raymond MD  
PAGE 1 Signed Report Name: LEONA PERES [facility] : 1940 Age/S: 
82 / F [id number] Unit #: [id number] Loc: [place] [address] Phys: 
Sandro Raymond MD  Acct: [id number] Dis Date: Status: [facility] PHONE #: [phone] 
Exam Date: 03/15/2023 1453 FAX #: Reason: PRE OP EXAMS: CPT: [id number] XR CHEST 
1 V 95206 Fluoro Time: DAP (Gy m2): Air Kerma (mGy): (Continued) Technologist: 
BYRON LA Trnscb Date/Time: 03/15/2023 (1528) [email or web address]  Orig Print D/T: S: 
03/15/2023 (7790) PAGE 2 Signed Report- XR CHEST 1 -03-15 15:28:00
********************************[facility]****[facility] 1940 Sex: 
F************************************************************ Name:
LEONA EPRES : 1940 Age/S: 82 / F [id number] 
Unit #: [id number] Loc: [place] [address]  Phys: Sandro Raymond MD Acct: [id number]
Dis Date: Status: PRE SDC PHONE #: [phone] Exam Date: 03/15/2023 6540 FAX 
#: Reason: PRE OP EXAMS: CPT:  [id number] XR CHEST 1 V 33218 Fluoro Time: DAP (Gy
m2): Air Kerma (mGy): Location Code: S17 EXAMINATION: - XR CHEST 1 V CLINICAL 
INDICATION: Female, 82 years year old with PRE OP COMPARISON: None. FINDINGS: 
Single view(s) of the chest submitted. Support Devices: None. Heart: Cardiac 
silhouette is enlarged. Mediastinum: Mediastinal contours are normal. Lungs: 
Prominence of the central pulmonary vasculature. Lungs are well aerated and c
lear. Elevation of the right lung base. Pleura: No pleural effusion is 
identified. No pneumothorax is present. Bones: Postoperative changes involving 
the cervical spine. IMPRESSION: No evidence of focal consolidation.  ** 
Electronically Signed by Khoi Malone M.D. on 03/15/2023 at 1528 ** Reported and 
signed by: Khoi Malone M.D. CC: Nikolas Mcwilliams MD; Rebecca Cerrato MD; Sandro Raymond MD 
PAGE 1 Signed Report Name: LEONA PERES : 1940 Age/S: 
82 / F [id number] Unit #: [id number] Loc: [place] [address]  Phys: 
Sandro Raymond MD Acct: [id number] Dis Date: Status: PRE SDC PHONE #: [phone] 
Exam Date: 03/15/2023 9645 FAX #: Reason: PRE OP EXAMS: CPT:  [id number] XR CHEST
1 V 79090 Fluoro Time: DAP (Gy m2): Air Kerma (mGy): (Continued) Technologist: 
BYRON LA  Trnscb Date/Time: 03/15/2023 (1528) [email or web address] Orig Print D/T: S: 
03/15/2023 (8983)  PAGE 2 Signed ReportCOVID 19 INHOUSE AG2023-03-15 15:04:00





             Test Item    Value        Reference Range Interpretation Comments

 

             COVID 19 INHOUSE AG NEGATIVE     Negative                  Per manu

facturer,



             (test code =                                        negative result

s should



             [id number])                                        be treated aspr

esumptive



                                                                 and, if inconsi

stent with



                                                                 clinical signs



                                                                 andsymptoms or 

necessary



                                                                 for patient man

agement,



                                                                 should betested

 with an



                                                                 alternative mol

ecular



                                                                 assay. Negative

 resultsdo



                                                                 not preclude SA

RS-CoV-2



                                                                 infection and s

hould not



                                                                 be usedas the s

ole basis



                                                                 for patient man

agement



                                                                 decisions. Nega

tive



                                                                 results should 

be



                                                                 considered in t

he context



                                                                 of apatient's r

ecent



                                                                 exposures, hist

ory,



                                                                 presence of cli

nicalsigns



                                                                 and symptoms co

nsistent



                                                                 with COVID-19.



CHEM [id number] 15:57:00





             Test Item    Value        Reference Range Interpretation Comments

 

             BUN (test code = BUN) 20           7-25                      



[facility] [employer] TechnologiesannCHEM [id number] 15:57:00





             Test Item    Value        Reference Range Interpretation Comments

 

             Creatinine Lvl (test code = Creatinine 1.21         0.60-0.88      

           



             Lvl)                                                



[facility] [employer]annCHEM NJOIM5077-45-23 15:57:00





             Test Item    Value        Reference Range Interpretation Comments

 

             eGFR NON-AFR. AMERICAN (test code = 42                             

        



             eGFR NON-AFR. AMERICAN)                                        



[facility] [employer] TherapeuticsCHEM [id number] 15:57:00





             Test Item    Value        Reference Range Interpretation Comments

 

             eGFR  (test code = eGFR 49                         

            



             )                                        



[facility] [employer] TechnologiesannCHEM [id number] 15:57:00





             Test Item    Value        Reference Range Interpretation Comments

 

             B/C Ratio (test code = B/C Ratio) 17           6-22                

      



[facility] QsxmspaAHZECPMZKYOU1532-48-36 15:57:00





             Test Item    Value        Reference Range Interpretation Comments

 

             Sodium Lvl (test code = Sodium Lvl) 138          135-146           

        



[facility] TdolesyVGPIWAJSHBGY9487-44-19 15:57:00





             Test Item    Value        Reference Range Interpretation Comments

 

             Potassium Lvl (test code = Potassium 4.5          3.5-5.3          

         



             Lvl)                                                



[facility] KlhwthnFGSPXLTHGYII6515-55-55 15:57:00





             Test Item    Value        Reference Range Interpretation Comments

 

             Chloride Lvl (test code = Chloride Lvl) 106                  

            



[facility]OSKO3812[facility]68[facility]99 15:57:00





             Test Item    Value        Reference Range Interpretation Comments

 

             CO2 (test code = CO2) 21           20-32                     



[facility] HospitalArwslsjKGSHKSIXAS9623-30-24 15:57:00





             Test Item    Value        Reference Range Interpretation Comments

 

             WBC X 10x3 (test code = WBC X 10x3) 7.0          3.8-10.8          

        



[facility] HospitalPyirswnSBSNNNMFSO4539-02-05 15:57:00





             Test Item    Value        Reference Range Interpretation Comments

 

             RBC X 10x6 (test code = RBC X 10x6) 4.65         3.80-5.10         

        



[facility] HospitalCfkvzcgARRKRFTTDX0193-37-66 15:57:00





             Test Item    Value        Reference Range Interpretation Comments

 

             Hgb (test code = Hgb) 14.7         11.7-15.5                 



[facility] HospitalIfyxureVYSOVLZDEQ8558-70-11 15:57:00





             Test Item    Value        Reference Range Interpretation Comments

 

             Hct (test code = Hct) 45.1         35.0-45.0                 



[facility] HospitalHxrmwakUBVHNWUQON4767-62-70 15:57:00





             Test Item    Value        Reference Range Interpretation Comments

 

             MCV (test code = MCV) 97.0         80.0-100.0                



[facility] HospitalZmhktgyIBYYWJYKUZ4622-31-01 15:57:00





             Test Item    Value        Reference Range Interpretation Comments

 

             MCH (test code = MCH) 31.6 pg      27.0-33.0                 



[facility] HospitalSwotpotNFLKRUCRYV9048-74-81 15:57:00





             Test Item    Value        Reference Range Interpretation Comments

 

             MCHC (test code = MCHC) 32.6         32.0-36.0                 



[facility] HospitalBpmwqwoGSPNTDTAMA1696-54-77 15:57:00





             Test Item    Value        Reference Range Interpretation Comments

 

             RDW (test code = RDW) 12.9         11.0-15.0                 



[facility] HospitalVeivahhZBVMTCEZFU9373-20-44 15:57:00





             Test Item    Value        Reference Range Interpretation Comments

 

             Platelet (test code = Platelet) 241          140-400               

    



[facility] HospitalJqbfqziQOKXYSVXWG7571-13-83 15:57:00





             Test Item    Value        Reference Range Interpretation Comments

 

             MPV (test code = MPV) 10.9         7.5-12.5                  



[facility] HospitalKsieradLYCCUEAZIX3217-27-81 15:57:00





             Test Item    Value        Reference Range Interpretation Comments

 

             Neutrophils # (test code = Neutrophils 4060         0897-6194      

           



             #)                                                  



[facility] HospitalGoobyfcHZCWAQGEPH5096-70-63 15:57:00





             Test Item    Value        Reference Range Interpretation Comments

 

             Lymphocytes # (test code = Lymphocytes 2191         850-3900       

           



             #)                                                  



[facility] HospitalJhwtevpAKYUTISNMJ9507-27-65 15:57:00





             Test Item    Value        Reference Range Interpretation Comments

 

             Monocytes # (test code = Monocytes #) 511          200-950         

          



[facility] HospitalMlfetndYKHITSKBBW1252-55-82 15:57:00





             Test Item    Value        Reference Range Interpretation Comments

 

             Eosinophils # (test code = Eosinophils 161                   

           



             #)                                                  



[facility] HospitalRwqwlglQAATKCDJEB5029-43-74 15:57:00





             Test Item    Value        Reference Range Interpretation Comments

 

             Basophils # (test code 77           See_Comment                [Aut

omated message] The



             = Basophils #)                                        system which 

generated



                                                                 this result tra

nsmitted



                                                                 reference range

: <=200.



                                                                 The reference r

nurys was



                                                                 not used to int

erpret



                                                                 this result as



                                                                 normal/abnormal

.



[facility] HospitalFlvleayQHEDMJACFY5698-50-88 15:57:00





             Test Item    Value        Reference Range Interpretation Comments

 

             Segs (test code = Segs) 58                                     



[facility] HospitalDwflycfJUGJPBRSWU5393-17-09 15:57:00





             Test Item    Value        Reference Range Interpretation Comments

 

             Lymphocytes (test code = Lymphocytes) 31.3                         

          



[facility] HospitalShgjukvSDDRVOTGVC8159-47-29 15:57:00





             Test Item    Value        Reference Range Interpretation Comments

 

             Monocytes (test code = Monocytes) 7.3                              

      



[facility] HospitalAfxhfsgWJNPURYDKP4245-45-12 15:57:00





             Test Item    Value        Reference Range Interpretation Comments

 

             Eosinophils (test code = Eosinophils) 2.3                          

          



[facility] HospitalHgicacvZASXMDSDAN6508-54-57 15:57:00





             Test Item    Value        Reference Range Interpretation Comments

 

             Basophils (test code = Basophils) 1.1                              

      



[facility]MemoryBistroCHEM AGDTI3272-48-54 15:57:00





             Test Item    Value        Reference Range Interpretation Comments

 

             BUN (test code = BUN) 20           7-25                      



[facility]annS[facility]One Solutions [id number] 15:57:00





             Test Item    Value        Reference Range Interpretation Comments

 

             Creatinine Lvl (test code = Creatinine 1.21         0.60-0.88      

           



             Lvl)                                                



[facility] MPFVJ3410-82-86 15:57:00





             Test Item    Value        Reference Range Interpretation Comments

 

             eGFR NON-AFR. AMERICAN (test code = 42                             

        



             eGFR NON-AFR. AMERICAN)                                        



[facility] IEYSC5529-41-11 15:57:00





             Test Item    Value        Reference Range Interpretation Comments

 

             eGFR  (test code = eGFR 49                         

            



             )                                        



[facility]2022-06-08 15:57:00





             Test Item    Value        Reference Range Interpretation Comments

 

             B/C Ratio (test code = B/C Ratio) 17           6-22                

      



[facility] CenterSyukwpmXMOHNLSOGWFG9384-58-77 15:57:00





             Test Item    Value        Reference Range Interpretation Comments

 

             Sodium Lvl (test code = Sodium Lvl) 138          135-146           

        



[facility] CenterZjyfkwqANRBKNIPYCMW0021-51-82 15:57:00





             Test Item    Value        Reference Range Interpretation Comments

 

             Potassium Lvl (test code = Potassium 4.5          3.5-5.3          

         



             Lvl)                                                



[facility] CenterHhmbxcuMONURMXASJZH0757-19-14 15:57:00





             Test Item    Value        Reference Range Interpretation Comments

 

             Chloride Lvl (test code = Chloride Lvl) 106                  

            



[facility] CenterXftunyqQEXBBBIEPBIW0817-46-03 15:57:00





             Test Item    Value        Reference Range Interpretation Comments

 

             CO2 (test code = CO2) 21           20-32                     



[facility] HospitalQmwqbwdDAVXSJRACF9520-27-12 15:57:00





             Test Item    Value        Reference Range Interpretation Comments

 

             WBC X 10x3 (test code = WBC X 10x3) 7.0          3.8-10.8          

        



[facility] HospitalMjshmirDZBBWZVPRC7517-03-55 15:57:00





             Test Item    Value        Reference Range Interpretation Comments

 

             RBC X 10x6 (test code = RBC X 10x6) 4.65         3.80-5.10         

        



[facility] HospitalJkhezbcUHLXAGZIQB1372-08-46 15:57:00





             Test Item    Value        Reference Range Interpretation Comments

 

             Hgb (test code = Hgb) 14.7         11.7-15.5                 



[facility] HospitalEaztjsgLWXFKNAGAY8957-49-02 15:57:00





             Test Item    Value        Reference Range Interpretation Comments

 

             Hct (test code = Hct) 45.1         35.0-45.0                 



[facility] HospitalHvwdafqHQJEWLMZAK1115-89-42 15:57:00





             Test Item    Value        Reference Range Interpretation Comments

 

             MCV (test code = MCV) 97.0         80.0-100.0                



[facility] HospitalRjlzdjrQFCWLNRSQV8090-60-85 15:57:00





             Test Item    Value        Reference Range Interpretation Comments

 

             MCH (test code = MCH) 31.6 pg      27.0-33.0                 



[facility] HospitalSbvnqjaWEFKGQNWOQ2861-47-31 15:57:00





             Test Item    Value        Reference Range Interpretation Comments

 

             MCHC (test code = MCHC) 32.6         32.0-36.0                 



[place]7722-06-08 15:57:00





             Test Item    Value        Reference Range Interpretation Comments

 

             RDW (test code = RDW) 12.9         11.0-15.0                 



[place] 397722-06-08 15:57:00





             Test Item    Value        Reference Range Interpretation Comments

 

             Platelet (test code = Platelet) 241          140-400               

    



[place] 397722-06-08 15:57:00





             Test Item    Value        Reference Range Interpretation Comments

 

             MPV (test code = MPV) 10.9         7.5-12.5                  



[place] 397722-06-08 15:57:00





             Test Item    Value        Reference Range Interpretation Comments

 

             Neutrophils # (test code = Neutrophils 4060         3407-0378      

           



             #)                                                  



[facility] HospitalXlyciflMZTHAXXBYU7843-76-71 15:57:00





             Test Item    Value        Reference Range Interpretation Comments

 

             Lymphocytes # (test code = Lymphocytes 2191         850-3900       

           



             #)                                                  



[facility] HospitalWbfjyekCVXSISQMRW9446-01-98 15:57:00





             Test Item    Value        Reference Range Interpretation Comments

 

             Monocytes # (test code = Monocytes #) 511          200-950         

          



[facility] HospitalWwvjfejPIMKKHIKOG3582-38-06 15:57:00





             Test Item    Value        Reference Range Interpretation Comments

 

             Eosinophils # (test code = Eosinophils 161                   

           



             #)                                                  



[facility] HospitalNevlcboYCTIZTBHTO7721-15-44 15:57:00





             Test Item    Value        Reference Range Interpretation Comments

 

             Basophils # (test code 77           See_Comment                [Aut

omated message] The



             = Basophils #)                                        system which 

generated



                                                                 this result tra

nsmitted



                                                                 reference range

: <=200.



                                                                 The reference r

nurys was



                                                                 not used to int

erpret



                                                                 this result as



                                                                 normal/abnormal

.



[facility] HospitalGmlztwlVMFVCAUGCJ4621-85-04 15:57:00





             Test Item    Value        Reference Range Interpretation Comments

 

             Segs (test code = Segs) 58                                     



[place] [address]-06-08 15:57:00





             Test Item    Value        Reference Range Interpretation Comments

 

             Lymphocytes (test code = Lymphocytes) 31.3                         

          



[place] [address]-06-08 15:57:00





             Test Item    Value        Reference Range Interpretation Comments

 

             Monocytes (test code = Monocytes) 7.3                              

      



[place] [address]-06-08 15:57:00





             Test Item    Value        Reference Range Interpretation Comments

 

             Eosinophils (test code = Eosinophils) 2.3                          

          



[place] 397722-06-08 15:57:00





             Test Item    Value        Reference Range Interpretation Comments

 

             Basophils (test code = Basophils) 1.1                              

      



[facility]2022-[facility] 15:57:00





             Test Item    Value        Reference Range Interpretation Comments

 

             BUN (test code = BUN) 20           7-                      



[facility]2022-06-08 15:57:00





             Test Item    Value        Reference Range Interpretation Comments

 

             Creatinine Lvl (test code = Creatinine 1.21         0.60-0.88      

           



             Lvl)                                                



[place] 658352-06-08 15:57:00





             Test Item    Value        Reference Range Interpretation Comments

 

             eGFR NON-AFR. AMERICAN (test code = 42                             

        



             eGFR NON-AFR. AMERICAN)                                        



[facility]2022-06-08 15:57:00





             Test Item    Value        Reference Range Interpretation Comments

 

             eGFR  (test code = eGFR 49                         

            



             )                                        



[facility]2022-06-08 15:57:00





             Test Item    Value        Reference Range Interpretation Comments

 

             B/C Ratio (test code = B/C Ratio) 17           6-                

      



[facility] CenterJmoggdaROARKYBKCAWC7039-27-[facility] 15:57:00





             Test Item    Value        Reference Range Interpretation Comments

 

             Sodium Lvl (test code = Sodium Lvl) 138          135-146           

        



[place] 905522-06-08 15:57:00





             Test Item    Value        Reference Range Interpretation Comments

 

             Potassium Lvl (test code = Potassium 4.5          3.5-5.3          

         



             Lvl)                                                



[facility] CenterCixytdaIMMYMPGZJYTM6805-43-65 15:57:00





             Test Item    Value        Reference Range Interpretation Comments

 

             Chloride Lvl (test code = Chloride Lvl) 106                  

            



[place] 905522-06-08 15:57:00





             Test Item    Value        Reference Range Interpretation Comments

 

             CO2 (test code = CO2) 21           20-32                     



[facility] HospitalScpwqmvEWSBWIWAWX7351-55-57 15:57:00





             Test Item    Value        Reference Range Interpretation Comments

 

             WBC X 10x3 (test code = WBC X 10x3) 7.0          3.8-10.8          

        



[place] 397722-06-08 15:57:00





             Test Item    Value        Reference Range Interpretation Comments

 

             RBC X 10x6 (test code = RBC X 10x6) 4.65         3.80-5.10         

        



[place] 397722-06-08 15:57:00





             Test Item    Value        Reference Range Interpretation Comments

 

             Hgb (test code = Hgb) 14.7         11.7-15.5                 



[place] 397722-06-08 15:57:00





             Test Item    Value        Reference Range Interpretation Comments

 

             Hct (test code = Hct) 45.1         35.0-45.0                 



[place] 397722-06-08 15:57:00





             Test Item    Value        Reference Range Interpretation Comments

 

             MCV (test code = MCV) 97.0         80.0-100.0                



[place] 397722-06-08 15:57:00





             Test Item    Value        Reference Range Interpretation Comments

 

             MCH (test code = MCH) 31.6 pg      27.0-33.0                 



[place] [address]-06-08 15:57:00





             Test Item    Value        Reference Range Interpretation Comments

 

             MCHC (test code = MCHC) 32.6         32.0-36.0                 



[place] 397722-06-08 15:57:00





             Test Item    Value        Reference Range Interpretation Comments

 

             RDW (test code = RDW) 12.9         11.0-15.0                 



[place] 397722-06-08 15:57:00





             Test Item    Value        Reference Range Interpretation Comments

 

             Platelet (test code = Platelet) 241          140-400               

    



[facility] HospitalZkwbdxgGLSIEDSQDI7110-15-49 15:57:00





             Test Item    Value        Reference Range Interpretation Comments

 

             MPV (test code = MPV) 10.9         7.5-12.5                  



[place] 397722-06-08 15:57:00





             Test Item    Value        Reference Range Interpretation Comments

 

             Neutrophils # (test code = Neutrophils 4060         5223-9231      

           



             #)                                                  



[facility] HospitalJlezpzvSGENMABPAY1914-14-36 15:57:00





             Test Item    Value        Reference Range Interpretation Comments

 

             Lymphocytes # (test code = Lymphocytes 2191         850-3900       

           



             #)                                                  



[place] 397722-06-08 15:57:00





             Test Item    Value        Reference Range Interpretation Comments

 

             Monocytes # (test code = Monocytes #) 511          200-950         

          



[place] 397722-06-08 15:57:00





             Test Item    Value        Reference Range Interpretation Comments

 

             Eosinophils # (test code = Eosinophils 161                   

           



             #)                                                  



[facility] HospitalGnkqujxJILPJITWET6527-24-04 15:57:00





             Test Item    Value        Reference Range Interpretation Comments

 

             Basophils # (test code 77           See_Comment                [Aut

omated message] The



             = Basophils #)                                        system which 

generated



                                                                 this result tra

nsmitted



                                                                 reference range

: <=200.



                                                                 The reference r

nurys was



                                                                 not used to int

erpret



                                                                 this result as



                                                                 normal/abnormal

.



[facility] HospitalWlyqiheFVIMFMMSYX2735-79-31 15:57:00





             Test Item    Value        Reference Range Interpretation Comments

 

             Segs (test code = Segs) 58                                     



[facility] HospitalKwiftfsLFAZIOFBRA0572-74-74 15:57:00





             Test Item    Value        Reference Range Interpretation Comments

 

             Lymphocytes (test code = Lymphocytes) 31.3                         

          



[facility] HospitalSbkuqmxQVIEAHXCRQ8186-73-99 15:57:00





             Test Item    Value        Reference Range Interpretation Comments

 

             Monocytes (test code = Monocytes) 7.3                              

      



[place] 397722-06-08 15:57:00





             Test Item    Value        Reference Range Interpretation Comments

 

             Eosinophils (test code = Eosinophils) 2.3                          

          



[place] 397722-06-08 15:57:00





             Test Item    Value        Reference Range Interpretation Comments

 

             Basophils (test code = Basophils) 1.1                              

      



[facility]2022-[facility] 15:57:00





             Test Item    Value        Reference Range Interpretation Comments

 

             BUN (test code = BUN) 20           7-25                      



[facility]2022-06-08 15:57:00





             Test Item    Value        Reference Range Interpretation Comments

 

             Creatinine Lvl (test code = Creatinine 1.21         0.60-0.88      

           



             Lvl)                                                



[facility] [id number] 15:57:00





             Test Item    Value        Reference Range Interpretation Comments

 

             eGFR NON-AFR. AMERICAN (test code = 42                             

        



             eGFR NON-AFR. AMERICAN)                                        



[facility]2022-[facility] 15:57:00





             Test Item    Value        Reference Range Interpretation Comments

 

             eGFR  (test code = eGFR 49                         

            



             )                                        



[facility] [id number] 15:57:00





             Test Item    Value        Reference Range Interpretation Comments

 

             B/C Ratio (test code = B/C Ratio) 17           6-22                

      



[facility] CenterXmkorraPLCGEYONUTVH4350-24-04 15:57:00





             Test Item    Value        Reference Range Interpretation Comments

 

             Sodium Lvl (test code = Sodium Lvl) 138          135-146           

        



[place] 905522-06-08 15:57:00





             Test Item    Value        Reference Range Interpretation Comments

 

             Potassium Lvl (test code = Potassium 4.5          3.5-5.3          

         



             Lvl)                                                



[facility] CenterUtommxePALRWFXZAQPU4855-78-72 15:57:00





             Test Item    Value        Reference Range Interpretation Comments

 

             Chloride Lvl (test code = Chloride Lvl) 106                  

            



[place] 905522-06-08 15:57:00





             Test Item    Value        Reference Range Interpretation Comments

 

             CO2 (test code = CO2) 21           20-32                     



[place] [address]-06-08 15:57:00





             Test Item    Value        Reference Range Interpretation Comments

 

             WBC X 10x3 (test code = WBC X 10x3) 7.0          3.8-10.8          

        



[place] 397722-06-08 15:57:00





             Test Item    Value        Reference Range Interpretation Comments

 

             RBC X 10x6 (test code = RBC X 10x6) 4.65         3.80-5.10         

        



[facility] HospitalKdnogghOIBEQKLOSL9837-39-13 15:57:00





             Test Item    Value        Reference Range Interpretation Comments

 

             Hgb (test code = Hgb) 14.7         11.7-15.5                 



[place] 397722-06-08 15:57:00





             Test Item    Value        Reference Range Interpretation Comments

 

             Hct (test code = Hct) 45.1         35.0-45.0                 



[place] 397722-06-08 15:57:00





             Test Item    Value        Reference Range Interpretation Comments

 

             MCV (test code = MCV) 97.0         80.0-100.0                



[place] 397722-06-08 15:57:00





             Test Item    Value        Reference Range Interpretation Comments

 

             MCH (test code = MCH) 31.6 pg      27.0-33.0                 



[facility] HospitalVpialzvGVXKKWWGCJ4177-23-48 15:57:00





             Test Item    Value        Reference Range Interpretation Comments

 

             MCHC (test code = MCHC) 32.6         32.0-36.0                 



[place] 397722-06-08 15:57:00





             Test Item    Value        Reference Range Interpretation Comments

 

             RDW (test code = RDW) 12.9         11.0-15.0                 



[place] [address]-06-08 15:57:00





             Test Item    Value        Reference Range Interpretation Comments

 

             Platelet (test code = Platelet) 241          140-400               

    



[place] 397722-06-08 15:57:00





             Test Item    Value        Reference Range Interpretation Comments

 

             MPV (test code = MPV) 10.9         7.5-12.5                  



[place] [address]-06-08 15:57:00





             Test Item    Value        Reference Range Interpretation Comments

 

             Neutrophils # (test code = Neutrophils 4060         8222-9203      

           



             #)                                                  



[facility] HospitalFyrokquYOXWMRTFJG9502-03-19 15:57:00





             Test Item    Value        Reference Range Interpretation Comments

 

             Lymphocytes # (test code = Lymphocytes 2191         850-3900       

           



             #)                                                  



[facility] HospitalAqycbvvFAUMWLMPOG2483-68-85 15:57:00





             Test Item    Value        Reference Range Interpretation Comments

 

             Monocytes # (test code = Monocytes #) 511          200-950         

          



[facility] HospitalFdlshsrLXDHVEDSNJ9229-75-22 15:57:00





             Test Item    Value        Reference Range Interpretation Comments

 

             Eosinophils # (test code = Eosinophils 161                   

           



             #)                                                  



[facility] HospitalIknhlhfHQOITNGZBO7629-69-58 15:57:00





             Test Item    Value        Reference Range Interpretation Comments

 

             Basophils # (test code 77           See_Comment                [Aut

omated message] The



             = Basophils #)                                        system which 

generated



                                                                 this result tra

nsmitted



                                                                 reference range

: <=200.



                                                                 The reference r

nurys was



                                                                 not used to int

erpret



                                                                 this result as



                                                                 normal/abnormal

.



[facility] HospitalMktbwycEJIGNOTQYH0440-64-57 15:57:00





             Test Item    Value        Reference Range Interpretation Comments

 

             Segs (test code = Segs) 58                                     



[facility] HospitalMxmheycOWQYZQNYKJ9754-58-32 15:57:00





             Test Item    Value        Reference Range Interpretation Comments

 

             Lymphocytes (test code = Lymphocytes) 31.3                         

          



[facility] HospitalRomfkpuKWYJMDNPCW9163-12-17 15:57:00





             Test Item    Value        Reference Range Interpretation Comments

 

             Monocytes (test code = Monocytes) 7.3                              

      



[facility] HospitalKqxbkheQNBWOONIMO0888-81-05 15:57:00





             Test Item    Value        Reference Range Interpretation Comments

 

             Eosinophils (test code = Eosinophils) 2.3                          

          



[facility] HospitalAsgqvrbFTOOLEIQMC8076-09-32 15:57:00





             Test Item    Value        Reference Range Interpretation Comments

 

             Basophils (test code = Basophils) 1.1                              

      



[facility]2021 
17:20:00





             Test Item    Value        Reference Range Interpretation Comments

 

             Troponin I HS (test 6 pg/ml      See_Comment                [Automa

ariadne



             code = 54796-0)                                        message] The



                                                                 system which



                                                                 generated this



                                                                 result



                                                                 transmitted



                                                                 reference range

:



                                                                 <=17. The



                                                                 reference range



                                                                 was not used to



                                                                 interpret this



                                                                 result as



                                                                 normal/abnormal

.

 

             MAGGI (test code =  ID -                           



             MAGGI)         DBThe                            



                          STAT High                              



                          Sensitivity                            



                          Troponin-I results                           



                          should be used in                           



                          conjunction with                           



                          other diagnostic                           



                          information such                           



                          as ECG, clinical                           



                          observations and                           



                          information, and                           



                          patient symptoms                           



                          to aid in the                           



                          diagnosis of MI.                           

 

             Lab Interpretation Normal                                 



             (test code =                                        



             99518-5)                                            



[facility] -73-51 17:20:00





             Test Item    Value        Reference Range Interpretation Comments

 

             HIGH SENSITIVITY 6 pg/ml      See_Comment                [Automated

 message]



             TROPONIN I (test code =                                        The 

system which



             9004085)                                            generated this 

result



                                                                 transmitted ref

erence



                                                                 range: <=17. Th

e



                                                                 reference range

 was not



                                                                 used to interpr

et this



                                                                 result as



                                                                 normal/abnormal

.



 ID - DBThe  STAT High Sensitivity Troponin-I results should be
used in conjunctionwith other diagnostic information such as ECG, clinical 
observations and information, and patient symptoms to aid in the diagnosis of 
MI.RAD, CHEST, 1 VIEW, NON NQVL9014-32-32 10:23:00Reason for exam:-&gt;pulm 
edemaShould this be performed at the bedside?-&gt;Yes
************************************************************[facility] : 1940 Sex: 
F************************************************************FINAL REPORT 
PATIENT ID: [id number] HISTORY: Pulmonary edema COMPARISON: 2021 FINDINGS: 
Mild interstitial pulmonary edema. No pleural effusions or pneumothorax. The 
heart shadow is borderline enlarged. The thoracic aorta is mildly tortuous. 
There are postoperative changes in the cervical spine. A right shoulder 
arthroplasty is partially imaged. Signed: Nikolas Ricketts MDReport Verified 
Date/Time: 2021 10:23:36 Reading Location: [address] 
Reading Room Electronically signed by: NIKOLAS RICKETTS MD on 2021 10:23 AM
Basic Metabolic Deysa2519-72-19 05:46:00





             Test Item    Value        Reference Range Interpretation Comments

 

             Sodium (test code = 140 meq/L    136-145                   



             2951-2)                                             

 

             Potassium (test code = 3.4 meq/L    3.5-5.1      L            



             2823-3)                                             

 

             Chloride (test code = 108 meq/L           H            



             2075-0)                                             

 

             CO2 (test code = 25 meq/L     -8-9)                                             

 

             BUN (test code = 11 mg/dL     -                      



             3094-0)                                             

 

             Creatinine (test code 1.12 mg/dL   0.57-1.25                 



             = 2160-0)                                           

 

             Glucose (test code = 113 mg/dL           H            



             2345-7)                                             

 

             Calcium (test code = 8.9 mg/dL    8.4-10.2                  



             79030-6)                                            

 

             EGFR (test code = 47           mL/min/1.73 sq m              ESTIMA

ARIADNE GFR IS



             33914-3)                                            NOT AS ACCURATE



                                                                 AS CREATININE



                                                                 CLEARANCE IN



                                                                 PREDICTING



                                                                 GLOMERULAR



                                                                 FILTRATION RATE

.



                                                                 ESTIMATED GFR I

S



                                                                 NOT APPLICABLE



                                                                 FOR DIALYSIS



                                                                 PATIENTS.

 

             MAGGI (test code = MAGGI)  ID -                           



                          NICOLAS M                                 

 

             Lab Interpretation Abnormal                               



             (test code = 85784-2)                                        



[facility]





             Test Item    Value        Reference Range Interpretation Comments

 

             Magnesium (test code = 1.7 mg/dL    1.6-2.6                   



             43756-3)                                            

 

             MAGGI (test code = MAGGI)  ID - NICOLAS                           



                          M                                      

 

             Lab Interpretation (test Normal                                 



             code = 54438-4)                                        



[id number] 05:46:00





             Test Item    Value        Reference Range Interpretation Comments

 

             SODIUM (BEAKER) 140 meq/L    136-145                   



             (test code = 381)                                        

 

             POTASSIUM (BEAKER) 3.4 meq/L    3.5-5.1      L            



             (test code = 379)                                        

 

             CHLORIDE (BEAKER) 108 meq/L           H            



             (test code = 382)                                        

 

             CO2 (BEAKER) (test 25 meq/L                          



             code = 355)                                         

 

             BLOOD UREA NITROGEN 11 mg/dL                           



             (BEAKER) (test code                                        



             = 354)                                              

 

             CREATININE (BEAKER) 1.12 mg/dL   0.57-1.25                 



             (test code = 358)                                        

 

             GLUCOSE RANDOM 113 mg/dL           H            



             (BEAKER) (test code                                        



             = 652)                                              

 

             CALCIUM (BEAKER) 8.9 mg/dL    8.4-10.2                  



             (test code = 697)                                        

 

             EGFR (BEAKER) (test 47 mL/min/1.73                           ESTIMA

ARIADNE GFR IS



             code = 1092) sq m                                   NOT AS ACCURATE

 AS



                                                                 CREATININE



                                                                 CLEARANCE IN



                                                                 PREDICTING



                                                                 GLOMERULAR



                                                                 FILTRATION RATE

.



                                                                 ESTIMATED GFR I

S



                                                                 NOT APPLICABLE 

FOR



                                                                 DIALYSIS PATIEN

TS.



 ID - NICOLAS [id number] 05:46:00





             Test Item    Value        Reference Range Interpretation Comments

 

             MAGNESIUM (BEAKER) (test code = 1.7 mg/dL    1.6-2.6               

    



             627)                                                



 ID - NICOLAS [facility] with platelet count + automated [id number] 05:04:00





             Test Item    Value        Reference Range Interpretation Comments

 

             WBC (test code = 6690-2) 5.7          See_Comment                [A

utomated message]



                                                                 The system [employer]



                                                                 generated this 

result



                                                                 transmitted ref

erence



                                                                 range: 3.5 - 10

.5



                                                                 K/L. The refe

rence



                                                                 range was not u

sed to



                                                                 interpret this 

result



                                                                 as normal/abnor

mal.

 

             RBC (test code = 789-8) 3.85         See_Comment  L             [Au

tomated message]



                                                                 The system [employer]



                                                                 generated this 

result



                                                                 transmitted ref

erence



                                                                 range: 3.93 - 5

.22



                                                                 M/L. The refe

rence



                                                                 range was not u

sed to



                                                                 interpret this 

result



                                                                 as normal/abnor

mal.

 

             MCHC (test code = 786-4) 30.9         See_Comment  L             [A

utomated message]



                                                                 The system [employer]



                                                                 generated this 

result



                                                                 transmitted ref

erence



                                                                 range: 32.2 - 3

5.5



                                                                 GM/DL. The refe

rence



                                                                 range was not u

sed to



                                                                 interpret this 

result



                                                                 as normal/abnor

mal.

 

             Hematocrit (test code = 39.1 %       34.1-44.9                 



             4544-3)                                             

 

             MCV (test code = 787-2) 101.6 fL     79.4-94.8    H            

 

             MCH (test code = 785-6) 31.4 pg      25.6-32.2                 

 

             RDW (test code = 788-0) 13.8 %       11.7-14.4                 

 

             Platelets (test code = 204          See_Comment                [Aut

omated message]



             777-3)                                              The system [employer]



                                                                 generated this 

result



                                                                 transmitted ref

erence



                                                                 range: 150 - 45

0 K/CU



                                                                 MM. The referen

ce



                                                                 range was not u

sed to



                                                                 interpret this 

result



                                                                 as normal/abnor

mal.

 

             MPV (test code = 9.5 fL       9.4-12.3                  



             72789-7)                                            

 

             nRBC (test code = 413) 0            See_Comment                [Aut

omated message]



                                                                 The system [employer]



                                                                 generated this 

result



                                                                 transmitted ref

erence



                                                                 range: 0 - 0 /1

00



                                                                 WBC. The refere

nce



                                                                 range was not u

sed to



                                                                 interpret this 

result



                                                                 as normal/abnor

mal.

 

             % Neutros (test code = 52 %                                   



             429)                                                

 

             % Lymphs (test code = 32 %                                   



             430)                                                

 

             % Monos (test code = 11 %                                   



             431)                                                

 

             % Eos (test code = 432) 5 %                                    

 

             % Baso (test code = 437) 1 %                                    

 

             # Neutros (test code = 2.96         See_Comment                [Aut

omated message]



             670)                                                The system [employer]



                                                                 generated this 

result



                                                                 transmitted ref

erence



                                                                 range: 1.56 - 6

.13



                                                                 K/L. The refe

rence



                                                                 range was not u

sed to



                                                                 interpret this 

result



                                                                 as normal/abnor

mal.

 

             # Lymphs (test code = 1.83         See_Comment                [Auto

mated message]



             414)                                                The system [employer]



                                                                 generated this 

result



                                                                 transmitted ref

erence



                                                                 range: 1.18 - 3

.74



                                                                 K/L. The refe

rence



                                                                 range was not u

sed to



                                                                 interpret this 

result



                                                                 as normal/abnor

mal.

 

             # Monos (test code = 0.60         See_Comment  H             [Autom

ated message]



             415)                                                The system [employer]



                                                                 generated this 

result



                                                                 transmitted ref

erence



                                                                 range: 0.24 - 0

.36



                                                                 K/L. The refe

rence



                                                                 range was not u

sed to



                                                                 interpret this 

result



                                                                 as normal/abnor

mal.

 

             # Eos (test code = 416) 0.26         See_Comment                [Au

tomated message]



                                                                 The system [employer]



                                                                 generated this 

result



                                                                 transmitted ref

erence



                                                                 range: 0.04 - 0

.36



                                                                 K/L. The refe

rence



                                                                 range was not u

sed to



                                                                 interpret this 

result



                                                                 as normal/abnor

mal.

 

             # Baso (test code = 417) 0.05         See_Comment                [A

utomated message]



                                                                 The system [employer]



                                                                 generated this 

result



                                                                 transmitted ref

erence



                                                                 range: 0.01 - 0

.08



                                                                 K/L. The refe

rence



                                                                 range was not u

sed to



                                                                 interpret this 

result



                                                                 as normal/abnor

mal.

 

             Immature     1 %          0-1                       



             Granulocytes-Relative                                        



             (test code = 2801)                                        

 

             Lab Interpretation (test Abnormal                               



             code = 33760-0)                                        



[facility] W/PLT COUNT &amp; AUTO [id number] 
05:04:00





             Test Item    Value        Reference Range Interpretation Comments

 

             WHITE BLOOD CELL COUNT (BEAKER) 5.7 K/ L     3.5-10.5              

    



             (test code = 775)                                        

 

             RED BLOOD CELL COUNT (BEAKER) 3.85 M/ L    3.93-5.22    L          

  



             (test code = 761)                                        

 

             HEMOGLOBIN (BEAKER) (test code = 12.1 GM/DL   11.2-15.7            

     



             410)                                                

 

             HEMATOCRIT (BEAKER) (test code = 39.1 %       34.1-44.9            

     



             411)                                                

 

             MEAN CORPUSCULAR VOLUME (BEAKER) 101.6 fL     79.4-94.8    H       

     



             (test code = 753)                                        

 

             MEAN CORPUSCULAR HEMOGLOBIN 31.4 pg      25.6-32.2                 



             (BEAKER) (test code = 751)                                        

 

             MEAN CORPUSCULAR HEMOGLOBIN CONC 30.9 GM/DL   32.2-35.5    L       

     



             (BEAKER) (test code = 752)                                        

 

             RED CELL DISTRIBUTION WIDTH 13.8 %       11.7-14.4                 



             (BEAKER) (test code = 412)                                        

 

             PLATELET COUNT (BEAKER) (test 204 K/CU MM  150-450                 

  



             code = 756)                                         

 

             MEAN PLATELET VOLUME (BEAKER) 9.5 fL       9.4-12.3                

  



             (test code = 754)                                        

 

             NUCLEATED RED BLOOD CELLS 0 /100 WBC   0-0                       



             (BEAKER) (test code = 413)                                        

 

             NEUTROPHILS RELATIVE PERCENT 52 %                                  

 



             (BEAKER) (test code = 429)                                        

 

             LYMPHOCYTES RELATIVE PERCENT 32 %                                  

 



             (BEAKER) (test code = 430)                                        

 

             MONOCYTES RELATIVE PERCENT 11 %                                   



             (BEAKER) (test code = 431)                                        

 

             EOSINOPHILS RELATIVE PERCENT 5 %                                   

 



             (BEAKER) (test code = 432)                                        

 

             BASOPHILS RELATIVE PERCENT 1 %                                    



             (BEAKER) (test code = 437)                                        

 

             NEUTROPHILS ABSOLUTE COUNT 2.96 K/ L    1.56-6.13                 



             (BEAKER) (test code = 670)                                        

 

             LYMPHOCYTES ABSOLUTE COUNT 1.83 K/ L    1.18-3.74                 



             (BEAKER) (test code = 414)                                        

 

             MONOCYTES ABSOLUTE COUNT (BEAKER) 0.60 K/ L    0.24-0.36    H      

      



             (test code = 415)                                        

 

             EOSINOPHILS ABSOLUTE COUNT 0.26 K/ L    0.04-0.36                 



             (BEAKER) (test code = 416)                                        

 

             BASOPHILS ABSOLUTE COUNT (BEAKER) 0.05 K/ L    0.01-0.08           

      



             (test code = 417)                                        

 

             IMMATURE GRANULOCYTES-RELATIVE 1 %          0-1                    

   



             PERCENT (BEAKER) (test code =                                      

  



             2801)                                               



B-type Natriuretic Factor (BNP)2021 04:52:00





             Test Item    Value        Reference Range Interpretation Comments

 

             BNP (test code = 10275-9) 97 pg/mL     0-100                     

 

             MAGGI (test code = MAGGI)  ID - ROBYN                          

 



                          L                                      

 

             Lab Interpretation (test Normal                                 



             code = 12108-1)                                        



[facility] 04:52:00





             Test Item    Value        Reference Range Interpretation Comments

 

             B-TYPE NATRIURETIC PEPTIDE (BEAKER) 97 pg/mL     0-100             

        



             (test code = 700)                                        



 ID - ROBYN LRAD, CHEST, 1 VIEW, NON [id number] 14:01:00Reason for 
exam:-&gt;dyspneaShould this be performed at the bedside?-&gt;Yes
************************************************************[facility] : 1940 Sex: 
F************************************************************FINAL REPORT 
PATIENT ID: [id number] CLINICAL HISTORY: dyspnea TECHNIQUE: 1 view of the chest. 
COMPARISON: 2021 IMPRESSION: Pulmonary vascular congestion is again seen 
with slightly increased prominence of the interstitial lung markings 
bilaterally. There is no focal lobar consolidation or significantpleural fluid. 
The cardiomediastinal silhouette is magnified by technique. Cervical fusion 
hardware right shoulder hardware is again seen. Signed: Juan Francisco MDReport 
Verified Date/Time: 2021 14:01:32 Reading Location: [facility] 
Radiology Reading Room Electronically signed by: JUAN FRANCISCO M.D. on 
2021 02:01 PMBASIC METABOLIC [id number] 12:22:00





             Test Item    Value        Reference Range Interpretation Comments

 

             SODIUM (BEAKER) 134 meq/L    136-145      L            



             (test code = 381)                                        

 

             POTASSIUM (BEAKER) 3.7 meq/L    3.5-5.1                   



             (test code = 379)                                        

 

             CHLORIDE (BEAKER) 103 meq/L                        



             (test code = 382)                                        

 

             CO2 (BEAKER) (test 25 meq/L     22-29                     



             code = 355)                                         

 

             BLOOD UREA NITROGEN 15 mg/dL     7-21                      



             (BEAKER) (test code                                        



             = 354)                                              

 

             CREATININE (BEAKER) 1.24 mg/dL   0.57-1.25                 



             (test code = 358)                                        

 

             GLUCOSE RANDOM 168 mg/dL           H            



             (BEAKER) (test code                                        



             = 652)                                              

 

             CALCIUM (BEAKER) 8.3 mg/dL    8.4-10.2     L            



             (test code = 697)                                        

 

             EGFR (BEAKER) (test 42 mL/min/1.73                           ESTIMA

ARIADNE GFR IS



             code = 1092) sq m                                   NOT AS ACCURATE

 AS



                                                                 CREATININE



                                                                 CLEARANCE IN



                                                                 PREDICTING



                                                                 GLOMERULAR



                                                                 FILTRATION RATE

.



                                                                 ESTIMATED GFR I

S



                                                                 NOT APPLICABLE 

FOR



                                                                 DIALYSIS PATIEN

TS.



 ID - [id number] 12:22:00





             Test Item    Value        Reference Range Interpretation Comments

 

             MAGNESIUM (BEAKER) (test code = 1.7 mg/dL    1.6-2.6               

    



             627)                                                



 ID - EMERSONB-TYPE NATRIURETIC FACTOR (BNP)2021 12:13:00





             Test Item    Value        Reference Range Interpretation Comments

 

             B-TYPE NATRIURETIC PEPTIDE (BEAKER) 88 pg/mL     0-100             

        



             (test code = 700)                                        



 ID - EMERSONTransesophageal [id number] 21:25:57Ejection 
FractionSLEH ECHO HEARTLAB MKCKESSON [facility] HospitalHep[facility] 
function [id number] 06:35:00





             Test Item    Value        Reference Range Interpretation Comments

 

             Protein, Total (test 5.4          See_Comment  L             [Autom

ated



             code = 2885-2)                                        message] The



                                                                 system which



                                                                 generated this



                                                                 result transmit

ariadne



                                                                 reference range

:



                                                                 6.0 - 8.3 gm/dL

.



                                                                 The reference



                                                                 range was not u

sed



                                                                 to interpret th

is



                                                                 result as



                                                                 normal/abnormal

.

 

             Albumin (test code = 2.7 g/dL     3.5-5.0      L            



             92929-4)                                            

 

             Total Bilirubin (test 0.1 mg/dL    0.2-1.2      L            



             code = 1975-2)                                        

 

             Bilirubin, Direct 0.1 mg/dL    0.1-0.5                   



             (test code = 1968-7)                                        

 

             Alkaline Phosphatase 80 U/L                           



             (test code = 6768-6)                                        

 

             AST (test code = 15 U/L       5-34                      



             1920-8)                                             

 

             ALT (test code = 8 U/L        6-55                      



             1742-6)                                             

 

             MAGGI (test code = MAGGI)  ID -                           



                          PIAYA L                                

 

             Lab Interpretation Abnormal                               



             (test code = 65853-5)                                        



[id number]00





             Test Item    Value        Reference Range Interpretation Comments

 

             SODIUM (BEAKER) 138 meq/L    136-145                   



             (test code = 381)                                        

 

             POTASSIUM (BEAKER) 3.6 meq/L    3.5-5.1                   



             (test code = 379)                                        

 

             CHLORIDE (BEAKER) 106 meq/L                        



             (test code = 382)                                        

 

             CO2 (BEAKER) (test 22 meq/L     22-29                     



             code = 355)                                         

 

             BLOOD UREA NITROGEN 15 mg/dL     7-21                      



             (BEAKER) (test code                                        



             = 354)                                              

 

             CREATININE (BEAKER) 1.12 mg/dL   0.57-1.25                 



             (test code = 358)                                        

 

             GLUCOSE RANDOM 69 mg/dL            L            



             (BEAKER) (test code                                        



             = 652)                                              

 

             CALCIUM (BEAKER) 8.4 mg/dL    8.4-10.2                  



             (test code = 697)                                        

 

             EGFR (BEAKER) (test 47 mL/min/1.73                           ESTIMA

ARIADNE GFR IS



             code = 1092) sq m                                   NOT AS ACCURATE

 AS



                                                                 CREATININE



                                                                 CLEARANCE IN



                                                                 PREDICTING



                                                                 GLOMERULAR



                                                                 FILTRATION RATE

.



                                                                 ESTIMATED GFR I

S



                                                                 NOT APPLICABLE 

FOR



                                                                 DIALYSIS PATIEN

TS.



 ID - PIAYA LHEPATIC FUNCTION [id number] 06:35:00





             Test Item    Value        Reference Range Interpretation Comments

 

             TOTAL PROTEIN (BEAKER) (test code = 5.4 gm/dL    6.0-8.3      L    

        



             770)                                                

 

             ALBUMIN (BEAKER) (test code = 1145) 2.7 g/dL     3.5-5.0      L    

        

 

             BILIRUBIN TOTAL (BEAKER) (test code 0.1 mg/dL    0.2-1.2      L    

        



             = 377)                                              

 

             BILIRUBIN DIRECT (BEAKER) (test 0.1 mg/dL    0.1-0.5               

    



             code = 706)                                         

 

             ALKALINE PHOSPHATASE (BEAKER) (test 80 U/L                   

        



             code = 346)                                         

 

             AST (SGOT) (BEAKER) (test code = 15 U/L       5-34                 

     



             353)                                                

 

             ALT (SGPT) (BEAKER) (test code = 8 U/L        6-55                 

     



             347)                                                



 ID - PIRAJIV LCBC W/PLT COUNT &amp; AUTO [id number] 05:44:00





             Test Item    Value        Reference Range Interpretation Comments

 

             WHITE BLOOD CELL COUNT (BEAKER) 5.8 K/ L     3.5-10.5              

    



             (test code = 775)                                        

 

             RED BLOOD CELL COUNT (BEAKER) 3.95 M/ L    3.93-5.22               

  



             (test code = 761)                                        

 

             HEMOGLOBIN (BEAKER) (test code = 12.7 GM/DL   11.2-15.7            

     



             410)                                                

 

             HEMATOCRIT (BEAKER) (test code = 40.1 %       34.1-44.9            

     



             411)                                                

 

             MEAN CORPUSCULAR VOLUME (BEAKER) 101.5 fL     79.4-94.8    H       

     



             (test code = 753)                                        

 

             MEAN CORPUSCULAR HEMOGLOBIN 32.2 pg      25.6-32.2                 



             (BEAKER) (test code = 751)                                        

 

             MEAN CORPUSCULAR HEMOGLOBIN CONC 31.7 GM/DL   32.2-35.5    L       

     



             (BEAKER) (test code = 752)                                        

 

             RED CELL DISTRIBUTION WIDTH 13.4 %       11.7-14.4                 



             (BEAKER) (test code = 412)                                        

 

             PLATELET COUNT (BEAKER) (test 213 K/CU MM  150-450                 

  



             code = 756)                                         

 

             MEAN PLATELET VOLUME (BEAKER) 9.8 fL       9.4-12.3                

  



             (test code = 754)                                        

 

             NUCLEATED RED BLOOD CELLS 0 /100 WBC   0-0                       



             (BEAKER) (test code = 413)                                        

 

             NEUTROPHILS RELATIVE PERCENT 41 %                                  

 



             (BEAKER) (test code = 429)                                        

 

             LYMPHOCYTES RELATIVE PERCENT 41 %                                  

 



             (BEAKER) (test code = 430)                                        

 

             MONOCYTES RELATIVE PERCENT 10 %                                   



             (BEAKER) (test code = 431)                                        

 

             EOSINOPHILS RELATIVE PERCENT 6 %                                   

 



             (BEAKER) (test code = 432)                                        

 

             BASOPHILS RELATIVE PERCENT 1 %                                    



             (BEAKER) (test code = 437)                                        

 

             NEUTROPHILS ABSOLUTE COUNT 2.34 K/ L    1.56-6.13                 



             (BEAKER) (test code = 670)                                        

 

             LYMPHOCYTES ABSOLUTE COUNT 2.38 K/ L    1.18-3.74                 



             (BEAKER) (test code = 414)                                        

 

             MONOCYTES ABSOLUTE COUNT (BEAKER) 0.57 K/ L    0.24-0.36    H      

      



             (test code = 415)                                        

 

             EOSINOPHILS ABSOLUTE COUNT 0.34 K/ L    0.04-0.36                 



             (BEAKER) (test code = 416)                                        

 

             BASOPHILS ABSOLUTE COUNT (BEAKER) 0.07 K/ L    0.01-0.08           

      



             (test code = 417)                                        

 

             IMMATURE GRANULOCYTES-RELATIVE 1 %          0-1                    

   



             PERCENT (BEAKER) (test code =                                      

  



             2801)                                               



SARS-CoV2/RT-PCR (Asymptomatic ONLY)2021 23:22:00





             Test Item    Value        Reference Range Interpretation Comments

 

             SARS-COV2/RT-PCR (test Negative     Negative                  



             code = 60338-4)                                        

 

             MAGGI (test code = MAGGI) Negative result for                          

 



                          this test determines                           



                          that SARS-CoV-2 RNA was                           



                          not present in the                           



                          specimen above the                           



                          Limit of Detection                           



                          (LOD). However,                           



                          Negative results do not                           



                          preclude SARS-CoV-2                           



                          infection and should                           



                          not be used as the sole                           



                          basis for treatment or                           



                          patient management                           



                          decisions. Negative                           



                          results must be                           



                          combined with clinical                           



                          observations, patient                           



                          history, and                           



                          epidemiological                           



                          information. A false                           



                          negative result may                           



                          occur if a specimen is                           



                          improperly collected,                           



                          transported, or                           



                          handled. A false                           



                          negative result should                           



                          be considered if                           



                          patient's recent                           



                          exposures or clinical                           



                          presentation indicate                           



                          that COVID-19                           



                          (SARS-CoV-2) is likely                           



                          and diagnostic tests                           



                          for other causes of                           



                          illness are negative.                           



                          Re-testing should be                           



                          considered in cases of                           



                          suspected false                           



                          negatives. The limit of                           



                          detection for this                           



                          assay is 100 copies/mL.                           



                          This SARS-CoV-2 test is                           



                          a real-time RT_PCR test                           



                          intended for the                           



                          qualitative detection                           



                          of nucleic acid from                           



                          SARS-CoV-2 in a                           



                          nasopharyngeal swab                           



                          specimen collected from                           



                          individuals suspected                           



                          of COVID-19 by their                           



                          healthcare provider.                           



                          This test has not been                           



                          Food and Drug                           



                          Administration (FDA)                           



                          cleared or approved.                           



                          This is a modified                           



                          version of an approved                           



                          Emergency Use                           



                          Authorization (EUA) and                           



                          is in the process of                           



                          review by the FDA. Once                           



                          authorized by the FDA,                           



                          the issued EUA will be                           



                          effective until the                           



                          declaration that                           



                          circumstances exist                           



                          justifying the                           



                          authorization of the                           



                          emergency use of in                           



                          vitro diagnostic tests                           



                          for detection and/or                           



                          diagnosis of COVID-19                           



                          is terminated under                           



                          Section 564(b)(2) of                           



                          the Act or the EUA is                           



                          revoked under Section                           



                          564(g) of the Act.                           



                          Testing was performed                           



                          using the Abbott                           



                          SARS-CoV-2 assay. Fact                           



                          Sheet for Healthcare                           



                          Providers:https://www.[employer]/sal/RT                           



                          SARS-CoV-2 HCP Fact                           



                          Sheet 51-390796.pdf                           



                          Fact Sheet for                           



                          Healthcare                             



                          Patients:https://www.mo                           



                          lecular.abbott/sal/RT                           



                          SARS-CoV-2 Patient Fact                           



                          Sheet EN                               



                          51-745465I6.pdf                           

 

             Lab Interpretation Normal                                 



             (test code = 30917-3)                                        



[facility] 
23:22:00





             Test Item    Value        Reference Range Interpretation Comments

 

             SARS-COV2/RT-PCR (test code = Negative     Negative                

  



             3162979)                                            



Negative result for this test determines that SARS-CoV-2 RNA was not present in 
the specimen above the Limit of Detection (LOD). However, Negative results do 
not preclude SARS-CoV-2 infection and should not be used as the sole basis for 
treatment or patient management decisions. Negative results must be combined 
with clinical observations, patient history, and epidemiological information. A 
false negative result may occur if a specimen is improperly collected, 
transported, or handled. A false negative result should be considered if 
patient's recent exposures or clinical presentation indicate that COVID-19 
(SARS-CoV-2) is likely and diagnostic tests for other causes of illness are 
negative. Re-testing should be considered in cases of suspected false 
negatives.The limit of detection for this assay is 100 copies/mL.This SARS-CoV-2
test is a real-time RT_PCR test intended for the qualitative detection of 
nucleic acid from SARS-CoV-2 in a nasopharyngeal swab specimen collected from 
individuals suspected of COVID-19 by their healthcare provider.This test has not
been Food and Drug Administration (FDA) cleared or approved. This is a modified 
version of an approved Emergency Use Authorization (EUA) and is in the process 
of review by the FDA. Once authorized by the FDA, the issued EUA will be 
effective until the declaration that circumstances exist justifying the 
authorization of the emergency use of in vitro diagnostic tests for detection 
and/or diagnosis of COVID-19 is terminated under Section 564(b)(2) of the Act or
the EUA is revoked under Section 564(g) of the Act.Testing was performed using t
he Abbott SARS-CoV-2 assay.Fact Sheet for Healthcare 
Providers:https://www.[employer]/sal/RT SARS-CoV-2 HCP Fact Sheet 51-
927755.pdfFact Sheet for Healthcare Patients:https://www.[employer]/sal/RT
SARS-CoV-2 Patient Fact Sheet EN 51-970675X9.pdfHIGH SENSITIVITY TROPONIN I
2021 19:21:00





             Test Item    Value        Reference Range Interpretation Comments

 

             HIGH SENSITIVITY 8 pg/ml      See_Comment                [Automated

 message]



             TROPONIN I (test code =                                        The 

system which



             6423758)                                            generated this 

result



                                                                 transmitted ref

erence



                                                                 range: <=17. Th

e



                                                                 reference range

 was not



                                                                 used to interpr

et this



                                                                 result as



                                                                 normal/abnormal

.



 ID - dwayne Patricia  STAT High Sensitivity Troponin-I results 
should be used in conjunction with other diagnostic information such as ECG, 
clinical observations and information, and patient symptoms to aid in the 
diagnosis of MI.2D Echo W/Doppler(CW/PW/Color)2021 17:56:11Ejection 
FractionSLEH ECHO HEARTLAB [facility] 
WITH IV CONTRAST- PE TEST [id number] 06:05:00Unlisted Reason for Exam - 
Click Yes and Enter Reason Below-&gt;No
************************************************************[facility] : 1940 Sex: 
F************************************************************FINAL REPORT 
PATIENT ID: [id number] EXAM/TECHNIQUE: CT angiography of the chest pulmonary 
embolus protocol INDICATION: Concern for pulmonary embolism. COMPARISON: None. 
FINDINGS: Lower Neck: Unremarkable. Parenchyma/Pleura: Bilateral patchy 
groundglass opacities are present in the lungs. 13 mm left lower lobe pulmonary 
nodule. Small bilateral pleural effusions. Septal thickening and peribronchial 
thickening is present. Airways: Unremarkable. Cardiac: Cardiomegaly. 
Mediastinum/lymph nodes: No lymphadenopathy. Vessels: No filling defects in the 
main, lobar, or segmental pulmonary arteries. Osseous: No acute osseous process.
No suspicious osseous lesion. Upper abdomen: Unremarkable. Impression: 1. No 
acute pulmonary embolism.2. Bilateral groundglass opacities, bilateral pleural 
effusions, and interstitial thickening may represent pulmonary edema and/or 
infection.3. Left lower lobe pulmonary nodule measuring 13 mm. Consider follow-
up CT in three months, PET/CT, or tissue sampling. Signed: Will Matthews 
MDRepSaint [facility] East Hospital Verified Date/Time: 2021 06:05:30 Electronically signed by: WILL MATTHEWS MD on 2021 06:05 [facility] CityEPATIC FUNCTION [id number] 06:00:00





             Test Item    Value        Reference Range Interpretation Comments

 

             TOTAL PROTEIN (BEAKER) (test code = 5.2 gm/dL    6.0-8.3      L    

        



             770)                                                

 

             ALBUMIN (BEAKER) (test code = 1145) 2.7 g/dL     3.5-5.0      L    

        

 

             BILIRUBIN TOTAL (BEAKER) (test code 0.6 mg/dL    0.2-1.2           

        



             = 377)                                              

 

             BILIRUBIN DIRECT (BEAKER) (test 0.3 mg/dL    0.1-0.5               

    



             code = 706)                                         

 

             ALKALINE PHOSPHATASE (BEAKER) (test 92 U/L                   

        



             code = 346)                                         

 

             AST (SGOT) (BEAKER) (test code = 14 U/L       5-34                 

     



             353)                                                

 

             ALT (SGPT) (BEAKER) (test code = 7 U/L        6-55                 

     



             347)                                                



 ID - NICOLAS MOperator ID - PIAYA LBASIC METABOLIC [id number] 
05:13:00





             Test Item    Value        Reference Range Interpretation Comments

 

             SODIUM (BEAKER) 139 meq/L    136-145                   



             (test code = 381)                                        

 

             POTASSIUM (BEAKER) 4.0 meq/L    3.5-5.1                   



             (test code = 379)                                        

 

             CHLORIDE (BEAKER) 110 meq/L           H            



             (test code = 382)                                        

 

             CO2 (BEAKER) (test 24 meq/L     22-29                     



             code = 355)                                         

 

             BLOOD UREA NITROGEN 14 mg/dL     7-21                      



             (BEAKER) (test code                                        



             = 354)                                              

 

             CREATININE (BEAKER) 1.05 mg/dL   0.57-1.25                 



             (test code = 358)                                        

 

             GLUCOSE RANDOM 81 mg/dL                         



             (BEAKER) (test code                                        



             = 652)                                              

 

             CALCIUM (BEAKER) 8.4 mg/dL    8.4-10.2                  



             (test code = 697)                                        

 

             EGFR (BEAKER) (test 51 mL/min/1.73                           ESTIMA

ARIADNE GFR IS



             code = 1092) sq m                                   NOT AS ACCURATE

 AS



                                                                 CREATININE



                                                                 CLEARANCE IN



                                                                 PREDICTING



                                                                 GLOMERULAR



                                                                 FILTRATION RATE

.



                                                                 ESTIMATED GFR I

S



                                                                 NOT APPLICABLE 

FOR



                                                                 DIALYSIS PATIEN

TS.



 ID - NICOLAS MVitamin B12 and [id number] 04:44:00





             Test Item    Value        Reference Range Interpretation Comments

 

             Vitamin B12 (test 272 pg/mL    213-816                   



             code = 2132-9)                                        

 

             Folate (test code = 7.70 ng/mL   See_Comment                [Automa

ariadne



             2284-8)                                             message] The



                                                                 system which



                                                                 generated this



                                                                 result transmit

ariadne



                                                                 reference range

:



                                                                 >=7.00. The



                                                                 reference range



                                                                 was not used to



                                                                 interpret this



                                                                 result as



                                                                 normal/abnormal

.

 

             MAGGI (test code = MAGGI)  ID -                           



                          NICOLAS M                                 

 

             Lab Interpretation Normal                                 



             (test code = 26772-9)                                        



[id number] 04[facility]44[facility]00





             Test Item    Value        Reference Range Interpretation Comments

 

             VITAMIN B12 (BEAKER) 272 pg/mL    213-816                   



             (test code = 774)                                        

 

             FOLATE (BEAKER) 7.70 ng/mL   See_Comment                [Automated 

message]



             (test code = 362)                                        The system

 which



                                                                 generated this 

result



                                                                 transmitted ref

erence



                                                                 range: >=7.00. 

The



                                                                 reference range

 was not



                                                                 used to interpr

et this



                                                                 result as



                                                                 normal/abnormal

.



 ID - NICOLAS MRAD, CHEST, 1 VIEW, NON [id number] 04:41:00Reason for 
exam:-&gt;afibShould this be performed at the bedside?-&gt;Yes
************************************************************[facility] : 1940 Sex: 
F************************************************************FINAL REPORT 
PATIENT ID: [id number] EXAM/TECHNIQUE: Single view frontal radiograph of the 
chest. INDICATION: Atrial fibrillation. COMPARISON: None. FINDINGS: 
Devices/Objects: None. Lungs: No focal consolidation. No pleural effusion. No 
pneumothorax. Heart/Mediastinum: No cardiomegaly. Mild interstitial thickening. 
Osseous: No acute osseous process. No suspicious osseous lesion. Upper abdomen: 
Unremarkable. Impression: Mild interstitial thickening may represent 
interstitial pulmonary edema. Signed: Will Matthews MDReport Verified 
Date/Time: 2021 04:41:03 Electronically signed by: WILL MATTHEWS MD on 
2021 04:41 AMHIGH SENSITIVITY TROPONIN -04-15 04:36:00





             Test Item    Value        Reference Range Interpretation Comments

 

             HIGH SENSITIVITY 8 pg/ml      See_Comment                [Automated

 message]



             TROPONIN I (test code =                                        The 

system which



             6857892)                                            generated this 

result



                                                                 transmitted ref

erence



                                                                 range: <=17. Th

e



                                                                 reference range

 was not



                                                                 used to interpr

et this



                                                                 result as



                                                                 normal/abnormal

.



 [facility]  STAT High Sensitivity Troponin-I results 
should be used in conjunction with other diagnostic information such as ECG, 
clinical observations and information, and patient symptoms to aid in the 
diagnosis of MI.B-TYPE NATRIURETIC FACTOR (BNP)2021 04:27:00





             Test Item    Value        Reference Range Interpretation Comments

 

             B-TYPE NATRIURETIC PEPTIDE (BEAKER) 144 pg/mL    0-100        H    

        



             (test code = 700)                                        



 ID - NICOLAS [facility] W/PLT COUNT &amp; AUTO [id number] 04:01:00





             Test Item    Value        Reference Range Interpretation Comments

 

             WHITE BLOOD CELL COUNT (BEAKER) 6.7 K/ L     3.5-10.5              

    



             (test code = 775)                                        

 

             RED BLOOD CELL COUNT (BEAKER) 3.83 M/ L    3.93-5.22    L          

  



             (test code = 761)                                        

 

             HEMOGLOBIN (BEAKER) (test code = 12.3 GM/DL   11.2-15.7            

     



             410)                                                

 

             HEMATOCRIT (BEAKER) (test code = 38.7 %       34.1-44.9            

     



             411)                                                

 

             MEAN CORPUSCULAR VOLUME (BEAKER) 101.0 fL     79.4-94.8    H       

     



             (test code = 753)                                        

 

             MEAN CORPUSCULAR HEMOGLOBIN 32.1 pg      25.6-32.2                 



             (BEAKER) (test code = 751)                                        

 

             MEAN CORPUSCULAR HEMOGLOBIN CONC 31.8 GM/DL   32.2-35.5    L       

     



             (BEAKER) (test code = 752)                                        

 

             RED CELL DISTRIBUTION WIDTH 13.5 %       11.7-14.4                 



             (BEAKER) (test code = 412)                                        

 

             PLATELET COUNT (BEAKER) (test 205 K/CU MM  150-450                 

  



             code = 756)                                         

 

             MEAN PLATELET VOLUME (BEAKER) 9.7 fL       9.4-12.3                

  



             (test code = 754)                                        

 

             NUCLEATED RED BLOOD CELLS 0 /100 WBC   0-0                       



             (BEAKER) (test code = 413)                                        

 

             NEUTROPHILS RELATIVE PERCENT 47 %                                  

 



             (BEAKER) (test code = 429)                                        

 

             LYMPHOCYTES RELATIVE PERCENT 38 %                                  

 



             (BEAKER) (test code = 430)                                        

 

             MONOCYTES RELATIVE PERCENT 10 %                                   



             (BEAKER) (test code = 431)                                        

 

             EOSINOPHILS RELATIVE PERCENT 4 %                                   

 



             (BEAKER) (test code = 432)                                        

 

             BASOPHILS RELATIVE PERCENT 1 %                                    



             (BEAKER) (test code = 437)                                        

 

             NEUTROPHILS ABSOLUTE COUNT 3.14 K/ L    1.56-6.13                 



             (BEAKER) (test code = 670)                                        

 

             LYMPHOCYTES ABSOLUTE COUNT 2.55 K/ L    1.18-3.74                 



             (BEAKER) (test code = 414)                                        

 

             MONOCYTES ABSOLUTE COUNT (BEAKER) 0.66 K/ L    0.24-0.36    H      

      



             (test code = 415)                                        

 

             EOSINOPHILS ABSOLUTE COUNT 0.23 K/ L    0.04-0.36                 



             (BEAKER) (test code = 416)                                        

 

             BASOPHILS ABSOLUTE COUNT (BEAKER) 0.05 K/ L    0.01-0.08           

      



             (test code = 417)                                        

 

             IMMATURE GRANULOCYTES-RELATIVE 1 %          0-1                    

   



             PERCENT (BEAKER) (test code =                                      

  



             2801)                                               



TSH/Free T4 If [id number] 00:19:00





             Test Item    Value        Reference Range Interpretation Comments

 

             TSH (test code = 1.723        See_Comment                [Automated



             56334-3)                                            message] The



                                                                 system which



                                                                 generated this



                                                                 result transmit

ariadne



                                                                 reference range

:



                                                                 0.350 - 4.940



                                                                 uIU/mL. The



                                                                 reference range



                                                                 was not used to



                                                                 interpret this



                                                                 result as



                                                                 normal/abnormal

.

 

             MAGGI (test code = MAGGI)  ID -                           



                          PIAYA L                                

 

             Lab Interpretation Normal                                 



             (test code = 75243-7)                                        



[facility] [id number] 00:19:00





             Test Item    Value        Reference Range Interpretation Comments

 

             THYROID STIMULATING HORMONE 1.723 uIU/mL 0.350-4.940               



             (BEAKER) (test code = 772)                                        



 ID - PIAYA LHIGH SENSITIVITY TROPONIN -10-42 00:02:00





             Test Item    Value        Reference Range Interpretation Comments

 

             HIGH SENSITIVITY 7 pg/ml      See_Comment                [Automated

 message]



             TROPONIN I (test code =                                        The 

system which



             5939278)                                            generated this 

result



                                                                 transmitted ref

erence



                                                                 range: <=17. Th

e



                                                                 reference range

 was not



                                                                 used to interpr

et this



                                                                 result as



                                                                 normal/abnormal

.



 ID - DBThe  STAT High Sensitivity Troponin-I results should be
used in conjunctionwith other diagnostic information such as ECG, clinical 
observations and information, and patient symptoms to aid in the diagnosis of 
MI.[id number] 23:58:00





             Test Item    Value        Reference Range Interpretation Comments

 

             Phosphorus (test code = 3.3 mg/dL    2.3-4.7                   



             2777-1)                                             

 

             MAGGI (test code = MAGGI)  ID - DB                           

 

             Lab Interpretation (test Normal                                 



             code = 52379-8)                                        



[facility] METABOLIC [id number] 23:58:00





             Test Item    Value        Reference Range Interpretation Comments

 

             SODIUM (BEAKER) 138 meq/L    136-145                   



             (test code = 381)                                        

 

             POTASSIUM (BEAKER) 4.0 meq/L    3.5-5.1                   



             (test code = 379)                                        

 

             CHLORIDE (BEAKER) 109 meq/L           H            



             (test code = 382)                                        

 

             CO2 (BEAKER) (test 20 meq/L     22-29        L            



             code = 355)                                         

 

             BLOOD UREA NITROGEN 14 mg/dL     7-21                      



             (BEAKER) (test code                                        



             = 354)                                              

 

             CREATININE (BEAKER) 1.09 mg/dL   0.57-1.25                 



             (test code = 358)                                        

 

             GLUCOSE RANDOM 83 mg/dL                         



             (BEAKER) (test code                                        



             = 652)                                              

 

             CALCIUM (BEAKER) 8.1 mg/dL    8.4-10.2     L            



             (test code = 697)                                        

 

             EGFR (BEAKER) (test 48 mL/min/1.73                           ESTIMA

ARIADNE GFR IS



             code = 1092) sq m                                   NOT AS ACCURATE

 AS



                                                                 CREATININE



                                                                 CLEARANCE IN



                                                                 PREDICTING



                                                                 GLOMERULAR



                                                                 FILTRATION RATE

.



                                                                 ESTIMATED GFR I

S



                                                                 NOT APPLICABLE 

FOR



                                                                 DIALYSIS PATIEN

TS.



 ID - [id number] 23:58:00





             Test Item    Value        Reference Range Interpretation Comments

 

             MAGNESIUM (BEAKER) (test code = 1.7 mg/dL    1.6-2.6               

    



             627)                                                



 ID - [id number] 23:58:00





             Test Item    Value        Reference Range Interpretation Comments

 

             PHOSPHORUS (BEAKER) (test code = 3.3 mg/dL    2.3-4.7              

     



             604)                                                



 ID - DBHEPATIC FUNCTION [id number] 23:58:00





             Test Item    Value        Reference Range Interpretation Comments

 

             TOTAL PROTEIN (BEAKER) (test code = 5.4 gm/dL    6.0-8.3      L    

        



             770)                                                

 

             ALBUMIN (BEAKER) (test code = 1145) 2.8 g/dL     3.5-5.0      L    

        

 

             BILIRUBIN TOTAL (BEAKER) (test code 0.7 mg/dL    0.2-1.2           

        



             = 377)                                              

 

             BILIRUBIN DIRECT (BEAKER) (test 0.3 mg/dL    0.1-0.5               

    



             code = 706)                                         

 

             ALKALINE PHOSPHATASE (BEAKER) (test 92 U/L                   

        



             code = 346)                                         

 

             AST (SGOT) (BEAKER) (test code = 14 U/L       5-34                 

     



             353)                                                

 

             ALT (SGPT) (BEAKER) (test code = 9 U/L        6-55                 

     



             347)                                                



 ID - [id number] 23:42:00





             Test Item    Value        Reference Range Interpretation Comments

 

             D-Dimer, Quant (test 1.26         See_Comment  H             [Autom

ated



             code = 18530-8)                                        message] The



                                                                 system which



                                                                 generated this



                                                                 result



                                                                 transmitted



                                                                 reference range

:



                                                                 <0.50 MG/L FEU.



                                                                 The reference



                                                                 range was not



                                                                 used to interpr

et



                                                                 this result as



                                                                 normal/abnormal

.

 

             MAGGI (test code = MAGGI) Intended Use: The                           



                          D-Dimer Assay can                           



                          be used to aid in                           



                          the diagnosis of                           



                          Deep Vein                              



                          Thrombosis (DVT)                           



                          and Pulmonary                           



                          Embolism Disease                           



                          (PED).In patients                           



                          with low pre-test                           



                          probability,                           



                          various studies                           



                          concerning STA                           



                          Liatest D-dimer                           



                          test have                              



                          reported that                           



                          with a cutoff                           



                          value of 0.50                           



                          MG/L FEU, the                           



                          Negative                               



                          Predictive Value                           



                          (NPV) regarding                           



                          the exclusion of                           



                          thrombosis is                           



                          within %                           



                          range.                                 

 

             Lab Interpretation Abnormal                               



             (test code = 39002-2)                                        



[facility] Center[facility]-[id number] 23:42:00





             Test Item    Value        Reference Range Interpretation Comments

 

             D-DIMER QUANTITATIVE (BEAKER) 1.26 MG/L FEU <0.50        H         

   



             (test code = 671)                                        



Intended Use: The D-Dimer Assay can be used to aid in the diagnosis of Deep Vein
Thrombosis (DVT) and Pulmonary Embolism Disease (PED).In patients with low pre-
test probability, various studies concerning STA Liatest D-dimer test have 
reported that with a cutoff value of 0.50 MG/L FEU, the Negative Predictive 
Value (NPV) regarding the exclusion of thrombosis is within % range.
Prothrombin time/[id number] 23:39:00





             Test Item    Value        Reference    Interpretation Comments



                                       Range                     

 

             Protime (test code = 15.4         See_Comment  H             [Autom

ated



             1792-2)                                             message] The



                                                                 system which



                                                                 generated this



                                                                 result



                                                                 transmitted



                                                                 reference range

:



                                                                 11.9 - 14.2



                                                                 seconds. The



                                                                 reference range



                                                                 was not used to



                                                                 interpret this



                                                                 result as



                                                                 normal/abnormal

.

 

             INR (test code = 1.24         See_Comment                [Automated



             9178-6)                                             message] The



                                                                 system which



                                                                 generated this



                                                                 result



                                                                 transmitted



                                                                 reference range

:



                                                                 <=5.90. The



                                                                 reference range



                                                                 was not used to



                                                                 interpret this



                                                                 result as



                                                                 normal/abnormal

.

 

             MAGGI (test code = RECOMMENDED                            



             MAGGI)         COUMADIN/WARFARIN                           



                          INR THERAPY                            



                          RANGESSTANDARD DOSE:                           



                          2.0 - 3.0 Includes:                           



                          PROPHYLAXIS for                           



                          venous thrombosis,                           



                          systemic                               



                          embolization;                           



                          TREATMENT for venous                           



                          thrombosis and/or                           



                          pulmonary                              



                          embolus.HIGH RISK:                           



                          Target INR is                           



                          2.5-3.5 for patients                           



                          with mechanical                           



                          heart valves.                           

 

             Lab Interpretation Abnormal                               



             (test code =                                        



             36640-7)                                            



[id number] 23:39:00





             Test Item    Value        Reference Range Interpretation Comments

 

             PROTIME (BEAKER) 15.4 seconds 11.9-14.2    H            



             (test code = 759)                                        

 

             INR (BEAKER) (test 1.24         See_Comment                [Automat

ed message]



             code = 370)                                         The system madvertise



                                                                 generated this 

result



                                                                 transmitted ref

erence



                                                                 range: <=5.90. 

The



                                                                 reference range

 was



                                                                 not used to int

erpret



                                                                 this result as



                                                                 normal/abnormal

.



RECOMMENDED COUMADIN/WARFARIN INR THERAPY RANGESSTANDARD DOSE: 2.0 - 3.0 
Includes: PROPHYLAXIS for venous thrombosis, systemic embolization; TREATMENT 
for venous thrombosis and/or pulmonary embolus.HIGH RISK: Target INR is 2.5-3.5 
for patients with mechanical heart valves.CBC W/PLT COUNT &amp; AUTO 
[id number] 23:32:00





             Test Item    Value        Reference Range Interpretation Comments

 

             WHITE BLOOD CELL COUNT (BEAKER) 7.9 K/ L     3.5-10.5              

    



             (test code = 775)                                        

 

             RED BLOOD CELL COUNT (BEAKER) 3.96 M/ L    3.93-5.22               

  



             (test code = 761)                                        

 

             HEMOGLOBIN (BEAKER) (test code = 12.7 GM/DL   11.2-15.7            

     



             410)                                                

 

             HEMATOCRIT (BEAKER) (test code = 40.3 %       34.1-44.9            

     



             411)                                                

 

             MEAN CORPUSCULAR VOLUME (BEAKER) 101.8 fL     79.4-94.8    H       

     



             (test code = 753)                                        

 

             MEAN CORPUSCULAR HEMOGLOBIN 32.1 pg      25.6-32.2                 



             (BEAKER) (test code = 751)                                        

 

             MEAN CORPUSCULAR HEMOGLOBIN CONC 31.5 GM/DL   32.2-35.5    L       

     



             (BEAKER) (test code = 752)                                        

 

             RED CELL DISTRIBUTION WIDTH 13.3 %       11.7-14.4                 



             (BEAKER) (test code = 412)                                        

 

             PLATELET COUNT (BEAKER) (test 210 K/CU MM  150-450                 

  



             code = 756)                                         

 

             MEAN PLATELET VOLUME (BEAKER) 9.3 fL       9.4-12.3     L          

  



             (test code = 754)                                        

 

             NUCLEATED RED BLOOD CELLS 0 /100 WBC   0-0                       



             (BEAKER) (test code = 413)                                        

 

             NEUTROPHILS RELATIVE PERCENT 58 %                                  

 



             (BEAKER) (test code = 429)                                        

 

             LYMPHOCYTES RELATIVE PERCENT 31 %                                  

 



             (BEAKER) (test code = 430)                                        

 

             MONOCYTES RELATIVE PERCENT 8 %                                    



             (BEAKER) (test code = 431)                                        

 

             EOSINOPHILS RELATIVE PERCENT 2 %                                   

 



             (BEAKER) (test code = 432)                                        

 

             BASOPHILS RELATIVE PERCENT 1 %                                    



             (BEAKER) (test code = 437)                                        

 

             NEUTROPHILS ABSOLUTE COUNT 4.59 K/ L    1.56-6.13                 



             (BEAKER) (test code = 670)                                        

 

             LYMPHOCYTES ABSOLUTE COUNT 2.42 K/ L    1.18-3.74                 



             (BEAKER) (test code = 414)                                        

 

             MONOCYTES ABSOLUTE COUNT (BEAKER) 0.63 K/ L    0.24-0.36    H      

      



             (test code = 415)                                        

 

             EOSINOPHILS ABSOLUTE COUNT 0.17 K/ L    0.04-0.36                 



             (BEAKER) (test code = 416)                                        

 

             BASOPHILS ABSOLUTE COUNT (BEAKER) 0.06 K/ L    0.01-0.08           

      



             (test code = 417)                                        

 

             IMMATURE GRANULOCYTES-RELATIVE 1 %          0-1                    

   



             PERCENT (BEAKER) (test code =                                      

  



             2801)                                               







Notes







                Date/Time       Note            Provider        Source

 

                2023 11:10:00-00:00 [id number]                       [facility]                

 



                                 [address] [place] [address]                 



                                                                



                                PATIENT NAME: LEONA PERES ADMIT DATE: 

3                 



                                ACCOUNT NO: [id number] ROOM NO:               

  



                                MEDICAL RECORD NO: [id number] AGE: 82           

      



                                REPORT TYPE: OPERATIVE REPORT SEX: F            

     



                                                                



                                ADMITTING PHYSICIAN:                 



                                ATTENDING PHYSICIAN: Rebecca Cerrato MD            

     



                                                                



                                                                



                                OPERATION DATE: 2023                 



                                                                



                                PROCEDURES PERFORMED:                 



                                1. EGD.                         



                                2. Biopsy.                      



                                3. Guidewire dilation using 42 and 45-French res

pectively.                 



                                4. Administration of anesthesia by Department of

 Anesthesia.                 



                                                                



                                REQUESTING PHYSICIAN: Dr. Nikolas Mcwilliams.        

         



                                                                



                                INDICATIONS/PREOPERATIVE DIAGNOSES: Dysphagia, G

ERD.                 



                                                                



                                SURGEON: Rebecca Cerrato MD                 



                                                                



                                ASSISTANT:                      



                                                                



                                ANESTHESIA:                     



                                                                



                                PROCEDURE IN DETAIL: The procedure with possible

 complication, alternatives                 



                                including but not limited to possibility of blee

ding, perforation, tear,                 



                                infection, sepsis, need for surgery, need for bl

ood transfusion,                 



                                                    anesthesia-related problem e

xplained to the patient. Informed consent obtained.

                                                    



                                 The patient was placed in left lateral position

. After appropriate level of                 



                                anesthesia, scope was passed. Esophageal mucosa 

in the proximal, mid, and                 



                                distal aspect appeared to be within normal range

. Other than slight                 



                                inflammation in the EG junction, no obstruction 

or no resistance was noted.                 



                                                                



                                                    In the stomach, although mod

erate-to-severe gastritis noted in a diffuse 

manner,                                             



                                        more predominant in the antrum. Biopsies

 done in different areas. Retroflexion 

                                        



                                was the same.                   



                                                                



                                Duodenal bulb, duodenal angle, duodenal part 2 a

ppeared to be within normal                 



                                range. After doing the biopsy. at this time, gus

dewire was placed. Serial                 



                                dilation performed starting from the least to th

e highest one as delineated                 



                                above.                          



                                                                



                                Having done the above procedure in safe diligent

 and satisfactory manner,                 



                                endoscope, rest of the endoscopic accessories we

re removed. The patient's                 



                                        oropharyngeal area cleaned out in respec

tful manner. The patient has been sent 

                                        



                                in excellent condition to postoperative recovery

, from there to home.                 



                                                                



                                IMPRESSION/POSTOPERATIVE DIAGNOSES:             

    



                                                                



                                PATIENT NAME: LEONA PERES  ACCOUNT #: [id number]

[id number]                 



                                                                



                                                                



                                                                



                                1. Mild esophagitis.                 



                                2. Dysphagia, although no stenosis noted. Empiri

c dilation done for symptom                 



                                relief.                         



                                3. Moderate-to-severe gastritis.                

 



                                                                



                                PLAN: Office followup. Follow up with biopsy; fo

r the time being, continue on                 



                                PPI.                            



                                                                



                                COMPLICATIONS: None.                 



                                                                



                                The patient tolerated the procedure well.       

          



                                                                



                                DISPOSITION: As above.                 



                                                                



                                Dictated By: Rebecca Cerrato MD                 



                                                                



                                Date Dictated: 2023 11:10:24              

   



                                Date Transcribed: 2023 18:03:53           

      



                                NMM/SUB                         



                                Job #: [id number]                 



                                Receipt ID: [id number]                 



                                                                



                                CC:                             



                                Nikolas Mcwilliams                      



                                Authenticated by Rebecca Cerrato MD On 2023 

07:45:45 AM                 



                                                                



                                                                



                                                                



                                                                



                                Electronically Signed by Rebecca Cerrato MD on  at 0745                 



                                                                



                                                                



                                                                



                                                                



                                                                



                                                                



                                                                



                                                                



                                                                



                                                                



                                                                



                                                                



                                                                



                                                                



                                                                



                                                                



                                                                



                                                                



                                                                



                                                                



                                                                



                                                                



                                                                



                                                                



                                                                



                                PATIENT NAME: LEONA PERES ACCOUNT #: [id number]

[id number]                 

 

                2023-03-15 13:51:00-00:00 [id number]                       [place] [address]                 



                                                                



                                PATIENT NAME: LEONA PERES  ADMIT DATE:                  



                                ACCOUNT NO: [id number] ROOM NO:               

  



                                MEDICAL RECORD NO: [id number] AGE: 82           

      



                                REPORT TYPE: eELECTROCARDIOGRAM SEX: F          

       



                                                                



                                ADMITTING PHYSICIAN:                 



                                ATTENDING PHYSICIAN: Rebecca Cerrato MD           

      



                                                                



                                                                



                                Order:                          



                                [id number]                   



                                Test Reason : PROCEDURE                 



                                 Test Date/Time Stamp:                 



                                Wed Mar 15 2023 13:51:23                 



                                Blood Pressure : ***/*** mmHG                 



                                Vent. Rate : 052 BPM Atrial Rate : 052 BPM      

           



                                 P-R Int : 214 ms  QRS Dur : 096 ms             

    



                                 QT Int : 436 ms P-R-T Axes : 077 063 061 degree

s                 



                                 QTc Int : 405 ms                 



                                                                



                                Sinus bradycardia with 1st degree AV block      

           



                                Nonspecific T wave abnormality                 



                                Abnormal ECG                    



                                No previous ECGs available                 



                                Confirmed by MD Whitehead Amir ([id number]) on  10:36:18 AM                 



                                                                



                                Referred By: Rebecca Cerrato Confirmed by:Norma sterling MD                 



                                                                



                                                                



                                                                



                                                                



                                                                



                                Electronically Signed by Norma Whitehead MD

 on 23 at 1036                 



                                                                



                                                                



                                                                



                                                                



                                                                



                                                                



                                                                



                                                                



                                                                



                                                                



                                                                



                                                                



                                                                



                                                                



                                                                



                                                                



                                                                



                                                                



                                                                



                                PATIENT NAME: LEONA PERES ACCOUNT #: [id number]

[id number]
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
## 2023-08-03 NOTE — EDPHYS
Physician Documentation                                                                           

 [facility]                                                                 

Name: Leona Peres                                                                              

Age: 82 yrs                                                                                       

Sex: Female                                                                                       

: 1940                                                                                   

MRN: [id number]                                                                                   

Arrival Date: 2023                                                                          

Time: 12:38                                                                                       

Account#: [id number]                                                                            

Bed 15                                                                                            

Private MD: Javier Mcwilliams T                                                                        

ED Physician Felicita Ricci                                                                     

HPI:                                                                                              

                                                                                             

17:49 This 82 yrs old Female presents to ER via Ambulatory with complaints of Weakness.       cp3 

15:35 Patient is a 82-year-old female with a history of A-fib, Alzheimer's disease,           cp3 

      lymphedema who presents to the ED secondary to generalized weakness and near syncopal       

      episode. Patient recently evaluated by her primary care doctor and treated her for UTI      

      with recent adjustments in antibiotics. Patient has been weak and falling and per           

      patient's relative at bedside fell 2 days ago and is scheduled to have a CAT scan           

      tomorrow the patient was too weak to wait for CT in the morning. Patient sent to ED for     

      further evaluation. Patient denies any pain but feels generally weak and fatigued..         

                                                                                                  

Historical:                                                                                       

- Allergies:                                                                                      

13:05 clindamycin HCl (bulk);                                                                 ss  

13:05 Darvocet-N 100;                                                                         ss  

13:05 PENICILLINS;                                                                            ss  

13:05 Strawberries;                                                                           ss  

13:05 Sulfa (Sulfonamide Antibiotics);                                                        ss  

- PMHx:                                                                                           

13:05 AFIB; Alzheimer's disease; Cancer; lymphedema; skipped heart beats; insomnia;           ss  

      Hypothyroidism; angina pectoris; Hypertension; left breast cancer; COPD;                    

      Hyperlipidemia; Fibromyalgia; Depression; GERD;                                             

- PSHx:                                                                                           

13:05 bilateral knee replacement; Appendectomy; mastectomy; Spinal Fusion; Total abdominal    ss  

      hysterectomy;                                                                               

                                                                                                  

- Immunization history:: Adult Immunizations unknown.                                             

- Social history:: Smoking status: unknown.                                                       

                                                                                                  

                                                                                                  

Vital Signs:                                                                                      

13:01  / 79; Pulse 68; Resp 20; Temp 98.1(O); Pulse Ox 97% on R/A; Weight 103.42 kg;    ss  

      Height 5 ft. 3 in. ; Pain 2/10;                                                             

14:15  / 78; Pulse 59; Resp 18; Pulse Ox 96% ;                                          nj1 

15:23  / 65; Pulse 59; Resp 18; Pulse Ox 95% on R/A;                                    nj1 

16:30  / 78; Pulse 61; Resp 15; Pulse Ox 98% on R/A;                                    nj1 

17:30  / 69; Pulse 59; Resp 18; Pulse Ox 95% on R/A;                                    nj1 

19:02  / 68; Pulse 59; Resp 19; Pulse Ox 97% on R/A;                                    nj1 

20:33  / 74; Pulse 59; Resp 18; Pulse Ox 95% on R/A;                                    nj1 

13:01 Body Mass Index 40.39 (103.42 kg, 160.02 cm)                                            ss  

13:01 Pain Scale: Adult                                                                       ss  

                                                                                                  

MDM:                                                                                              

12:51 Patient medically screened.                                                             cp3 

15:32 ED course: EKG interpreted by me at 1528: Normal Rinus rhythm, Rate 61 nonspecific ST   cp3 

      changes no evidence of acute MI.                                                            

                                                                                                  

                                                                                             

13:05 Order name: Blood Culture Adult (2)                                                     cp3 

/                                                                                             

13:05 Order name: CBC with Diff; Complete Time: 15:16                                         cp3 

08/                                                                                             

13:05 Order name: CMP; Complete Time: 15:16                                                   cp3 

/                                                                                             

13:05 Order name: Lactate w/ 2H reflex if indic.; Complete Time: 15:16                        cp3 

08/                                                                                             

13:05 Order name: Protime (+inr); Complete Time: 15:16                                        cp3 

/                                                                                             

13:05 Order name: Ptt, Activated; Complete Time: 15:16                                        cp3 

08/                                                                                             

13:05 Order name: Urinalysis W/Microscopic; Complete Time: 17:38                              cp3 

08/                                                                                             

13:05 Order name: Blood Culture Adult (2)                                                     cp3 

                                                                                             

19:07 Order name: CBC with Automated Diff                                                     EDMS

                                                                                             

19:07 Order name: CBC with Automated Diff                                                     EDMS

                                                                                             

19:07 Order name: CBC with Automated Diff                                                     EDMS

                                                                                             

19:07 Order name: CBC with Automated Diff                                                     EDMS

                                                                                             

19:07 Order name: Comprehensive Metabolic Panel                                               EDMS

                                                                                             

19:07 Order name: Comprehensive Metabolic Panel                                               EDMS

                                                                                             

19:07 Order name: Comprehensive Metabolic Panel                                               EDMS

                                                                                             

19:07 Order name: Comprehensive Metabolic Panel                                               EDMS

                                                                                             

13:23 Order name: Head C Spine Mpr Wo Con; Complete Time: 14:40                               EDMS

                                                                                             

13:05 Order name: EKG; Complete Time: 13:06                                                   cp3 

                                                                                             

19:07 Order name: Physical Therapy Consult                                                    EDMS

                                                                                             

19:07 Order name: Social Service Consult                                                      [facility]

                                                                                             

19:07 Order name: Heart Healthy                                                               [facility]

                                                                                             

19:22 Order name: Social Service Consult                                                      [facility]

                                                                                             

13:05 Order name: Cardiac monitoring; Complete Time: 14:38                                    cp3 

                                                                                             

13:05 Order name: EKG - Nurse/Tech; Complete Time: 15:32                                      cp3 

                                                                                             

13:05 Order name: IV Saline Lock - Large Bore; Complete Time: 14:38                           cp3 

                                                                                             

13:05 Order name: Labs collected and sent; Complete Time: 14:38                               cp3 

                                                                                             

13:05 Order name: O2 Per Protocol; Complete Time: 14:38                                       cp3 

                                                                                             

13:05 Order name: O2 Sat Monitoring; Complete Time: 14:39                                     cp3 

                                                                                             

13:05 Order name: Vital Signs; Complete Time: 14:39                                           cp3 

                                                                                                  

Administered Medications:                                                                         

14:12 Drug: Lactated Ringers Solution IV 1000 ml Route: IV; Rate: 126 ml/hr; Site: right      nj1 

      antecubital;                                                                                

21:49 Follow up: Response: No adverse reaction; IV Status: Completed infusion; IV Intake:     jw7 

      1000ml                                                                                      

                                                                                                  

                                                                                                  

Disposition Summary:                                                                              

23 17:46                                                                                    

Hospitalization Ordered                                                                           

      Hospitalization Status: Inpatient Admission                                             cp3 

      [facility]                                                 cp3 

      Condition: Stable                                                                       cp3 

      Problem: new                                                                            cp3 

      Symptoms: have worsened                                                                 cp3 

      Bed/Room Type: Standard                                                                 cp3 

      Provider: Pollo Sweeney(23 17:49)                                                 cp3 

      Room Assignment: [facility](23 20:26)                                                    eb1 

      Diagnosis                                                                                   

        - Altered mental status, unspecified                                                  cp3 

        - Muscle weakness (generalized)                                                       cp3 

        - Contusion of unspecified part of head                                               cp3 

        - Moderate protein-calorie malnutrition                                               cp3 

      Forms:                                                                                      

        - Medication Reconciliation Form                                                      cp3 

        - SBAR form                                                                           cp3 

Signatures:                                                                                       

Dispatcher [employer]                           Felicita Stanley MD MD   cp3                                                  

Kimberly Mendosa RN RN ss Garcia, Cindy, RN RN cg Basinger, Emily, RN RN   eb1                                                  

Wanda Kaufman RN RN nj1                                                  

Xiomara Summers RN   jw7                                                  

                                                                                                  

Corrections: (The following items were deleted from the chart)                                    

13:23 13:06 Head Brain Wo Cont+CT.RAD.BRZ ordered. EDMS                                       EDMS

17:49 17:46 Adiel Riccijuanlilli cp3                                                              cp3 

20:08 17:46 cp3                                                                               cg  

20:26 20:08 407 cg                                                                            eb1 

                                                                                                  

**************************************************************************************************

## 2023-08-04 LAB
ALBUMIN SERPL BCP-MCNC: 2.2 G/DL (ref 3.4–5)
ALP SERPL-CCNC: 52 U/L (ref 45–117)
ALT SERPL W P-5'-P-CCNC: 16 U/L (ref 13–56)
AST SERPL W P-5'-P-CCNC: 21 U/L (ref 15–37)
BLD SMEAR INTERP: (no result)
BUN BLD-MCNC: 13 MG/DL (ref 7–18)
GLUCOSE SERPLBLD-MCNC: 96 MG/DL (ref 74–106)
HCT VFR BLD CALC: 37.7 % (ref 36–45)
LYMPHOCYTES # SPEC AUTO: 2.4 K/UL (ref 0.7–4.9)
MCV RBC: 98.8 FL (ref 80–100)
MORPHOLOGY BLD-IMP: (no result)
PMV BLD: 8 FL (ref 7.6–11.3)
POTASSIUM SERPL-SCNC: 4.2 MEQ/L (ref 3.5–5.1)
RBC # BLD: 3.82 M/UL (ref 3.86–4.86)
WBC # BLD AUTO: 7.7 THOU/UL (ref 4.3–10.9)

## 2023-08-04 RX ADMIN — PANTOPRAZOLE SODIUM SCH MG: 40 TABLET, DELAYED RELEASE ORAL at 06:04

## 2023-08-04 RX ADMIN — DULOXETINE HYDROCHLORIDE SCH MG: 30 CAPSULE, DELAYED RELEASE ORAL at 21:20

## 2023-08-04 RX ADMIN — ACETAMINOPHEN SCH: 500 TABLET, FILM COATED ORAL at 13:55

## 2023-08-04 RX ADMIN — ACETAMINOPHEN SCH MG: 500 TABLET, FILM COATED ORAL at 08:51

## 2023-08-04 RX ADMIN — TOPIRAMATE SCH MG: 100 TABLET, FILM COATED ORAL at 08:51

## 2023-08-04 RX ADMIN — BUPROPION HYDROCHLORIDE SCH: 150 TABLET, EXTENDED RELEASE ORAL at 09:00

## 2023-08-04 RX ADMIN — MEMANTINE HYDROCHLORIDE SCH MG: 10 TABLET ORAL at 08:49

## 2023-08-04 RX ADMIN — ACETAMINOPHEN SCH MG: 500 TABLET, FILM COATED ORAL at 21:21

## 2023-08-04 RX ADMIN — TOPIRAMATE SCH MG: 100 TABLET, FILM COATED ORAL at 21:21

## 2023-08-04 RX ADMIN — BUPROPION HYDROCHLORIDE SCH MG: 150 TABLET, EXTENDED RELEASE ORAL at 11:42

## 2023-08-04 RX ADMIN — NITROFURANTOIN MONOHYDRATE/MACROCRYSTALLINE SCH MG: 25; 75 CAPSULE ORAL at 21:20

## 2023-08-04 RX ADMIN — ACETAMINOPHEN SCH MG: 500 TABLET, FILM COATED ORAL at 14:48

## 2023-08-04 RX ADMIN — NITROFURANTOIN MONOHYDRATE/MACROCRYSTALLINE SCH MG: 25; 75 CAPSULE ORAL at 08:50

## 2023-08-04 RX ADMIN — LEVOTHYROXINE SODIUM SCH MG: 75 TABLET ORAL at 06:04

## 2023-08-04 RX ADMIN — APIXABAN SCH MG: 5 TABLET, FILM COATED ORAL at 08:51

## 2023-08-04 RX ADMIN — APIXABAN SCH MG: 5 TABLET, FILM COATED ORAL at 21:20

## 2023-08-04 NOTE — P.PN
Subjective


Date of Service: 08/04/23


Primary Care Provider: Oj


Chief Complaint: uti protein energy malnutrition 


Subjective: No new changes





was able to get to the commode with a wheelchair





Review of Systems


10-point ROS is otherwise unremarkable


General: Weakness





Physical Examination





- Vital Signs


Temperature: 97.7 F


Blood Pressure: 145/73


Pulse: 59


Respirations: 20


Pulse Ox (%): 96





- Physical Exam


General: Alert, In no apparent distress


HEENT: Atraumatic, PERRLA, EOMI


Neck: Supple, JVD not distended


Respiratory: Clear to auscultation bilaterally, Normal air movement


Cardiovascular: Regular rate/rhythm, Normal S1 S2


Gastrointestinal: Normal bowel sounds, No tenderness


Musculoskeletal: No tenderness


Integumentary: No rashes


Neurological: Normal speech, Normal tone, Normal affect


Lymphatics: No axilla or inguinal lymphadenopathy





- Studies


Laboratory Data (last 24 hrs)











  08/03/23 08/03/23 08/03/23





  14:11 14:11 14:11


 


WBC    6.60


 


Hgb    13.7


 


Hct    43.0


 


Plt Count    238


 


PT  15.8 H  


 


INR  1.44  


 


APTT  37.8 H  


 


Sodium   136 


 


Potassium   4.0 


 


BUN   16 


 


Creatinine   1.04 H 


 


Glucose   159 H 


 


Total Bilirubin   0.3 


 


AST   18 


 


ALT   17 


 


Alkaline Phosphatase   64 








Microbiology Data (last 24 hrs): 








08/03/23 14:11   Blood  - Blood   Anaerobic Blood Culture - Final


08/03/23 14:30   Blood  - Blood   Anaerobic Blood Culture - Final





Medications List Reviewed: Yes





Assessment And Plan





- Current Problems (Diagnosis)


(1) Severe protein-energy malnutrition


Current Visit: Yes   Status: Chronic   


Plan: 


will order pt and see if we can move the patient to a PT center or snf.  She is 

getting weaker.  Can not transfer without assistance.   


8/4


Patient is doing a little better.  Will need to start PT and start weaning her 

med list. Will start with simvastatin.  No mortality benefit at this age


sedatives and narcotics should be minimized.  Will put her on scheduled tylenol 

to try decreasing the hydrocodone


consider decreasing the beta blockers and flecanide.  








(2) Afib


Current Visit: No   Status: Chronic   


Plan: 


may need to wean down her beta blocker


8/4 


pulse is low will try lowering he beta blocker dosage.   


Qualifiers: 


   Atrial fibrillation type: unspecified chronic   Qualified Code(s): I48.20 - 

Chronic atrial fibrillation, unspecified; I48.2 - Chronic atrial fibrillation   





(3) GERD (gastroesophageal reflux disease)


Current Visit: No   Status: Acute   


Plan: 


restart home protonix.  


Qualifiers: 


   Esophagitis presence: esophagitis presence not specified   Qualified Code(s):

K21.9 - Gastro-esophageal reflux disease without esophagitis   





(4) Dementia


Current Visit: No   Status: Chronic   


Plan: 


restart namenda


Qualifiers: 


   Dementia type: unspecified type   Qualified Code(s): F03.90 - Unspecified 

dementia, unspecified severity, without behavioral disturbance, psychotic 

disturbance, mood disturbance, and anxiety   





(5) UTI (urinary tract infection)


Onset Date: 09/25/18   Current Visit: No   Status: Acute   


Plan: 


will restart the levaquin and the nitrofurantin 


Qualifiers: 


   Urinary tract infection type: acute cystitis   Hematuria presence: without 

hematuria   Qualified Code(s): N30.00 - Acute cystitis without hematuria   


Discharge Plan: Nursing Home


Plan to discharge in: Greater than 2 days





- Code Status/Comfort Care


Code Status Assessed: No


Physician Review: Patient Assessed, Agree with Above Assessment and Plan


Critical Care: No


Time Spent Managing PTS Care (In Minutes): 20

## 2023-08-05 LAB
ALBUMIN SERPL BCP-MCNC: 2.6 G/DL (ref 3.4–5)
ALP SERPL-CCNC: 60 U/L (ref 45–117)
ALT SERPL W P-5'-P-CCNC: 18 U/L (ref 13–56)
AST SERPL W P-5'-P-CCNC: 21 U/L (ref 15–37)
BUN BLD-MCNC: 10 MG/DL (ref 7–18)
GLUCOSE SERPLBLD-MCNC: 97 MG/DL (ref 74–106)
HCT VFR BLD CALC: 42.5 % (ref 36–45)
LYMPHOCYTES # SPEC AUTO: 1.7 K/UL (ref 0.7–4.9)
MCV RBC: 99.8 FL (ref 80–100)
PMV BLD: 7.9 FL (ref 7.6–11.3)
POTASSIUM SERPL-SCNC: 3.7 MEQ/L (ref 3.5–5.1)
RBC # BLD: 4.26 M/UL (ref 3.86–4.86)
WBC # BLD AUTO: 5.6 THOU/UL (ref 4.3–10.9)

## 2023-08-05 RX ADMIN — Medication SCH MG: at 20:02

## 2023-08-05 RX ADMIN — APIXABAN SCH MG: 5 TABLET, FILM COATED ORAL at 10:00

## 2023-08-05 RX ADMIN — NITROFURANTOIN MONOHYDRATE/MACROCRYSTALLINE SCH MG: 25; 75 CAPSULE ORAL at 20:05

## 2023-08-05 RX ADMIN — MEMANTINE HYDROCHLORIDE SCH MG: 10 TABLET ORAL at 10:09

## 2023-08-05 RX ADMIN — MECLIZINE HYDROCHLORIDE PRN MG: 12.5 TABLET ORAL at 13:46

## 2023-08-05 RX ADMIN — TOPIRAMATE SCH MG: 100 TABLET, FILM COATED ORAL at 10:01

## 2023-08-05 RX ADMIN — NITROFURANTOIN MONOHYDRATE/MACROCRYSTALLINE SCH MG: 25; 75 CAPSULE ORAL at 10:02

## 2023-08-05 RX ADMIN — Medication SCH MG: at 16:26

## 2023-08-05 RX ADMIN — BUPROPION HYDROCHLORIDE SCH MG: 150 TABLET, EXTENDED RELEASE ORAL at 10:09

## 2023-08-05 RX ADMIN — ACETAMINOPHEN SCH MG: 500 TABLET, FILM COATED ORAL at 13:24

## 2023-08-05 RX ADMIN — ACETAMINOPHEN SCH MG: 500 TABLET, FILM COATED ORAL at 10:01

## 2023-08-05 RX ADMIN — DULOXETINE HYDROCHLORIDE SCH: 30 CAPSULE, DELAYED RELEASE ORAL at 20:02

## 2023-08-05 RX ADMIN — LEVOTHYROXINE SODIUM SCH MG: 75 TABLET ORAL at 06:00

## 2023-08-05 RX ADMIN — ACETAMINOPHEN SCH MG: 500 TABLET, FILM COATED ORAL at 20:03

## 2023-08-05 RX ADMIN — MECLIZINE HYDROCHLORIDE PRN MG: 12.5 TABLET ORAL at 20:08

## 2023-08-05 RX ADMIN — TOPIRAMATE SCH MG: 100 TABLET, FILM COATED ORAL at 20:02

## 2023-08-05 RX ADMIN — PANTOPRAZOLE SODIUM SCH MG: 40 TABLET, DELAYED RELEASE ORAL at 06:00

## 2023-08-05 RX ADMIN — APIXABAN SCH MG: 5 TABLET, FILM COATED ORAL at 20:02

## 2023-08-05 NOTE — P.PN
Subjective


Date of Service: 08/05/23


Primary Care Provider: Oj


Chief Complaint: uti protein energy malnutrition 


Subjective: No new changes





patient still complainting of weakness 





Review of Systems


10-point ROS is otherwise unremarkable


General: Weakness





Physical Examination





- Vital Signs


Temperature: 98 F


Blood Pressure: 141/70


Pulse: 76


Respirations: 20


Pulse Ox (%): 100





- Physical Exam


General: Alert, Mild distress


HEENT: Atraumatic, PERRLA, EOMI


Neck: Supple, JVD not distended


Respiratory: Clear to auscultation bilaterally, Normal air movement


Cardiovascular: Regular rate/rhythm, Normal S1 S2


Gastrointestinal: Normal bowel sounds, No tenderness


Musculoskeletal: No tenderness


Integumentary: No rashes


Neurological: Normal speech, Normal tone, Normal affect


Lymphatics: No axilla or inguinal lymphadenopathy





- Studies


Microbiology Data (last 24 hrs): 








08/03/23 14:30   Blood  - Blood   Anaerobic Blood Culture - Final


08/03/23 14:11   Blood  - Blood   Anaerobic Blood Culture - Final





Medications List Reviewed: Yes





Assessment And Plan





- Current Problems (Diagnosis)


(1) Severe protein-energy malnutrition


Current Visit: Yes   Status: Chronic   


Plan: 


will order pt and see if we can move the patient to a PT center or snf.  She is 

getting weaker.  Can not transfer without assistance.   


8/4


Patient is doing a little better.  Will need to start PT and start weaning her 

med list. Will start with simvastatin.  No mortality benefit at this age


sedatives and narcotics should be minimized.  Will put her on scheduled tylenol 

to try decreasing the hydrocodone


consider decreasing the beta blockers and flecanide.  








(2) Afib


Current Visit: No   Status: Chronic   


Plan: 


may need to wean down her beta blocker


8/5


pulse is still low.  Will stop the betablocker and continue flecanide on the 

patient 


Qualifiers: 


   Atrial fibrillation type: unspecified chronic   Qualified Code(s): I48.20 - 

Chronic atrial fibrillation, unspecified; I48.2 - Chronic atrial fibrillation   





(3) GERD (gastroesophageal reflux disease)


Current Visit: No   Status: Acute   


Plan: 


restart home protonix.  


Qualifiers: 


   Esophagitis presence: esophagitis presence not specified   Qualified Code(s):

K21.9 - Gastro-esophageal reflux disease without esophagitis   





(4) Dementia


Current Visit: No   Status: Chronic   


Plan: 


restart namenda


Qualifiers: 


   Dementia type: unspecified type   Qualified Code(s): F03.90 - Unspecified 

dementia, unspecified severity, without behavioral disturbance, psychotic 

disturbance, mood disturbance, and anxiety   





(5) UTI (urinary tract infection)


Onset Date: 09/25/18   Current Visit: No   Status: Acute   


Plan: 


will restart the levaquin and the nitrofurantin 


Qualifiers: 


   Urinary tract infection type: acute cystitis   Hematuria presence: without 

hematuria   Qualified Code(s): N30.00 - Acute cystitis without hematuria   


Discharge Plan: Home


Plan to discharge in: 24 Hours





- Code Status/Comfort Care


Code Status Assessed: No


Physician Review: Patient Assessed, Agree with Above Assessment and Plan


Critical Care: No


Time Spent Managing PTS Care (In Minutes): 20

## 2023-08-06 LAB
ALBUMIN SERPL BCP-MCNC: 2.7 G/DL (ref 3.4–5)
ALP SERPL-CCNC: 63 U/L (ref 45–117)
ALT SERPL W P-5'-P-CCNC: 21 U/L (ref 13–56)
AST SERPL W P-5'-P-CCNC: 21 U/L (ref 15–37)
BUN BLD-MCNC: 11 MG/DL (ref 7–18)
GLUCOSE SERPLBLD-MCNC: 97 MG/DL (ref 74–106)
HCT VFR BLD CALC: 43.2 % (ref 36–45)
LYMPHOCYTES # SPEC AUTO: 1.7 K/UL (ref 0.7–4.9)
MCV RBC: 99.2 FL (ref 80–100)
PMV BLD: 8.2 FL (ref 7.6–11.3)
POTASSIUM SERPL-SCNC: 3.6 MEQ/L (ref 3.5–5.1)
RBC # BLD: 4.35 M/UL (ref 3.86–4.86)
WBC # BLD AUTO: 6.4 THOU/UL (ref 4.3–10.9)

## 2023-08-06 RX ADMIN — LEVOTHYROXINE SODIUM SCH MG: 75 TABLET ORAL at 05:35

## 2023-08-06 RX ADMIN — Medication SCH MG: at 22:12

## 2023-08-06 RX ADMIN — Medication SCH MG: at 22:13

## 2023-08-06 RX ADMIN — MEMANTINE HYDROCHLORIDE SCH MG: 10 TABLET ORAL at 09:18

## 2023-08-06 RX ADMIN — TOPIRAMATE SCH MG: 100 TABLET, FILM COATED ORAL at 22:11

## 2023-08-06 RX ADMIN — TOPIRAMATE SCH MG: 100 TABLET, FILM COATED ORAL at 09:18

## 2023-08-06 RX ADMIN — NITROFURANTOIN MONOHYDRATE/MACROCRYSTALLINE SCH MG: 25; 75 CAPSULE ORAL at 09:17

## 2023-08-06 RX ADMIN — NITROFURANTOIN MONOHYDRATE/MACROCRYSTALLINE SCH MG: 25; 75 CAPSULE ORAL at 22:18

## 2023-08-06 RX ADMIN — ACETAMINOPHEN SCH MG: 500 TABLET, FILM COATED ORAL at 09:18

## 2023-08-06 RX ADMIN — ACETAMINOPHEN SCH MG: 500 TABLET, FILM COATED ORAL at 13:45

## 2023-08-06 RX ADMIN — ACETAMINOPHEN SCH: 500 TABLET, FILM COATED ORAL at 21:00

## 2023-08-06 RX ADMIN — MECLIZINE HYDROCHLORIDE PRN MG: 12.5 TABLET ORAL at 13:19

## 2023-08-06 RX ADMIN — PANTOPRAZOLE SODIUM SCH MG: 40 TABLET, DELAYED RELEASE ORAL at 05:35

## 2023-08-06 RX ADMIN — BUPROPION HYDROCHLORIDE SCH MG: 150 TABLET, EXTENDED RELEASE ORAL at 09:19

## 2023-08-06 RX ADMIN — HYDROCODONE BITARTRATE AND ACETAMINOPHEN PRN TAB: 5; 325 TABLET ORAL at 22:18

## 2023-08-06 RX ADMIN — APIXABAN SCH MG: 5 TABLET, FILM COATED ORAL at 22:10

## 2023-08-06 RX ADMIN — Medication SCH MG: at 09:00

## 2023-08-06 RX ADMIN — APIXABAN SCH MG: 5 TABLET, FILM COATED ORAL at 09:19

## 2023-08-06 NOTE — P.PN
Subjective


Date of Service: 08/06/23


Primary Care Provider: Oj


Chief Complaint: uti protein energy malnutrition 





patient still complainting of weakness 





Review of Systems


10-point ROS is otherwise unremarkable


General: Weakness


Musculoskeletal: Neck Pain





Physical Examination





- Vital Signs


Temperature: 97.8 F


Blood Pressure: 139/67


Pulse: 65


Respirations: 16


Pulse Ox (%): 91





- Physical Exam


General: Alert, In no apparent distress


HEENT: Atraumatic, PERRLA, EOMI


Neck: Supple, JVD not distended


Respiratory: Clear to auscultation bilaterally, Normal air movement


Cardiovascular: Regular rate/rhythm, Normal S1 S2


Gastrointestinal: Normal bowel sounds, No tenderness


Musculoskeletal: No tenderness


Integumentary: No rashes


Neurological: Normal speech, Normal tone, Normal affect


Lymphatics: No axilla or inguinal lymphadenopathy





- Studies


Medications List Reviewed: Yes





Assessment And Plan





- Current Problems (Diagnosis)


(1) Severe protein-energy malnutrition


Current Visit: Yes   Status: Chronic   


Plan: 


will order pt and see if we can move the patient to a PT center or snf.  She is 

getting weaker.  Can not transfer without assistance.   


8.6


will have the patient sit up in the chair today.  She seems to have assumed the 

sick role.  Have had a long discussion  with her children  Asked them to be my 

good/bad  to get the patient more mobile.   As long term bed bound status is

a very poor prognostic factor for patients.  The adult son and daughter agreed 

with this assessment 








(2) Afib


Current Visit: No   Status: Chronic   


Plan: 


may need to wean down her beta blocker


8/5


pulse is still low.  Will stop the betablocker and continue flecanide on the 

patient 


Qualifiers: 


   Atrial fibrillation type: unspecified chronic   Qualified Code(s): I48.20 - 

Chronic atrial fibrillation, unspecified; I48.2 - Chronic atrial fibrillation   





(3) GERD (gastroesophageal reflux disease)


Current Visit: No   Status: Acute   


Plan: 


restart home protonix.  


Qualifiers: 


   Esophagitis presence: esophagitis presence not specified   Qualified Code(s):

K21.9 - Gastro-esophageal reflux disease without esophagitis   





(4) Dementia


Current Visit: No   Status: Chronic   


Plan: 


restart namenda


Qualifiers: 


   Dementia type: unspecified type   Qualified Code(s): F03.90 - Unspecified 

dementia, unspecified severity, without behavioral disturbance, psychotic 

disturbance, mood disturbance, and anxiety   





(5) UTI (urinary tract infection)


Onset Date: 09/25/18   Current Visit: No   Status: Acute   


Plan: 


will restart the levaquin and the nitrofurantin 


Qualifiers: 


   Urinary tract infection type: acute cystitis   Hematuria presence: without 

hematuria   Qualified Code(s): N30.00 - Acute cystitis without hematuria   


Discharge Plan: Nursing Home


Plan to discharge in: 24 Hours





- Code Status/Comfort Care


Code Status Assessed: No


Physician Review: Patient Assessed, Agree with Above Assessment and Plan


Critical Care: No


Time Spent Managing PTS Care (In Minutes): 25

## 2023-08-07 VITALS — OXYGEN SATURATION: 95 %

## 2023-08-07 RX ADMIN — APIXABAN SCH MG: 5 TABLET, FILM COATED ORAL at 20:50

## 2023-08-07 RX ADMIN — Medication SCH MG: at 20:51

## 2023-08-07 RX ADMIN — MEMANTINE HYDROCHLORIDE SCH MG: 10 TABLET ORAL at 09:48

## 2023-08-07 RX ADMIN — ACETAMINOPHEN SCH MG: 500 TABLET, FILM COATED ORAL at 09:47

## 2023-08-07 RX ADMIN — LEVOTHYROXINE SODIUM SCH MG: 75 TABLET ORAL at 06:11

## 2023-08-07 RX ADMIN — NITROFURANTOIN MONOHYDRATE/MACROCRYSTALLINE SCH MG: 25; 75 CAPSULE ORAL at 20:49

## 2023-08-07 RX ADMIN — MECLIZINE HYDROCHLORIDE PRN MG: 12.5 TABLET ORAL at 23:01

## 2023-08-07 RX ADMIN — ACETAMINOPHEN SCH MG: 500 TABLET, FILM COATED ORAL at 14:43

## 2023-08-07 RX ADMIN — TOPIRAMATE SCH MG: 100 TABLET, FILM COATED ORAL at 20:49

## 2023-08-07 RX ADMIN — Medication SCH MG: at 09:46

## 2023-08-07 RX ADMIN — TOPIRAMATE SCH MG: 100 TABLET, FILM COATED ORAL at 09:47

## 2023-08-07 RX ADMIN — PANTOPRAZOLE SODIUM SCH MG: 40 TABLET, DELAYED RELEASE ORAL at 06:11

## 2023-08-07 RX ADMIN — APIXABAN SCH MG: 5 TABLET, FILM COATED ORAL at 09:47

## 2023-08-07 RX ADMIN — NITROFURANTOIN MONOHYDRATE/MACROCRYSTALLINE SCH MG: 25; 75 CAPSULE ORAL at 09:46

## 2023-08-07 RX ADMIN — MECLIZINE HYDROCHLORIDE PRN MG: 12.5 TABLET ORAL at 14:43

## 2023-08-07 RX ADMIN — BUPROPION HYDROCHLORIDE SCH MG: 150 TABLET, EXTENDED RELEASE ORAL at 09:46

## 2023-08-07 RX ADMIN — ACETAMINOPHEN SCH MG: 500 TABLET, FILM COATED ORAL at 20:50

## 2023-08-07 NOTE — EKG
Test Date:    2023-08-03               Test Time:    15:28:51

Technician:   NJ                                     

                                                     

MEASUREMENT RESULTS:                                       

Intervals:                                           

Rate:         61                                     

IN:           228                                    

QRSD:         100                                    

QT:           424                                    

QTc:          426                                    

Axis:                                                

P:            77                                     

IN:           228                                    

QRS:          75                                     

T:            75                                     

                                                     

INTERPRETIVE STATEMENTS:                                       

                                                     

Sinus rhythm with 1st degree AV block

ST & T wave abnormality, consider anterior ischemia

Abnormal ECG

Compared to ECG 12/14/2022 18:31:55

Possible ischemia now present

Right-axis deviation no longer present

ST (T wave) deviation still present



Electronically Signed On 08-07-23 13:11:04 CDT by Michele Pina

## 2023-08-07 NOTE — P.PN
Subjective


Date of Service: 08/07/23


Primary Care Provider: Oj


Chief Complaint: uti protein energy malnutrition 





patient has some relieve with anti vert 





Review of Systems


10-point ROS is otherwise unremarkable


General: Weakness, Other (dizziness)





Physical Examination





- Vital Signs


Temperature: 97 F


Blood Pressure: 156/75


Pulse: 67


Respirations: 18


Pulse Ox (%): 94





- Physical Exam


General: Alert, In no apparent distress


HEENT: Atraumatic, PERRLA, EOMI


Neck: Supple, JVD not distended


Respiratory: Clear to auscultation bilaterally, Normal air movement


Cardiovascular: Regular rate/rhythm, Normal S1 S2


Gastrointestinal: Normal bowel sounds, No tenderness


Musculoskeletal: No tenderness


Integumentary: No rashes


Neurological: Normal speech, Normal tone, Normal affect


Lymphatics: No axilla or inguinal lymphadenopathy





- Studies


Medications List Reviewed: Yes





Assessment And Plan





- Current Problems (Diagnosis)


(1) Severe protein-energy malnutrition


Current Visit: Yes   Status: Chronic   


Plan: 


will order pt and see if we can move the patient to a PT center or snf.  She is 

getting weaker.  Can not transfer without assistance.   


8.7


Possible transfer to [place] today








(2) Afib


Current Visit: No   Status: Chronic   


Plan: 


may need to wean down her beta blocker


8/5


pulse is still low.  Will stop the betablocker and continue flecanide on the pat

ient 


Qualifiers: 


   Atrial fibrillation type: unspecified chronic   Qualified Code(s): I48.20 - 

Chronic atrial fibrillation, unspecified; I48.2 - Chronic atrial fibrillation   





(3) GERD (gastroesophageal reflux disease)


Current Visit: No   Status: Acute   


Plan: 


restart home protonix.  


Qualifiers: 


   Esophagitis presence: esophagitis presence not specified   Qualified Code(s):

K21.9 - Gastro-esophageal reflux disease without esophagitis   





(4) Dementia


Current Visit: No   Status: Chronic   


Plan: 


restart namenda


Qualifiers: 


   Dementia type: unspecified type   Qualified Code(s): F03.90 - Unspecified 

dementia, unspecified severity, without behavioral disturbance, psychotic 

disturbance, mood disturbance, and anxiety   





(5) UTI (urinary tract infection)


Onset Date: 09/25/18   Current Visit: No   Status: Acute   


Plan: 


will restart the levaquin and the nitrofurantin 


Qualifiers: 


   Urinary tract infection type: acute cystitis   Hematuria presence: without 

hematuria   Qualified Code(s): N30.00 - Acute cystitis without hematuria   


Discharge Plan: Nursing Home


Plan to discharge in: 24 Hours





- Code Status/Comfort Care


Code Status Assessed: No


Physician Review: Patient Assessed, Agree with Above Assessment and Plan


Critical Care: No


Time Spent Managing PTS Care (In Minutes): 20

## 2023-08-08 RX ADMIN — TOPIRAMATE SCH MG: 100 TABLET, FILM COATED ORAL at 08:48

## 2023-08-08 RX ADMIN — Medication SCH MG: at 08:48

## 2023-08-08 RX ADMIN — MECLIZINE HYDROCHLORIDE PRN MG: 12.5 TABLET ORAL at 12:08

## 2023-08-08 RX ADMIN — MEMANTINE HYDROCHLORIDE SCH MG: 10 TABLET ORAL at 08:48

## 2023-08-08 RX ADMIN — NITROFURANTOIN MONOHYDRATE/MACROCRYSTALLINE SCH MG: 25; 75 CAPSULE ORAL at 08:48

## 2023-08-08 RX ADMIN — Medication SCH MG: at 08:49

## 2023-08-08 RX ADMIN — Medication SCH MG: at 20:02

## 2023-08-08 RX ADMIN — ACETAMINOPHEN SCH MG: 500 TABLET, FILM COATED ORAL at 08:48

## 2023-08-08 RX ADMIN — APIXABAN SCH MG: 5 TABLET, FILM COATED ORAL at 20:04

## 2023-08-08 RX ADMIN — ACETAMINOPHEN SCH MG: 500 TABLET, FILM COATED ORAL at 20:03

## 2023-08-08 RX ADMIN — BUPROPION HYDROCHLORIDE SCH MG: 150 TABLET, EXTENDED RELEASE ORAL at 08:47

## 2023-08-08 RX ADMIN — ACETAMINOPHEN SCH MG: 500 TABLET, FILM COATED ORAL at 12:08

## 2023-08-08 RX ADMIN — NITROFURANTOIN MONOHYDRATE/MACROCRYSTALLINE SCH MG: 25; 75 CAPSULE ORAL at 20:02

## 2023-08-08 RX ADMIN — LEVOTHYROXINE SODIUM SCH MG: 75 TABLET ORAL at 05:57

## 2023-08-08 RX ADMIN — Medication SCH MG: at 20:01

## 2023-08-08 RX ADMIN — APIXABAN SCH MG: 5 TABLET, FILM COATED ORAL at 08:48

## 2023-08-08 RX ADMIN — MECLIZINE HYDROCHLORIDE PRN MG: 12.5 TABLET ORAL at 08:48

## 2023-08-08 RX ADMIN — PANTOPRAZOLE SODIUM SCH MG: 40 TABLET, DELAYED RELEASE ORAL at 05:57

## 2023-08-08 RX ADMIN — TOPIRAMATE SCH MG: 100 TABLET, FILM COATED ORAL at 20:02

## 2023-08-08 NOTE — P.DS
Admission Date: 08/03/23


Discharge Date: 08/08/23


Primary Care Provider: Oj


Disposition: TRANSFER TO NURSING HOME


Discharge Condition: GOOD


Reason for Admission: uti protein energy malnutrition 





- Problems


(1) Severe protein-energy malnutrition


Current Visit: Yes   Status: Chronic   





(2) Afib


Current Visit: No   Status: Chronic   


Qualifiers: 


   Atrial fibrillation type: unspecified chronic   Qualified Code(s): I48.20 - 

Chronic atrial fibrillation, unspecified; I48.2 - Chronic atrial fibrillation   





(3) GERD (gastroesophageal reflux disease)


Current Visit: No   Status: Acute   


Qualifiers: 


   Esophagitis presence: esophagitis presence not specified   Qualified Code(s):

K21.9 - Gastro-esophageal reflux disease without esophagitis   





(4) Dementia


Current Visit: No   Status: Chronic   


Qualifiers: 


   Dementia type: unspecified type   Qualified Code(s): F03.90 - Unspecified 

dementia, unspecified severity, without behavioral disturbance, psychotic 

disturbance, mood disturbance, and anxiety   





(5) UTI (urinary tract infection)


Onset Date: 09/25/18   Current Visit: No   Status: Acute   


Qualifiers: 


   Urinary tract infection type: acute cystitis   Hematuria presence: without 

hematuria   Qualified Code(s): N30.00 - Acute cystitis without hematuria   


Brief History of Present Illness: 





patient of Dr. Mcwilliams.  She has a history of copd, afib hypothyroidism.  The 

patient fell 10 days ago and hit her head.  Since then she has become more bed 

bound.  Needs assistance to get to and from the [facility].  She had a uti.  Was 

seen by Dr. Mcwilliams and was started on levaquin and then added nitrofurantin. She 

was getting weaker and was told to come to the ER.  She has no confusion.  

However the patient has no home health or PT.  She has one son at home.  She is 

a fall risk 


Hospital Course: 





Patient came in with uti and decreased activity.  She was treated with iv 

antibiotics.  The patient worked well with PT.  She will be sent to a snf for 

further PT.  The family chose [facility]  Thank you for allowing me to take

part in her care. 


Vital Signs/Physical Exam: 














Temp Pulse Resp BP Pulse Ox


 


 97.9 F   81   18   147/63 H  94 


 


 08/08/23 07:59  08/08/23 07:59  08/08/23 07:59  08/08/23 07:59  08/08/23 07:59








General: Alert, In no apparent distress


HEENT: Atraumatic, PERRLA, EOMI


Neck: Supple, JVD not distended


Respiratory: Clear to auscultation bilaterally, Normal air movement


Cardiovascular: Regular rate/rhythm, Normal S1 S2


Gastrointestinal: Normal bowel sounds, No tenderness


Musculoskeletal: No tenderness


Integumentary: No rashes


Neurological: Normal speech, Normal tone, Normal affect


Lymphatics: No axilla or inguinal lymphadenopathy


Laboratory Data at Discharge: 














WBC  6.40 thou/uL (4.3-10.9)   08/06/23  06:51    


 


Hgb  13.9 g/dL (12.0-15.0)   08/06/23  06:51    


 


Hct  43.2 % (36.0-45.0)   08/06/23  06:51    


 


Plt Count  222 thou/uL (152-406)   08/06/23  06:51    


 


PT  15.8 SECONDS (9.5-12.5)  H  08/03/23  14:11    


 


INR  1.44   08/03/23  14:11    


 


APTT  37.8 SECONDS (24.3-36.9)  H  08/03/23  14:11    


 


Sodium  140 mEq/L (136-145)   08/06/23  06:51    


 


Potassium  3.6 mEq/L (3.5-5.1)   08/06/23  06:51    


 


BUN  11 mg/dL (7-18)   08/06/23  06:51    


 


Creatinine  0.92 mg/dL (0.55-1.02)   08/06/23  06:51    


 


Glucose  97 mg/dL ()   08/06/23  06:51    


 


Total Bilirubin  0.6 mg/dL (0.2-1.0)   08/06/23  06:51    


 


AST  21 U/L (15-37)   08/06/23  06:51    


 


ALT  21 U/L (13-56)   08/06/23  06:51    


 


Alkaline Phosphatase  63 U/L ()   08/06/23  06:51    








Home Medications: 








Albuterol Sulfate [Proair Respiclick] 2 puff IH Q6HP PRN 02/22/22 


Apixaban [Eliquis] 5 mg PO BID 02/22/22 


Cyanocobalamin (Vitamin B-12) [Vitamin B12] 1,000 mcg PO DAILY 02/22/22 


Cyclosporine [Restasis] 1 drop EACH EYE TID 02/22/22 


Duloxetine HCl 60 mg PO BID 02/22/22 


Estradiol 0.01% Cream 1 appl VAG SEECOM 02/22/22 


Flecainide Acetate 50 mg PO BID 02/22/22 


Gabapentin 300 mg PO BEDTIME 02/22/22 


Levothyroxine [Synthroid*] 0.075 mcg PO [id number] 02/22/22 


Memantine HCl 10 mg PO BID 02/22/22 


Montelukast [Singulair*] 10 mg PO DAILY 02/22/22 


Pantoprazole [Protonix Tab*] 40 mg PO DAILY 02/22/22 


Potassium Chloride 20 meq PO BID 02/22/22 


Simvastatin 40 mg PO BEDTIME 02/22/22 


Topiramate 100 mg PO BID 02/22/22 


buPROPion HCL [Bupropion Xl] 300 mg PO DAILY 02/22/22 


Pataday 0.7% Drops  1 drop OPTH DAILY 09/13/22 


Propylene Glycol/Peg 400 [Lubricating Eye Drop] 1 drop OPTH BID 09/13/22 


Cranberry Fruit Extract [Ellura] 500 mg PO DAILY 10/25/22 


Cephalexin [Keflex] 500 mg PO Q6HR 7 Days #28 cap 10/27/22 


Hydrocodone Bit/Acetaminophen [Hydrocodon-Acetaminophen 5-325] 5 - 325 mg PO BID

08/03/23 


Metoprolol Succinate [Toprol Xl*] 12.5 mg PO BEDTIME 08/03/23 


Temazepam [Restoril] 7.5 mg PO BEDTIME 08/03/23 





Diet: Regular


Activity: Ad tylor


Followup: 


Javier Mcwilliams MD [Primary Care Provider] - 


Time spent managing pt's care (in minutes): 30

## 2023-08-09 VITALS — TEMPERATURE: 98.5 F | DIASTOLIC BLOOD PRESSURE: 67 MMHG | SYSTOLIC BLOOD PRESSURE: 142 MMHG

## 2023-08-09 RX ADMIN — TOPIRAMATE SCH MG: 100 TABLET, FILM COATED ORAL at 08:36

## 2023-08-09 RX ADMIN — LEVOTHYROXINE SODIUM SCH MG: 75 TABLET ORAL at 05:36

## 2023-08-09 RX ADMIN — PANTOPRAZOLE SODIUM SCH MG: 40 TABLET, DELAYED RELEASE ORAL at 05:36

## 2023-08-09 RX ADMIN — HYDROCODONE BITARTRATE AND ACETAMINOPHEN PRN TAB: 5; 325 TABLET ORAL at 11:34

## 2023-08-09 RX ADMIN — Medication SCH MG: at 08:37

## 2023-08-09 RX ADMIN — ACETAMINOPHEN SCH: 500 TABLET, FILM COATED ORAL at 14:00

## 2023-08-09 RX ADMIN — MEMANTINE HYDROCHLORIDE SCH MG: 10 TABLET ORAL at 08:36

## 2023-08-09 RX ADMIN — ACETAMINOPHEN SCH MG: 500 TABLET, FILM COATED ORAL at 08:36

## 2023-08-09 RX ADMIN — BUPROPION HYDROCHLORIDE SCH MG: 150 TABLET, EXTENDED RELEASE ORAL at 08:36

## 2023-08-09 RX ADMIN — APIXABAN SCH MG: 5 TABLET, FILM COATED ORAL at 08:36

## 2023-08-09 NOTE — P.PN
Subjective


Date of Service: 08/09/23


Primary Care Provider: Oj


Chief Complaint: uti protein energy malnutrition 


Patient discharge held due to insurance approval.  Awaiting insurance 





Review of Systems


10-point ROS is otherwise unremarkable





Physical Examination





- Vital Signs


Temperature: 97.9 F


Blood Pressure: 176/76


Pulse: 78


Respirations: 18


Pulse Ox (%): 96





- Physical Exam


General: Alert, In no apparent distress


HEENT: Atraumatic, PERRLA, EOMI


Neck: Supple, JVD not distended


Respiratory: Clear to auscultation bilaterally, Normal air movement


Cardiovascular: Regular rate/rhythm, Normal S1 S2


Gastrointestinal: Normal bowel sounds, No tenderness


Musculoskeletal: No tenderness


Integumentary: No rashes


Neurological: Normal speech, Normal tone, Normal affect


Lymphatics: No axilla or inguinal lymphadenopathy





- Studies


Microbiology Data (last 24 hrs): 








08/03/23 14:30   Blood  - Blood   Aerobic Blood Culture - Final


                            No growth in 5 days.


08/03/23 14:30   Blood  - Blood   Anaerobic Blood Culture - Final


08/03/23 14:11   Blood  - Blood   Aerobic Blood Culture - Final


                            No growth in 5 days.


08/03/23 14:11   Blood  - Blood   Anaerobic Blood Culture - Final





Medications List Reviewed: Yes





Assessment And Plan





- Current Problems (Diagnosis)


(1) Severe protein-energy malnutrition


Current Visit: Yes   Status: Chronic   


Plan: 


will order pt and see if we can move the patient to a PT center or snf.  She is 

getting weaker.  Can not transfer without assistance.   


8.7


Possible transfer to [place] today








(2) Afib


Current Visit: No   Status: Chronic   


Plan: 


may need to wean down her beta blocker


8/5


pulse is still low.  Will stop the betablocker and continue flecanide on the 

patient 


Qualifiers: 


   Atrial fibrillation type: unspecified chronic   Qualified Code(s): I48.20 - 

Chronic atrial fibrillation, unspecified; I48.2 - Chronic atrial fibrillation   





(3) GERD (gastroesophageal reflux disease)


Current Visit: No   Status: Acute   


Plan: 


restart home protonix.  


Qualifiers: 


   Esophagitis presence: esophagitis presence not specified   Qualified Code(s):

K21.9 - Gastro-esophageal reflux disease without esophagitis   





(4) Dementia


Current Visit: No   Status: Chronic   


Plan: 


restart namenda


Qualifiers: 


   Dementia type: unspecified type   Qualified Code(s): F03.90 - Unspecified 

dementia, unspecified severity, without behavioral disturbance, psychotic 

disturbance, mood disturbance, and anxiety   





(5) UTI (urinary tract infection)


Onset Date: 09/25/18   Current Visit: No   Status: Acute   


Plan: 


will restart the levaquin and the nitrofurantin 


Qualifiers: 


   Urinary tract infection type: acute cystitis   Hematuria presence: without 

hematuria   Qualified Code(s): N30.00 - Acute cystitis without hematuria   


Discharge Plan: Nursing Home


Plan to discharge in: 24 Hours





- Code Status/Comfort Care


Code Status Assessed: No


Physician Review: Patient Assessed, Agree with Above Assessment and Plan


Critical Care: No


Time Spent Managing PTS Care (In Minutes): 20

## 2024-09-18 ENCOUNTER — HOSPITAL ENCOUNTER (EMERGENCY)
Dept: HOSPITAL 97 - ER | Age: 84
Discharge: HOME | End: 2024-09-18
Payer: COMMERCIAL

## 2024-09-18 VITALS — OXYGEN SATURATION: 98 % | DIASTOLIC BLOOD PRESSURE: 67 MMHG | SYSTOLIC BLOOD PRESSURE: 113 MMHG

## 2024-09-18 DIAGNOSIS — G30.9: ICD-10-CM
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
## 2024-09-18 DIAGNOSIS — J44.9: ICD-10-CM

## 2024-09-18 DIAGNOSIS — Z88.8: ICD-10-CM

## 2024-09-18 DIAGNOSIS — M25.561: ICD-10-CM

## 2024-09-18 DIAGNOSIS — Z88.0: ICD-10-CM

## 2024-09-18 DIAGNOSIS — G47.00: ICD-10-CM

## 2024-09-18 DIAGNOSIS — Z91.018: ICD-10-CM

## 2024-09-18 DIAGNOSIS — I48.91: ICD-10-CM

## 2024-09-18 DIAGNOSIS — M79.7: ICD-10-CM

## 2024-09-18 DIAGNOSIS — K21.9: ICD-10-CM

## 2024-09-18 DIAGNOSIS — E78.5: ICD-10-CM

## 2024-09-18 DIAGNOSIS — R55: Primary | ICD-10-CM

## 2024-09-18 DIAGNOSIS — E03.9: ICD-10-CM

## 2024-09-18 DIAGNOSIS — Z88.2: ICD-10-CM

## 2024-09-18 DIAGNOSIS — I10: ICD-10-CM

## 2024-09-18 DIAGNOSIS — F02.80: ICD-10-CM

## 2024-09-18 DIAGNOSIS — S82.892A: ICD-10-CM

## 2024-09-18 LAB
ALBUMIN SERPL BCP-MCNC: 2.7 G/DL (ref 3.4–5)
ALBUMIN/GLOB SERPL: 0.7 {RATIO} (ref 1.1–1.8)
ALP SERPL-CCNC: 101 U/L (ref 45–117)
ALT SERPL W P-5'-P-CCNC: 21 U/L (ref 13–56)
ANION GAP SERPL CALC-SCNC: 10 MEQ/L (ref 5–15)
APTT BLD: 34.6 SECONDS (ref 24.3–36.9)
AST SERPL W P-5'-P-CCNC: 24 U/L (ref 15–37)
BUN BLD-MCNC: 18 MG/DL (ref 7–18)
GLOBULIN SER CALC-MCNC: 3.7 G/DL (ref 2.3–3.5)
GLUCOSE SERPLBLD-MCNC: 111 MG/DL (ref 74–106)
HCT VFR BLD CALC: 40.8 % (ref 36–45)
HGB BLD-MCNC: 13.3 G/DL (ref 12–15)
INR BLD: 1.43
LYMPHOCYTES # SPEC AUTO: 1.4 K/UL (ref 0.7–4.9)
MAGNESIUM SERPL-MCNC: 1.8 MG/DL (ref 1.6–2.4)
MCH RBC QN AUTO: 31.2 PG (ref 27–35)
MCHC RBC AUTO-ENTMCNC: 32.4 G/DL (ref 32–36)
MCV RBC: 96.1 FL (ref 80–100)
NRBC # BLD: 0 10*3/UL (ref 0–0)
NRBC BLD AUTO-RTO: 0.1 % (ref 0–0)
NT-PROBNP SERPL-MCNC: 497 PG/ML (ref ?–450)
PMV BLD: 8.6 FL (ref 7.6–11.3)
POTASSIUM SERPL-SCNC: 4 MEQ/L (ref 3.5–5.1)
PROTHROMBIN TIME: 15.9 SECONDS (ref 9.4–12.5)
RBC # BLD: 4.25 M/UL (ref 3.86–4.86)
TROPONIN I SERPL HS-MCNC: 5.9 PG/ML (ref ?–58.9)
WBC # BLD AUTO: 8.1 THOU/UL (ref 4.3–10.9)

## 2024-09-18 PROCEDURE — 85610 PROTHROMBIN TIME: CPT

## 2024-09-18 PROCEDURE — 83880 ASSAY OF NATRIURETIC PEPTIDE: CPT

## 2024-09-18 PROCEDURE — 85025 COMPLETE CBC W/AUTO DIFF WBC: CPT

## 2024-09-18 PROCEDURE — 85730 THROMBOPLASTIN TIME PARTIAL: CPT

## 2024-09-18 PROCEDURE — 73562 X-RAY EXAM OF KNEE 3: CPT

## 2024-09-18 PROCEDURE — 83735 ASSAY OF MAGNESIUM: CPT

## 2024-09-18 PROCEDURE — 36415 COLL VENOUS BLD VENIPUNCTURE: CPT

## 2024-09-18 PROCEDURE — 72125 CT NECK SPINE W/O DYE: CPT

## 2024-09-18 PROCEDURE — 93005 ELECTROCARDIOGRAM TRACING: CPT

## 2024-09-18 PROCEDURE — 99285 EMERGENCY DEPT VISIT HI MDM: CPT

## 2024-09-18 PROCEDURE — 84484 ASSAY OF TROPONIN QUANT: CPT

## 2024-09-18 PROCEDURE — 70450 CT HEAD/BRAIN W/O DYE: CPT

## 2024-09-18 PROCEDURE — 73610 X-RAY EXAM OF ANKLE: CPT

## 2024-09-18 PROCEDURE — 80053 COMPREHEN METABOLIC PANEL: CPT

## 2024-09-18 NOTE — ER
Nurse's Notes                                                                                     

 [facility]                                                                 

Name: Leona Peres                                                                              

Age: 83 yrs                                                                                       

Sex: Female                                                                                       

: 1940                                                                                   

MRN: [id number]                                                                                   

Arrival Date: 2024                                                                          

Time: 07:30                                                                                       

Account#: [id number]                                                                            

Bed 6                                                                                             

Private MD:                                                                                       

Diagnosis: Syncope;Closed left ankle fracture;Right knee pain                                     

                                                                                                  

Presentation:                                                                                     

                                                                                             

07:50 Chief complaint: Patient's son or daughter states: syncopal episode at 0615 after       kc6 

      taking 10mg of a Norco at 0600. pt takes eliquis daily and reports left ankle pain          

      8/10. Coronavirus screen: At this time, the client does not indicate any symptoms           

      associated with coronavirus-19. Ebola Screen: No symptoms or risks identified at this       

      time. Initial Sepsis Screen: Does the patient meet any 2 criteria? No. Patient's            

      initial sepsis screen is negative. Does the patient have a suspected source of              

      infection? No. Patient's initial sepsis screen is negative. Risk Assessment: Do you         

      want to hurt yourself or someone else? Patient reports no desire to harm self or            

      others. Onset of symptoms was 2024.                                           

07:50 Method Of Arrival: Wheelchair                                                           kc 

07:50 Acuity: ANUSHKA 3                                                                           kc6 

                                                                                                  

Historical:                                                                                       

- Allergies:                                                                                      

07:52 clindamycin HCl (bulk);                                                                 kc6 

07:52 Darvocet-N 100;                                                                         kc6 

07:52 PENICILLINS;                                                                            kc6 

07:52 Strawberries;                                                                           kc6 

07:52 Sulfa (Sulfonamide Antibiotics);                                                        kc6 

- PMHx:                                                                                           

07:52 AFIB; Alzheimer's disease; angina pectoris; Cancer; COPD; Depression; Fibromyalgia;     kc6 

      GERD; Hyperlipidemia; Hypertension; Hypothyroidism; insomnia; left breast cancer;           

      lymphedema; skipped heart beats;                                                            

- PSHx:                                                                                           

07:52 Appendectomy; bilateral knee replacement; mastectomy; Spinal Fusion; Total abdominal    kc6 

      hysterectomy;                                                                               

                                                                                                  

- Immunization history:: Client reports receiving the 2nd dose of the Covid vaccine,              

  Flu vaccine is not up to date.                                                                  

- Infectious Disease History:: Denies.                                                            

- Social history:: Smoking status: Patient denies any tobacco usage or history of.                

                                                                                                  

                                                                                                  

Screenin:53 [facility] ED Fall Risk Assessment (Adult) History of falling in the last 3 months,       6 

      including since admission Yes- physiologic fall (2 pts) Confusion or Disorientation No      

      (0 pts) Intoxicated or Sedated No (0 pts) Impaired Gait Yes (1 pt) Mobility Assist          

      Device Used Yes (1 pt) Altered Elimination No (0 pt) Score/Fall Risk Level 3 or more        

      points = High Risk Oriented to surroundings. Abuse screen: Denies threats or abuse.         

      Denies injuries from another. Nutritional screening: No deficits noted. Tuberculosis        

      screening: No symptoms or risk factors identified.                                          

                                                                                                  

Assessment:                                                                                       

07:53 General: Appears in no apparent distress. uncomfortable, obese, well groomed, well      kc6 

      developed, Behavior is calm, cooperative, appropriate for age. Pain: Complains of pain      

      in left foot Pain currently is 8 out of 10 on a pain scale. Neuro: Level of                 

      Consciousness is awake, alert, obeys commands, Oriented to person, place, time,             

      situation, Appropriate for age Reports dizziness, a syncopal episode weakness.              

      Cardiovascular: Capillary refill < 3 seconds. Respiratory: Airway is patent Trachea         

      midline Respiratory effort is even, unlabored, Respiratory pattern is regular,              

      symmetrical. GI: No signs and/or symptoms were reported involving the gastrointestinal      

      system. : No signs and/or symptoms were reported regarding the genitourinary system.      

      EENT: No signs and/or symptoms were reported regarding the EENT system. Derm: No signs      

      and/or symptoms reported regarding the dermatologic system. Skin is intact, is healthy      

      with good turgor, Skin is pale. Musculoskeletal: Capillary refill < 3 seconds, Range of     

      motion: limited in left ankle.                                                              

08:53 Reassessment: Patient appears in no apparent distress at this time. No changes from     kc6 

      previously documented assessment. Patient and/or family updated on plan of care and         

      expected duration. Pain level reassessed. Patient is alert, oriented x 3, equal             

      unlabored respirations, skin warm/dry/pink.                                                 

10:04 Reassessment: Patient appears in no apparent distress at this time. No changes from     kc6 

      previously documented assessment. Patient and/or family updated on plan of care and         

      expected duration. Pain level reassessed. Patient is alert, oriented x 3, equal             

      unlabored respirations, skin warm/dry/pink. Patient states feeling better. Patient          

      states symptoms have improved.                                                              

                                                                                                  

Vital Signs:                                                                                      

07:50  / 64; Pulse 56; Resp 19 S; Temp 97.6(O); Pulse Ox 95% on R/A; Weight 79.83 kg    kc6 

      (R); Height 5 ft. 3 in. (R); Pain 8/10;                                                     

08:18  / 62; Pulse 57; Resp 17 S; Pulse Ox 94% on R/A;                                  kc6 

09:15 Pulse 54; Resp 17 S; Pulse Ox 100% on R/A;                                              kc6 

10:48  / 67; Pulse 51; Resp 16 S; Pulse Ox 98% on R/A;                                  kc6 

07:50 Body Mass Index 31.18 (79.83 kg, 160.02 cm)                                             kc6 

07:50 Pain Scale: Adult                                                                       kc6 

                                                                                                  

ED Course:                                                                                        

07:33 Patient arrived in ED.                                                                  mg5 

07:36 Brittani Dejesus MD is Attending Physician.                                           sd2 

07:50 Dianelys Diaz, RN is Primary Nurse.                                                 kc6 

07:52 Triage completed.                                                                       kc6 

07:52 Arm band placed on.                                                                     kc6 

07:52 Patient has correct armband on for positive identification. Bed in low position. Call   kc6 

      light in reach. Side rails up X2. Adult w/ patient. Cardiac monitor on. Pulse ox on.        

      NIBP on. Door closed. Noise minimized. Lights dimmed. Pillow given.                         

08:38 Patient moved to CT via stretcher.                                                      kc6 

08:38 Ptt, Activated Sent.                                                                    kc6 

08:38 PT-INR Sent.                                                                            kc6 

08:38 BNP Sent.                                                                               kc6 

08:38 Troponin High Sensitivity Sent.                                                         kc6 

08:38 Magnesium Sent.                                                                         kc6 

08:38 CMP Sent.                                                                               kc6 

08:38 CBC with Diff Sent.                                                                     kc6 

08:38 Inserted saline lock: 24 gauge in right hand, using aseptic technique. Blood collected. kc6 

      Flushed with 10 mL NS. Patient maintains SpO2 saturation greater than 95% on room air.      

08:41 CT Head C Spine In Process Unspecified.                                                 EDMS

08:51 XRAY Knee RIGHT 3 view In Process Unspecified.                                          EDMS

08:51 XRAY Ankle LEFT 3 view In Process Unspecified.                                          EDMS

10:04 Ice pack to injury.                                                                     kc6 

10:48 Orthoglass splint: Posterior short lleg splint applied on left leg. stirrup splint      kc6 

      applied on left leg.                                                                        

11:30 No provider procedures requiring assistance completed. IV discontinued, intact,         kc6 

      bleeding controlled, No redness/swelling at site. Pressure dressing applied.                

                                                                                                  

Administered Medications:                                                                         

09:15 Not Given (Other Intervention Used): fentanyl (pf)50 mcg IVP once                       kc6 

09:15 Drug: fentaNYL (PF) IVP 25 mcg IVP once Route: IVP; Site: right hand;                   kc6 

10:03 Follow up: Response: No adverse reaction; Pain is decreased; RASS: Drowsy (-1)          kc6 

11:24 Not Given (Other Intervention Used): fentanyl (pf)25 mcg IVP once                       kc6 

                                                                                                  

                                                                                                  

Medication:                                                                                       

11:31 VIS not applicable for this client.                                                     kc6 

                                                                                                  

Outcome:                                                                                          

11:10 Discharge ordered by MD.                                                                sd2 

11:30 Discharged to home via wheelchair, with family,                                         kc6 

11:30 Condition: good                                                                             

11:30 Discharge instructions given to patient, family, Instructed on discharge instructions,      

      follow up and referral plans. Demonstrated understanding of instructions, follow-up         

      care, splint care,                                                                          

11:31 Patient left the ED.                                                                    kc6 

                                                                                                  

Signatures:                                                                                       

Dispatcher [employer]                           EDBrittani Blue MD MD sd2                                                  

Dianelys Diaz RN                   RN   kc6                                                  

Olivia Logan                             mg5                                                  

                                                                                                  

Corrections: (The following items were deleted from the chart)                                    

08:18 07:50  / 64; Pulse 85bpm; Resp 19bpm; Spontaneous; Pulse Ox 95% RA; Temp 97.6F    kc6 

      Oral; 79.83 kg Reported; Height 5 ft. 3 in. Reported; BMI: 31.1; Pain 8/10, Adult; kc6      

08:21 07:52 Pulse ox on. NIBP on. kc6                                                         kc6 

                                                                                                  

**************************************************************************************************

## 2024-09-18 NOTE — RAD REPORT
EXAMINATION: Ankle Left 3 View



CLINICAL INDICATION: Female, 83 years old. [facility]

 Pain;Swelling

 Bed Name: 6



TECHNIQUE: 3  view radiographs of the left ankle were obtained. 



COMPARISON: 10/8/2009



FINDINGS: No bone or joint abnormality seen. Heterogeneous radiodensity at the tip of the lateral mal
leolus suggesting periosteal reaction. Soft tissue swelling about the ankle most pronounced

laterally and anteriorly



IMPRESSION:

Periosteal reaction at the tip of the lateral malleolus, may suggest a healing fracture. Pronounced s
oft tissue swelling about the ankle most pronounced laterally and anteriorly.



Reported By: Noam Shah 

Electronically Signed:  9/18/2024 9:31 AM

## 2024-09-18 NOTE — EDPHYS
Physician Documentation                                                                           

 [facility]                                                                 

Name: Leona Peres                                                                              

Age: 83 yrs                                                                                       

Sex: Female                                                                                       

: 1940                                                                                   

MRN: [id number]                                                                                   

Arrival Date: 2024                                                                          

Time: 07:30                                                                                       

Account#: [id number]                                                                            

Bed 6                                                                                             

Private MD:                                                                                       

ED Physician Brittani Dejesus                                                                    

HPI:                                                                                              

                                                                                             

08:26 This 83 yrs old Female presents to ER via Wheelchair with complaints of Syncope, Fall   sd2 

      Injury.                                                                                     

08:26 83-year-old female presents with chief complaint of syncopal episode with fall. Her     sd2 

      children at bedside who are her caregivers report that she took a 10 mg Norco which she     

      has been taking since her recent shoulder replacement surgery around 0600 and then got      

      up to go to the restroom about 10 to 15 minutes later. She reports not feeling any          

      significant lightheadedness or dizziness but does have Mnire's disease and                

      intermittent dizziness. Her son reports that he was assisting her and noticed that she      

      started to feel off balance and then she had a loss of consciousness. She was lowered       

      down to the ground and they do not believe she had any significant head injury but she      

      is on Eliquis. She reports significant pain and swelling to her left ankle and then         

      some mild pain to her right knee which she has had a replacement for previously as          

      well..                                                                                      

                                                                                                  

Historical:                                                                                       

- Allergies:                                                                                      

07:52 clindamycin HCl (bulk);                                                                 kc6 

07:52 Darvocet-N 100;                                                                         kc6 

07:52 PENICILLINS;                                                                            kc6 

07:52 Strawberries;                                                                           kc6 

07:52 Sulfa (Sulfonamide Antibiotics);                                                        kc6 

- PMHx:                                                                                           

07:52 AFIB; Alzheimer's disease; angina pectoris; Cancer; COPD; Depression; Fibromyalgia;     kc6 

      GERD; Hyperlipidemia; Hypertension; Hypothyroidism; insomnia; left breast cancer;           

      lymphedema; skipped heart beats;                                                            

- PSHx:                                                                                           

07:52 Appendectomy; bilateral knee replacement; mastectomy; Spinal Fusion; Total abdominal    kc6 

      hysterectomy;                                                                               

                                                                                                  

- Immunization history:: Client reports receiving the 2nd dose of the Covid vaccine,              

  Flu vaccine is not up to date.                                                                  

- Infectious Disease History:: Denies.                                                            

- Social history:: Smoking status: Patient denies any tobacco usage or history of.                

                                                                                                  

                                                                                                  

ROS:                                                                                              

08:26 Constitutional: Negative for fever, chills, and weight loss, Eyes: Negative for injury, sd2 

      pain, redness, and discharge, Neck: Negative for injury, pain, and swelling,                

      Cardiovascular: Negative for chest pain, palpitations, and edema, Respiratory: Negative     

      for shortness of breath, cough, wheezing. Abdomen/GI: Negative for abdominal pain,          

      nausea, vomiting, diarrhea. Back: Negative for injury and pain,                             

08:26 Skin: Negative for injury, rash, and discoloration, Neuro: Negative for headache,           

      numbness and tingling.                                                                      

08:26 MS/extremity: Positive for injury or acute deformity, swelling, tenderness,                 

                                                                                                  

Exam:                                                                                             

08:26 Constitutional:  This is a well developed, well nourished patient who is awake, alert,  sd2 

      and in no acute distress. Head/Face:  Normocephalic, atraumatic. Eyes:  EOMI, normal        

      conjunctiva bilaterally Neck:  Trachea midline, no thyromegaly or masses palpated, and      

      no cervical lymphadenopathy.  Supple, full range of motion without nuchal rigidity, or      

      vertebral point tenderness.  No Meningismus. Chest/axilla:  Normal chest wall               

      appearance and motion.  Nontender with no deformity.   Cardiovascular:  Regular rate        

      and rhythm with a normal S1 and S2.  No gallops, murmurs, or rubs.  2+ distal pulses.       

      Respiratory:  Lungs have equal breath sounds bilaterally, clear to auscultation and         

      percussion.  No rales, rhonchi or wheezes noted.  No increased work of breathing, no        

      retractions or nasal flaring. Abdomen/GI:  Soft, non-tender, with normal bowel sounds.      

      No guarding or rebound.  No evidence of tenderness throughout. Back:  No spinal             

      tenderness.  No costovertebral tenderness.  Full range of motion. Skin:  Warm, dry with     

      normal turgor.  Normal color with no rashes, no lesions, and no evidence of cellulitis.     

      MS/ Extremity:  Pulses equal, no cyanosis.  Neurovascular intact.  Significant swelling     

      and tenderness to palpation noted to the left medial and lateral malleolus.  Mild           

      tenderness to palpation of the right knee with no appreciable swelling or deformity.        

      Full range of motion intact of the right knee Psych:  Awake, alert, with orientation to     

      person, place and time.  Behavior, mood, and affect are within normal limits.               

09:12 ECG was reviewed by the Attending Physician. Sinus bradycardia, rate 52, no STEMi       sd2 

      criteria, 1st degree AV block                                                               

                                                                                                  

Vital Signs:                                                                                      

07:50  / 64; Pulse 56; Resp 19 S; Temp 97.6(O); Pulse Ox 95% on R/A; Weight 79.83 kg    kc6 

      (R); Height 5 ft. 3 in. (R); Pain 8/10;                                                     

08:18  / 62; Pulse 57; Resp 17 S; Pulse Ox 94% on R/A;                                  kc6 

09:15 Pulse 54; Resp 17 S; Pulse Ox 100% on R/A;                                              kc6 

10:48  / 67; Pulse 51; Resp 16 S; Pulse Ox 98% on R/A;                                  kc6 

07:50 Body Mass Index 31.18 (79.83 kg, 160.02 cm)                                             6 

07:50 Pain Scale: Adult                                                                       kc6 

                                                                                                  

MDM:                                                                                              

07:56 Patient medically screened.                                                             sd2 

08:26 Differential Diagnosis: Vasovagal, orthostatic, ACS, anemia, dehydration, electrolyte   sd2 

      abnormality, fracture, contusion, sprain, strain among others. Data reviewed: vital         

      signs, nurses notes, lab test result(s), EKG, radiologic studies. I considered the          

      following discharge prescriptions or medication management in the emergency department      

      Medications were administered in the Emergency Department. See MAR. Historians other        

      than the Patient: Daughter/Son: At bedside. Care significantly affected by the              

      following chronic conditions: Cancer, A-fib.                                                

10:57 Counseling: I had a detailed discussion with the patient and/or guardian regarding the  sd2 

      historical points, exam findings, and any diagnostic results supporting the                 

      discharge/admit diagnosis, lab results, radiology results, the need for outpatient          

      follow up, to return to the emergency department if symptoms worsen or persist or if        

      there are any questions or concerns that arise at home. ED course: Labs and imaging         

      reviewed. BP stable. Pt bradycardic likely 2/2 medication. Hx of A-fib and elevated HR      

      on medications for. XR with possible fracture and splint applied. Pt to follow up           

      outpatient with her current orthopedic surgeon for further evaluation. Advised of           

      continued supportive care and need for f/u with PCP as well. Pt and son/daughter at       

      comfortable with plan and verbalize understanding of strict return precautions..            

                                                                                                  

                                                                                             

08:22 Order name: CBC with Diff; Complete Time: 09:46                                         sd2 

                                                                                             

08:22 Order name: CMP; Complete Time: 10:12                                                   sd2 

                                                                                             

08:22 Order name: Magnesium; Complete Time: 10:12                                             sd                                                                                             

08:22 Order name: Troponin High Sensitivity; Complete Time: 10:12                                                                                                                          

08:22 Order name: BNP; Complete Time: 10:12                                                                                                                                                

08:22 Order name: PT-INR; Complete Time: 09:46                                                                                                                                             

08:22 Order name: Ptt, Activated; Complete Time: 09:46                                                                                                                                     

08:22 Order name: CT Head C Spine; Complete Time: 09:46                                                                                                                                    

08:22 Order name: XRAY Knee RIGHT 3 view; Complete Time: 09:46                                                                                                                             

08:22 Order name: XRAY Ankle LEFT 3 view; Complete Time: 09:46                                                                                                                             

08:22 Order name: EKG - Nurse/Tech; Complete Time: 08:54                                      sd                                                                                             

08:50 Order name: Labs - recollect needed: recollect green lavender blue top; Complete Time:  bd  

      09:14                                                                                       

                                                                                             

09:47 Order name: Ice pack; Complete Time: 09:51                                              sd                                                                                             

10:13 Order name: Splint - Long Leg: Posterior w/ Stirrup; Complete Time: 10:48               sd2 

                                                                                                  

Administered Medications:                                                                         

09:15 Not Given (Other Intervention Used): fentanyl (pf)50 mcg IVP once                       kc6 

09:15 Drug: fentaNYL (PF) IVP 25 mcg IVP once Route: IVP; Site: right hand;                   kc6 

10:03 Follow up: Response: No adverse reaction; Pain is decreased; RASS: Drowsy (-1)          kc6 

11:24 Not Given (Other Intervention Used): fentanyl (pf)25 mcg IVP once                       kc6 

                                                                                                  

                                                                                                  

Disposition Summary:                                                                              

24 11:10                                                                                    

Discharge Ordered                                                                                 

 

      Problem: new                                                                            sd2 

      Symptoms: have improved                                                                 sd2 

      Condition: Stable                                                                       sd2 

      Diagnosis                                                                                   

        - Syncope                                                                             sd2 

        - Closed left ankle fracture                                                          sd2 

        - Right knee pain                                                                     sd2 

      Followup:                                                                               sd2 

        - With: Private Physician                                                                  

        - When: 2 - 3 days                                                                         

        - Reason: Recheck today's complaints, Continuance of care, Re-evaluation by your           

      physician                                                                                   

      Discharge Instructions:                                                                     

        - Discharge Summary Sheet                                                             sd2 

        - Ankle Fracture                                                                      sd2 

        - Syncope                                                                             sd2 

      Forms:                                                                                      

        - Medication Reconciliation Form                                                      sd2 

        - Antibiotic Education                                                                sd2 

        - Prescription Opioid Use                                                             sd2 

        - Patient Portal Instructions                                                         sd2 

        - Leadership Thank You Letter                                                         sd2 

Signatures:                                                                                       

Dispatcher MedChery Tobias Stephanie, MD MD   sd2                                                  

Dianelys Diaz RN                   RN   kc6                                                  

                                                                                                  

**************************************************************************************************

## 2024-09-18 NOTE — RAD REPORT
EXAM: CT brain without contrast



HISTORY: [facility]

 TRAUMA

 Bed Name: 6



COMPARISON: None



TECHNIQUE: Multiple contiguous axial images were obtained and a CT of the brain without contrast. Sag
ittal and coronal reformats were performed.





FINDINGS:No evidence of hydrocephalus, intracranial hemorrhage, or extra-axial fluid collection.

Mild diffuse parenchymal volume loss commensurate with age. The brain is normal in morphology.



The calvarium is intact. The visualized paranasal sinuses and mastoid air cells are essentially clear
.



IMPRESSION: 



No evidence of acute intracranial abnormality. 





EXAM: CT of the cervical spine without contrast



HISTORY:  [facility]

 TRAUMA

 Bed Name: 6



COMPARISON: None



TECHNIQUE: Multiple contiguous axial images were obtained in a CT of the cervical spine without contr
ast. Sagittal and coronal reformats were performed.



FINDINGS: The vertebral bodies demonstrate normal height and alignment. No evidence of acute fracture
 or subluxation.. No degenerative changes are present.  No prevertebral soft tissue swelling is

seen. 



The posterior facets are well aligned. Sequelae of anterior fusion with interbody spacers spanning C4
-C7. Normal alignment of the skull base with the cervical spine is seen.



The lung apices are unremarkable.   



IMPRESSION: 



No evidence of acute osseous abnormality of the cervical spine.







Reported By: Noam Shah 

Electronically Signed:  9/18/2024 9:42 AM

## 2024-09-18 NOTE — RAD REPORT
EXAM: Knee Right 3 View



HISTORY: [facility] MAIN

 PAIN

 Bed Name: 6



COMPARISON: 1/26/2021



TECHNIQUE: 3 views of the right knee were obtained.



FINDINGS: No knee effusion is seen. There is no evidence of acute fracture or dislocation. Right tota
l hip arthroplasty hardware in unchanged alignment. No significant degenerative changes are seen. 

No soft tissue swelling is present.



IMPRESSION: No evidence of acute osseous abnormality. 



Reported By: Noam Shah 

Electronically Signed:  9/18/2024 9:26 AM

## 2024-09-19 NOTE — EKG
Test Date:    2024-09-18               Test Time:    08:59:19

Technician:   MARNI                                     

                                                     

MEASUREMENT RESULTS:                                       

Intervals:                                           

Rate:         52                                     

LA:           256                                    

QRSD:         106                                    

QT:           470                                    

QTc:          437                                    

Axis:                                                

P:            81                                     

LA:           256                                    

QRS:          79                                     

T:            76                                     

                                                     

INTERPRETIVE STATEMENTS:                                       

                                                     

Sinus bradycardia with 1st degree AV block

ST & T wave abnormality, consider anterior ischemia

Abnormal ECG

Compared to ECG 08/03/2023 15:28:51

Sinus rhythm no longer present

ST (T wave) deviation still present

Possible ischemia still present



Electronically Signed On 09-19-24 12:17:41 CDT by Donovan Meadows

## 2024-10-01 ENCOUNTER — HOSPITAL ENCOUNTER (INPATIENT)
Dept: HOSPITAL 97 - ER | Age: 84
LOS: 2 days | Discharge: HOME HEALTH | DRG: 871 | End: 2024-10-03
Attending: HOSPITALIST | Admitting: HOSPITALIST
Payer: COMMERCIAL

## 2024-10-01 VITALS — BODY MASS INDEX: 31.1 KG/M2

## 2024-10-01 DIAGNOSIS — G30.9: ICD-10-CM

## 2024-10-01 DIAGNOSIS — Z86.711: ICD-10-CM

## 2024-10-01 DIAGNOSIS — F02.80: ICD-10-CM

## 2024-10-01 DIAGNOSIS — Z88.5: ICD-10-CM

## 2024-10-01 DIAGNOSIS — Z16.20: ICD-10-CM

## 2024-10-01 DIAGNOSIS — Z87.891: ICD-10-CM

## 2024-10-01 DIAGNOSIS — Z79.899: ICD-10-CM

## 2024-10-01 DIAGNOSIS — K21.9: ICD-10-CM

## 2024-10-01 DIAGNOSIS — I48.91: ICD-10-CM

## 2024-10-01 DIAGNOSIS — E78.5: ICD-10-CM

## 2024-10-01 DIAGNOSIS — Z88.2: ICD-10-CM

## 2024-10-01 DIAGNOSIS — R74.01: ICD-10-CM

## 2024-10-01 DIAGNOSIS — E03.9: ICD-10-CM

## 2024-10-01 DIAGNOSIS — J44.9: ICD-10-CM

## 2024-10-01 DIAGNOSIS — A41.51: Primary | ICD-10-CM

## 2024-10-01 DIAGNOSIS — N30.00: ICD-10-CM

## 2024-10-01 DIAGNOSIS — G93.41: ICD-10-CM

## 2024-10-01 DIAGNOSIS — Z90.710: ICD-10-CM

## 2024-10-01 DIAGNOSIS — M79.7: ICD-10-CM

## 2024-10-01 DIAGNOSIS — Z91.018: ICD-10-CM

## 2024-10-01 DIAGNOSIS — Z90.10: ICD-10-CM

## 2024-10-01 DIAGNOSIS — Z85.3: ICD-10-CM

## 2024-10-01 DIAGNOSIS — Z79.01: ICD-10-CM

## 2024-10-01 DIAGNOSIS — Z90.49: ICD-10-CM

## 2024-10-01 DIAGNOSIS — Z90.12: ICD-10-CM

## 2024-10-01 DIAGNOSIS — Z88.0: ICD-10-CM

## 2024-10-01 DIAGNOSIS — Z16.12: ICD-10-CM

## 2024-10-01 DIAGNOSIS — Z88.1: ICD-10-CM

## 2024-10-01 DIAGNOSIS — Z96.653: ICD-10-CM

## 2024-10-01 DIAGNOSIS — N17.9: ICD-10-CM

## 2024-10-01 LAB
ALBUMIN SERPL BCP-MCNC: 2.5 G/DL (ref 3.4–5)
ALBUMIN/GLOB SERPL: 0.6 {RATIO} (ref 1.1–1.8)
ALP SERPL-CCNC: 125 U/L (ref 45–117)
ALT SERPL W P-5'-P-CCNC: 19 U/L (ref 13–56)
ANION GAP SERPL CALC-SCNC: 11.5 MEQ/L (ref 5–15)
AST SERPL W P-5'-P-CCNC: 22 U/L (ref 15–37)
BUN BLD-MCNC: 19 MG/DL (ref 7–18)
GLOBULIN SER CALC-MCNC: 4 G/DL (ref 2.3–3.5)
GLUCOSE SERPLBLD-MCNC: 121 MG/DL (ref 74–106)
HCT VFR BLD CALC: 41.9 % (ref 36–45)
HGB BLD-MCNC: 13.3 G/DL (ref 12–15)
LIPASE SERPL-CCNC: 19 U/L (ref 13–75)
LYMPHOCYTES # SPEC AUTO: 2.3 K/UL (ref 0.7–4.9)
MCH RBC QN AUTO: 30.9 PG (ref 27–35)
MCHC RBC AUTO-ENTMCNC: 31.8 G/DL (ref 32–36)
MCV RBC: 97.2 FL (ref 80–100)
NRBC # BLD: 0 10*3/UL (ref 0–0)
NRBC BLD AUTO-RTO: 0 % (ref 0–0)
PMV BLD: 8.2 FL (ref 7.6–11.3)
POTASSIUM SERPL-SCNC: 4.5 MEQ/L (ref 3.5–5.1)
RBC # BLD: 4.31 M/UL (ref 3.86–4.86)
SQUAMOUS URNS QL MICRO: (no result) /HPF
UA COMPLETE W REFLEX CULTURE PNL UR: (no result)
UA DIPSTICK W REFLEX MICRO PNL UR: (no result)
WBC # BLD AUTO: 8.2 THOU/UL (ref 4.3–10.9)

## 2024-10-01 PROCEDURE — 87088 URINE BACTERIA CULTURE: CPT

## 2024-10-01 PROCEDURE — 85025 COMPLETE CBC W/AUTO DIFF WBC: CPT

## 2024-10-01 PROCEDURE — 82947 ASSAY GLUCOSE BLOOD QUANT: CPT

## 2024-10-01 PROCEDURE — 87086 URINE CULTURE/COLONY COUNT: CPT

## 2024-10-01 PROCEDURE — 0T9B70Z DRAINAGE OF BLADDER WITH DRAINAGE DEVICE, VIA NATURAL OR ARTIFICIAL OPENING: ICD-10-PCS

## 2024-10-01 PROCEDURE — 87077 CULTURE AEROBIC IDENTIFY: CPT

## 2024-10-01 PROCEDURE — 83690 ASSAY OF LIPASE: CPT

## 2024-10-01 PROCEDURE — 99285 EMERGENCY DEPT VISIT HI MDM: CPT

## 2024-10-01 PROCEDURE — 83735 ASSAY OF MAGNESIUM: CPT

## 2024-10-01 PROCEDURE — 80053 COMPREHEN METABOLIC PANEL: CPT

## 2024-10-01 PROCEDURE — 96361 HYDRATE IV INFUSION ADD-ON: CPT

## 2024-10-01 PROCEDURE — 87186 SC STD MICRODIL/AGAR DIL: CPT

## 2024-10-01 PROCEDURE — 85730 THROMBOPLASTIN TIME PARTIAL: CPT

## 2024-10-01 PROCEDURE — 87040 BLOOD CULTURE FOR BACTERIA: CPT

## 2024-10-01 PROCEDURE — 81001 URINALYSIS AUTO W/SCOPE: CPT

## 2024-10-01 PROCEDURE — 36415 COLL VENOUS BLD VENIPUNCTURE: CPT

## 2024-10-01 PROCEDURE — 36569 INSJ PICC 5 YR+ W/O IMAGING: CPT

## 2024-10-01 PROCEDURE — 02HV33Z INSERTION OF INFUSION DEVICE INTO SUPERIOR VENA CAVA, PERCUTANEOUS APPROACH: ICD-10-PCS

## 2024-10-01 PROCEDURE — 83605 ASSAY OF LACTIC ACID: CPT

## 2024-10-01 PROCEDURE — 84145 PROCALCITONIN (PCT): CPT

## 2024-10-01 PROCEDURE — 96365 THER/PROPH/DIAG IV INF INIT: CPT

## 2024-10-01 PROCEDURE — 51702 INSERT TEMP BLADDER CATH: CPT

## 2024-10-01 PROCEDURE — 71045 X-RAY EXAM CHEST 1 VIEW: CPT

## 2024-10-01 PROCEDURE — 85610 PROTHROMBIN TIME: CPT

## 2024-10-01 PROCEDURE — 84484 ASSAY OF TROPONIN QUANT: CPT

## 2024-10-01 RX ADMIN — MUPIROCIN SCH APPL: 20 OINTMENT TOPICAL at 21:00

## 2024-10-01 RX ADMIN — HYDROMORPHONE HYDROCHLORIDE ONE MG: 1 INJECTION, SOLUTION INTRAMUSCULAR; INTRAVENOUS; SUBCUTANEOUS at 19:14

## 2024-10-01 RX ADMIN — SODIUM CHLORIDE SCH MLS: 0.9 INJECTION, SOLUTION INTRAVENOUS at 18:00

## 2024-10-01 RX ADMIN — FLECAINIDE ACETATE SCH MG: 100 TABLET ORAL at 21:00

## 2024-10-01 RX ADMIN — SODIUM CHLORIDE SCH: 9 INJECTION, SOLUTION INTRAVENOUS at 21:00

## 2024-10-01 RX ADMIN — MORPHINE SULFATE PRN MG: 2 INJECTION, SOLUTION INTRAMUSCULAR; INTRAVENOUS at 18:11

## 2024-10-01 RX ADMIN — METOPROLOL SUCCINATE SCH MG: 25 TABLET, EXTENDED RELEASE ORAL at 21:00

## 2024-10-01 RX ADMIN — DULOXETINE HYDROCHLORIDE SCH MG: 30 CAPSULE, DELAYED RELEASE ORAL at 21:00

## 2024-10-01 NOTE — EDPHYS
Physician Documentation                                                                           

 [facility]                                                                 

Name: Leona Peres                                                                              

Age: 83 yrs                                                                                       

Sex: Female                                                                                       

: 1940                                                                                   

MRN: [id number]                                                                                   

Arrival Date: 10/01/2024                                                                          

Time: 09:59                                                                                       

Account#: [id number]                                                                            

Bed 26                                                                                            

Private MD:                                                                                       

ED Physician Ofelia Wu                                                                        

HPI:                                                                                              

10/01                                                                                             

15:19 This 83 yrs old  Female presents to ER via Wheelchair with complaints of       gb1 

      Urinary Problem.                                                                            

15:19 83-year-old female was called back to the ER for positive urine culture that required   gb1 

      IV antibiotics. She recently finished a course of Bactrim and Macrobid. She denies any      

      fever or chills or any nausea vomiting or diarrhea. With history of A-fib, Alzheimer's      

      disease, COPD..                                                                             

                                                                                                  

Historical:                                                                                       

- Allergies:                                                                                      

10:14 clindamycin HCl (bulk);                                                                 tm6 

10:14 Darvocet-N 100;                                                                         tm6 

10:14 PENICILLINS;                                                                            tm6 

10:14 Strawberries;                                                                           tm6 

10:14 Sulfa (Sulfonamide Antibiotics);                                                        tm6 

- PMHx:                                                                                           

10:14 AFIB; Alzheimer's disease; Alzheimer's disease; angina pectoris; Cancer; Cancer; COPD;  tm6 

      Depression; Fibromyalgia; GERD; Hyperlipidemia; Hypertension; Hypothyroidism; insomnia;     

      left breast cancer; lymphedema; skipped heart beats;                                        

- PSHx:                                                                                           

10:14 Appendectomy; bilateral knee replacement; mastectomy; Spinal Fusion; Total abdominal    tm6 

      hysterectomy;                                                                               

                                                                                                  

- Immunization history:: Client reports receiving the 2nd dose of the Covid vaccine.              

- Infectious Disease History:: Denies.                                                            

- Social history:: Smoking status: Patient denies any tobacco usage or history of.                

                                                                                                  

                                                                                                  

Exam:                                                                                             

15:19 Constitutional:  This is a well developed, well nourished patient who is awake, alert,  gb1 

      and in no acute distress. Head/Face:  Normocephalic, atraumatic. Eyes:  Pupils equal        

      round and reactive to light, extra-ocular motions intact.  Lids and lashes normal.          

      Conjunctiva and sclera are non-icteric and not injected.  Cornea within normal limits.      

      Periorbital areas with no swelling, redness, or edema. ENT:  Nares patent. No nasal         

      discharge, no septal abnormalities noted.  Tympanic membranes are normal and external       

      auditory canals are clear.  Oropharynx with no redness, swelling, or masses, exudates,      

      or evidence of obstruction, uvula midline.  Mucous membranes moist. Neck:  Trachea          

      midline, no thyromegaly or masses palpated, and no cervical lymphadenopathy.  Supple,       

      full range of motion without nuchal rigidity, or vertebral point tenderness.  No            

      Meningismus. Chest/axilla:  Normal chest wall appearance and motion.  Nontender with no     

      deformity.  No lesions are appreciated. Cardiovascular:  Regular rate and rhythm with a     

      normal S1 and S2.  No gallops, murmurs, or rubs.  Normal PMI, no JVD.  No pulse             

      deficits. Respiratory:  Lungs have equal breath sounds bilaterally, clear to                

      auscultation and percussion.  No rales, rhonchi or wheezes noted.  No increased work of     

      breathing, no retractions or nasal flaring. Abdomen/GI:  Soft, non-tender, with normal      

      bowel sounds.  No distension or tympany.  No guarding or rebound.  No evidence of           

      tenderness throughout. Back:  No spinal tenderness.  No costovertebral tenderness.          

      Full range of motion. Skin:  Warm, dry with normal turgor.  Normal color with no            

      rashes, no lesions, and no evidence of cellulitis. MS/ Extremity:  Pulses equal, no         

      cyanosis.  Neurovascular intact.  Full, normal range of motion. Neuro:  Awake and           

      alert, GCS 15, oriented to person, place, time, and situation.  Cranial nerves II-XII       

      grossly intact.  Motor strength 5/5 in all extremities.  Sensory grossly intact.            

      Cerebellar exam normal.  Normal gait.                                                       

                                                                                                  

Vital Signs:                                                                                      

10:11 Pulse 72; Resp 19; Temp 97.1(TE); Pulse Ox 98% ; Weight 79.83 kg; Height 5 ft. 3 in. ;  tm6 

      Pain 7/10;                                                                                  

10:14  / 57; MAP 71 mmHg;                                                               tm6 

12:30  / 59; Pulse 57; Resp 16; Pulse Ox 99% ;                                          me1 

13:00  / 65; Pulse 57; Resp 16; Pulse Ox 99% ;                                          me1 

14:00  / 56; Pulse 57; Resp 16; Pulse Ox 98% ;                                          me1 

15:00  / 51; Pulse 56; Resp 16; Pulse Ox 100% on R/A;                                   me1 

16:00  / 59; Pulse 57; Resp 17; Pulse Ox 98% ;                                          me1 

17:00  / 59; Pulse 57; Resp 16; Pulse Ox 99% ;                                          me1 

18:00  / 97; Pulse 68; Resp 16; Pulse Ox 98% ;                                          me1 

19:00  / 62; Pulse 63; Resp 16; Pulse Ox 94% ;                                          me1 

10:11 Body Mass Index 31.18 (79.83 kg, 160.02 cm)                                             tm6 

10:11 Pain Scale: Adult                                                                       tm6 

                                                                                                  

MDM:                                                                                              

10:30 Patient medically screened.                                                             gb1 

15:19 Differential diagnosis: nonspecific abdominal pain, urinary tract infection. Data       1 

      reviewed: vital signs, nurses notes, old medical records, I reviewed the patient's last     

      urine culture from 2024 lab test result(s). ED course: 83 years old         

      female with history of recurrent urinary tract infections is here for                       

      multidrug-resistant ESBL. I have admitted her to the hospital and arranged for PICC         

      line placement for home health IV antibiotic medications as well as to establish IV         

      access here. I have admitted the patient to the hospitalist. At this time she does not      

      appear septic and will receive tailor antibiotic therapy. She is afebrile and otherwise     

      able to tolerate liquids..                                                                  

                                                                                                  

10/01                                                                                             

10:49 Order name: CBC with Diff; Complete Time: 14:14                                         [facility] 

10/01                                                                                             

10:49 Order name: CMP; Complete Time: 14:14                                                   1 

10/01                                                                                             

10:49 Order name: Lipase; Complete Time: 14:14                                                [facility] 

10/01                                                                                             

10:49 Order name: Urinalysis w/ reflexes; Complete Time: 14:14                                1 

10/01                                                                                             

13:43 Order name: Urine Culture                                                               [facility]

10/01                                                                                             

14:17 Order name: Blood Culture Adult (2)                                                     [facility] 

10/01                                                                                             

14:18 Order name: Lactate w/ 2H reflex if indic.; Complete Time: 18:49                        1 

10/01                                                                                             

17:24 Order name: CBC with Automated Diff                                                     [facility]

10/01                                                                                             

17:24 Order name: CBC with Automated Diff                                                     [facility]

10/01                                                                                             

17:24 Order name: Comprehensive Metabolic Panel                                               [facility]

10/01                                                                                             

17:24 Order name: Comprehensive Metabolic Panel                                               [facility]

10/01                                                                                             

15:33 Order name: XRAY Chest (1 view); Complete Time: 18:49                                   [facility] 

10/01                                                                                             

10:49 Order name: IV Saline Lock; Complete Time: 12:59                                        gb1 

10/01                                                                                             

10:49 Order name: Labs collected and sent; Complete Time: 12:59                               gb1 

                                                                                                  

Administered Medications:                                                                         

13:28 Drug: NS 0.9% IV 1000 ml IV at 1 bolus Per protocol; 1000 mL bolus Route: IV; Rate: 1   me1 

      bolus; Site: right hand;                                                                    

17:01 Follow up: Response: No adverse reaction; IV Status: Infusion continued; IV Intake:     me1 

      1000ml                                                                                      

17:20 Drug: Meropenem IV 1 grams IV at bolus once; (mix in  mL) Route: IV; Rate: bolus; me1 

      Site: right upper arm;                                                                      

18:06 Follow up: Response: No adverse reaction; IV Status: Completed infusion                 me1 

                                                                                                  

                                                                                                  

Disposition Summary:                                                                              

10/01/24 15:18                                                                                    

Hospitalization Ordered                                                                           

 Notes:       Hospitalization Status: Inpatient Admission                                           
  gb1

      Provider: Guillaume Garibay 

      Location: Telemetry/MedSurg (observation)                                               gb1 

      Condition: Stable                                                                       gb1 

      Problem: chronic                                                                        gb1 

      Symptoms: have worsened                                                                 gb1 

      Bed/Room Type: Standard                                                                 [facility] 

      Room Assignment: 218(10/02/24 02:06)                                                    lg3 

      Diagnosis                                                                                   

        - UTI/ Urinary tract infection, site not specified                                    [facility] 

      Forms:                                                                                      

        - Medication Reconciliation Form                                                      gb1 

        - SBAR form                                                                           1 

        - Leadership Thank You Letter                                                         gb1 

Signatures:                                                                                       

Dispatcher [employer]                           EDAviva Morillo RN                         RN   lg3                                                  

Vangie Barrett RN RN   me1                                                  

Ofelia Wu MD MD   gb1                                                  

Ene Johnson                     [facility]                                                  

Malika Bond RN                   RN   tm6                                                  

                                                                                                  

Corrections: (The following items were deleted from the chart)                                    

10:50 10:49 CBC+H.LAB.BRZ ordered. EDMS                                                       EDMS

10:50 10:49 COMPREHENSIVE METABOLIC PANEL+C.LAB.BRZ ordered. EDMS                             EDMS

10:50 10:49 LIPASE+C.LAB.BRZ ordered. EDMS                                                    EDMS

10:50 10:49 Urinalysis+U.LAB.BRZ ordered. EDMS                                                EDMS

23:58 15:18 gb1                                                                               kmf 

10/02                                                                                             

02:06 10/01 23:58 407 kmf                                                                     lg3 

                                                                                                  

**************************************************************************************************

## 2024-10-01 NOTE — RAD REPORT
EXAMINATION: ONE VIEW CHEST XR



CLINICAL INDICATION: Female, 83 years old.,picc line placement



TECHNIQUE: Frontal chest projection is submitted. Examination is limited by patient positioning and t
echnique.



COMPARISON: 9/24/2024



FINDINGS:

Right arm PICC with tip terminating at the superior cavoatrial junction. The lungs are well inflated 
and clear.  No pneumothorax or sizable effusion. The heart is normal in size.



IMPRESSION: 



Satisfactory positioning of right arm PICC.







Reported By: Noam Shah 

Electronically Signed:  10/1/2024 4:59 PM

## 2024-10-01 NOTE — ER
Nurse's Notes                                                                                     

 [facility]                                                                 

Name: Leona Peres                                                                              

Age: 83 yrs                                                                                       

Sex: Female                                                                                       

: 1940                                                                                   

MRN: [id number]                                                                                   

Arrival Date: 10/01/2024                                                                          

Time: 09:59                                                                                       

Account#: [id number]                                                                            

Bed 26                                                                                            

Private MD:                                                                                       

Diagnosis: UTI/ Urinary tract infection, site not specified                                       

                                                                                                  

Presentation:                                                                                     

10/01                                                                                             

10:11 Chief complaint: Patient states: received call from hospital saying her urine culture   tm6 

      came back and she needed to come in for IV antibiotics. Patient having burning and          

      frequency, dark urine. Coronavirus screen: Vaccine status: Patient reports receiving        

      the 2nd dose of the covid vaccine. Ebola Screen: Patient negative for fever greater         

      than or equal to 101.5 degrees Fahrenheit, and additional compatible Ebola Virus            

      Disease symptoms Patient denies exposure to infectious person. Patient denies travel to     

      an Ebola-affected area in the 21 days before illness onset. No symptoms or risks            

      identified at this time. Initial Sepsis Screen: Does the patient meet any 2 criteria?       

      Does the patient have a suspected source of infection?. Risk Assessment: Do you want to     

      hurt yourself or someone else? Patient reports no desire to harm self or others. Onset      

      of symptoms is unknown.                                                                     

10:11 Method Of Arrival: Wheelchair                                                           tm6 

10:11 Acuity: ANUSHKA 3                                                                           tm6 

                                                                                                  

Triage Assessment:                                                                                

10:14 General: Appears in no apparent distress. Behavior is calm, cooperative. Pain:          tm6 

      Complains of pain in right scapular area, pelvis and anterior aspect of left ankle Pain     

      currently is 7 out of 10 on a pain scale. EENT: No signs and/or symptoms were reported      

      regarding the EENT system. Neuro: Level of Consciousness is awake, alert, obeys             

      commands, Oriented to person, place, time, situation,  are. Cardiovascular:            

      Patient's skin is warm and dry. Respiratory: Airway is patent Respiratory effort is         

      even, unlabored, Respiratory pattern is regular, symmetrical. GI: No signs and/or           

      symptoms were reported involving the gastrointestinal system. Abdomen is flat,              

      non-distended. : Reports burning with urination, urinary frequency. Derm: No signs        

      and/or symptoms reported regarding the dermatologic system. Musculoskeletal: Reports        

      pain in right scapular area and anterior aspect of left ankle.                              

                                                                                                  

Historical:                                                                                       

- Allergies:                                                                                      

10:14 clindamycin HCl (bulk);                                                                 tm6 

10:14 Darvocet-N 100;                                                                         tm6 

10:14 PENICILLINS;                                                                            tm6 

10:14 Strawberries;                                                                           tm6 

10:14 Sulfa (Sulfonamide Antibiotics);                                                        tm6 

- PMHx:                                                                                           

10:14 AFIB; Alzheimer's disease; Alzheimer's disease; angina pectoris; Cancer; Cancer; COPD;  tm6 

      Depression; Fibromyalgia; GERD; Hyperlipidemia; Hypertension; Hypothyroidism; insomnia;     

      left breast cancer; lymphedema; skipped heart beats;                                        

- PSHx:                                                                                           

10:14 Appendectomy; bilateral knee replacement; mastectomy; Spinal Fusion; Total abdominal    tm6 

      hysterectomy;                                                                               

                                                                                                  

- Immunization history:: Client reports receiving the 2nd dose of the Covid vaccine.              

- Infectious Disease History:: Denies.                                                            

- Social history:: Smoking status: Patient denies any tobacco usage or history of.                

                                                                                                  

                                                                                                  

Screenin:30 [facility] ED Fall Risk Assessment (Adult) History of falling in the last 3 months,       me1 

      including since admission No falls in past 3 months (0 pts) Confusion or Disorientation     

      No (0 pts) Intoxicated or Sedated No (0 pts) Impaired Gait Yes (1 pt) Mobility Assist       

      Device Used Yes (1 pt) Altered Elimination No (0 pt) Score/Fall Risk Level 0 - 2 = Low      

      Risk Maintained a safe environment, Provided non-skid footwear, Hourly rounding (assess     

      needs \T\ fall precautionary measures) done. Abuse screen: Denies threats or abuse.         

      Nutritional screening: No deficits noted. Tuberculosis screening: No symptoms or risk       

      factors identified.                                                                         

                                                                                                  

Assessment:                                                                                       

12:30 General: Appears uncomfortable, well groomed, well developed, well nourished, Behavior  me1 

      is calm, cooperative, appropriate for age, Reports received call from hospital saying       

      her urine culture came back and she needed to come in for IV antibiotics. Patient           

      having burning and frequency, dark urine. Pain: Complains of pain in suprapubic area        

      Pain does not radiate. Pain currently is 3 out of 10 on a pain scale. Quality of pain       

      is described as dull, Pain began gradually, Is continuous. Neuro: Level of                  

      Consciousness is awake, alert, obeys commands, Oriented to person, place, time,             

      situation, Appropriate for age. Cardiovascular: Patient's skin is warm and dry.             

      Respiratory: Airway is patent Respiratory effort is even, unlabored, Respiratory            

      pattern is regular, symmetrical. GI: No signs and/or symptoms were reported involving       

      the gastrointestinal system. : Reports burning with urination, urinary frequency.         

      EENT: No signs and/or symptoms were reported regarding the EENT system. Derm: Skin is       

      intact, is healthy with good turgor, Skin is pink, warm \T\ dry. Musculoskeletal: Reports   

      pain in anterior aspect of left ankle brace in place from recent injury to ankle.           

16:35 General: Verbal ok to use PICC line by Dr Wu.                                      me1 

                                                                                                  

Vital Signs:                                                                                      

10:11 Pulse 72; Resp 19; Temp 97.1(TE); Pulse Ox 98% ; Weight 79.83 kg; Height 5 ft. 3 in. ;  tm6 

      Pain 7/10;                                                                                  

10:14  / 57; MAP 71 mmHg;                                                               tm6 

12:30  / 59; Pulse 57; Resp 16; Pulse Ox 99% ;                                          me1 

13:00  / 65; Pulse 57; Resp 16; Pulse Ox 99% ;                                          me1 

14:00  / 56; Pulse 57; Resp 16; Pulse Ox 98% ;                                          me1 

15:00  / 51; Pulse 56; Resp 16; Pulse Ox 100% on R/A;                                   me1 

16:00  / 59; Pulse 57; Resp 17; Pulse Ox 98% ;                                          me1 

17:00  / 59; Pulse 57; Resp 16; Pulse Ox 99% ;                                          me1 

18:00  / 97; Pulse 68; Resp 16; Pulse Ox 98% ;                                          me1 

19:00  / 62; Pulse 63; Resp 16; Pulse Ox 94% ;                                          me1 

10:11 Body Mass Index 31.18 (79.83 kg, 160.02 cm)                                             tm6 

10:11 Pain Scale: Adult                                                                       tm6 

                                                                                                  

ED Course:                                                                                        

10:01 Patient arrived in ED.                                                                  im  

10:07 Ofelia Wu MD is Attending Physician.                                               gb1 

10:13 Triage completed.                                                                       tm6 

10:14 Arm band placed on left wrist.                                                          tm6 

12:02 Vangie Barrett, RN is Primary Nurse.                                                me1 

12:30 Patient has correct armband on for positive identification. Bed in low position. Call   me1 

      light in reach. Side rails up X2. Provided Education on: POC. Verbalized understanding..    

12:30 No provider procedures requiring assistance completed.                                  me1 

12:58 Initial lab(s) drawn, by me, sent to lab. Inserted saline lock: 24 gauge in right hand, me1 

      using aseptic technique.                                                                    

12:59 CBC with Diff Sent.                                                                     me1 

12:59 CMP Sent.                                                                               me1 

12:59 Lipase Sent.                                                                            me1 

13:00 1300 CM spoke with ER doctor, . Dr. Wu was contemplating ordering HH and ane 

      IV antibiotics but was awaiting lab results. 1422 CM met with patient, patient's son        

      Kody at the bedside in the ED exam room. Patient identified by name and .              

      Demographic sheet confirmed. Kody states he is the primary caregiver for .      

      They lives together in a single story home. He reports that prior to admission, she had     

      not been ambulating as she usually does with a walker because of a right shoulder           

      surgery as well as an ankle sprain. Prior to her injuries, Ms. Peres was able to          

      ambulate with a walker with assistance, and sometimes using a wheelchair. Other DME         

      includes a raised toilet seat, shower chair,  bars in the bathroom, motorized bed       

      and recliner, and a home oxygen concentrator. Patient does not use oxygen at this time.     

      Ms. Peres is receiving outpatient PT but Kody is unsure of the name of the              

      facility. MPOA is in place. The preferred plan is for  to receive regain          

      health so that she may be able to continue with PT and return home. CM team will            

      continue to follow and coordinate care during this hospital stay.                           

13:22 Urinalysis w/ reflexes Sent.                                                            me1 

13:22 Urine collected: straight cath specimen, cloudy, tea colored, blood tinged.             me1 

13:29 Client placed on continuous cardiac and pulse oximetry monitoring. NIBP monitoring      me1 

      applied. Pulse ox on. NIBP on.                                                              

14:05 Urine Culture Sent.                                                                     me1 

15:18 Guillaume Garibay MD is Hospitalizing Provider.                                           gb1 

15:29 Accessed PICC line. using ,sterile technique, Clean \T\ dry. Dressing intact. Good blood  me1

      return. Flushes easily.                                                                     

15:47 XRAY Chest (1 view) In Process Unspecified.                                             EDMS

16:49 First set of blood cultures drawn by me.                                                me1 

16:53 Lactate w/ 2H reflex if indic. Sent.                                                    me1 

16:53 Blood Culture Adult (2) Sent.                                                           me1 

17:04 Second set of blood cultures drawn by me.                                               me1 

18:38 Samuels cath inserted, using sterile technique, 18 Fr., by me, balloon inflated, to       me1 

      gravity drainage.                                                                           

18:38 returned cloudy urine. Patient tolerated well.                                          me1 

                                                                                                  

Administered Medications:                                                                         

13:28 Drug: NS 0.9% IV 1000 ml IV at 1 bolus Per protocol; 1000 mL bolus Route: IV; Rate: 1   me1 

      bolus; Site: right hand;                                                                    

17:01 Follow up: Response: No adverse reaction; IV Status: Infusion continued; IV Intake:     me1 

      1000ml                                                                                      

17:20 Drug: Meropenem IV 1 grams IV at bolus once; (mix in  mL) Route: IV; Rate: bolus; me1 

      Site: right upper arm;                                                                      

18:06 Follow up: Response: No adverse reaction; IV Status: Completed infusion                 me1 

                                                                                                  

                                                                                                  

Medication:                                                                                       

12:30 VIS not applicable for this client.                                                     me1 

                                                                                                  

Intake:                                                                                           

17:01 IV: 1000ml; Total: 1000ml.                                                              me1 

                                                                                                  

Outcome:                                                                                          

15:18 Decision to Hospitalize by Provider.                                                    gb1 

10/02                                                                                             

03:24 Patient left the ED.                                                                    lg3 

                                                                                                  

Signatures:                                                                                       

Dispatcher [employer]                           EDAviva Morillo, RN                         RN   lg3                                                  

Magaly Lynn Michelle, RN                  RN   me1                                                  

Ofelia Wu MD MD   gb1                                                  

Malika Bond RN RN   tm6                                                  

Merlyn Vargas RN                      RN   ane                                                  

                                                                                                  

Corrections: (The following items were deleted from the chart)                                    

10/01                                                                                             

15:53 10:11 Chief complaint: Patient states: received call from hospital saying her urine     me1 

      culture came back and she needed to come in for IV antibiotics. Patient having burning      

      and frequency, dark urine tm6                                                               

                                                                                                  

**************************************************************************************************

## 2024-10-01 NOTE — P.HP
Certification for Inpatient


Patient admitted to: Inpatient


With expected LOS: >2 Midnights


Patient will require the following post-hospital care: Home Health Services


Practitioner: I am a practitioner with admitting privileges, knowledge of 

patient current condition, hospital course, and medical plan of care.


Services: Services provided to patient in accordance with Admission requirements

found in Title 42 Section 412.3 of the Code of Federal Regulations





<Guillaume Garibay - Last Filed: 10/02/24 17:59>





Patient History


Reason for admission: Acute cystitis, failed outpatient treatment


History of Present Illness: 





83-year-old female with a past medical history AFIB; Alzheimer's disease; 

Alzheimer's disease; angina pectoris; Cancer; Cancer; COPD; Depression; 

Fibromyalgia; GERD; Hyperlipidemia; Hypertension; Hypothyroidism; insomnia;  

left breast cancer; lymphedema; skipped heart beats; presents to the emergency 

room with failed outpatient treatment of p.o. antibiotics.  She denies fever, 

chills, nausea vomiting diarrhea.  Plan to admit for acute cystitis,





<Lidia Jean - Last Filed: 10/02/24 15:37>


Date of Service: 10/01/24


History of Present Illness: 





patient is an 83-year-old female with a past medical history of atrial 

fibrillation, Alzheimer's disease, CAD, COPD, Depression, Fibromyalgia, GERD, 

metabolic syndrome, and breast cancer, presents to the emergency room with 

failed outpatient treatment of p.o. antibiotics. She denies fever, chills, 

nausea & vomiting diarrhea.  Patient has been living at home and she has been 

getting recurring infections. She has been confused and has been getting weaker.

She decided to go into the ER for further evaluation. 





- Past Medical/Surgical History


Diabetic: No


-: Lymphedema


-: Afib


-: L breast cancer


-: PE


-: Meniere's Disease


-: Former smoker


-: Recurrent UTIs


-: Fibromyalgia


-: GERD


-: Insomnia


-: Depression


-: Neuropathy


-: s/p Left Mastectomy


-: SANTOSH TKA


-: Cervical fusion


-: Lumbar fusion


-: Appendectomy


-: Hysterectomy


Psychosocial/ Personal History: Patient lives in an assisted living facility-

[facility]





- Family History


  ** Father


Medical History: Diabetes


Notes: heart attack.  alcoholic





- Social History


Alcohol use: No


CD- Drugs: No


Caffeine use: No





<Guillaume Garibay - Last Filed: 10/02/24 17:59>


Allergies





clindamycin HCl [From Cleocin] Allergy (Severe, Verified 08/03/23 22:05)


   Hives/Rash


clindamycin palmitate HCl [From Cleocin] Allergy (Severe, Verified 08/03/23 

22:05)


   Hives/Rash


clindamycin phosphate [From Cleocin] Allergy (Severe, Verified 08/03/23 22:05)


   Hives/Rash


Penicillins Allergy (Severe, Verified 08/03/23 22:05)


   Hives/Rash


Sulfa (Sulfonamide Antibiotics) [Sulfa(Sulfonamide Antibiotics)] Allergy 

(Severe, Verified 08/03/23 22:05)


   Hives/Rash


propoxyphene [From Darvocet-N 100] Allergy (Verified 08/03/23 22:05)


   Unknown


strawberries Allergy (Severe, Uncoded 03/24/21 05:19)


   Hives/Rash





Home Medications: 








Albuterol Sulfate [Proair Respiclick] 2 puff IH Q6HP PRN 02/22/22 


Apixaban [Eliquis] 5 mg PO BID 02/22/22 


Cyanocobalamin (Vitamin B-12) [Vitamin B12] 1,000 mcg PO DAILY 02/22/22 


Duloxetine HCl 60 mg PO BID 02/22/22 


Estradiol 0.01% Cream 1 appl VAG SEECOM 02/22/22 


Flecainide Acetate 50 mg PO BID 02/22/22 


Gabapentin 300 mg PO BEDTIME 02/22/22 


Levothyroxine [Synthroid*] 0.075 mcg PO [id number] 02/22/22 


Memantine HCl 10 mg PO BID 02/22/22 


Montelukast [Singulair*] 10 mg PO DAILY 02/22/22 


Pantoprazole [Protonix Tab*] 40 mg PO DAILY 02/22/22 


Potassium Chloride 20 meq PO BID 02/22/22 


Simvastatin 40 mg PO BEDTIME 02/22/22 


Topiramate 100 mg PO BID 02/22/22 


buPROPion HCL [Bupropion Xl] 300 mg PO DAILY 02/22/22 


cycloSPORINE [Restasis] 1 drop EACH EYE TID 02/22/22 


Pataday 0.7% Drops  1 drop OPTH DAILY 09/13/22 


Propylene Glycol/Peg 400 [Lubricating Eye Drop] 1 drop OPTH BID 09/13/22 


Cranberry Fruit Extract [Ellura] 500 mg PO DAILY 10/25/22 


Cephalexin [Keflex] 500 mg PO Q6HR 7 Days #28 cap 10/27/22 


Hydrocodone Bit/Acetaminophen [Hydrocodon-Acetaminophen 5-325] 5 - 325 mg PO BID

08/03/23 


Metoprolol Succinate [Toprol Xl*] 12.5 mg PO BEDTIME 08/03/23 


Temazepam [Restoril] 7.5 mg PO BEDTIME 08/03/23 








Review of Systems


10-point ROS is otherwise unremarkable





<Lidia Jean - Last Filed: 10/02/24 15:37>


10-point ROS is otherwise unremarkable





<Guillaume Garibay - Last Filed: 10/02/24 17:59>





Physical Examination





- Physical Exam


General: Alert, In no apparent distress, Oriented x3


HEENT: Atraumatic, Normocephalic


Neck: Supple, JVD not distended


Respiratory: Clear to auscultation bilaterally, Normal air movement


Cardiovascular: Normal pulses, Regular rate/rhythm


Capillary refill: <2 Seconds


Gastrointestinal: Normal bowel sounds, Soft and benign, Other (Suprapubic 

tenderness)


Musculoskeletal: No clubbing, No swelling


Integumentary: No rashes, No breakdown


Neurological: Normal speech, Normal strength at 5/5 x4 extr





<Lidia Jean - Last Filed: 10/02/24 15:37>





- Vital Signs


Temperature: 98 F


Blood Pressure: 140/80


Pulse: 80


Respirations: 18


Pulse Ox (%): 95





- Physical Exam


General: Alert, In no apparent distress, Confused


HEENT: Atraumatic, PERRLA, Mucous membr. moist/pink, EOMI, Sclerae nonicteric


Neck: Supple, 2+ carotid pulse no bruit, No LAD, Without JVD or thyroid 

abnormality


Respiratory: Clear to auscultation bilaterally, Normal air movement


Cardiovascular: Regular rate/rhythm, Normal S1 S2


Gastrointestinal: Normal bowel sounds, No tenderness


Musculoskeletal: No tenderness


Integumentary: No rashes


Neurological: Normal gait, Normal speech, Normal strength at 5/5 x4 extr, Normal

 tone, Normal affect


Lymphatics: No axilla or inguinal lymphadenopathy





- Studies


Laboratory Data (last 24 hrs)











  10/01/24 10/01/24





  12:56 12:56


 


WBC   8.20


 


Hgb   13.3


 


Hct   41.9


 


Plt Count   333


 


Sodium  139 


 


Potassium  4.5 


 


BUN  19 H 


 


Creatinine  1.11 H 


 


Glucose  121 H 


 


Total Bilirubin  0.5 


 


AST  22 


 


ALT  19 


 


Alkaline Phosphatase  125 H 


 


Lipase  19 











<Guillaume Garibay - Last Filed: 10/02/24 17:59>





Assessment & Plan





- Plan





Assessment plan


Acute cystitis, failed outpatient treatment IV antibiotics.


Culture gram-negative rods,


IV fluids, IV antibiotics.  As needed analgesics,


Plan to discharge home with IV antibiotics IV Invanz, 1 g daily


Initially was treated with IV Samuels catheter,


Blood cultures, urine cultures


Plan to discharge home with home health with IV antibiotics, Invanz 1 g daily 

for 14 days





Transaminitis


Trend liver enzymes





Acute kidney injury, likely secondary to prerenal,


Treated with IV fluids, trend kidney function,





Full code


DVT SCDs


Diet cardiac





Time with patient 55 minutes





Discharge Plan: Nursing Home





- Code Status/Comfort Care


Code Status: Full Code


Critical Care: No


Time Spent Managing PTS Care (In Minutes): 55





<Lidia Jean - Last Filed: 10/02/24 15:37>





- Plan








Assessment/Plan


-Acute cystitis, MDR, failed outpatient treatment IV antibiotics.


Culture gram-negative rods,


IV fluids, IV antibiotics.  


As needed analgesics,


Plan to discharge home with IV antibiotics & IV Invanz 1 g daily


Initially was treated with IV Samuels catheter








-Acute kidney injury, likely secondary to prerenal,


Treated with IV fluids


Trend kidney function,





-





- Advance Directives


Does patient have a Living Will: No


Does patient have a Durable POA for Healthcare: Yes





<Guillaume Garibay - Last Filed: 10/02/24 17:59>

## 2024-10-02 LAB
ALBUMIN SERPL BCP-MCNC: 2 G/DL (ref 3.4–5)
ALBUMIN/GLOB SERPL: 0.6 {RATIO} (ref 1.1–1.8)
ALP SERPL-CCNC: 214 U/L (ref 45–117)
ALT SERPL W P-5'-P-CCNC: 118 U/L (ref 13–56)
ANION GAP SERPL CALC-SCNC: 6.6 MEQ/L (ref 5–15)
APTT BLD: 33.6 SECONDS (ref 24.3–36.9)
AST SERPL W P-5'-P-CCNC: 265 U/L (ref 15–37)
BUN BLD-MCNC: 14 MG/DL (ref 7–18)
GLOBULIN SER CALC-MCNC: 3.2 G/DL (ref 2.3–3.5)
GLUCOSE SERPLBLD-MCNC: 89 MG/DL (ref 74–106)
HCT VFR BLD CALC: 34.6 % (ref 36–45)
HGB BLD-MCNC: 11.3 G/DL (ref 12–15)
INR BLD: 1.28
LYMPHOCYTES # SPEC AUTO: 1.6 K/UL (ref 0.7–4.9)
MCH RBC QN AUTO: 31.6 PG (ref 27–35)
MCHC RBC AUTO-ENTMCNC: 32.5 G/DL (ref 32–36)
MCV RBC: 97.3 FL (ref 80–100)
NRBC # BLD: 0 10*3/UL (ref 0–0)
NRBC BLD AUTO-RTO: 0 % (ref 0–0)
PMV BLD: 8.2 FL (ref 7.6–11.3)
POTASSIUM SERPL-SCNC: 3.6 MEQ/L (ref 3.5–5.1)
PROTHROMBIN TIME: 14.2 SECONDS (ref 9.4–12.5)
RBC # BLD: 3.56 M/UL (ref 3.86–4.86)
TROPONIN I SERPL HS-MCNC: 9.9 PG/ML (ref ?–58.9)
WBC # BLD AUTO: 6.8 THOU/UL (ref 4.3–10.9)

## 2024-10-02 RX ADMIN — HYDROCODONE BITARTRATE AND ACETAMINOPHEN PRN TAB: 5; 325 TABLET ORAL at 17:17

## 2024-10-02 RX ADMIN — LEVOTHYROXINE SODIUM SCH MG: 0.1 TABLET ORAL at 05:30

## 2024-10-02 RX ADMIN — SODIUM CHLORIDE SCH MLS: 9 INJECTION, SOLUTION INTRAVENOUS at 09:52

## 2024-10-02 NOTE — P.PN
Date of Service: 10/03/24








Subjective


Admitted with UTI, failed outpatient treatment antibiotics








Review of Systems


10 point system review of negative unless listed in HPI





Physical Examination





- Vital Signs


Reviewed





Assessment And Plan





- Plan


- Physical Exam


General: Alert, In no apparent distress, Oriented x3


HEENT: Atraumatic, PERRLA, Mucous membr. moist/pink, EOMI, Sclerae nonicteric


Neck: Supple, JVD not distended


Respiratory: Clear to auscultation bilaterally, Normal air movement


Cardiovascular: No edema, Regular rate/rhythm, Normal S1 S2, No gallops, No 

rubs, No murmurs


Gastrointestinal: Normal bowel sounds, Soft and benign, Non-distended, No 

tenderness, No rebound, No guarding


Musculoskeletal: No clubbing


Integumentary: No rashes


Neurological: Normal speech, Cranial nerves 3-12 intact, Normal affect











Assessment and Plan





- Plan





Acute cystitis


Failed outpatient treatment antibiotics


9/24 ESBL, E. coli of the urine


Treated with Bactrim, Macrobid,


Started on IV meropenem


Will need IV antibiotics x 14 days, Merrem impairment discharge





Transaminitis


Trend liver function





Atrial fibrillation


COPD


COPD


GERD


HTN


Hypothyroidism


Resume appropriate home meds








DVT PPX: Lovenox-start anticoagulation if on at home. 


Code status:Full


Discharge Plan: Home


Plan to discharge in: 24 Hours





- Advance Directives


Does patient have a Living Will: No


Does patient have a Durable POA for Healthcare: Yes





- Code Status/Comfort Care


Code Status Assessed: Yes (Full code)


Critical Care: No


Time Spent Managing Pts Care (In Minutes): 35

## 2024-10-02 NOTE — P.DS
Admission Date: 10/01/24


Discharge Date: 10/03/24


Reason for Admission: Acute cystitis, failed outpatient treatment


Brief History of Present Illness: 





83-year-old female with a past medical history AFIB; Alzheimer's disease; 

Alzheimer's disease; angina pectoris; Cancer; Cancer; COPD; Depression; 

Fibromyalgia; GERD; Hyperlipidemia; Hypertension; Hypothyroidism; insomnia;  

left breast cancer; lymphedema; skipped heart beats; presents to the emergency 

room with failed outpatient treatment of p.o. antibiotics.  She denies fever, 

chills, nausea vomiting diarrhea.  Plan to admit for acute cystitis,





- Physical Exam


General: Alert, In no apparent distress, Oriented x3


HEENT: Atraumatic, Normocephalic


Neck: Supple, JVD not distended


Respiratory: Clear to auscultation bilaterally, Normal air movement


Cardiovascular: Normal pulses, Regular rate/rhythm


Capillary refill: <2 Seconds


Gastrointestinal: Normal bowel sounds, Soft and benign, Other (Suprapubic 

tenderness)


Musculoskeletal: No clubbing, No swelling


Integumentary: No rashes, No breakdown


Neurological: Normal speech, Normal strength at 5/5 x4 extr


Hospital Course: 





83-year-old female with a past medical history AFIB; Alzheimer's disease; 

Alzheimer's disease; angina pectoris; Cancer; Cancer; COPD; Depression; 

Fibromyalgia; GERD; Hyperlipidemia; Hypertension; Hypothyroidism; insomnia;  

left breast cancer; lymphedema; skipped heart beats; presents to the emergency 

room with failed outpatient treatment of p.o. antibiotics.  She denies fever, 

chills, nausea vomiting diarrhea.  She was treated for acute cystitis, 

tolerating diet, stable to discharge home with home health care with IV 

antibiotics, follow-up with PCP in 1 week





Assessment


Metabolic encephalopathy from sepsis, acute cystitis, fall precautions


Acute cystitis discharged home on IV antibiotics with home health, family teach 

prior to discharge


Failed outpatient treatment of antibiotics





Plan to discharge home with home health care with Invanz 1 g daily for 14 days


Weekly CBC, CMP, mag PCP to monitor weekly labs





Continue home medicines as previously prescribed





GOAL: Clear understanding of disease process





INSTRUCTIONS:


Physician Discharge Instructions: 


-Follow-up with PCP in 1 to 2 weeks


-Please call Dr. Garibay at [phone] if any questions regarding hospital stay


-Please call nursing station at [phone] if any nursing or medication 

questions


-Return to the emergency room if symptoms worsen





Diet: ADA, low sodium





Activity: Fall precautions








<Lidia Jean - Last Filed: 10/06/24 05:44>


Admission Date: 10/01/24


Discharge Date: 10/03/24


Hospital Course: 





Patient was seen and examined.  Events of the last 24 hours have been noted.  

Spoke with with ELENA regarding patient's clinical picture after evaluating and 

examining the patient independently. I performed a substantial part of the MDM 

during this patient's care today. I personally made or approved the documented 

management plan and acknowledge its risk of complications. I agree with the 

findings and documentation provided in the ELENA's notes. 





<Guillaume Garibay - Last Filed: 10/07/24 00:10>


Disposition: DC HOME/HOME HEALTH CARE


Vital Signs/Physical Exam: 














Temp Pulse Resp BP Pulse Ox


 


 98.3 F   59   16   97/53 L  100 


 


 10/02/24 12:00  10/02/24 12:00  10/02/24 12:00  10/02/24 12:00  10/02/24 12:00








Laboratory Data at Discharge: 














WBC  6.80 thou/uL (4.3-10.9)   10/02/24  05:00    


 


Hgb  11.3 g/dL (12.0-15.0)  L D 10/02/24  05:00    


 


Hct  34.6 % (36.0-45.0)  L  10/02/24  05:00    


 


Plt Count  266 thou/uL (152-406)   10/02/24  05:00    


 


PT  14.2 SECONDS (9.4-12.5)  H  10/02/24  05:00    


 


INR  1.28   10/02/24  05:00    


 


APTT  33.6 SECONDS (24.3-36.9)   10/02/24  05:00    


 


Sodium  140 mEq/L (136-145)   10/02/24  05:00    


 


Potassium  3.6 mEq/L (3.5-5.1)  D 10/02/24  05:00    


 


BUN  14 mg/dL (7-18)   10/02/24  05:00    


 


Creatinine  0.63 mg/dL (0.55-1.02)   10/02/24  05:00    


 


Glucose  89 mg/dL ()   10/02/24  05:00    


 


Total Bilirubin  0.5 mg/dL (0.2-1.0)   10/02/24  05:00    


 


AST  265 U/L (15-37)  H  10/02/24  05:00    


 


ALT  118 U/L (13-56)  H  10/02/24  05:00    


 


Alkaline Phosphatase  214 U/L ()  H D 10/02/24  05:00    


 


Lipase  19 U/L (13-75)   10/01/24  12:56    











<Lidia Jean - Last Filed: 10/06/24 05:44>


Vital Signs/Physical Exam: 














Temp Pulse Resp BP Pulse Ox


 


 96.9 F   65   17   138/65   97 


 


 10/03/24 16:00  10/03/24 16:00  10/03/24 16:00  10/03/24 16:00  10/03/24 16:00








General: Alert, In no apparent distress, Oriented x3


Laboratory Data at Discharge: 














WBC  6.10 thou/uL (4.3-10.9)   10/03/24  04:37    


 


Hgb  10.5 g/dL (12.0-15.0)  L  10/03/24  04:37    


 


Hct  33.0 % (36.0-45.0)  L  10/03/24  04:37    


 


Plt Count  253 thou/uL (152-406)   10/03/24  04:37    


 


PT  13.0 SECONDS (9.4-12.5)  H  10/03/24  04:37    


 


INR  1.17   10/03/24  04:37    


 


APTT  34.9 SECONDS (24.3-36.9)   10/03/24  04:37    


 


Sodium  142 mEq/L (136-145)   10/03/24  04:37    


 


Potassium  3.8 mEq/L (3.5-5.1)   10/03/24  04:37    


 


BUN  10 mg/dL (7-18)   10/03/24  04:37    


 


Creatinine  0.57 mg/dL (0.55-1.02)   10/03/24  04:37    


 


Glucose  76 mg/dL ()   10/03/24  04:37    


 


Magnesium  1.8 mg/dL (1.6-2.4)   10/03/24  04:37    


 


Total Bilirubin  0.5 mg/dL (0.2-1.0)   10/03/24  04:37    


 


AST  97 U/L (15-37)  H  10/03/24  04:37    


 


ALT  71 U/L (13-56)  H  10/03/24  04:37    


 


Alkaline Phosphatase  181 U/L ()  H  10/03/24  04:37    


 


Lipase  19 U/L (13-75)   10/01/24  12:56    











<Guillaume Garibay - Last Filed: 10/07/24 00:10>


Diet: AHA


Activity: Fall precautions


Time spent managing pt's care (in minutes): 55





<Lidia Jean - Last Filed: 10/06/24 05:44>





<Guillaume Garibay - Last Filed: 10/07/24 00:10>


Home Medications: 








Albuterol Sulfate [Proair Respiclick] 2 puff IH Q6HP PRN 02/22/22 


Apixaban [Eliquis] 5 mg PO BID 02/22/22 


Cyanocobalamin (Vitamin B-12) [Vitamin B12] 1,000 mcg PO DAILY 02/22/22 


Duloxetine HCl 60 mg PO BID 02/22/22 


Estradiol 0.01% Cream 1 appl VAG SEECOM 02/22/22 


Flecainide Acetate 50 mg PO BID 02/22/22 


Gabapentin 300 mg PO BEDTIME 02/22/22 


Levothyroxine [Synthroid*] 0.075 mcg PO [id number] 02/22/22 


Memantine HCl 10 mg PO BID 02/22/22 


Montelukast [Singulair*] 10 mg PO DAILY 02/22/22 


Pantoprazole [Protonix Tab*] 40 mg PO DAILY 02/22/22 


Potassium Chloride 20 meq PO BID 02/22/22 


Simvastatin 40 mg PO BEDTIME 02/22/22 


Topiramate 100 mg PO BID 02/22/22 


buPROPion HCL [Bupropion Xl] 300 mg PO DAILY 02/22/22 


cycloSPORINE [Restasis] 1 drop EACH EYE TID 02/22/22 


Pataday 0.7% Drops  1 drop OPTH DAILY 09/13/22 


Propylene Glycol/Peg 400 [Lubricating Eye Drop] 1 drop OPTH BID 09/13/22 


Cranberry Fruit Extract [Ellura] 500 mg PO DAILY 10/25/22 


Hydrocodone Bit/Acetaminophen [Hydrocodon-Acetaminophen 5-325] 5 - 325 mg PO BID

08/03/23 


Metoprolol Succinate [Toprol Xl*] 12.5 mg PO BEDTIME 08/03/23 





Physician Discharge Instructions: 





83-year-old female with a past medical history AFIB; Alzheimer's disease; 

Alzheimer's disease; angina pectoris; Cancer; Cancer; COPD; Depression; 

Fibromyalgia; GERD; Hyperlipidemia; Hypertension; Hypothyroidism; insomnia;  

left breast cancer; lymphedema; skipped heart beats; presents to the emergency 

room with failed outpatient treatment of p.o. antibiotics.  She denies fever, 

chills, nausea vomiting diarrhea.  She was treated for acute cystitis, 

tolerating diet, stable to discharge home with home health care with IV 

antibiotics, follow-up with PCP in 1 week





Assessment


Metabolic encephalopathy from sepsis, acute cystitis, fall precautions


Acute cystitis-Plan to discharge home with home health care with Invanz 1 g 

daily for 14 days


Failed outpatient treatment of antibiotics





Plan to discharge home with home health care with Invanz 1 g daily for 14 days


Weekly CBC, CMP, mag PCP to monitor weekly labs





Continue home medicines as previously prescribed





GOAL: Clear understanding of disease process





INSTRUCTIONS:


Physician Discharge Instructions: 


-Follow-up with PCP in 1 to 2 weeks


-Please call Dr. Garibay at [phone] if any questions regarding hospital stay


-Please call nursing station at [phone] if any nursing or medication 

questions


-Return to the emergency room if symptoms worsen





Diet: ADA, low sodium





Activity: Fall precautions


Followup: 


[facility] [Primary Care Provider] - 1-2 Weeks

## 2024-10-03 VITALS — TEMPERATURE: 96.9 F | DIASTOLIC BLOOD PRESSURE: 65 MMHG | SYSTOLIC BLOOD PRESSURE: 138 MMHG

## 2024-10-03 VITALS — OXYGEN SATURATION: 96 %

## 2024-10-03 LAB
ALBUMIN SERPL BCP-MCNC: 2 G/DL (ref 3.4–5)
ALBUMIN/GLOB SERPL: 0.6 {RATIO} (ref 1.1–1.8)
ALP SERPL-CCNC: 181 U/L (ref 45–117)
ALT SERPL W P-5'-P-CCNC: 71 U/L (ref 13–56)
ANION GAP SERPL CALC-SCNC: 6.8 MEQ/L (ref 5–15)
APTT BLD: 34.9 SECONDS (ref 24.3–36.9)
AST SERPL W P-5'-P-CCNC: 97 U/L (ref 15–37)
BUN BLD-MCNC: 10 MG/DL (ref 7–18)
GLOBULIN SER CALC-MCNC: 3.1 G/DL (ref 2.3–3.5)
GLUCOSE SERPLBLD-MCNC: 76 MG/DL (ref 74–106)
HCT VFR BLD CALC: 33 % (ref 36–45)
HGB BLD-MCNC: 10.5 G/DL (ref 12–15)
INR BLD: 1.17
LYMPHOCYTES # SPEC AUTO: 2 K/UL (ref 0.7–4.9)
MAGNESIUM SERPL-MCNC: 1.8 MG/DL (ref 1.6–2.4)
MCH RBC QN AUTO: 31 PG (ref 27–35)
MCHC RBC AUTO-ENTMCNC: 31.9 G/DL (ref 32–36)
MCV RBC: 97.2 FL (ref 80–100)
NRBC # BLD: 0 10*3/UL (ref 0–0)
NRBC BLD AUTO-RTO: 0 % (ref 0–0)
PMV BLD: 7.7 FL (ref 7.6–11.3)
POTASSIUM SERPL-SCNC: 3.8 MEQ/L (ref 3.5–5.1)
PROTHROMBIN TIME: 13 SECONDS (ref 9.4–12.5)
RBC # BLD: 3.4 M/UL (ref 3.86–4.86)
WBC # BLD AUTO: 6.1 THOU/UL (ref 4.3–10.9)

## 2024-10-03 RX ADMIN — ERTAPENEM SODIUM SCH MLS: 1 INJECTION, POWDER, LYOPHILIZED, FOR SOLUTION INTRAMUSCULAR; INTRAVENOUS at 15:03

## 2024-10-03 NOTE — P.PN
Date of Service: 10/02/24








Subjective


Admitted with UTI, failed outpatient treatment antibiotics








Review of Systems


10 point system review of negative unless listed in HPI





Physical Examination





- Vital Signs


Reviewed





Assessment And Plan





- Plan


- Physical Exam


General: Alert, In no apparent distress, Oriented x3


HEENT: Atraumatic, PERRLA, Mucous membr. moist/pink, EOMI, Sclerae nonicteric


Neck: Supple, JVD not distended


Respiratory: Clear to auscultation bilaterally, Normal air movement


Cardiovascular: No edema, Regular rate/rhythm, Normal S1 S2, No gallops, No 

rubs, No murmurs


Gastrointestinal: Normal bowel sounds, Soft and benign, Non-distended, No 

tenderness, No rebound, No guarding


Musculoskeletal: No clubbing


Integumentary: No rashes


Neurological: Normal speech, Cranial nerves 3-12 intact, Normal affect











Assessment and Plan





- Plan





Acute cystitis


Failed outpatient treatment antibiotics


9/24 ESBL, E. coli of the urine


Treated with Bactrim, Macrobid,


Started on IV meropenem


Will need IV antibiotics x 14 days, Merrem impairment discharge





Metabolic encephalopathy from sepsis, 


from acute cystitis, fall precautions





Transaminitis


Trend liver function





Atrial fibrillation


COPD


COPD


GERD


HTN


Hypothyroidism


Resume appropriate home meds








DVT PPX: Lovenox-start anticoagulation if on at home. 


Code status:Full


Discharge Plan: Home


Plan to discharge in: 24 Hours





- Advance Directives


Does patient have a Living Will: No


Does patient have a Durable POA for Healthcare: Yes





- Code Status/Comfort Care


Code Status Assessed: Yes (Full code)


Critical Care: No


Time Spent Managing Pts Care (In Minutes): 35








<Lidia Jean - Last Filed: 10/06/24 05:48>





Patient was seen and examined.  Events of the last 24 hours have been noted.  

Spoke with with ELENA regarding patient's clinical picture after evaluating and 

examining the patient independently. I performed a substantial part of the MDM 

during this patient's care today. I personally made or approved the documented 

management plan and acknowledge its risk of complications. I agree with the 

findings and documentation provided in the ELENA's notes. 





<Guillaume Garibay - Last Filed: 10/07/24 00:09>